# Patient Record
Sex: FEMALE | Race: WHITE | NOT HISPANIC OR LATINO | Employment: PART TIME | URBAN - METROPOLITAN AREA
[De-identification: names, ages, dates, MRNs, and addresses within clinical notes are randomized per-mention and may not be internally consistent; named-entity substitution may affect disease eponyms.]

---

## 2017-02-20 ENCOUNTER — PRE VISIT (OUTPATIENT)
Dept: CARDIOLOGY | Facility: CLINIC | Age: 67
End: 2017-02-20

## 2017-03-03 ENCOUNTER — OFFICE VISIT (OUTPATIENT)
Dept: CARDIOLOGY | Facility: CLINIC | Age: 67
End: 2017-03-03
Payer: COMMERCIAL

## 2017-03-03 VITALS
HEIGHT: 65 IN | BODY MASS INDEX: 23.66 KG/M2 | WEIGHT: 142 LBS | DIASTOLIC BLOOD PRESSURE: 54 MMHG | SYSTOLIC BLOOD PRESSURE: 102 MMHG | HEART RATE: 69 BPM

## 2017-03-03 DIAGNOSIS — I25.10 CORONARY ARTERY DISEASE INVOLVING NATIVE CORONARY ARTERY OF NATIVE HEART WITHOUT ANGINA PECTORIS: Primary | ICD-10-CM

## 2017-03-03 DIAGNOSIS — E78.00 HYPERCHOLESTEREMIA: ICD-10-CM

## 2017-03-03 PROCEDURE — 99214 OFFICE O/P EST MOD 30 MIN: CPT | Performed by: INTERNAL MEDICINE

## 2017-03-03 NOTE — LETTER
3/3/2017    Allan Cano MD  [facility]   [address]  [facility] [address]    RE: Svetlana Adair       Dear Colleague,    I had the pleasure of seeing Svetlana Adair in the [facility].    Ms. Adair is a very nice 66-year-old woman with a past medical history significant for a very high calcium score at 790, putting her in the top 1% for age.  She is a former smoker, having quit 4 years ago.  She has hypercholesterolemia.  She was under a great deal of stress as her   of Alzheimer disease and she was a caregiver for many years.  She herself has also had breast cancer and bilateral mastectomies.  Lymph nodes were all negative.      Svetlana returns to clinic stating that she is doing quite well from a cardiac standpoint.  She has no chest, arm, neck, jaw, shoulder discomfort, no dyspnea on exertion, orthopnea or PND.  No palpitations, lightheadedness, dizziness, syncope or near-syncope.        She states she is not doing quite as well as she has in the past.  She has a new boyfriend and unfortunately her diet has slid and she has gained about 5 pounds of weight as she states he is thin and does not watch as strict of a diet that she did.  She notes no side effects or problems with her current medical regimen.      She has a variety of questions including vitamin D, calcium supplements, Coenzyme Q10, her stress test and her calcium scoring test and what she needs to do to reverse these things, what is her long-term risks.  She states she is just haunted by the fact that she is in the top 1% for risk.     Outpatient Encounter Prescriptions as of 3/3/2017   Medication Sig Dispense Refill     Multiple Vitamins-Minerals (MULTIVITAMIN ADULT PO) Take by mouth daily       [DISCONTINUED] valACYclovir (VALTREX) 1000 mg tablet TAKE 2 TABLETS BY MOUTH TWICE DAILY 8 tablet 0     atorvastatin (LIPITOR) 40 MG tablet TAKE 1 TABLET(40 MG) BY  MOUTH DAILY 90 tablet 3     HYDROcodone-acetaminophen (NORCO) 5-325 MG per tablet Take 1 tablet by mouth every 6 hours as needed for pain 45 tablet 0     triamterene-hydrochlorothiazide (MAXZIDE-25) 37.5-25 MG per tablet Take 1 tablet by mouth as needed 90 tablet 0     cyclobenzaprine (FLEXERIL) 5 MG tablet Take 1-2 tablets (5-10 mg) by mouth 2 times daily as needed for muscle spasms 42 tablet 0     FIBER PO 2 tablets twice daily       nicotine polacrilex (NICORETTE) 2 MG gum Place 2 mg inside cheek as needed for smoking cessation       aspirin 81 MG tablet Take 1 tablet (81 mg) by mouth daily 30 tablet      omeprazole 20 MG tablet Take 1 tablet (20 mg) by mouth as needed 90 tablet 1     Omega-3 Fatty Acids (OMEGA 3 PO) Take 1 tablet by mouth 2 times daily        LYSINE 1-3 daily as needed       [DISCONTINUED] nicotine polacrilex (NICORETTE) 2 MG gum Take 1 each (2 mg) by mouth as needed for smoking cessation 170 each 3     Cholecalciferol (VITAMIN D3) 2000 UNITS TABS Take 5,000 Units by mouth Reported on 3/3/2017       Coenzyme Q10 (COQ10) 100 MG CAPS Take by mouth daily Reported on 3/3/2017       [DISCONTINUED] vitamin  B complex with vitamin C (VITAMIN  B COMPLEX) TABS Take 1 tablet by mouth daily       No facility-administered encounter medications on file as of 3/3/2017.       ASSESSMENT AND PLAN:  Svetlana appears to be doing quite well from a cardiac standpoint without clinical evidence of ischemia or heart failure or significant arrhythmia.  During her stress test of 12/2015, she was able to exercise to a very high workload and I have explained to her this means she has no hemodynamically significant coronary artery disease but her calcium score tells us that she does have quite a bit of coronary artery disease brewing and that we need to be aggressive with all risk factors.      I reassured her she is doing quite well.  Blood pressure is 102/54 with a pulse of 69.      Weight is 142 pounds which still keeps  her in the normal range with a body mass index of 23.7.  This is up about 5 pounds from last fall.      Fasting lipid profile is outstanding.  Total cholesterol is 140, HDL 75, LDL 52, triglycerides were 65.  I point out to dramatic improvement with LDL dropping from 142 all the way to 52 at the same time her HDL going up by 4 points.      I have told her the most important thing is the fact that she is off cigarettes.      Electrolytes were also within normal limits with a creatinine of 0.75, BUN of 12 and a potassium of 4.3.      We talked about the importance of regular exercise regimen and keeping a healthy diet.  We also talked about the fact that she probably should be on a vitamin D supplement.  Coenzyme Q10 may decrease the risk for side effects with the atorvastatin.  A multivitamin is probably not necessary but again I have asked her to discuss these with her primary care doctor.  I will have her follow up with my PATIENCE in 1 year.  I will see her back in 2 years.  If she were to have any problems I would be glad to see her sooner.     Again, thank you for allowing me to participate in the care of your patient.      Sincerely,    Jose Alberto Perez MD     [facility]

## 2017-03-03 NOTE — PROGRESS NOTES
HPI and Plan:   See dictation    Orders Placed This Encounter   Procedures     Lipid Profile     Lipid Profile     Follow-Up with Cardiac Advanced Practice Provider     Follow-Up with Cardiologist       Orders Placed This Encounter   Medications     Multiple Vitamins-Minerals (MULTIVITAMIN ADULT PO)     Sig: Take by mouth daily       Medications Discontinued During This Encounter   Medication Reason     nicotine polacrilex (NICORETTE) 2 MG gum Medication Reconciliation Clean Up     vitamin  B complex with vitamin C (VITAMIN  B COMPLEX) TABS Stopped by Patient         Encounter Diagnoses   Name Primary?     Coronary artery disease involving native coronary artery of native heart without angina pectoris Yes     Hypercholesteremia        CURRENT MEDICATIONS:  Current Outpatient Prescriptions   Medication Sig Dispense Refill     Multiple Vitamins-Minerals (MULTIVITAMIN ADULT PO) Take by mouth daily       valACYclovir (VALTREX) 1000 mg tablet TAKE 2 TABLETS BY MOUTH TWICE DAILY 8 tablet 0     atorvastatin (LIPITOR) 40 MG tablet TAKE 1 TABLET(40 MG) BY MOUTH DAILY 90 tablet 3     HYDROcodone-acetaminophen (NORCO) 5-325 MG per tablet Take 1 tablet by mouth every 6 hours as needed for pain 45 tablet 0     triamterene-hydrochlorothiazide (MAXZIDE-25) 37.5-25 MG per tablet Take 1 tablet by mouth as needed 90 tablet 0     cyclobenzaprine (FLEXERIL) 5 MG tablet Take 1-2 tablets (5-10 mg) by mouth 2 times daily as needed for muscle spasms 42 tablet 0     FIBER PO 2 tablets twice daily       nicotine polacrilex (NICORETTE) 2 MG gum Place 2 mg inside cheek as needed for smoking cessation       aspirin 81 MG tablet Take 1 tablet (81 mg) by mouth daily 30 tablet      omeprazole 20 MG tablet Take 1 tablet (20 mg) by mouth as needed 90 tablet 1     Omega-3 Fatty Acids (OMEGA 3 PO) Take 1 tablet by mouth 2 times daily        LYSINE 1-3 daily as needed       Cholecalciferol (VITAMIN D3) 2000 UNITS TABS Take 5,000 Units by mouth Reported  on 3/3/2017       Coenzyme Q10 (COQ10) 100 MG CAPS Take by mouth daily Reported on 3/3/2017         ALLERGIES     Allergies   Allergen Reactions     Cats      Codeine Sulfate GI Disturbance       PAST MEDICAL HISTORY:  Past Medical History   Diagnosis Date     Alcoholism (H)      Allergies      Fall     Basal cell cancer      back, chest, face     Breast cancer (H)      Coronary artery disease      CT calcium tetgt=877 Nov 2015     Depression      Hypertension      borderline     Squamous cell carcinoma (H)      left upper arm, chin       PAST SURGICAL HISTORY:  Past Surgical History   Procedure Laterality Date     Gyn surgery  2005     hysterectomy after hx of ovarian cyst, ended up being benign     Mastectomy modified radical  2011     bilateral     Colonoscopy       Orthopedic surgery       rt. knee arthroscopy     Realign patella  1973     right      Toe surgery       right grt OA      Colonoscopy  11/17/2011     Procedure:COLONOSCOPY; Colonoscopy; Surgeon:MEKA RUSS; Location:[facility]     Hysterectomy, pap no longer indicated       Mastectomy, bilateral         FAMILY HISTORY:  Family History   Problem Relation Age of Onset     CANCER Mother 60     leukemia     Arthritis Mother      osteo     Eye Disorder Mother      glaucoma     GASTROINTESTINAL DISEASE Mother      gallbladder     Alcohol/Drug Father      alcohol     HEART DISEASE Father      ? CHF     Respiratory Father      emphysema     Asthma Sister      Cancer - colorectal Brother 50     Prostate Cancer Brother      Allergies Brother      bee      Arthritis Sister      osteo     Arthritis Sister      osteo     Arthritis Brother      osteo     Depression Sister      Psychotic Disorder Sister      bipolar     Respiratory Sister        SOCIAL HISTORY:  Social History     Social History     Marital status:      Spouse name: N/A     Number of children: N/A     Years of education: N/A     Social History Main Topics     Smoking status: Former Smoker  "    Packs/day: 1.00     Years: 22.00     Types: Cigarettes     Quit date: 4/28/2010     Smokeless tobacco: Never Used     Alcohol use No      Comment: intermittent sobriety 8/24/11     Drug use: No     Sexual activity: Yes     Partners: Male     Birth control/ protection: Surgical      Comment: hyst     Other Topics Concern      Service No     Blood Transfusions No     Caffeine Concern Yes     4-5 cups caffeine per day     Occupational Exposure No     Hobby Hazards No     Sleep Concern Yes     Stress Concern Yes     Weight Concern No     Special Diet Yes     organic foods     Back Care No     Exercise No     Bike Helmet No     Seat Belt Yes     Self-Exams Yes     Parent/Sibling W/ Cabg, Mi Or Angioplasty Before 65f 55m? No     Social History Narrative       Review of Systems:  Skin:  Negative       Eyes:  Positive for glasses    ENT:  Negative      Respiratory:  Negative       Cardiovascular:  Negative      Gastroenterology: Negative      Genitourinary:  not assessed      Musculoskeletal:  Positive for arthritis;joint stiffness    Neurologic:  Positive for numbness or tingling of feet toes  Psychiatric:  Negative      Heme/Lymph/Imm:  Positive for allergies cats  Endocrine:  Negative        Physical Exam:  Vitals: /54  Pulse 69  Ht 1.651 m (5' 5\")  Wt 64.4 kg (142 lb)  BMI 23.63 kg/m2    Constitutional:  cooperative, alert and oriented, well developed, well nourished, in no acute distress        Skin:  warm and dry to the touch, no apparent skin lesions or masses noted        Head:  normocephalic, no masses or lesions        Eyes:  pupils equal and round;conjunctivae and lids unremarkable;sclera white;no xanthalasma;no nystagmus        ENT:  no pallor or cyanosis, dentition good        Neck:  carotid pulses are full and equal bilaterally;no carotid bruit        Chest:  normal breath sounds, clear to auscultation, normal A-P diameter, normal symmetry, normal respiratory excursion, no use of accessory " muscles          Cardiac: regular rhythm;normal S1 and S2;no S3 or S4;no murmurs, gallops or rubs detected                  Abdomen:           Vascular: pulses full and equal                                        Extremities and Back:  no edema;no spinal abnormalities noted;normal muscle strength and tone              Neurological:  affect appropriate, oriented to time, person and place;no gross motor deficits              CC  Jose Alberto Perez MD  [facility]  [address]  [place] [address]

## 2017-03-07 NOTE — PROGRESS NOTES
HISTORY OF PRESENT ILLNESS:  Ms. Adair is a very nice 66-year-old woman with a past medical history significant for a very high calcium score at 790, putting her in the top 1% for age.  She is a former smoker, having quit 4 years ago.  She has hypercholesterolemia.  She was under a great deal of stress as her   of Alzheimer disease and she was a caregiver for many years.  She herself has also had breast cancer and bilateral mastectomies.  Lymph nodes were all negative.      Svetlana returns to clinic stating that she is doing quite well from a cardiac standpoint.  She has no chest, arm, neck, jaw, shoulder discomfort, no dyspnea on exertion, orthopnea or PND.  No palpitations, lightheadedness, dizziness, syncope or near-syncope.        She states she is not doing quite as well as she has in the past.  She has a new boyfriend and unfortunately her diet has slid and she has gained about 5 pounds of weight as she states he is thin and does not watch as strict of a diet that she did.  She notes no side effects or problems with her current medical regimen.      She has a variety of questions including vitamin D, calcium supplements, Coenzyme Q10, her stress test and her calcium scoring test and what she needs to do to reverse these things, what is her long-term risks.  She states she is just haunted by the fact that she is in the top 1% for risk.      ASSESSMENT AND PLAN:  Svetlana appears to be doing quite well from a cardiac standpoint without clinical evidence of ischemia or heart failure or significant arrhythmia.  During her stress test of 2015, she was able to exercise to a very high workload and I have explained to her this means she has no hemodynamically significant coronary artery disease but her calcium score tells us that she does have quite a bit of coronary artery disease brewing and that we need to be aggressive with all risk factors.      I reassured her she is doing quite well.  Blood  pressure is 102/54 with a pulse of 69.      Weight is 142 pounds which still keeps her in the normal range with a body mass index of 23.7.  This is up about 5 pounds from last fall.      Fasting lipid profile is outstanding.  Total cholesterol is 140, HDL 75, LDL 52, triglycerides were 65.  I point out to dramatic improvement with LDL dropping from 142 all the way to 52 at the same time her HDL going up by 4 points.      I have told her the most important thing is the fact that she is off cigarettes.      Electrolytes were also within normal limits with a creatinine of 0.75, BUN of 12 and a potassium of 4.3.      We talked about the importance of regular exercise regimen and keeping a healthy diet.  We also talked about the fact that she probably should be on a vitamin D supplement.  Coenzyme Q10 may decrease the risk for side effects with the atorvastatin.  A multivitamin is probably not necessary but again I have asked her to discuss these with her primary care doctor.  I will have her follow up with my PATIENCE in 1 year.  I will see her back in 2 years.  If she were to have any problems I would be glad to see her sooner.         NITA GARCIA MD, [facility]             D: 2017 15:33   T: 2017 08:12   MT: DESTIN      Name:     ERIKA VALENCIA   MRN:      [id number]        Account:      [id number]   :      1950           Service Date: 2017      Document: [id number]

## 2017-04-16 ENCOUNTER — MYC MEDICAL ADVICE (OUTPATIENT)
Dept: FAMILY MEDICINE | Facility: CLINIC | Age: 67
End: 2017-04-16

## 2017-04-16 DIAGNOSIS — B00.9 HSV (HERPES SIMPLEX VIRUS) INFECTION: ICD-10-CM

## 2017-04-17 RX ORDER — VALACYCLOVIR HYDROCHLORIDE 1 G/1
TABLET, FILM COATED ORAL
Qty: 8 TABLET | Refills: 1 | Status: ON HOLD | OUTPATIENT
Start: 2017-04-17 | End: 2017-11-01

## 2017-04-17 NOTE — TELEPHONE ENCOUNTER
valACYclovir (VALTREX) 1000 mg tablet 8 tablet 0 11/16/2016          Last Written Prescription Date: 11/16/2016  Last Fill Quantity: 8, # refills: 0  Last Office Visit with [facility], [facility] or [facility] prescribing provider: 11/18/2016        Creatinine   Date Value Ref Range Status   11/14/2016 0.75 0.52 - 1.04 mg/dL Final

## 2017-04-17 NOTE — TELEPHONE ENCOUNTER
Prescription approved per [facility] Refill Protocol.    Ramona Sandy RN

## 2017-04-27 DIAGNOSIS — M54.50 LOW BACK PAIN WITHOUT SCIATICA, UNSPECIFIED BACK PAIN LATERALITY, UNSPECIFIED CHRONICITY: ICD-10-CM

## 2017-04-27 DIAGNOSIS — F17.218 CIGARETTE NICOTINE DEPENDENCE WITH OTHER NICOTINE-INDUCED DISORDER: Primary | ICD-10-CM

## 2017-04-27 RX ORDER — HYDROCODONE BITARTRATE AND ACETAMINOPHEN 5; 325 MG/1; MG/1
1 TABLET ORAL EVERY 6 HOURS PRN
Qty: 20 TABLET | Refills: 0 | Status: SHIPPED | OUTPATIENT
Start: 2017-04-27 | End: 2017-05-05

## 2017-04-27 NOTE — TELEPHONE ENCOUNTER
PCP:    Starter pack was completed on 11/18/16. Pt requesting to restart Chantix.     Ramona Sandy RN

## 2017-04-27 NOTE — TELEPHONE ENCOUNTER
Pended the Norco- Unsure if should pend starter chantix or continuing please review message below.    Controlled Substance Refill Request for Pending Prescriptions:                       Disp   Refills    HYDROcodone-acetaminophen (NORCO) 5-325 M*45 tab*0            Sig: Take 1 tablet by mouth every 6 hours as needed           for pain    Norco  Problem List Complete:  Yes   checked in past 6 months?  No, route to RN  Last OV:11/18/2016  Last Refill:10/27/2016  Qty:45  Refills:0  Controlled substance agreement: YES

## 2017-04-27 NOTE — TELEPHONE ENCOUNTER
Called patient regarding [place] refill.  Patient states that she uses the narcotic analgesic periodically for multiple sites of arthritic pain.  Her last refill was in October.  Agreed to give her a partial refill and she will follow up with Dr. Cano for ongoing therapy.

## 2017-04-27 NOTE — TELEPHONE ENCOUNTER
Reason for Call:  Medication or medication refill:    Do you use a [facility]?  Name of the pharmacy and phone number for the current request:     [id number] [address] [facility] [address]    Name of the medication requested: HYDROcodone-acetaminophen (NORCO) 5-325 MG per tablet - 2 pills left    Other request: also wants to go back on Chantix; please send rx to the pharm    Can we leave a detailed message on this number? YES    Phone number patient can be reached at: Home number on file [phone] (home)    Best Time:     Call taken on 4/27/2017 at 1:14 PM by Carlene Mejia

## 2017-05-01 DIAGNOSIS — M54.50 LOW BACK PAIN WITHOUT SCIATICA, UNSPECIFIED BACK PAIN LATERALITY, UNSPECIFIED CHRONICITY: ICD-10-CM

## 2017-05-01 RX ORDER — HYDROCODONE BITARTRATE AND ACETAMINOPHEN 5; 325 MG/1; MG/1
1 TABLET ORAL EVERY 6 HOURS PRN
Qty: 20 TABLET | Refills: 0 | Status: CANCELLED | OUTPATIENT
Start: 2017-05-01

## 2017-05-01 NOTE — TELEPHONE ENCOUNTER
Reason for Call:  Medication or medication refill:    Do you use a [facility]?  Name of the pharmacy and phone number for the current request:       WRITTEN PRESCRIPTION REQUESTED    Name of the medication requested: HYDROcodone-acetaminophen (NORCO) 5-325 MG per tablet      Other request: please call when Rx is ready     Can we leave a detailed message on this number? YES    Phone number patient can be reached at: Home number on file [phone] (home)    Best Time: anytime    Call taken on 5/1/2017 at 12:27 PM by Sharlene Morin

## 2017-05-04 NOTE — TELEPHONE ENCOUNTER
She did not take the Rx from the  she wants the whole script  So the half of Rx is still in the front drawer and the patient would like to come and  a new Rx for   A whole script on Monday 5/8/2017  Please call patient when there is a new RX ready for her to

## 2017-05-04 NOTE — TELEPHONE ENCOUNTER
Patient came in to  her medication and to say Goodbye to Dr. Cano who she is going to miss him greatly

## 2017-05-05 RX ORDER — HYDROCODONE BITARTRATE AND ACETAMINOPHEN 5; 325 MG/1; MG/1
1 TABLET ORAL EVERY 6 HOURS PRN
Qty: 20 TABLET | Refills: 0 | Status: SHIPPED | OUTPATIENT
Start: 2017-05-05 | End: 2017-10-27

## 2017-05-08 NOTE — TELEPHONE ENCOUNTER
Called and informed pt that two rx's are available at the .   1 with 20 from Dr. [place] and 1 with an additional 20 from Dr. Cano

## 2017-05-22 ENCOUNTER — MYC MEDICAL ADVICE (OUTPATIENT)
Dept: FAMILY MEDICINE | Facility: CLINIC | Age: 67
End: 2017-05-22

## 2017-05-22 DIAGNOSIS — F17.200 TOBACCO USE DISORDER: Primary | ICD-10-CM

## 2017-05-22 NOTE — TELEPHONE ENCOUNTER
Please see My Chart message below and advise as appropriate. Nicorette gum pended.  Listed as historical medication    Caroline Coughlin RN  [facility] [employer]

## 2017-05-28 ENCOUNTER — MYC MEDICAL ADVICE (OUTPATIENT)
Dept: FAMILY MEDICINE | Facility: CLINIC | Age: 67
End: 2017-05-28

## 2017-05-28 DIAGNOSIS — R60.0 PERIPHERAL EDEMA: ICD-10-CM

## 2017-05-31 RX ORDER — TRIAMTERENE/HYDROCHLOROTHIAZID 37.5-25 MG
1 TABLET ORAL PRN
Qty: 90 TABLET | Refills: 0 | Status: SHIPPED | OUTPATIENT
Start: 2017-05-31 | End: 2017-08-16

## 2017-05-31 NOTE — TELEPHONE ENCOUNTER
Pending Prescriptions:                       Disp   Refills    triamterene-hydrochlorothiazide (MAXZIDE-*90 tab*0            Sig: Take 1 tablet by mouth as needed          Last Written Prescription Date: 09/19/16  Last Fill Quantity: 90, # refills: 0  Last Office Visit with [facility], [facility] or [facility] prescribing provider: 11/18/16       Potassium   Date Value Ref Range Status   11/14/2016 4.3 3.4 - 5.3 mmol/L Final     Creatinine   Date Value Ref Range Status   11/14/2016 0.75 0.52 - 1.04 mg/dL Final     BP Readings from Last 3 Encounters:   03/03/17 102/54   11/18/16 107/69   01/15/16 122/70

## 2017-05-31 NOTE — TELEPHONE ENCOUNTER
Routing refill request to provider for review/approval because:  Drug not on the [facility] refill protocol for dx of peripheral edema  Peripheral edema not discussed in visits within past year  Audrey ABREU RN

## 2017-08-02 ENCOUNTER — TELEPHONE (OUTPATIENT)
Dept: CARDIOLOGY | Facility: CLINIC | Age: 67
End: 2017-08-02

## 2017-08-02 DIAGNOSIS — R00.2 PALPITATIONS: Primary | ICD-10-CM

## 2017-08-02 DIAGNOSIS — E78.5 HYPERLIPIDEMIA LDL GOAL <70: ICD-10-CM

## 2017-08-02 NOTE — TELEPHONE ENCOUNTER
"Pt calling- left voice message that she is concerned her statin is causing muscle \"tone\" issues and memory issues.  Also she is fatigued.    Called pt - reviewed that with her elevated calcium score she needs a statin rx.  Discussed usual side effects being muscle aching - not usually tone issues.  Pt will hold atorvastatin for 2 weeks and see if it helps.  Also, needs follow up with PCP to review these issues.    Discussed calcium score isn't treated with calcium management but is related to plaque and fat build up in arteries.  Avoid fats - red meat only 2-3x/week, eggs not daily.  Offered dietician consult.  Also recommended book by Dr. Garcia Harmon on reversing CAD.  Gave her PCP recommendations- Dr. Cummins and Dr. Mcginnis.  Will review if lab work needed with Dr. Perez.-   "

## 2017-08-02 NOTE — TELEPHONE ENCOUNTER
"Called to review that at times she is having \"fluttering\" in her chest  Denies other associated sxs ie lightheadness or shortness or breath    Will keep diary of when this occurs  Call if increasing or symptomatic    Noted on nuc gxt 2015 had 28K irregular beats.    "

## 2017-08-08 ENCOUNTER — HOSPITAL ENCOUNTER (OUTPATIENT)
Dept: CARDIOLOGY | Facility: CLINIC | Age: 67
Discharge: HOME | End: 2017-08-08
Attending: INTERNAL MEDICINE | Admitting: INTERNAL MEDICINE
Payer: MEDICARE

## 2017-08-08 DIAGNOSIS — R00.2 PALPITATIONS: ICD-10-CM

## 2017-08-08 DIAGNOSIS — E78.5 HYPERLIPIDEMIA LDL GOAL <70: ICD-10-CM

## 2017-08-08 LAB
ANION GAP SERPL CALCULATED.3IONS-SCNC: 6 MMOL/L (ref 6–17)
BUN SERPL-MCNC: 13 MG/DL (ref 7–30)
CALCIUM SERPL-MCNC: 9.6 MG/DL (ref 8.5–10.5)
CHLORIDE SERPL-SCNC: 105 MMOL/L (ref 98–107)
CK SERPL-CCNC: 147 U/L (ref 30–225)
CO2 SERPL-SCNC: 29 MMOL/L (ref 23–29)
CREAT SERPL-MCNC: 0.9 MG/DL (ref 0.7–1.3)
GFR SERPL CREATININE-BSD FRML MDRD: 63 ML/MIN/1.7M2
GLUCOSE SERPL-MCNC: 90 MG/DL (ref 70–105)
POTASSIUM SERPL-SCNC: 4 MMOL/L (ref 3.5–5.1)
SODIUM SERPL-SCNC: 136 MMOL/L (ref 136–145)

## 2017-08-08 PROCEDURE — 80048 BASIC METABOLIC PNL TOTAL CA: CPT | Performed by: INTERNAL MEDICINE

## 2017-08-08 PROCEDURE — 36415 COLL VENOUS BLD VENIPUNCTURE: CPT | Performed by: INTERNAL MEDICINE

## 2017-08-08 PROCEDURE — 93225 XTRNL ECG REC<48 HRS REC: CPT | Performed by: INTERNAL MEDICINE

## 2017-08-08 PROCEDURE — 82550 ASSAY OF CK (CPK): CPT | Performed by: INTERNAL MEDICINE

## 2017-08-08 PROCEDURE — 93227 XTRNL ECG REC<48 HR R&I: CPT | Performed by: INTERNAL MEDICINE

## 2017-08-11 ENCOUNTER — DOCUMENTATION ONLY (OUTPATIENT)
Dept: CARDIOLOGY | Facility: CLINIC | Age: 67
End: 2017-08-11

## 2017-08-11 DIAGNOSIS — I25.10 CORONARY ARTERY DISEASE INVOLVING NATIVE CORONARY ARTERY OF NATIVE HEART WITHOUT ANGINA PECTORIS: ICD-10-CM

## 2017-08-11 NOTE — PROGRESS NOTES
Phone call 8-2-2017 fluttering in chest Dr. Perez ordered BMP and Holter    Component      Latest Ref Rng & Units 8/8/2017   Sodium      136 - 145 mmol/L 136   Potassium      3.5 - 5.1 mmol/L 4.0   Chloride      98 - 107 mmol/L 105   Carbon Dioxide      23 - 29 mmol/L 29   Anion Gap      6 - 17 mmol/L 6   Glucose      70 - 105 mg/dL 90   Urea Nitrogen      7 - 30 mg/dL 13   Creatinine      0.70 - 1.30 mg/dL 0.90   GFR Estimate      >60 mL/min/1.7m2 63   GFR Estimate If Black      >60 mL/min/1.7m2 76   Calcium      8.5 - 10.5 mg/dL 9.6       Holter 24 hours Showed 464 isolated SVPB's, 5 pairs and 2 Runs longest run was 4 beats which was alos the fastes rate 130bpm at 3:13pm  Has D/U with PMD on 9-  F/U with Cardiology in March 2018      See if she would be willing to try atorvastatin 10 mg a day. Per Dr. Perez

## 2017-08-13 NOTE — TELEPHONE ENCOUNTER
Her electrolytes are fine. Holter is benign. She should have her TSH rechecked. Did her heart flutter during Holter?

## 2017-08-14 NOTE — PROGRESS NOTES
"  Spoke with patient and BMP results reviewed. Patient said she had a brief  fluttering feeling once while wearing the Holter and pressed the button.   Per Holter results - event of palpitation correlated with an isolated PAC. Patient states that she still experiences about 1-2 X/Week a brief episode, about 1 minute,  of fluttering.  Patient also is currently on a statin Holter for \" feeling that leg muscles are atrophying (shrinking) , fatigue and slight memory forgetfulness for the past year.\"  Patient states maybe her memory is better for the past week or so since holding the atorvastatin.    See if she would be willing to try atorvastatin 10 mg a day. Per Dr. Perez                 Patient states she is willing to try Atorvastatin 10mg and will repeat FLP in 6 weeks.  Reviewed holter results and recommend she cut back on caffeine to see if palpitations or fluttering has decreased.  Sent RX to [facility] and orders placed in Epic.  "

## 2017-08-15 RX ORDER — ATORVASTATIN CALCIUM 40 MG/1
TABLET, FILM COATED ORAL
Qty: 90 TABLET | Refills: 3 | Status: CANCELLED | COMMUNITY
Start: 2017-08-15

## 2017-08-15 RX ORDER — ATORVASTATIN CALCIUM 10 MG/1
10 TABLET, FILM COATED ORAL DAILY
Qty: 90 TABLET | Refills: 0 | Status: SHIPPED | OUTPATIENT
Start: 2017-08-15 | End: 2018-01-03 | Stop reason: ALTCHOICE

## 2017-08-23 ENCOUNTER — OFFICE VISIT (OUTPATIENT)
Dept: FAMILY MEDICINE | Facility: CLINIC | Age: 67
End: 2017-08-23
Payer: COMMERCIAL

## 2017-08-23 VITALS
BODY MASS INDEX: 23.73 KG/M2 | OXYGEN SATURATION: 96 % | RESPIRATION RATE: 18 BRPM | TEMPERATURE: 96.7 F | HEART RATE: 65 BPM | HEIGHT: 64 IN | DIASTOLIC BLOOD PRESSURE: 61 MMHG | SYSTOLIC BLOOD PRESSURE: 98 MMHG | WEIGHT: 139 LBS

## 2017-08-23 DIAGNOSIS — H92.03 ACUTE PAIN OF BOTH EARS: ICD-10-CM

## 2017-08-23 DIAGNOSIS — R53.83 FATIGUE, UNSPECIFIED TYPE: Primary | ICD-10-CM

## 2017-08-23 DIAGNOSIS — R00.2 HEART PALPITATIONS: ICD-10-CM

## 2017-08-23 PROCEDURE — 99215 OFFICE O/P EST HI 40 MIN: CPT | Performed by: NURSE PRACTITIONER

## 2017-08-23 ASSESSMENT — ENCOUNTER SYMPTOMS: PALPITATIONS: 1

## 2017-08-23 NOTE — NURSING NOTE
"Chief Complaint   Patient presents with     Palpitations     Ear Problem       Initial BP 98/61 (BP Location: Left arm, Patient Position: Chair, Cuff Size: Adult Regular)  Pulse 65  Temp 96.7  F (35.9  C) (Oral)  Resp 18  Ht 5' 4\" (1.626 m)  Wt 139 lb (63 kg)  SpO2 96%  BMI 23.86 kg/m2 Estimated body mass index is 23.86 kg/(m^2) as calculated from the following:    Height as of this encounter: 5' 4\" (1.626 m).    Weight as of this encounter: 139 lb (63 kg).  Medication Reconciliation: complete   Naya Stoddard CMA (AAMA)      "

## 2017-08-23 NOTE — PROGRESS NOTES
"HPI    SUBJECTIVE:   Svetlana Adair is a 67 year old female who presents to clinic today for the following health issues:    Palpitations      Duration: Ongoing    Description (location/character/radiation): Heart Palpitations-Had holter monitor this month with Cardiology    Intensity:  mild    Accompanying signs and symptoms: No chest pain; Very mild SOB that is more like stress SOB, even with just talking     History (similar episodes/previous evaluation): None    Precipitating or alleviating factors: None    Therapies tried and outcome: None       Complains of palpitations that are \"happening a lot today\"  Ear ache right 3 weeks ago, down neck on right side only, now last couple of days both ears hurt, jaw pain  Complains of fatigue for the last couple of months,   Complains of stress, stated she does have a good support system, she is not currently in counseling.  Atorvastatin down to 10 mg from 40 mg a couple weeks ago due to increasing fatigue  Memory changes, complains of brain fog and forgetfulness  Palpations in the last 4-6 months  Muscular atrophy, no weight loss but clothes are fitting differently, she is not currently working out or exercising due to fatigue.  Complains of indegestion-omeprazole as needed  Complains of vertigo for the last 2 months, lightheaded when standing      Past Medical History:   Diagnosis Date     Alcoholism (H)      Allergies     Fall     Basal cell cancer     back, chest, face     Breast cancer (H)      Coronary artery disease     CT calcium nyqtl=172 Nov 2015     Depression      Hypertension     borderline     Squamous cell carcinoma     left upper arm, chin     Past Surgical History:   Procedure Laterality Date     COLONOSCOPY       COLONOSCOPY  11/17/2011    Procedure:COLONOSCOPY; Colonoscopy; Surgeon:MEKA RUSS; Location: GI     GYN SURGERY  2005    hysterectomy after hx of ovarian cyst, ended up being benign     HYSTERECTOMY, PAP NO LONGER INDICATED       " MASTECTOMY MODIFIED RADICAL  2011    bilateral     MASTECTOMY, BILATERAL       ORTHOPEDIC SURGERY      rt. knee arthroscopy     REALIGN PATELLA  1973    right      TOE SURGERY      right grt OA      Social History   Substance Use Topics     Smoking status: Former Smoker     Packs/day: 1.00     Years: 22.00     Types: Cigarettes     Quit date: 4/28/2010     Smokeless tobacco: Never Used     Alcohol use No      Comment: intermittent sobriety 8/24/11     Current Outpatient Prescriptions   Medication Sig Dispense Refill     omeprazole 20 MG tablet Take 1 tablet (20 mg) by mouth as needed 30 tablet 0     triamterene-hydrochlorothiazide (MAXZIDE-25) 37.5-25 MG per tablet Take 1 tablet by mouth as needed 90 tablet 0     atorvastatin (LIPITOR) 10 MG tablet Take 1 tablet (10 mg) by mouth daily 90 tablet 0     nicotine polacrilex (NICORETTE) 2 MG gum Place 1 each (2 mg) inside cheek as needed for smoking cessation 30 tablet 11     HYDROcodone-acetaminophen (NORCO) 5-325 MG per tablet Take 1 tablet by mouth every 6 hours as needed for pain 20 tablet 0     valACYclovir (VALTREX) 1000 mg tablet TAKE 2 TABLETS BY MOUTH TWICE DAILY 8 tablet 1     Multiple Vitamins-Minerals (MULTIVITAMIN ADULT PO) Take by mouth daily       atorvastatin (LIPITOR) 40 MG tablet TAKE 1 TABLET(40 MG) BY MOUTH DAILY 90 tablet 3     cyclobenzaprine (FLEXERIL) 5 MG tablet Take 1-2 tablets (5-10 mg) by mouth 2 times daily as needed for muscle spasms 42 tablet 0     Cholecalciferol (VITAMIN D3) 2000 UNITS TABS Take 5,000 Units by mouth Reported on 3/3/2017       FIBER PO 2 tablets twice daily       Coenzyme Q10 (COQ10) 100 MG CAPS Take by mouth daily Reported on 3/3/2017       aspirin 81 MG tablet Take 1 tablet (81 mg) by mouth daily 30 tablet      Omega-3 Fatty Acids (OMEGA 3 PO) Take 1 tablet by mouth 2 times daily        LYSINE 1-3 daily as needed       varenicline (CHANTIX STARTING MONTH PAK) 0.5 MG X 11 & 1 MG X 42 tablet Take 0.5 mg tab daily for 3  "days, then 0.5 mg tab twice daily for 4 days, then 1 mg twice daily. (Patient not taking: Reported on 8/23/2017) 53 tablet 0     Allergies   Allergen Reactions     Cats      Codeine Sulfate GI Disturbance       Reviewed and updated as needed this visit by clinical staff and provider      Review of Systems   Constitutional: Positive for malaise/fatigue.   HENT: Positive for ear pain.         Bilateral jaw pain   Cardiovascular: Positive for palpitations.   Gastrointestinal: Positive for heartburn (indigestion).   Neurological: Positive for dizziness.   All other systems reviewed and are negative.        BP 98/61 (BP Location: Left arm, Patient Position: Chair, Cuff Size: Adult Regular)  Pulse 65  Temp 96.7  F (35.9  C) (Oral)  Resp 18  Ht 5' 4\" (1.626 m)  Wt 139 lb (63 kg)  SpO2 96%  BMI 23.86 kg/m2      Physical Exam   Constitutional: She is well-developed, well-nourished, and in no distress. Vital signs are normal.   HENT:   Head: Normocephalic.   Right Ear: Tympanic membrane, external ear and ear canal normal.   Left Ear: Tympanic membrane, external ear and ear canal normal.   Jaw was asymmetric with popping on the right side with closing of the jaw   Eyes: Conjunctivae are normal.   Cardiovascular: Normal rate and normal heart sounds.    Occasional ectopic beats   Pulmonary/Chest: Effort normal and breath sounds normal.   Neurological: She is alert.   Skin: Skin is warm and dry.   Psychiatric: Mood and affect normal.   Nursing note and vitals reviewed.      Assessment and Plan:       ICD-10-CM    1. Fatigue, unspecified type R53.83 TSH with free T4 reflex   2. Heart palpitations R00.2    3. Acute pain of both ears H92.03        Multiple symptoms of unknown etiology   Patient to have TSH done at next physical exam (9/25) with Dr Cruz  No acute findings on exam, ear pain is likely due to TMJ   Discussed the use of nicorette gum with her jaw pain and to try lozenges instead  Discussed cutting down on " caffeine and stopping all caffeine before noon.  Talked about diet and exercise to help with fatigue  Discussed coping strategies, counseling. Patient stated she has a good support system and is not afraid to talk .  Return to clinic if symptoms persist or worsen      The total visit time was 40 minutes more  than 50% was spent in counseling and coordination of care as discussed above.        Adriana Roblero, RN NP student      Muriel Mcintyre APRN, CNP  [facility]

## 2017-08-23 NOTE — MR AVS SNAPSHOT
After Visit Summary   8/23/2017    Svetlana Adair    MRN: [id number]           Patient Information     Date Of Birth          1950        Visit Information        Provider Department      8/23/2017 11:00 AM Muriel Mcintyre APRN CNP [facility]        Today's Diagnoses     Fatigue, unspecified type    -  1    Heart palpitations        Acute pain of both ears           Follow-ups after your visit        Your next 10 appointments already scheduled     Sep 25, 2017  9:30 AM CDT   Office Visit with Vladislav Cruz MD   [facility]    [address] [facility] [id number] [phone]           Bring a current list of meds and any records pertaining to this visit. For Physicals, please bring immunization records and any forms needing to be filled out. Please arrive 10 minutes early to complete paperwork.              Who to contact     If you have questions or need follow up information about today's clinic visit or your schedule please contact [facility] directly at [phone].  Normal or non-critical lab and imaging results will be communicated to you by Delphixhart, letter or phone within 4 business days after the clinic has received the results. If you do not hear from us within 7 days, please contact the clinic through Delphixhart or phone. If you have a critical or abnormal lab result, we will notify you by phone as soon as possible.  Submit refill requests through Doximity or call your pharmacy and they will forward the refill request to us. Please allow 3 business days for your refill to be completed.          Additional Information About Your Visit        MyChart Information     Doximity gives you secure access to your electronic health record. If you see a primary care provider, you can also send messages to your care team and make appointments. If you have questions, please call your primary care clinic.  If you do not have a  "primary care provider, please call [phone] and they will assist you.        Care EveryWhere ID     This is your Care EveryWhere ID. This could be used by other organizations to access your [place] medical records  [id number]        Your Vitals Were     Pulse Temperature Respirations Height Pulse Oximetry BMI (Body Mass Index)    65 96.7  F (35.9  C) (Oral) 18 5' 4\" (1.626 m) 96% 23.86 kg/m2       Blood Pressure from Last 3 Encounters:   08/23/17 98/61   03/03/17 102/54   11/18/16 107/69    Weight from Last 3 Encounters:   08/23/17 139 lb (63 kg)   03/03/17 142 lb (64.4 kg)   11/18/16 138 lb (62.6 kg)               Primary Care Provider Office Phone #    Mariano Byrnes [facility] [phone]       No address on file        Equal Access to Services     SMITA AMADOR : Hadii jerson veraso Sojabari, waaxda luqadaha, qaybta kaalmada adeegyada, chelsi james . So [facility] [phone].    ATENCIÓN: Si habla español, tiene a bruner disposición servicios gratuitos de asistencia lingüística. Chato al [phone].    We comply with applicable federal civil rights laws and [place] laws. We do not discriminate on the basis of race, color, national origin, age, disability sex, sexual orientation or gender identity.            Thank you!     Thank you for choosing [facility]  for your care. Our goal is always to provide you with excellent care. Hearing back from our patients is one way we can continue to improve our services. Please take a few minutes to complete the written survey that you may receive in the mail after your visit with us. Thank you!             Your Updated Medication List - Protect others around you: Learn how to safely use, store and throw away your medicines at www.disposemymeds.org.          This list is accurate as of: 8/23/17 11:59 PM.  Always use your most recent med list.                   Brand Name Dispense Instructions for use Diagnosis    aspirin 81 MG " tablet     30 tablet    Take 1 tablet (81 mg) by mouth daily    Coronary artery disease involving native coronary artery of native heart without angina pectoris       * atorvastatin 40 MG tablet    LIPITOR    90 tablet    TAKE 1 TABLET(40 MG) BY MOUTH DAILY    Coronary artery disease involving native coronary artery of native heart without angina pectoris       * atorvastatin 10 MG tablet    LIPITOR    90 tablet    Take 1 tablet (10 mg) by mouth daily    Coronary artery disease involving native coronary artery of native heart without angina pectoris       CoQ10 100 MG Caps      Take by mouth daily Reported on 3/3/2017        cyclobenzaprine 5 MG tablet    FLEXERIL    42 tablet    Take 1-2 tablets (5-10 mg) by mouth 2 times daily as needed for muscle spasms    Torticollis, spasmodic       FIBER PO      2 tablets twice daily        HYDROcodone-acetaminophen 5-325 MG per tablet    NORCO    20 tablet    Take 1 tablet by mouth every 6 hours as needed for pain    Low back pain without sciatica, unspecified back pain laterality, unspecified chronicity       LYSINE      1-3 daily as needed        MULTIVITAMIN ADULT PO      Take by mouth daily        nicotine polacrilex 2 MG gum    NICORETTE    30 tablet    Place 1 each (2 mg) inside cheek as needed for smoking cessation    Tobacco use disorder       OMEGA 3 PO      Take 1 tablet by mouth 2 times daily        omeprazole 20 MG tablet     30 tablet    Take 1 tablet (20 mg) by mouth as needed    GERD (gastroesophageal reflux disease)       triamterene-hydrochlorothiazide 37.5-25 MG per tablet    MAXZIDE-25    90 tablet    Take 1 tablet by mouth as needed    Peripheral edema       valACYclovir 1000 mg tablet    VALTREX    8 tablet    TAKE 2 TABLETS BY MOUTH TWICE DAILY    HSV (herpes simplex virus) infection       varenicline 0.5 MG X 11 & 1 MG X 42 tablet    CHANTIX STARTING MONTH LUMA    53 tablet    Take 0.5 mg tab daily for 3 days, then 0.5 mg tab twice daily for 4 days, then  1 mg twice daily.    Cigarette nicotine dependence with other nicotine-induced disorder       Vitamin D3 2000 UNITS Tabs      Take 5,000 Units by mouth Reported on 3/3/2017        * Notice:  This list has 2 medication(s) that are the same as other medications prescribed for you. Read the directions carefully, and ask your doctor or other care provider to review them with you.

## 2017-08-25 ASSESSMENT — ENCOUNTER SYMPTOMS
HEARTBURN: 1
DIZZINESS: 1

## 2017-09-25 ENCOUNTER — OFFICE VISIT (OUTPATIENT)
Dept: FAMILY MEDICINE | Facility: CLINIC | Age: 67
End: 2017-09-25
Payer: COMMERCIAL

## 2017-09-25 VITALS
HEART RATE: 64 BPM | SYSTOLIC BLOOD PRESSURE: 108 MMHG | OXYGEN SATURATION: 100 % | TEMPERATURE: 97.5 F | BODY MASS INDEX: 22.88 KG/M2 | DIASTOLIC BLOOD PRESSURE: 65 MMHG | HEIGHT: 64 IN | WEIGHT: 134 LBS

## 2017-09-25 DIAGNOSIS — Z23 NEED FOR PROPHYLACTIC VACCINATION AGAINST STREPTOCOCCUS PNEUMONIAE (PNEUMOCOCCUS): ICD-10-CM

## 2017-09-25 DIAGNOSIS — Z23 NEED FOR PROPHYLACTIC VACCINATION AND INOCULATION AGAINST INFLUENZA: ICD-10-CM

## 2017-09-25 DIAGNOSIS — E78.5 HYPERLIPIDEMIA LDL GOAL <130: ICD-10-CM

## 2017-09-25 DIAGNOSIS — Z91.81 AT RISK FOR FALLING: ICD-10-CM

## 2017-09-25 DIAGNOSIS — Z00.00 ROUTINE GENERAL MEDICAL EXAMINATION AT A HEALTH CARE FACILITY: Primary | ICD-10-CM

## 2017-09-25 DIAGNOSIS — C50.911 MALIGNANT NEOPLASM OF RIGHT FEMALE BREAST, UNSPECIFIED ESTROGEN RECEPTOR STATUS, UNSPECIFIED SITE OF BREAST (H): ICD-10-CM

## 2017-09-25 LAB
ALBUMIN SERPL-MCNC: 4.1 G/DL (ref 3.4–5)
ALP SERPL-CCNC: 60 U/L (ref 40–150)
ALT SERPL W P-5'-P-CCNC: 29 U/L (ref 0–50)
ANION GAP SERPL CALCULATED.3IONS-SCNC: 6 MMOL/L (ref 3–14)
AST SERPL W P-5'-P-CCNC: 17 U/L (ref 0–45)
BILIRUB SERPL-MCNC: 0.4 MG/DL (ref 0.2–1.3)
BUN SERPL-MCNC: 13 MG/DL (ref 7–30)
CALCIUM SERPL-MCNC: 9.5 MG/DL (ref 8.5–10.1)
CHLORIDE SERPL-SCNC: 107 MMOL/L (ref 94–109)
CHOLEST SERPL-MCNC: 158 MG/DL
CO2 SERPL-SCNC: 28 MMOL/L (ref 20–32)
CREAT SERPL-MCNC: 0.74 MG/DL (ref 0.52–1.04)
ERYTHROCYTE [DISTWIDTH] IN BLOOD BY AUTOMATED COUNT: 12.4 % (ref 10–15)
GFR SERPL CREATININE-BSD FRML MDRD: 79 ML/MIN/1.7M2
GLUCOSE SERPL-MCNC: 100 MG/DL (ref 70–99)
HCT VFR BLD AUTO: 43.3 % (ref 35–47)
HDLC SERPL-MCNC: 80 MG/DL
HGB BLD-MCNC: 14.4 G/DL (ref 11.7–15.7)
LDLC SERPL CALC-MCNC: 63 MG/DL
MCH RBC QN AUTO: 32.1 PG (ref 26.5–33)
MCHC RBC AUTO-ENTMCNC: 33.3 G/DL (ref 31.5–36.5)
MCV RBC AUTO: 96 FL (ref 78–100)
NONHDLC SERPL-MCNC: 78 MG/DL
PLATELET # BLD AUTO: 263 10E9/L (ref 150–450)
POTASSIUM SERPL-SCNC: 4.4 MMOL/L (ref 3.4–5.3)
PROT SERPL-MCNC: 7.7 G/DL (ref 6.8–8.8)
RBC # BLD AUTO: 4.49 10E12/L (ref 3.8–5.2)
SODIUM SERPL-SCNC: 141 MMOL/L (ref 133–144)
TRIGL SERPL-MCNC: 73 MG/DL
TSH SERPL DL<=0.005 MIU/L-ACNC: 1.1 MU/L (ref 0.4–4)
WBC # BLD AUTO: 5.2 10E9/L (ref 4–11)

## 2017-09-25 PROCEDURE — 84443 ASSAY THYROID STIM HORMONE: CPT | Performed by: INTERNAL MEDICINE

## 2017-09-25 PROCEDURE — G0009 ADMIN PNEUMOCOCCAL VACCINE: HCPCS | Performed by: INTERNAL MEDICINE

## 2017-09-25 PROCEDURE — 36415 COLL VENOUS BLD VENIPUNCTURE: CPT | Performed by: INTERNAL MEDICINE

## 2017-09-25 PROCEDURE — 80053 COMPREHEN METABOLIC PANEL: CPT | Performed by: INTERNAL MEDICINE

## 2017-09-25 PROCEDURE — G0008 ADMIN INFLUENZA VIRUS VAC: HCPCS | Performed by: INTERNAL MEDICINE

## 2017-09-25 PROCEDURE — G0438 PPPS, INITIAL VISIT: HCPCS | Performed by: INTERNAL MEDICINE

## 2017-09-25 PROCEDURE — 90670 PCV13 VACCINE IM: CPT | Performed by: INTERNAL MEDICINE

## 2017-09-25 PROCEDURE — 80061 LIPID PANEL: CPT | Performed by: INTERNAL MEDICINE

## 2017-09-25 PROCEDURE — 90662 IIV NO PRSV INCREASED AG IM: CPT | Performed by: INTERNAL MEDICINE

## 2017-09-25 PROCEDURE — 85027 COMPLETE CBC AUTOMATED: CPT | Performed by: INTERNAL MEDICINE

## 2017-09-25 NOTE — MR AVS SNAPSHOT
After Visit Summary   9/25/2017    Svetlana Adair    MRN: [id number]           Patient Information     Date Of Birth          1950        Visit Information        Provider Department      9/25/2017 9:30 AM Vladislav Cruz MD [facility]        Today's Diagnoses     Routine general medical examination at a health care facility    -  1    Malignant neoplasm of right female breast, unspecified estrogen receptor status, unspecified site of breast (H)        Hyperlipidemia LDL goal <130        Need for prophylactic vaccination and inoculation against influenza        Need for prophylactic vaccination against Streptococcus pneumoniae (pneumococcus)        At risk for falling          Care Instructions      Preventive Health Recommendations    Female Ages 65 +    Yearly exam:     See your health care provider every year in order to  o Review health changes.   o Discuss preventive care.    o Review your medicines if your doctor has prescribed any.      You no longer need a yearly Pap test unless you've had an abnormal Pap test in the past 10 years. If you have vaginal symptoms, such as bleeding or discharge, be sure to talk with your provider about a Pap test.      Every 1 to 2 years, have a mammogram.  If you are over 69, talk with your health care provider about whether or not you want to continue having screening mammograms.      Every 10 years, have a colonoscopy. Or, have a yearly FIT test (stool test). These exams will check for colon cancer.       Have a cholesterol test every 5 years, or more often if your doctor advises it.       Have a diabetes test (fasting glucose) every three years. If you are at risk for diabetes, you should have this test more often.       At age 65, have a bone density scan (DEXA) to check for osteoporosis (brittle bone disease).    Shots:    Get a flu shot each year.    Get a tetanus shot every 10 years.    Talk to your doctor about your pneumonia vaccines.  There are now two you should receive - Pneumovax (PPSV 23) and Prevnar (PCV 13).    Talk to your doctor about the shingles vaccine.    Talk to your doctor about the hepatitis B vaccine.    Nutrition:     Eat at least 5 servings of fruits and vegetables each day.      Eat whole-grain bread, whole-wheat pasta and brown rice instead of white grains and rice.      Talk to your provider about Calcium and Vitamin D.     Lifestyle    Exercise at least 150 minutes a week (30 minutes a day, 5 days a week). This will help you control your weight and prevent disease.      Limit alcohol to one drink per day.      No smoking.       Wear sunscreen to prevent skin cancer.       See your dentist twice a year for an exam and cleaning.      See your eye doctor every 1 to 2 years to screen for conditions such as glaucoma, macular degeneration and cataracts.          Follow-ups after your visit        Who to contact     If you have questions or need follow up information about today's clinic visit or your schedule please contact [facility] directly at [phone].  Normal or non-critical lab and imaging results will be communicated to you by YASSSUhart, letter or phone within 4 business days after the clinic has received the results. If you do not hear from us within 7 days, please contact the clinic through Hiddenbedt or phone. If you have a critical or abnormal lab result, we will notify you by phone as soon as possible.  Submit refill requests through Step Labs or call your pharmacy and they will forward the refill request to us. Please allow 3 business days for your refill to be completed.          Additional Information About Your Visit        Step Labs Information     Step Labs gives you secure access to your electronic health record. If you see a primary care provider, you can also send messages to your care team and make appointments. If you have questions, please call your primary care clinic.  If you do not have a primary  "care provider, please call [phone] and they will assist you.        Care EveryWhere ID     This is your Care EveryWhere ID. This could be used by other organizations to access your [place] medical records  [id number]        Your Vitals Were     Pulse Temperature Height Pulse Oximetry Breastfeeding? BMI (Body Mass Index)    64 97.5  F (36.4  C) (Tympanic) 5' 4\" (1.626 m) 100% No 23 kg/m2       Blood Pressure from Last 3 Encounters:   09/25/17 108/65   08/23/17 98/61   03/03/17 102/54    Weight from Last 3 Encounters:   09/25/17 134 lb (60.8 kg)   08/23/17 139 lb (63 kg)   03/03/17 142 lb (64.4 kg)              We Performed the Following     ADMIN INFLUENZA  (For MEDICARE Patients ONLY) []     CBC with platelets     Comprehensive metabolic panel     FLU VACCINE, INCREASED ANTIGEN, PRESV FREE, AGE 65+ [66856]     Lipid panel reflex to direct LDL     Pneumococcal vaccine 13 valent PCV13 IM (Prevnar) [39721]     TSH          Today's Medication Changes          These changes are accurate as of: 9/25/17 11:46 AM.  If you have any questions, ask your nurse or doctor.               These medicines have changed or have updated prescriptions.        Dose/Directions    atorvastatin 10 MG tablet   Commonly known as:  LIPITOR   This may have changed:  Another medication with the same name was removed. Continue taking this medication, and follow the directions you see here.   Used for:  Coronary artery disease involving native coronary artery of native heart without angina pectoris   Changed by:  Chelsea Roberson RN        Dose:  10 mg   Take 1 tablet (10 mg) by mouth daily   Quantity:  90 tablet   Refills:  0         Stop taking these medicines if you haven't already. Please contact your care team if you have questions.     varenicline 0.5 MG X 11 & 1 MG X 42 tablet   Commonly known as:  CHANTIX STARTING MONTH PAK   Stopped by:  Vladislav Cruz MD                    Primary Care Provider Office Phone #    [place] " [facility] [phone]       No address on file        Equal Access to Services     SMITA MARJORIE : Hadii aad ku hadsanticandace [place], wareginoda julieth, qacynthiacaleb duganjunigeovanny domingo, chelsi spearskeshawnfabio bowie. So [facility] [phone].    ATENCIÓN: Si habla español, tiene a bruner disposición servicios gratuitos de asistencia lingüística. Llame al [phone].    We comply with applicable federal civil rights laws and [place] laws. We do not discriminate on the basis of race, color, national origin, age, disability sex, sexual orientation or gender identity.            Thank you!     Thank you for choosing [facility]  for your care. Our goal is always to provide you with excellent care. Hearing back from our patients is one way we can continue to improve our services. Please take a few minutes to complete the written survey that you may receive in the mail after your visit with us. Thank you!             Your Updated Medication List - Protect others around you: Learn how to safely use, store and throw away your medicines at www.disposemymeds.org.          This list is accurate as of: 9/25/17 11:46 AM.  Always use your most recent med list.                   Brand Name Dispense Instructions for use Diagnosis    aspirin 81 MG tablet     30 tablet    Take 1 tablet (81 mg) by mouth daily    Coronary artery disease involving native coronary artery of native heart without angina pectoris       atorvastatin 10 MG tablet    LIPITOR    90 tablet    Take 1 tablet (10 mg) by mouth daily    Coronary artery disease involving native coronary artery of native heart without angina pectoris       CoQ10 100 MG Caps      Take by mouth daily Reported on 3/3/2017        cyclobenzaprine 5 MG tablet    FLEXERIL    42 tablet    Take 1-2 tablets (5-10 mg) by mouth 2 times daily as needed for muscle spasms    Torticollis, spasmodic       FIBER PO      2 tablets twice daily        HYDROcodone-acetaminophen 5-325 MG per  tablet    NORCO    20 tablet    Take 1 tablet by mouth every 6 hours as needed for pain    Low back pain without sciatica, unspecified back pain laterality, unspecified chronicity       LYSINE      1-3 daily as needed        MULTIVITAMIN ADULT PO      Take by mouth daily        nicotine polacrilex 2 MG gum    NICORETTE    30 tablet    Place 1 each (2 mg) inside cheek as needed for smoking cessation    Tobacco use disorder       OMEGA 3 PO      Take 1 tablet by mouth 2 times daily        omeprazole 20 MG tablet     30 tablet    Take 1 tablet (20 mg) by mouth as needed    GERD (gastroesophageal reflux disease)       triamterene-hydrochlorothiazide 37.5-25 MG per tablet    MAXZIDE-25    90 tablet    Take 1 tablet by mouth as needed    Peripheral edema       valACYclovir 1000 mg tablet    VALTREX    8 tablet    TAKE 2 TABLETS BY MOUTH TWICE DAILY    HSV (herpes simplex virus) infection       Vitamin D3 2000 UNITS Tabs      Take 5,000 Units by mouth Reported on 3/3/2017

## 2017-09-25 NOTE — PROGRESS NOTES
SUBJECTIVE:   Svetlana Adair is a 67 year old female who presents for Preventive Visit.  Are you in the first 12 months of your Medicare Part B coverage?  Yes,  Visual Acuity:  Right Eye: 20/20   Left Eye: 20/25  Both Eyes: 20/20    Healthy Habits:    Do you get at least three servings of calcium containing foods daily (dairy, green leafy vegetables, etc.)? yes    Amount of exercise or daily activities, outside of work: 5-6 day(s) per week    Problems taking medications regularly No    Medication side effects: No    Have you had an eye exam in the past two years? yes    Do you see a dentist twice per year? No - once per year    Do you have sleep apnea, excessive snoring or daytime drowsiness?yes - daily fatigue    COGNITIVE SCREEN  1) Repeat 3 items (Banana, Sunrise, Chair)    2) Clock draw: NORMAL  3) 3 item recall: Recalls 3 objects  Results: 3 items recalled: COGNITIVE IMPAIRMENT LESS LIKELY    Mini-CogTM Copyright PATTI Martinez. Licensed by the author for use in [facility]; reprinted with permission (luis antonio@[facility]). All rights reserved.          Reviewed and updated as needed this visit by clinical staff  Tobacco  Allergies  Meds  Med Hx  Surg Hx  Fam Hx  Soc Hx        Reviewed and updated as needed this visit by Provider  Tobacco  Med Hx  Surg Hx  Fam Hx  Soc Hx       Social History   Substance Use Topics     Smoking status: Former Smoker     Packs/day: 1.00     Years: 22.00     Types: Cigarettes     Quit date: 4/28/2010     Smokeless tobacco: Never Used     Alcohol use No      Comment: intermittent sobriety 8/24/11       The patient does not drink >3 drinks per day nor >7 drinks per week.    Today's PHQ-2 Score:   PHQ-2 ( 1999 Pf[employer]) 11/16/2016 11/5/2015   Q1: Little interest or pleasure in doing things - 0   Q2: Feeling down, depressed or hopeless - 0   PHQ-2 Score - 0   Q1: Little interest or pleasure in doing things Not at all -   Q2: Feeling down, depressed or hopeless Not at all -    PHQ-2 Score 0 -         Do you feel safe in your environment - Yes    Do you have a Health Care Directive?: Yes: Patient states has Advance Directive and will bring in a copy to clinic.    Current providers sharing in care for this patient include: Patient Care Team:  Mariano Wilson as PCP - General  Pavan Fuentes MD as MD (Oncology)      Hearing impairment: No    Ability to successfully perform activities of daily living: Yes, no assistance needed     Fall risk:         Home safety:  none identified      The following health maintenance items are reviewed in Epic and correct as of today:  Health Maintenance   Topic Date Due     ADVANCE DIRECTIVE PLANNING Q5 YRS  09/06/2016     PHQ-9 Q6 MONTHS  05/18/2017     FALL RISK ASSESSMENT  08/22/2017     PNEUMOCOCCAL (1 of 2 - PCV13) 08/22/2017     INFLUENZA VACCINE (SYSTEM ASSIGNED)  09/01/2017     DEPRESSION ACTION PLAN Q1 YR  11/18/2017     DEXA Q3 YR  11/06/2018     PNEUMO 5YR BOOST DUE AFTER AGE 65 (NO IB MSG) (2) 12/10/2020     LIPID SCREEN Q5 YR FEMALE (SYSTEM ASSIGNED)  11/14/2021     TETANUS IMMUNIZATION (SYSTEM ASSIGNED)  11/19/2022     COLON CANCER SCREEN (SYSTEM ASSIGNED)  12/14/2025     HEPATITIS C SCREENING  Completed     Patient Active Problem List   Diagnosis     Breast cancer est/prog +, HER2 ERNIE -     Osteoarthritis     Mild major depression (H)     CARDIOVASCULAR SCREENING; LDL GOAL LESS THAN 160     Advanced directives, counseling/discussion     Knee pain     Past use of tobacco     IFG (impaired fasting glucose)     Low back pain     Malignant neoplasm of right breast (H)     Coronary artery disease involving native coronary artery of native heart without angina pectoris     Hypercholesteremia     Past Surgical History:   Procedure Laterality Date     COLONOSCOPY       COLONOSCOPY  11/17/2011    Procedure:COLONOSCOPY; Colonoscopy; Surgeon:MEKA RUSS; Location: GI     GYN SURGERY  2005    hysterectomy after hx of ovarian cyst, ended  up being benign     HYSTERECTOMY, PAP NO LONGER INDICATED       MASTECTOMY MODIFIED RADICAL  2011    bilateral     MASTECTOMY, BILATERAL       ORTHOPEDIC SURGERY      rt. knee arthroscopy     REALIGN PATELLA  1973    right      TOE SURGERY      right grt OA        Social History   Substance Use Topics     Smoking status: Former Smoker     Packs/day: 1.00     Years: 22.00     Types: Cigarettes     Quit date: 4/28/2010     Smokeless tobacco: Never Used     Alcohol use No      Comment: intermittent sobriety 8/24/11     Family History   Problem Relation Age of Onset     CANCER Mother 60     leukemia     Arthritis Mother      osteo     Eye Disorder Mother      glaucoma     GASTROINTESTINAL DISEASE Mother      gallbladder     Alcohol/Drug Father      alcohol     HEART DISEASE Father      ? CHF     Respiratory Father      emphysema     Asthma Sister      Cancer - colorectal Brother 50     Prostate Cancer Brother      Allergies Brother      bee      Arthritis Sister      osteo     Arthritis Sister      osteo     Arthritis Brother      osteo     Depression Sister      Psychotic Disorder Sister      bipolar     Respiratory Sister          Current Outpatient Prescriptions   Medication Sig Dispense Refill     omeprazole 20 MG tablet Take 1 tablet (20 mg) by mouth as needed 30 tablet 0     triamterene-hydrochlorothiazide (MAXZIDE-25) 37.5-25 MG per tablet Take 1 tablet by mouth as needed 90 tablet 0     atorvastatin (LIPITOR) 10 MG tablet Take 1 tablet (10 mg) by mouth daily 90 tablet 0     nicotine polacrilex (NICORETTE) 2 MG gum Place 1 each (2 mg) inside cheek as needed for smoking cessation 30 tablet 11     HYDROcodone-acetaminophen (NORCO) 5-325 MG per tablet Take 1 tablet by mouth every 6 hours as needed for pain 20 tablet 0     valACYclovir (VALTREX) 1000 mg tablet TAKE 2 TABLETS BY MOUTH TWICE DAILY 8 tablet 1     Multiple Vitamins-Minerals (MULTIVITAMIN ADULT PO) Take by mouth daily       cyclobenzaprine (FLEXERIL) 5 MG  "tablet Take 1-2 tablets (5-10 mg) by mouth 2 times daily as needed for muscle spasms 42 tablet 0     Cholecalciferol (VITAMIN D3) 2000 UNITS TABS Take 5,000 Units by mouth Reported on 3/3/2017       FIBER PO 2 tablets twice daily       Coenzyme Q10 (COQ10) 100 MG CAPS Take by mouth daily Reported on 3/3/2017       aspirin 81 MG tablet Take 1 tablet (81 mg) by mouth daily 30 tablet      Omega-3 Fatty Acids (OMEGA 3 PO) Take 1 tablet by mouth 2 times daily        LYSINE 1-3 daily as needed       varenicline (CHANTIX STARTING MONTH LUMA) 0.5 MG X 11 & 1 MG X 42 tablet Take 0.5 mg tab daily for 3 days, then 0.5 mg tab twice daily for 4 days, then 1 mg twice daily. (Patient not taking: Reported on 9/25/2017) 53 tablet 0     [DISCONTINUED] atorvastatin (LIPITOR) 40 MG tablet TAKE 1 TABLET(40 MG) BY MOUTH DAILY (Patient not taking: Reported on 9/25/2017) 90 tablet 3     Allergies   Allergen Reactions     Cats      Codeine Sulfate GI Disturbance       ROS:  Constitutional, HEENT, cardiovascular, pulmonary, GI, , musculoskeletal, neuro, skin, endocrine and psych systems are negative, except as otherwise noted.      OBJECTIVE:   /65 (BP Location: Left arm, Cuff Size: Adult Regular)  Pulse 64  Temp 97.5  F (36.4  C) (Tympanic)  Ht 5' 4\" (1.626 m)  Wt 134 lb (60.8 kg)  SpO2 100%  Breastfeeding? No  BMI 23 kg/m2 Estimated body mass index is 23 kg/(m^2) as calculated from the following:    Height as of this encounter: 5' 4\" (1.626 m).    Weight as of this encounter: 134 lb (60.8 kg).  EXAM:   GENERAL APPEARANCE: healthy, alert and no distress  EYES: Eyes grossly normal to inspection, PERRL and conjunctivae and sclerae normal  HENT: ear canals and TM's normal, nose and mouth without ulcers or lesions, oropharynx clear and oral mucous membranes moist  NECK: no adenopathy, no asymmetry, masses, or scars and thyroid normal to palpation  RESP: lungs clear to auscultation - no rales, rhonchi or wheezes  BREAST: exam " "declined, as she will see Dr. Fuentes next month and will have an exam  CV: regular rate and rhythm, normal S1 S2, no S3 or S4, no murmur, click or rub, no peripheral edema and peripheral pulses strong  ABDOMEN: soft, nontender, no hepatosplenomegaly, no masses and bowel sounds normal  MS: no musculoskeletal defects are noted and gait is age appropriate without ataxia  SKIN: no suspicious lesions or rashes  NEURO: Normal strength and tone, sensory exam grossly normal, mentation intact and speech normal  PSYCH: mentation appears normal and anxious affect normal/bright    ASSESSMENT / PLAN:   1. Routine general medical examination at a health care facility      2. Malignant neoplasm of right female breast, unspecified estrogen receptor status, unspecified site of breast (H)      3. Hyperlipidemia LDL goal <130    - Comprehensive metabolic panel  - CBC with platelets  - Lipid panel reflex to direct LDL  - TSH    4. Need for prophylactic vaccination and inoculation against influenza    - FLU VACCINE, INCREASED ANTIGEN, PRESV FREE, AGE 65+ [83396]  - ADMIN INFLUENZA  (For MEDICARE Patients ONLY) []    5. Need for prophylactic vaccination against Streptococcus pneumoniae (pneumococcus)    - Pneumococcal vaccine 13 valent PCV13 IM (Prevnar) [39080]    6. At risk for falling        End of Life Planning:  Patient currently has an advanced directive: Yes.  Practitioner is supportive of decision.    COUNSELING:  Reviewed preventive health counseling, as reflected in patient instructions  Special attention given to:       Regular exercise       Healthy diet/nutrition       Immunizations    Vaccinated for: Influenza and Pneumococcal           Colon cancer screening       Hepatitis C screening          Estimated body mass index is 23 kg/(m^2) as calculated from the following:    Height as of this encounter: 5' 4\" (1.626 m).    Weight as of this encounter: 134 lb (60.8 kg).     reports that she quit smoking about 7 years ago. " Her smoking use included Cigarettes. She has a 22.00 pack-year smoking history. She has never used smokeless tobacco.        Appropriate preventive services were discussed with this patient, including applicable screening as appropriate for cardiovascular disease, diabetes, osteopenia/osteoporosis, and glaucoma.  As appropriate for age/gender, discussed screening for colorectal cancer, prostate cancer, breast cancer, and cervical cancer. Checklist reviewing preventive services available has been given to the patient.    Reviewed patients plan of care and provided an AVS. The Basic Care Plan (routine screening as documented in Health Maintenance) for Svetlana meets the Care Plan requirement. This Care Plan has been established and reviewed with the Patient.    Counseling Resources:  ATP IV Guidelines  Pooled Cohorts Equation Calculator  Breast Cancer Risk Calculator  FRAX Risk Assessment  ICSI Preventive Guidelines  Dietary Guidelines for Americans, 2010  USDA's MyPlate  ASA Prophylaxis  Lung CA Screening    Vladislav Cruz MD  [facility]

## 2017-09-25 NOTE — NURSING NOTE
"Chief Complaint   Patient presents with     Medicare Visit     Welcome to Medicare       Initial /65 (BP Location: Left arm, Cuff Size: Adult Regular)  Pulse 64  Temp 97.5  F (36.4  C) (Tympanic)  Ht 5' 4\" (1.626 m)  Wt 134 lb (60.8 kg)  SpO2 100%  Breastfeeding? No  BMI 23 kg/m2 Estimated body mass index is 23 kg/(m^2) as calculated from the following:    Height as of this encounter: 5' 4\" (1.626 m).    Weight as of this encounter: 134 lb (60.8 kg).  Medication Reconciliation: complete Velma Hidalgo MA      "

## 2017-09-25 NOTE — LETTER
[facility]  [address] [facility]  [address]  [place]  [address]  Tel: [phone]    September 26, 2017    Svetlana Adair  [address]    [facility] [address]        Dear Ms. Adair,    The following letter pertains to your most recent diagnostic tests:    -Liver and gallbladder tests are normal for you. (ALT,AST, Alk phos, bilirubin), kidney function is normal for you (Creatinine, GFR), Sodium is normal, Potassium is normal for you, Calcium is normal for you, Glucose (blood sugar) is essentially normal for you.      -Your cholesterol panel looks healthy.     -TSH (thyroid stimulating hormone) level is normal which indicates normal circulating thyroid hormone levels.                -Your complete blood counts including your hemoglobin returned normal for you.       Bottom line:  Your lab results look healthy and at goal.       Follow up:  Schedule an appointment for a physical examination with fasting blood tests in one year's time, or return sooner if new questions, symptoms or problems arise.       Sincerely,    Vladislav Cruz MD/MARICEL        Enclosure: Lab Results  Results for orders placed or performed in visit on 09/25/17   Comprehensive metabolic panel   Result Value Ref Range    Sodium 141 133 - 144 mmol/L    Potassium 4.4 3.4 - 5.3 mmol/L    Chloride 107 94 - 109 mmol/L    Carbon Dioxide 28 20 - 32 mmol/L    Anion Gap 6 3 - 14 mmol/L    Glucose 100 (H) 70 - 99 mg/dL    Urea Nitrogen 13 7 - 30 mg/dL    Creatinine 0.74 0.52 - 1.04 mg/dL    GFR Estimate 79 >60 mL/min/1.7m2    GFR Estimate If Black >90 >60 mL/min/1.7m2    Calcium 9.5 8.5 - 10.1 mg/dL    Bilirubin Total 0.4 0.2 - 1.3 mg/dL    Albumin 4.1 3.4 - 5.0 g/dL    Protein Total 7.7 6.8 - 8.8 g/dL    Alkaline Phosphatase 60 40 - 150 U/L    ALT 29 0 - 50 U/L    AST 17 0 - 45 U/L   CBC with platelets   Result Value Ref Range    WBC 5.2 4.0 - 11.0 10e9/L    RBC Count 4.49 3.8 - 5.2 10e12/L    Hemoglobin 14.4 11.7 - 15.7 g/dL     Hematocrit 43.3 35.0 - 47.0 %    MCV 96 78 - 100 fl    MCH 32.1 26.5 - 33.0 pg    MCHC 33.3 31.5 - 36.5 g/dL    RDW 12.4 10.0 - 15.0 %    Platelet Count 263 150 - 450 10e9/L   Lipid panel reflex to direct LDL   Result Value Ref Range    Cholesterol 158 <200 mg/dL    Triglycerides 73 <150 mg/dL    HDL Cholesterol 80 >49 mg/dL    LDL Cholesterol Calculated 63 <100 mg/dL    Non HDL Cholesterol 78 <130 mg/dL   TSH   Result Value Ref Range    TSH 1.10 0.40 - 4.00 mU/L

## 2017-09-26 ENCOUNTER — MYC MEDICAL ADVICE (OUTPATIENT)
Dept: CARDIOLOGY | Facility: CLINIC | Age: 67
End: 2017-09-26

## 2017-09-26 NOTE — PROGRESS NOTES
The following letter pertains to your most recent diagnostic tests:    -Liver and gallbladder tests are normal for you. (ALT,AST, Alk phos, bilirubin), kidney function is normal for you (Creatinine, GFR), Sodium is normal, Potassium is normal for you, Calcium is normal for you, Glucose (blood sugar) is essentially normal for you.      -Your cholesterol panel looks healthy.     -TSH (thyroid stimulating hormone) level is normal which indicates normal circulating thyroid hormone levels.                -Your complete blood counts including your hemoglobin returned normal for you.       Bottom line:  Your lab results look healthy and at goal.       Follow up:  Schedule an appointment for a physical examination with fasting blood tests in one year's time, or return sooner if new questions, symptoms or problems arise.       Sincerely,    Dr. Cruz

## 2017-09-27 ENCOUNTER — TELEPHONE (OUTPATIENT)
Dept: CARDIOLOGY | Facility: CLINIC | Age: 67
End: 2017-09-27

## 2017-09-27 NOTE — TELEPHONE ENCOUNTER
My Chart Message -   I just had my physical with Dr Cruz. He did the lipid tests for you to review as requested.   Dr Tyson lowered my atrovastin to 10 MG and wanted to see how that compared to the 40 Mg dose I was taking.    Please see my test results from Dr Cruz and let me know if there is any further directions for me....do I keep on the 10 MG dose?  I am assuming yes looking at the results.  Thank you,  Mahnaz Adair  Labs remain at goal. On Atorvastatin 10 mg daily,    Chol HDL LDL cameron Chol/HDL TG   09/25/17 1054 158 80 63 -- 73   11/14/16 1020 140 75 52 -- 65   Was on Atorvastatin 40 mg for lab draw 11-14-16.

## 2017-10-10 ENCOUNTER — TRANSFERRED RECORDS (OUTPATIENT)
Dept: HEALTH INFORMATION MANAGEMENT | Facility: CLINIC | Age: 67
End: 2017-10-10

## 2017-10-11 ENCOUNTER — TRANSFERRED RECORDS (OUTPATIENT)
Dept: HEALTH INFORMATION MANAGEMENT | Facility: CLINIC | Age: 67
End: 2017-10-11

## 2017-10-16 ENCOUNTER — TRANSFERRED RECORDS (OUTPATIENT)
Dept: HEALTH INFORMATION MANAGEMENT | Facility: CLINIC | Age: 67
End: 2017-10-16

## 2017-10-18 ENCOUNTER — OFFICE VISIT (OUTPATIENT)
Dept: SURGERY | Facility: CLINIC | Age: 67
End: 2017-10-18
Payer: COMMERCIAL

## 2017-10-18 VITALS
DIASTOLIC BLOOD PRESSURE: 78 MMHG | SYSTOLIC BLOOD PRESSURE: 117 MMHG | WEIGHT: 133 LBS | HEART RATE: 60 BPM | BODY MASS INDEX: 22.16 KG/M2 | HEIGHT: 65 IN

## 2017-10-18 DIAGNOSIS — C50.611 MALIGNANT NEOPLASM OF AXILLARY TAIL OF RIGHT BREAST IN FEMALE, ESTROGEN RECEPTOR POSITIVE (H): Primary | ICD-10-CM

## 2017-10-18 DIAGNOSIS — Z17.0 MALIGNANT NEOPLASM OF AXILLARY TAIL OF RIGHT BREAST IN FEMALE, ESTROGEN RECEPTOR POSITIVE (H): Primary | ICD-10-CM

## 2017-10-18 PROCEDURE — 99205 OFFICE O/P NEW HI 60 MIN: CPT | Performed by: SURGERY

## 2017-10-18 NOTE — PROGRESS NOTES
Surgery Consultation, [facility] ERNESTINA White MD    Svetlana Adair MRN# [id number]   YOB: 1950 Age: 67 year old     PCP:  Vladislav Cruz [phone]    Chief Complaint:  Recurrent right breast cancer    Pt was seen in consultation from Vladislav Cruz.    History of Present Illness:  Svetlana Adair is a 67 year old female who presented with a palpable node in her right axilla.  She has a history of previous right-sided breast cancer.  This was an invasive ductal carcinoma ER/IL positive.  She underwent bilateral mastectomies with reconstruction in 2011.  Had a sentinel node at that time which was negative.  No radiation, took tamoxifen for four years.  Oncotype DX was performed at that time and came back with a low recurrence score, thus chemotherapy was deferred.  She was recently seen and a right axillary lymph node was palpated on physical exam.  Ultrasound was obtained which revealed a 2 cm suspicious right axillary lymph node.  This was biopsied and found to be recurrent breast cancer with a similar hormone pattern.  She's here for further discussion.    PMH:  Svetlana Adair  has a past medical history of Alcoholism (H); Allergies; Basal cell cancer; Breast cancer (H); Coronary artery disease; Depression; Hypertension; and Squamous cell carcinoma.  PSH:  Svetlana Adair  has a past surgical history that includes GYN surgery (2005); Mastectomy modified radical (2011); colonoscopy; orthopedic surgery; Realign patella (1973); Toe Surgery; Colonoscopy (11/17/2011); hysterectomy, pap no longer indicated; and mastectomy, bilateral.    Home medications and allergies reviewed.    Social History:  Svetlana Adair  reports that she quit smoking about 7 years ago. Her smoking use included Cigarettes. She has a 22.00 pack-year smoking history. She has never used smokeless tobacco. She reports that she does not drink alcohol or use illicit drugs.  Family History:   "Svetlana Adair family history includes Alcohol/Drug in her father; Allergies in her brother; Arthritis in her brother, mother, sister, and sister; Asthma in her sister; Breast Cancer in her maternal grandmother; CANCER (age of onset: 60) in her mother; Cancer - colorectal (age of onset: 50) in her brother; Colon Cancer in her brother; Coronary Artery Disease in her father; Depression in her sister; Eye Disorder in her mother; GASTROINTESTINAL DISEASE in her mother; Gallbladder Disease in her mother; HEART DISEASE in her father; Other Cancer in her mother; Prostate Cancer in her brother; Psychotic Disorder in her sister; Respiratory in her father and sister; Substance Abuse in her father and sister.    ROS:  The 10 point Review of Systems is negative other than noted in the HPI.  No headaches or double vision.  No chest pain or shortness of breath.  No fevers or chills.    Physical Exam:  Blood pressure 117/78, pulse 60, height 5' 5\" (1.651 m), weight 133 lb (60.3 kg), not currently breastfeeding.  133 lbs 0 oz  Pleasant healthy female in no distress.  Patient has a pleasant affect and communicates well.   Pupils equal round and reactive to light.   No cervical lymphadenopathy or thyromegaly.   Lung fields clear, breathing comfortably.   Heart normal sinus rhythm.  No murmurs rubs or gallops.  Bilateral breast exam performed.  Subpectoral implants in place, well-healed reconstruction.  2.5 cm fairly soft palpable node in the right axilla, fairly mobile.  Skin warm, dry.  No obvious rashes or lesions.    All new lab and imaging data was reviewed including pathology reports and ultrasound images.       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  Although no other suspicious nodes were identified on ultrasound, she is getting a PET scan tomorrow.  I think she would benefit from right axillary dissection with postsurgical radiation therapy.  Plan is then " for antiestrogen therapy, although decision for or against cytotoxic chemotherapy may be deferred until after surgical pathology returns.  We talked about the possible risks of surgery which include bleeding infection and lymphedema.  She understands that she will have a drain after surgery.  We will get her scheduled as soon as possible.  Surgical co-morbities include previous surgery.    Yann White M.D.  [facility]  [phone]    Please route or send letter to:  Primary Care Provider (PCP) and Referring Provider

## 2017-10-18 NOTE — MR AVS SNAPSHOT
After Visit Summary   10/18/2017    Svetlana Adair    MRN: [id number]           Patient Information     Date Of Birth          1950        Visit Information        Provider Department      10/18/2017 11:00 AM Cortez White MD [facility] Surgery      Care Instructions    Your surgery is scheduled for 11/2/17 7:30 am at [facility]          Follow-ups after your visit        Your next 10 appointments already scheduled     Nov 02, 2017   Procedure with Cortez White MD   [facility] PeriOP Services (--)    [address]  [facility] [address] [phone]              Who to contact     If you have questions or need follow up information about today's clinic visit or your schedule please contact [facility] directly at [phone].  Normal or non-critical lab and imaging results will be communicated to you by MyChart, letter or phone within 4 business days after the clinic has received the results. If you do not hear from us within 7 days, please contact the clinic through CredSimplehart or phone. If you have a critical or abnormal lab result, we will notify you by phone as soon as possible.  Submit refill requests through Cerebrex or call your pharmacy and they will forward the refill request to us. Please allow 3 business days for your refill to be completed.          Additional Information About Your Visit        MyChart Information     C[employer] gives you secure access to your electronic health record. If you see a primary care provider, you can also send messages to your care team and make appointments. If you have questions, please call your primary care clinic.  If you do not have a primary care provider, please call [phone] and they will assist you.        Care EveryWhere ID     This is your Care EveryWhere ID. This could be used by other  "organizations to access your [place] medical records  [id number]        Your Vitals Were     Pulse Height BMI (Body Mass Index)             60 5' 5\" (1.651 m) 22.13 kg/m2          Blood Pressure from Last 3 Encounters:   10/18/17 117/78   09/25/17 108/65   08/23/17 98/61    Weight from Last 3 Encounters:   10/18/17 133 lb (60.3 kg)   09/25/17 134 lb (60.8 kg)   08/23/17 139 lb (63 kg)              Today, you had the following     No orders found for display       Primary Care Provider Office Phone # Fax #    Vladislav Cruz [phone]       [facility] [address] [facility] [address]  [facility] [address]        Equal Access to Services     [facility] : Hadii aad ku hadasho Soomaali, waaxda luqadaha, qaybta kaalmada adeegyada, waxay liseth haychely james . So [facility] [phone].    ATENCIÓN: Si habla español, tiene a bruner disposición servicios gratuitos de asistencia lingüística. Chato al [phone].    We comply with applicable federal civil rights laws and [place] laws. We do not discriminate on the basis of race, color, national origin, age, disability, sex, sexual orientation, or gender identity.            Thank you!     Thank you for choosing [facility]  for your care. Our goal is always to provide you with excellent care. Hearing back from our patients is one way we can continue to improve our services. Please take a few minutes to complete the written survey that you may receive in the mail after your visit with us. Thank you!             Your Updated Medication List - Protect others around you: Learn how to safely use, store and throw away your medicines at www.disposemymeds.org.          This list is accurate as of: 10/18/17 12:01 PM.  Always use your most recent med list.                   Brand Name Dispense Instructions for use Diagnosis    aspirin 81 MG tablet     30 tablet    Take 1 tablet (81 mg) by mouth daily    Coronary artery " disease involving native coronary artery of native heart without angina pectoris       atorvastatin 10 MG tablet    LIPITOR    90 tablet    Take 1 tablet (10 mg) by mouth daily    Coronary artery disease involving native coronary artery of native heart without angina pectoris       CoQ10 100 MG Caps      Take by mouth daily Reported on 3/3/2017        cyclobenzaprine 5 MG tablet    FLEXERIL    42 tablet    Take 1-2 tablets (5-10 mg) by mouth 2 times daily as needed for muscle spasms    Torticollis, spasmodic       FIBER PO      2 tablets twice daily        HYDROcodone-acetaminophen 5-325 MG per tablet    NORCO    20 tablet    Take 1 tablet by mouth every 6 hours as needed for pain    Low back pain without sciatica, unspecified back pain laterality, unspecified chronicity       LYSINE      1-3 daily as needed        MULTIVITAMIN ADULT PO      Take by mouth daily        nicotine polacrilex 2 MG gum    NICORETTE    30 tablet    Place 1 each (2 mg) inside cheek as needed for smoking cessation    Tobacco use disorder       OMEGA 3 PO      Take 1 tablet by mouth 2 times daily        omeprazole 20 MG tablet     30 tablet    Take 1 tablet (20 mg) by mouth as needed    GERD (gastroesophageal reflux disease)       triamterene-hydrochlorothiazide 37.5-25 MG per tablet    MAXZIDE-25    90 tablet    Take 1 tablet by mouth as needed    Peripheral edema       valACYclovir 1000 mg tablet    VALTREX    8 tablet    TAKE 2 TABLETS BY MOUTH TWICE DAILY    HSV (herpes simplex virus) infection       Vitamin D3 2000 UNITS Tabs      Take 5,000 Units by mouth Reported on 3/3/2017

## 2017-10-18 NOTE — NURSING NOTE
Breast Patients    BREAST PATIENTS (ALL)    1-Do you have any of the following symptoms? Other: None  2-In which breast are you having the symptoms? right  3-Do you use hormones?  No  4-Have you had a Mammogram? No  5-Have you ever had a breast cyst drained? No  6-Have you ever had a breast biopsy? Yes  Side: left  Date: 2011  7-Have you ever had a Breast Cancer? Yes  Side: Right  Date: 2-2-2011   8-Is there a history of Breast Cancer in your family? Yes   Relationship to you:    Great Grandmother  9-Have you ever had Ovarian Cancer? No  10-Is there a history of Ovarian Cancer in your family? No  11-Summarize your caffeine intake (i.e. coffee, tea, chocolate, soda etc.): 3-4 cups a day    BREAST PATIENTS (FEMALE)    12-What age did your periods begin? 15  13-Date your last menstrual period began? Hysterectomy in 50s  14-Number of full-term pregnancies: 0  15-Your age when your first child was born? N/A  16-Did you nurse your children? N/A  17-Are you pregnant now? No  18-Have you begun menopause? No  19-Have you had either ovary removed?Yes  Date of Surgery:  In 50s  20-Do you have breast implants? Yes

## 2017-10-18 NOTE — LETTER
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  Although no other suspicious nodes were identified on ultrasound, she is getting a PET scan tomorrow.  I think she would benefit from right axillary dissection with postsurgical radiation therapy. Plan is then for antiestrogen therapy, although decision for or against cytotoxic chemotherapy may be deferred until after surgical pathology returns.  We talked about the possible risks of surgery which include bleeding infection and lymphedema.  She understands that she will have a drain after surgery. We will get her scheduled as soon as possible.  Surgical co-morbities include previous surgery.     Yann White M.D.

## 2017-10-19 ENCOUNTER — TRANSFERRED RECORDS (OUTPATIENT)
Dept: HEALTH INFORMATION MANAGEMENT | Facility: CLINIC | Age: 67
End: 2017-10-19

## 2017-10-26 ENCOUNTER — OFFICE VISIT (OUTPATIENT)
Dept: FAMILY MEDICINE | Facility: CLINIC | Age: 67
End: 2017-10-26
Payer: COMMERCIAL

## 2017-10-26 VITALS
HEART RATE: 58 BPM | RESPIRATION RATE: 16 BRPM | BODY MASS INDEX: 23.16 KG/M2 | WEIGHT: 139 LBS | OXYGEN SATURATION: 98 % | DIASTOLIC BLOOD PRESSURE: 69 MMHG | HEIGHT: 65 IN | TEMPERATURE: 97 F | SYSTOLIC BLOOD PRESSURE: 111 MMHG

## 2017-10-26 DIAGNOSIS — E55.9 VITAMIN D DEFICIENCY: ICD-10-CM

## 2017-10-26 DIAGNOSIS — Z01.818 PREOP GENERAL PHYSICAL EXAM: Primary | ICD-10-CM

## 2017-10-26 DIAGNOSIS — C50.911 MALIGNANT NEOPLASM OF RIGHT FEMALE BREAST, UNSPECIFIED ESTROGEN RECEPTOR STATUS, UNSPECIFIED SITE OF BREAST (H): ICD-10-CM

## 2017-10-26 DIAGNOSIS — E78.5 HYPERLIPIDEMIA LDL GOAL <130: ICD-10-CM

## 2017-10-26 PROBLEM — I10 BENIGN ESSENTIAL HYPERTENSION: Status: ACTIVE | Noted: 2017-10-26

## 2017-10-26 PROBLEM — I10 BENIGN ESSENTIAL HYPERTENSION: Status: RESOLVED | Noted: 2017-10-26 | Resolved: 2017-10-26

## 2017-10-26 PROCEDURE — 36415 COLL VENOUS BLD VENIPUNCTURE: CPT | Performed by: INTERNAL MEDICINE

## 2017-10-26 PROCEDURE — 99214 OFFICE O/P EST MOD 30 MIN: CPT | Performed by: INTERNAL MEDICINE

## 2017-10-26 PROCEDURE — 93000 ELECTROCARDIOGRAM COMPLETE: CPT | Performed by: INTERNAL MEDICINE

## 2017-10-26 PROCEDURE — 83018 HEAVY METAL QUAN EACH NES: CPT | Mod: 90 | Performed by: INTERNAL MEDICINE

## 2017-10-26 PROCEDURE — 82306 VITAMIN D 25 HYDROXY: CPT | Performed by: INTERNAL MEDICINE

## 2017-10-26 PROCEDURE — 99000 SPECIMEN HANDLING OFFICE-LAB: CPT | Performed by: INTERNAL MEDICINE

## 2017-10-26 ASSESSMENT — PATIENT HEALTH QUESTIONNAIRE - PHQ9: SUM OF ALL RESPONSES TO PHQ QUESTIONS 1-9: 3

## 2017-10-26 NOTE — MR AVS SNAPSHOT
After Visit Summary   10/26/2017    Svetlana Adair    MRN: [id number]           Patient Information     Date Of Birth          1950        Visit Information        Provider Department      10/26/2017 2:00 PM Vladislav Cruz MD [facility]        Today's Diagnoses     Preop general physical exam    -  1    Malignant neoplasm of right female breast, unspecified estrogen receptor status, unspecified site of breast (H)        Hyperlipidemia LDL goal <130        Vitamin D deficiency          Care Instructions      Before Your Surgery      Call your surgeon if there is any change in your health. This includes signs of a cold or flu (such as a sore throat, runny nose, cough, rash or fever).    Do not smoke, drink alcohol or take over the counter medicine (unless your surgeon or primary care doctor tells you to) for the 24 hours before and after surgery.    If you take prescribed drugs: Follow your doctor s orders about which medicines to take and which to stop until after surgery.    Eating and drinking prior to surgery: follow the instructions from your surgeon    Take a shower or bath the night before surgery. Use the soap your surgeon gave you to gently clean your skin. If you do not have soap from your surgeon, use your regular soap. Do not shave or scrub the surgery site.  Wear clean pajamas and have clean sheets on your bed.           Follow-ups after your visit        Your next 10 appointments already scheduled     Nov 02, 2017   Procedure with Cortez White MD   [facility] PeriOP Services (--)    [address]  [facility] [address]   [phone]            Nov 02, 2017  7:30 AM CDT   [facility] Same Day Surgery with Cortez White MD   [facility])    [facility])   [phone]              Who to contact     If you have  "questions or need follow up information about today's clinic visit or your schedule please contact [facility] directly at [phone].  Normal or non-critical lab and imaging results will be communicated to you by MyChart, letter or phone within 4 business days after the clinic has received the results. If you do not hear from us within 7 days, please contact the clinic through 0xdatahart or phone. If you have a critical or abnormal lab result, we will notify you by phone as soon as possible.  Submit refill requests through [email or web address] or call your pharmacy and they will forward the refill request to us. Please allow 3 business days for your refill to be completed.          Additional Information About Your Visit        0xdataharBannerman Information     [email or web address] gives you secure access to your electronic health record. If you see a primary care provider, you can also send messages to your care team and make appointments. If you have questions, please call your primary care clinic.  If you do not have a primary care provider, please call [phone] and they will assist you.        Care EveryWhere ID     This is your Care EveryWhere ID. This could be used by other organizations to access your [place] medical records  [id number]        Your Vitals Were     Pulse Temperature Respirations Height Pulse Oximetry BMI (Body Mass Index)    58 97  F (36.1  C) (Oral) 16 5' 5\" (1.651 m) 98% 23.13 kg/m2       Blood Pressure from Last 3 Encounters:   10/26/17 111/69   10/18/17 117/78   09/25/17 108/65    Weight from Last 3 Encounters:   10/26/17 139 lb (63 kg)   10/18/17 133 lb (60.3 kg)   09/25/17 134 lb (60.8 kg)              We Performed the Following     EKG 12-lead complete w/read - Clinics     Iodine Serum     Vitamin D Deficiency        Primary Care Provider Office Phone # Fax #    Vladislav Cruz [phone]       [facility] [address]  [facility] [address]        Equal Access to " Services     [facility]: Hadii aad ku hadsanticandace Altagarciajabari, waaxda luqadaha, qaybta kaalmada chayo, chelsi james . So [facility] [place] [phone].    ATENCIÓN: Si habla yomi, tiene a bruner disposición servicios gratuitos de asistencia lingüística. Llame al [phone].    We comply with applicable federal civil rights laws and [place] laws. We do not discriminate on the basis of race, color, national origin, age, disability, sex, sexual orientation, or gender identity.            Thank you!     Thank you for choosing [facility]  for your care. Our goal is always to provide you with excellent care. Hearing back from our patients is one way we can continue to improve our services. Please take a few minutes to complete the written survey that you may receive in the mail after your visit with us. Thank you!             Your Updated Medication List - Protect others around you: Learn how to safely use, store and throw away your medicines at www.disposemymeds.org.          This list is accurate as of: 10/26/17  5:29 PM.  Always use your most recent med list.                   Brand Name Dispense Instructions for use Diagnosis    aspirin 81 MG tablet     30 tablet    Take 1 tablet (81 mg) by mouth daily    Coronary artery disease involving native coronary artery of native heart without angina pectoris       atorvastatin 10 MG tablet    LIPITOR    90 tablet    Take 1 tablet (10 mg) by mouth daily    Coronary artery disease involving native coronary artery of native heart without angina pectoris       CoQ10 100 MG Caps      Take by mouth daily Reported on 3/3/2017        cyclobenzaprine 5 MG tablet    FLEXERIL    42 tablet    Take 1-2 tablets (5-10 mg) by mouth 2 times daily as needed for muscle spasms    Torticollis, spasmodic       FIBER PO      2 tablets twice daily        HYDROcodone-acetaminophen 5-325 MG per tablet    NORCO    20 tablet    Take 1 tablet by mouth every 6 hours as  needed for pain    Low back pain without sciatica, unspecified back pain laterality, unspecified chronicity       LYSINE      1-3 daily as needed        MULTIVITAMIN ADULT PO      Take by mouth daily        nicotine polacrilex 2 MG gum    NICORETTE    30 tablet    Place 1 each (2 mg) inside cheek as needed for smoking cessation    Tobacco use disorder       OMEGA 3 PO      Take 1 tablet by mouth 2 times daily        omeprazole 20 MG tablet     30 tablet    Take 1 tablet (20 mg) by mouth as needed    GERD (gastroesophageal reflux disease)       triamterene-hydrochlorothiazide 37.5-25 MG per tablet    MAXZIDE-25    90 tablet    Take 1 tablet by mouth as needed    Peripheral edema       valACYclovir 1000 mg tablet    VALTREX    8 tablet    TAKE 2 TABLETS BY MOUTH TWICE DAILY    HSV (herpes simplex virus) infection       Vitamin D3 2000 UNITS Tabs      Take 5,000 Units by mouth Reported on 3/3/2017

## 2017-10-26 NOTE — LETTER
[facility]  [address] [facility]  [address]  [place]  [address]  Tel: [phone]    October 31, 2017    Svetlana Adair  [address]   [facility] [address]        Dear Ms. Renatolamont,    The following letter pertains to your most recent diagnostic tests:    Good news!     Your iodine levels and vitamin D levels are adequate    Sincerely,    Vladislav Cruz MD/MARICEL          Enclosure: Lab Results  Results for orders placed or performed in visit on 10/26/17   Iodine Serum   Result Value Ref Range    Iodine Serum 63 40 - 92 ng/mL   Vitamin D Deficiency   Result Value Ref Range    Vitamin D Deficiency screening 37 20 - 75 ug/L

## 2017-10-26 NOTE — NURSING NOTE
"Chief Complaint   Patient presents with     Pre-Op Exam       Initial /69 (BP Location: Left arm, Patient Position: Chair, Cuff Size: Adult Regular)  Pulse 58  Temp 97  F (36.1  C) (Oral)  Resp 16  Ht 5' 5\" (1.651 m)  Wt 139 lb (63 kg)  SpO2 98%  BMI 23.13 kg/m2 Estimated body mass index is 23.13 kg/(m^2) as calculated from the following:    Height as of this encounter: 5' 5\" (1.651 m).    Weight as of this encounter: 139 lb (63 kg).  Medication Reconciliation: complete   Naya Stoddard CMA (AAMA)      "

## 2017-10-27 ENCOUNTER — TELEPHONE (OUTPATIENT)
Dept: FAMILY MEDICINE | Facility: CLINIC | Age: 67
End: 2017-10-27

## 2017-10-27 DIAGNOSIS — G24.3 TORTICOLLIS, SPASMODIC: ICD-10-CM

## 2017-10-27 DIAGNOSIS — M54.50 LOW BACK PAIN WITHOUT SCIATICA, UNSPECIFIED BACK PAIN LATERALITY, UNSPECIFIED CHRONICITY: ICD-10-CM

## 2017-10-27 LAB — DEPRECATED CALCIDIOL+CALCIFEROL SERPL-MC: 37 UG/L (ref 20–75)

## 2017-10-27 NOTE — TELEPHONE ENCOUNTER
Reason for Call:  Other Reports    Detailed comments: patient  Had requested copies of reports Dr. Cruz had from testing done regarding cancer at the cancer clinic.  He printed them out to give to her at her PRE OP visit yesterday.  She forgot to take them with her.  She would like to have the copies mailed to her home address.    Phone Number Patient can be reached at: Home number on file [phone] (home)    Best Time: any time    Can we leave a detailed message on this number? YES    Call taken on 10/27/2017 at 3:31 PM by Padma Singh    .

## 2017-10-27 NOTE — TELEPHONE ENCOUNTER
Controlled Substance Refill Request for HYDROcodone-acetaminophen (NORCO) 5-325 MG per tablet  Problem List Complete:  No     PROVIDER TO CONSIDER COMPLETION OF PROBLEM LIST AND OVERVIEW/CONTROLLED SUBSTANCE AGREEMENT    Last Written Prescription Date:  5/5/2017  Last Fill Quantity: 20,   # refills: 0    Last Office Visit with [facility] primary care provider: 10/26/2017    Future Office visit:     Controlled substance agreement on file: No.     Processing:  Patient will  in clinic     checked in past 6 months?  No, route to RN      cyclobenzaprine (FLEXERIL) 5 MG tablet      Last Written Prescription Date: 3/2/2016  Last Fill Quantity: 42,  # refills: 0   Last Office Visit with [facility], [facility] or [facility] prescribing provider: 10/26/2017

## 2017-10-27 NOTE — TELEPHONE ENCOUNTER
Reason for Call:  Medication or medication refill:    Do you use a [facility]?  Name of the pharmacy and phone number for the current request:       [facility] [address] [place], [place] - [address] AT [facility] [address]    WRITTEN PRESCRIPTION REQUESTED.    Name of the medication requested: cyclobenzaprine (FLEXERIL) 5 MG tablet to be sent to [facility], and    HYDROcodone-acetaminophen (NORCO) 5-325 MG per tablet (Discontinued) 45 tablet  Will  at desk       Other request: patient saw Dr. Cruz yesterday for a pre op and he was going to do a refill for both of these medications.  She is requesting  The hydrocodone be for the amount of the the original amount of 45.  Please call patient when prescription is ready for .    Can we leave a detailed message on this number? YES    Phone number patient can be reached at: Home number on file [phone] (home)    Best Time: any time    Call taken on 10/27/2017 at 3:27 PM by Padma Singh    .

## 2017-10-30 LAB — IODINE SERPL-MCNC: 63 NG/ML (ref 40–92)

## 2017-10-31 NOTE — H&P (VIEW-ONLY)
[facility]  [address] [facility] [address]  [phone]  Dept: [phone]    PRE-OP EVALUATION:  Today's date: 10/26/2017    Svetlana Adair (: 1950) presents for pre-operative evaluation assessment as requested by Dr. White.  She requires evaluation and anesthesia risk assessment prior to undergoing surgery/procedure for treatment of Dissect Lymph Node Axilla .  Proposed procedure: Dissect Lymph Node Axilla    Date of Surgery/ Procedure: 2017  Time of Surgery/ Procedure: 7:30  Hospital/Surgical Facility: [facility]  Primary Physician: Vladislav Cruz  Type of Anesthesia Anticipated: General    Patient has a Health Care Directive or Living Will:  YES     Preop Questions 10/26/2017   1.  Do you have a history of heart attack, stroke, stent, bypass or surgery on an artery in the head, neck, heart or legs? No   2.  Do you ever have any pain or discomfort in your chest? No   3.  Do you have a history of  Heart Failure? No   4.   Are you troubled by shortness of breath when:  walking on a level surface, or up a slight hill, or at night? No   5.  Do you currently have a cold, bronchitis or other respiratory infection? No   6.  Do you have a cough, shortness of breath, or wheezing? No   7.  Do you sometimes get pains in the calves of your legs when you walk? No   8. Do you or anyone in your family have previous history of blood clots? No   9.  Do you or does anyone in your family have a serious bleeding problem such as prolonged bleeding following surgeries or cuts? No   10. Have you ever had problems with anemia or been told to take iron pills? No   11. Have you had any abnormal blood loss such as black, tarry or bloody stools, or abnormal vaginal bleeding? No   12. Have you ever had a blood transfusion? No   13. Have you or any of your relatives ever had problems with anesthesia? YES - Personal history of nausea    14. Do you have sleep apnea, excessive snoring or daytime  drowsiness? No   15. Do you have any prosthetic heart valves? No   16. Do you have prosthetic joints? No   17. Is there any chance that you may be pregnant? No         HPI:                                                      Brief HPI related to upcoming procedure: Lymph node noted on recent breast exam.  Found to represent recurrent breast cancer.  She has a history of coronary calcification on coronary calcium scan.  She can 4 METS of physical activity without chest pain or dyspnea.       MEDICAL HISTORY:                                                    Patient Active Problem List    Diagnosis Date Noted     Hypercholesteremia 12/02/2015     Priority: Medium     Coronary artery disease involving native coronary artery of native heart without angina pectoris 11/25/2015     Priority: Medium     CT calcium qloub=564 Nov 2015       Malignant neoplasm of right breast (H) 10/14/2015     Priority: Medium     Low back pain 04/03/2013     Priority: Medium     Diagnosis updated by automated process. Provider to review and confirm.       IFG (impaired fasting glucose) 04/01/2013     Priority: Medium     Knee pain 11/19/2012     Priority: Medium     Past use of tobacco 11/19/2012     Priority: Medium     Advanced directives, counseling/discussion 09/06/2011     Priority: Medium     Advance Directive Problem List Overview:   Name Relationship Phone    Primary Health Care Agent            Alternative Health Care Agent          Patient states has Advance Directive and will bring in a copy to clinic. 9/6/2011          Mild major depression (H) 06/20/2011     Priority: Medium     CARDIOVASCULAR SCREENING; LDL GOAL LESS THAN 160 06/20/2011     Priority: Medium     Breast cancer est/prog +, HER2 ERNIE - 03/03/2011     Priority: Medium     Osteoarthritis 03/03/2011     Priority: Medium      Past Medical History:   Diagnosis Date     Alcoholism (H)      Allergies     Fall     Basal cell cancer     back, chest, face     Breast cancer  (H)      Coronary artery disease     CT calcium ljcmz=499 Nov 2015     Depression      Hypertension     borderline     Squamous cell carcinoma     left upper arm, chin     Past Surgical History:   Procedure Laterality Date     COLONOSCOPY       COLONOSCOPY  11/17/2011    Procedure:COLONOSCOPY; Colonoscopy; Surgeon:MEKA RUSS; Location: GI     GYN SURGERY  2005    hysterectomy after hx of ovarian cyst, ended up being benign     HYSTERECTOMY, PAP NO LONGER INDICATED       MASTECTOMY MODIFIED RADICAL  2011    bilateral     MASTECTOMY, BILATERAL       ORTHOPEDIC SURGERY      rt. knee arthroscopy     REALIGN PATELLA  1973    right      TOE SURGERY      right grt OA      Current Outpatient Prescriptions   Medication Sig Dispense Refill     omeprazole 20 MG tablet Take 1 tablet (20 mg) by mouth as needed 30 tablet 0     triamterene-hydrochlorothiazide (MAXZIDE-25) 37.5-25 MG per tablet Take 1 tablet by mouth as needed 90 tablet 0     atorvastatin (LIPITOR) 10 MG tablet Take 1 tablet (10 mg) by mouth daily 90 tablet 0     nicotine polacrilex (NICORETTE) 2 MG gum Place 1 each (2 mg) inside cheek as needed for smoking cessation 30 tablet 11     HYDROcodone-acetaminophen (NORCO) 5-325 MG per tablet Take 1 tablet by mouth every 6 hours as needed for pain 20 tablet 0     valACYclovir (VALTREX) 1000 mg tablet TAKE 2 TABLETS BY MOUTH TWICE DAILY 8 tablet 1     Multiple Vitamins-Minerals (MULTIVITAMIN ADULT PO) Take by mouth daily       cyclobenzaprine (FLEXERIL) 5 MG tablet Take 1-2 tablets (5-10 mg) by mouth 2 times daily as needed for muscle spasms 42 tablet 0     Cholecalciferol (VITAMIN D3) 2000 UNITS TABS Take 5,000 Units by mouth Reported on 3/3/2017       FIBER PO 2 tablets twice daily       Coenzyme Q10 (COQ10) 100 MG CAPS Take by mouth daily Reported on 3/3/2017       aspirin 81 MG tablet Take 1 tablet (81 mg) by mouth daily 30 tablet      Omega-3 Fatty Acids (OMEGA 3 PO) Take 1 tablet by mouth 2 times daily     "    LYSINE 1-3 daily as needed         Allergies   Allergen Reactions     Cats      Codeine Sulfate GI Disturbance      Latex Allergy: NO    Social History   Substance Use Topics     Smoking status: Former Smoker     Packs/day: 1.00     Years: 22.00     Types: Cigarettes     Quit date: 4/28/2010     Smokeless tobacco: Never Used     Alcohol use No      Comment: intermittent sobriety 8/24/11     History   Drug Use No       REVIEW OF SYSTEMS:                                                    Constitutional, neuro, ENT, endocrine, pulmonary, cardiac, gastrointestinal, genitourinary, musculoskeletal, integument and psychiatric systems are negative, except as otherwise noted.      EXAM:                                                    /69 (BP Location: Left arm, Patient Position: Chair, Cuff Size: Adult Regular)  Pulse 58  Temp 97  F (36.1  C) (Oral)  Resp 16  Ht 5' 5\" (1.651 m)  Wt 139 lb (63 kg)  SpO2 98%  BMI 23.13 kg/m2    GENERAL APPEARANCE: healthy, alert and no distress     EYES: EOMI, PERRL     HENT: ear canals and TM's normal and nose and mouth without ulcers or lesions     NECK: no adenopathy, no asymmetry, masses, or scars and thyroid normal to palpation     RESP: lungs clear to auscultation - no rales, rhonchi or wheezes     CV: regular rates and rhythm, normal S1 S2, no S3 or S4 and no murmur, click or rub     ABDOMEN:  soft, nontender, no HSM or masses and bowel sounds normal     MS: extremities normal- no gross deformities noted, no evidence of inflammation in joints, FROM in all extremities.     SKIN: no suspicious lesions or rashes     NEURO: Normal strength and tone, sensory exam grossly normal, mentation intact and speech normal     PSYCH: mentation appears normal. and affect normal/bright     LYMPHATICS: Freely mobile SC mass in right axilla that is not tender, no other cervical lymphadenopathy     DIAGNOSTICS:                                                    EKG: Sinus bradycardia " not ST changes, motion artifact     Recent Labs   Lab Test  09/25/17   1054  08/08/17   1505  11/18/16   1256  11/14/16   1020  12/10/15   1621   HGB  14.4   --    --   13.5   --    PLT  263   --    --   258   --    NA  141  136   --   143   --    POTASSIUM  4.4  4.0   --   4.3   --    CR  0.74  0.90   --   0.75   --    A1C   --    --   5.6   --   5.7        IMPRESSION:                                                    Reason for surgery/procedure: Recurrent breast cancer   Diagnosis/reason for consult: Preoperative evaluation     The proposed surgical procedure is considered LOW risk.    REVISED CARDIAC RISK INDEX  The patient has the following serious cardiovascular risks for perioperative complications such as (MI, PE, VFib and 3  AV Block):  No serious cardiac risks  INTERPRETATION: 0 risks: Class I (very low risk - 0.4% complication rate)    The patient has the following additional risks for perioperative complications:  No identified additional risks      ICD-10-CM    1. Preop general physical exam Z01.818 EKG 12-lead complete w/read - Clinics   2. Malignant neoplasm of right female breast, unspecified estrogen receptor status, unspecified site of breast (H) C50.911 Iodine Serum   3. Vitamin D deficiency E55.9 Vitamin D Deficiency     She has done reading about vitamin D and iodine deficiency and recurrent cancer and she requests to have these levels checked.    RECOMMENDATIONS:                                                        --Patient is to take all scheduled medications on the day of surgery     APPROVAL GIVEN to proceed with proposed procedure, without further diagnostic evaluation       Signed Electronically by: Vladislav Cruz MD    Copy of this evaluation report is provided to requesting physician.    [facility] Preop Guidelines

## 2017-10-31 NOTE — PROGRESS NOTES
The following letter pertains to your most recent diagnostic tests:    Good news!     Your iodine levels and vitamin D levels are adequate.            Sincerely,    Dr. Cruz

## 2017-11-01 ENCOUNTER — ANESTHESIA EVENT (OUTPATIENT)
Dept: SURGERY | Facility: CLINIC | Age: 67
End: 2017-11-01
Payer: MEDICARE

## 2017-11-01 DIAGNOSIS — B00.9 HSV (HERPES SIMPLEX VIRUS) INFECTION: ICD-10-CM

## 2017-11-01 RX ORDER — HYDROCODONE BITARTRATE AND ACETAMINOPHEN 5; 325 MG/1; MG/1
1 TABLET ORAL EVERY 6 HOURS PRN
Qty: 20 TABLET | Refills: 0 | Status: ON HOLD | OUTPATIENT
Start: 2017-11-01 | End: 2017-11-02

## 2017-11-01 RX ORDER — CYCLOBENZAPRINE HCL 5 MG
5-10 TABLET ORAL 2 TIMES DAILY PRN
Qty: 42 TABLET | Refills: 0 | Status: SHIPPED | OUTPATIENT
Start: 2017-11-01 | End: 2018-10-19

## 2017-11-02 ENCOUNTER — TELEPHONE (OUTPATIENT)
Dept: SURGERY | Facility: CLINIC | Age: 67
End: 2017-11-02

## 2017-11-02 ENCOUNTER — HOSPITAL ENCOUNTER (OUTPATIENT)
Facility: CLINIC | Age: 67
Discharge: HOME | End: 2017-11-02
Attending: SURGERY | Admitting: SURGERY
Payer: MEDICARE

## 2017-11-02 ENCOUNTER — OFFICE VISIT (OUTPATIENT)
Dept: SURGERY | Facility: PHYSICIAN GROUP | Age: 67
End: 2017-11-02
Payer: COMMERCIAL

## 2017-11-02 ENCOUNTER — ANESTHESIA (OUTPATIENT)
Dept: SURGERY | Facility: CLINIC | Age: 67
End: 2017-11-02
Payer: MEDICARE

## 2017-11-02 VITALS
BODY MASS INDEX: 22.73 KG/M2 | HEIGHT: 65 IN | RESPIRATION RATE: 16 BRPM | OXYGEN SATURATION: 98 % | WEIGHT: 136.4 LBS | DIASTOLIC BLOOD PRESSURE: 82 MMHG | TEMPERATURE: 96.8 F | SYSTOLIC BLOOD PRESSURE: 122 MMHG

## 2017-11-02 DIAGNOSIS — M54.50 LOW BACK PAIN WITHOUT SCIATICA, UNSPECIFIED BACK PAIN LATERALITY, UNSPECIFIED CHRONICITY: ICD-10-CM

## 2017-11-02 DIAGNOSIS — G24.3 TORTICOLLIS, SPASMODIC: ICD-10-CM

## 2017-11-02 PROCEDURE — 36000054 ZZH SURGERY LEVEL 2 W FLUORO 1ST 30 MIN: Performed by: SURGERY

## 2017-11-02 PROCEDURE — A9270 NON-COVERED ITEM OR SERVICE: HCPCS | Mod: GY | Performed by: PHYSICIAN ASSISTANT

## 2017-11-02 PROCEDURE — 36000052 ZZH SURGERY LEVEL 2 EA 15 ADDTL MIN: Performed by: SURGERY

## 2017-11-02 PROCEDURE — 71000012 ZZH RECOVERY PHASE 1 LEVEL 1 FIRST HR: Performed by: SURGERY

## 2017-11-02 PROCEDURE — 25000128 H RX IP 250 OP 636: Performed by: NURSE ANESTHETIST, CERTIFIED REGISTERED

## 2017-11-02 PROCEDURE — 25000132 ZZH RX MED GY IP 250 OP 250 PS 637: Mod: GY | Performed by: PHYSICIAN ASSISTANT

## 2017-11-02 PROCEDURE — 40000170 ZZH STATISTIC PRE-PROCEDURE ASSESSMENT II: Performed by: SURGERY

## 2017-11-02 PROCEDURE — 71000013 ZZH RECOVERY PHASE 1 LEVEL 1 EA ADDTL HR: Performed by: SURGERY

## 2017-11-02 PROCEDURE — 25000128 H RX IP 250 OP 636: Performed by: ANESTHESIOLOGY

## 2017-11-02 PROCEDURE — 71000027 ZZH RECOVERY PHASE 2 EACH 15 MINS: Performed by: SURGERY

## 2017-11-02 PROCEDURE — 88307 TISSUE EXAM BY PATHOLOGIST: CPT | Mod: 26 | Performed by: SURGERY

## 2017-11-02 PROCEDURE — 37000009 ZZH ANESTHESIA TECHNICAL FEE, EACH ADDTL 15 MIN: Performed by: SURGERY

## 2017-11-02 PROCEDURE — 00000159 ZZHCL STATISTIC H-SEND OUTS PREP: Performed by: SURGERY

## 2017-11-02 PROCEDURE — 25000128 H RX IP 250 OP 636: Performed by: SURGERY

## 2017-11-02 PROCEDURE — 27210794 ZZH OR GENERAL SUPPLY STERILE: Performed by: SURGERY

## 2017-11-02 PROCEDURE — 37000008 ZZH ANESTHESIA TECHNICAL FEE, 1ST 30 MIN: Performed by: SURGERY

## 2017-11-02 PROCEDURE — 88307 TISSUE EXAM BY PATHOLOGIST: CPT | Performed by: SURGERY

## 2017-11-02 PROCEDURE — 27210995 ZZH RX 272: Performed by: SURGERY

## 2017-11-02 PROCEDURE — 25000125 ZZHC RX 250: Performed by: NURSE ANESTHETIST, CERTIFIED REGISTERED

## 2017-11-02 PROCEDURE — 25000125 ZZHC RX 250: Performed by: SURGERY

## 2017-11-02 PROCEDURE — 38525 BIOPSY/REMOVAL LYMPH NODES: CPT | Performed by: SURGERY

## 2017-11-02 RX ORDER — LIDOCAINE HYDROCHLORIDE 20 MG/ML
INJECTION, SOLUTION INFILTRATION; PERINEURAL PRN
Status: DISCONTINUED | OUTPATIENT
Start: 2017-11-02 | End: 2017-11-02

## 2017-11-02 RX ORDER — SODIUM CHLORIDE, SODIUM LACTATE, POTASSIUM CHLORIDE, CALCIUM CHLORIDE 600; 310; 30; 20 MG/100ML; MG/100ML; MG/100ML; MG/100ML
INJECTION, SOLUTION INTRAVENOUS CONTINUOUS PRN
Status: DISCONTINUED | OUTPATIENT
Start: 2017-11-02 | End: 2017-11-02

## 2017-11-02 RX ORDER — SODIUM CHLORIDE, SODIUM LACTATE, POTASSIUM CHLORIDE, CALCIUM CHLORIDE 600; 310; 30; 20 MG/100ML; MG/100ML; MG/100ML; MG/100ML
INJECTION, SOLUTION INTRAVENOUS CONTINUOUS
Status: DISCONTINUED | OUTPATIENT
Start: 2017-11-02 | End: 2017-11-02 | Stop reason: HOSPADM

## 2017-11-02 RX ORDER — PROPOFOL 10 MG/ML
INJECTION, EMULSION INTRAVENOUS CONTINUOUS PRN
Status: DISCONTINUED | OUTPATIENT
Start: 2017-11-02 | End: 2017-11-02

## 2017-11-02 RX ORDER — ACETAMINOPHEN 10 MG/ML
1000 INJECTION, SOLUTION INTRAVENOUS ONCE
Status: COMPLETED | OUTPATIENT
Start: 2017-11-02 | End: 2017-11-02

## 2017-11-02 RX ORDER — VALACYCLOVIR HYDROCHLORIDE 1 G/1
TABLET, FILM COATED ORAL
Qty: 8 TABLET | Refills: 1 | Status: SHIPPED | OUTPATIENT
Start: 2017-11-02 | End: 2018-04-13

## 2017-11-02 RX ORDER — MEPERIDINE HYDROCHLORIDE 25 MG/ML
12.5 INJECTION INTRAMUSCULAR; INTRAVENOUS; SUBCUTANEOUS
Status: DISCONTINUED | OUTPATIENT
Start: 2017-11-02 | End: 2017-11-02 | Stop reason: HOSPADM

## 2017-11-02 RX ORDER — FENTANYL CITRATE 50 UG/ML
25-50 INJECTION, SOLUTION INTRAMUSCULAR; INTRAVENOUS EVERY 5 MIN PRN
Status: DISCONTINUED | OUTPATIENT
Start: 2017-11-02 | End: 2017-11-02 | Stop reason: HOSPADM

## 2017-11-02 RX ORDER — CEFAZOLIN SODIUM 1 G/3ML
1 INJECTION, POWDER, FOR SOLUTION INTRAMUSCULAR; INTRAVENOUS SEE ADMIN INSTRUCTIONS
Status: DISCONTINUED | OUTPATIENT
Start: 2017-11-02 | End: 2017-11-02 | Stop reason: HOSPADM

## 2017-11-02 RX ORDER — PROPOFOL 10 MG/ML
INJECTION, EMULSION INTRAVENOUS PRN
Status: DISCONTINUED | OUTPATIENT
Start: 2017-11-02 | End: 2017-11-02

## 2017-11-02 RX ORDER — ONDANSETRON 4 MG/1
4 TABLET, ORALLY DISINTEGRATING ORAL EVERY 30 MIN PRN
Status: DISCONTINUED | OUTPATIENT
Start: 2017-11-02 | End: 2017-11-02 | Stop reason: HOSPADM

## 2017-11-02 RX ORDER — NALOXONE HYDROCHLORIDE 0.4 MG/ML
.1-.4 INJECTION, SOLUTION INTRAMUSCULAR; INTRAVENOUS; SUBCUTANEOUS
Status: DISCONTINUED | OUTPATIENT
Start: 2017-11-02 | End: 2017-11-02 | Stop reason: HOSPADM

## 2017-11-02 RX ORDER — BUPIVACAINE HYDROCHLORIDE AND EPINEPHRINE 5; 5 MG/ML; UG/ML
INJECTION, SOLUTION PERINEURAL PRN
Status: DISCONTINUED | OUTPATIENT
Start: 2017-11-02 | End: 2017-11-02 | Stop reason: HOSPADM

## 2017-11-02 RX ORDER — KETOROLAC TROMETHAMINE 30 MG/ML
INJECTION, SOLUTION INTRAMUSCULAR; INTRAVENOUS PRN
Status: DISCONTINUED | OUTPATIENT
Start: 2017-11-02 | End: 2017-11-02

## 2017-11-02 RX ORDER — ONDANSETRON 2 MG/ML
INJECTION INTRAMUSCULAR; INTRAVENOUS PRN
Status: DISCONTINUED | OUTPATIENT
Start: 2017-11-02 | End: 2017-11-02

## 2017-11-02 RX ORDER — HYDROCODONE BITARTRATE AND ACETAMINOPHEN 5; 325 MG/1; MG/1
1-2 TABLET ORAL EVERY 6 HOURS PRN
Qty: 25 TABLET | Refills: 0 | Status: SHIPPED | OUTPATIENT
Start: 2017-11-02 | End: 2018-02-01

## 2017-11-02 RX ORDER — ONDANSETRON 2 MG/ML
4 INJECTION INTRAMUSCULAR; INTRAVENOUS EVERY 30 MIN PRN
Status: DISCONTINUED | OUTPATIENT
Start: 2017-11-02 | End: 2017-11-02 | Stop reason: HOSPADM

## 2017-11-02 RX ORDER — FENTANYL CITRATE 50 UG/ML
25-50 INJECTION, SOLUTION INTRAMUSCULAR; INTRAVENOUS
Status: DISCONTINUED | OUTPATIENT
Start: 2017-11-02 | End: 2017-11-02 | Stop reason: HOSPADM

## 2017-11-02 RX ORDER — BUPIVACAINE HYDROCHLORIDE AND EPINEPHRINE 5; 5 MG/ML; UG/ML
INJECTION, SOLUTION EPIDURAL; INTRACAUDAL; PERINEURAL
Status: DISCONTINUED
Start: 2017-11-02 | End: 2017-11-02 | Stop reason: HOSPADM

## 2017-11-02 RX ORDER — CEFAZOLIN SODIUM 2 G/100ML
2 INJECTION, SOLUTION INTRAVENOUS
Status: COMPLETED | OUTPATIENT
Start: 2017-11-02 | End: 2017-11-02

## 2017-11-02 RX ORDER — DEXAMETHASONE SODIUM PHOSPHATE 4 MG/ML
INJECTION, SOLUTION INTRA-ARTICULAR; INTRALESIONAL; INTRAMUSCULAR; INTRAVENOUS; SOFT TISSUE PRN
Status: DISCONTINUED | OUTPATIENT
Start: 2017-11-02 | End: 2017-11-02

## 2017-11-02 RX ORDER — HYDROCODONE BITARTRATE AND ACETAMINOPHEN 5; 325 MG/1; MG/1
1-2 TABLET ORAL
Status: COMPLETED | OUTPATIENT
Start: 2017-11-02 | End: 2017-11-02

## 2017-11-02 RX ORDER — FENTANYL CITRATE 50 UG/ML
INJECTION, SOLUTION INTRAMUSCULAR; INTRAVENOUS PRN
Status: DISCONTINUED | OUTPATIENT
Start: 2017-11-02 | End: 2017-11-02

## 2017-11-02 RX ORDER — MAGNESIUM HYDROXIDE 1200 MG/15ML
LIQUID ORAL PRN
Status: DISCONTINUED | OUTPATIENT
Start: 2017-11-02 | End: 2017-11-02 | Stop reason: HOSPADM

## 2017-11-02 RX ORDER — HYDROMORPHONE HYDROCHLORIDE 1 MG/ML
.3-.5 INJECTION, SOLUTION INTRAMUSCULAR; INTRAVENOUS; SUBCUTANEOUS EVERY 10 MIN PRN
Status: DISCONTINUED | OUTPATIENT
Start: 2017-11-02 | End: 2017-11-02 | Stop reason: HOSPADM

## 2017-11-02 RX ADMIN — DEXAMETHASONE SODIUM PHOSPHATE 4 MG: 4 INJECTION, SOLUTION INTRA-ARTICULAR; INTRALESIONAL; INTRAMUSCULAR; INTRAVENOUS; SOFT TISSUE at 07:53

## 2017-11-02 RX ADMIN — SODIUM CHLORIDE, POTASSIUM CHLORIDE, SODIUM LACTATE AND CALCIUM CHLORIDE: 600; 310; 30; 20 INJECTION, SOLUTION INTRAVENOUS at 07:31

## 2017-11-02 RX ADMIN — PROPOFOL 170 MCG/KG/MIN: 10 INJECTION, EMULSION INTRAVENOUS at 07:33

## 2017-11-02 RX ADMIN — HYDROMORPHONE HYDROCHLORIDE 0.5 MG: 1 INJECTION, SOLUTION INTRAMUSCULAR; INTRAVENOUS; SUBCUTANEOUS at 10:22

## 2017-11-02 RX ADMIN — ONDANSETRON 4 MG: 2 INJECTION INTRAMUSCULAR; INTRAVENOUS at 09:02

## 2017-11-02 RX ADMIN — MIDAZOLAM HYDROCHLORIDE 2 MG: 1 INJECTION, SOLUTION INTRAMUSCULAR; INTRAVENOUS at 07:33

## 2017-11-02 RX ADMIN — LIDOCAINE HYDROCHLORIDE 60 MG: 20 INJECTION, SOLUTION INFILTRATION; PERINEURAL at 07:33

## 2017-11-02 RX ADMIN — HYDROCODONE BITARTRATE AND ACETAMINOPHEN 1 TABLET: 5; 325 TABLET ORAL at 10:19

## 2017-11-02 RX ADMIN — FENTANYL CITRATE 50 MCG: 50 INJECTION, SOLUTION INTRAMUSCULAR; INTRAVENOUS at 09:50

## 2017-11-02 RX ADMIN — HYDROMORPHONE HYDROCHLORIDE 0.5 MG: 1 INJECTION, SOLUTION INTRAMUSCULAR; INTRAVENOUS; SUBCUTANEOUS at 10:45

## 2017-11-02 RX ADMIN — ACETAMINOPHEN 1000 MG: 10 INJECTION, SOLUTION INTRAVENOUS at 10:40

## 2017-11-02 RX ADMIN — KETOROLAC TROMETHAMINE 30 MG: 30 INJECTION, SOLUTION INTRAMUSCULAR at 09:05

## 2017-11-02 RX ADMIN — PROPOFOL 200 MG: 10 INJECTION, EMULSION INTRAVENOUS at 07:33

## 2017-11-02 RX ADMIN — CEFAZOLIN SODIUM 2 G: 2 INJECTION, SOLUTION INTRAVENOUS at 07:42

## 2017-11-02 RX ADMIN — FENTANYL CITRATE 50 MCG: 50 INJECTION, SOLUTION INTRAMUSCULAR; INTRAVENOUS at 09:42

## 2017-11-02 RX ADMIN — FENTANYL CITRATE 50 MCG: 50 INJECTION, SOLUTION INTRAMUSCULAR; INTRAVENOUS at 08:17

## 2017-11-02 RX ADMIN — FENTANYL CITRATE 50 MCG: 50 INJECTION, SOLUTION INTRAMUSCULAR; INTRAVENOUS at 08:33

## 2017-11-02 RX ADMIN — FENTANYL CITRATE 50 MCG: 50 INJECTION, SOLUTION INTRAMUSCULAR; INTRAVENOUS at 07:33

## 2017-11-02 ASSESSMENT — LIFESTYLE VARIABLES: TOBACCO_USE: 1

## 2017-11-02 NOTE — ANESTHESIA PREPROCEDURE EVALUATION
Anesthesia Evaluation     . Pt has had prior anesthetic. Type: General    History of anesthetic complications   - PONV        ROS/MED HX    ENT/Pulmonary:     (+)tobacco use, Past use 22 pack years packs/day  , . .    Neurologic:       Cardiovascular:     (+) Dyslipidemia, hypertension--CAD, --. : . . . :. .       METS/Exercise Tolerance:     Hematologic:         Musculoskeletal:         GI/Hepatic:     (+) GERD Asymptomatic on medication,       Renal/Genitourinary:         Endo:         Psychiatric: Comment: H/o ETOH    (+) psychiatric history depression      Infectious Disease:         Malignancy:   (+) Malignancy History of Breast          Other:    (+) H/O Chronic Pain,H/O chronic opiod use ,                    Physical Exam  Normal systems: cardiovascular, pulmonary and dental    Airway   Mallampati: I  TM distance: >3 FB  Neck ROM: full    Dental     Cardiovascular   Rhythm and rate: regular and normal      Pulmonary    breath sounds clear to auscultation                    Anesthesia Plan      History & Physical Review  History and physical reviewed and following examination; no interval change.    ASA Status:  2 .    NPO Status:  > 8 hours    Plan for General and LMA with Propofol induction. Maintenance will be TIVA.    PONV prophylaxis:  Ondansetron (or other 5HT-3) and Dexamethasone or Solumedrol       Postoperative Care  Postoperative pain management:  IV analgesics and Oral pain medications.      Consents                          .

## 2017-11-02 NOTE — TELEPHONE ENCOUNTER
Name of caller: Patient    Reason for Call:  Patient had surgery this morning and misplaced discharge instructions.  She has a couple of post-op questions    Surgeon:  Dr. White     Recent Surgery:  Yes.    If yes, when & what type:  11/2/17; Right axillary dissection      Best phone number to reach pt at is: [phone]  Ok to leave a message with medical info? Yes.    Pharmacy preferred (if calling for a refill): [facility]

## 2017-11-02 NOTE — PROGRESS NOTES
Patient continue to complain of a 8/10 pain despite receiving 150 mcg of Fentanyl and 0.5 of Dilaudid.  Patient sleeping most of the time and no facial grimace. VS stable.  MDA updated. Tylenol IV ordered and given.

## 2017-11-02 NOTE — TELEPHONE ENCOUNTER
Patient called to state they got home without their discharge packet information.  Reviewed post op care: may shower after 48 hours, remove dressing prior to showering, replace dressing as needed.  Place dressing over drain site.  Steri strips may be removed after 1 week.  Patient expressed understanding and had no further questions.     Esperanza Holly PA-C

## 2017-11-02 NOTE — IP AVS SNAPSHOT
[facility]    [address]    [place] [address]    Phone:  [phone]    Fax:  [phone]                                       After Visit Summary   11/2/2017    Svetlana Adair    MRN: [id number]           After Visit Summary Signature Page     I have received my discharge instructions, and my questions have been answered. I have discussed any challenges I see with this plan with the nurse or doctor.    ..........................................................................................................................................  Patient/Patient Representative Signature      ..........................................................................................................................................  Patient Representative Print Name and Relationship to Patient    ..................................................               ................................................  Date                                            Time    ..........................................................................................................................................  Reviewed by Signature/Title    ...................................................              ..............................................  Date                                                            Time

## 2017-11-02 NOTE — OP NOTE
General Surgery Operative Note    PREOPERATIVE DIAGNOSIS: Recurrent breast cancer in the right axilla    POSTOPERATIVE DIAGNOSIS:  Same    PROCEDURE:  Right axillary node dissection    ANESTHESIA:  General.    PREOPERATIVE MEDICATIONS:  Ancef    SURGEON:  Cortez White MD    ASSISTANT:  Ace Sosa PA-C.  Assistant was required owing to challenging exposure and need for retraction.    INDICATIONS:  Patient is status post mastectomy for right-sided breast cancer.  She was recently noted to have a palpable lymph node in the right axilla which on biopsy showed recurrent breast cancer.    PROCEDURE:  The patient was taken to the operating suite and uneventfully endotracheally intubated.  The right breast and axilla were prepped and draped in a sterile fashion.  Surgeon initiated timeout was acknowledged.  We made a transverse incision in the right axilla below the axillary hairline and took this down through skin and subcutaneous tissue.  There was a moderate amount of scar due to the patient's previous mastectomy.  We took the incision down to the chest wall and began dissecting the tissue off of the underlying muscle.  This was taken out laterally to the latissimus muscle.  We proceeded superiorly until the axillary vein was encountered.  Two small branches of the vein were tied off.  We encountered the thoracodorsal neurovascular complex and removed tissue superficial to this.  As we proceeded medially, we noted a cluster of small palpable lymph nodes which were densely adherent to the chest wall.  They were able to be mobilized and removed by taking with it some of the serratus muscle.  This was very close to the origin of the thoracodorsal nerve as well as the long thoracic nerve, although both were spared.  We continued mobilizing and tying off all vessels until the entire axillary content was removed and sent for pathology.  Area was irrigated and inspected for hemostasis.  2 mL of Evicel were placed  within the axilla.  A 15 round drain was placed and secured to the skin with silk suture.  Deep tissues were reapproximated with 3-0 Vicryl.  The skin edges were reapproximated with 4-0 Vicryl and Steri-Strips.  The patient was uneventfully extubated, awakened and taken to the PACU in stable condition.  At the conclusion of the case, all lap and needle counts were correct.      ESTIMATED BLOOD LOSS:  15 mL    INTRAOPERATIVE FINDINGS:  Multiple small but grossly positive lymph nodes, densely adherent to the chest wall.    Cortez White MD

## 2017-11-02 NOTE — PROGRESS NOTES
[facility] drain instructions reviewed with caregiver.  All questions answered. Pt and caregiver state understanding.

## 2017-11-02 NOTE — PROGRESS NOTES
Patient waking up.  LMA discontinued and was uneventful.  Oxygen delivery via simple face mask continued.

## 2017-11-02 NOTE — BRIEF OP NOTE
[facility] Brief Operative Note    Pre-operative diagnosis: RIGHT AXILLARY BREAST CANCER    Post-operative diagnosis Right Axillary Mass     Procedure: Procedure(s):  RIGHT AXILLARY LYMPH NODE DISSECTION - Wound Class: I-Clean   Surgeon(s): Surgeon(s) and Role:     * Cortez White MD - Primary     * Ace Sosa PA-C - Assisting   Estimated blood loss: 15 mL    Specimens:   ID Type Source Tests Collected by Time Destination   A : Right axillary disection Tissue Axilla, Right SURGICAL PATHOLOGY EXAM Cortez White MD 11/2/2017  9:00 AM       Findings: See Operative Report for full details.  No complications noted.

## 2017-11-02 NOTE — IP AVS SNAPSHOT
MRN:[id number]                      After Visit Summary   11/2/2017    Svetlana Adair    MRN: [id number]           Thank you!     Thank you for choosing [facility] for your care. Our goal is always to provide you with excellent care. Hearing back from our patients is one way we can continue to improve our services. Please take a few minutes to complete the written survey that you may receive in the mail after you visit with us. Thank you!        Patient Information     Date Of Birth          1950        About your hospital stay     You were admitted on:  November 2, 2017 You last received care in the:  [facility]    You were discharged on:  November 2, 2017       Who to Call     For medical emergencies, please call 911.  For non-urgent questions about your medical care, please call your primary care provider or clinic, [phone]  For questions related to your surgery, please call your surgery clinic        Attending Provider     Provider Specialty    Cortez White MD Surgery       Primary Care Provider Office Phone # Fax #    Vladislav Cruz [phone]      After Care Instructions     Discharge Instructions       Please teach pt how to empty and strip her drain.  Give some extra supplies for dressing around the drain if possible.                  Further instructions from your care team         [facility]  Discharge Instructions: Post-Operative Breast Surgery    ACTIVITY    Increase your activity gradually.  Avoid strenuous physical activity or heavy lifting greater than 15-20 lbs. for 1-2 weeks with arm on the surgery side.  You may climb stairs.    Gentle rotation and stretching of your arms and shoulders will prevent joint stiffness.    You may drive without restrictions when you are not using any prescription pain medication and comfortable in a car.    You may return to work/school when you are  comfortable without any prescription pain medication.    WOUND CARE    You may remove your bandage and shower 48 hours after the surgery.  Pat your incisions dry and leave open to air.  Re-apply dressing (Band-aid or gauze/tape) as needed for drainage.    You may have steri-strips (looks like tape) or Dermabond (looks like glue) on your incision.  Leave it alone, it will peel up and fall off on its own.     Do not soak your incisions in a tub or pool for 2 weeks.     Wear a supportive bra for 1-2 weeks, day and night.    DIET    Return to the diet you were on before surgery.    Drink plenty of liquids to stay hydrated.    PAIN    Expect some tenderness and discomfort at the incision site(s).  Use the prescribed pain medication at your discretion.  Expect gradual resolution of your pain over several days.    You may take ibuprofen with food (unless you have been told not to) instead of or in addition to your prescribed pain medication. You may have to take 2 tabs of Norco for the pain initially and then taper back to 1 tab at a time.  Do not take any additional acetaminophen/APAP/Tylenol.    Do not drink alcohol or drive while you are taking pain medications.    You may apply ice to your incisions in 20 minute intervals as needed for the next 48 hours.  After that time, consider switching to heat if you prefer.    RETURN APPOINTMENT    You are going home with a drain in your axilla.  Log the output of this drain on a daily basis (you may have to empty bulb more frequently however).      When your drain output is less than 25 mL per day for 2 consecutive days, you need to call our office to schedule having your drain pulled.      Follow up with your surgeon in 2 weeks (hopefully drain removal appt will be done prior to this).  Please call the office at [phone] to schedule your appointments.    CALL OUR OFFICE IF YOU HAVE:     Chills or fever above 100.5 F.    Increased redness or drainage at your  incisions.    Significant bleeding.    Pain not relieved by your pain medication or rest.    Increasing pain after the first 48 hours.    Any other concerns or questions.    HELPFUL HINTS    Pain medications can cause constipation.  Limit use when possible.  Take over the counter stool softener/stimulant, such as Colace or Senna, with plenty of water.  You may take a mild over the counter laxative, such as Miralax or a suppository, as needed.        Same Day Surgery Discharge Instructions for  Sedation and General Anesthesia       It's not unusual to feel dizzy, light-headed or faint for up to 24 hours after surgery or while taking pain medication.  If you have these symptoms: sit for a few minutes before standing and have someone assist you when you get up to walk or use the bathroom.      You should rest and relax for the next 24 hours. We recommend you make arrangements to have an adult stay with you for at least 24 hours after your discharge.  Avoid hazardous and strenuous activity.      DO NOT DRIVE any vehicle or operate mechanical equipment for 24 hours following the end of your surgery.  Even though you may feel normal, your reactions may be affected by the medication you have received.      Do not drink alcoholic beverages for 24 hours following surgery.       Slowly progress to your regular diet as you feel able. It's not unusual to feel nauseated and/or vomit after receiving anesthesia.  If you develop these symptoms, drink clear liquids (apple juice, ginger ale, broth, 7-up, etc. ) until you feel better.  If your nausea and vomiting persists for 24 hours, please notify your surgeon.        All narcotic pain medications, along with inactivity and anesthesia, can cause constipation. Drinking plenty of liquids and increasing fiber intake will help.      For any questions of a medical nature, call your surgeon.      Do not make important decisions for 24 hours.      If you had general anesthesia, you may  have a sore throat for a couple of days related to the breathing tube used during surgery.  You may use Cepacol lozenges to help with this discomfort.  If it worsens or if you develop a fever, contact your surgeon.       If you feel your pain is not well managed with the pain medications prescribed by your surgeon, please contact your surgeon's office to let them know so they can address your concerns.     Justino Wilkinson Drain  Home Care Instructions    What is a Justino Wilkinson (GARLAND) Drain?  This is a small tube that connects to a bulb.  Its gentle suction removes extra fluid from a surgical wound.  Your doctor will remove the tube when the amount of fluid decreases.  The color and amount of fluid varies.  Right after surgery the fluid is bright red.  Over time, it changes to light pink and may become clear or the color of straw.    How should I care for my tube site?    Keep the skin around the tube dry.  Check with your doctor about how to shower.  You may need to cover the site with plastic when you shower.  Or, it may be okay to let the site get wet and put on a clean bandage after you shower.      If the bandage gets wet, you will need to change it.  How should I care for the bulb?    Keep the bulb compressed at all times except while you empty it.     Attach the bulb to your clothing with tape and a safety pin.    Try to empty the bulb at the same time every day.  Empty the bulb at least once a day, or when the bulb becomes half full.  If it becomes too full, there will not be enough suction.    To empty the bulb:    Wash your hands.    Open the bulb cap.    Drain the fluid from the bulb into the measuring cup.  If you have two drains, use two cups.      Clean the mouth of the bulb with an alcohol wipe if your nurse told you to.    Squeeze the bulb (fold it in half before you close the bulb cap) If it does not stay compressed, call your nurse or clinic.    Write the amount of drainage on the drainage record (see  "back page).  If you have two drains, write the amount for each bulb.    Flush the drainage down the toilet.  Rinse the measuring cup.    Wash your hands.    When should I call my doctor?   Call your doctor if:    You have a fever over 101 F (38.3 C), taken under the tongue.     The drainage increases or smells bad.    The skin around your tube has increased redness, swelling, warmth or pain.    You have pus or fluid leaking at the tube site.    Your stitches break.    You think the tube is not draining.    The tube falls out.    You have any problems or concerns.    Your drainage record:    Empty your bulb at least once per day or when 1/2 to 1/3 full.  Write down the date, time and amount of drainage in each bulb.   Bring this record to each clinic visit.    Date Time Bulb 1: amount of  Drainage in (ml or cc) Bulb 2: amount of drainage (in ml or cc) Notes                                                          While you were at the hospital today you received Toradol, an antiinflammatory medication similar to Ibuprofen.  You should not take other antiinflammatory medication, such as Ibuprofen, Motrin, Advil, Aleve, Naprosyn, etc, until 3:00 p.m.      **If you have concerns or questions about your procedure,    please contact Dr White at  [phone]**          Pending Results     Date and Time Order Name Status Description    11/2/2017 0900 Surgical pathology exam In process             Admission Information     Date & Time Department Dept. Phone    11/2/2017 [facility] [phone]      Your Vitals Were     Blood Pressure Temperature Respirations Height Weight Pulse Oximetry    122/82 96.8  F (36  C) 16 1.651 m (5' 5\") 61.9 kg (136 lb 6.4 oz) 98%    BMI (Body Mass Index)                   22.7 kg/m2           [employer] Information     [employer] gives you secure access to your electronic health record. If you see a primary care provider, you can also send messages to your care team and make " appointments. If you have questions, please call your primary care clinic.  If you do not have a primary care provider, please call [phone] and they will assist you.        Care EveryWhere ID     This is your Care EveryWhere ID. This could be used by other organizations to access your [facility] medical records  [id number]        Equal Access to Services     SMITA AMADOR : Hadii jerson pagan hadsantio Sonicolali, waaxda luqadaha, qaybta kaalmada carlosbrennangeovanny, chelsi spearskeshawnfabio james . So [facility] [phone].    ATENCIÓN: Si habla español, tiene a bruner disposición servicios gratuitos de asistencia lingüística. Chato al [phone].    We comply with applicable federal civil rights laws and [place] laws. We do not discriminate on the basis of race, color, national origin, age, disability, sex, sexual orientation, or gender identity.               Review of your medicines      UNREVIEWED medicines. Ask your doctor about these medicines        Dose / Directions    valACYclovir 1000 mg tablet   Commonly known as:  VALTREX   This may have changed:  See the new instructions.   Used for:  HSV (herpes simplex virus) infection   Ask about: Which instructions should I use?        TAKE 2 TABLETS BY MOUTH TWICE DAILY   Quantity:  8 tablet   Refills:  1         CONTINUE these medicines which may have CHANGED, or have new prescriptions. If we are uncertain of the size of tablets/capsules you have at home, strength may be listed as something that might have changed.        Dose / Directions    HYDROcodone-acetaminophen 5-325 MG per tablet   Commonly known as:  NORCO   This may have changed:  how much to take   Used for:  Low back pain without sciatica, unspecified back pain laterality, unspecified chronicity, Torticollis, spasmodic   Notes to Patient:  One pill given at 11:19        Dose:  1-2 tablet   Take 1-2 tablets by mouth every 6 hours as needed for pain   Quantity:  25 tablet   Refills:  0         CONTINUE these  medicines which have NOT CHANGED        Dose / Directions    aspirin 81 MG tablet   Used for:  Coronary artery disease involving native coronary artery of native heart without angina pectoris        Dose:  81 mg   Take 1 tablet (81 mg) by mouth daily   Quantity:  30 tablet   Refills:  0       atorvastatin 10 MG tablet   Commonly known as:  LIPITOR   Used for:  Coronary artery disease involving native coronary artery of native heart without angina pectoris        Dose:  10 mg   Take 1 tablet (10 mg) by mouth daily   Quantity:  90 tablet   Refills:  0       CoQ10 100 MG Caps        Take by mouth daily Reported on 3/3/2017   Refills:  0       cyclobenzaprine 5 MG tablet   Commonly known as:  FLEXERIL   Used for:  Torticollis, spasmodic, Low back pain without sciatica, unspecified back pain laterality, unspecified chronicity        Dose:  5-10 mg   Take 1-2 tablets (5-10 mg) by mouth 2 times daily as needed for muscle spasms   Quantity:  42 tablet   Refills:  0       FIBER PO        2 tablets twice daily   Refills:  0       IBUPROFEN PO        Dose:  200-400 mg   Take 200-400 mg by mouth every 6 hours as needed for moderate pain   Refills:  0       LYSINE        1-3 daily as needed   Refills:  0       MULTIVITAMIN ADULT PO        Take by mouth daily   Refills:  0       nicotine polacrilex 2 MG gum   Commonly known as:  NICORETTE   Used for:  Tobacco use disorder        Dose:  2 mg   Place 1 each (2 mg) inside cheek as needed for smoking cessation   Quantity:  30 tablet   Refills:  11       OMEGA 3 PO        Dose:  1 tablet   Take 1 tablet by mouth 2 times daily   Refills:  0       omeprazole 20 MG tablet   Used for:  GERD (gastroesophageal reflux disease)        Dose:  20 mg   Take 1 tablet (20 mg) by mouth as needed   Quantity:  30 tablet   Refills:  0       triamterene-hydrochlorothiazide 37.5-25 MG per tablet   Commonly known as:  MAXZIDE-25   Used for:  Peripheral edema        Dose:  1 tablet   Take 1 tablet by  mouth as needed   Quantity:  90 tablet   Refills:  0       Vitamin D3 2000 UNITS Tabs        Dose:  5000 Units   Take 5,000 Units by mouth Reported on 3/3/2017   Refills:  0            Where to get your medicines      These medications were sent to [id number] - [place], [place] - [address] AT [facility] [address]  [address] [place] [address] [address]     Phone:  [phone]     valACYclovir 1000 mg tablet         Some of these will need a paper prescription and others can be bought over the counter. Ask your nurse if you have questions.     Bring a paper prescription for each of these medications     HYDROcodone-acetaminophen 5-325 MG per tablet                Protect others around you: Learn how to safely use, store and throw away your medicines at www.disposemymeds.org.             Medication List: This is a list of all your medications and when to take them. Check marks below indicate your daily home schedule. Keep this list as a reference.      Medications           Morning Afternoon Evening Bedtime As Needed    aspirin 81 MG tablet   Take 1 tablet (81 mg) by mouth daily                                atorvastatin 10 MG tablet   Commonly known as:  LIPITOR   Take 1 tablet (10 mg) by mouth daily                                CoQ10 100 MG Caps   Take by mouth daily Reported on 3/3/2017                                cyclobenzaprine 5 MG tablet   Commonly known as:  FLEXERIL   Take 1-2 tablets (5-10 mg) by mouth 2 times daily as needed for muscle spasms                                FIBER PO   2 tablets twice daily                                HYDROcodone-acetaminophen 5-325 MG per tablet   Commonly known as:  NORCO   Take 1-2 tablets by mouth every 6 hours as needed for pain   Last time this was given:  1 tablet on 11/2/2017 10:19 AM   Notes to Patient:  One pill given at 11:19                                IBUPROFEN PO   Take 200-400 mg by mouth every 6 hours as needed for moderate  pain                                LYSINE   1-3 daily as needed                                MULTIVITAMIN ADULT PO   Take by mouth daily                                nicotine polacrilex 2 MG gum   Commonly known as:  NICORETTE   Place 1 each (2 mg) inside cheek as needed for smoking cessation                                OMEGA 3 PO   Take 1 tablet by mouth 2 times daily                                omeprazole 20 MG tablet   Take 1 tablet (20 mg) by mouth as needed                                triamterene-hydrochlorothiazide 37.5-25 MG per tablet   Commonly known as:  MAXZIDE-25   Take 1 tablet by mouth as needed                                Vitamin D3 2000 UNITS Tabs   Take 5,000 Units by mouth Reported on 3/3/2017                                  ASK your doctor about these medications           Morning Afternoon Evening Bedtime As Needed    valACYclovir 1000 mg tablet   Commonly known as:  VALTREX   TAKE 2 TABLETS BY MOUTH TWICE DAILY   Ask about: Which instructions should I use?

## 2017-11-02 NOTE — ANESTHESIA POSTPROCEDURE EVALUATION
Patient: Svetlana Adair    Procedure(s):  RIGHT AXILLARY LYMPH NODE DISSECTION - Wound Class: I-Clean    Diagnosis:RIGHT AXILLARY BREAST CANCER   Diagnosis Additional Information: No value filed.    Anesthesia Type:  General, LMA    Note:  Anesthesia Post Evaluation    Patient location during evaluation: PACU  Patient participation: Able to fully participate in evaluation  Level of consciousness: awake  Pain management: adequate  Airway patency: patent  Cardiovascular status: acceptable  Respiratory status: acceptable  Hydration status: acceptable  PONV: controlled     Anesthetic complications: None          Last vitals:  Vitals:    11/02/17 0613 11/02/17 0935   BP: 109/67 103/73   Resp: 16 15   Temp: 36.3  C (97.4  F) 35.8  C (96.4  F)   SpO2: 97% 100%         Electronically Signed By: Jose Alberto Eagle MD  November 2, 2017  9:37 AM

## 2017-11-02 NOTE — DISCHARGE INSTRUCTIONS
[facility]  Discharge Instructions: Post-Operative Breast Surgery    ACTIVITY    Increase your activity gradually.  Avoid strenuous physical activity or heavy lifting greater than 15-20 lbs. for 1-2 weeks with arm on the surgery side.  You may climb stairs.    Gentle rotation and stretching of your arms and shoulders will prevent joint stiffness.    You may drive without restrictions when you are not using any prescription pain medication and comfortable in a car.    You may return to work/school when you are comfortable without any prescription pain medication.    WOUND CARE    You may remove your bandage and shower 48 hours after the surgery.  Pat your incisions dry and leave open to air.  Re-apply dressing (Band-aid or gauze/tape) as needed for drainage.    You may have steri-strips (looks like tape) or Dermabond (looks like glue) on your incision.  Leave it alone, it will peel up and fall off on its own.     Do not soak your incisions in a tub or pool for 2 weeks.     Wear a supportive bra for 1-2 weeks, day and night.    DIET    Return to the diet you were on before surgery.    Drink plenty of liquids to stay hydrated.    PAIN    Expect some tenderness and discomfort at the incision site(s).  Use the prescribed pain medication at your discretion.  Expect gradual resolution of your pain over several days.    You may take ibuprofen with food (unless you have been told not to) instead of or in addition to your prescribed pain medication. You may have to take 2 tabs of Norco for the pain initially and then taper back to 1 tab at a time.  Do not take any additional acetaminophen/APAP/Tylenol.    Do not drink alcohol or drive while you are taking pain medications.    You may apply ice to your incisions in 20 minute intervals as needed for the next 48 hours.  After that time, consider switching to heat if you prefer.    RETURN APPOINTMENT    You are going home with a drain in  your axilla.  Log the output of this drain on a daily basis (you may have to empty bulb more frequently however).      When your drain output is less than 25 mL per day for 2 consecutive days, you need to call our office to schedule having your drain pulled.      Follow up with your surgeon in 2 weeks (hopefully drain removal appt will be done prior to this).  Please call the office at [phone] to schedule your appointments.    CALL OUR OFFICE IF YOU HAVE:     Chills or fever above 100.5 F.    Increased redness or drainage at your incisions.    Significant bleeding.    Pain not relieved by your pain medication or rest.    Increasing pain after the first 48 hours.    Any other concerns or questions.    HELPFUL HINTS    Pain medications can cause constipation.  Limit use when possible.  Take over the counter stool softener/stimulant, such as Colace or Senna, with plenty of water.  You may take a mild over the counter laxative, such as Miralax or a suppository, as needed.        Same Day Surgery Discharge Instructions for  Sedation and General Anesthesia       It's not unusual to feel dizzy, light-headed or faint for up to 24 hours after surgery or while taking pain medication.  If you have these symptoms: sit for a few minutes before standing and have someone assist you when you get up to walk or use the bathroom.      You should rest and relax for the next 24 hours. We recommend you make arrangements to have an adult stay with you for at least 24 hours after your discharge.  Avoid hazardous and strenuous activity.      DO NOT DRIVE any vehicle or operate mechanical equipment for 24 hours following the end of your surgery.  Even though you may feel normal, your reactions may be affected by the medication you have received.      Do not drink alcoholic beverages for 24 hours following surgery.       Slowly progress to your regular diet as you feel able. It's not unusual to feel nauseated and/or vomit after receiving  anesthesia.  If you develop these symptoms, drink clear liquids (apple juice, ginger ale, broth, 7-up, etc. ) until you feel better.  If your nausea and vomiting persists for 24 hours, please notify your surgeon.        All narcotic pain medications, along with inactivity and anesthesia, can cause constipation. Drinking plenty of liquids and increasing fiber intake will help.      For any questions of a medical nature, call your surgeon.      Do not make important decisions for 24 hours.      If you had general anesthesia, you may have a sore throat for a couple of days related to the breathing tube used during surgery.  You may use Cepacol lozenges to help with this discomfort.  If it worsens or if you develop a fever, contact your surgeon.       If you feel your pain is not well managed with the pain medications prescribed by your surgeon, please contact your surgeon's office to let them know so they can address your concerns.     Justino Wilkinson Drain  Home Care Instructions    What is a Justino Wilkinson (GARLAND) Drain?  This is a small tube that connects to a bulb.  Its gentle suction removes extra fluid from a surgical wound.  Your doctor will remove the tube when the amount of fluid decreases.  The color and amount of fluid varies.  Right after surgery the fluid is bright red.  Over time, it changes to light pink and may become clear or the color of straw.    How should I care for my tube site?    Keep the skin around the tube dry.  Check with your doctor about how to shower.  You may need to cover the site with plastic when you shower.  Or, it may be okay to let the site get wet and put on a clean bandage after you shower.      If the bandage gets wet, you will need to change it.  How should I care for the bulb?    Keep the bulb compressed at all times except while you empty it.     Attach the bulb to your clothing with tape and a safety pin.    Try to empty the bulb at the same time every day.  Empty the bulb at  least once a day, or when the bulb becomes half full.  If it becomes too full, there will not be enough suction.    To empty the bulb:    Wash your hands.    Open the bulb cap.    Drain the fluid from the bulb into the measuring cup.  If you have two drains, use two cups.      Clean the mouth of the bulb with an alcohol wipe if your nurse told you to.    Squeeze the bulb (fold it in half before you close the bulb cap) If it does not stay compressed, call your nurse or clinic.    Write the amount of drainage on the drainage record (see back page).  If you have two drains, write the amount for each bulb.    Flush the drainage down the toilet.  Rinse the measuring cup.    Wash your hands.    When should I call my doctor?   Call your doctor if:    You have a fever over 101 F (38.3 C), taken under the tongue.     The drainage increases or smells bad.    The skin around your tube has increased redness, swelling, warmth or pain.    You have pus or fluid leaking at the tube site.    Your stitches break.    You think the tube is not draining.    The tube falls out.    You have any problems or concerns.    Your drainage record:    Empty your bulb at least once per day or when 1/2 to 1/3 full.  Write down the date, time and amount of drainage in each bulb.   Bring this record to each clinic visit.    Date Time Bulb 1: amount of  Drainage in (ml or cc) Bulb 2: amount of drainage (in ml or cc) Notes                                                          While you were at the hospital today you received Toradol, an antiinflammatory medication similar to Ibuprofen.  You should not take other antiinflammatory medication, such as Ibuprofen, Motrin, Advil, Aleve, Naprosyn, etc, until 3:00 p.m.      **If you have concerns or questions about your procedure,    please contact Dr White at  [phone]**

## 2017-11-02 NOTE — ANESTHESIA CARE TRANSFER NOTE
Patient: Svetlana Adair    Procedure(s):  RIGHT AXILLARY LYMPH NODE DISSECTION - Wound Class: I-Clean    Diagnosis: RIGHT AXILLARY BREAST CANCER   Diagnosis Additional Information: No value filed.    Anesthesia Type:   General, LMA     Note:  Airway :LMA and Face Mask  Patient transferred to:PACU  Handoff Report: Identifed the Patient, Identified the Reponsible Provider, Reviewed the pertinent medical history, Discussed the surgical course, Reviewed Intra-OP anesthesia mangement and issues during anesthesia, Set expectations for post-procedure period and Allowed opportunity for questions and acknowledgement of understanding     good air exchange, sleepoing, LMA remains, report to RN  Vitals: (Last set prior to Anesthesia Care Transfer)    CRNA VITALS  11/2/2017 0857 - 11/2/2017 0934      11/2/2017             Pulse: 59    SpO2: 99 %    Resp Rate (observed): 14    Resp Rate (set): 10         BP: 103/73        Electronically Signed By: ANNAMARIA Carmichael CRNA  November 2, 2017  9:34 AM

## 2017-11-03 LAB — COPATH REPORT: NORMAL

## 2017-11-06 ENCOUNTER — OFFICE VISIT (OUTPATIENT)
Dept: SURGERY | Facility: CLINIC | Age: 67
End: 2017-11-06
Payer: COMMERCIAL

## 2017-11-06 ENCOUNTER — TELEPHONE (OUTPATIENT)
Dept: SURGERY | Facility: CLINIC | Age: 67
End: 2017-11-06

## 2017-11-06 VITALS — DIASTOLIC BLOOD PRESSURE: 69 MMHG | SYSTOLIC BLOOD PRESSURE: 123 MMHG | HEART RATE: 63 BPM

## 2017-11-06 DIAGNOSIS — Z98.890 POSTOPERATIVE STATE: Primary | ICD-10-CM

## 2017-11-06 PROCEDURE — 99024 POSTOP FOLLOW-UP VISIT: CPT | Performed by: PHYSICIAN ASSISTANT

## 2017-11-06 NOTE — TELEPHONE ENCOUNTER
Called patient back, she is having minimal drainage from drains and drain site is leaking she was recommended to come in today and have this assessed and see if drains can be removed. Scheduled patient for today at 1:00pm with PA. Patient verbalized understanding of this and agreed. Encouraged them to call back if they had further questions or concerns.    Erinn Gardiner RN BSN

## 2017-11-06 NOTE — TELEPHONE ENCOUNTER
Name of caller: Patient    Reason for Call:  questions    Surgeon:  Dr. White     Recent Surgery:  Yes.    If yes, when & what type:  Right axillary dissection.11 hours/02/17      Best phone number to reach pt at is: [phone]  Ok to leave a message with medical info? Yes.    Pharmacy preferred (if calling for a refill): na

## 2017-11-06 NOTE — MR AVS SNAPSHOT
After Visit Summary   11/6/2017    Svetlana Adair    MRN: [id number]           Patient Information     Date Of Birth          1950        Visit Information        Provider Department      11/6/2017 2:00 PM Ace Sosa PA-C [facility] General Surgery      Today's Diagnoses     Postoperative state    -  1       Follow-ups after your visit        Your next 10 appointments already scheduled     Nov 15, 2017 11:45 AM CST   Post Op with Cortez White MD   [facility]    [address]  [facility] [id number] [phone]              Who to contact     If you have questions or need follow up information about today's clinic visit or your schedule please contact [facility] directly at [phone].  Normal or non-critical lab and imaging results will be communicated to you by MyChart, letter or phone within 4 business days after the clinic has received the results. If you do not hear from us within 7 days, please contact the clinic through [employer] or phone. If you have a critical or abnormal lab result, we will notify you by phone as soon as possible.  Submit refill requests through [employer] or call your pharmacy and they will forward the refill request to us. Please allow 3 business days for your refill to be completed.          Additional Information About Your Visit        MyChart Information     [employer] gives you secure access to your electronic health record. If you see a primary care provider, you can also send messages to your care team and make appointments. If you have questions, please call your primary care clinic.  If you do not have a primary care provider, please call [phone] and they will assist you.        Care EveryWhere ID     This is your Care EveryWhere ID. This could be used by other organizations to access your [facility]  records  [id number]        Your Vitals Were     Pulse                   63            Blood Pressure from Last 3 Encounters:   11/06/17 123/69   11/02/17 122/82   10/26/17 111/69    Weight from Last 3 Encounters:   11/02/17 136 lb 6.4 oz (61.9 kg)   10/26/17 139 lb (63 kg)   10/18/17 133 lb (60.3 kg)              Today, you had the following     No orders found for display       Primary Care Provider Office Phone # Fax #    Vladislav Cruz [phone]       [facility] [address]  [facility] [address]        Equal Access to Services     ALEC AMADOR : Hadii aad latasha hadasho Sojabari, waaxda luqadaha, qaybta kaalmada adeishanyada, chelsi james . So [facility] [place] [phone].    ATENCIÓN: Si habla español, tiene a bruner disposición servicios gratuitos de asistencia lingüística. Llame al [phone].    We comply with applicable federal civil rights laws and [place] laws. We do not discriminate on the basis of race, color, national origin, age, disability, sex, sexual orientation, or gender identity.            Thank you!     Thank you for choosing [facility]  for your care. Our goal is always to provide you with excellent care. Hearing back from our patients is one way we can continue to improve our services. Please take a few minutes to complete the written survey that you may receive in the mail after your visit with us. Thank you!             Your Updated Medication List - Protect others around you: Learn how to safely use, store and throw away your medicines at www.disposemymeds.org.          This list is accurate as of: 11/6/17  3:08 PM.  Always use your most recent med list.                   Brand Name Dispense Instructions for use Diagnosis    aspirin 81 MG tablet     30 tablet    Take 1 tablet (81 mg) by mouth daily    Coronary artery disease involving native coronary artery of native heart without angina pectoris       atorvastatin 10 MG  tablet    LIPITOR    90 tablet    Take 1 tablet (10 mg) by mouth daily    Coronary artery disease involving native coronary artery of native heart without angina pectoris       CoQ10 100 MG Caps      Take by mouth daily Reported on 3/3/2017        cyclobenzaprine 5 MG tablet    FLEXERIL    42 tablet    Take 1-2 tablets (5-10 mg) by mouth 2 times daily as needed for muscle spasms    Torticollis, spasmodic, Low back pain without sciatica, unspecified back pain laterality, unspecified chronicity       FIBER PO      2 tablets twice daily        HYDROcodone-acetaminophen 5-325 MG per tablet    NORCO    25 tablet    Take 1-2 tablets by mouth every 6 hours as needed for pain    Low back pain without sciatica, unspecified back pain laterality, unspecified chronicity, Torticollis, spasmodic       IBUPROFEN PO      Take 200-400 mg by mouth every 6 hours as needed for moderate pain        LYSINE      1-3 daily as needed        MULTIVITAMIN ADULT PO      Take by mouth daily        nicotine polacrilex 2 MG gum    NICORETTE    30 tablet    Place 1 each (2 mg) inside cheek as needed for smoking cessation    Tobacco use disorder       OMEGA 3 PO      Take 1 tablet by mouth 2 times daily        omeprazole 20 MG tablet     30 tablet    Take 1 tablet (20 mg) by mouth as needed    GERD (gastroesophageal reflux disease)       triamterene-hydrochlorothiazide 37.5-25 MG per tablet    MAXZIDE-25    90 tablet    Take 1 tablet by mouth as needed    Peripheral edema       valACYclovir 1000 mg tablet    VALTREX    8 tablet    TAKE 2 TABLETS BY MOUTH TWICE DAILY    HSV (herpes simplex virus) infection       Vitamin D3 2000 UNITS Tabs      Take 5,000 Units by mouth Reported on 3/3/2017

## 2017-11-06 NOTE — PROGRESS NOTES
[facility]     CC: Post-op check     Subjective:  Svetlana Adair is here for her first postoperative visit. She underwent a Right Axillary Dissection by Dr. White  on 11/2/17. Today she  tells me she is doing quite well. She currently does not need any pain medications, and her GARLAND drain output has steadily declined over the last few days.  She would like this removed today if possible.    Objective:  Axilla - No seroma or hematoma, is a small ridge of swelling  Inc - Healing well, well approximated and without signs of infection    Pathology:  6/10 lymph nodes positive for metastatic carcinoma.  Dr. White called pt and reported findings.     Assessment:  S/p Right Axillary Dissection. The pathology confirms metastatic disease.     Plan:  Drain removed today.  Dressing applied.  Answered what questions I could about the surgery and pathology.  Path and Op Reports printed and given to pt.  Discussed dressing changes and pt will return to office next week to see Dr. White for further plan.  Patient was instructed to call if fever, increasing pain, redness or drainage at the incision sites occurs.  Otherwise she will follow up with Vladislav Cruz for her regular medical needs.    Ace Sosa PA-C  [phone]    Please route or send letter to:  Primary Care Provider (PCP)

## 2017-11-10 ENCOUNTER — TELEPHONE (OUTPATIENT)
Dept: SURGERY | Facility: CLINIC | Age: 67
End: 2017-11-10

## 2017-11-10 ENCOUNTER — OFFICE VISIT (OUTPATIENT)
Dept: SURGERY | Facility: CLINIC | Age: 67
End: 2017-11-10
Payer: COMMERCIAL

## 2017-11-10 DIAGNOSIS — Z09 FOLLOW-UP EXAMINATION FOLLOWING SURGERY: Primary | ICD-10-CM

## 2017-11-10 PROCEDURE — 99024 POSTOP FOLLOW-UP VISIT: CPT | Performed by: SURGERY

## 2017-11-10 NOTE — TELEPHONE ENCOUNTER
Name of caller: Patient    Reason for Call:  QUESTIONS LUMP UNDER ARMPIT NEEDS TO BE ASPIRATED    Surgeon:  Dr. White     Recent Surgery:  Yes.    If yes, when & what type:  RIGHT AXILLARY DI SECTION 11/02/17       Best phone number to reach pt at is: [phone]  Ok to leave a message with medical info? Yes.    Pharmacy preferred (if calling for a refill): NA

## 2017-11-10 NOTE — PROGRESS NOTES
Surgery Postop Note    Svetlana Adair presents today for surgical followup.  she is doing well following right axillary dissection.  Incisions look fine with no signs of wound infection.  She did have a moderately sized right axillary seroma, which I aspirated in clinic.  100 cc of straw colored clear fluid was removed.  We talked a little about her prognosis and possible additional therapy.  I expressed that it seems she is likely to have a recommendation for chemotherapy in addition to radiation.  She is scheduled to meet with her oncologist Tuesday.  I will see her later next week.  Thank you for the opportunity to help in her care.    Yann White M.D.  [facility]  [phone]    Please route or send letter to:  Primary Care Provider (PCP) and Referring Provider

## 2017-11-10 NOTE — LETTER
November 10, 2017    Re: Svetlana Adair - 1950    Svetlana Adair presents today for surgical followup.  she is doing well following right axillary dissection.  Incisions look fine with no signs of wound infection.  She did have a moderately sized right axillary seroma, which I aspirated in clinic.  100 cc of straw colored clear fluid was removed.  We talked a little about her prognosis and possible additional therapy.  I expressed that it seems she is likely to have a recommendation for chemotherapy in addition to radiation.  She is scheduled to meet with her oncologist Tuesday.  I will see her later next week.  Thank you for the opportunity to help in her care.     Yann White M.D.  [facility]  [phone]

## 2017-11-10 NOTE — TELEPHONE ENCOUNTER
Patient spoke with Dr. James last night and discussed coming in for hematoma/seroma aspiration today. Will discuss with Dr. White and try to get her scheduled for today. Patient verbalized understanding of this and agreed. She will wait for return call after I speak with Dr. White. Encouraged them to call back if they had further questions or concerns.    Erinn Gardiner RN BSN

## 2017-11-10 NOTE — MR AVS SNAPSHOT
After Visit Summary   11/10/2017    Svetlana Adair    MRN: [id number]           Patient Information     Date Of Birth          1950        Visit Information        Provider Department      11/10/2017 11:30 AM Cortez White MD [facility] [place] [facility] General Surgery       Follow-ups after your visit        Your next 10 appointments already scheduled     Nov 15, 2017 11:45 AM CST   Post Op with Cortez White MD   [facility] [place] [facility] [place]    [address]  [place] [address] [phone]              Who to contact     If you have questions or need follow up information about today's clinic visit or your schedule please contact [facility] directly at [phone].  Normal or non-critical lab and imaging results will be communicated to you by Care-n-Sharehart, letter or phone within 4 business days after the clinic has received the results. If you do not hear from us within 7 days, please contact the clinic through Care-n-Sharehart or phone. If you have a critical or abnormal lab result, we will notify you by phone as soon as possible.  Submit refill requests through VideoCare or call your pharmacy and they will forward the refill request to us. Please allow 3 business days for your refill to be completed.          Additional Information About Your Visit        MyChart Information     VideoCare gives you secure access to your electronic health record. If you see a primary care provider, you can also send messages to your care team and make appointments. If you have questions, please call your primary care clinic.  If you do not have a primary care provider, please call [phone] and they will assist you.        Care EveryWhere ID     This is your Care EveryWhere ID. This could be used by other organizations to access your [facility] medical records  [id number]         Blood Pressure from Last 3  Encounters:   11/06/17 123/69   11/02/17 122/82   10/26/17 111/69    Weight from Last 3 Encounters:   11/02/17 136 lb 6.4 oz (61.9 kg)   10/26/17 139 lb (63 kg)   10/18/17 133 lb (60.3 kg)              Today, you had the following     No orders found for display       Primary Care Provider Office Phone # Fax #    Vladislav JOSE Cruz [phone]       [facility] [address] [facility] [address]  [facility] [address]        Equal Access to Services     [facility]: Hadii aad ku hadasho Soomaali, waaxda luqadaha, qaybta kaalmada adeegyada, chelsi childers haychely james . So [facility] [phone].    ATENCIÓN: Si habla español, tiene a bruner disposición servicios gratuitos de asistencia lingüística. [facility] [phone].    We comply with applicable federal civil rights laws and [place] laws. We do not discriminate on the basis of race, color, national origin, age, disability, sex, sexual orientation, or gender identity.            Thank you!     Thank you for choosing [facility]  for your care. Our goal is always to provide you with excellent care. Hearing back from our patients is one way we can continue to improve our services. Please take a few minutes to complete the written survey that you may receive in the mail after your visit with us. Thank you!             Your Updated Medication List - Protect others around you: Learn how to safely use, store and throw away your medicines at www.disposemymeds.org.          This list is accurate as of: 11/10/17  1:24 PM.  Always use your most recent med list.                   Brand Name Dispense Instructions for use Diagnosis    aspirin 81 MG tablet     30 tablet    Take 1 tablet (81 mg) by mouth daily    Coronary artery disease involving native coronary artery of native heart without angina pectoris       atorvastatin 10 MG tablet    LIPITOR    90 tablet    Take 1 tablet (10 mg) by mouth daily    Coronary artery disease involving native  coronary artery of native heart without angina pectoris       CoQ10 100 MG Caps      Take by mouth daily Reported on 3/3/2017        cyclobenzaprine 5 MG tablet    FLEXERIL    42 tablet    Take 1-2 tablets (5-10 mg) by mouth 2 times daily as needed for muscle spasms    Torticollis, spasmodic, Low back pain without sciatica, unspecified back pain laterality, unspecified chronicity       FIBER PO      2 tablets twice daily        HYDROcodone-acetaminophen 5-325 MG per tablet    NORCO    25 tablet    Take 1-2 tablets by mouth every 6 hours as needed for pain    Low back pain without sciatica, unspecified back pain laterality, unspecified chronicity, Torticollis, spasmodic       IBUPROFEN PO      Take 200-400 mg by mouth every 6 hours as needed for moderate pain        LYSINE      1-3 daily as needed        MULTIVITAMIN ADULT PO      Take by mouth daily        nicotine polacrilex 2 MG gum    NICORETTE    30 tablet    Place 1 each (2 mg) inside cheek as needed for smoking cessation    Tobacco use disorder       OMEGA 3 PO      Take 1 tablet by mouth 2 times daily        omeprazole 20 MG tablet     30 tablet    Take 1 tablet (20 mg) by mouth as needed    GERD (gastroesophageal reflux disease)       triamterene-hydrochlorothiazide 37.5-25 MG per tablet    MAXZIDE-25    90 tablet    Take 1 tablet by mouth as needed    Peripheral edema       valACYclovir 1000 mg tablet    VALTREX    8 tablet    TAKE 2 TABLETS BY MOUTH TWICE DAILY    HSV (herpes simplex virus) infection       Vitamin D3 2000 UNITS Tabs      Take 5,000 Units by mouth Reported on 3/3/2017

## 2017-11-12 ENCOUNTER — TELEPHONE (OUTPATIENT)
Dept: SURGERY | Facility: CLINIC | Age: 67
End: 2017-11-12

## 2017-11-12 ENCOUNTER — THERAPY VISIT (OUTPATIENT)
Dept: OTHER | Facility: CLINIC | Age: 67
End: 2017-11-12
Payer: COMMERCIAL

## 2017-11-12 DIAGNOSIS — N64.89 SEROMA OF BREAST: Primary | ICD-10-CM

## 2017-11-12 PROCEDURE — 10160 PNXR ASPIR ABSC HMTMA BULLA: CPT | Mod: 79 | Performed by: SURGERY

## 2017-11-12 NOTE — TELEPHONE ENCOUNTER
Mahnaz called as she has had reaccumulation of her axillary seroma and it is now at least double the size it had been on Friday when Dr. White drained it in the office. We discussed options, she can come to the hospital today and I can drain the fluid or she can wait and have it drained in clinic tomorrow. She would like to come it today. I will see her upon her arrival to drain the seroma.     Anitra Fagan MD  [facility]  [phone]

## 2017-11-12 NOTE — MR AVS SNAPSHOT
After Visit Summary   11/12/2017    Svetlana Adair    MRN: [id number]           Patient Information     Date Of Birth          1950        Visit Information        Provider Department      11/12/2017 1:24 PM Anitra Fagan MD  [facility]        Today's Diagnoses     Seroma of breast    -  1       Follow-ups after your visit        Your next 10 appointments already scheduled     Nov 15, 2017 11:45 AM CST   Post Op with Cortez White MD   [facility] [place]    [address]  [facility] [address] [phone]              Who to contact     If you have questions or need follow up information about today's clinic visit or your schedule please contact  [facility] directly at [phone].  Normal or non-critical lab and imaging results will be communicated to you by The Clearinghart, letter or phone within 4 business days after the clinic has received the results. If you do not hear from us within 7 days, please contact the clinic through The Clearinghart or phone. If you have a critical or abnormal lab result, we will notify you by phone as soon as possible.  Submit refill requests through General Sentiment or call your pharmacy and they will forward the refill request to us. Please allow 3 business days for your refill to be completed.          Additional Information About Your Visit        MyChart Information     General Sentiment gives you secure access to your electronic health record. If you see a primary care provider, you can also send messages to your care team and make appointments. If you have questions, please call your primary care clinic.  If you do not have a primary care provider, please call [phone] and they will assist you.        Care EveryWhere ID     This is your Care EveryWhere ID. This could be used by other organizations to access your [facility] medical records  [id number]         Blood Pressure from Last 3 Encounters:   11/06/17  123/69   11/02/17 122/82   10/26/17 111/69    Weight from Last 3 Encounters:   11/02/17 61.9 kg (136 lb 6.4 oz)   10/26/17 63 kg (139 lb)   10/18/17 60.3 kg (133 lb)              Today, you had the following     No orders found for display       Primary Care Provider Office Phone # Fax #    Vladislav GARCIA MD Anthony [phone]       [facility]  [address] [facility] [address]  [facility] [address]        Equal Access to Services     [facility]: Hadii aad ku hadasho Soomaali, waaxda luqadaha, qaybta kaalmada adeegyada, waxari childers haychely james . So [facility] [phone].    ATENCIÓN: Si habla español, tiene a bruner disposición servicios gratuitos de asistencia lingüística. [facility] [phone].    We comply with applicable federal civil rights laws and [place] laws. We do not discriminate on the basis of race, color, national origin, age, disability, sex, sexual orientation, or gender identity.            Thank you!     Thank you for choosing [facility]  for your care. Our goal is always to provide you with excellent care. Hearing back from our patients is one way we can continue to improve our services. Please take a few minutes to complete the written survey that you may receive in the mail after your visit with us. Thank you!             Your Updated Medication List - Protect others around you: Learn how to safely use, store and throw away your medicines at www.disposemymeds.org.          This list is accurate as of: 11/12/17  1:28 PM.  Always use your most recent med list.                   Brand Name Dispense Instructions for use Diagnosis    aspirin 81 MG tablet     30 tablet    Take 1 tablet (81 mg) by mouth daily    Coronary artery disease involving native coronary artery of native heart without angina pectoris       atorvastatin 10 MG tablet    LIPITOR    90 tablet    Take 1 tablet (10 mg) by mouth daily    Coronary artery disease involving native coronary artery of native heart  without angina pectoris       CoQ10 100 MG Caps      Take by mouth daily Reported on 3/3/2017        cyclobenzaprine 5 MG tablet    FLEXERIL    42 tablet    Take 1-2 tablets (5-10 mg) by mouth 2 times daily as needed for muscle spasms    Torticollis, spasmodic, Low back pain without sciatica, unspecified back pain laterality, unspecified chronicity       FIBER PO      2 tablets twice daily        HYDROcodone-acetaminophen 5-325 MG per tablet    NORCO    25 tablet    Take 1-2 tablets by mouth every 6 hours as needed for pain    Low back pain without sciatica, unspecified back pain laterality, unspecified chronicity, Torticollis, spasmodic       IBUPROFEN PO      Take 200-400 mg by mouth every 6 hours as needed for moderate pain        LYSINE      1-3 daily as needed        MULTIVITAMIN ADULT PO      Take by mouth daily        nicotine polacrilex 2 MG gum    NICORETTE    30 tablet    Place 1 each (2 mg) inside cheek as needed for smoking cessation    Tobacco use disorder       OMEGA 3 PO      Take 1 tablet by mouth 2 times daily        omeprazole 20 MG tablet     30 tablet    Take 1 tablet (20 mg) by mouth as needed    GERD (gastroesophageal reflux disease)       triamterene-hydrochlorothiazide 37.5-25 MG per tablet    MAXZIDE-25    90 tablet    Take 1 tablet by mouth as needed    Peripheral edema       valACYclovir 1000 mg tablet    VALTREX    8 tablet    TAKE 2 TABLETS BY MOUTH TWICE DAILY    HSV (herpes simplex virus) infection       Vitamin D3 2000 UNITS Tabs      Take 5,000 Units by mouth Reported on 3/3/2017

## 2017-11-12 NOTE — PROGRESS NOTES
Surgery Procedure Note    Indication: Svetlana is a very pleasant 67yof who had an axillary lymph node dissection and unfortunately has developed a seroma which was drained in clinic on 11/10 and has re-accumulated and is symptomatic. She presents for drainage today.     Procedure: the axilla was prepped with chloraprep. 1%lidocaine was injected over the area of most fluctuance. An 18 gauge needle was inserted into the large fluid collection and clear fluid aspirated. A total of 200ml was aspirated. She tolerated this well. I placed a compression type dressing in her axilla.     She will follow up with Dr. White on Wednesday as scheduled.     Anitra Fagan MD  [facility]  [phone]

## 2017-11-14 ENCOUNTER — TRANSFERRED RECORDS (OUTPATIENT)
Dept: HEALTH INFORMATION MANAGEMENT | Facility: CLINIC | Age: 67
End: 2017-11-14

## 2017-11-15 ENCOUNTER — OFFICE VISIT (OUTPATIENT)
Dept: SURGERY | Facility: CLINIC | Age: 67
End: 2017-11-15
Payer: COMMERCIAL

## 2017-11-15 ENCOUNTER — TELEPHONE (OUTPATIENT)
Dept: SURGERY | Facility: CLINIC | Age: 67
End: 2017-11-15

## 2017-11-15 DIAGNOSIS — Z09 SURGERY FOLLOW-UP: Primary | ICD-10-CM

## 2017-11-15 PROCEDURE — 99024 POSTOP FOLLOW-UP VISIT: CPT | Performed by: SURGERY

## 2017-11-15 NOTE — TELEPHONE ENCOUNTER
Patient has follow up with Dr. White this morning.  Calling to report that fluid building up has seemed to move to a different area.  Informed patient that Dr. White could evaluate this at the appointment.  Patient also requesting to remove compression dressing that was placed at the last visit as it is causing discomfort.  Informed her that it is ok to remove the dressing at this time, but if there is any drainage that a new dressing should be placed over the area.  Patient agreed and will discuss further concerns with Dr. White during her appointment today.    Katelynn Jones RN

## 2017-11-15 NOTE — LETTER
November 15, 2017    Re: Svetlana Adair - 1950    Svetlana Adair presents today for surgical followup.  she is doing well following right axillary dissection for recurrent breast cancer.  She does have a recurrent seroma and I have elected to aspirate it at this point.  I removed approximately 150 mL of clear fluid.  I would like to see her back on Friday for possible repeat aspiration.  Thank you for the opportunity to help in her care.     Yann White M.D.  [facility]  [phone]

## 2017-11-15 NOTE — MR AVS SNAPSHOT
After Visit Summary   11/15/2017    Svetlana Adair    MRN: [id number]           Patient Information     Date Of Birth          1950        Visit Information        Provider Department      11/15/2017 11:45 AM Cortez White MD [facility] General Surgery      Today's Diagnoses     Surgery follow-up    -  1       Follow-ups after your visit        Who to contact     If you have questions or need follow up information about today's clinic visit or your schedule please contact [facility] directly at [phone].  Normal or non-critical lab and imaging results will be communicated to you by Futuris.tkhart, letter or phone within 4 business days after the clinic has received the results. If you do not hear from us within 7 days, please contact the clinic through InTouch Technologiest or phone. If you have a critical or abnormal lab result, we will notify you by phone as soon as possible.  Submit refill requests through Socrative or call your pharmacy and they will forward the refill request to us. Please allow 3 business days for your refill to be completed.          Additional Information About Your Visit        MyChart Information     Socrative gives you secure access to your electronic health record. If you see a primary care provider, you can also send messages to your care team and make appointments. If you have questions, please call your primary care clinic.  If you do not have a primary care provider, please call [phone] and they will assist you.        Care EveryWhere ID     This is your Care EveryWhere ID. This could be used by other organizations to access your [facility] medical records  [id number]         Blood Pressure from Last 3 Encounters:   11/06/17 123/69   11/02/17 122/82   10/26/17 111/69    Weight from Last 3 Encounters:   11/02/17 136 lb 6.4 oz (61.9 kg)   10/26/17 139 lb (63 kg)   10/18/17 133 lb (60.3 kg)              Today,  you had the following     No orders found for display       Primary Care Provider Office Phone # Fax #    Vladislav Cruz [phone]       [facility] [address] [facility] [address]  [facility] [address]        Equal Access to Services     [facility] : Hadii aad ku hadasho Soomaali, waaxda luqadaha, qaybta kaalmada adeegyada, waxay idiin hayaan adeeg khkylee laAntonioindian . So [facility] [phone].    ATENCIÓN: Si habla español, tiene a bruner disposición servicios gratuitos de asistencia lingüística. Llame al [phone].    We comply with applicable federal civil rights laws and [place] laws. We do not discriminate on the basis of race, color, national origin, age, disability, sex, sexual orientation, or gender identity.            Thank you!     Thank you for choosing [facility]  for your care. Our goal is always to provide you with excellent care. Hearing back from our patients is one way we can continue to improve our services. Please take a few minutes to complete the written survey that you may receive in the mail after your visit with us. Thank you!             Your Updated Medication List - Protect others around you: Learn how to safely use, store and throw away your medicines at www.disposemymeds.org.          This list is accurate as of: 11/15/17 12:04 PM.  Always use your most recent med list.                   Brand Name Dispense Instructions for use Diagnosis    aspirin 81 MG tablet     30 tablet    Take 1 tablet (81 mg) by mouth daily    Coronary artery disease involving native coronary artery of native heart without angina pectoris       atorvastatin 10 MG tablet    LIPITOR    90 tablet    Take 1 tablet (10 mg) by mouth daily    Coronary artery disease involving native coronary artery of native heart without angina pectoris       CoQ10 100 MG Caps      Take by mouth daily Reported on 3/3/2017        cyclobenzaprine 5 MG tablet    FLEXERIL    42 tablet    Take 1-2 tablets  (5-10 mg) by mouth 2 times daily as needed for muscle spasms    Torticollis, spasmodic, Low back pain without sciatica, unspecified back pain laterality, unspecified chronicity       FIBER PO      2 tablets twice daily        HYDROcodone-acetaminophen 5-325 MG per tablet    NORCO    25 tablet    Take 1-2 tablets by mouth every 6 hours as needed for pain    Low back pain without sciatica, unspecified back pain laterality, unspecified chronicity, Torticollis, spasmodic       IBUPROFEN PO      Take 200-400 mg by mouth every 6 hours as needed for moderate pain        LYSINE      1-3 daily as needed        MULTIVITAMIN ADULT PO      Take by mouth daily        nicotine polacrilex 2 MG gum    NICORETTE    30 tablet    Place 1 each (2 mg) inside cheek as needed for smoking cessation    Tobacco use disorder       OMEGA 3 PO      Take 1 tablet by mouth 2 times daily        omeprazole 20 MG tablet     30 tablet    Take 1 tablet (20 mg) by mouth as needed    GERD (gastroesophageal reflux disease)       triamterene-hydrochlorothiazide 37.5-25 MG per tablet    MAXZIDE-25    90 tablet    Take 1 tablet by mouth as needed    Peripheral edema       valACYclovir 1000 mg tablet    VALTREX    8 tablet    TAKE 2 TABLETS BY MOUTH TWICE DAILY    HSV (herpes simplex virus) infection       Vitamin D3 2000 UNITS Tabs      Take 5,000 Units by mouth Reported on 3/3/2017

## 2017-11-17 ENCOUNTER — OFFICE VISIT (OUTPATIENT)
Dept: SURGERY | Facility: CLINIC | Age: 67
End: 2017-11-17
Payer: COMMERCIAL

## 2017-11-17 VITALS
SYSTOLIC BLOOD PRESSURE: 122 MMHG | DIASTOLIC BLOOD PRESSURE: 76 MMHG | BODY MASS INDEX: 22.66 KG/M2 | WEIGHT: 136 LBS | HEIGHT: 65 IN | OXYGEN SATURATION: 99 % | HEART RATE: 67 BPM

## 2017-11-17 DIAGNOSIS — Z09 SURGICAL FOLLOWUP VISIT: Primary | ICD-10-CM

## 2017-11-17 PROCEDURE — 99024 POSTOP FOLLOW-UP VISIT: CPT | Performed by: PHYSICIAN ASSISTANT

## 2017-11-17 PROCEDURE — 10160 PNXR ASPIR ABSC HMTMA BULLA: CPT | Mod: 79 | Performed by: PHYSICIAN ASSISTANT

## 2017-11-17 NOTE — MR AVS SNAPSHOT
After Visit Summary   11/17/2017    Svetlana Adair    MRN: [id number]           Patient Information     Date Of Birth          1950        Visit Information        Provider Department      11/17/2017 1:00 PM Dorothea Boogie PA-C [facility] General Surgery      Today's Diagnoses     Surgical followup visit    -  1       Follow-ups after your visit        Your next 10 appointments already scheduled     Nov 20, 2017  2:15 PM CST   Post Op with Ace Sosa PA-C   [facility] ([facility])    [address]  [place] [id number] [phone]              Who to contact     If you have questions or need follow up information about today's clinic visit or your schedule please contact [facility] directly at [phone].  Normal or non-critical lab and imaging results will be communicated to you by IT'SUGARhart, letter or phone within 4 business days after the clinic has received the results. If you do not hear from us within 7 days, please contact the clinic through IT'SUGARhart or phone. If you have a critical or abnormal lab result, we will notify you by phone as soon as possible.  Submit refill requests through MindBites or call your pharmacy and they will forward the refill request to us. Please allow 3 business days for your refill to be completed.          Additional Information About Your Visit        MyChart Information     MindBites gives you secure access to your electronic health record. If you see a primary care provider, you can also send messages to your care team and make appointments. If you have questions, please call your primary care clinic.  If you do not have a primary care provider, please call [phone] and they will assist you.        Care EveryWhere ID     This is your Care EveryWhere ID. This could be used by other organizations to access your [facility]  "records  [id number]        Your Vitals Were     Pulse Height Pulse Oximetry BMI (Body Mass Index)          67 5' 5\" (1.651 m) 99% 22.63 kg/m2         Blood Pressure from Last 3 Encounters:   11/17/17 122/76   11/06/17 123/69   11/02/17 122/82    Weight from Last 3 Encounters:   11/17/17 136 lb (61.7 kg)   11/02/17 136 lb 6.4 oz (61.9 kg)   10/26/17 139 lb (63 kg)              We Performed the Following     SCPA Office Procedure        Primary Care Provider Office Phone # Fax #    Vladislav Cruz [phone]       [facility] [address] [address]        Equal Access to Services     [facility] : Hadii aad ku hadasho Sojabari, waaxda luqadaha, qaybta kaalmada adeegyada, waxari childers haychely james . So [facility] [phone].    ATENCIÓN: Si habla español, tiene a bruner disposición servicios gratuitos de asistencia lingüística. Chato al [phone].    We comply with applicable federal civil rights laws and [place] laws. We do not discriminate on the basis of race, color, national origin, age, disability, sex, sexual orientation, or gender identity.            Thank you!     Thank you for choosing [facility]  for your care. Our goal is always to provide you with excellent care. Hearing back from our patients is one way we can continue to improve our services. Please take a few minutes to complete the written survey that you may receive in the mail after your visit with us. Thank you!             Your Updated Medication List - Protect others around you: Learn how to safely use, store and throw away your medicines at www.disposemymeds.org.          This list is accurate as of: 11/17/17  1:52 PM.  Always use your most recent med list.                   Brand Name Dispense Instructions for use Diagnosis    aspirin 81 MG tablet     30 tablet    Take 1 tablet (81 mg) by mouth daily    Coronary artery disease involving native coronary artery of " native heart without angina pectoris       atorvastatin 10 MG tablet    LIPITOR    90 tablet    Take 1 tablet (10 mg) by mouth daily    Coronary artery disease involving native coronary artery of native heart without angina pectoris       CoQ10 100 MG Caps      Take by mouth daily Reported on 3/3/2017        cyclobenzaprine 5 MG tablet    FLEXERIL    42 tablet    Take 1-2 tablets (5-10 mg) by mouth 2 times daily as needed for muscle spasms    Torticollis, spasmodic, Low back pain without sciatica, unspecified back pain laterality, unspecified chronicity       FIBER PO      2 tablets twice daily        HYDROcodone-acetaminophen 5-325 MG per tablet    NORCO    25 tablet    Take 1-2 tablets by mouth every 6 hours as needed for pain    Low back pain without sciatica, unspecified back pain laterality, unspecified chronicity, Torticollis, spasmodic       IBUPROFEN PO      Take 200-400 mg by mouth every 6 hours as needed for moderate pain        LYSINE      1-3 daily as needed        MULTIVITAMIN ADULT PO      Take by mouth daily        nicotine polacrilex 2 MG gum    NICORETTE    30 tablet    Place 1 each (2 mg) inside cheek as needed for smoking cessation    Tobacco use disorder       OMEGA 3 PO      Take 1 tablet by mouth 2 times daily        omeprazole 20 MG tablet     30 tablet    Take 1 tablet (20 mg) by mouth as needed    GERD (gastroesophageal reflux disease)       triamterene-hydrochlorothiazide 37.5-25 MG per tablet    MAXZIDE-25    90 tablet    Take 1 tablet by mouth as needed    Peripheral edema       valACYclovir 1000 mg tablet    VALTREX    8 tablet    TAKE 2 TABLETS BY MOUTH TWICE DAILY    HSV (herpes simplex virus) infection       Vitamin D3 2000 UNITS Tabs      Take 5,000 Units by mouth Reported on 3/3/2017

## 2017-11-17 NOTE — LETTER
2017    Re: Svetlana Adair - 1950    Subjective: Svetlana Adair is here for possible aspiration of the right axilla.  She underwent a right axillary node dissection by Dr. White on 17.  She underwent drainage of a seroma on 11/10 (100cc),  (200cc), and 11/15 (150cc).  She returns today for further aspiration.  Today she continues to have tenderness to the right axilla. The patient denies fever/chills, redness or drainage.      Objective:     Wound(s): clean, dry and intact and without any purulent drainage or erythema. R axilla is tender and there is a palpable seroma.      Assessment and Plan:  S/P right axillary node dissection, now with a seroma  - The right axilla was prepped with betadine. An 18G needle was used to aspirate the seroma.  Approximately 100cc of serosanguinous fluid was drained.  The wound was cleaned and dressed.   - She was instructed to continue to keep the wound clean and dry. Ok to shower but do not soak or apply creams.  Wear a compressive bra.   - Continue stretching exercises of the RUE.   - Follow up in Monday for possible drainage, call sooner if any questions or concerns.     Dorothea Boogie PA-C

## 2017-11-17 NOTE — PROGRESS NOTES
[facility] Visit Note    Subjective: Svetlana Adair is here for possible aspiration of the right axilla.  She underwent a right axillary node dissection by Dr. White on 11/2/17.  She underwent drainage of a seroma on 11/10 (100cc), 11/12 (200cc), and 11/15 (150cc).  She returns today for further aspiration.  Today she continues to have tenderness to the right axilla. The patient denies fever/chills, redness or drainage.     Objective:    Wound(s): clean, dry and intact and without any purulent drainage or erythema. R axilla is tender and there is a palpable seroma.     Assessment and Plan:  S/P right axillary node dissection, now with a seroma  - The right axilla was prepped with betadine. An 18G needle was used to aspirate the seroma.  Approximately 100cc of serosanguinous fluid was drained.  The wound was cleaned and dressed.   - She was instructed to continue to keep the wound clean and dry. Ok to shower but do not soak or apply creams.  Wear a compressive bra.   - Continue stretching exercises of the RUE.   - Follow up in Monday for possible drainage, call sooner if any questions or concerns.    Dorothea Boogie PA-C  Please route or send letter to:  Primary Care Provider (PCP)

## 2017-11-20 ENCOUNTER — OFFICE VISIT (OUTPATIENT)
Dept: SURGERY | Facility: CLINIC | Age: 67
End: 2017-11-20
Payer: COMMERCIAL

## 2017-11-20 DIAGNOSIS — Z09 SURGERY FOLLOW-UP EXAMINATION: Primary | ICD-10-CM

## 2017-11-20 PROCEDURE — 99024 POSTOP FOLLOW-UP VISIT: CPT | Performed by: PHYSICIAN ASSISTANT

## 2017-11-20 NOTE — MR AVS SNAPSHOT
After Visit Summary   11/20/2017    Svetlana Adair    MRN: [id number]           Patient Information     Date Of Birth          1950        Visit Information        Provider Department      11/20/2017 2:15 PM Ace Sosa PA-C [facility] General Surgery      Today's Diagnoses     Surgery follow-up examination    -  1       Follow-ups after your visit        Your next 10 appointments already scheduled     Nov 22, 2017 11:45 AM CST   Post Op with Esperanza Holly PA-C   [facility] [place] ([facility]    [address]  [place] [address] [phone]            Nov 24, 2017  8:30 AM CST   Post Op with Esperanza Holly PA-C   [facility] ([facility] [place])    [address]  [place] [id number] [phone]              Who to contact     If you have questions or need follow up information about today's clinic visit or your schedule please contact [facility] directly at [phone].  Normal or non-critical lab and imaging results will be communicated to you by Justworkshart, letter or phone within 4 business days after the clinic has received the results. If you do not hear from us within 7 days, please contact the clinic through Justworkshart or phone. If you have a critical or abnormal lab result, we will notify you by phone as soon as possible.  Submit refill requests through TasteBook or call your pharmacy and they will forward the refill request to us. Please allow 3 business days for your refill to be completed.          Additional Information About Your Visit        MyChart Information     TasteBook gives you secure access to your electronic health record. If you see a primary care provider, you can also send messages to your care team and make appointments. If you have questions, please call your primary care clinic.  If you do not  have a primary care provider, please call [phone] and they will assist you.        Care EveryWhere ID     This is your Care EveryWhere ID. This could be used by other organizations to access your [facility] medical records  [id number]         Blood Pressure from Last 3 Encounters:   11/17/17 122/76   11/06/17 123/69   11/02/17 122/82    Weight from Last 3 Encounters:   11/17/17 136 lb (61.7 kg)   11/02/17 136 lb 6.4 oz (61.9 kg)   10/26/17 139 lb (63 kg)              Today, you had the following     No orders found for display       Primary Care Provider Office Phone # Fax #    Vladislav Cruz [phone]       [facility] [address] [address]        Equal Access to Services     [facility]: Hadii aad ku hadasho Soomaali, waaxda luqadaha, qaybta kaalmada adeegyada, chelsi mitchellin hayaaefrain james . So [facility] [phone].    ATENCIÓN: Si habla español, tiene a bruner disposición servicios gratuitos de asistencia lingüística. Llame al [phone].    We comply with applicable federal civil rights laws and [place] laws. We do not discriminate on the basis of race, color, national origin, age, disability, sex, sexual orientation, or gender identity.            Thank you!     Thank you for choosing [facility]  for your care. Our goal is always to provide you with excellent care. Hearing back from our patients is one way we can continue to improve our services. Please take a few minutes to complete the written survey that you may receive in the mail after your visit with us. Thank you!             Your Updated Medication List - Protect others around you: Learn how to safely use, store and throw away your medicines at www.disposemymeds.org.          This list is accurate as of: 11/20/17  3:17 PM.  Always use your most recent med list.                   Brand Name Dispense Instructions for use Diagnosis    aspirin 81 MG tablet     30 tablet     Take 1 tablet (81 mg) by mouth daily    Coronary artery disease involving native coronary artery of native heart without angina pectoris       atorvastatin 10 MG tablet    LIPITOR    90 tablet    Take 1 tablet (10 mg) by mouth daily    Coronary artery disease involving native coronary artery of native heart without angina pectoris       CoQ10 100 MG Caps      Take by mouth daily Reported on 3/3/2017        cyclobenzaprine 5 MG tablet    FLEXERIL    42 tablet    Take 1-2 tablets (5-10 mg) by mouth 2 times daily as needed for muscle spasms    Torticollis, spasmodic, Low back pain without sciatica, unspecified back pain laterality, unspecified chronicity       FIBER PO      2 tablets twice daily        HYDROcodone-acetaminophen 5-325 MG per tablet    NORCO    25 tablet    Take 1-2 tablets by mouth every 6 hours as needed for pain    Low back pain without sciatica, unspecified back pain laterality, unspecified chronicity, Torticollis, spasmodic       IBUPROFEN PO      Take 200-400 mg by mouth every 6 hours as needed for moderate pain        LYSINE      1-3 daily as needed        MULTIVITAMIN ADULT PO      Take by mouth daily        nicotine polacrilex 2 MG gum    NICORETTE    30 tablet    Place 1 each (2 mg) inside cheek as needed for smoking cessation    Tobacco use disorder       OMEGA 3 PO      Take 1 tablet by mouth 2 times daily        omeprazole 20 MG tablet     30 tablet    Take 1 tablet (20 mg) by mouth as needed    GERD (gastroesophageal reflux disease)       triamterene-hydrochlorothiazide 37.5-25 MG per tablet    MAXZIDE-25    90 tablet    Take 1 tablet by mouth as needed    Peripheral edema       valACYclovir 1000 mg tablet    VALTREX    8 tablet    TAKE 2 TABLETS BY MOUTH TWICE DAILY    HSV (herpes simplex virus) infection       Vitamin D3 2000 UNITS Tabs      Take 5,000 Units by mouth Reported on 3/3/2017

## 2017-11-20 NOTE — LETTER
2017    Re: Svetlana ARMSTRONG [facility] - 1950    History same.  Some discomfort in axilla with the swelling.  Feels better with it drained.  Has had drained 3 or 4 times.  Amounts decreasing.  Fluid remains SS.     Today prepped with chloraprep, then wiped down with alcohol b/c pt did not want orange to skin and did not want to wait 3 min with arm in air.  While sitting, first used 25 gauge, but transitioned to 22 gauge to aspirate better.  Pt tolerated the procedure without local anesthetic.  Was able to aspirate 85mL of serosanguinous fluid.     There was just some mild erythema/pinking of the incision area, but pt reports this is also where her sports bra contacts skin.  She has been using this for last 3 days.  She was instructed to watch this.     Discussed with patient, I'm happy amount of aspirate continues to lower and no signs of infection.  May need drainage either Wed or Friday of this week.  Appts made for both and pt will call to cancel if swelling is tolerable.  At this point, I feel like she is close to being able to stop aspirations and deal with the smaller seroma conservatively.  She would feel more comfortable having aspirated at least one more time assuming swelling returns.     All questions answered.  She was concerned about a tendon pulling in the axilla when she raises her arm.  The tendon is visibly taut in the axilla with arm raised.  We discussed stretching this over time with wall crawls.  Some sensory deficits to triceps areas yet as well.     Return to clinic Wed or Friday PRN swelling.  Continue supportive bra.      Ace Sosa PA-C  [phone]

## 2017-11-20 NOTE — PROGRESS NOTES
Post Op Visit for drainage of Right Axillary Seroma    History same.  Some discomfort in axilla with the swelling.  Feels better with it drained.  Has had drained 3 or 4 times.  Amounts decreasing.  Fluid remains SS.    Today prepped with chloraprep, then wiped down with alcohol b/c pt did not want orange to skin and did not want to wait 3 min with arm in air.  While sitting, first used 25 gauge, but transitioned to 22 gauge to aspirate better.  Pt tolerated the procedure without local anesthetic.  Was able to aspirate 85mL of serosanguinous fluid.    There was just some mild erythema/pinking of the incision area, but pt reports this is also where her sports bra contacts skin.  She has been using this for last 3 days.  She was instructed to watch this.    Discussed with patient, I'm happy amount of aspirate continues to lower and no signs of infection.  May need drainage either Wed or Friday of this week.  Appts made for both and pt will call to cancel if swelling is tolerable.  At this point, I feel like she is close to being able to stop aspirations and deal with the smaller seroma conservatively.  She would feel more comfortable having aspirated at least one more time assuming swelling returns.    All questions answered.  She was concerned about a tendon pulling in the axilla when she raises her arm.  The tendon is visibly taut in the axilla with arm raised.  We discussed stretching this over time with wall crawls.  Some sensory deficits to triceps areas yet as well.    Return to clinic Wed or Friday PRN swelling.  Continue supportive bra.

## 2017-11-21 ENCOUNTER — TELEPHONE (OUTPATIENT)
Dept: CARDIOLOGY | Facility: CLINIC | Age: 67
End: 2017-11-21

## 2017-11-21 NOTE — TELEPHONE ENCOUNTER
Per  Dr. Perez's reply patient called and will hold the statin for 1 fill month. Patient agrees with plan and will call with update after the Holidays.

## 2017-11-21 NOTE — TELEPHONE ENCOUNTER
Gastrointestinal statins were causing problems with muscle weakness and memory the effects should reverse with stopping the statin. If there is any doubts given a one-month holiday. If she appears to be better there are other statins we could try.

## 2017-11-21 NOTE — TELEPHONE ENCOUNTER
"Patient called to report that after decreasing the atorvastatin 40 mg to 10 mg daily she has not noticed any effect in her \" weak, atrophied muscles and her memory is no better. \" Patient is also taking CoQ 10. Patient staters that her nephew had these same symptoms and stopped the atorvastatin.   Patient advised to take a 2 week statin Holiday.  Patient wondering if there is another statin she could try that would cause these possible side effects and if stopping the statin will her muscles and memory improve?   Will message Dr. Perez  "

## 2017-11-28 ENCOUNTER — TELEPHONE (OUTPATIENT)
Dept: SURGERY | Facility: CLINIC | Age: 67
End: 2017-11-28

## 2017-11-28 NOTE — TELEPHONE ENCOUNTER
S/p R axillary node dissection 11/2/17  Surgeon:      Patient was last seen in clinic on 11/20/17 - R axillary seroma aspirated.  She cancelled 2 follow-up appointments last week as she said that swelling had gone down considerably.  Today she reports that she now notes a hard lump just above and below R axillary incision.  No drainage or redness at site.    She has an appt with another provider on Monday 12/4 and would like to be seen in clinic after that appointment.     Transferred to appointments to schedule post-op appointment with PA.

## 2017-11-28 NOTE — TELEPHONE ENCOUNTER
Attempted to return patient's call.  No answer - left voicemail message.  Will attempt to reach pt again at 1:00.

## 2017-11-28 NOTE — TELEPHONE ENCOUNTER
Pt denies pain, fever and chills.  She will call clinic if any new or worsening symptoms develop prior to post-op visit.  Pt agrees with plan.

## 2017-11-28 NOTE — TELEPHONE ENCOUNTER
Name of caller: Patient    Reason for Call:  questions    Surgeon:  Dr. White     Recent Surgery:  Yes.     If yes, when & what type:   right axillary dissection; 11/02/17      Best phone number to reach pt at is: [phone]  Ok to leave a message with medical info? Yes.    Pharmacy preferred (if calling for a refill): na

## 2017-12-04 ENCOUNTER — TRANSFERRED RECORDS (OUTPATIENT)
Dept: HEALTH INFORMATION MANAGEMENT | Facility: CLINIC | Age: 67
End: 2017-12-04

## 2017-12-04 ENCOUNTER — OFFICE VISIT (OUTPATIENT)
Dept: SURGERY | Facility: CLINIC | Age: 67
End: 2017-12-04
Payer: COMMERCIAL

## 2017-12-04 DIAGNOSIS — Z09 SURGERY FOLLOW-UP EXAMINATION: Primary | ICD-10-CM

## 2017-12-04 PROCEDURE — 99024 POSTOP FOLLOW-UP VISIT: CPT | Performed by: PHYSICIAN ASSISTANT

## 2017-12-04 NOTE — MR AVS SNAPSHOT
After Visit Summary   12/4/2017    Svetlana Adair    MRN: [id number]           Patient Information     Date Of Birth          1950        Visit Information        Provider Department      12/4/2017 1:30 PM Ace Sosa PA-C [facility] General Surgery      Today's Diagnoses     Surgery follow-up examination    -  1       Follow-ups after your visit        Who to contact     If you have questions or need follow up information about today's clinic visit or your schedule please contact [facility] directly at [phone].  Normal or non-critical lab and imaging results will be communicated to you by TalkT[employer], letter or phone within 4 business days after the clinic has received the results. If you do not hear from us within 7 days, please contact the clinic through "[employer]t or phone. If you have a critical or abnormal lab result, we will notify you by phone as soon as possible.  Submit refill requests through [employer] or call your pharmacy and they will forward the refill request to us. Please allow 3 business days for your refill to be completed.          Additional Information About Your Visit        MyChart Information     [employer] gives you secure access to your electronic health record. If you see a primary care provider, you can also send messages to your care team and make appointments. If you have questions, please call your primary care clinic.  If you do not have a primary care provider, please call [phone] and they will assist you.        Care EveryWhere ID     This is your Care EveryWhere ID. This could be used by other organizations to access your [place] medical records  [id number]         Blood Pressure from Last 3 Encounters:   11/17/17 122/76   11/06/17 123/69   11/02/17 122/82    Weight from Last 3 Encounters:   11/17/17 61.7 kg (136 lb)   11/02/17 61.9 kg (136 lb 6.4 oz)   10/26/17 63 kg (139 lb)               Today, you had the following     No orders found for display       Primary Care Provider Office Phone # Fax #    Vladislav Cruz [phone]       [facility] [address] [facility] [address]  [facility] [address]        Equal Access to Services     MARIONALEC MARJORIE : Hadii aad ku hadasho Soomaali, waaxda luqadaha, qaybta kaalmada adeegyada, waxay idiin hayaan adeishan elisefabio ladamon . So [facility] [phone].    ATENCIÓN: Si habla español, tiene a bruner disposición servicios gratuitos de asistencia lingüística. Llame al [phone].    We comply with applicable federal civil rights laws and [place] laws. We do not discriminate on the basis of race, color, national origin, age, disability, sex, sexual orientation, or gender identity.            Thank you!     Thank you for choosing [facility]  for your care. Our goal is always to provide you with excellent care. Hearing back from our patients is one way we can continue to improve our services. Please take a few minutes to complete the written survey that you may receive in the mail after your visit with us. Thank you!             Your Updated Medication List - Protect others around you: Learn how to safely use, store and throw away your medicines at www.disposemymeds.org.          This list is accurate as of: 12/4/17 11:59 PM.  Always use your most recent med list.                   Brand Name Dispense Instructions for use Diagnosis    aspirin 81 MG tablet     30 tablet    Take 1 tablet (81 mg) by mouth daily    Coronary artery disease involving native coronary artery of native heart without angina pectoris       atorvastatin 10 MG tablet    LIPITOR    90 tablet    Take 1 tablet (10 mg) by mouth daily    Coronary artery disease involving native coronary artery of native heart without angina pectoris       CoQ10 100 MG Caps      Take by mouth daily Reported on 3/3/2017        cyclobenzaprine 5 MG tablet    FLEXERIL    42 tablet    Take 1-2  tablets (5-10 mg) by mouth 2 times daily as needed for muscle spasms    Torticollis, spasmodic, Low back pain without sciatica, unspecified back pain laterality, unspecified chronicity       FIBER PO      2 tablets twice daily        HYDROcodone-acetaminophen 5-325 MG per tablet    NORCO    25 tablet    Take 1-2 tablets by mouth every 6 hours as needed for pain    Low back pain without sciatica, unspecified back pain laterality, unspecified chronicity, Torticollis, spasmodic       IBUPROFEN PO      Take 200-400 mg by mouth every 6 hours as needed for moderate pain        LYSINE      1-3 daily as needed        MULTIVITAMIN ADULT PO      Take by mouth daily        nicotine polacrilex 2 MG gum    NICORETTE    30 tablet    Place 1 each (2 mg) inside cheek as needed for smoking cessation    Tobacco use disorder       OMEGA 3 PO      Take 1 tablet by mouth 2 times daily        omeprazole 20 MG tablet     30 tablet    Take 1 tablet (20 mg) by mouth as needed    GERD (gastroesophageal reflux disease)       triamterene-hydrochlorothiazide 37.5-25 MG per tablet    MAXZIDE-25    90 tablet    Take 1 tablet by mouth as needed    Peripheral edema       valACYclovir 1000 mg tablet    VALTREX    8 tablet    TAKE 2 TABLETS BY MOUTH TWICE DAILY    HSV (herpes simplex virus) infection       Vitamin D3 2000 UNITS Tabs      Take 5,000 Units by mouth Reported on 3/3/2017

## 2017-12-05 NOTE — PROGRESS NOTES
"General Surgery Clinic Note    Svetlana Adair returns to the clinic for Right axilla check.  Her hematoma greatly improved over the Thanksgiving weekend.  She cancelled two office visits that were set up for aspiration. However, she has since noticed a lump under her incision.  She states this still causes her some discomfort in her axilla.  Additionally, when she uses her arm to push her out of a chair (triceps firing), she has some weakness.  Also some decreased sensation in this same triceps area.    NAD, pleasant, A&O  Right axilla: seroma gone.  Semi solid colum that runs right through her axilla with arm raised.  This feels muscular to me, but not equal bilaterally.    Incision: healing well with no concerns    A/P:  Stable S/P Right axillary dissection with multiple post op aspirations  -Discussed with her that I don't feel this is fluid.  No aspiration needed.  We looked at anatomy of what muscle this could be inserting here.  Her surgery included a more firm lesion that was taken off the chest wall and muscle in the area, so may be some deformity from this.  -Low likelihood of scar tissue, but could try some gentle cross friction massage.  -At this time, would monitor and give some time to see if heals on own.  She will do this for 2 weeks and then call for an appt to see Dr. White if still concerns.  Will call our office sooner if \"mass\" getting any larger.  Potentially consider US if worsens.  -Pt is comfortable with this plan and will call with any further concerns.  "

## 2017-12-07 ENCOUNTER — TRANSFERRED RECORDS (OUTPATIENT)
Dept: HEALTH INFORMATION MANAGEMENT | Facility: CLINIC | Age: 67
End: 2017-12-07

## 2017-12-11 ENCOUNTER — TELEPHONE (OUTPATIENT)
Dept: SURGERY | Facility: CLINIC | Age: 67
End: 2017-12-11

## 2017-12-11 NOTE — TELEPHONE ENCOUNTER
"Patient reports that the hard lump under her arm is still present and last time she was in seeing a PA, she was told to call back if still present in a couple of weeks.  No s/s of infection.  She also reports a \"yellow\" area.  According to the patient, Dr. Richard thinks that she should be seen in our clinic prior to beginning radiation this week.    Scheduled patient to come see Dr. White prior to radiation on the 13th.    Katelynn Jones RN  "

## 2017-12-14 ENCOUNTER — TRANSFERRED RECORDS (OUTPATIENT)
Dept: HEALTH INFORMATION MANAGEMENT | Facility: CLINIC | Age: 67
End: 2017-12-14

## 2018-01-02 ENCOUNTER — TELEPHONE (OUTPATIENT)
Dept: CARDIOLOGY | Facility: CLINIC | Age: 68
End: 2018-01-02

## 2018-01-02 DIAGNOSIS — E78.2 MIXED HYPERLIPIDEMIA: Primary | ICD-10-CM

## 2018-01-02 NOTE — TELEPHONE ENCOUNTER
Patient called with update. Atorvastatin 10 mg daily has been held for 2-3 weeks and patient believes she has more energy, less weakness and fatigue, and little change in some  short term memory loss.   Patient wondering if she should have another FLP check or alternative medication. Patient wonders if she even needs to take a statin?  Will message Dr. Perez.

## 2018-01-03 RX ORDER — ROSUVASTATIN CALCIUM 5 MG/1
5 TABLET, COATED ORAL DAILY
Qty: 90 TABLET | Refills: 1 | Status: SHIPPED | OUTPATIENT
Start: 2018-01-03 | End: 2018-06-22

## 2018-01-03 NOTE — TELEPHONE ENCOUNTER
Spoke with patient and Dr. Perez's reply reviewed. Patient agrees with trying Crestor 5 mg daily. Atorvastatin was already stopped.  Prescription e scribed.   Patient will call with any symptoms, concerns, or questions.   Orders entered for repeat FLP/ALT.

## 2018-01-03 NOTE — TELEPHONE ENCOUNTER
Her calcium score is the top 1% ie higher risk than 99% of other women her same age. Her cholesterol profile is terrible. We can recheck but it will probably be terrible again. I doubt her symptoms are related to her statin. She can try crestor 5 mgs/day

## 2018-01-05 ENCOUNTER — TELEPHONE (OUTPATIENT)
Dept: CARDIOLOGY | Facility: CLINIC | Age: 68
End: 2018-01-05

## 2018-01-05 NOTE — TELEPHONE ENCOUNTER
Message from patient that she would like a call to discuss using statin medication. Patient was recently changed to crestor 5mg daily.  Attempted to contact patient, left message for patient to call back.    1030 spoke with patient, she states she took her medications to discuss with a pharmacist, she had been working with her oncology providers to see if the hormone-blockers used for her breast cancer had caused an episode of severe muscle cramping in her arm & hand and knee swelling at the joint. Patient states the pharmacist told her the statin meds were the issue for her and she should request to try a fenofibrate instead.  Reviewed with patient that different stains work along separate pathways and do not all have the same side effects. Reviewed that the fenofibrate medications are not as strong as the stains in decreasing LDL. Patient states she has only taken 2 days of crestor and would like to try using this a little longer. She will call back if she decides she is not tolerating the crestor.

## 2018-01-19 ENCOUNTER — TELEPHONE (OUTPATIENT)
Dept: CARDIOLOGY | Facility: CLINIC | Age: 68
End: 2018-01-19

## 2018-01-19 DIAGNOSIS — I25.10 CORONARY ARTERY DISEASE INVOLVING NATIVE CORONARY ARTERY OF NATIVE HEART WITHOUT ANGINA PECTORIS: Primary | ICD-10-CM

## 2018-01-19 DIAGNOSIS — E78.2 MIXED HYPERLIPIDEMIA: ICD-10-CM

## 2018-01-19 NOTE — TELEPHONE ENCOUNTER
Attempted to contact patient, Unable to find the previous life screening test in EPIC  showing carotid plaque. Patient's current diagnosis CAD and mixed hyperlipidemia do no qualify for doing a carotid US without a ABN (waiver) . Message left for patient to call back.

## 2018-01-19 NOTE — TELEPHONE ENCOUNTER
Spoke with patient who states she will look for the carotid screening test done a few years ago that states she had plaque and if she find it will either call us, bring/fax a copy to us or bring it with her to the PATIENCE OV. Patient states she prefers not to sign a wavier.

## 2018-01-19 NOTE — TELEPHONE ENCOUNTER
Patient called requesting a repeat Carotid US to be done prior to upcoming PATIENCE OV. Patient states she had a life screening test about 2 years ago which showed plaque in her carotid and would like it checked.  Reviewed call request with Dr. Perez. Ok to order carotid US.

## 2018-01-25 ENCOUNTER — TELEPHONE (OUTPATIENT)
Dept: CARDIOLOGY | Facility: CLINIC | Age: 68
End: 2018-01-25

## 2018-01-25 ENCOUNTER — MEDICAL CORRESPONDENCE (OUTPATIENT)
Dept: HEALTH INFORMATION MANAGEMENT | Facility: CLINIC | Age: 68
End: 2018-01-25

## 2018-01-25 DIAGNOSIS — I65.29 CAROTID ARTERY PLAQUE: Primary | ICD-10-CM

## 2018-01-25 NOTE — TELEPHONE ENCOUNTER
Carotid US ordered to be done prior to PATIENCE OV.  Carotid artery plaque entered as diagnosis.   Scheduling messaged.

## 2018-01-25 NOTE — TELEPHONE ENCOUNTER
Patient called wondering if carotid screening US done a couple of years ago had been received. Patient states she had faxed a copy on 1-19-18. No fax received. Message left requesting another copy be sent. Fax number given.

## 2018-01-25 NOTE — TELEPHONE ENCOUNTER
Fax received from patient : [facility] screening done 3-22-13   Carotid artery results: abnormal mild plaque buildup and was preformed fore than 12 months ago.   Patient requesting a carotid US be done prior to PATIENCE OV. Carotid artery plaque disease.  Copy sent to scan.  Dr. Perez message.

## 2018-01-31 ENCOUNTER — TELEPHONE (OUTPATIENT)
Dept: FAMILY MEDICINE | Facility: CLINIC | Age: 68
End: 2018-01-31

## 2018-01-31 DIAGNOSIS — M54.50 LOW BACK PAIN WITHOUT SCIATICA, UNSPECIFIED BACK PAIN LATERALITY, UNSPECIFIED CHRONICITY: ICD-10-CM

## 2018-01-31 DIAGNOSIS — G24.3 TORTICOLLIS, SPASMODIC: ICD-10-CM

## 2018-01-31 NOTE — TELEPHONE ENCOUNTER
Controlled Substance Refill Request for norco  Problem List Complete:  No     PROVIDER TO CONSIDER COMPLETION OF PROBLEM LIST AND OVERVIEW/CONTROLLED SUBSTANCE AGREEMENT    Last Written Prescription Date:  11/02/17  Last Fill Quantity: 25,   # refills: 0    Last Office Visit with G primary care provider: 10/26/17    Future Office visit:   Next 5 appointments (look out 90 days)     Feb 21, 2018 10:30 AM CST   Return Visit with Taiwo Anthony PA-C   [facility]    [address] [place] [id number] [phone]                  Controlled substance agreement on file: No.     Processing:  Patient will  in clinic   checked in past 6 months?  No, route to RN

## 2018-01-31 NOTE — TELEPHONE ENCOUNTER
Reason for Call:  Medication or medication refill:    Do you use a [facility]?  Name of the pharmacy and phone number for the current request:       [facility], [place] - [address] AT [facility] [address]  WRITTEN PRESCRIPTION REQUESTED      Name of the medication requested: Norco    Other request: call when ready for     Can we leave a detailed message on this number? YES    Phone number patient can be reached at: Home number on file [phone] (home)    Best Time: anytime    Call taken on 1/31/2018 at 12:17 PM by Yan Alexander

## 2018-02-01 ENCOUNTER — PRE VISIT (OUTPATIENT)
Dept: CARDIOLOGY | Facility: CLINIC | Age: 68
End: 2018-02-01

## 2018-02-01 RX ORDER — HYDROCODONE BITARTRATE AND ACETAMINOPHEN 5; 325 MG/1; MG/1
1-2 TABLET ORAL EVERY 6 HOURS PRN
Qty: 20 TABLET | Refills: 0 | Status: SHIPPED | OUTPATIENT
Start: 2018-02-01 | End: 2018-03-27
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
## 2018-02-01 NOTE — TELEPHONE ENCOUNTER
Left VM for patient that script is ready for  at   Placed script at  for .    Paula Candelario RN

## 2018-02-02 ENCOUNTER — TRANSFERRED RECORDS (OUTPATIENT)
Dept: HEALTH INFORMATION MANAGEMENT | Facility: CLINIC | Age: 68
End: 2018-02-02

## 2018-02-05 ENCOUNTER — TRANSFERRED RECORDS (OUTPATIENT)
Dept: HEALTH INFORMATION MANAGEMENT | Facility: CLINIC | Age: 68
End: 2018-02-05

## 2018-02-06 ENCOUNTER — TRANSFERRED RECORDS (OUTPATIENT)
Dept: HEALTH INFORMATION MANAGEMENT | Facility: CLINIC | Age: 68
End: 2018-02-06

## 2018-02-13 ENCOUNTER — MYC MEDICAL ADVICE (OUTPATIENT)
Dept: FAMILY MEDICINE | Facility: CLINIC | Age: 68
End: 2018-02-13

## 2018-02-13 ENCOUNTER — HOSPITAL ENCOUNTER (OUTPATIENT)
Dept: ULTRASOUND IMAGING | Facility: CLINIC | Age: 68
Discharge: HOME | End: 2018-02-13
Attending: INTERNAL MEDICINE | Admitting: INTERNAL MEDICINE
Payer: MEDICARE

## 2018-02-13 DIAGNOSIS — I65.29 CAROTID ARTERY PLAQUE: ICD-10-CM

## 2018-02-13 DIAGNOSIS — N95.1 FEMALE CLIMACTERIC STATE: ICD-10-CM

## 2018-02-13 DIAGNOSIS — G24.3 TORTICOLLIS, SPASMODIC: ICD-10-CM

## 2018-02-13 PROCEDURE — 93880 EXTRACRANIAL BILAT STUDY: CPT | Mod: 26 | Performed by: INTERNAL MEDICINE

## 2018-02-13 PROCEDURE — 93880 EXTRACRANIAL BILAT STUDY: CPT

## 2018-02-14 RX ORDER — GABAPENTIN 100 MG/1
200 CAPSULE ORAL 3 TIMES DAILY
Qty: 540 CAPSULE | Refills: 0 | Status: SHIPPED | OUTPATIENT
Start: 2018-02-14 | End: 2018-10-19

## 2018-02-14 NOTE — TELEPHONE ENCOUNTER
To PCP:  Please see MyChart Message below.  Gabapentin has not been refilled since 2015.  Thank you.  Jill Frost RN

## 2018-02-16 ENCOUNTER — TELEPHONE (OUTPATIENT)
Dept: CARDIOLOGY | Facility: CLINIC | Age: 68
End: 2018-02-16

## 2018-02-16 NOTE — TELEPHONE ENCOUNTER
Call from patient wondering about US carotid results. Reviewed with patient that scan showed good flow bilaterally, mild plaque on left side. Reminded patient of labs and OV 2/21/18 with PATIENCE Taiwo Anthony.

## 2018-02-21 ENCOUNTER — OFFICE VISIT (OUTPATIENT)
Dept: CARDIOLOGY | Facility: CLINIC | Age: 68
End: 2018-02-21
Attending: INTERNAL MEDICINE
Payer: COMMERCIAL

## 2018-02-21 VITALS
HEIGHT: 65 IN | HEART RATE: 59 BPM | WEIGHT: 137.1 LBS | DIASTOLIC BLOOD PRESSURE: 68 MMHG | BODY MASS INDEX: 22.84 KG/M2 | SYSTOLIC BLOOD PRESSURE: 100 MMHG

## 2018-02-21 DIAGNOSIS — E78.2 MIXED HYPERLIPIDEMIA: ICD-10-CM

## 2018-02-21 DIAGNOSIS — E78.5 HYPERLIPIDEMIA LDL GOAL <70: ICD-10-CM

## 2018-02-21 DIAGNOSIS — E78.2 MIXED HYPERLIPIDEMIA: Primary | ICD-10-CM

## 2018-02-21 DIAGNOSIS — I65.22 ATHEROSCLEROSIS OF LEFT CAROTID ARTERY: ICD-10-CM

## 2018-02-21 DIAGNOSIS — I25.10 CORONARY ARTERY DISEASE INVOLVING NATIVE CORONARY ARTERY OF NATIVE HEART WITHOUT ANGINA PECTORIS: ICD-10-CM

## 2018-02-21 LAB
ALT SERPL W P-5'-P-CCNC: 17 U/L (ref 5–30)
CHOLEST SERPL-MCNC: 187 MG/DL
HDLC SERPL-MCNC: 75 MG/DL
LDLC SERPL CALC-MCNC: 96 MG/DL
NONHDLC SERPL-MCNC: 112 MG/DL
TRIGL SERPL-MCNC: 79 MG/DL

## 2018-02-21 PROCEDURE — 99213 OFFICE O/P EST LOW 20 MIN: CPT | Performed by: PHYSICIAN ASSISTANT

## 2018-02-21 PROCEDURE — 36415 COLL VENOUS BLD VENIPUNCTURE: CPT | Performed by: INTERNAL MEDICINE

## 2018-02-21 PROCEDURE — 84460 ALANINE AMINO (ALT) (SGPT): CPT | Performed by: INTERNAL MEDICINE

## 2018-02-21 PROCEDURE — 80061 LIPID PANEL: CPT | Performed by: INTERNAL MEDICINE

## 2018-02-21 NOTE — MR AVS SNAPSHOT
After Visit Summary   2/21/2018    Svetlana Adair    MRN: [id number]           Patient Information     Date Of Birth          1950        Visit Information        Provider Department      2/21/2018 10:30 AM Taiwo Anthony PA-C [facility]        Today's Diagnoses     Coronary artery disease involving native coronary artery of native heart without angina pectoris          Care Instructions    Today's Plan:   1) Come back in 6 months with repeat fasting labs.     Your Cholesterol Lab Results:   Component      Latest Ref Rng & Units 9/25/2017 2/21/2018   Cholesterol      <200 mg/dL 158 187   Triglycerides      <150 mg/dL 73 79   HDL Cholesterol      >49 mg/dL 80 75   LDL Cholesterol Calculated      <100 mg/dL 63 96   Non HDL Cholesterol      <130 mg/dL 78 112       If you have questions or concerns please call my nurse team at [phone].     Scheduling phone number: [phone]  Reminder: Please bring in all current medications, over the counter supplements and vitamin bottles to your next appointment.    It was a pleasure seeing you today!     Taiwo Anthony  2/21/2018              Follow-ups after your visit        Who to contact     If you have questions or need follow up information about today's clinic visit or your schedule please contact [facility] directly at [phone].  Normal or non-critical lab and imaging results will be communicated to you by MyChart, letter or phone within 4 business days after the clinic has received the results. If you do not hear from us within 7 days, please contact the clinic through MyChart or phone. If you have a critical or abnormal lab result, we will notify you by phone as soon as possible.  Submit refill requests through [employer] or call your pharmacy and they will forward the refill request to us. Please allow 3 business days for your refill to be completed.  "         Additional Information About Your Visit        MyChart Information     [employer] gives you secure access to your electronic health record. If you see a primary care provider, you can also send messages to your care team and make appointments. If you have questions, please call your primary care clinic.  If you do not have a primary care provider, please call [phone] and they will assist you.        Care EveryWhere ID     This is your Care EveryWhere ID. This could be used by other organizations to access your [place] medical records  [id number]        Your Vitals Were     Pulse Height BMI (Body Mass Index)             59 1.651 m (5' 5\") 22.81 kg/m2          Blood Pressure from Last 3 Encounters:   02/21/18 100/68   11/17/17 122/76   11/06/17 123/69    Weight from Last 3 Encounters:   02/21/18 62.2 kg (137 lb 1.6 oz)   11/17/17 61.7 kg (136 lb)   11/02/17 61.9 kg (136 lb 6.4 oz)              We Performed the Following     Follow-Up with Cardiac Advanced Practice Provider          Today's Medication Changes          These changes are accurate as of 2/21/18 11:04 AM.  If you have any questions, ask your nurse or doctor.               These medicines have changed or have updated prescriptions.        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 [address]        Equal Access to Services     SMITA AMADOR : Hadii aad ku hadsantio Sonicolali, waaxda luqadaha, qaybta kajunida dionyramangeovanny, chelsi childers lizaefrain spearskeshawnfabio james . So [facility] [phone].    ATENCIÓN: Si habla español, tiene a bruner disposición servicios gratuitos de asistencia lingüística. Llame al [phone].    We comply with applicable federal civil rights laws and [place] laws. We do not discriminate on the basis of race, color, national origin, age, disability, sex, sexual orientation, or gender identity.            Thank you!     Thank you for choosing [facility]  for your care. Our goal is always to provide you with excellent care. Hearing back from our patients is one way we can continue to improve our services. Please take a few minutes to complete the written survey that you may receive in the mail after your visit with us. Thank you!             Your Updated Medication List - Protect others around you: Learn how to safely use, store and throw away your medicines at www.disposemymeds.org.          This list is accurate as of 2/21/18 11:04 AM.  Always use your most recent med list.                   Brand Name Dispense Instructions for use Diagnosis    aspirin 81 MG tablet     30 tablet    Take 1 tablet (81 mg) by mouth daily    Coronary artery disease involving native coronary artery of native heart without angina pectoris       CoQ10 100 MG Caps      Take by mouth daily Reported on 3/3/2017        cyclobenzaprine 5 MG tablet    FLEXERIL    42 tablet    Take 1-2 tablets (5-10 mg) by mouth 2 times daily as needed for muscle spasms    Torticollis, spasmodic, Low back pain without sciatica, unspecified back pain laterality, unspecified chronicity       FIBER PO      2 tablets twice daily        gabapentin 100 MG capsule    NEURONTIN    540 capsule    Take 2 capsules (200 mg) by mouth 3 times daily    Female  climacteric state, Torticollis, spasmodic       HYDROcodone-acetaminophen 5-325 MG per tablet    NORCO    20 tablet    Take 1-2 tablets by mouth every 6 hours as needed for pain    Low back pain without sciatica, unspecified back pain laterality, unspecified chronicity, Torticollis, spasmodic       IBUPROFEN PO      Take 200-400 mg by mouth every 6 hours as needed for moderate pain        LYSINE      1-3 daily as needed        MULTIVITAMIN ADULT PO      Take by mouth daily        nicotine polacrilex 2 MG gum    NICORETTE    30 tablet    Place 1 each (2 mg) inside cheek as needed for smoking cessation    Tobacco use disorder       OMEGA 3 PO      Take 1 tablet by mouth 2 times daily        omeprazole 20 MG tablet     30 tablet    Take 1 tablet (20 mg) by mouth as needed    GERD (gastroesophageal reflux disease)       rosuvastatin 5 MG tablet    CRESTOR    90 tablet    Take 1 tablet (5 mg) by mouth daily    Mixed hyperlipidemia       triamterene-hydrochlorothiazide 37.5-25 MG per tablet    MAXZIDE-25    90 tablet    Take 1 tablet by mouth as needed    Peripheral edema       valACYclovir 1000 mg tablet    VALTREX    8 tablet    TAKE 2 TABLETS BY MOUTH TWICE DAILY    HSV (herpes simplex virus) infection       Vitamin D3 2000 UNITS Tabs      Take 5,000 Units by mouth Reported on 3/3/2017

## 2018-02-21 NOTE — PATIENT INSTRUCTIONS
Today's Plan:   1) Come back in 6 months with repeat fasting labs.     Your Cholesterol Lab Results:   Component      Latest Ref Rng & Units 9/25/2017 2/21/2018   Cholesterol      <200 mg/dL 158 187   Triglycerides      <150 mg/dL 73 79   HDL Cholesterol      >49 mg/dL 80 75   LDL Cholesterol Calculated      <100 mg/dL 63 96   Non HDL Cholesterol      <130 mg/dL 78 112       If you have questions or concerns please call my nurse team at [phone].     Scheduling phone number: [phone]  Reminder: Please bring in all current medications, over the counter supplements and vitamin bottles to your next appointment.    It was a pleasure seeing you today!     Taiwo Anthony  2/21/2018

## 2018-02-21 NOTE — PROGRESS NOTES
History of Present Illness:   This is a pleasant 67-year-old female who presents to cardiology clinic for annual follow-up.  Her past medical history is notable for high calcium score with negative stress study, history of tobacco abuse, and hypercholesterolemia.  She was started on atorvastatin which was recently switched to rosuvastatin due to memory issues.    She returns to clinic stating that she is doing well.  She denies any new symptoms.  She states that her memory issues or maybe a little bit better now with rosuvastatin.  She denies any muscle tenderness.    Physical examination:  Gen- NAD  Neck- Normal JVP  Resp- CTA, lyn  Card- RRR w/o m,r,g  Abd- Soft, nontender  Ext- No edema    Assessment and Plan:   This is a pleasant 67-year-old female who presents to cardiology clinic for annual follow-up.  Her past medical history is notable for high calcium score with negative stress study, history of tobacco abuse, and hypercholesterolemia.  She was started on atorvastatin which was recently switched to rosuvastatin due to memory issues.    Her lipid panel today shows slight packs lighten her numbers with LDL 96 and total cholesterol 187.  She does admit to not exercising regularly.  We decided to pursue further lifestyle changes to recheck her numbers in 6 months.    Carotid ultrasound shows some plaque in the Left internal carotid but no increased velocities. Overall stable findings compared to her last ultrasound.     Thank you for allowing me to participate in the care of this patient today.      This note was completed in part using Dragon voice recognition software. Although reviewed after completion, some word and grammatical errors may occur.    Orders this Visit:  No orders of the defined types were placed in this encounter.    No orders of the defined types were placed in this encounter.    There are no discontinued medications.      Encounter Diagnosis   Name Primary?     Coronary artery disease  involving native coronary artery of native heart without angina pectoris        CURRENT MEDICATIONS:  Current Outpatient Prescriptions   Medication Sig Dispense Refill     gabapentin (NEURONTIN) 100 MG capsule Take 2 capsules (200 mg) by mouth 3 times daily (Patient taking differently: Take 200 mg by mouth as needed p) 540 capsule 0     HYDROcodone-acetaminophen (NORCO) 5-325 MG per tablet Take 1-2 tablets by mouth every 6 hours as needed for pain 20 tablet 0     rosuvastatin (CRESTOR) 5 MG tablet Take 1 tablet (5 mg) by mouth daily 90 tablet 1     valACYclovir (VALTREX) 1000 mg tablet TAKE 2 TABLETS BY MOUTH TWICE DAILY (Patient taking differently: TAKE 2 TABLETS BY MOUTH TWICE DAILY as needed) 8 tablet 1     IBUPROFEN PO Take 200-400 mg by mouth every 6 hours as needed for moderate pain       cyclobenzaprine (FLEXERIL) 5 MG tablet Take 1-2 tablets (5-10 mg) by mouth 2 times daily as needed for muscle spasms 42 tablet 0     omeprazole 20 MG tablet Take 1 tablet (20 mg) by mouth as needed 30 tablet 0     triamterene-hydrochlorothiazide (MAXZIDE-25) 37.5-25 MG per tablet Take 1 tablet by mouth as needed 90 tablet 0     nicotine polacrilex (NICORETTE) 2 MG gum Place 1 each (2 mg) inside cheek as needed for smoking cessation 30 tablet 11     Multiple Vitamins-Minerals (MULTIVITAMIN ADULT PO) Take by mouth daily       Cholecalciferol (VITAMIN D3) 2000 UNITS TABS Take 5,000 Units by mouth Reported on 3/3/2017       FIBER PO 2 tablets twice daily       Coenzyme Q10 (COQ10) 100 MG CAPS Take by mouth daily Reported on 3/3/2017       aspirin 81 MG tablet Take 1 tablet (81 mg) by mouth daily 30 tablet      Omega-3 Fatty Acids (OMEGA 3 PO) Take 1 tablet by mouth 2 times daily        LYSINE 1-3 daily as needed         ALLERGIES     Allergies   Allergen Reactions     Cats      Codeine Sulfate GI Disturbance       PAST MEDICAL HISTORY:  Past Medical History:   Diagnosis Date     Alcoholism (H)      Allergies     Fall     Basal  cell cancer     back, chest, face     Breast cancer (H)      Coronary artery disease     CT calcium rfgfc=423 Nov 2015     Depression      Hyperlipidemia LDL goal <130 12/2/2015     Hypertension     borderline     PONV (postoperative nausea and vomiting)      Squamous cell carcinoma     left upper arm, chin       PAST SURGICAL HISTORY:  Past Surgical History:   Procedure Laterality Date     COLONOSCOPY       COLONOSCOPY  11/17/2011    Procedure:COLONOSCOPY; Colonoscopy; Surgeon:MEKA RUSS; Location: [facility]     DISSECT LYMPH NODE AXILLA Right 11/2/2017    Procedure: DISSECT LYMPH NODE AXILLA;  RIGHT AXILLARY LYMPH NODE DISSECTION;  Surgeon: Cortez White MD;  Location: [facility]     GYN SURGERY  2005    hysterectomy after hx of ovarian cyst, ended up being benign     HYSTERECTOMY, PAP NO LONGER INDICATED       MASTECTOMY MODIFIED RADICAL  2011    bilateral     MASTECTOMY, BILATERAL       ORTHOPEDIC SURGERY      rt. knee arthroscopy     ORTHOPEDIC SURGERY Right     toe surgery     REALIGN PATELLA  1973    right      TOE SURGERY      right grt OA        FAMILY HISTORY:  Family History   Problem Relation Age of Onset     CANCER Mother 60     leukemia     Arthritis Mother      osteo     Eye Disorder Mother      glaucoma     GASTROINTESTINAL DISEASE Mother      gallbladder     Other Cancer Mother      Gallbladder Disease Mother      Alcohol/Drug Father      alcohol     HEART DISEASE Father      ? CHF     Respiratory Father      emphysema     Coronary Artery Disease Father      Substance Abuse Father      Substance Abuse Sister      Colon Cancer Brother      Breast Cancer Maternal Grandmother      Asthma Sister      Cancer - colorectal Brother 50     Prostate Cancer Brother      Allergies Brother      bee      Arthritis Sister      osteo     Arthritis Sister      osteo     Arthritis Brother      osteo     Depression Sister      Psychotic Disorder Sister      bipolar     Respiratory Sister        SOCIAL  "HISTORY:  Social History     Social History     Marital status:      Spouse name: N/A     Number of children: N/A     Years of education: N/A     Social History Main Topics     Smoking status: Former Smoker     Packs/day: 1.00     Years: 22.00     Types: Cigarettes     Quit date: 4/28/2010     Smokeless tobacco: Never Used     Alcohol use No      Comment: intermittent sobriety 8/24/11     Drug use: Yes     Special: Marijuana      Comment: for sleeping/occ     Sexual activity: Yes     Partners: Male     Birth control/ protection: Surgical      Comment: hyst     Other Topics Concern      Service No     Blood Transfusions No     Caffeine Concern Yes     4-5 cups caffeine per day     Occupational Exposure No     Hobby Hazards No     Sleep Concern Yes     Stress Concern Yes     Weight Concern No     Special Diet Yes     organic foods     Back Care No     Exercise No     Bike Helmet No     Seat Belt Yes     Self-Exams Yes     Parent/Sibling W/ Cabg, Mi Or Angioplasty Before 65f 55m? No     Social History Narrative       Review of Systems:  Skin:  Negative     Eyes:  Positive for glasses  ENT:  Negative    Respiratory:  Negative    Cardiovascular:  Negative    Gastroenterology: Negative    Genitourinary:  Negative    Musculoskeletal:  Positive for arthritis;joint stiffness  Neurologic:  Negative    Psychiatric:  Negative    Heme/Lymph/Imm:  Positive for    Endocrine:  Negative      Physical Exam:  Vitals: /68  Pulse 59  Ht 1.651 m (5' 5\")  Wt 62.2 kg (137 lb 1.6 oz)  BMI 22.81 kg/m2   See dictation above.      Recent Lab Results:  LIPID RESULTS:  Lab Results   Component Value Date    CHOL 187 02/21/2018    HDL 75 02/21/2018    LDL 96 02/21/2018    TRIG 79 02/21/2018    CHOLHDLRATIO 3.3 10/29/2015       LIVER ENZYME RESULTS:  Lab Results   Component Value Date    AST 17 09/25/2017    ALT 17 02/21/2018       CBC RESULTS:  Lab Results   Component Value Date    WBC 5.2 09/25/2017    RBC 4.49 09/25/2017 "    HGB 14.4 09/25/2017    HCT 43.3 09/25/2017    MCV 96 09/25/2017    MCH 32.1 09/25/2017    MCHC 33.3 09/25/2017    RDW 12.4 09/25/2017     09/25/2017       BMP RESULTS:  Lab Results   Component Value Date     09/25/2017    POTASSIUM 4.4 09/25/2017    CHLORIDE 107 09/25/2017    CO2 28 09/25/2017    ANIONGAP 6 09/25/2017     (H) 09/25/2017    BUN 13 09/25/2017    CR 0.74 09/25/2017    GFRESTIMATED 79 09/25/2017    GFRESTBLACK >90 09/25/2017    YASMIN 9.5 09/25/2017        A1C RESULTS:  Lab Results   Component Value Date    A1C 5.6 11/18/2016       INR RESULTS:  No results found for: INR        Taiwo Anthony PA-C   February 21, 2018

## 2018-02-21 NOTE — LETTER
2/21/2018    Vladislav Cruz MD  [facility] [address]  [facility] [address]    RE: Svetlana Adair       Dear Colleague,    I had the pleasure of seeing Svetlana PATTI Adair in the [facility].    History of Present Illness:   This is a pleasant 67-year-old female who presents to cardiology clinic for annual follow-up.  Her past medical history is notable for high calcium score with negative stress study, history of tobacco abuse, and hypercholesterolemia.  She was started on atorvastatin which was recently switched to rosuvastatin due to memory issues.    She returns to clinic stating that she is doing well.  She denies any new symptoms.  She states that her memory issues or maybe a little bit better now with rosuvastatin.  She denies any muscle tenderness.    Physical examination:  Gen- NAD  Neck- Normal JVP  Resp- CTA, lyn  Card- RRR w/o m,r,g  Abd- Soft, nontender  Ext- No edema    Assessment and Plan:   This is a pleasant 67-year-old female who presents to cardiology clinic for annual follow-up.  Her past medical history is notable for high calcium score with negative stress study, history of tobacco abuse, and hypercholesterolemia.  She was started on atorvastatin which was recently switched to rosuvastatin due to memory issues.    Her lipid panel today shows slight packs lighten her numbers with LDL 96 and total cholesterol 187.  She does admit to not exercising regularly.  We decided to pursue further lifestyle changes to recheck her numbers in 6 months.    Carotid ultrasound shows some plaque in the Left internal carotid but no increased velocities. Overall stable findings compared to her last ultrasound.     Thank you for allowing me to participate in the care of this patient today.      This note was completed in part using Dragon voice recognition software. Although reviewed after completion, some word and grammatical errors may occur.    Orders this  Visit:  No orders of the defined types were placed in this encounter.    No orders of the defined types were placed in this encounter.    There are no discontinued medications.      Encounter Diagnosis   Name Primary?     Coronary artery disease involving native coronary artery of native heart without angina pectoris        CURRENT MEDICATIONS:  Current Outpatient Prescriptions   Medication Sig Dispense Refill     gabapentin (NEURONTIN) 100 MG capsule Take 2 capsules (200 mg) by mouth 3 times daily (Patient taking differently: Take 200 mg by mouth as needed p) 540 capsule 0     HYDROcodone-acetaminophen (NORCO) 5-325 MG per tablet Take 1-2 tablets by mouth every 6 hours as needed for pain 20 tablet 0     rosuvastatin (CRESTOR) 5 MG tablet Take 1 tablet (5 mg) by mouth daily 90 tablet 1     valACYclovir (VALTREX) 1000 mg tablet TAKE 2 TABLETS BY MOUTH TWICE DAILY (Patient taking differently: TAKE 2 TABLETS BY MOUTH TWICE DAILY as needed) 8 tablet 1     IBUPROFEN PO Take 200-400 mg by mouth every 6 hours as needed for moderate pain       cyclobenzaprine (FLEXERIL) 5 MG tablet Take 1-2 tablets (5-10 mg) by mouth 2 times daily as needed for muscle spasms 42 tablet 0     omeprazole 20 MG tablet Take 1 tablet (20 mg) by mouth as needed 30 tablet 0     triamterene-hydrochlorothiazide (MAXZIDE-25) 37.5-25 MG per tablet Take 1 tablet by mouth as needed 90 tablet 0     nicotine polacrilex (NICORETTE) 2 MG gum Place 1 each (2 mg) inside cheek as needed for smoking cessation 30 tablet 11     Multiple Vitamins-Minerals (MULTIVITAMIN ADULT PO) Take by mouth daily       Cholecalciferol (VITAMIN D3) 2000 UNITS TABS Take 5,000 Units by mouth Reported on 3/3/2017       FIBER PO 2 tablets twice daily       Coenzyme Q10 (COQ10) 100 MG CAPS Take by mouth daily Reported on 3/3/2017       aspirin 81 MG tablet Take 1 tablet (81 mg) by mouth daily 30 tablet      Omega-3 Fatty Acids (OMEGA 3 PO) Take 1 tablet by mouth 2 times daily         LYSINE 1-3 daily as needed         ALLERGIES     Allergies   Allergen Reactions     Cats      Codeine Sulfate GI Disturbance       PAST MEDICAL HISTORY:  Past Medical History:   Diagnosis Date     Alcoholism (H)      Allergies     Fall     Basal cell cancer     back, chest, face     Breast cancer (H)      Coronary artery disease     CT calcium vahfs=675 Nov 2015     Depression      Hyperlipidemia LDL goal <130 12/2/2015     Hypertension     borderline     PONV (postoperative nausea and vomiting)      Squamous cell carcinoma     left upper arm, chin       PAST SURGICAL HISTORY:  Past Surgical History:   Procedure Laterality Date     COLONOSCOPY       COLONOSCOPY  11/17/2011    Procedure:COLONOSCOPY; Colonoscopy; Surgeon:MEKA RUSS; Location: GI     DISSECT LYMPH NODE AXILLA Right 11/2/2017    Procedure: DISSECT LYMPH NODE AXILLA;  RIGHT AXILLARY LYMPH NODE DISSECTION;  Surgeon: Cortez White MD;  Location:  [facility]     GYN SURGERY  2005    hysterectomy after hx of ovarian cyst, ended up being benign     HYSTERECTOMY, PAP NO LONGER INDICATED       MASTECTOMY MODIFIED RADICAL  2011    bilateral     MASTECTOMY, BILATERAL       ORTHOPEDIC SURGERY      rt. knee arthroscopy     ORTHOPEDIC SURGERY Right     toe surgery     REALIGN PATELLA  1973    right      TOE SURGERY      right grt OA        FAMILY HISTORY:  Family History   Problem Relation Age of Onset     CANCER Mother 60     leukemia     Arthritis Mother      osteo     Eye Disorder Mother      glaucoma     GASTROINTESTINAL DISEASE Mother      gallbladder     Other Cancer Mother      Gallbladder Disease Mother      Alcohol/Drug Father      alcohol     HEART DISEASE Father      ? CHF     Respiratory Father      emphysema     Coronary Artery Disease Father      Substance Abuse Father      Substance Abuse Sister      Colon Cancer Brother      Breast Cancer Maternal Grandmother      Asthma Sister      Cancer - colorectal Brother 50     Prostate Cancer  "Brother      Allergies Brother      bee      Arthritis Sister      osteo     Arthritis Sister      osteo     Arthritis Brother      osteo     Depression Sister      Psychotic Disorder Sister      bipolar     Respiratory Sister        SOCIAL HISTORY:  Social History     Social History     Marital status:      Spouse name: N/A     Number of children: N/A     Years of education: N/A     Social History Main Topics     Smoking status: Former Smoker     Packs/day: 1.00     Years: 22.00     Types: Cigarettes     Quit date: 4/28/2010     Smokeless tobacco: Never Used     Alcohol use No      Comment: intermittent sobriety 8/24/11     Drug use: Yes     Special: Marijuana      Comment: for sleeping/occ     Sexual activity: Yes     Partners: Male     Birth control/ protection: Surgical      Comment: hyst     Other Topics Concern      Service No     Blood Transfusions No     Caffeine Concern Yes     4-5 cups caffeine per day     Occupational Exposure No     Hobby Hazards No     Sleep Concern Yes     Stress Concern Yes     Weight Concern No     Special Diet Yes     organic foods     Back Care No     Exercise No     Bike Helmet No     Seat Belt Yes     Self-Exams Yes     Parent/Sibling W/ Cabg, Mi Or Angioplasty Before 65f 55m? No     Social History Narrative       Review of Systems:  Skin:  Negative     Eyes:  Positive for glasses  ENT:  Negative    Respiratory:  Negative    Cardiovascular:  Negative    Gastroenterology: Negative    Genitourinary:  Negative    Musculoskeletal:  Positive for arthritis;joint stiffness  Neurologic:  Negative    Psychiatric:  Negative    Heme/Lymph/Imm:  Positive for    Endocrine:  Negative      Physical Exam:  Vitals: /68  Pulse 59  Ht 1.651 m (5' 5\")  Wt 62.2 kg (137 lb 1.6 oz)  BMI 22.81 kg/m2   See dictation above.      Recent Lab Results:  LIPID RESULTS:  Lab Results   Component Value Date    CHOL 187 02/21/2018    HDL 75 02/21/2018    LDL 96 02/21/2018    TRIG 79 " 02/21/2018    CHOLHDLRATIO 3.3 10/29/2015       LIVER ENZYME RESULTS:  Lab Results   Component Value Date    AST 17 09/25/2017    ALT 17 02/21/2018       CBC RESULTS:  Lab Results   Component Value Date    WBC 5.2 09/25/2017    RBC 4.49 09/25/2017    HGB 14.4 09/25/2017    HCT 43.3 09/25/2017    MCV 96 09/25/2017    MCH 32.1 09/25/2017    MCHC 33.3 09/25/2017    RDW 12.4 09/25/2017     09/25/2017       BMP RESULTS:  Lab Results   Component Value Date     09/25/2017    POTASSIUM 4.4 09/25/2017    CHLORIDE 107 09/25/2017    CO2 28 09/25/2017    ANIONGAP 6 09/25/2017     (H) 09/25/2017    BUN 13 09/25/2017    CR 0.74 09/25/2017    GFRESTIMATED 79 09/25/2017    GFRESTBLACK >90 09/25/2017    YASMIN 9.5 09/25/2017        A1C RESULTS:  Lab Results   Component Value Date    A1C 5.6 11/18/2016       INR RESULTS:  No results found for: INR      Thank you for allowing me to participate in the care of your patient.    Sincerely,     Taiwo Anthony PA-C     [facility]

## 2018-03-27 DIAGNOSIS — M54.50 LOW BACK PAIN WITHOUT SCIATICA, UNSPECIFIED BACK PAIN LATERALITY, UNSPECIFIED CHRONICITY: ICD-10-CM

## 2018-03-27 DIAGNOSIS — G24.3 TORTICOLLIS, SPASMODIC: ICD-10-CM
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
## 2018-03-27 NOTE — TELEPHONE ENCOUNTER
Controlled Substance Refill Request for HYDROcodone-acetaminophen (NORCO) 5-325 MG per tablet  Problem List Complete:  No     PROVIDER TO CONSIDER COMPLETION OF PROBLEM LIST AND OVERVIEW/CONTROLLED SUBSTANCE AGREEMENT    Last Written Prescription Date:  2/1/2018  Last Fill Quantity: 20,   # refills: 0    Last Office Visit with [facility] primary care provider: 10/26/2017    Future Office visit:     Controlled substance agreement on file: No.     Processing:  Patient will  in clinic   checked in past 6 months?  No, route to RN

## 2018-03-28 RX ORDER — HYDROCODONE BITARTRATE AND ACETAMINOPHEN 5; 325 MG/1; MG/1
1-2 TABLET ORAL EVERY 6 HOURS PRN
Qty: 20 TABLET | Refills: 0 | Status: SHIPPED | OUTPATIENT
Start: 2018-03-28 | End: 2018-04-26

## 2018-03-28 NOTE — TELEPHONE ENCOUNTER
LVM for Pt to call and schedule. Rx is ready for Pt to  at the .   Controlled Substance Agreement printed. Please have Pt fill sign form  in front of   staff and witness her signature.

## 2018-03-28 NOTE — TELEPHONE ENCOUNTER
I noted that she requested another fill of norco  Her use seems to be escalating  I would recommend an office visit to discuss strategies for controlling pain that do not include opioids   I refilled the medication, but I think she should be seen at her convenience to talk about it  Also she does not have a controlled substance agreement on file

## 2018-03-29 NOTE — TELEPHONE ENCOUNTER
Controlled substance agreement invalid as it was not done by Dr. Cruz. Patient was told she needs an appointment to discuss future medications and do a [facility].     Signed agreement destroyed as it is invalid.

## 2018-04-13 DIAGNOSIS — B00.9 HSV (HERPES SIMPLEX VIRUS) INFECTION: ICD-10-CM

## 2018-04-13 NOTE — TELEPHONE ENCOUNTER
"valACYclovir (VALTREX) 1000 mg tablet 8 tablet 1 11/2/2017     Last Written Prescription Date:  11/2/17  Last Fill Quantity: 8,  # refills: 1   Last office visit: 10/26/2017 with prescribing provider:  Anthony   Future Office Visit:   Next 5 appointments (look out 90 days)     Apr 26, 2018 11:30 AM CDT   [facility] Vladislav [facility]   [facility]    [address] [id number] [phone]                 Requested Prescriptions   Pending Prescriptions Disp Refills     valACYclovir (VALTREX) 500 MG tablet [Pharmacy Med Name: VALACYCLOVIR 500MG TABLETS] 16 tablet 0     Sig: TAKE 4 TABLETS BY MOUTH TWICE DAILY    Antivirals for Herpes Protocol Passed    4/13/2018 10:19 AM       Passed - Patient is age 12 or older       Passed - Recent (12 mo) or future (30 days) visit within the authorizing provider's specialty    Patient had office visit in the last 12 months or has a visit in the next 30 days with authorizing provider or within the authorizing provider's specialty.  See \"Patient Info\" tab in inbasket, or \"Choose Columns\" in Meds & Orders section of the refill encounter.           Passed - Normal serum creatinine on file in past 12 months    Recent Labs   Lab Test  09/25/17   1054   CR  0.74             PHQ-9 SCORE 7/29/2015 11/18/2016 10/26/2017   Total Score 5 - -   Total Score - 4 3     "

## 2018-04-16 RX ORDER — VALACYCLOVIR HYDROCHLORIDE 1 G/1
2000 TABLET, FILM COATED ORAL 2 TIMES DAILY PRN
Qty: 8 TABLET | Refills: 1 | Status: SHIPPED | OUTPATIENT
Start: 2018-04-16 | End: 2019-04-17

## 2018-04-16 NOTE — TELEPHONE ENCOUNTER
Prescription approved per [facility] Refill Protocol.  Jasmyn RAZA RN

## 2018-04-19 ENCOUNTER — HOSPITAL ENCOUNTER (OUTPATIENT)
Dept: MAMMOGRAPHY | Facility: CLINIC | Age: 68
Discharge: HOME | End: 2018-04-19
Attending: INTERNAL MEDICINE | Admitting: INTERNAL MEDICINE
Payer: MEDICARE

## 2018-04-19 DIAGNOSIS — R22.31 MASS OF RIGHT AXILLA: ICD-10-CM

## 2018-04-19 PROCEDURE — 76882 US LMTD JT/FCL EVL NVASC XTR: CPT | Mod: RT

## 2018-04-26 ENCOUNTER — OFFICE VISIT (OUTPATIENT)
Dept: FAMILY MEDICINE | Facility: CLINIC | Age: 68
End: 2018-04-26
Payer: COMMERCIAL

## 2018-04-26 VITALS
HEIGHT: 65 IN | WEIGHT: 137 LBS | BODY MASS INDEX: 22.82 KG/M2 | SYSTOLIC BLOOD PRESSURE: 106 MMHG | TEMPERATURE: 98 F | DIASTOLIC BLOOD PRESSURE: 54 MMHG | OXYGEN SATURATION: 100 % | HEART RATE: 56 BPM

## 2018-04-26 DIAGNOSIS — M25.569 CHRONIC KNEE PAIN, UNSPECIFIED LATERALITY: ICD-10-CM

## 2018-04-26 DIAGNOSIS — G89.29 CHRONIC KNEE PAIN, UNSPECIFIED LATERALITY: ICD-10-CM

## 2018-04-26 DIAGNOSIS — M54.50 LOW BACK PAIN WITHOUT SCIATICA, UNSPECIFIED BACK PAIN LATERALITY, UNSPECIFIED CHRONICITY: ICD-10-CM

## 2018-04-26 DIAGNOSIS — G89.4 CHRONIC PAIN SYNDROME: ICD-10-CM

## 2018-04-26 DIAGNOSIS — M54.2 NECK PAIN: ICD-10-CM

## 2018-04-26 DIAGNOSIS — F43.23 ADJUSTMENT DISORDER WITH MIXED ANXIETY AND DEPRESSED MOOD: Primary | ICD-10-CM

## 2018-04-26 PROCEDURE — 99214 OFFICE O/P EST MOD 30 MIN: CPT | Performed by: INTERNAL MEDICINE

## 2018-04-26 RX ORDER — HYDROCODONE BITARTRATE AND ACETAMINOPHEN 5; 325 MG/1; MG/1
1-2 TABLET ORAL EVERY 6 HOURS PRN
Qty: 20 TABLET | Refills: 0 | Status: SHIPPED | OUTPATIENT
Start: 2018-04-26 | End: 2018-06-19

## 2018-04-26 NOTE — LETTER
[facility]    04/26/18    Patient: Svetlana Adair  YOB: 1950  Medical Record Number: [id number]                                                                  Controlled Substance Agreement  I understand that my care provider has prescribed controlled substances (narcotics, tranquilizers, and/or stimulants) to help manage my condition(s).  I am taking this medicine to help me function or work.  I know that this is strong medicine.  It could have serious side effects and even cause a dependency on the drug.  If I stop these medicines suddenly, I could have severe withdrawal symptoms.    The risks, benefits, and side effects of these medication(s) were explained to me.  I agree that:  1. I will take part in other treatments as advised by my provider.  This may be psychiatry or counseling, physical therapy, behavioral therapy, group treatment, or a referral to a pain clinic.  I will reduce or stop my medicine when my provider tells me to do so.   2. I will take my medicines as prescribed.  I will not change the dose or schedule unless my provider tells me to.  There will be no refills if I  run out early.   I may be contacted at any time without warning and asked to complete a drug test or pill count.   3. I will keep all my appointments at the clinic.  If I miss appointments or fail to follow instructions, my provider may stop my medicine.  4. I will not ask other providers to prescribe controlled substances. And I will not accept controlled substances from other people. If I need another prescribed controlled substance for a new reason, I will notify my provider within one business day.  5. If I enroll in the [place] Medical Marijuana program, I will tell my provider.  I will also sign an agreement to share my medical records with my provider.  6. I will use one pharmacy to fill all of my controlled substance prescriptions.  If my prescription is mailed to my pharmacy, it may take 5  to 7 days for my medicine to be ready.  7. I understand that my provider, clinic care team, and pharmacy can track controlled substance prescriptions from other providers through a central database (prescription monitoring program).  8. I will bring in my list of medications (or my medicine bottles) each time I come to the clinic.  REV- 04/2016                                                                                                                                            Page 1 of 2      [facility]    04/26/18    Patient: Svetlana Adair  YOB: 1950  Medical Record Number: [id number]    9. Refills of controlled substances will be made only during office hours.  It is up to me to make sure that I do not run out of my medicines on weekends or holidays.    10. I am responsible for my prescriptions.  If the medicine is lost or stolen, it will not be replaced.   I also agree not to share these medicines with anyone.  11. I agree to not use ANY illegal or recreational drugs.  This includes marijuana, cocaine, bath salts or other drugs.  I agree not to use alcohol unless my provider says I may.  I agree to give urine samples whenever asked.  If I fail to give a urine sample, the provider may stop my medicine.     12. I will tell my nurse or provider right away if I become pregnant or have a new medical problem treated outside of [facility].  13. I understand that this medicine can affect my thinking and judgment.  It may be unsafe for me to drive, use machinery and do dangerous tasks.  I will not do any of these things until I know how the medicine affects me.  If my dose changes, I will wait to see how it affects me.  I will contact my provider if I have concerns about medicine side effects.  I understand that if I do not follow any of the conditions above, my prescriptions or treatment may be stopped.    I agree that my provider, clinic care team, and pharmacy may work with any  city, state or federal law enforcement agency that investigates the misuse, sale, or other diversion of my controlled medicine. I will allow my provider to discuss my care with or share a copy of this agreement with any other treating provider, pharmacy or emergency room where I receive care.  I agree to give up (waive) any right of privacy or confidentiality with respect to these authorizations.   I have read this agreement and have asked questions about anything I did not understand.   ___________________________________    ___________________________  Patient Signature                                                           Date and Time  ___________________________________     ____________________________  Witness                                                                            Date and Time  ___________________________________  Vladislav Cruz MD  REV-  04/2016                                                                                                                                                                 Page 2 of 2

## 2018-04-26 NOTE — MR AVS SNAPSHOT
"              After Visit Summary   4/26/2018    Svetlana Adair    MRN: [id number]           Patient Information     Date Of Birth          1950        Visit Information        Provider Department      4/26/2018 11:30 AM Vladislav Cruz MD [facility]        Today's Diagnoses     Adjustment disorder with mixed anxiety and depressed mood    -  1    Low back pain without sciatica, unspecified back pain laterality, unspecified chronicity        Torticollis, spasmodic        Chronic pain syndrome          Care Instructions    You should get the new shingles vaccine \"SHINGRIX\" (not Zostavax) at your pharmacy.             Follow-ups after your visit        Additional Services     MENTAL HEALTH REFERRAL  - Adult; Outpatient Treatment; Individual/Couples/Family/Group Therapy/Health Psychology; [facility] [place]: [facility] [phone]; We will contact you to schedule the appointment or please call with any questions       All scheduling is subject to the client's specific insurance plan & benefits, provider/location availability, and provider clinical specialities.  Please arrive 15 minutes early for your first appointment and bring your completed paperwork.    Please be aware that coverage of these services is subject to the terms and limitations of your health insurance plan.  Call member services at your health plan with any benefit or coverage questions.                            Who to contact     If you have questions or need follow up information about today's clinic visit or your schedule please contact [facility] directly at [phone].  Normal or non-critical lab and imaging results will be communicated to you by MyChart, letter or phone within 4 business days after the clinic has received the results. If you do not hear from us within 7 days, please contact the clinic through MyChart or phone. If you have a critical or abnormal lab result, we will notify you by phone as " "soon as possible.  Submit refill requests through [employer] or call your pharmacy and they will forward the refill request to us. Please allow 3 business days for your refill to be completed.          Additional Information About Your Visit        [employer] Information     [employer] gives you secure access to your electronic health record. If you see a primary care provider, you can also send messages to your care team and make appointments. If you have questions, please call your primary care clinic.  If you do not have a primary care provider, please call [phone] and they will assist you.        Care EveryWhere ID     This is your Care EveryWhere ID. This could be used by other organizations to access your [place] medical records  [id number]        Your Vitals Were     Pulse Temperature Height Pulse Oximetry Breastfeeding? BMI (Body Mass Index)    56 98  F (36.7  C) (Oral) 5' 5\" (1.651 m) 100% No 22.8 kg/m2       Blood Pressure from Last 3 Encounters:   04/26/18 106/54   02/21/18 100/68   11/17/17 122/76    Weight from Last 3 Encounters:   04/26/18 137 lb (62.1 kg)   02/21/18 137 lb 1.6 oz (62.2 kg)   11/17/17 136 lb (61.7 kg)              We Performed the Following     MENTAL HEALTH REFERRAL  - Adult; Outpatient Treatment; Individual/Couples/Family/Group Therapy/Health Psychology; [facility]: [facility] [phone]; We will contact you to schedule the appointment or please call with any questions          Today's Medication Changes          These changes are accurate as of 4/26/18 12:16 PM.  If you have any questions, ask your nurse or doctor.               These medicines have changed or have updated prescriptions.        Dose/Directions    gabapentin 100 MG capsule   Commonly known as:  NEURONTIN   This may have changed:    - when to take this  - reasons to take this  - additional instructions   Used for:  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, Torticollis, spasmodic        Dose:  200 mg   Take 2 capsules " (200 mg) by mouth 3 times daily   Quantity:  540 capsule   Refills:  0            Where to get your medicines      Some of these will need a paper prescription and others can be bought over the counter.  Ask your nurse if you have questions.     Bring a paper prescription for each of these medications     HYDROcodone-acetaminophen 5-325 MG per tablet               Information about OPIOIDS     PRESCRIPTION OPIOIDS: WHAT YOU NEED TO KNOW   You have a prescription for an opioid (narcotic) pain medicine. Opioids can cause addiction. If you have a history of chemical dependency of any type, you are at a higher risk of becoming addicted to opioids. Only take this medicine after all other options have been tried. Take it for as short a time and as few doses as possible.     Do not:    Drive. If you drive while taking these medicines, you could be arrested for driving under the influence (DUI).    Operate heavy machinery    Do any other dangerous activities while taking these medicines.     Drink any alcohol while taking these medicines.      Take with any other medicines that contain acetaminophen. Read all labels carefully. Look for the word  acetaminophen  or  Tylenol.  Ask your pharmacist if you have questions or are unsure.    Store your pills in a secure place, locked if possible. We will not replace any lost or stolen medicine. If you don t finish your medicine, please throw away (dispose) as directed by your pharmacist. The [place] Pollution Control Agency has more information about safe disposal: https://www.pca.[facility].[place]/living-green/managing-unwanted-medications    All opioids tend to cause constipation. Drink plenty of water and eat foods that have a lot of fiber, such as fruits, vegetables, prune juice, apple juice and high-fiber cereal. Take a laxative (Miralax, milk of magnesia, Colace, Senna) if you don t move your bowels at least every other day.          Primary Care Provider Office Phone # Fax #     Vladislav Cruz [phone]       [facility] [address]  [facility] [address]        Equal Access to Services     SMITA AMADOR : Hadii aad ku hadsantio Sonicolali, waaxda luqadaha, qaybta kaalmada adeegyada, chelsi zaman laAntoniochely . So [facility] [phone].    ATENCIÓN: Si habla español, tiene a bruner disposición servicios gratuitos de asistencia lingüística. Llame al [phone].    We comply with applicable federal civil rights laws and [place] laws. We do not discriminate on the basis of race, color, national origin, age, disability, sex, sexual orientation, or gender identity.            Thank you!     Thank you for choosing [facility]  for your care. Our goal is always to provide you with excellent care. Hearing back from our patients is one way we can continue to improve our services. Please take a few minutes to complete the written survey that you may receive in the mail after your visit with us. Thank you!             Your Updated Medication List - Protect others around you: Learn how to safely use, store and throw away your medicines at www.disposemymeds.org.          This list is accurate as of 4/26/18 12:16 PM.  Always use your most recent med list.                   Brand Name Dispense Instructions for use Diagnosis    aspirin 81 MG tablet     30 tablet    Take 1 tablet (81 mg) by mouth daily    Coronary artery disease involving native coronary artery of native heart without angina pectoris       CoQ10 100 MG Caps      Take by mouth daily Reported on 3/3/2017        cyclobenzaprine 5 MG tablet    FLEXERIL    42 tablet    Take 1-2 tablets (5-10 mg) by mouth 2 times daily as needed for muscle spasms    Torticollis, spasmodic, Low back pain without sciatica, unspecified back pain laterality, unspecified chronicity       FIBER PO      2 tablets twice daily        gabapentin 100 MG capsule    NEURONTIN    540 capsule    Take 2 capsules (200 mg) by  mouth 3 times daily    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, Torticollis, spasmodic       HYDROcodone-acetaminophen 5-325 MG per tablet    NORCO    20 tablet    Take 1-2 tablets by mouth every 6 hours as needed for pain    Low back pain without sciatica, unspecified back pain laterality, unspecified chronicity, Torticollis, spasmodic       IBUPROFEN PO      Take 200-400 mg by mouth every 6 hours as needed for moderate pain        LYSINE      1-3 daily as needed        MULTIVITAMIN ADULT PO      Take by mouth daily        nicotine polacrilex 2 MG gum    NICORETTE    30 tablet    Place 1 each (2 mg) inside cheek as needed for smoking cessation    Tobacco use disorder       OMEGA 3 PO      Take 1 tablet by mouth 2 times daily        omeprazole 20 MG tablet     30 tablet    Take 1 tablet (20 mg) by mouth as needed    GERD (gastroesophageal reflux disease)       rosuvastatin 5 MG tablet    CRESTOR    90 tablet    Take 1 tablet (5 mg) by mouth daily    Mixed hyperlipidemia       triamterene-hydrochlorothiazide 37.5-25 MG per tablet    MAXZIDE-25    90 tablet    Take 1 tablet by mouth as needed    Peripheral edema       valACYclovir 1000 mg tablet    VALTREX    8 tablet    Take 2 tablets (2,000 mg) by mouth 2 times daily as needed    HSV (herpes simplex virus) infection       Vitamin D3 2000 units Tabs      Take 5,000 Units by mouth Reported on 3/3/2017
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
## 2018-05-02 ENCOUNTER — TELEPHONE (OUTPATIENT)
Dept: FAMILY MEDICINE | Facility: CLINIC | Age: 68
End: 2018-05-02

## 2018-05-02 NOTE — TELEPHONE ENCOUNTER
Reason for Call: Request for an order or referral:    Order or referral being requested: needs a DX code for Physically condition  That causes her to want to have counseling     Date needed: as soon as possible    Has the patient been seen by the PCP for this problem? YES    Additional comments: call ASAP    Phone number Patient can be reached at:  Home number on file [phone] (home)    Best Time:  anytime    Can we leave a detailed message on this number?  YES    Call taken on 5/2/2018 at 4:22 PM by Yan Alexander

## 2018-05-02 NOTE — TELEPHONE ENCOUNTER
Left message with ICD 10 diagnosis codes from mental health referral placed by Dr. Cruz on 4/26/2018.  Codes are   Diagnosis   309.28 (ICD-9-CM) - F43.23 (ICD-10-CM) - Adjustment disorder with mixed anxiety and depressed mood     Advised Mahnaz to call back with any further questions or needs.

## 2018-05-03 NOTE — TELEPHONE ENCOUNTER
Pt called back informing that these codes do not work.  They are looking for procedure code.  Please call her back.  It also has to be due to a physical diagnosis, not a mental one.  She wonders if her cancer diagnosis could work.

## 2018-05-03 NOTE — TELEPHONE ENCOUNTER
"Spoke to Svetlana who states that Medicare is requiring a code for an actual physical condition - possibly her cancer diagnosis? She was told that the code would not have any \"dots\" in it - (xxx.xx). I gave her the code for her cancer diagnosis C50.911, and asked her to call patient financial services, as they are familiar with the billing code requirements and may be better suited to help her with this matter. She will call them at [phone], and call us back if anything further is needed on our end.   "

## 2018-05-08 ENCOUNTER — OFFICE VISIT (OUTPATIENT)
Dept: PSYCHOLOGY | Facility: CLINIC | Age: 68
End: 2018-05-08
Attending: INTERNAL MEDICINE
Payer: COMMERCIAL

## 2018-05-08 DIAGNOSIS — F43.23 ADJUSTMENT DISORDER WITH MIXED ANXIETY AND DEPRESSED MOOD: Primary | ICD-10-CM

## 2018-05-08 PROCEDURE — 90791 PSYCH DIAGNOSTIC EVALUATION: CPT | Performed by: SOCIAL WORKER

## 2018-05-08 ASSESSMENT — ANXIETY QUESTIONNAIRES
6. BECOMING EASILY ANNOYED OR IRRITABLE: SEVERAL DAYS
2. NOT BEING ABLE TO STOP OR CONTROL WORRYING: SEVERAL DAYS
5. BEING SO RESTLESS THAT IT IS HARD TO SIT STILL: SEVERAL DAYS
7. FEELING AFRAID AS IF SOMETHING AWFUL MIGHT HAPPEN: NOT AT ALL
IF YOU CHECKED OFF ANY PROBLEMS ON THIS QUESTIONNAIRE, HOW DIFFICULT HAVE THESE PROBLEMS MADE IT FOR YOU TO DO YOUR WORK, TAKE CARE OF THINGS AT HOME, OR GET ALONG WITH OTHER PEOPLE: SOMEWHAT DIFFICULT
3. WORRYING TOO MUCH ABOUT DIFFERENT THINGS: MORE THAN HALF THE DAYS
1. FEELING NERVOUS, ANXIOUS, OR ON EDGE: MORE THAN HALF THE DAYS
GAD7 TOTAL SCORE: 8

## 2018-05-08 ASSESSMENT — PATIENT HEALTH QUESTIONNAIRE - PHQ9: 5. POOR APPETITE OR OVEREATING: SEVERAL DAYS

## 2018-05-08 NOTE — MR AVS SNAPSHOT
MRN:[id number]                      After Visit Summary   5/8/2018    Svetlana Adair    MRN: [id number]           Visit Information        Provider Department      5/8/2018 2:00 PM Monserrat Meng, [facility] [place] [facility] Generic      Your next 10 appointments already scheduled     May 23, 2018  5:00 PM CDT   Return Visit with Monserrat Meng [facility] [place] ([facility] [place])    [address]  [place] [address] [phone]            Jun 05, 2018 11:00 AM CDT   Return Visit with Monserrat Meng [facility] [place] ([facility] [place])    [address]  [place] [address] [phone]              MyChart Information     [employer] gives you secure access to your electronic health record. If you see a primary care provider, you can also send messages to your care team and make appointments. If you have questions, please call your primary care clinic.  If you do not have a primary care provider, please call [phone] and they will assist you.        Care EveryWhere ID     This is your Care EveryWhere ID. This could be used by other organizations to access your [facility] medical records  [id number]        Equal Access to Services     SMITA AMADOR AH: Mary veraso Sojabari, waaxda luqadaha, qaybta kaalmada adeegyada, chelsi bowie. So [facility] [phone].    ATENCIÓN: Si habla español, tiene a bruner disposición servicios gratuitos de asistencia lingüística. Llame al [phone].    We comply with applicable federal civil rights laws and [place] laws. We do not discriminate on the basis of race, color, national origin, age, disability, sex, sexual orientation, or gender identity.

## 2018-05-08 NOTE — PROGRESS NOTES
Progress Note - Initial Session    Client Name:  Svetlana Adair Date: 5/8/2018           Service Type: Individual      Session Start Time: 2pm  Session End Time: 250      Session Length: 38 - 52      Session #: 1     Attendees: Client attended alone         Diagnostic Assessment in progress.  Unable to complete documentation at the conclusion of the first session due to time limitations.      Mental Status Assessment:  Appearance:   Appropriate   Eye Contact:   Good   Psychomotor Behavior: Normal   Attitude:   Cooperative   Orientation:   All  Speech   Rate / Production: Normal    Volume:  Normal   Mood:    Anxious  Depressed  Sad   Affect:    Appropriate   Thought Content:  Clear  Perservative  Rumination   Thought Form:  Coherent  Logical   Insight:    Fair       Safety Issues and Plan for Safety and Risk Management:  Client denies current fears or concerns for personal safety.  Client denies current or recent suicidal ideation or behaviors.  Client denies current or recent homicidal ideation or behaviors.  Client denies current or recent self injurious behavior or ideation.  Client denies other safety concerns.  A safety and risk management plan has not been developed at this time, however client was given the after-hours number / 911 should there be a change in any of these risk factors.  Client reports there are no firearms in the house.      Diagnostic Criteria:  A. The development of emotional or behavioral symptoms in response to an identifiable stressor(s) occurring within 3 months of the onset of the stressor(s)  B. These symptoms or behaviors are clinically significant, as evidenced by one or both of the following:  C. The stress-related disturbance does not meet criteria for another disorder & is not not an exacerbation of another mental disorder  D. The symptoms do not represent normal bereavement  E. Once the stressor or its consequences have terminated, the symptoms do not persist  for more than an additional 6 months       * Adjustment Disorder with Mixed Anxiety and Depressed Mood: The predominant manifestation is a combination of depression and anxiety        DSM5 Diagnoses: (Sustained by DSM5 Criteria Listed Above)  Diagnoses: Adjustment Disorders  309.28 (F43.23) With mixed anxiety and depressed mood  Psychosocial & Contextual Factors: re occurrence of BrCa; bf dx with terminal cancer;financial issues.  WHODAS 2.0 (12 item)            This questionnaire asks about difficulties due to health conditions. Health conditions  include  disease or illnesses, other health problems that may be short or long lasting,  injuries, mental health or emotional problems, and problems with alcohol or drugs.                     Think back over the past 30 days and answer these questions, thinking about how much  difficulty you had doing the following activities. For each question, please [place] only  one response.    S1 Standing for long periods such as 30 minutes? None =         1   S2 Taking care of household responsibilities? Mild =           2   S3 Learning a new task, for example, learning how to get to a new place? None =         1   S4 How much of a problem do you have joining community activities (for example, festivals, Muslim or other activities) in the same way as anyone else can? None =         1   S5 How much have you been emotionally affected by your health problems? Mild =           2     In the past 30 days, how much difficulty did you have in:   S6 Concentrating on doing something for ten minutes? None =         1   S7 Walking a long distance such as a kilometer (or equivalent)? None =         1   S8 Washing your whole body? None =         1   S9 Getting dressed? None =         1   S10 Dealing with people you do not know? None =         1   S11 Maintaining a friendship? None =         1   S12 Your day to day work? Mild =           2     H1 Overall, in the past 30 days, how many days were  these difficulties present? Record number of days 5   H2 In the past 30 days, for how many days were you totally unable to carry out your usual activities or work because of any health condition? Record number of days  0   H3 In the past 30 days, not counting the days that you were totally unable, for how many days did you cut back or reduce your usual activities or work because of any health condition? Record number of days 5       Collateral Reports Completed:  Routed note to PCP      PLAN: (Homework, other):  Client stated that she may follow up for ongoing services with [facility].  Agreeing to schedule future appnts. Returns to clinic in 2 weeks to begin tx goal planning. Full DA to follow.      Monserrat Meng, DIMAS 5/8/2018

## 2018-05-09 ASSESSMENT — ANXIETY QUESTIONNAIRES: GAD7 TOTAL SCORE: 8

## 2018-05-09 ASSESSMENT — PATIENT HEALTH QUESTIONNAIRE - PHQ9: SUM OF ALL RESPONSES TO PHQ QUESTIONS 1-9: 6

## 2018-05-21 ENCOUNTER — TRANSFERRED RECORDS (OUTPATIENT)
Dept: HEALTH INFORMATION MANAGEMENT | Facility: CLINIC | Age: 68
End: 2018-05-21

## 2018-05-23 ENCOUNTER — OFFICE VISIT (OUTPATIENT)
Dept: PSYCHOLOGY | Facility: CLINIC | Age: 68
End: 2018-05-23
Attending: INTERNAL MEDICINE
Payer: COMMERCIAL

## 2018-05-23 DIAGNOSIS — F43.23 ADJUSTMENT DISORDER WITH MIXED ANXIETY AND DEPRESSED MOOD: Primary | ICD-10-CM

## 2018-05-23 PROCEDURE — 90834 PSYTX W PT 45 MINUTES: CPT | Performed by: SOCIAL WORKER

## 2018-05-23 ASSESSMENT — ANXIETY QUESTIONNAIRES
3. WORRYING TOO MUCH ABOUT DIFFERENT THINGS: SEVERAL DAYS
1. FEELING NERVOUS, ANXIOUS, OR ON EDGE: MORE THAN HALF THE DAYS
5. BEING SO RESTLESS THAT IT IS HARD TO SIT STILL: SEVERAL DAYS
2. NOT BEING ABLE TO STOP OR CONTROL WORRYING: SEVERAL DAYS
7. FEELING AFRAID AS IF SOMETHING AWFUL MIGHT HAPPEN: SEVERAL DAYS
IF YOU CHECKED OFF ANY PROBLEMS ON THIS QUESTIONNAIRE, HOW DIFFICULT HAVE THESE PROBLEMS MADE IT FOR YOU TO DO YOUR WORK, TAKE CARE OF THINGS AT HOME, OR GET ALONG WITH OTHER PEOPLE: SOMEWHAT DIFFICULT
6. BECOMING EASILY ANNOYED OR IRRITABLE: NEARLY EVERY DAY
GAD7 TOTAL SCORE: 10

## 2018-05-23 ASSESSMENT — PATIENT HEALTH QUESTIONNAIRE - PHQ9: 5. POOR APPETITE OR OVEREATING: SEVERAL DAYS

## 2018-05-23 NOTE — MR AVS SNAPSHOT
MRN:[id number]                      After Visit Summary   5/23/2018    Svetlana Adair    MRN: [id number]           Visit Information        Provider Department      5/23/2018 5:00 PM Monserrat Meng, [facility] [place] [facility] Generic      Your next 10 appointments already scheduled     May 29, 2018 11:40 AM CDT   (Arrive by 11:25 AM)   ROSAMARIA Extremity with Kortney Bustos PT   [facility] [facility] ([facility] [place]  )    [address]  [place] [address]   [phone]            Jun 05, 2018 11:00 AM CDT   Return Visit with Monserrat Meng [facility] [place] ([facility] [place])    [address]  [place] [address] [phone]            Jun 19, 2018 12:00 PM CDT   Return Visit with Monserrat Meng [facility] [place] ([facility] [place])    [address]  [facility] [address] [phone]              MyChart Information     [employer] gives you secure access to your electronic health record. If you see a primary care provider, you can also send messages to your care team and make appointments. If you have questions, please call your primary care clinic.  If you do not have a primary care provider, please call [phone] and they will assist you.        Care EveryWhere ID     This is your Care EveryWhere ID. This could be used by other organizations to access your [facility] medical records  [id number]        Equal Access to Services     SMITA AMADOR : Hadii jerson ku hadasho Sonicolali, waaxda luqadaha, qaybta kaalmada adeegyada, chelsi idilarry bowie. So [facility] [phone].    ATENCIÓN: Si habla español, tiene a bruner disposición servicios gratuitos de asistencia lingüística. Llame al [phone].    We comply with applicable federal civil rights laws and [place] laws. We do not discriminate on the basis of race, color, national origin, age, disability, sex,  sexual orientation, or gender identity.

## 2018-05-23 NOTE — PROGRESS NOTES
Progress Note    Client Name: Svetlana Adair  Date: 5/23/2018           Service Type: Individual      Session Start Time: 5pm  Session End Time: 550      Session Length: 50     Session #: 2     Attendees: Client attended alone    Treatment Plan Last Reviewed: in prgress  PHQ-9 / ANGY-7 : 5;10     DATA      Progress Since Last Session (Related to Symptoms / Goals / Homework):   Symptoms: Stable    Homework: n/a      Episode of Care Goals: Minimal progress - PREPARATION (Decided to change - considering how); Intervened by negotiating a change plan and determining options / strategies for behavior change, identifying triggers, exploring social supports, and working towards setting a date to begin behavior change     Current / Ongoing Stressors and Concerns:   In remission from 2nd cancer dx; bf in stage 4 cancer, financial stress and relational issues. IN recovery but had recent relapse.     Treatment Objective(s) Addressed in This Session:   Discussing areas for tx goal planning and obtaining more history and current information. Wouls like to work on boundaries in relationships and better self care. Already using meditation and yoga.       Intervention:   Insight, Use of AA supports, CBT.        ASSESSMENT: Current Emotional / Mental Status (status of significant symptoms):   Risk status (Self / Other harm or suicidal ideation)   Client denies current fears or concerns for personal safety.   Client denies current or recent suicidal ideation or behaviors.   Client denies current or recent homicidal ideation or behaviors.   Client denies current or recent self injurious behavior or ideation.   Client denies other safety concerns.   A safety and risk management plan has not been developed at this time, however client was given the after-hours number / 911 should there be a change in any of these risk factors.     Appearance:   Appropriate    Eye Contact:   Good     Psychomotor Behavior: Normal    Attitude:   Cooperative    Orientation:   All   Speech    Rate / Production: Hyperverbal     Volume:  Soft    Mood:    Anxious  Irritable    Affect:    Appropriate    Thought Content:  Clear  Rumination    Thought Form:  Coherent  Logical    Insight:    Good      Medication Review:   No current psychiatric medications prescribed     Medication Compliance:   NA     Changes in Health Issues:   None reported     Chemical Use Review:   Substance Use: Chemical use reviewed, no active concerns identified .Reportes 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 Working with sponsor and AA group.     Tobacco Use: No current tobacco use.       Collateral Reports Completed:   Not Applicable    PLAN: (Client Tasks / Therapist Tasks / Other)  Returns in 2 weeks; complete tx plan then.        Monserrat Meng, [facility] 5/23/2018                                                           ________________________________________________________________________

## 2018-05-24 ASSESSMENT — PATIENT HEALTH QUESTIONNAIRE - PHQ9: SUM OF ALL RESPONSES TO PHQ QUESTIONS 1-9: 5

## 2018-05-24 ASSESSMENT — ANXIETY QUESTIONNAIRES: GAD7 TOTAL SCORE: 10

## 2018-05-29 ENCOUNTER — THERAPY VISIT (OUTPATIENT)
Dept: PHYSICAL THERAPY | Facility: CLINIC | Age: 68
End: 2018-05-29
Payer: MEDICARE

## 2018-05-29 DIAGNOSIS — M54.2 NECK PAIN ON RIGHT SIDE: Primary | ICD-10-CM

## 2018-05-29 PROCEDURE — 97161 PT EVAL LOW COMPLEX 20 MIN: CPT | Mod: GP | Performed by: PHYSICAL THERAPIST

## 2018-05-29 PROCEDURE — G8979 MOBILITY GOAL STATUS: HCPCS | Mod: GP | Performed by: PHYSICAL THERAPIST

## 2018-05-29 PROCEDURE — G8978 MOBILITY CURRENT STATUS: HCPCS | Mod: GP | Performed by: PHYSICAL THERAPIST

## 2018-05-29 PROCEDURE — 97110 THERAPEUTIC EXERCISES: CPT | Mod: GP | Performed by: PHYSICAL THERAPIST

## 2018-05-29 NOTE — MR AVS SNAPSHOT
After Visit Summary   5/29/2018    Svetlana Adair    MRN: [id number]           Patient Information     Date Of Birth          1950        Visit Information        Provider Department      5/29/2018 11:40 AM Kortney Bustos PT [facility] [place] [facility]        Today's Diagnoses     Neck pain on right side    -  1       Follow-ups after your visit        Your next 10 appointments already scheduled     Jun 04, 2018 10:00 AM CDT   ROSAMARIA Extremity with Kortney Bustos PT   [facility] [place] [facility] ([facility] [place]  )    [address] [place] [address] [address]  [place] [address]   [phone]            Jun 05, 2018 11:00 AM CDT   Return Visit with Monserrat Meng, [facility] [place] ([facility] [place])    [address]  [place] [address] [phone]            Alexi 15, 2018 12:40 PM CDT   ROSAMARIA Extremity with Kortney Bustos PT   [facility]  [place] [facility] ([facility] [place]  )    [address] [place] [address] [address]  [place] [address] [phone]            Jun 19, 2018 12:00 PM CDT   Return Visit with Monserrat Meng, [facility] [place] ([facility] [place])    [address]  [place] [address] [phone]            Jun 22, 2018 12:40 PM CDT   ROSAMARIA Extremity with Kortney Bustos PT   [facility] [place] [facility] ([facility] [place]  )    [address] [place] [address] [address]  [place] [address]   [phone]              Who to contact     If you have questions or need follow up information about today's clinic visit or your schedule please contact [facility] directly at [phone].  Normal or non-critical lab and imaging results will be communicated to you by MyChart, letter or phone within 4 business days after the clinic has received the results. If you do not hear from us within 7 days, please contact the  clinic through [employer] or phone. If you have a critical or abnormal lab result, we will notify you by phone as soon as possible.  Submit refill requests through [employer] or call your pharmacy and they will forward the refill request to us. Please allow 3 business days for your refill to be completed.          Additional Information About Your Visit        [employer] Information     [employer] gives you secure access to your electronic health record. If you see a primary care provider, you can also send messages to your care team and make appointments. If you have questions, please call your primary care clinic.  If you do not have a primary care provider, please call [phone] and they will assist you.        Care EveryWhere ID     This is your Care EveryWhere ID. This could be used by other organizations to access your [facility] medical records  [id number]         Blood Pressure from Last 3 Encounters:   04/26/18 106/54   02/21/18 100/68   11/17/17 122/76    Weight from Last 3 Encounters:   04/26/18 62.1 kg (137 lb)   02/21/18 62.2 kg (137 lb 1.6 oz)   11/17/17 61.7 kg (136 lb)              We Performed the Following     HC PT EVAL, LOW COMPLEXITY     ROSAM[facility] CERT REPORT     [facility] INITIAL EVAL REPORT     THERAPEUTIC EXERCISES          Today's Medication Changes          These changes are accurate as of 5/29/18  4:04 PM.  If you have any questions, ask your nurse or doctor.               These medicines have changed or have updated prescriptions.        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 Hospital[facility] : Hadii jerson pagan edacandace Joy, wareginoda luqadaha, qaybta kaileejunigeovanny domingo, chelsi spearskeshawnfabio bowie. So [facility] [phone].    ATENCIÓN: Si habla español, tiene a bruner disposición servicios gratuitos de asistencia lingüística. Rajame al [phone].    We comply with applicable federal civil rights laws and [place] laws. We do not discriminate on the basis of race, color, national origin, age, disability, sex, sexual orientation, or gender identity.            Thank you!     Thank you for choosing [facility]  for your care. Our goal is always to provide you with excellent care. Hearing back from our patients is one way we can continue to improve our services. Please take a few minutes to complete the written survey that you may receive in the mail after your visit with us. Thank you!             Your Updated Medication List - Protect others around you: Learn how to safely use, store and throw away your medicines at www.disposemymeds.org.          This list is accurate as of 5/29/18  4:04 PM.  Always use your most recent med list.                   Brand Name Dispense Instructions for use Diagnosis    aspirin 81 MG tablet     30 tablet    Take 1 tablet (81 mg) by mouth daily    Coronary artery disease involving native coronary artery of native heart without angina pectoris       CoQ10 100 MG Caps      Take by mouth daily Reported on 3/3/2017        cyclobenzaprine 5 MG tablet    FLEXERIL    42 tablet    Take 1-2 tablets (5-10 mg) by mouth 2 times daily as needed for muscle spasms    Torticollis, spasmodic, Low back pain without sciatica, unspecified back pain laterality, unspecified chronicity       FIBER PO      2 tablets twice daily        gabapentin 100 MG capsule    NEURONTIN    540 capsule    Take 2 capsules (200 mg) by mouth 3 times daily    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,  Torticollis, spasmodic       HYDROcodone-acetaminophen 5-325 MG per tablet    NORCO    20 tablet    Take 1-2 tablets by mouth every 6 hours as needed for pain    Low back pain without sciatica, unspecified back pain laterality, unspecified chronicity       IBUPROFEN PO      Take 200-400 mg by mouth every 6 hours as needed for moderate pain        LYSINE      1-3 daily as needed        MULTIVITAMIN ADULT PO      Take by mouth daily        nicotine polacrilex 2 MG gum    NICORETTE    30 tablet    Place 1 each (2 mg) inside cheek as needed for smoking cessation    Tobacco use disorder       OMEGA 3 PO      Take 1 tablet by mouth 2 times daily        omeprazole 20 MG tablet     30 tablet    Take 1 tablet (20 mg) by mouth as needed    GERD (gastroesophageal reflux disease)       rosuvastatin 5 MG tablet    CRESTOR    90 tablet    Take 1 tablet (5 mg) by mouth daily    Mixed hyperlipidemia       triamterene-hydrochlorothiazide 37.5-25 MG per tablet    MAXZIDE-25    90 tablet    Take 1 tablet by mouth as needed    Peripheral edema       valACYclovir 1000 mg tablet    VALTREX    8 tablet    Take 2 tablets (2,000 mg) by mouth 2 times daily as needed    HSV (herpes simplex virus) infection       Vitamin D3 2000 units Tabs      Take 5,000 Units by mouth Reported on 3/3/2017
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
## 2018-05-30 NOTE — PROGRESS NOTES
[place] [facility] Initial Evaluation  Subjective:  Patient is a 67 year old female presenting with rehab left ankle/foot hpi.                                      Pertinent medical history includes:  Cancer and chemical dependency.  Medical allergies: yes.  Other surgeries include:  Cancer surgery and orthopedic surgery.  Current medications:  None as reported by patient.  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  Primary job tasks include:  Driving.    Barriers include:  None as reported by patient.    Red flags:  Pain at night/rest.                        Objective:  System    Physical Exam    General     ROS    Assessment/Plan:
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
## 2018-06-04 ENCOUNTER — THERAPY VISIT (OUTPATIENT)
Dept: PHYSICAL THERAPY | Facility: CLINIC | Age: 68
End: 2018-06-04
Payer: MEDICARE

## 2018-06-04 DIAGNOSIS — M54.2 NECK PAIN ON RIGHT SIDE: ICD-10-CM

## 2018-06-04 PROCEDURE — 97110 THERAPEUTIC EXERCISES: CPT | Mod: GP | Performed by: PHYSICAL THERAPIST

## 2018-06-04 PROCEDURE — 97140 MANUAL THERAPY 1/> REGIONS: CPT | Mod: GP | Performed by: PHYSICAL THERAPIST

## 2018-06-04 PROCEDURE — 97112 NEUROMUSCULAR REEDUCATION: CPT | Mod: GP | Performed by: PHYSICAL THERAPIST

## 2018-06-05 ENCOUNTER — OFFICE VISIT (OUTPATIENT)
Dept: PSYCHOLOGY | Facility: CLINIC | Age: 68
End: 2018-06-05
Payer: COMMERCIAL

## 2018-06-05 DIAGNOSIS — F43.23 ADJUSTMENT DISORDER WITH MIXED ANXIETY AND DEPRESSED MOOD: Primary | ICD-10-CM

## 2018-06-05 PROCEDURE — 90834 PSYTX W PT 45 MINUTES: CPT | Performed by: SOCIAL WORKER

## 2018-06-05 ASSESSMENT — ANXIETY QUESTIONNAIRES
1. FEELING NERVOUS, ANXIOUS, OR ON EDGE: SEVERAL DAYS
2. NOT BEING ABLE TO STOP OR CONTROL WORRYING: SEVERAL DAYS
5. BEING SO RESTLESS THAT IT IS HARD TO SIT STILL: SEVERAL DAYS
3. WORRYING TOO MUCH ABOUT DIFFERENT THINGS: NOT AT ALL
7. FEELING AFRAID AS IF SOMETHING AWFUL MIGHT HAPPEN: NOT AT ALL
IF YOU CHECKED OFF ANY PROBLEMS ON THIS QUESTIONNAIRE, HOW DIFFICULT HAVE THESE PROBLEMS MADE IT FOR YOU TO DO YOUR WORK, TAKE CARE OF THINGS AT HOME, OR GET ALONG WITH OTHER PEOPLE: SOMEWHAT DIFFICULT
6. BECOMING EASILY ANNOYED OR IRRITABLE: MORE THAN HALF THE DAYS
GAD7 TOTAL SCORE: 6

## 2018-06-05 ASSESSMENT — PATIENT HEALTH QUESTIONNAIRE - PHQ9: 5. POOR APPETITE OR OVEREATING: SEVERAL DAYS

## 2018-06-05 NOTE — MR AVS SNAPSHOT
MRN:[id number]                      After Visit Summary   6/5/2018    Svetlana Adair    MRN: [id number]           Visit Information        Provider Department      6/5/2018 11:00 AM Uniqueiza Monserrat, [facility] [place] [facility] Generic      Your next 10 appointments already scheduled     Alexi 15, 2018 12:40 PM CDT   ROSAMARIA Extremity with Kortney Bustos, PT   [facility] [facility] ([facility] [place]  )    [address]  [place] [address] [phone]            Jun 19, 2018 12:00 PM CDT   Return Visit with Monserrat Meng [facility] [place] ([facility] [place])    [address] [place] [address] [address]  [place] [id number] [phone]            Jun 22, 2018 12:40 PM CDT   ROSAMARIA Extremity with Kortney Bustos, PT   [facility] ([facility] [place]  )    [address] [place] [address] [address]  [place] [address] [phone]            Jul 02, 2018 12:00 PM CDT   Return Visit with Monserrat Meng [facility] [place] ([facility] [place])    [address]  [place] [address] [phone]              MyChart Information     [facility] [employer] gives you secure access to your electronic health record. If you see a primary care provider, you can also send messages to your care team and make appointments. If you have questions, please call your primary care clinic.  If you do not have a primary care provider, please call [phone] and they will assist you.        Care EveryWhere ID     This is your Care EveryWhere ID. This could be used by other organizations to access your [place] medical records  [id number]        Equal Access to Services     SMITA AMADOR : Mary Hamilton, mattie clancy, qaleah kaalchelsi hoffmann. Altagracia [facility] [phone].    ATENCIÓN: Si habla español, tiene a bruner disposición servicios gratuitos de asistencia  lingüística. Chato al [phone].    We comply with applicable federal civil rights laws and [place] laws. We do not discriminate on the basis of race, color, national origin, age, disability, sex, sexual orientation, or gender identity.

## 2018-06-05 NOTE — PROGRESS NOTES
Progress Note    Client Name: Svetlana Adair  Date: 6/5/2018           Service Type: Individual      Session Start Time: 11am  Session End Time: 1145      Session Length: 45     Session #: 3     Attendees: Client attended alone    Treatment Plan Last Reviewed: see below.  PHQ-9 / ANGY-7 : 3;6     DATA      Progress Since Last Session (Related to Symptoms / Goals / Homework):   Symptoms: Stable    Homework: Completed in session      Episode of Care Goals: Minimal progress - PREPARATION (Decided to change - considering how); Intervened by negotiating a change plan and determining options / strategies for behavior change, identifying triggers, exploring social supports, and working towards setting a date to begin behavior change     Current / Ongoing Stressors and Concerns:   Needing help with establishing and maintaining boundaries and with healthier management of emotions. 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     Treatment Objective(s) Addressed in This Session:   Improve coping skills; setting limits; self care.       Intervention:   Established treatment goals, keeping in mind need to rely on sober supports/programming.        ASSESSMENT: Current Emotional / Mental Status (status of significant symptoms):   Risk status (Self / Other harm or suicidal ideation)   Client denies current fears or concerns for personal safety.   Client denies current or recent suicidal ideation or behaviors.   Client denies current or recent homicidal ideation or behaviors.   Client denies current or recent self injurious behavior or ideation.   Client denies other safety concerns.   A safety and risk management plan has not been developed at this time, however client was given the after-hours number / 911 should there be a change in any of these risk factors.     Appearance:   Appropriate    Eye Contact:   Good    Psychomotor Behavior: Normal  "   Attitude:   Cooperative    Orientation:   All   Speech    Rate / Production: Normal     Volume:  Normal    Mood:    Anxious  Irritable    Affect:    Appropriate    Thought Content:  Clear  Rumination    Thought Form:  Coherent  Logical    Insight:    Fair      Medication Review:   No current psychiatric medications prescribed     Medication Compliance:   NA     Changes in Health Issues:   None reported     Chemical Use Review:   Substance Use: increase in alcohol  and cannabis .  Client reports frequency of use of MJ as at night :2 hits\" 5 out of 7 days since 3/18;Etoh: Drank 5x since 3/18,2\"airplanesized bottles.  Patient assessed present costs and future losses as a result of substance use  Reviewed concerns related to health related substance abuse risk  Provided encouragement towards sobriety  Provided support and affirmation for steps taken towards sobriety. States her AA group is aware of her current use level and are encouraging her to stop.   Tobacco Use: No current tobacco use.       Collateral Reports Completed:   Not Applicable as yet.    PLAN: (Client Tasks / Therapist Tasks / Other)  Returns in 2 weeks. We will cont to monitor use and I will recommend she discuss medication with her GP to assist in healthier coping.        Monserrat Meng, [facility]6/5/2018                                                           ________________________________________________________________________    Treatment Plan    Client's Name: Svetlana Adair  YOB: 1950    Date: 6/5/2018      DSM-V Diagnoses: Adjustment Disorders  309.28 (F43.23) With mixed anxiety and depressed mood  Psychosocial / Contextual Factors: Hx of losses; breast cancer survivor; current Bf has cancer.  WHODAS: se intake    Referral / Collaboration:  Referral to GP for medication consultation will be recommended..    Anticipated number of session or this episode of care: eval every 90 days      MeasurableTreatment Goal(s) related to " "diagnosis / functional impairment(s)  Goal 1: Client will improve management of boundaries.    I will know I've met my goal when I feel less \"hooked\".      Objective #A (Client Action)    Client will compile a list of boundaries that they would like to set with others. Ct will practice setting these weekly..  Status: new     Intervention(s)  Therapist will teach coping skills and communication skils to assist ct in her efforts..    Objective #B  Client will identify 2-3 strategies to more effectively address stressors.  Status: new     Intervention(s)  Therapist will encourage and support efforts and provide resources as needed..    Objective #C  Client will explore and adopt coping that is not reliant on MJ nor etoh..  Status: new     Intervention(s)  Therapist will provide referrals/information and support efforts..Including seeking a med consultation from her GP and or psychiatry.          Client has reviewed and agreed to the above plan.      Monserrat Meng, [facility]  June 5, 2018  "

## 2018-06-06 ASSESSMENT — ANXIETY QUESTIONNAIRES: GAD7 TOTAL SCORE: 6

## 2018-06-06 ASSESSMENT — PATIENT HEALTH QUESTIONNAIRE - PHQ9: SUM OF ALL RESPONSES TO PHQ QUESTIONS 1-9: 3

## 2018-06-15 ENCOUNTER — THERAPY VISIT (OUTPATIENT)
Dept: PHYSICAL THERAPY | Facility: CLINIC | Age: 68
End: 2018-06-15
Payer: MEDICARE

## 2018-06-15 DIAGNOSIS — M54.2 NECK PAIN ON RIGHT SIDE: ICD-10-CM

## 2018-06-15 PROCEDURE — 97140 MANUAL THERAPY 1/> REGIONS: CPT | Mod: GP | Performed by: PHYSICAL THERAPIST

## 2018-06-15 PROCEDURE — 97110 THERAPEUTIC EXERCISES: CPT | Mod: GP | Performed by: PHYSICAL THERAPIST

## 2018-06-15 PROCEDURE — 97112 NEUROMUSCULAR REEDUCATION: CPT | Mod: GP | Performed by: PHYSICAL THERAPIST

## 2018-06-18 ENCOUNTER — MYC MEDICAL ADVICE (OUTPATIENT)
Dept: FAMILY MEDICINE | Facility: CLINIC | Age: 68
End: 2018-06-18

## 2018-06-18 DIAGNOSIS — M54.50 LOW BACK PAIN WITHOUT SCIATICA, UNSPECIFIED BACK PAIN LATERALITY, UNSPECIFIED CHRONICITY: ICD-10-CM

## 2018-06-19 RX ORDER — HYDROCODONE BITARTRATE AND ACETAMINOPHEN 5; 325 MG/1; MG/1
1-2 TABLET ORAL EVERY 6 HOURS PRN
Qty: 20 TABLET | Refills: 0 | Status: SHIPPED | OUTPATIENT
Start: 2018-06-19 | End: 2018-07-23

## 2018-06-19 RX ORDER — HYDROCODONE BITARTRATE AND ACETAMINOPHEN 5; 325 MG/1; MG/1
1-2 TABLET ORAL EVERY 6 HOURS PRN
Qty: 20 TABLET | Refills: 0 | Status: SHIPPED | OUTPATIENT
Start: 2018-06-19 | End: 2018-06-19

## 2018-06-19 NOTE — TELEPHONE ENCOUNTER
Pending Prescriptions:                       Disp   Refills    HYDROcodone-acetaminophen (NORCO) 5-325 M*20 tab*0            Sig: Take 1-2 tablets by mouth every 6 hours as needed           for pain    Last filled 4/26/18, last seen 4/26/18    Dana Powell RN

## 2018-06-22 ENCOUNTER — THERAPY VISIT (OUTPATIENT)
Dept: PHYSICAL THERAPY | Facility: CLINIC | Age: 68
End: 2018-06-22
Payer: MEDICARE

## 2018-06-22 DIAGNOSIS — M54.2 NECK PAIN ON RIGHT SIDE: ICD-10-CM

## 2018-06-22 DIAGNOSIS — E78.2 MIXED HYPERLIPIDEMIA: ICD-10-CM

## 2018-06-22 PROCEDURE — 97140 MANUAL THERAPY 1/> REGIONS: CPT | Mod: GP | Performed by: PHYSICAL THERAPIST

## 2018-06-22 PROCEDURE — 97012 MECHANICAL TRACTION THERAPY: CPT | Mod: GP | Performed by: PHYSICAL THERAPIST

## 2018-06-22 PROCEDURE — 97110 THERAPEUTIC EXERCISES: CPT | Mod: GP | Performed by: PHYSICAL THERAPIST

## 2018-06-22 RX ORDER — ROSUVASTATIN CALCIUM 5 MG/1
5 TABLET, COATED ORAL DAILY
Qty: 90 TABLET | Refills: 1 | Status: SHIPPED | OUTPATIENT
Start: 2018-06-22 | End: 2018-10-16 | Stop reason: DRUGHIGH

## 2018-06-22 NOTE — TELEPHONE ENCOUNTER
Medication Refilled: Rosuvastatin  Last office visit: 18  Last Labs/EK18  Next office visit: 18  Pharmacy sent to: Rafael Antonio RN

## 2018-06-26 ENCOUNTER — THERAPY VISIT (OUTPATIENT)
Dept: PHYSICAL THERAPY | Facility: CLINIC | Age: 68
End: 2018-06-26
Payer: MEDICARE

## 2018-06-26 DIAGNOSIS — M54.2 NECK PAIN ON RIGHT SIDE: ICD-10-CM

## 2018-06-26 PROCEDURE — 97140 MANUAL THERAPY 1/> REGIONS: CPT | Mod: GP | Performed by: PHYSICAL THERAPIST

## 2018-06-26 PROCEDURE — 97110 THERAPEUTIC EXERCISES: CPT | Mod: GP | Performed by: PHYSICAL THERAPIST

## 2018-06-26 PROCEDURE — 97112 NEUROMUSCULAR REEDUCATION: CPT | Mod: GP | Performed by: PHYSICAL THERAPIST

## 2018-07-02 ENCOUNTER — OFFICE VISIT (OUTPATIENT)
Dept: PSYCHOLOGY | Facility: CLINIC | Age: 68
End: 2018-07-02
Payer: COMMERCIAL

## 2018-07-02 DIAGNOSIS — F43.23 ADJUSTMENT DISORDER WITH MIXED ANXIETY AND DEPRESSED MOOD: Primary | ICD-10-CM

## 2018-07-02 PROCEDURE — 90834 PSYTX W PT 45 MINUTES: CPT | Performed by: SOCIAL WORKER

## 2018-07-02 ASSESSMENT — ANXIETY QUESTIONNAIRES
GAD7 TOTAL SCORE: 4
2. NOT BEING ABLE TO STOP OR CONTROL WORRYING: SEVERAL DAYS
6. BECOMING EASILY ANNOYED OR IRRITABLE: SEVERAL DAYS
5. BEING SO RESTLESS THAT IT IS HARD TO SIT STILL: NOT AT ALL
3. WORRYING TOO MUCH ABOUT DIFFERENT THINGS: NOT AT ALL
1. FEELING NERVOUS, ANXIOUS, OR ON EDGE: SEVERAL DAYS
7. FEELING AFRAID AS IF SOMETHING AWFUL MIGHT HAPPEN: NOT AT ALL
IF YOU CHECKED OFF ANY PROBLEMS ON THIS QUESTIONNAIRE, HOW DIFFICULT HAVE THESE PROBLEMS MADE IT FOR YOU TO DO YOUR WORK, TAKE CARE OF THINGS AT HOME, OR GET ALONG WITH OTHER PEOPLE: NOT DIFFICULT AT ALL

## 2018-07-02 ASSESSMENT — PATIENT HEALTH QUESTIONNAIRE - PHQ9: 5. POOR APPETITE OR OVEREATING: SEVERAL DAYS

## 2018-07-02 NOTE — MR AVS SNAPSHOT
MRN:[id number]                      After Visit Summary   7/2/2018    Svetlana Adair    MRN: [id number]           Visit Information        Provider Department      7/2/2018 12:00 PM Monserrat Meng, [facility]      Your next 10 appointments already scheduled     Jul 09, 2018  8:40 AM CDT   ROSAMARIA Extremity with Kortney Bustos, PT   [facility] (ROSAMARIA [place]  )    [address]  [place] [address]   [phone]            Jul 16, 2018 12:30 PM CDT   ROSAMARIA Extremity with Kortney Bustos PT   [facility] (ROSAMARIA [place]  )    [address]  [place] [address]   [phone]            Aug 06, 2018 12:00 PM CDT   Return Visit with Monserrat Meng [facility] ([facility])    [address] [place] [address] [address]  [facility] [address] [phone]            Sep 06, 2018  9:30 AM CDT   LAB with GRECO LAB   [facility] [place] ([facility])    [address]  [facility] [address] [phone]           Please do not eat 10-12 hours before your appointment if you are coming in fasting for labs on lipids, cholesterol, or glucose (sugar). This does not apply to pregnant women. Water, hot tea and black coffee (with nothing added) are okay. Do not drink other fluids, diet soda or chew gum.            Sep 06, 2018 10:30 AM CDT   Return Visit with Taiwo Anthony PA-C   [facility]   [place] ([facility])    [address]  [facility] [address] [phone] OPT 2              MyChart Information     MyChart gives you secure access to your electronic health record. If you see a primary care provider, you can also send messages to your care team and make appointments. If you have questions, please call your primary care  clinic.  If you do not have a primary care provider, please call [phone] and they will assist you.        Care EveryWhere ID     This is your Care EveryWhere ID. This could be used by other organizations to access your [place] medical records  [id number]        Equal Access to Services     SMITA AMADOR : Mary Hamilton, mattie clancy, rolan domingo, chelsi bowie. So [facility] [phone].    ATENCIÓN: Si habla español, tiene a bruner disposición servicios gratuitos de asistencia lingüística. Llame al [phone].    We comply with applicable federal civil rights laws and [place] laws. We do not discriminate on the basis of race, color, national origin, age, disability, sex, sexual orientation, or gender identity.

## 2018-07-02 NOTE — PROGRESS NOTES
Progress Note    Client Name: Svetlana Adair  Date:7/2/2018           Service Type: Individual      Session Start Time: 11am  Session End Time: 1145      Session Length: 45     Session #: 4     Attendees: Client attended alone    Treatment Plan Last Reviewed: see below.  PHQ-9 / ANGY-7 : 2;4     DATA      Progress Since Last Session (Related to Symptoms / Goals / Homework):   Symptoms: Stable    Homework: Completed in session      Episode of Care Goals: Minimal progress - PREPARATION (Decided to change - considering how); Intervened by negotiating a change plan and determining options / strategies for behavior change, identifying triggers, exploring social supports, and working towards setting a date to begin behavior change. In light of recent events over past month, ct reports she has turned a corner related to mood and outlook. Bf received a good cancer report and she realized she had been feeling angry and defiant and not focusing on self care. Deciding to stop drinking and will gradually cut down on MJ use at bedtime. Agrees to schedule appnt with GP to discuss medications for mood and sleep. May also try accupunture.     Current / Ongoing Stressors and Concerns:   Needing help with establishing and maintaining boundaries and with healthier management of emotions. 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     Treatment Objective(s) Addressed in This Session:   Improve coping skills; setting limits; self care. Began addressing this more emphatically today.       Intervention:   Established treatment goals, keeping in mind need to rely on sober supports/programming. Encouraging sobriety and social connection.        ASSESSMENT: Current Emotional / Mental Status (status of significant symptoms):   Risk status (Self / Other harm or suicidal ideation)   Client denies current fears or concerns for personal safety.   Client denies current or recent  "suicidal ideation or behaviors.   Client denies current or recent homicidal ideation or behaviors.   Client denies current or recent self injurious behavior or ideation.   Client denies other safety concerns.   A safety and risk management plan has not been developed at this time, however client was given the after-hours number / 911 should there be a change in any of these risk factors.     Appearance:   Appropriate    Eye Contact:   Good    Psychomotor Behavior: Normal    Attitude:   Cooperative    Orientation:   All   Speech    Rate / Production: Normal     Volume:  Normal    Mood:    Anxious  Irritable    Affect:    Appropriate    Thought Content:  Clear  Rumination    Thought Form:  Coherent  Logical    Insight:    Fair      Medication Review:   No current psychiatric medications prescribed     Medication Compliance:   NA     Changes in Health Issues:   None reported     Chemical Use Review:   Substance Use:  Decrease now in alcohol  and cannabis .  Client reports frequency of use of MJ as at night :2 hits\" 5 out of 7 days since 3/18;Etoh: Drank 2x since last appnt, 2\"airplanesized\" bottles.  Patient assessed present costs and future losses as a result of substance use  Reviewed concerns related to health related substance abuse risk  Provided encouragement towards sobriety  Provided support and affirmation for steps taken towards sobriety. States her AA group is aware of her current use level and are encouraging her to stop.   Tobacco Use: No current tobacco use.       Collateral Reports Completed:   Not Applicable as yet.    PLAN: (Client Tasks / Therapist Tasks / Other)  Returns in 2-4 weeks. She will be added to providers wait list. We will cont to monitor use and I will recommend she discuss medication with her GP to assist in healthier coping.        Monserrat Meng, MAYELASW 7/2/2018                                                         " "    ________________________________________________________________________    Treatment Plan    Client's Name: Svetlana Adair  YOB: 1950    Date: 6/5/2018      DSM-V Diagnoses: Adjustment Disorders  309.28 (F43.23) With mixed anxiety and depressed mood  Psychosocial / Contextual Factors: Hx of losses; breast cancer survivor; current Bf has cancer.  WHODAS: se intake    Referral / Collaboration:  Referral to GP for medication consultation will be recommended..    Anticipated number of session or this episode of care: eval every 90 days      MeasurableTreatment Goal(s) related to diagnosis / functional impairment(s)  Goal 1: Client will improve management of boundaries.    I will know I've met my goal when I feel less \"hooked\".      Objective #A (Client Action)    Client will compile a list of boundaries that they would like to set with others. Ct will practice setting these weekly..  Status: new     Intervention(s)  Therapist will teach coping skills and communication skils to assist ct in her efforts..    Objective #B  Client will identify 2-3 strategies to more effectively address stressors.  Status: new     Intervention(s)  Therapist will encourage and support efforts and provide resources as needed..    Objective #C  Client will explore and adopt coping that is not reliant on MJ nor etoh..  Status: new     Intervention(s)  Therapist will provide referrals/information and support efforts..Including seeking a med consultation from her GP and or psychiatry.          Client has reviewed and agreed to the above plan.      Monserrat Meng, [facility]  June 5, 2018  "

## 2018-07-03 ASSESSMENT — ANXIETY QUESTIONNAIRES: GAD7 TOTAL SCORE: 4

## 2018-07-03 ASSESSMENT — PATIENT HEALTH QUESTIONNAIRE - PHQ9: SUM OF ALL RESPONSES TO PHQ QUESTIONS 1-9: 2

## 2018-07-09 ENCOUNTER — THERAPY VISIT (OUTPATIENT)
Dept: PHYSICAL THERAPY | Facility: CLINIC | Age: 68
End: 2018-07-09
Payer: MEDICARE

## 2018-07-09 DIAGNOSIS — M54.2 NECK PAIN ON RIGHT SIDE: ICD-10-CM

## 2018-07-09 PROCEDURE — 97112 NEUROMUSCULAR REEDUCATION: CPT | Mod: GP | Performed by: PHYSICAL THERAPIST

## 2018-07-09 PROCEDURE — 97110 THERAPEUTIC EXERCISES: CPT | Mod: GP | Performed by: PHYSICAL THERAPIST

## 2018-07-09 PROCEDURE — 97140 MANUAL THERAPY 1/> REGIONS: CPT | Mod: GP | Performed by: PHYSICAL THERAPIST

## 2018-07-16 ENCOUNTER — THERAPY VISIT (OUTPATIENT)
Dept: PHYSICAL THERAPY | Facility: CLINIC | Age: 68
End: 2018-07-16
Payer: MEDICARE

## 2018-07-16 DIAGNOSIS — M54.2 NECK PAIN ON RIGHT SIDE: ICD-10-CM

## 2018-07-16 PROCEDURE — 97140 MANUAL THERAPY 1/> REGIONS: CPT | Mod: GP | Performed by: PHYSICAL THERAPIST

## 2018-07-16 PROCEDURE — 97012 MECHANICAL TRACTION THERAPY: CPT | Mod: GP | Performed by: PHYSICAL THERAPIST

## 2018-07-16 PROCEDURE — 97110 THERAPEUTIC EXERCISES: CPT | Mod: GP | Performed by: PHYSICAL THERAPIST

## 2018-07-22 ENCOUNTER — MYC MEDICAL ADVICE (OUTPATIENT)
Dept: FAMILY MEDICINE | Facility: CLINIC | Age: 68
End: 2018-07-22

## 2018-07-22 DIAGNOSIS — M54.50 LOW BACK PAIN WITHOUT SCIATICA, UNSPECIFIED BACK PAIN LATERALITY, UNSPECIFIED CHRONICITY: ICD-10-CM

## 2018-07-23 ENCOUNTER — THERAPY VISIT (OUTPATIENT)
Dept: PHYSICAL THERAPY | Facility: CLINIC | Age: 68
End: 2018-07-23
Payer: COMMERCIAL

## 2018-07-23 DIAGNOSIS — M54.2 NECK PAIN ON RIGHT SIDE: ICD-10-CM

## 2018-07-23 PROCEDURE — 97140 MANUAL THERAPY 1/> REGIONS: CPT | Mod: GP | Performed by: PHYSICAL THERAPIST

## 2018-07-23 PROCEDURE — 97110 THERAPEUTIC EXERCISES: CPT | Mod: GP | Performed by: PHYSICAL THERAPIST

## 2018-07-23 PROCEDURE — 97012 MECHANICAL TRACTION THERAPY: CPT | Mod: GP | Performed by: PHYSICAL THERAPIST

## 2018-07-23 RX ORDER — HYDROCODONE BITARTRATE AND ACETAMINOPHEN 5; 325 MG/1; MG/1
1-2 TABLET ORAL EVERY 6 HOURS PRN
Qty: 20 TABLET | Refills: 0 | Status: SHIPPED | OUTPATIENT
Start: 2018-07-23 | End: 2018-08-02

## 2018-07-23 NOTE — TELEPHONE ENCOUNTER
Controlled Substance Refill Request for Norco  Problem List Complete:  No     PROVIDER TO CONSIDER COMPLETION OF PROBLEM LIST AND OVERVIEW/CONTROLLED SUBSTANCE AGREEMENT    Last Written Prescription Date:  06/19/18  Last Fill Quantity: 20,   # refills: 0    Last Office Visit with [facility] primary care provider: 04/26/18    Future Office visit:   Next 5 appointments (look out 90 days)     Aug 02, 2018 11:30 AM CDT   Office Visit with Vladislav Cruz MD   [facility]    [address] [address]   [phone]            Aug 09, 2018  2:00 PM CDT   [facility]    [address]  [facility] [address]   [phone]            Aug 21, 2018 12:00 PM CDT   Return Visit with Monserrat Meng [facility] ([facility])    [address]  [address]  [facility] [address]   [phone]            Sep 06, 2018 10:30 AM CDT   Return Visit with Taiwo Anthony PA-C   [facility]    [address]  [facility] [address] [phone] OPT 2                  Controlled substance agreement on file: Yes:  Date 04/26/18.     Processing:  Patient will  in clinic   checked in past 3 months?  No, route to RAKEL Sher MA

## 2018-07-23 NOTE — TELEPHONE ENCOUNTER
checked today. Only issue is that Surgeon dispensed medication in November 1 time so no issues.  Dana Powell RN

## 2018-08-02 ENCOUNTER — OFFICE VISIT (OUTPATIENT)
Dept: FAMILY MEDICINE | Facility: CLINIC | Age: 68
End: 2018-08-02
Payer: COMMERCIAL

## 2018-08-02 VITALS
SYSTOLIC BLOOD PRESSURE: 102 MMHG | DIASTOLIC BLOOD PRESSURE: 66 MMHG | HEIGHT: 65 IN | BODY MASS INDEX: 22.49 KG/M2 | TEMPERATURE: 97.3 F | HEART RATE: 57 BPM | OXYGEN SATURATION: 99 % | WEIGHT: 135 LBS

## 2018-08-02 DIAGNOSIS — F32.0 MILD MAJOR DEPRESSION (H): ICD-10-CM

## 2018-08-02 DIAGNOSIS — E21.3 HYPERPARATHYROIDISM (H): ICD-10-CM

## 2018-08-02 DIAGNOSIS — F10.21 ALCOHOL DEPENDENCE IN REMISSION (H): ICD-10-CM

## 2018-08-02 DIAGNOSIS — C50.911 MALIGNANT NEOPLASM OF RIGHT FEMALE BREAST, UNSPECIFIED ESTROGEN RECEPTOR STATUS, UNSPECIFIED SITE OF BREAST (H): ICD-10-CM

## 2018-08-02 DIAGNOSIS — M54.50 LOW BACK PAIN WITHOUT SCIATICA, UNSPECIFIED BACK PAIN LATERALITY, UNSPECIFIED CHRONICITY: ICD-10-CM

## 2018-08-02 PROCEDURE — 99214 OFFICE O/P EST MOD 30 MIN: CPT | Performed by: INTERNAL MEDICINE

## 2018-08-02 RX ORDER — LETROZOLE 2.5 MG/1
1 TABLET, FILM COATED ORAL DAILY
Refills: 5 | COMMUNITY
Start: 2018-05-08

## 2018-08-02 RX ORDER — HYDROCODONE BITARTRATE AND ACETAMINOPHEN 5; 325 MG/1; MG/1
1-2 TABLET ORAL EVERY 6 HOURS PRN
Qty: 20 TABLET | Refills: 0 | Status: SHIPPED | OUTPATIENT
Start: 2018-08-23 | End: 2018-10-25

## 2018-08-02 RX ORDER — ESCITALOPRAM OXALATE 10 MG/1
10 TABLET ORAL DAILY
Qty: 90 TABLET | Refills: 3 | Status: SHIPPED | OUTPATIENT
Start: 2018-08-02 | End: 2018-12-06

## 2018-08-02 ASSESSMENT — ANXIETY QUESTIONNAIRES
GAD7 TOTAL SCORE: 7
3. WORRYING TOO MUCH ABOUT DIFFERENT THINGS: SEVERAL DAYS
2. NOT BEING ABLE TO STOP OR CONTROL WORRYING: SEVERAL DAYS
1. FEELING NERVOUS, ANXIOUS, OR ON EDGE: SEVERAL DAYS
7. FEELING AFRAID AS IF SOMETHING AWFUL MIGHT HAPPEN: SEVERAL DAYS
5. BEING SO RESTLESS THAT IT IS HARD TO SIT STILL: SEVERAL DAYS
IF YOU CHECKED OFF ANY PROBLEMS ON THIS QUESTIONNAIRE, HOW DIFFICULT HAVE THESE PROBLEMS MADE IT FOR YOU TO DO YOUR WORK, TAKE CARE OF THINGS AT HOME, OR GET ALONG WITH OTHER PEOPLE: SOMEWHAT DIFFICULT
6. BECOMING EASILY ANNOYED OR IRRITABLE: SEVERAL DAYS

## 2018-08-02 ASSESSMENT — PATIENT HEALTH QUESTIONNAIRE - PHQ9: 5. POOR APPETITE OR OVEREATING: SEVERAL DAYS

## 2018-08-02 NOTE — MR AVS SNAPSHOT
After Visit Summary   8/2/2018    Svetlana Adair    MRN: [id number]           Patient Information     Date Of Birth          1950        Visit Information        Provider Department      8/2/2018 11:30 AM Vladislav Cruz MD [facility]        Today's Diagnoses     Mild major depression (H)        Malignant neoplasm of right female breast, unspecified estrogen receptor status, unspecified site of breast (H)        Hyperparathyroidism (H)        Alcohol dependence in remission (H)           Follow-ups after your visit        Follow-up notes from your care team     Return in about 4 weeks (around 8/30/2018) for Routine Visit.      Your next 10 appointments already scheduled     Aug 07, 2018 12:00 PM CDT   ROSAMARIA Extremity with Delmis Nelson, PT   [facility] ([facility] [place]  )    [address]  [place] [address]   [phone]            Aug 09, 2018  2:00 PM CDT   Return Visit with Monserrat Meng, [facility] ([facility] [place])    [address] [place] [address] [address]  [facility] [address]   [phone]            Aug 14, 2018 12:00 PM CDT   ROSAMARIA Extremity with Delmis Nelson, PT   [facility] [place] [facility] ([facility]IA [place]  )    [address]  [place] [address]   [phone]            Aug 21, 2018 12:00 PM CDT   Return Visit with Monserrat Meng, [facility] [place] ([facility] [place])    [address] [place] [address] [address]  [facility] [address]   [phone]            Sep 06, 2018  9:30 AM CDT   LAB with [facility]CO LAB   [facility] [place] ([facility])    [address]  [facility] [address] [phone]           Please do not eat 10-12 hours before your appointment if you are coming in fasting for labs on lipids, cholesterol, or glucose (sugar). This does not apply to pregnant women. Water, hot tea and  "black coffee (with nothing added) are okay. Do not drink other fluids, diet soda or chew gum.            Sep 06, 2018 10:30 AM CDT   Return Visit with Taiwo Anthony PA-C   [facility] ([facility])    [address]  [place] [address] [phone] OPT 2            Sep 06, 2018  1:00 PM CDT   Return Visit with DIMAS Sorto   [facility] ([facility])    [address]  [place] [address] [phone]              Who to contact     If you have questions or need follow up information about today's clinic visit or your schedule please contact [facility] directly at [phone].  Normal or non-critical lab and imaging results will be communicated to you by MyChart, letter or phone within 4 business days after the clinic has received the results. If you do not hear from us within 7 days, please contact the clinic through [employer] or phone. If you have a critical or abnormal lab result, we will notify you by phone as soon as possible.  Submit refill requests through [employer] or call your pharmacy and they will forward the refill request to us. Please allow 3 business days for your refill to be completed.          Additional Information About Your Visit        MyChart Information     [employer] gives you secure access to your electronic health record. If you see a primary care provider, you can also send messages to your care team and make appointments. If you have questions, please call your primary care clinic.  If you do not have a primary care provider, please call [phone] and they will assist you.        Care EveryWhere ID     This is your Care EveryWhere ID. This could be used by other organizations to access your [place] medical records  [id number]        Your Vitals Were     Pulse Temperature Height Pulse Oximetry BMI (Body Mass Index)       57 97.3  F (36.3  C) (Oral) 5' 5\" (1.651 " m) 99% 22.47 kg/m2        Blood Pressure from Last 3 Encounters:   08/02/18 102/66   04/26/18 106/54   02/21/18 100/68    Weight from Last 3 Encounters:   08/02/18 135 lb (61.2 kg)   04/26/18 137 lb (62.1 kg)   02/21/18 137 lb 1.6 oz (62.2 kg)              Today, you had the following     No orders found for display         Today's Medication Changes          These changes are accurate as of 8/2/18 12:18 PM.  If you have any questions, ask your nurse or doctor.               Start taking these medicines.        Dose/Directions    escitalopram 10 MG tablet   Commonly known as:  LEXAPRO   Used for:  Mild major depression (H)   Started by:  Vladislav Cruz MD        Dose:  10 mg   Take 1 tablet (10 mg) by mouth daily One-half tablet once daily for one week, then increase to one tablet daily until follow up appointment   Quantity:  90 tablet   Refills:  3         These medicines have changed or have updated prescriptions.        Dose/Directions    gabapentin 100 MG capsule   Commonly known as:  NEURONTIN   This may have changed:    - when to take this  - reasons to take this  - additional instructions   Used for:  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, Torticollis, spasmodic        Dose:  200 mg   Take 2 capsules (200 mg) by mouth 3 times daily   Quantity:  540 capsule   Refills:  0            Where to get your medicines      These medications were sent to [id number]  [place] - [address] AT [facility] [place] [address]     Phone:  [phone]     escitalopram 10 MG tablet                Primary Care Provider Office Phone # Fax #    Vladislav Cruz [phone] [address]  [place] [address]        Equal Access to Services     SMITA AMADOR AH: Mary Hamilton, waemili clancy, qacynthiata kaalmageovanny domingo, chelsi bowie. So [facility] [phone].    ATENCIÓN: Si peytonla español, tiene a bruner disposición  servicios gratuitos de asistencia lingüística. Chato flores [phone].    We comply with applicable federal civil rights laws and [place] laws. We do not discriminate on the basis of race, color, national origin, age, disability, sex, sexual orientation, or gender identity.            Thank you!     Thank you for choosing [facility]  for your care. Our goal is always to provide you with excellent care. Hearing back from our patients is one way we can continue to improve our services. Please take a few minutes to complete the written survey that you may receive in the mail after your visit with us. Thank you!             Your Updated Medication List - Protect others around you: Learn how to safely use, store and throw away your medicines at www.disposemymeds.org.          This list is accurate as of 8/2/18 12:18 PM.  Always use your most recent med list.                   Brand Name Dispense Instructions for use Diagnosis    aspirin 81 MG tablet     30 tablet    Take 1 tablet (81 mg) by mouth daily    Coronary artery disease involving native coronary artery of native heart without angina pectoris       CoQ10 100 MG Caps      Take by mouth daily Reported on 3/3/2017        cyclobenzaprine 5 MG tablet    FLEXERIL    42 tablet    Take 1-2 tablets (5-10 mg) by mouth 2 times daily as needed for muscle spasms    Torticollis, spasmodic, Low back pain without sciatica, unspecified back pain laterality, unspecified chronicity       escitalopram 10 MG tablet    LEXAPRO    90 tablet    Take 1 tablet (10 mg) by mouth daily One-half tablet once daily for one week, then increase to one tablet daily until follow up appointment    Mild major depression (H)       FIBER PO      2 tablets twice daily        gabapentin 100 MG capsule    NEURONTIN    540 capsule    Take 2 capsules (200 mg) by mouth 3 times daily    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, Torticollis, spasmodic       HYDROcodone-acetaminophen 5-325 MG per tablet    NORCO     20 tablet    Take 1-2 tablets by mouth every 6 hours as needed for pain    Low back pain without sciatica, unspecified back pain laterality, unspecified chronicity       IBUPROFEN PO      Take 200-400 mg by mouth every 6 hours as needed for moderate pain        letrozole 2.5 MG tablet    FEMARA     Take 1 tablet by mouth daily        LYSINE      1-3 daily as needed        MULTIVITAMIN ADULT PO      Take by mouth daily        nicotine polacrilex 2 MG gum    NICORETTE    30 tablet    Place 1 each (2 mg) inside cheek as needed for smoking cessation    Tobacco use disorder       OMEGA 3 PO      Take 1 tablet by mouth 2 times daily        omeprazole 20 MG tablet     30 tablet    Take 1 tablet (20 mg) by mouth as needed    GERD (gastroesophageal reflux disease)       rosuvastatin 5 MG tablet    CRESTOR    90 tablet    Take 1 tablet (5 mg) by mouth daily    Mixed hyperlipidemia       triamterene-hydrochlorothiazide 37.5-25 MG per tablet    MAXZIDE-25    90 tablet    Take 1 tablet by mouth as needed    Peripheral edema       valACYclovir 1000 mg tablet    VALTREX    8 tablet    Take 2 tablets (2,000 mg) by mouth 2 times daily as needed    HSV (herpes simplex virus) infection       Vitamin D3 2000 units Tabs      Take 5,000 Units by mouth Reported on 3/3/2017        ZOLEDRONIC ACID IV      Inject into the vein every 6 months

## 2018-08-02 NOTE — TELEPHONE ENCOUNTER
escitalopram (LEXAPRO) 10 MG tablet 90 tablet 3 8/2/2018     Last Written Prescription Date:  8/2/18  Last Fill Quantity: 90,  # refills: 3   Last office visit: 8/2/2018 with prescribing provider:  Anthony   Future Office Visit:   Next 5 appointments (look out 90 days)     Aug 09, 2018  2:00 PM CDT   Return Visit with Monserrat Meng, [facility] ([facility])    [address]  [place] [address]   [phone]            Aug 21, 2018 12:00 PM CDT   Return Visit with Monserrat Meng, [facility] [place] ([facility])    [address]  [place] [address] [phone]            Sep 06, 2018 10:30 AM CDT   Return Visit with Taiwo Anthony PA-C   [facility] ([facility])    [address]  [place] [address] [phone] OPT 2            Sep 06, 2018  1:00 PM CDT   Return Visit with Monserrat Meng, [facility] [place] ([facility])    [address]  [place] [address] [phone]            Sep 19, 2018 10:30 AM CDT   PHYSICAL with Vladislav Cruz MD   [facility]    [address]  [place] [address] [phone]                 Requested Prescriptions   Pending Prescriptions Disp Refills     escitalopram (LEXAPRO) 10 MG tablet [Pharmacy Med Name: ESCITALOPRAM 10MG TABLETS] 86 tablet 3     Sig: TAKE ONE-HALF TABLET BY MOUTH ONCE DAILY FOR 1 WEEK, THEN INCREASE TO 1 TABLET DAILY UNTIL FOLLOW UP APPT    SSRIs Protocol Passed    8/2/2018 12:47 PM       Passed - PHQ-9 score less than 5 in past 6 months    Please review last PHQ-9 score.          Passed - Patient is age 18 or older       Passed - No active pregnancy on record       Passed - No positive pregnancy test in last 12 months       Passed - Recent (6 mo) or future (30 days) visit within the authorizing provider's specialty    Patient  "had office visit in the last 6 months or has a visit in the next 30 days with authorizing provider or within the authorizing provider's specialty.  See \"Patient Info\" tab in inbasket, or \"Choose Columns\" in Meds & Orders section of the refill encounter.            PHQ-9 SCORE 6/5/2018 7/2/2018 8/2/2018   Total Score - - -   Total Score 3 2 6     "

## 2018-08-02 NOTE — PROGRESS NOTES
"Chief complaint:  Mood problems     S:    67 year old female with alcoholism, breast cancer, chronic pain, hyperlipidemia  Has been seeing therapist to help with stress from her breast cancer and a significant other who is battling lung cancer and worsening  She feels anxious and depressed for several months and it is severe and her therapist suggested taking medications in addition to doing psychotherapy to address her mood  She is here to discuss this     PHQ-9 SCORE 6/5/2018 7/2/2018 8/2/2018   Total Score - - -   Total Score 3 2 6     ANGY-7 SCORE 6/5/2018 7/2/2018 8/2/2018   Total Score 6 4 7       She is not currently drinking alcohol   She is not smoking, but using nicotine replacement products     O:  /66 (BP Location: Right arm, Patient Position: Sitting, Cuff Size: Adult Regular)  Pulse 57  Temp 97.3  F (36.3  C) (Oral)  Ht 5' 5\" (1.651 m)  Wt 135 lb (61.2 kg)  SpO2 99%  BMI 22.47 kg/m2  GEN: Well appearing, comfortable appearing  MENTAL STATUS:  Depressed and anxious mood, anxious affect, well groomed, speech sometimes pressured and tangential, no hallucinations, normal insight and judgement, no suicidal ideation       A/P:    1. Mild major depression (H)  Try lexapro  Side effects and risks discussed  Follow up in 4 weeks to assess therapy and adjust dose   - escitalopram (LEXAPRO) 10 MG tablet; Take 1 tablet (10 mg) by mouth daily One-half tablet once daily for one week, then increase to one tablet daily until follow up appointment  Dispense: 90 tablet; Refill: 3    2. Malignant neoplasm of right female breast, unspecified estrogen receptor status, unspecified site of breast (H)  Continue follow up with Dr. Ace     3. Hyperparathyroidism (H)  Check labs when she returns in 4 weeks (one year since last check)    4. Alcohol dependence in remission (H)      Total face to face contact time was greater than 28 minutes of which more than 50% of this time was spent counseling and coordinating care " regarding the above topics.

## 2018-08-03 RX ORDER — ESCITALOPRAM OXALATE 10 MG/1
TABLET ORAL
Start: 2018-08-03

## 2018-08-03 ASSESSMENT — PATIENT HEALTH QUESTIONNAIRE - PHQ9: SUM OF ALL RESPONSES TO PHQ QUESTIONS 1-9: 6

## 2018-08-03 ASSESSMENT — ANXIETY QUESTIONNAIRES: GAD7 TOTAL SCORE: 7

## 2018-08-07 ENCOUNTER — THERAPY VISIT (OUTPATIENT)
Dept: PHYSICAL THERAPY | Facility: CLINIC | Age: 68
End: 2018-08-07
Payer: MEDICARE

## 2018-08-07 DIAGNOSIS — M54.2 NECK PAIN ON RIGHT SIDE: ICD-10-CM

## 2018-08-07 PROCEDURE — 97112 NEUROMUSCULAR REEDUCATION: CPT | Mod: GP | Performed by: PHYSICAL THERAPIST

## 2018-08-07 PROCEDURE — 97110 THERAPEUTIC EXERCISES: CPT | Mod: GP | Performed by: PHYSICAL THERAPIST

## 2018-08-09 ENCOUNTER — OFFICE VISIT (OUTPATIENT)
Dept: PSYCHOLOGY | Facility: CLINIC | Age: 68
End: 2018-08-09
Payer: COMMERCIAL

## 2018-08-09 DIAGNOSIS — F43.23 ADJUSTMENT DISORDER WITH MIXED ANXIETY AND DEPRESSED MOOD: Primary | ICD-10-CM

## 2018-08-09 PROCEDURE — 90834 PSYTX W PT 45 MINUTES: CPT | Performed by: SOCIAL WORKER

## 2018-08-09 ASSESSMENT — ANXIETY QUESTIONNAIRES
IF YOU CHECKED OFF ANY PROBLEMS ON THIS QUESTIONNAIRE, HOW DIFFICULT HAVE THESE PROBLEMS MADE IT FOR YOU TO DO YOUR WORK, TAKE CARE OF THINGS AT HOME, OR GET ALONG WITH OTHER PEOPLE: SOMEWHAT DIFFICULT
GAD7 TOTAL SCORE: 7
1. FEELING NERVOUS, ANXIOUS, OR ON EDGE: SEVERAL DAYS
2. NOT BEING ABLE TO STOP OR CONTROL WORRYING: SEVERAL DAYS
3. WORRYING TOO MUCH ABOUT DIFFERENT THINGS: SEVERAL DAYS
6. BECOMING EASILY ANNOYED OR IRRITABLE: SEVERAL DAYS
7. FEELING AFRAID AS IF SOMETHING AWFUL MIGHT HAPPEN: SEVERAL DAYS
5. BEING SO RESTLESS THAT IT IS HARD TO SIT STILL: SEVERAL DAYS

## 2018-08-09 ASSESSMENT — PATIENT HEALTH QUESTIONNAIRE - PHQ9: 5. POOR APPETITE OR OVEREATING: SEVERAL DAYS

## 2018-08-09 NOTE — MR AVS SNAPSHOT
MRN:[id number]                      After Visit Summary   8/9/2018    Svetlana Adair    MRN: [id number]           Visit Information        Provider Department      8/9/2018 2:00 PM Monserrat Meng, [facility] Generic      Your next 10 appointments already scheduled     Aug 14, 2018 12:00 PM CDT   ROSAMARIA Extremity with Delmis Nelson, PT   [facility] (ROSAMARIA [place]  )    [address] [place] [address] [address]  [place] [address]   [phone]            Aug 21, 2018 10:40 AM CDT   ROSAMARIA Extremity with Delmis Nelson, PT   [place] [facility] (ROSAMARIA [place]  )    [address] [place] [address] [address]  [place] [address] [phone]            Aug 21, 2018 12:00 PM CDT   Return Visit with Monserrat Meng, [facility] ([facility])    [address]  [facility] [address] [phone]            Aug 28, 2018 11:20 AM CDT   ROSAMARIA Extremity with Delmis Nelson, PT   [facility] (ROSAMARIA [place]  )    [address]  [place] [address] [phone]            Sep 04, 2018 10:40 AM CDT   ROSAMARIA Extremity with Delmis Nelson, PT   [facility] (ROSAMARIA [place]  )    [address] [place] [address] [address]  [place] [address] [phone]            Sep 06, 2018  9:30 AM CDT   LAB with [facility]   [facility] ([facility])    [address]  [facility] [address] [phone]           Please do not eat 10-12 hours before your appointment if you are coming in fasting for labs on lipids, cholesterol, or glucose (sugar). This does not apply to pregnant women. Water, hot tea and black coffee (with nothing added) are okay. Do not drink other fluids, diet soda or chew gum.            Sep 06, 2018 10:30 AM CDT   Return  Visit with Taiwo Anthony PA-C   [facility] ([facility])    [address]  [place] [address] [phone] OPT 2            Sep 06, 2018  1:00 PM CDT   Return Visit with Monserrat Meng, [facility] [place] ([facility] [place])    [address]  [place] [address] [phone]            Sep 11, 2018 10:40 AM CDT   ROSAMARIA Extremity with Delmis Nelson PT   [facility] [place] [facility] (ROSAMARIA [place]  )    [address]  [place] [address] [phone]            Sep 19, 2018 10:30 AM CDT   PHYSICAL with Vladislav Cruz MD   [facility]    [address] [facility] [address] [phone]              MyChart Information     [employer] gives you secure access to your electronic health record. If you see a primary care provider, you can also send messages to your care team and make appointments. If you have questions, please call your primary care clinic.  If you do not have a primary care provider, please call [phone] and they will assist you.        Care EveryWhere ID     This is your Care EveryWhere ID. This could be used by other organizations to access your [place] medical records  [id number]        Equal Access to Services     SMITA AMADOR AH: Hadii jerson [place], waaxda luqadaha, qaybta kaalmada chayo, chelsi bowie. So [facility] [phone].    ATENCIÓN: Si habla español, tiene a bruner disposición servicios gratuitos de asistencia lingüística. Llame al [phone].    We comply with applicable federal civil rights laws and [place] laws. We do not discriminate on the basis of race, color, national origin, age, disability, sex, sexual orientation, or gender identity.

## 2018-08-09 NOTE — PROGRESS NOTES
Progress Note    Client Name: Svetlana Adair  Date:8/9/2018             Service Type: Individual      Session Start Time: 2pm  Session End Time: 245      Session Length: 45     Session #: 5     Attendees: Client attended alone    Treatment Plan Last Reviewed: see below.  PHQ-9 / ANGY-7 : 3;7     DATA      Progress Since Last Session (Related to Symptoms / Goals / Homework):   Symptoms: Stable but with a bit more anxiety    Homework: Completed in session      Episode of Care Goals: Minimal progress - ACTION (Actively working towards change); Intervened by reinforcing change plan / affirming steps takenMet with Dr Cruz and was prescribed Lexapro. Taking this the last 8 days. Had to stop exercising per recommendation by her PT for 2 weeks. Using meditation to cope. Talking with 2 women friends for support. Bf under went radiation on brain and lung and has been bedridden for a while. This experience has increased her mood sx's.   Current / Ongoing Stressors and Concerns:   Needing help with establishing and maintaining boundaries and with healthier management of emotions. 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     Treatment Objective(s) Addressed in This Session:   Improve coping skills; setting limits; self care.  Continue to focus on self care. Financial issues chronic and one regular ct dies recently. Reports stopping MJ and etoh use altogether.      Intervention:   Keeping in mind need to rely on sober supports/programming. Encouraging sobriety and social connection.         ASSESSMENT: Current Emotional / Mental Status (status of significant symptoms):   Risk status (Self / Other harm or suicidal ideation)   Client denies current fears or concerns for personal safety.   Client denies current or recent suicidal ideation or behaviors.   Client denies current or recent homicidal ideation or behaviors.   Client denies current or recent self  injurious behavior or ideation.   Client denies other safety concerns.   A safety and risk management plan has not been developed at this time, however client was given the after-hours number / 911 should there be a change in any of these risk factors.     Appearance:   Appropriate    Eye Contact:   Good    Psychomotor Behavior: Normal    Attitude:   Cooperative    Orientation:   All   Speech    Rate / Production: Normal     Volume:  Normal    Mood:    Anxious  Sad    Affect:    Appropriate    Thought Content:  Clear  Rumination    Thought Form:  Coherent  Logical    Insight:    Fair      Medication Review:   No current psychiatric medications prescribed     Medication Compliance:   NA     Changes in Health Issues:   None reported     Chemical Use Review:   Substance Use: Provided encouragement towards continued sobriety  Provided support and affirmation for steps taken towards sobriety. States her AA group is aware of her current use level and are encouraging her to remain sober. Tobacco Use: No current tobacco use.       Collateral Reports Completed:   Not Applicable as yet.    PLAN: (Client Tasks / Therapist Tasks / Other)  Returns in 2-4 weeks. She will be added to providers wait list. We will cont to monitor use and engagement in self care and boundary work.      Monserrat Meng, [facility] 8/9/2018                                                             ________________________________________________________________________    Treatment Plan    Client's Name: Svetlana Adair  YOB: 1950    Date: 6/5/2018      DSM-V Diagnoses: Adjustment Disorders  309.28 (F43.23) With mixed anxiety and depressed mood  Psychosocial / Contextual Factors: Hx of losses; breast cancer survivor; current Bf has cancer.  WHODAS: se intake    Referral / Collaboration:  Referral to GP for medication consultation will be recommended..    Anticipated number of session or this episode of care: eval every 90  "days      MeasurableTreatment Goal(s) related to diagnosis / functional impairment(s)  Goal 1: Client will improve management of boundaries.    I will know I've met my goal when I feel less \"hooked\".      Objective #A (Client Action)    Client will compile a list of boundaries that they would like to set with others. Ct will practice setting these weekly..  Status: new     Intervention(s)  Therapist will teach coping skills and communication skils to assist ct in her efforts..    Objective #B  Client will identify 2-3 strategies to more effectively address stressors.  Status: new     Intervention(s)  Therapist will encourage and support efforts and provide resources as needed..    Objective #C  Client will explore and adopt coping that is not reliant on MJ nor etoh..  Status: new     Intervention(s)  Therapist will provide referrals/information and support efforts..Including seeking a med consultation from her GP and or psychiatry.          Client has reviewed and agreed to the above plan.      Monserrat Meng, [facility]  June 5, 2018  "

## 2018-08-10 ASSESSMENT — ANXIETY QUESTIONNAIRES: GAD7 TOTAL SCORE: 7

## 2018-08-10 ASSESSMENT — PATIENT HEALTH QUESTIONNAIRE - PHQ9: SUM OF ALL RESPONSES TO PHQ QUESTIONS 1-9: 3

## 2018-08-14 ENCOUNTER — THERAPY VISIT (OUTPATIENT)
Dept: PHYSICAL THERAPY | Facility: CLINIC | Age: 68
End: 2018-08-14
Payer: MEDICARE

## 2018-08-14 DIAGNOSIS — M54.2 NECK PAIN ON RIGHT SIDE: ICD-10-CM

## 2018-08-14 PROCEDURE — 97110 THERAPEUTIC EXERCISES: CPT | Mod: GP | Performed by: PHYSICAL THERAPIST

## 2018-08-14 PROCEDURE — 97112 NEUROMUSCULAR REEDUCATION: CPT | Mod: GP | Performed by: PHYSICAL THERAPIST

## 2018-08-21 ENCOUNTER — OFFICE VISIT (OUTPATIENT)
Dept: PSYCHOLOGY | Facility: CLINIC | Age: 68
End: 2018-08-21
Payer: COMMERCIAL

## 2018-08-21 ENCOUNTER — THERAPY VISIT (OUTPATIENT)
Dept: PHYSICAL THERAPY | Facility: CLINIC | Age: 68
End: 2018-08-21
Payer: MEDICARE

## 2018-08-21 DIAGNOSIS — M54.2 NECK PAIN ON RIGHT SIDE: ICD-10-CM

## 2018-08-21 DIAGNOSIS — F43.23 ADJUSTMENT DISORDER WITH MIXED ANXIETY AND DEPRESSED MOOD: Primary | ICD-10-CM

## 2018-08-21 PROCEDURE — 90834 PSYTX W PT 45 MINUTES: CPT | Performed by: SOCIAL WORKER

## 2018-08-21 PROCEDURE — 97110 THERAPEUTIC EXERCISES: CPT | Mod: GP | Performed by: PHYSICAL THERAPIST

## 2018-08-21 PROCEDURE — 97112 NEUROMUSCULAR REEDUCATION: CPT | Mod: GP | Performed by: PHYSICAL THERAPIST

## 2018-08-21 ASSESSMENT — ANXIETY QUESTIONNAIRES
2. NOT BEING ABLE TO STOP OR CONTROL WORRYING: NOT AT ALL
5. BEING SO RESTLESS THAT IT IS HARD TO SIT STILL: NOT AT ALL
1. FEELING NERVOUS, ANXIOUS, OR ON EDGE: SEVERAL DAYS
6. BECOMING EASILY ANNOYED OR IRRITABLE: SEVERAL DAYS
3. WORRYING TOO MUCH ABOUT DIFFERENT THINGS: NOT AT ALL
7. FEELING AFRAID AS IF SOMETHING AWFUL MIGHT HAPPEN: NOT AT ALL
GAD7 TOTAL SCORE: 2
IF YOU CHECKED OFF ANY PROBLEMS ON THIS QUESTIONNAIRE, HOW DIFFICULT HAVE THESE PROBLEMS MADE IT FOR YOU TO DO YOUR WORK, TAKE CARE OF THINGS AT HOME, OR GET ALONG WITH OTHER PEOPLE: NOT DIFFICULT AT ALL

## 2018-08-21 ASSESSMENT — PATIENT HEALTH QUESTIONNAIRE - PHQ9: 5. POOR APPETITE OR OVEREATING: NOT AT ALL

## 2018-08-21 NOTE — PROGRESS NOTES
Progress Note    Client Name: Svetlana Adair  Date:8/21/2018             Service Type: Individual      Session Start Time: 12pm  Session End Time: 1245      Session Length: 45     Session #: 6     Attendees: Client attended alone    Treatment Plan Last Reviewed: see below.  PHQ-9 / ANGY-7 : 2;2     DATA      Progress Since Last Session (Related to Symptoms / Goals / Homework):   Symptoms: Improving with a reduction in anxiety with the help of counseling and medication.   Homework: Completed in session      Episode of Care Goals: Satisfactory progress - ACTION (Actively working towards change); Intervened by reinforcing change plan / affirming steps takenMet with Dr Cruz and has been taking Lexapro.  Sleep has improved. Better boundaries with bf around his mood and health concerns.   Current / Ongoing Stressors and Concerns:   Needing help with establishing and maintaining boundaries and with healthier management of emotions. 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 Reports ongoing sobriety now. In robust self care activities: PT, tennis, social outlets, biking.     Treatment Objective(s) Addressed in This Session:   Improve coping skills; setting limits; self care.  Continue to focus on self care. Financial issues chronic. Attempting to convince bf that he needs therapy and/or a cancer support grp. Trying to set limits around his focusing on her as his main support.    Intervention:   Keeping in mind need to rely on sober supports/programming. Encouraging sobriety and social connection. Ct able to establish boundaries with bf while working on maintaining her connection to him and his family.        ASSESSMENT: Current Emotional / Mental Status (status of significant symptoms):   Risk status (Self / Other harm or suicidal ideation)   Client denies current fears or concerns for personal safety.   Client denies current or recent suicidal ideation  or behaviors.   Client denies current or recent homicidal ideation or behaviors.   Client denies current or recent self injurious behavior or ideation.   Client denies other safety concerns.   A safety and risk management plan has not been developed at this time, however client was given the after-hours number / 911 should there be a change in any of these risk factors.     Appearance:   Appropriate    Eye Contact:   Good    Psychomotor Behavior: Normal    Attitude:   Cooperative    Orientation:   All   Speech    Rate / Production: Normal     Volume:  Normal    Mood:    Anxious  with improvement.    Affect:    Appropriate    Thought Content:  Clear  Rumination    Thought Form:  Coherent  Logical    Insight:    Fair      Medication Review:   No current psychiatric medications prescribed     Medication Compliance:   NA     Changes in Health Issues:   None reported     Chemical Use Review:   Substance Use: Provided encouragement towards continued sobriety  Provided support and affirmation for steps taken towards sobriety. States her AA group is aware of her current use level and are encouraging her to remain sober. Tobacco Use: No current tobacco use.       Collateral Reports Completed:   Not Applicable as yet.    PLAN: (Client Tasks / Therapist Tasks / Other)  Returns in 2 weeks.  We will cont to monitor use and engagement in self care and boundary work.      Monserrat Meng, [facility] 8/21/2018                                                             ________________________________________________________________________    Treatment Plan    Client's Name: Svetlana Adair  YOB: 1950    Date: 6/5/2018      DSM-V Diagnoses: Adjustment Disorders  309.28 (F43.23) With mixed anxiety and depressed mood  Psychosocial / Contextual Factors: Hx of losses; breast cancer survivor; current Bf has cancer.  WHODAS: se intake    Referral / Collaboration:  Referral to GP for medication consultation will be  "recommended..    Anticipated number of session or this episode of care: eval every 90 days      MeasurableTreatment Goal(s) related to diagnosis / functional impairment(s)  Goal 1: Client will improve management of boundaries.    I will know I've met my goal when I feel less \"hooked\".      Objective #A (Client Action)    Client will compile a list of boundaries that they would like to set with others. Ct will practice setting these weekly..  Status: new     Intervention(s)  Therapist will teach coping skills and communication skils to assist ct in her efforts..    Objective #B  Client will identify 2-3 strategies to more effectively address stressors.  Status: new     Intervention(s)  Therapist will encourage and support efforts and provide resources as needed..    Objective #C  Client will explore and adopt coping that is not reliant on MJ nor etoh..  Status: new     Intervention(s)  Therapist will provide referrals/information and support efforts..Including seeking a med consultation from her GP and or psychiatry.          Client has reviewed and agreed to the above plan.      Monserrat Meng, [facility]  June 5, 2018  "

## 2018-08-21 NOTE — MR AVS SNAPSHOT
MRN:[id number]                      After Visit Summary   8/21/2018    Svetlana Adair    MRN: [id number]           Visit Information        Provider Department      8/21/2018 12:00 PM Monserrat Meng, [facility] Generic      Your next 10 appointments already scheduled     Aug 28, 2018 11:20 AM CDT   ROSAMARIA Extremity with Delmis Nelson, PT   [facility] [place] [facility] (ROSAMARIA [place]  )    [address] [place] [address] [address]  [place] [address]   [phone]            Sep 04, 2018 10:40 AM CDT   ROSAMARIA Extremity with Delmis Nelson, PT   [facility] (ROSAMARIA [place]  )    [address] [place] [address] [address]  [place] [address] [phone]            Sep 06, 2018  9:30 AM CDT   LAB with [facility]   [facility] ([facility])    [address]  [facility] [address] [phone]           Please do not eat 10-12 hours before your appointment if you are coming in fasting for labs on lipids, cholesterol, or glucose (sugar). This does not apply to pregnant women. Water, hot tea and black coffee (with nothing added) are okay. Do not drink other fluids, diet soda or chew gum.            Sep 06, 2018 10:30 AM CDT   Return Visit with Taiwo Anthony PA-C   [facility] ([facility])    [address]  [facility] [address] [phone] OPT 2            Sep 06, 2018  1:00 PM CDT   Return Visit with Monserrat Meng, [facility] ([facility] [place])    [address]  [place] [address] [phone]            Sep 11, 2018 10:40 AM CDT   ROSAMARIA Extremity with Delmis Nelson, PT   [facility] (ROSAMARIA [place]  )    [address]  [place] [address] [phone]            Sep 19, 2018 10:30 AM  CDT   PHYSICAL with Vladislav Cruz MD   [facility] ([facility])    [address] [facility] [address] [phone]            Sep 27, 2018 12:00 PM CDT   Return Visit with DIMAS Sorto   [facility] [place] ([facility])    [address]  [place] [address] [phone]              MyChart Information     [employer] gives you secure access to your electronic health record. If you see a primary care provider, you can also send messages to your care team and make appointments. If you have questions, please call your primary care clinic.  If you do not have a primary care provider, please call [phone] and they will assist you.        Care EveryWhere ID     This is your Care EveryWhere ID. This could be used by other organizations to access your [place] medical records  [id number]        Equal Access to Services     SMITA AMADOR : Hadii jerson [place], wareginoda julieth, qaybta kaalmageovanny dom[place], chelsi bowie. So [facility] [place] [phone].    ATENCIÓN: Si habla español, tiene a bruner disposición servicios gratuitos de asistencia lingüística. Llame al [phone].    We comply with applicable federal civil rights laws and [place] laws. We do not discriminate on the basis of race, color, national origin, age, disability, sex, sexual orientation, or gender identity.

## 2018-08-22 ASSESSMENT — PATIENT HEALTH QUESTIONNAIRE - PHQ9: SUM OF ALL RESPONSES TO PHQ QUESTIONS 1-9: 2

## 2018-08-22 ASSESSMENT — ANXIETY QUESTIONNAIRES: GAD7 TOTAL SCORE: 2

## 2018-08-28 ENCOUNTER — THERAPY VISIT (OUTPATIENT)
Dept: PHYSICAL THERAPY | Facility: CLINIC | Age: 68
End: 2018-08-28
Payer: MEDICARE

## 2018-08-28 DIAGNOSIS — M54.2 NECK PAIN ON RIGHT SIDE: ICD-10-CM

## 2018-08-28 PROCEDURE — G8981 BODY POS CURRENT STATUS: HCPCS | Mod: GP | Performed by: PHYSICAL THERAPIST

## 2018-08-28 PROCEDURE — 97112 NEUROMUSCULAR REEDUCATION: CPT | Mod: GP | Performed by: PHYSICAL THERAPIST

## 2018-08-28 PROCEDURE — G8979 MOBILITY GOAL STATUS: HCPCS | Mod: GP | Performed by: PHYSICAL THERAPIST

## 2018-08-28 PROCEDURE — G8980 MOBILITY D/C STATUS: HCPCS | Mod: GP | Performed by: PHYSICAL THERAPIST

## 2018-08-28 PROCEDURE — 97110 THERAPEUTIC EXERCISES: CPT | Mod: GP | Performed by: PHYSICAL THERAPIST

## 2018-08-28 PROCEDURE — G8982 BODY POS GOAL STATUS: HCPCS | Mod: GP | Performed by: PHYSICAL THERAPIST

## 2018-08-28 PROCEDURE — G8978 MOBILITY CURRENT STATUS: HCPCS | Mod: GP | Performed by: PHYSICAL THERAPIST

## 2018-08-30 NOTE — PROGRESS NOTES
Subjective:  HPI                    Objective:  System    Physical Exam    General     ROS    Assessment/Plan:    PROGRESS  REPORT    Progress reporting period is from May 29, 2018 to August 28, 2018.       SUBJECTIVE   Subjective: Patient reports she has had a busy week and hasn't had as much time to perform HEP. Still feels like neck ROM is improving with less stiffness felt overall.     Current Pain level: 4/10.      Initial Pain level: 7/10.   Changes in function: Patient reports she is able to play tennis now without neck pain. Continues to have difficulty with stiffness in neck in am, and after Yoga and Pilates,  Adverse reaction to treatment or activity: none    OBJECTIVE  Changes noted in objective findings:  The objective findings below are from DOS 08/28/18.  Objective: AROM cervical spine Flex 50 degrees,, ext 55 degrees, SBB 30 degrees, Rot B 45 degrees. Myotome weakness C5 left fair and C8 fair on the left.      ASSESSMENT/PLAN  Updated problem list and treatment plan: Diagnosis 1:  Neck pain  Pain -  hot/cold therapy, manual therapy, self management, education and home program  Decreased ROM/flexibility - manual therapy, therapeutic exercise, therapeutic activity and home program  Impaired posture - neuro re-education, therapeutic activities and home program  STG/LTGs have been met or progress has been made towards goals:  Yes, patient continues to make progress with decreased pain, increased ROM, and improved functional abilities  Assessment of Progress: The patient's condition is improving.  Self Management Plans:  Patient has been instructed in a home treatment program.  Patient  has been instructed in self management of symptoms.  I have re-evaluated this patient and find that the nature, scope, duration and intensity of the therapy is appropriate for the medical condition of the patient.  Svetlana continues to require the following intervention to meet STG and LTG's:  PT    Recommendations:  This  patient would benefit from continued therapy.     Frequency:  1 X week, once daily  Duration:  for 6 weeks        Please refer to the daily flowsheet for treatment today, total treatment time and time spent performing 1:1 timed codes.

## 2018-09-04 ENCOUNTER — THERAPY VISIT (OUTPATIENT)
Dept: PHYSICAL THERAPY | Facility: CLINIC | Age: 68
End: 2018-09-04
Payer: MEDICARE

## 2018-09-04 DIAGNOSIS — M54.2 NECK PAIN ON RIGHT SIDE: ICD-10-CM

## 2018-09-04 PROCEDURE — 97110 THERAPEUTIC EXERCISES: CPT | Mod: GP | Performed by: PHYSICAL THERAPIST

## 2018-09-04 PROCEDURE — 97112 NEUROMUSCULAR REEDUCATION: CPT | Mod: GP | Performed by: PHYSICAL THERAPIST

## 2018-09-06 ENCOUNTER — OFFICE VISIT (OUTPATIENT)
Dept: PSYCHOLOGY | Facility: CLINIC | Age: 68
End: 2018-09-06
Payer: COMMERCIAL

## 2018-09-06 ENCOUNTER — OFFICE VISIT (OUTPATIENT)
Dept: CARDIOLOGY | Facility: CLINIC | Age: 68
End: 2018-09-06
Attending: PHYSICIAN ASSISTANT
Payer: COMMERCIAL

## 2018-09-06 VITALS
HEIGHT: 65 IN | BODY MASS INDEX: 22.66 KG/M2 | SYSTOLIC BLOOD PRESSURE: 116 MMHG | WEIGHT: 136 LBS | DIASTOLIC BLOOD PRESSURE: 70 MMHG | HEART RATE: 56 BPM

## 2018-09-06 DIAGNOSIS — E78.2 MIXED HYPERLIPIDEMIA: ICD-10-CM

## 2018-09-06 DIAGNOSIS — I65.22 ATHEROSCLEROSIS OF LEFT CAROTID ARTERY: ICD-10-CM

## 2018-09-06 DIAGNOSIS — I25.10 CORONARY ARTERY DISEASE INVOLVING NATIVE CORONARY ARTERY OF NATIVE HEART WITHOUT ANGINA PECTORIS: ICD-10-CM

## 2018-09-06 DIAGNOSIS — E78.5 HYPERLIPIDEMIA LDL GOAL <70: ICD-10-CM

## 2018-09-06 DIAGNOSIS — E78.5 HYPERLIPIDEMIA LDL GOAL <70: Primary | ICD-10-CM

## 2018-09-06 DIAGNOSIS — F43.23 ADJUSTMENT DISORDER WITH MIXED ANXIETY AND DEPRESSED MOOD: Primary | ICD-10-CM

## 2018-09-06 LAB
ALT SERPL W P-5'-P-CCNC: 44 U/L (ref 5–30)
CHOLEST SERPL-MCNC: 198 MG/DL
HDLC SERPL-MCNC: 81 MG/DL
LDLC SERPL CALC-MCNC: 94 MG/DL
NONHDLC SERPL-MCNC: 117 MG/DL
TRIGL SERPL-MCNC: 115 MG/DL

## 2018-09-06 PROCEDURE — 80061 LIPID PANEL: CPT | Performed by: PHYSICIAN ASSISTANT

## 2018-09-06 PROCEDURE — 90834 PSYTX W PT 45 MINUTES: CPT | Performed by: SOCIAL WORKER

## 2018-09-06 PROCEDURE — 36415 COLL VENOUS BLD VENIPUNCTURE: CPT | Performed by: PHYSICIAN ASSISTANT

## 2018-09-06 PROCEDURE — 99214 OFFICE O/P EST MOD 30 MIN: CPT | Performed by: PHYSICIAN ASSISTANT

## 2018-09-06 PROCEDURE — 84460 ALANINE AMINO (ALT) (SGPT): CPT | Performed by: PHYSICIAN ASSISTANT

## 2018-09-06 ASSESSMENT — PATIENT HEALTH QUESTIONNAIRE - PHQ9: 5. POOR APPETITE OR OVEREATING: SEVERAL DAYS

## 2018-09-06 ASSESSMENT — ANXIETY QUESTIONNAIRES
5. BEING SO RESTLESS THAT IT IS HARD TO SIT STILL: SEVERAL DAYS
3. WORRYING TOO MUCH ABOUT DIFFERENT THINGS: SEVERAL DAYS
7. FEELING AFRAID AS IF SOMETHING AWFUL MIGHT HAPPEN: SEVERAL DAYS
6. BECOMING EASILY ANNOYED OR IRRITABLE: SEVERAL DAYS
IF YOU CHECKED OFF ANY PROBLEMS ON THIS QUESTIONNAIRE, HOW DIFFICULT HAVE THESE PROBLEMS MADE IT FOR YOU TO DO YOUR WORK, TAKE CARE OF THINGS AT HOME, OR GET ALONG WITH OTHER PEOPLE: SOMEWHAT DIFFICULT
GAD7 TOTAL SCORE: 6
1. FEELING NERVOUS, ANXIOUS, OR ON EDGE: SEVERAL DAYS
2. NOT BEING ABLE TO STOP OR CONTROL WORRYING: NOT AT ALL

## 2018-09-06 NOTE — MR AVS SNAPSHOT
After Visit Summary   9/6/2018    Svetlana Adair    MRN: [id number]           Patient Information     Date Of Birth          1950        Visit Information        Provider Department      9/6/2018 10:30 AM Taiwo Anthony PA-C [facility]        Today's Diagnoses     Coronary artery disease involving native coronary artery of native heart without angina pectoris        Mixed hyperlipidemia        Hyperlipidemia LDL goal <70        Atherosclerosis of left carotid artery          Care Instructions    Today's Plan:   1) Continue with same medications.   2) Follow mediterranean diet.    If you have questions or concerns please call my nurse team at [phone].     Scheduling phone number: [phone]  Reminder: Please bring in all current medications, over the counter supplements and vitamin bottles to your next appointment.    It was a pleasure seeing you today!     Taiwo Anthony  9/6/2018              Follow-ups after your visit        Your next 10 appointments already scheduled     Sep 06, 2018  1:00 PM CDT   Return Visit with Monserrat Megn [facility] ([facility])    [address] [place] [address] [address]  [facility] [address] [phone]            Sep 11, 2018 10:40 AM CDT   ROSAMARIA Extremity with Delmis Nelson    [facility] ([facility] [place]  )    [address] [place] [address] [address]  [place] [address]   [phone]            Sep 19, 2018 10:30 AM CDT   [facility]    [address] [place] [address]   [phone]            Sep 27, 2018 12:00 PM CDT   Return Visit with Monserrat Meng [facility] ([facility])    [address] [place] [address] [address]  [facility] [id number] [phone]              Who to contact     If you have questions or need follow up information about  "today's clinic visit or your schedule please contact [facility]   [place] directly at [phone].  Normal or non-critical lab and imaging results will be communicated to you by [employer], letter or phone within 4 business days after the clinic has received the results. If you do not hear from us within 7 days, please contact the clinic through [employer] or phone. If you have a critical or abnormal lab result, we will notify you by phone as soon as possible.  Submit refill requests through [facility] or call your pharmacy and they will forward the refill request to us. Please allow 3 business days for your refill to be completed.          Additional Information About Your Visit        [facility] Information     [facility] gives you secure access to your electronic health record. If you see a primary care provider, you can also send messages to your care team and make appointments. If you have questions, please call your primary care clinic.  If you do not have a primary care provider, please call [phone] and they will assist you.        Care EveryWhere ID     This is your Care EveryWhere ID. This could be used by other organizations to access your [facility] medical records  [id number]        Your Vitals Were     Pulse Height BMI (Body Mass Index)             56 1.651 m (5' 5\") 22.63 kg/m2          Blood Pressure from Last 3 Encounters:   09/06/18 116/70   08/02/18 102/66   04/26/18 106/54    Weight from Last 3 Encounters:   09/06/18 61.7 kg (136 lb)   08/02/18 61.2 kg (135 lb)   04/26/18 62.1 kg (137 lb)              We Performed the Following     Follow-Up with Cardiac Advanced Practice Provider          Today's Medication Changes          These changes are accurate as of 9/6/18 11:04 AM.  If you have any questions, ask your nurse or doctor.               These medicines have changed or have updated prescriptions.        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 So [facility] [phone].    ATENCIÓN: Si habla español, tiene a bruner disposición servicios gratuitos de asistencia lingüística. [facility] [phone].    We comply with applicable federal civil rights laws and [place] laws. We do not discriminate on the basis of race, color, national origin, age, disability, sex, sexual orientation, or gender identity.            Thank you!     Thank you for choosing [facility]  for your care. Our goal is always to provide you with excellent care. Hearing back from our patients is one way we can continue to improve our services. Please take a few minutes to complete the written survey that you may receive in the mail after your visit with us. Thank you!             Your Updated Medication List - Protect others around you: Learn how to safely use, store and throw away your medicines at www.disposemymeds.org.          This list is accurate as of 9/6/18 11:04 AM.  Always use your most recent med list.                   Brand Name Dispense Instructions for use Diagnosis    aspirin 81 MG tablet     30 tablet    Take 1 tablet (81 mg) by mouth daily    Coronary artery disease involving native coronary artery of native heart without angina pectoris       CoQ10 100 MG Caps      Take by mouth daily Reported on 3/3/2017        cyclobenzaprine 5 MG tablet    FLEXERIL     42 tablet    Take 1-2 tablets (5-10 mg) by mouth 2 times daily as needed for muscle spasms    Torticollis, spasmodic, Low back pain without sciatica, unspecified back pain laterality, unspecified chronicity       escitalopram 10 MG tablet    LEXAPRO    90 tablet    Take 1 tablet (10 mg) by mouth daily One-half tablet once daily for one week, then increase to one tablet daily until follow up appointment    Mild major depression (H)       FIBER PO      2 tablets twice daily        gabapentin 100 MG capsule    NEURONTIN    540 capsule    Take 2 capsules (200 mg) by mouth 3 times daily    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, Torticollis, spasmodic       HYDROcodone-acetaminophen 5-325 MG per tablet    NORCO    20 tablet    Take 1-2 tablets by mouth every 6 hours as needed for pain    Low back pain without sciatica, unspecified back pain laterality, unspecified chronicity       IBUPROFEN PO      Take 200-400 mg by mouth every 6 hours as needed for moderate pain        letrozole 2.5 MG tablet    FEMARA     Take 1 tablet by mouth daily        LYSINE      1-3 daily as needed        MULTIVITAMIN ADULT PO      Take by mouth daily        nicotine polacrilex 2 MG gum    NICORETTE    30 tablet    Place 1 each (2 mg) inside cheek as needed for smoking cessation    Tobacco use disorder       OMEGA 3 PO      Take 1 tablet by mouth 2 times daily        omeprazole 20 MG tablet     30 tablet    Take 1 tablet (20 mg) by mouth as needed    GERD (gastroesophageal reflux disease)       rosuvastatin 5 MG tablet    CRESTOR    90 tablet    Take 1 tablet (5 mg) by mouth daily    Mixed hyperlipidemia       triamterene-hydrochlorothiazide 37.5-25 MG per tablet    MAXZIDE-25    90 tablet    Take 1 tablet by mouth as needed    Peripheral edema       valACYclovir 1000 mg tablet    VALTREX    8 tablet    Take 2 tablets (2,000 mg) by mouth 2 times daily as needed    HSV (herpes simplex virus) infection       Vitamin D3 2000 units Tabs      Take 5,000 Units  by mouth Reported on 3/3/2017        ZOLEDRONIC ACID IV      Inject into the vein every 6 months

## 2018-09-06 NOTE — PROGRESS NOTES
Primary Cardiologist: Dr. Perez    History of Present Illness:   This is a very pleasant 68-year-old female who presents to cardiology clinic for routine follow-up.  Her past medical history is notable for high calcium score measuring 790, former tobacco user, and hyperlipidemia. Of note, she had myalgias with atorvastatin and was switched to rosuvastatin lat year.     She returns to clinic stating that she is doing well.  She denies any symptoms of chest discomfort, shortness of breath, palpitations, or any other symptoms.  She does admit to not paying attention to her diet and over the last few weeks she has been eating out a lot.    Assessment and plan:  This is a very pleasant 68-year-old female who presents to cardiology clinic for routine follow-up.  Her past medical history is notable for high calcium score measuring 790, former tobacco user, and hyperlipidemia.     Her lipid panel unfortunately does not show improvement in her numbers. Given her calcium score, we would like her LDL closer to 70. Her ALT is higher as well but not by significant amount. At this time she would like to continue with lifestyle changes and very reluctant to increase rosuvastatin. We will recheck her numbers in 3 months and if she continues to have high numbers, we will increase rosuvastatin to 10 mg daily. We will need to keep close watch on her ALT as well.     Thank you for allowing me to participate in the care of this patient today.       This note was completed in part using Dragon voice recognition software. Although reviewed after completion, some word and grammatical errors may occur.    Orders this Visit:  No orders of the defined types were placed in this encounter.    No orders of the defined types were placed in this encounter.    There are no discontinued medications.      Encounter Diagnoses   Name Primary?     Coronary artery disease involving native coronary artery of native heart without angina pectoris       Mixed hyperlipidemia      Hyperlipidemia LDL goal <70      Atherosclerosis of left carotid artery        CURRENT MEDICATIONS:  Current Outpatient Prescriptions   Medication Sig Dispense Refill     aspirin 81 MG tablet Take 1 tablet (81 mg) by mouth daily 30 tablet      Cholecalciferol (VITAMIN D3) 2000 UNITS TABS Take 5,000 Units by mouth Reported on 3/3/2017       Coenzyme Q10 (COQ10) 100 MG CAPS Take by mouth daily Reported on 3/3/2017       cyclobenzaprine (FLEXERIL) 5 MG tablet Take 1-2 tablets (5-10 mg) by mouth 2 times daily as needed for muscle spasms 42 tablet 0     escitalopram (LEXAPRO) 10 MG tablet Take 1 tablet (10 mg) by mouth daily One-half tablet once daily for one week, then increase to one tablet daily until follow up appointment 90 tablet 3     FIBER PO 2 tablets twice daily       gabapentin (NEURONTIN) 100 MG capsule Take 2 capsules (200 mg) by mouth 3 times daily (Patient taking differently: Take 200 mg by mouth as needed p) 540 capsule 0     HYDROcodone-acetaminophen (NORCO) 5-325 MG per tablet Take 1-2 tablets by mouth every 6 hours as needed for pain 20 tablet 0     IBUPROFEN PO Take 200-400 mg by mouth every 6 hours as needed for moderate pain       letrozole (FEMARA) 2.5 MG tablet Take 1 tablet by mouth daily  5     LYSINE 1-3 daily as needed       Multiple Vitamins-Minerals (MULTIVITAMIN ADULT PO) Take by mouth daily       nicotine polacrilex (NICORETTE) 2 MG gum Place 1 each (2 mg) inside cheek as needed for smoking cessation 30 tablet 11     Omega-3 Fatty Acids (OMEGA 3 PO) Take 1 tablet by mouth 2 times daily        omeprazole 20 MG tablet Take 1 tablet (20 mg) by mouth as needed 30 tablet 0     rosuvastatin (CRESTOR) 5 MG tablet Take 1 tablet (5 mg) by mouth daily 90 tablet 1     triamterene-hydrochlorothiazide (MAXZIDE-25) 37.5-25 MG per tablet Take 1 tablet by mouth as needed 90 tablet 0     valACYclovir (VALTREX) 1000 mg tablet Take 2 tablets (2,000 mg) by mouth 2 times daily as  needed 8 tablet 1     ZOLEDRONIC ACID IV Inject into the vein every 6 months         ALLERGIES     Allergies   Allergen Reactions     Cats      Codeine Sulfate GI Disturbance       PAST MEDICAL HISTORY:  Past Medical History:   Diagnosis Date     Alcoholism (H)      Allergies     Fall     Basal cell cancer     back, chest, face     Breast cancer (H)      Coronary artery disease     CT calcium zhlpm=202 Nov 2015     Depression      Hyperlipidemia LDL goal <130 12/2/2015     Hypertension     borderline     PONV (postoperative nausea and vomiting)      Squamous cell carcinoma     left upper arm, chin       PAST SURGICAL HISTORY:  Past Surgical History:   Procedure Laterality Date     COLONOSCOPY       COLONOSCOPY  11/17/2011    Procedure:COLONOSCOPY; Colonoscopy; Surgeon:MEKA RUSS; Location: GI     DISSECT LYMPH NODE AXILLA Right 11/2/2017    Procedure: DISSECT LYMPH NODE AXILLA;  RIGHT AXILLARY LYMPH NODE DISSECTION;  Surgeon: Cortez White MD;  Location:  [facility]     GYN SURGERY  2005    hysterectomy after hx of ovarian cyst, ended up being benign     HYSTERECTOMY, PAP NO LONGER INDICATED       MASTECTOMY MODIFIED RADICAL  2011    bilateral     MASTECTOMY, BILATERAL       ORTHOPEDIC SURGERY      rt. knee arthroscopy     ORTHOPEDIC SURGERY Right     toe surgery     REALIGN PATELLA  1973    right      TOE SURGERY      right grt OA        FAMILY HISTORY:  Family History   Problem Relation Age of Onset     Cancer Mother 60     leukemia     Arthritis Mother      osteo     Eye Disorder Mother      glaucoma     GASTROINTESTINAL DISEASE Mother      gallbladder     Other Cancer Mother      Gallbladder Disease Mother      Alcohol/Drug Father      alcohol     HEART DISEASE Father      ? CHF     Respiratory Father      emphysema     Coronary Artery Disease Father      Substance Abuse Father      Substance Abuse Sister      Colon Cancer Brother      Breast Cancer Maternal Grandmother      Asthma Sister      Cancer  "- colorectal Brother 50     Prostate Cancer Brother      Allergies Brother      bee      Arthritis Sister      osteo     Arthritis Sister      osteo     Arthritis Brother      osteo     Depression Sister      Psychotic Disorder Sister      bipolar     Respiratory Sister        SOCIAL HISTORY:  Social History     Social History     Marital status:      Spouse name: N/A     Number of children: N/A     Years of education: N/A     Social History Main Topics     Smoking status: Former Smoker     Packs/day: 1.00     Years: 22.00     Types: Cigarettes     Quit date: 4/28/2010     Smokeless tobacco: Never Used     Alcohol use No      Comment: intermittent sobriety 8/24/11     Drug use: Yes     Special: Marijuana      Comment: for sleeping/occ     Sexual activity: Yes     Partners: Male     Birth control/ protection: Surgical      Comment: hyst     Other Topics Concern      Service No     Blood Transfusions No     Caffeine Concern Yes     4-5 cups caffeine per day     Occupational Exposure No     Hobby Hazards No     Sleep Concern Yes     Stress Concern Yes     Weight Concern No     Special Diet Yes     organic foods     Back Care No     Exercise No     Bike Helmet No     Seat Belt Yes     Self-Exams Yes     Parent/Sibling W/ Cabg, Mi Or Angioplasty Before 65f 55m? No     Social History Narrative       Review of Systems:  Skin:  Negative     Eyes:  Positive for glasses  ENT:  Negative    Respiratory:  Negative    Cardiovascular:  Negative    Gastroenterology: Negative    Genitourinary:  Negative    Musculoskeletal:  Positive for arthritis;joint stiffness  Neurologic:  Negative    Psychiatric:  Negative    Heme/Lymph/Imm:  Positive for    Endocrine:  Negative      Physical Exam:  Vitals: /70  Pulse 56  Ht 1.651 m (5' 5\")  Wt 61.7 kg (136 lb)  BMI 22.63 kg/m2     GEN:  NAD  NECK: No JVD  C/V:  Regular rate and rhythm, no murmur, rub or gallop.  RESP: Clear to auscultation bilaterally without wheezing, " rales, or rhonchi.  GI: Abdomen soft, nontender, nondistended.   EXTREM: No LE edema.   NEURO: Alert and oriented, cooperative. No obvious focal deficits.   PSYCH: Normal affect.  SKIN: Warm and dry.       Recent Lab Results:  LIPID RESULTS:  Lab Results   Component Value Date    CHOL 198 09/06/2018    HDL 81 09/06/2018    LDL 94 09/06/2018    TRIG 115 09/06/2018    CHOLHDLRATIO 3.3 10/29/2015       LIVER ENZYME RESULTS:  Lab Results   Component Value Date    AST 17 09/25/2017    ALT 44 (H) 09/06/2018       CBC RESULTS:  Lab Results   Component Value Date    WBC 5.2 09/25/2017    RBC 4.49 09/25/2017    HGB 14.4 09/25/2017    HCT 43.3 09/25/2017    MCV 96 09/25/2017    MCH 32.1 09/25/2017    MCHC 33.3 09/25/2017    RDW 12.4 09/25/2017     09/25/2017       BMP RESULTS:  Lab Results   Component Value Date     09/25/2017    POTASSIUM 4.4 09/25/2017    CHLORIDE 107 09/25/2017    CO2 28 09/25/2017    ANIONGAP 6 09/25/2017     (H) 09/25/2017    BUN 13 09/25/2017    CR 0.74 09/25/2017    GFRESTIMATED 79 09/25/2017    GFRESTBLACK >90 09/25/2017    YASMIN 9.5 09/25/2017        A1C RESULTS:  Lab Results   Component Value Date    A1C 5.6 11/18/2016       INR RESULTS:  No results found for: INR        Taiwo Anthony PA-C   September 6, 2018

## 2018-09-06 NOTE — LETTER
9/6/2018    Vladislav Cruz MD  [address]  [facility] [address]    RE: Svetlana Adair       Dear Colleague,    I had the pleasure of seeing Svetlana PATTI Adair in the [facility].    Primary Cardiologist: Dr. Perez    History of Present Illness:   This is a very pleasant 68-year-old female who presents to cardiology clinic for routine follow-up.  Her past medical history is notable for high calcium score measuring 790, former tobacco user, and hyperlipidemia. Of note, she had myalgias with atorvastatin and was switched to rosuvastatin lat year.     She returns to clinic stating that she is doing well.  She denies any symptoms of chest discomfort, shortness of breath, palpitations, or any other symptoms.  She does admit to not paying attention to her diet and over the last few weeks she has been eating out a lot.    Assessment and plan:  This is a very pleasant 68-year-old female who presents to cardiology clinic for routine follow-up.  Her past medical history is notable for high calcium score measuring 790, former tobacco user, and hyperlipidemia.     Her lipid panel unfortunately does not show improvement in her numbers. Given her calcium score, we would like her LDL closer to 70. Her ALT is higher as well but not by significant amount. At this time she would like to continue with lifestyle changes and very reluctant to increase rosuvastatin. We will recheck her numbers in 3 months and if she continues to have high numbers, we will increase rosuvastatin to 10 mg daily. We will need to keep close watch on her ALT as well.     Thank you for allowing me to participate in the care of this patient today.       This note was completed in part using Dragon voice recognition software. Although reviewed after completion, some word and grammatical errors may occur.    Orders this Visit:  No orders of the defined types were placed in this encounter.    No orders of the defined  types were placed in this encounter.    There are no discontinued medications.      Encounter Diagnoses   Name Primary?     Coronary artery disease involving native coronary artery of native heart without angina pectoris      Mixed hyperlipidemia      Hyperlipidemia LDL goal <70      Atherosclerosis of left carotid artery        CURRENT MEDICATIONS:  Current Outpatient Prescriptions   Medication Sig Dispense Refill     aspirin 81 MG tablet Take 1 tablet (81 mg) by mouth daily 30 tablet      Cholecalciferol (VITAMIN D3) 2000 UNITS TABS Take 5,000 Units by mouth Reported on 3/3/2017       Coenzyme Q10 (COQ10) 100 MG CAPS Take by mouth daily Reported on 3/3/2017       cyclobenzaprine (FLEXERIL) 5 MG tablet Take 1-2 tablets (5-10 mg) by mouth 2 times daily as needed for muscle spasms 42 tablet 0     escitalopram (LEXAPRO) 10 MG tablet Take 1 tablet (10 mg) by mouth daily One-half tablet once daily for one week, then increase to one tablet daily until follow up appointment 90 tablet 3     FIBER PO 2 tablets twice daily       gabapentin (NEURONTIN) 100 MG capsule Take 2 capsules (200 mg) by mouth 3 times daily (Patient taking differently: Take 200 mg by mouth as needed p) 540 capsule 0     HYDROcodone-acetaminophen (NORCO) 5-325 MG per tablet Take 1-2 tablets by mouth every 6 hours as needed for pain 20 tablet 0     IBUPROFEN PO Take 200-400 mg by mouth every 6 hours as needed for moderate pain       letrozole (FEMARA) 2.5 MG tablet Take 1 tablet by mouth daily  5     LYSINE 1-3 daily as needed       Multiple Vitamins-Minerals (MULTIVITAMIN ADULT PO) Take by mouth daily       nicotine polacrilex (NICORETTE) 2 MG gum Place 1 each (2 mg) inside cheek as needed for smoking cessation 30 tablet 11     Omega-3 Fatty Acids (OMEGA 3 PO) Take 1 tablet by mouth 2 times daily        omeprazole 20 MG tablet Take 1 tablet (20 mg) by mouth as needed 30 tablet 0     rosuvastatin (CRESTOR) 5 MG tablet Take 1 tablet (5 mg) by mouth daily  90 tablet 1     triamterene-hydrochlorothiazide (MAXZIDE-25) 37.5-25 MG per tablet Take 1 tablet by mouth as needed 90 tablet 0     valACYclovir (VALTREX) 1000 mg tablet Take 2 tablets (2,000 mg) by mouth 2 times daily as needed 8 tablet 1     ZOLEDRONIC ACID IV Inject into the vein every 6 months         ALLERGIES     Allergies   Allergen Reactions     Cats      Codeine Sulfate GI Disturbance       PAST MEDICAL HISTORY:  Past Medical History:   Diagnosis Date     Alcoholism (H)      Allergies     Fall     Basal cell cancer     back, chest, face     Breast cancer (H)      Coronary artery disease     CT calcium unnmz=879 Nov 2015     Depression      Hyperlipidemia LDL goal <130 12/2/2015     Hypertension     borderline     PONV (postoperative nausea and vomiting)      Squamous cell carcinoma     left upper arm, chin       PAST SURGICAL HISTORY:  Past Surgical History:   Procedure Laterality Date     COLONOSCOPY       COLONOSCOPY  11/17/2011    Procedure:COLONOSCOPY; Colonoscopy; Surgeon:MEKA RUSS; Location: [facility]     DISSECT LYMPH NODE AXILLA Right 11/2/2017    Procedure: DISSECT LYMPH NODE AXILLA;  RIGHT AXILLARY LYMPH NODE DISSECTION;  Surgeon: Cortez White MD;  Location:  [facility]     GYN SURGERY  2005    hysterectomy after hx of ovarian cyst, ended up being benign     HYSTERECTOMY, PAP NO LONGER INDICATED       MASTECTOMY MODIFIED RADICAL  2011    bilateral     MASTECTOMY, BILATERAL       ORTHOPEDIC SURGERY      rt. knee arthroscopy     ORTHOPEDIC SURGERY Right     toe surgery     REALIGN PATELLA  1973    right      TOE SURGERY      right grt OA        FAMILY HISTORY:  Family History   Problem Relation Age of Onset     Cancer Mother 60     leukemia     Arthritis Mother      osteo     Eye Disorder Mother      glaucoma     GASTROINTESTINAL DISEASE Mother      gallbladder     Other Cancer Mother      Gallbladder Disease Mother      Alcohol/Drug Father      alcohol     HEART DISEASE Father      ? CHF      Respiratory Father      emphysema     Coronary Artery Disease Father      Substance Abuse Father      Substance Abuse Sister      Colon Cancer Brother      Breast Cancer Maternal Grandmother      Asthma Sister      Cancer - colorectal Brother 50     Prostate Cancer Brother      Allergies Brother      bee      Arthritis Sister      osteo     Arthritis Sister      osteo     Arthritis Brother      osteo     Depression Sister      Psychotic Disorder Sister      bipolar     Respiratory Sister        SOCIAL HISTORY:  Social History     Social History     Marital status:      Spouse name: N/A     Number of children: N/A     Years of education: N/A     Social History Main Topics     Smoking status: Former Smoker     Packs/day: 1.00     Years: 22.00     Types: Cigarettes     Quit date: 4/28/2010     Smokeless tobacco: Never Used     Alcohol use No      Comment: intermittent sobriety 8/24/11     Drug use: Yes     Special: Marijuana      Comment: for sleeping/occ     Sexual activity: Yes     Partners: Male     Birth control/ protection: Surgical      Comment: hyst     Other Topics Concern      Service No     Blood Transfusions No     Caffeine Concern Yes     4-5 cups caffeine per day     Occupational Exposure No     Hobby Hazards No     Sleep Concern Yes     Stress Concern Yes     Weight Concern No     Special Diet Yes     organic foods     Back Care No     Exercise No     Bike Helmet No     Seat Belt Yes     Self-Exams Yes     Parent/Sibling W/ Cabg, Mi Or Angioplasty Before 65f 55m? No     Social History Narrative       Review of Systems:  Skin:  Negative     Eyes:  Positive for glasses  ENT:  Negative    Respiratory:  Negative    Cardiovascular:  Negative    Gastroenterology: Negative    Genitourinary:  Negative    Musculoskeletal:  Positive for arthritis;joint stiffness  Neurologic:  Negative    Psychiatric:  Negative    Heme/Lymph/Imm:  Positive for    Endocrine:  Negative      Physical Exam:  Vitals: BP  "116/70  Pulse 56  Ht 1.651 m (5' 5\")  Wt 61.7 kg (136 lb)  BMI 22.63 kg/m2     GEN:  NAD  NECK: No JVD  C/V:  Regular rate and rhythm, no murmur, rub or gallop.  RESP: Clear to auscultation bilaterally without wheezing, rales, or rhonchi.  GI: Abdomen soft, nontender, nondistended.   EXTREM: No LE edema.   NEURO: Alert and oriented, cooperative. No obvious focal deficits.   PSYCH: Normal affect.  SKIN: Warm and dry.       Recent Lab Results:  LIPID RESULTS:  Lab Results   Component Value Date    CHOL 198 09/06/2018    HDL 81 09/06/2018    LDL 94 09/06/2018    TRIG 115 09/06/2018    CHOLHDLRATIO 3.3 10/29/2015       LIVER ENZYME RESULTS:  Lab Results   Component Value Date    AST 17 09/25/2017    ALT 44 (H) 09/06/2018       CBC RESULTS:  Lab Results   Component Value Date    WBC 5.2 09/25/2017    RBC 4.49 09/25/2017    HGB 14.4 09/25/2017    HCT 43.3 09/25/2017    MCV 96 09/25/2017    MCH 32.1 09/25/2017    MCHC 33.3 09/25/2017    RDW 12.4 09/25/2017     09/25/2017       BMP RESULTS:  Lab Results   Component Value Date     09/25/2017    POTASSIUM 4.4 09/25/2017    CHLORIDE 107 09/25/2017    CO2 28 09/25/2017    ANIONGAP 6 09/25/2017     (H) 09/25/2017    BUN 13 09/25/2017    CR 0.74 09/25/2017    GFRESTIMATED 79 09/25/2017    GFRESTBLACK >90 09/25/2017    YASMIN 9.5 09/25/2017        A1C RESULTS:  Lab Results   Component Value Date    A1C 5.6 11/18/2016       INR RESULTS:  No results found for: INR        Thank you for allowing me to participate in the care of your patient.    Sincerely,     Taiwo Anthony PA-C     [facility]    "

## 2018-09-06 NOTE — LETTER
9/6/2018    Vladislav Cruz MD  [address]  [facility] [address]    RE: Svetlana Adair       Dear Colleague,    I had the pleasure of seeing Svetlana PATTI Adair in the [facility].    Primary Cardiologist: Dr. Perez    History of Present Illness:   This is a very pleasant 68-year-old female who presents to cardiology clinic for routine follow-up.  Her past medical history is notable for high calcium score measuring 790, former tobacco user, and hyperlipidemia. Of note, she had myalgias with atorvastatin and was switched to rosuvastatin lat year.     She returns to clinic stating that she is doing well.  She denies any symptoms of chest discomfort, shortness of breath, palpitations, or any other symptoms.  She does admit to not paying attention to her diet and over the last few weeks she has been eating out a lot.    Assessment and plan:  This is a very pleasant 68-year-old female who presents to cardiology clinic for routine follow-up.  Her past medical history is notable for high calcium score measuring 790, former tobacco user, and hyperlipidemia.     Her lipid panel unfortunately does not show improvement in her numbers. Given her calcium score, we would like her LDL closer to 70. Her ALT is higher as well but not by significant amount. At this time she would like to continue with lifestyle changes and very reluctant to increase rosuvastatin. We will recheck her numbers in 3 months and if she continues to have high numbers, we will increase rosuvastatin to 10 mg daily. We will need to keep close watch on her ALT as well.     Thank you for allowing me to participate in the care of this patient today.       This note was completed in part using Dragon voice recognition software. Although reviewed after completion, some word and grammatical errors may occur.    Orders this Visit:  No orders of the defined types were placed in this encounter.    No orders of the defined  types were placed in this encounter.    There are no discontinued medications.      Encounter Diagnoses   Name Primary?     Coronary artery disease involving native coronary artery of native heart without angina pectoris      Mixed hyperlipidemia      Hyperlipidemia LDL goal <70      Atherosclerosis of left carotid artery        CURRENT MEDICATIONS:  Current Outpatient Prescriptions   Medication Sig Dispense Refill     aspirin 81 MG tablet Take 1 tablet (81 mg) by mouth daily 30 tablet      Cholecalciferol (VITAMIN D3) 2000 UNITS TABS Take 5,000 Units by mouth Reported on 3/3/2017       Coenzyme Q10 (COQ10) 100 MG CAPS Take by mouth daily Reported on 3/3/2017       cyclobenzaprine (FLEXERIL) 5 MG tablet Take 1-2 tablets (5-10 mg) by mouth 2 times daily as needed for muscle spasms 42 tablet 0     escitalopram (LEXAPRO) 10 MG tablet Take 1 tablet (10 mg) by mouth daily One-half tablet once daily for one week, then increase to one tablet daily until follow up appointment 90 tablet 3     FIBER PO 2 tablets twice daily       gabapentin (NEURONTIN) 100 MG capsule Take 2 capsules (200 mg) by mouth 3 times daily (Patient taking differently: Take 200 mg by mouth as needed p) 540 capsule 0     HYDROcodone-acetaminophen (NORCO) 5-325 MG per tablet Take 1-2 tablets by mouth every 6 hours as needed for pain 20 tablet 0     IBUPROFEN PO Take 200-400 mg by mouth every 6 hours as needed for moderate pain       letrozole (FEMARA) 2.5 MG tablet Take 1 tablet by mouth daily  5     LYSINE 1-3 daily as needed       Multiple Vitamins-Minerals (MULTIVITAMIN ADULT PO) Take by mouth daily       nicotine polacrilex (NICORETTE) 2 MG gum Place 1 each (2 mg) inside cheek as needed for smoking cessation 30 tablet 11     Omega-3 Fatty Acids (OMEGA 3 PO) Take 1 tablet by mouth 2 times daily        omeprazole 20 MG tablet Take 1 tablet (20 mg) by mouth as needed 30 tablet 0     rosuvastatin (CRESTOR) 5 MG tablet Take 1 tablet (5 mg) by mouth daily  90 tablet 1     triamterene-hydrochlorothiazide (MAXZIDE-25) 37.5-25 MG per tablet Take 1 tablet by mouth as needed 90 tablet 0     valACYclovir (VALTREX) 1000 mg tablet Take 2 tablets (2,000 mg) by mouth 2 times daily as needed 8 tablet 1     ZOLEDRONIC ACID IV Inject into the vein every 6 months         ALLERGIES     Allergies   Allergen Reactions     Cats      Codeine Sulfate GI Disturbance       PAST MEDICAL HISTORY:  Past Medical History:   Diagnosis Date     Alcoholism (H)      Allergies     Fall     Basal cell cancer     back, chest, face     Breast cancer (H)      Coronary artery disease     CT calcium zndyz=555 Nov 2015     Depression      Hyperlipidemia LDL goal <130 12/2/2015     Hypertension     borderline     PONV (postoperative nausea and vomiting)      Squamous cell carcinoma     left upper arm, chin       PAST SURGICAL HISTORY:  Past Surgical History:   Procedure Laterality Date     COLONOSCOPY       COLONOSCOPY  11/17/2011    Procedure:COLONOSCOPY; Colonoscopy; Surgeon:MEKA RUSS; Location: GI     DISSECT LYMPH NODE AXILLA Right 11/2/2017    Procedure: DISSECT LYMPH NODE AXILLA;  RIGHT AXILLARY LYMPH NODE DISSECTION;  Surgeon: Cortez White MD;  Location:  [facility]     GYN SURGERY  2005    hysterectomy after hx of ovarian cyst, ended up being benign     HYSTERECTOMY, PAP NO LONGER INDICATED       MASTECTOMY MODIFIED RADICAL  2011    bilateral     MASTECTOMY, BILATERAL       ORTHOPEDIC SURGERY      rt. knee arthroscopy     ORTHOPEDIC SURGERY Right     toe surgery     REALIGN PATELLA  1973    right      TOE SURGERY      right grt OA        FAMILY HISTORY:  Family History   Problem Relation Age of Onset     Cancer Mother 60     leukemia     Arthritis Mother      osteo     Eye Disorder Mother      glaucoma     GASTROINTESTINAL DISEASE Mother      gallbladder     Other Cancer Mother      Gallbladder Disease Mother      Alcohol/Drug Father      alcohol     HEART DISEASE Father      ? CHF      Respiratory Father      emphysema     Coronary Artery Disease Father      Substance Abuse Father      Substance Abuse Sister      Colon Cancer Brother      Breast Cancer Maternal Grandmother      Asthma Sister      Cancer - colorectal Brother 50     Prostate Cancer Brother      Allergies Brother      bee      Arthritis Sister      osteo     Arthritis Sister      osteo     Arthritis Brother      osteo     Depression Sister      Psychotic Disorder Sister      bipolar     Respiratory Sister        SOCIAL HISTORY:  Social History     Social History     Marital status:      Spouse name: N/A     Number of children: N/A     Years of education: N/A     Social History Main Topics     Smoking status: Former Smoker     Packs/day: 1.00     Years: 22.00     Types: Cigarettes     Quit date: 4/28/2010     Smokeless tobacco: Never Used     Alcohol use No      Comment: intermittent sobriety 8/24/11     Drug use: Yes     Special: Marijuana      Comment: for sleeping/occ     Sexual activity: Yes     Partners: Male     Birth control/ protection: Surgical      Comment: hyst     Other Topics Concern      Service No     Blood Transfusions No     Caffeine Concern Yes     4-5 cups caffeine per day     Occupational Exposure No     Hobby Hazards No     Sleep Concern Yes     Stress Concern Yes     Weight Concern No     Special Diet Yes     organic foods     Back Care No     Exercise No     Bike Helmet No     Seat Belt Yes     Self-Exams Yes     Parent/Sibling W/ Cabg, Mi Or Angioplasty Before 65f 55m? No     Social History Narrative       Review of Systems:  Skin:  Negative     Eyes:  Positive for glasses  ENT:  Negative    Respiratory:  Negative    Cardiovascular:  Negative    Gastroenterology: Negative    Genitourinary:  Negative    Musculoskeletal:  Positive for arthritis;joint stiffness  Neurologic:  Negative    Psychiatric:  Negative    Heme/Lymph/Imm:  Positive for    Endocrine:  Negative      Physical Exam:  Vitals: BP  "116/70  Pulse 56  Ht 1.651 m (5' 5\")  Wt 61.7 kg (136 lb)  BMI 22.63 kg/m2     GEN:  NAD  NECK: No JVD  C/V:  Regular rate and rhythm, no murmur, rub or gallop.  RESP: Clear to auscultation bilaterally without wheezing, rales, or rhonchi.  GI: Abdomen soft, nontender, nondistended.   EXTREM: No LE edema.   NEURO: Alert and oriented, cooperative. No obvious focal deficits.   PSYCH: Normal affect.  SKIN: Warm and dry.       Recent Lab Results:  LIPID RESULTS:  Lab Results   Component Value Date    CHOL 198 09/06/2018    HDL 81 09/06/2018    LDL 94 09/06/2018    TRIG 115 09/06/2018    CHOLHDLRATIO 3.3 10/29/2015       LIVER ENZYME RESULTS:  Lab Results   Component Value Date    AST 17 09/25/2017    ALT 44 (H) 09/06/2018       CBC RESULTS:  Lab Results   Component Value Date    WBC 5.2 09/25/2017    RBC 4.49 09/25/2017    HGB 14.4 09/25/2017    HCT 43.3 09/25/2017    MCV 96 09/25/2017    MCH 32.1 09/25/2017    MCHC 33.3 09/25/2017    RDW 12.4 09/25/2017     09/25/2017       BMP RESULTS:  Lab Results   Component Value Date     09/25/2017    POTASSIUM 4.4 09/25/2017    CHLORIDE 107 09/25/2017    CO2 28 09/25/2017    ANIONGAP 6 09/25/2017     (H) 09/25/2017    BUN 13 09/25/2017    CR 0.74 09/25/2017    GFRESTIMATED 79 09/25/2017    GFRESTBLACK >90 09/25/2017    YASMIN 9.5 09/25/2017        A1C RESULTS:  Lab Results   Component Value Date    A1C 5.6 11/18/2016       INR RESULTS:  No results found for: INR        Taiwo Anthony PA-C   September 6, 2018       Thank you for allowing me to participate in the care of your patient.      Sincerely,     Taiwo Anthony PA-C     [facility]    cc:   Taiwo Anthony PA-C  [address] [place] [address]        "

## 2018-09-06 NOTE — PROGRESS NOTES
Progress Note    Client Name: Svetlana Adair  Date:9/6/2018               Service Type: Individual      Session Start Time: 1pm  Session End Time: 145      Session Length: 45     Session #: 7     Attendees: Client attended alone    Treatment Plan Last Reviewed: see below.  PHQ-9 / ANGY-7 : 2;6     DATA      Progress Since Last Session (Related to Symptoms / Goals / Homework):   Symptoms: Improving with a reduction in anxiety with the help of counseling and medication.   Homework: Completed in session      Episode of Care Goals: Satisfactory progress - ACTION (Actively working towards change); Intervened by reinforcing change plan / affirming steps taken Met with Dr Cruz and has been taking Lexapro.  Sleep has improved. Better boundaries with bf around his mood and health concerns. Still can get affected by bf's depression and fear. Working hard to take care of self. 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   Current / Ongoing Stressors and Concerns:   Needing help with establishing and maintaining boundaries and with healthier management of emotions. 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 Reports ongoing sobriety now. In robust self care activities: PT, tennis, social outlets, biking.     Treatment Objective(s) Addressed in This Session:   Improve coping skills; setting limits; self care.  Continue to focus on self care. Financial issues chronic but seeing light at end of the tunnel. Bf signing up for palliative care and she hope this takes some of the burden off of her.   Intervention:   Keeping in mind need to rely on sober supports/programming. Encouraging sobriety and social connection. Ct able to establish boundaries with bf while working on maintaining her connection to him and his family.        ASSESSMENT: Current Emotional / Mental Status (status of significant symptoms):   Risk status (Self / Other harm or suicidal  ideation)   Client denies current fears or concerns for personal safety.   Client denies current or recent suicidal ideation or behaviors.   Client denies current or recent homicidal ideation or behaviors.   Client denies current or recent self injurious behavior or ideation.   Client denies other safety concerns.   A safety and risk management plan has not been developed at this time, however client was given the after-hours number / 911 should there be a change in any of these risk factors.     Appearance:   Appropriate    Eye Contact:   Good    Psychomotor Behavior: Normal    Attitude:   Cooperative    Orientation:   All   Speech    Rate / Production: Normal     Volume:  Normal    Mood:    Anxious  with improvement.    Affect:    Appropriate    Thought Content:  Clear  Rumination    Thought Form:  Coherent  Logical    Insight:    Fair      Medication Review:   No current psychiatric medications prescribed     Medication Compliance:   NA     Changes in Health Issues:   None reported     Chemical Use Review:   Substance Use: Provided encouragement towards continued sobriety  Provided support and affirmation for steps taken towards sobriety. States her AA group is aware of her current use level and are encouraging her to remain sober. Tobacco Use: No current tobacco use.       Collateral Reports Completed:   Not Applicable as yet.    PLAN: (Client Tasks / Therapist Tasks / Other)  Returns in 2 weeks.  We will cont to monitor use and engagement in self care and boundary work.      Monserrat Meng, [facility] 9/6/2018                                                             ________________________________________________________________________    Treatment Plan    Client's Name: Svetlana Adair  YOB: 1950    Date: 6/5/2018      DSM-V Diagnoses: Adjustment Disorders  309.28 (F43.23) With mixed anxiety and depressed mood  Psychosocial / Contextual Factors: Hx of losses; breast cancer survivor; current  "Bf has cancer.  WHODAS: se intake    Referral / Collaboration:  Referral to GP for medication consultation will be recommended..    Anticipated number of session or this episode of care: eval every 90 days      MeasurableTreatment Goal(s) related to diagnosis / functional impairment(s)  Goal 1: Client will improve management of boundaries.    I will know I've met my goal when I feel less \"hooked\".      Objective #A (Client Action)    Client will compile a list of boundaries that they would like to set with others. Ct will practice setting these weekly..  Status: new     Intervention(s)  Therapist will teach coping skills and communication skils to assist ct in her efforts..    Objective #B  Client will identify 2-3 strategies to more effectively address stressors.  Status: new     Intervention(s)  Therapist will encourage and support efforts and provide resources as needed..    Objective #C  Client will explore and adopt coping that is not reliant on MJ nor etoh..  Status: new     Intervention(s)  Therapist will provide referrals/information and support efforts..Including seeking a med consultation from her GP and or psychiatry.          Client has reviewed and agreed to the above plan.      Monserrat Meng, [facility]  June 5, 2018  "

## 2018-09-06 NOTE — PATIENT INSTRUCTIONS
Today's Plan:   1) Continue with same medications.   2) Follow mediterranean diet.    If you have questions or concerns please call my nurse team at [phone].     Scheduling phone number: [phone]  Reminder: Please bring in all current medications, over the counter supplements and vitamin bottles to your next appointment.    It was a pleasure seeing you today!     Taiwo Anthony  9/6/2018

## 2018-09-06 NOTE — MR AVS SNAPSHOT
MRN:[id number]                      After Visit Summary   9/6/2018    Svetlana Adair    MRN: [id number]           Visit Information        Provider Department      9/6/2018 1:00 PM Monserrat Meng, [facility]      Your next 10 appointments already scheduled     Sep 11, 2018 10:40 AM CDT   ROSAMARIA Extremity with Delmis Nelson PT   [facility] [place] [facility] ([facility] [place]  )    [address]  [place] [address]   [phone]            Sep 19, 2018 10:30 AM CDT   [facility]ICAL with Vladislav Cruz MD   [facility]    [address]  [place] [address] [phone]            Sep 27, 2018 12:00 PM CDT   Return Visit with Monserrat Meng [facility] ([facility] [place])    [address]  [place] [address] [phone]            Oct 23, 2018 11:00 AM CDT   Return Visit with Monserrat Meng [facility] ([facility] [place])    [address]  [place] [address] [phone]              MyChart Information     [employer] gives you secure access to your electronic health record. If you see a primary care provider, you can also send messages to your care team and make appointments. If you have questions, please call your primary care clinic.  If you do not have a primary care provider, please call [phone] and they will assist you.        Care EveryWhere ID     This is your Care EveryWhere ID. This could be used by other organizations to access your [facility] medical records  [id number]        Equal Access to Services     [facility] AH: Mary Hamilton, waemili clancy, qaleah kaalmada chayo, chelsi garner So [facility] [phone].    ATENCIÓN: Si habla español, tiene a bruner disposición servicios gratuitos de asistencia lingüística. Llame al  [phone].    We comply with applicable federal civil rights laws and [place] laws. We do not discriminate on the basis of race, color, national origin, age, disability, sex, sexual orientation, or gender identity.

## 2018-09-07 ASSESSMENT — ANXIETY QUESTIONNAIRES: GAD7 TOTAL SCORE: 6

## 2018-09-07 ASSESSMENT — PATIENT HEALTH QUESTIONNAIRE - PHQ9: SUM OF ALL RESPONSES TO PHQ QUESTIONS 1-9: 2

## 2018-09-11 ENCOUNTER — THERAPY VISIT (OUTPATIENT)
Dept: PHYSICAL THERAPY | Facility: CLINIC | Age: 68
End: 2018-09-11
Payer: MEDICARE

## 2018-09-11 DIAGNOSIS — M54.2 NECK PAIN ON RIGHT SIDE: ICD-10-CM

## 2018-09-11 PROCEDURE — 97112 NEUROMUSCULAR REEDUCATION: CPT | Mod: GP | Performed by: PHYSICAL THERAPIST

## 2018-09-11 PROCEDURE — 97140 MANUAL THERAPY 1/> REGIONS: CPT | Mod: GP | Performed by: PHYSICAL THERAPIST

## 2018-09-11 PROCEDURE — 97110 THERAPEUTIC EXERCISES: CPT | Mod: GP | Performed by: PHYSICAL THERAPIST

## 2018-09-12 ENCOUNTER — TRANSFERRED RECORDS (OUTPATIENT)
Dept: HEALTH INFORMATION MANAGEMENT | Facility: CLINIC | Age: 68
End: 2018-09-12

## 2018-09-24 ENCOUNTER — THERAPY VISIT (OUTPATIENT)
Dept: PHYSICAL THERAPY | Facility: CLINIC | Age: 68
End: 2018-09-24
Payer: MEDICARE

## 2018-09-24 DIAGNOSIS — M54.2 NECK PAIN ON RIGHT SIDE: ICD-10-CM

## 2018-09-24 PROCEDURE — 97110 THERAPEUTIC EXERCISES: CPT | Mod: GP | Performed by: PHYSICAL THERAPIST

## 2018-09-24 PROCEDURE — 97112 NEUROMUSCULAR REEDUCATION: CPT | Mod: GP | Performed by: PHYSICAL THERAPIST

## 2018-09-24 PROCEDURE — 97140 MANUAL THERAPY 1/> REGIONS: CPT | Mod: GP | Performed by: PHYSICAL THERAPIST

## 2018-10-16 ENCOUNTER — DOCUMENTATION ONLY (OUTPATIENT)
Dept: LAB | Facility: CLINIC | Age: 68
End: 2018-10-16

## 2018-10-16 ENCOUNTER — DOCUMENTATION ONLY (OUTPATIENT)
Dept: CARDIOLOGY | Facility: CLINIC | Age: 68
End: 2018-10-16

## 2018-10-16 DIAGNOSIS — I65.22 ATHEROSCLEROSIS OF LEFT CAROTID ARTERY: ICD-10-CM

## 2018-10-16 DIAGNOSIS — E78.5 HYPERLIPIDEMIA LDL GOAL <70: ICD-10-CM

## 2018-10-16 DIAGNOSIS — I25.10 CORONARY ARTERY DISEASE INVOLVING NATIVE CORONARY ARTERY OF NATIVE HEART WITHOUT ANGINA PECTORIS: ICD-10-CM

## 2018-10-16 DIAGNOSIS — E78.5 HYPERLIPIDEMIA LDL GOAL <130: Primary | ICD-10-CM

## 2018-10-16 DIAGNOSIS — E78.2 MIXED HYPERLIPIDEMIA: Primary | ICD-10-CM

## 2018-10-16 DIAGNOSIS — E78.2 MIXED HYPERLIPIDEMIA: ICD-10-CM

## 2018-10-16 LAB
ALT SERPL W P-5'-P-CCNC: 36 U/L (ref 0–50)
CHOLEST SERPL-MCNC: 188 MG/DL
HDLC SERPL-MCNC: 74 MG/DL
LDLC SERPL CALC-MCNC: 91 MG/DL
NONHDLC SERPL-MCNC: 114 MG/DL
TRIGL SERPL-MCNC: 113 MG/DL

## 2018-10-16 PROCEDURE — 36415 COLL VENOUS BLD VENIPUNCTURE: CPT | Performed by: PHYSICIAN ASSISTANT

## 2018-10-16 PROCEDURE — 80061 LIPID PANEL: CPT | Performed by: PHYSICIAN ASSISTANT

## 2018-10-16 PROCEDURE — 84460 ALANINE AMINO (ALT) (SGPT): CPT | Performed by: PHYSICIAN ASSISTANT

## 2018-10-16 RX ORDER — ROSUVASTATIN CALCIUM 10 MG/1
10 TABLET, COATED ORAL DAILY
Qty: 30 TABLET | Refills: 3 | Status: SHIPPED | OUTPATIENT
Start: 2018-10-16 | End: 2018-10-17

## 2018-10-16 NOTE — PROGRESS NOTES
Patient had lab appointment this morning and was drawn for what was in chart.    Patient requested for a Pre-physical lab draw, after she was done being drawn.     Lab only was able to draw what was in chart, patient couldnt wait to be drawn again today do to another appointment today, but will coming back in the morning for pre-physical labs.    Please place orders for lab If needed or if nothing else is needed, to please contact patient.     Thank you Lab,

## 2018-10-16 NOTE — PROGRESS NOTES
Message from PATIENCE Taiwo Anthony:  Lipid panel essentially unchanged. Let's increase rosuvastatin to 10 mg.     Taiwo Anthony PA-C   10/16/2018     Spoke with patient to review PATIENCE Taiwo Anthony's recommendation to increase rosuvastatin to 10mg. Patient was disappointed with her LDL score as she has been working on her diet. She requested a new script be sent as she was due to order refills. She asked for 30 days to see how she tolerates the new dose. Rx escripted.

## 2018-10-17 DIAGNOSIS — E78.2 MIXED HYPERLIPIDEMIA: ICD-10-CM

## 2018-10-17 RX ORDER — ROSUVASTATIN CALCIUM 10 MG/1
10 TABLET, COATED ORAL DAILY
Qty: 90 TABLET | Refills: 0 | Status: SHIPPED | OUTPATIENT
Start: 2018-10-17 | End: 2019-05-08

## 2018-10-19 ENCOUNTER — OFFICE VISIT (OUTPATIENT)
Dept: FAMILY MEDICINE | Facility: CLINIC | Age: 68
End: 2018-10-19
Payer: COMMERCIAL

## 2018-10-19 VITALS
HEIGHT: 65 IN | BODY MASS INDEX: 22.84 KG/M2 | DIASTOLIC BLOOD PRESSURE: 70 MMHG | WEIGHT: 137.1 LBS | HEART RATE: 58 BPM | OXYGEN SATURATION: 99 % | SYSTOLIC BLOOD PRESSURE: 108 MMHG | TEMPERATURE: 98.8 F

## 2018-10-19 DIAGNOSIS — E78.5 HYPERLIPIDEMIA LDL GOAL <130: ICD-10-CM

## 2018-10-19 DIAGNOSIS — E78.5 HYPERLIPIDEMIA LDL GOAL <70: ICD-10-CM

## 2018-10-19 DIAGNOSIS — Z00.00 ROUTINE GENERAL MEDICAL EXAMINATION AT A HEALTH CARE FACILITY: Primary | ICD-10-CM

## 2018-10-19 DIAGNOSIS — Z23 NEED FOR PROPHYLACTIC VACCINATION AND INOCULATION AGAINST INFLUENZA: ICD-10-CM

## 2018-10-19 DIAGNOSIS — Z71.6 ENCOUNTER FOR TOBACCO USE CESSATION COUNSELING: ICD-10-CM

## 2018-10-19 DIAGNOSIS — G89.4 CHRONIC PAIN SYNDROME: ICD-10-CM

## 2018-10-19 DIAGNOSIS — F32.0 MILD MAJOR DEPRESSION (H): ICD-10-CM

## 2018-10-19 DIAGNOSIS — I25.10 CORONARY ARTERY DISEASE INVOLVING NATIVE CORONARY ARTERY OF NATIVE HEART WITHOUT ANGINA PECTORIS: ICD-10-CM

## 2018-10-19 DIAGNOSIS — C50.911 MALIGNANT NEOPLASM OF RIGHT FEMALE BREAST, UNSPECIFIED ESTROGEN RECEPTOR STATUS, UNSPECIFIED SITE OF BREAST (H): ICD-10-CM

## 2018-10-19 DIAGNOSIS — E21.3 HYPERPARATHYROIDISM (H): ICD-10-CM

## 2018-10-19 DIAGNOSIS — R19.7 DIARRHEA, UNSPECIFIED TYPE: ICD-10-CM

## 2018-10-19 LAB
ALBUMIN SERPL-MCNC: 4 G/DL (ref 3.4–5)
ALP SERPL-CCNC: 43 U/L (ref 40–150)
ALT SERPL W P-5'-P-CCNC: 38 U/L (ref 0–50)
ANION GAP SERPL CALCULATED.3IONS-SCNC: 9 MMOL/L (ref 3–14)
AST SERPL W P-5'-P-CCNC: 23 U/L (ref 0–45)
BILIRUB SERPL-MCNC: 0.4 MG/DL (ref 0.2–1.3)
BUN SERPL-MCNC: 13 MG/DL (ref 7–30)
CALCIUM SERPL-MCNC: 9.2 MG/DL (ref 8.5–10.1)
CHLORIDE SERPL-SCNC: 102 MMOL/L (ref 94–109)
CO2 SERPL-SCNC: 26 MMOL/L (ref 20–32)
CREAT SERPL-MCNC: 0.71 MG/DL (ref 0.52–1.04)
ERYTHROCYTE [DISTWIDTH] IN BLOOD BY AUTOMATED COUNT: 12.3 % (ref 10–15)
GFR SERPL CREATININE-BSD FRML MDRD: 83 ML/MIN/1.7M2
GLUCOSE SERPL-MCNC: 92 MG/DL (ref 70–99)
HCT VFR BLD AUTO: 40.4 % (ref 35–47)
HGB BLD-MCNC: 13.1 G/DL (ref 11.7–15.7)
MCH RBC QN AUTO: 31.6 PG (ref 26.5–33)
MCHC RBC AUTO-ENTMCNC: 32.4 G/DL (ref 31.5–36.5)
MCV RBC AUTO: 98 FL (ref 78–100)
PLATELET # BLD AUTO: 233 10E9/L (ref 150–450)
POTASSIUM SERPL-SCNC: 4.1 MMOL/L (ref 3.4–5.3)
PROT SERPL-MCNC: 7.6 G/DL (ref 6.8–8.8)
RBC # BLD AUTO: 4.14 10E12/L (ref 3.8–5.2)
SODIUM SERPL-SCNC: 137 MMOL/L (ref 133–144)
TSH SERPL DL<=0.005 MIU/L-ACNC: 2.46 MU/L (ref 0.4–4)
WBC # BLD AUTO: 3.9 10E9/L (ref 4–11)

## 2018-10-19 PROCEDURE — 85027 COMPLETE CBC AUTOMATED: CPT | Performed by: INTERNAL MEDICINE

## 2018-10-19 PROCEDURE — 80053 COMPREHEN METABOLIC PANEL: CPT | Performed by: INTERNAL MEDICINE

## 2018-10-19 PROCEDURE — 83516 IMMUNOASSAY NONANTIBODY: CPT | Mod: 59 | Performed by: INTERNAL MEDICINE

## 2018-10-19 PROCEDURE — 99397 PER PM REEVAL EST PAT 65+ YR: CPT | Performed by: INTERNAL MEDICINE

## 2018-10-19 PROCEDURE — 99213 OFFICE O/P EST LOW 20 MIN: CPT | Mod: 25 | Performed by: INTERNAL MEDICINE

## 2018-10-19 PROCEDURE — 83516 IMMUNOASSAY NONANTIBODY: CPT | Performed by: INTERNAL MEDICINE

## 2018-10-19 PROCEDURE — 84443 ASSAY THYROID STIM HORMONE: CPT | Performed by: INTERNAL MEDICINE

## 2018-10-19 PROCEDURE — 36415 COLL VENOUS BLD VENIPUNCTURE: CPT | Performed by: INTERNAL MEDICINE

## 2018-10-19 NOTE — MR AVS SNAPSHOT
After Visit Summary   10/19/2018    Svetlana Adair    MRN: [id number]           Patient Information     Date Of Birth          1950        Visit Information        Provider Department      10/19/2018 8:30 AM Vladislav Cruz MD [facility]        Today's Diagnoses     Routine general medical examination at a health care facility    -  1    Malignant neoplasm of right female breast, unspecified estrogen receptor status, unspecified site of breast (H)        Hyperparathyroidism (H)        Mild major depression (H)        Coronary artery disease involving native coronary artery of native heart without angina pectoris        Hyperlipidemia LDL goal <70        Chronic pain syndrome        Need for prophylactic vaccination and inoculation against influenza        Diarrhea, unspecified type        Encounter for tobacco use cessation counseling          Care Instructions      Preventive Health Recommendations    Female Ages 65 +    Yearly exam:     See your health care provider every year in order to  o Review health changes.   o Discuss preventive care.    o Review your medicines if your doctor has prescribed any.      You no longer need a yearly Pap test unless you've had an abnormal Pap test in the past 10 years. If you have vaginal symptoms, such as bleeding or discharge, be sure to talk with your provider about a Pap test.      Every 1 to 2 years, have a mammogram.  If you are over 69, talk with your health care provider about whether or not you want to continue having screening mammograms.      Every 10 years, have a colonoscopy. Or, have a yearly FIT test (stool test). These exams will check for colon cancer.       Have a cholesterol test every 5 years, or more often if your doctor advises it.       Have a diabetes test (fasting glucose) every three years. If you are at risk for diabetes, you should have this test more often.       At age 65, have a bone density scan (DEXA) to check  for osteoporosis (brittle bone disease).    Shots:    Get a flu shot each year.    Get a tetanus shot every 10 years.    Talk to your doctor about your pneumonia vaccines. There are now two you should receive - Pneumovax (PPSV 23) and Prevnar (PCV 13).    Talk to your pharmacist about the shingles vaccine.    Talk to your doctor about the hepatitis B vaccine.    Nutrition:     Eat at least 5 servings of fruits and vegetables each day.      Eat whole-grain bread, whole-wheat pasta and brown rice instead of white grains and rice.      Get adequate Calcium and Vitamin D.     Lifestyle    Exercise at least 150 minutes a week (30 minutes a day, 5 days a week). This will help you control your weight and prevent disease.      Limit alcohol to one drink per day.      No smoking.       Wear sunscreen to prevent skin cancer.       See your dentist twice a year for an exam and cleaning.      See your eye doctor every 1 to 2 years to screen for conditions such as glaucoma, macular degeneration and cataracts.          Follow-ups after your visit        Additional Services     MED THERAPY MANAGE REFERRAL       Your provider has referred you to: **[facility] Scheduling (numerous locations) [phone]   [email or web address]    Reason for Referral: Tobacco Cessation    The [facility] department will contact you to schedule an appointment.  You may also schedule the appointment by calling [phone].  For [facility] patients, please call [phone] to confirm/schedule your appointment on the next business day.    This service is designed to help you get the most from your medications.  A specially trained Pharmacist will work closely with you and your providers to solve any questions, concerns, issues or problems related to your medications.    Please bring all of your prescription and non-prescription medications  (such as vitamins, over-the-counter medications, and herbals) or a detailed medication list to your appointment.    If you have a glucose meter or other home monitoring information, please also bring this to your appointment (i.e. blood glucose log, blood pressure log, pain log, etc.).                  Follow-up notes from your care team     Return in about 1 year (around 10/19/2019) for Preventive Visit.      Your next 10 appointments already scheduled     Oct 23, 2018 11:00 AM CDT   Return Visit with Monserrat Meng [facility] ([facility] [place])    [address]  [facility] [address] [phone]              Who to contact     If you have questions or need follow up information about today's clinic visit or your schedule please contact [facility] directly at [phone].  Normal or non-critical lab and imaging results will be communicated to you by MyChart, letter or phone within 4 business days after the clinic has received the results. If you do not hear from us within 7 days, please contact the clinic through [employer] or phone. If you have a critical or abnormal lab result, we will notify you by phone as soon as possible.  Submit refill requests through [employer] or call your pharmacy and they will forward the refill request to us. Please allow 3 business days for your refill to be completed.          Additional Information About Your Visit        MyChart Information     [employer] gives you secure access to your electronic health record. If you see a primary care provider, you can also send messages to your care team and make appointments. If you have questions, please call your primary care clinic.  If you do not have a primary care provider, please call [phone] and they will assist you.        Care EveryWhere ID     This is your Care EveryWhere ID. This could be used by other organizations to access your [facility] medical records  [id number]        Your  "Vitals Were     Pulse Temperature Height Pulse Oximetry BMI (Body Mass Index)       58 98.8  F (37.1  C) (Oral) 5' 5\" (1.651 m) 99% 22.81 kg/m2        Blood Pressure from Last 3 Encounters:   10/19/18 108/70   09/06/18 116/70   08/02/18 102/66    Weight from Last 3 Encounters:   10/19/18 137 lb 1.6 oz (62.2 kg)   09/06/18 136 lb (61.7 kg)   08/02/18 135 lb (61.2 kg)              We Performed the Following     CBC with platelets     Comprehensive metabolic panel     MED THERAPY MANAGE REFERRAL     OFFICE/OUTPT VISIT,EST,LEVL III     Tissue transglutaminase corine IgA and IgG     TSH          Today's Medication Changes          These changes are accurate as of 10/19/18  9:27 AM.  If you have any questions, ask your nurse or doctor.               Stop taking these medicines if you haven't already. Please contact your care team if you have questions.     cyclobenzaprine 5 MG tablet   Commonly known as:  FLEXERIL   Stopped by:  Vladislav Cruz MD           gabapentin 100 MG capsule   Commonly known as:  NEURONTIN   Stopped by:  Vladislav Cruz MD                    Primary Care Provider Office Phone # Fax #    Vladislav Cruz [phone] [address] [address]        Equal Access to Services     [facility] AH: Hadii aad ku hadasho Soomaali, waaxda luqadaha, qaybta kaalmada adeegyada, waxay stephenin haychely james . So [facility] [phone].    ATENCIÓN: Si habla español, tiene a bruner disposición servicios gratuitos de asistencia lingüística. Llame al [phone].    We comply with applicable federal civil rights laws and [place] laws. We do not discriminate on the basis of race, color, national origin, age, disability, sex, sexual orientation, or gender identity.            Thank you!     Thank you for choosing [facility]  for your care. Our goal is always to provide you with excellent care. Hearing back from our patients is one way we can continue to improve " our services. Please take a few minutes to complete the written survey that you may receive in the mail after your visit with us. Thank you!             Your Updated Medication List - Protect others around you: Learn how to safely use, store and throw away your medicines at www.disposemymeds.org.          This list is accurate as of 10/19/18  9:27 AM.  Always use your most recent med list.                   Brand Name Dispense Instructions for use Diagnosis    aspirin 81 MG tablet     30 tablet    Take 1 tablet (81 mg) by mouth daily    Coronary artery disease involving native coronary artery of native heart without angina pectoris       CoQ10 100 MG Caps      Take by mouth daily Reported on 3/3/2017        escitalopram 10 MG tablet    LEXAPRO    90 tablet    Take 1 tablet (10 mg) by mouth daily One-half tablet once daily for one week, then increase to one tablet daily until follow up appointment    Mild major depression (H)       FIBER PO      2 tablets twice daily        HYDROcodone-acetaminophen 5-325 MG per tablet    NORCO    20 tablet    Take 1-2 tablets by mouth every 6 hours as needed for pain    Low back pain without sciatica, unspecified back pain laterality, unspecified chronicity       IBUPROFEN PO      Take 200-400 mg by mouth every 6 hours as needed for moderate pain        letrozole 2.5 MG tablet    FEMARA     Take 1 tablet by mouth daily        LYSINE      1-3 daily as needed        MULTIVITAMIN ADULT PO      Take by mouth daily        nicotine polacrilex 2 MG gum    NICORETTE    30 tablet    Place 1 each (2 mg) inside cheek as needed for smoking cessation    Tobacco use disorder       OMEGA 3 PO      Take 1 tablet by mouth 2 times daily        omeprazole 20 MG tablet     30 tablet    Take 1 tablet (20 mg) by mouth as needed    GERD (gastroesophageal reflux disease)       rosuvastatin 10 MG tablet    CRESTOR    90 tablet    Take 1 tablet (10 mg) by mouth daily    Mixed hyperlipidemia        triamterene-hydrochlorothiazide 37.5-25 MG per tablet    MAXZIDE-25    90 tablet    Take 1 tablet by mouth as needed    Peripheral edema       valACYclovir 1000 mg tablet    VALTREX    8 tablet    Take 2 tablets (2,000 mg) by mouth 2 times daily as needed    HSV (herpes simplex virus) infection       Vitamin D3 2000 units Tabs      Take 5,000 Units by mouth Reported on 3/3/2017        ZOLEDRONIC ACID IV      Inject into the vein every 6 months

## 2018-10-19 NOTE — PROGRESS NOTES
"  SUBJECTIVE:   Svetlana Adair is a 68 year old female who presents for Preventive Visit.  Are you in the first 12 months of your Medicare Part B coverage?  No    Physical Health:    In general, how would you rate your overall physical health? good    Outside of work, how many days during the week do you exercise? 2-3 days/week    Outside of work, approximately how many minutes a day do you exercise?15-30 minutes    If you drink alcohol do you typically have >3 drinks per day or >7 drinks per week? No    Do you usually eat at least 4 servings of fruit and vegetables a day, include whole grains & fiber and avoid regularly eating high fat or \"junk\" foods? NO    Do you have any problems taking medications regularly?  No    Do you have any side effects from medications? none    Needs assistance for the following daily activities: none    Home safety:  throw rugs in the hallway     Hearing impairment: No    In the past 6 months, have you been bothered by leaking of urine? no    Mental Health:    In general, how would you rate your overall mental or emotional health? fair  PHQ-2 Score:      Additional concerns to address?      Diarrhea for 3 months   No blood in stools  Up to 2 stools per day  No weight loss or fevers   No recent travel or antibiotics   Symptom improved with anti diarrhea medication    Rare intermittent use of norco for chronic neck pain; she requests refill       lexapro is helping mood ; she is talking to a therapist too     PHQ-9 SCORE 8/21/2018 9/6/2018 10/19/2018   Total Score - - -   Total Score 2 2 4       Fall risk:      Fallen 2 or more times in the past year?: No  Any fall with injury in the past year?: No    COGNITIVE SCREEN  1) Repeat 3 items (Leader, Season, Table)    2) Clock draw: NORMAL  3) 3 item recall: Recalls 3 objects  Results: 3 items recalled: COGNITIVE IMPAIRMENT LESS LIKELY    Mini-CogTM Copyright S Juan. Licensed by the author for use in [facility]; reprinted " with permission (luis antonio@[email or web address]). All rights reserved.      Reviewed and updated as needed this visit by clinical staff  Tobacco  Allergies  Meds  Soc Hx      Reviewed and updated as needed this visit by Provider        Social History   Substance Use Topics     Smoking status: Former Smoker     Packs/day: 1.00     Years: 22.00     Types: Cigarettes     Quit date: 4/28/2010     Smokeless tobacco: Never Used     Alcohol use No      Comment: intermittent sobriety 8/24/11                             Do you feel safe in your environment - Yes    Do you have a Health Care Directive?: Yes: Patient states has Advance Directive and will bring in a copy to clinic.    Current providers sharing in care for this patient include:   Patient Care Team:  Vlaidslav Cruz MD as PCP - General (Internal Medicine)  Pavan Fuentes MD as MD (Oncology)    The following health maintenance items are reviewed in Epic and correct as of today:  Health Maintenance   Topic Date Due     ADVANCE DIRECTIVE PLANNING Q5 YRS  09/06/2016     DEPRESSION ACTION PLAN Q1 YR  11/18/2017     INFLUENZA VACCINE (1) 09/01/2018     FALL RISK ASSESSMENT  10/26/2018     DEXA Q3 YR  11/06/2018     PHQ-9 Q6 MONTHS  03/06/2019     URINE DRUG SCREEN Q1 YR  04/26/2019     ANGY QUESTIONNAIRE 1 YEAR  09/06/2019     PNEUMO 5YR BOOST DUE AFTER AGE 65 (NO IB MSG) (2) 12/10/2020     PNEUMOCOCCAL (2 of 2 - PPSV23) 12/10/2020     COLONOSCOPY Q5 YR  12/14/2020     TETANUS IMMUNIZATION (SYSTEM ASSIGNED)  07/11/2023     LIPID SCREEN Q5 YR FEMALE (SYSTEM ASSIGNED)  10/16/2023     HEPATITIS C SCREENING  Completed     Patient Active Problem List   Diagnosis     Breast cancer est/prog +, HER2 ERNIE -     Osteoarthritis     Mild major depression (H)     Advanced directives, counseling/discussion     Knee pain     Past use of tobacco     IFG (impaired fasting glucose)     Low back pain     Malignant neoplasm of right breast (H)     Coronary artery disease involving native coronary artery of  native heart without angina pectoris     Hyperlipidemia LDL goal <70     Follow-up examination following surgery     Carotid artery plaque     Chronic pain syndrome     Adjustment disorder with mixed anxiety and depressed mood     Neck pain on right side     Hyperparathyroidism (H)     Alcohol dependence in remission (H)     Past Surgical History:   Procedure Laterality Date     COLONOSCOPY       COLONOSCOPY  11/17/2011    Procedure:COLONOSCOPY; Colonoscopy; Surgeon:MEKA RUSS; Location: GI     DISSECT LYMPH NODE AXILLA Right 11/2/2017    Procedure: DISSECT LYMPH NODE AXILLA;  RIGHT AXILLARY LYMPH NODE DISSECTION;  Surgeon: Cortez White MD;  Location: [facility]     GYN SURGERY  2005    hysterectomy after hx of ovarian cyst, ended up being benign     HYSTERECTOMY, PAP NO LONGER INDICATED       MASTECTOMY MODIFIED RADICAL  2011    bilateral     MASTECTOMY, BILATERAL       ORTHOPEDIC SURGERY      rt. knee arthroscopy     ORTHOPEDIC SURGERY Right     toe surgery     REALIGN PATELLA  1973    right      TOE SURGERY      right grt OA        Social History   Substance Use Topics     Smoking status: Former Smoker     Packs/day: 1.00     Years: 22.00     Types: Cigarettes     Quit date: 4/28/2010     Smokeless tobacco: Never Used     Alcohol use No      Comment: intermittent sobriety 8/24/11     Family History   Problem Relation Age of Onset     Cancer Mother 60     leukemia     Arthritis Mother      osteo     Eye Disorder Mother      glaucoma     GASTROINTESTINAL DISEASE Mother      gallbladder     Other Cancer Mother      Gallbladder Disease Mother      Alcohol/Drug Father      alcohol     HEART DISEASE Father      ? CHF     Respiratory Father      emphysema     Coronary Artery Disease Father      Substance Abuse Father      Substance Abuse Sister      Colon Cancer Brother      Breast Cancer Maternal Grandmother      Asthma Sister      Cancer - colorectal Brother 50     Prostate Cancer Brother      Allergies  Brother      bee      Arthritis Sister      osteo     Arthritis Sister      osteo     Arthritis Brother      osteo     Depression Sister      Psychotic Disorder Sister      bipolar     Respiratory Sister          Current Outpatient Prescriptions   Medication Sig Dispense Refill     aspirin 81 MG tablet Take 1 tablet (81 mg) by mouth daily 30 tablet      Cholecalciferol (VITAMIN D3) 2000 UNITS TABS Take 5,000 Units by mouth Reported on 3/3/2017       Coenzyme Q10 (COQ10) 100 MG CAPS Take by mouth daily Reported on 3/3/2017       escitalopram (LEXAPRO) 10 MG tablet Take 1 tablet (10 mg) by mouth daily One-half tablet once daily for one week, then increase to one tablet daily until follow up appointment 90 tablet 3     FIBER PO 2 tablets twice daily       HYDROcodone-acetaminophen (NORCO) 5-325 MG per tablet Take 1-2 tablets by mouth every 6 hours as needed for pain 20 tablet 0     IBUPROFEN PO Take 200-400 mg by mouth every 6 hours as needed for moderate pain       letrozole (FEMARA) 2.5 MG tablet Take 1 tablet by mouth daily  5     LYSINE 1-3 daily as needed       Multiple Vitamins-Minerals (MULTIVITAMIN ADULT PO) Take by mouth daily       Omega-3 Fatty Acids (OMEGA 3 PO) Take 1 tablet by mouth 2 times daily        omeprazole 20 MG tablet Take 1 tablet (20 mg) by mouth as needed 30 tablet 0     rosuvastatin (CRESTOR) 10 MG tablet Take 1 tablet (10 mg) by mouth daily 90 tablet 0     triamterene-hydrochlorothiazide (MAXZIDE-25) 37.5-25 MG per tablet Take 1 tablet by mouth as needed 90 tablet 0     valACYclovir (VALTREX) 1000 mg tablet Take 2 tablets (2,000 mg) by mouth 2 times daily as needed 8 tablet 1     ZOLEDRONIC ACID IV Inject into the vein every 6 months       cyclobenzaprine (FLEXERIL) 5 MG tablet Take 1-2 tablets (5-10 mg) by mouth 2 times daily as needed for muscle spasms (Patient not taking: Reported on 10/19/2018) 42 tablet 0     gabapentin (NEURONTIN) 100 MG capsule Take 2 capsules (200 mg) by mouth 3  "times daily (Patient not taking: Reported on 10/19/2018) 540 capsule 0     nicotine polacrilex (NICORETTE) 2 MG gum Place 1 each (2 mg) inside cheek as needed for smoking cessation 30 tablet 11     Allergies   Allergen Reactions     Cats      Codeine Sulfate GI Disturbance           ROS:  Constitutional, HEENT, cardiovascular, pulmonary, gi and gu systems are negative, except as otherwise noted.    OBJECTIVE:   /70 (BP Location: Left arm, Cuff Size: Adult Regular)  Pulse 58  Temp 98.8  F (37.1  C) (Oral)  Ht 5' 5\" (1.651 m)  Wt 137 lb 1.6 oz (62.2 kg)  SpO2 99%  BMI 22.81 kg/m2 Estimated body mass index is 22.81 kg/(m^2) as calculated from the following:    Height as of this encounter: 5' 5\" (1.651 m).    Weight as of this encounter: 137 lb 1.6 oz (62.2 kg).  EXAM:   GENERAL: healthy, alert and no distress  EYES: Eyes grossly normal to inspection, PERRL and conjunctivae and sclerae normal  HENT: ear canals and TM's normal, nose and mouth without ulcers or lesions  NECK: no adenopathy, no asymmetry, masses, or scars and thyroid normal to palpation  RESP: lungs clear to auscultation - no rales, rhonchi or wheezes  BREAST: Deferred as patient sees Dr. Ace from oncology regularly in follow up   CV: regular rate and rhythm, normal S1 S2, no S3 or S4, no murmur, click or rub, no peripheral edema and peripheral pulses strong  ABDOMEN: soft, nontender, no hepatosplenomegaly, no masses and bowel sounds normal  MS: no gross musculoskeletal defects noted, no edema  SKIN: no suspicious lesions or rashes  NEURO: Normal strength and tone, mentation intact and speech normal  PSYCH: mentation appears normal, affect normal/bright    Diagnostic Test Results:  Labs pending     ASSESSMENT / PLAN:   1. Routine general medical examination at a health care facility      2. Malignant neoplasm of right female breast, unspecified estrogen receptor status, unspecified site of breast (H)  Continue follow up with oncology     3. " Hyperparathyroidism (H)  Check labs today     4. Mild major depression (H)  PHQ 9 demonstrates good mood control  Still has mood complaints, I encouraged her to continue follow up with psychotherapist  Many life stressors discussed     5. Coronary artery disease involving native coronary artery of native heart without angina pectoris  Stable; continue follow up with cardiology     6. Hyperlipidemia LDL goal <70  Statin dose recently adjusted   - Comprehensive metabolic panel  - CBC with platelets  - TSH    7. Chronic pain syndrome  Continue rare intermittent use of hydrocodone   - OFFICE/OUTPT VISIT,EST,LEVL III    8. Need for prophylactic vaccination and inoculation against influenza  Vaccinated     9. Diarrhea, unspecified type  Unclear etiology   Try eliminating lactose and and artificial sweeteners  Check for celiac  If that does not help enough consider stool tests and or early colonoscopy  Discussed with keri t  - Tissue transglutaminase corine IgA and IgG  - OFFICE/OUTPT VISIT,EST,LEVL III    10. Encounter for tobacco use cessation counseling  She would like to try chantix to help stop chewing nicotine gum, referred to Janice to help with coverage as there are financial barriers to going back on chantix   - MED THERAPY MANAGE REFERRAL    End of Life Planning:  Patient currently has an advanced directive: Yes.  Practitioner is supportive of decision.    COUNSELING:  Reviewed preventive health counseling, as reflected in patient instructions  Special attention given to:       Lung cancer screening:  Less than 30 pack year hx   Regular exercise       Healthy diet/nutrition       Immunizations    Flu shot recommended she got it [facility] this year, shingrix recommended she already had her 2 injections   Colon cancer screening repeat colonoscopy in 2020, or sooner pending diarrhea symptoms    Hepatitis C screening done    BP Readings from Last 1 Encounters:   10/19/18 108/70     Estimated body mass index is 22.81  "kg/(m^2) as calculated from the following:    Height as of this encounter: 5' 5\" (1.651 m).    Weight as of this encounter: 137 lb 1.6 oz (62.2 kg).           reports that she quit smoking about 8 years ago. Her smoking use included Cigarettes. She has a 22.00 pack-year smoking history. She has never used smokeless tobacco.  Tobacco Cessation Action Plan: Pharmacotherapies : Chantix    Appropriate preventive services were discussed with this patient, including applicable screening as appropriate for cardiovascular disease, diabetes, osteopenia/osteoporosis, and glaucoma.  As appropriate for age/gender, discussed screening for colorectal cancer, prostate cancer, breast cancer, and cervical cancer. Checklist reviewing preventive services available has been given to the patient.    Reviewed patients plan of care and provided an AVS. The Basic Care Plan (routine screening as documented in Health Maintenance) for Svetlana meets the Care Plan requirement. This Care Plan has been established and reviewed with the Patient.    Counseling Resources:  ATP IV Guidelines  Pooled Cohorts Equation Calculator  Breast Cancer Risk Calculator  FRAX Risk Assessment  ICSI Preventive Guidelines  Dietary Guidelines for Americans, 2010  USDA's MyPlate  ASA Prophylaxis  Lung CA Screening    Vladislav Cruz MD  [facility]  "

## 2018-10-19 NOTE — LETTER
[facility]  [address] [facility]  [address]  [place]  [address]  Tel: [phone]    October 22, 2018    Svetlana Adair  [address]   [facility] [address]        Dear Ms. Adair,    The following letter pertains to your most recent diagnostic tests:    -Your tissue transglutaminase antibodies are negative which means you do not have celiac disease (sprue) which is an allergy/ intolerance to wheat gluten.       -Liver and gallbladder tests are normal for you. (ALT,AST, Alk phos, bilirubin), kidney function is normal for you (Creatinine, GFR), Sodium is normal, Potassium is normal for you, Calcium is normal for you, Glucose (blood sugar) is normal for you.      -TSH (thyroid stimulating hormone) level is normal which indicates normal circulating thyroid hormone levels.      -Your complete blood counts including your hemoglobin returned normal for you.         Bottom line:  Lab results look OK.        Follow up:  Schedule an appointment for a physical examination with fasting blood tests in one year's time, or return sooner if new questions, symptoms or problems arise or if diarrhea fails to improve.     If you have any further questions or problems, please contact our office.      Sincerely,    Vladislav Cruz MD/ Shivani Vieira CMA  Results for orders placed or performed in visit on 10/19/18   Comprehensive metabolic panel   Result Value Ref Range    Sodium 137 133 - 144 mmol/L    Potassium 4.1 3.4 - 5.3 mmol/L    Chloride 102 94 - 109 mmol/L    Carbon Dioxide 26 20 - 32 mmol/L    Anion Gap 9 3 - 14 mmol/L    Glucose 92 70 - 99 mg/dL    Urea Nitrogen 13 7 - 30 mg/dL    Creatinine 0.71 0.52 - 1.04 mg/dL    GFR Estimate 83 >60 mL/min/1.7m2    GFR Estimate If Black >90 >60 mL/min/1.7m2    Calcium 9.2 8.5 - 10.1 mg/dL    Bilirubin Total 0.4 0.2 - 1.3 mg/dL    Albumin 4.0 3.4 - 5.0 g/dL    Protein Total 7.6 6.8 - 8.8 g/dL    Alkaline Phosphatase 43 40 - 150 U/L    ALT 38 0 - 50 U/L    AST 23 0  - 45 U/L   CBC with platelets   Result Value Ref Range    WBC 3.9 (L) 4.0 - 11.0 10e9/L    RBC Count 4.14 3.8 - 5.2 10e12/L    Hemoglobin 13.1 11.7 - 15.7 g/dL    Hematocrit 40.4 35.0 - 47.0 %    MCV 98 78 - 100 fl    MCH 31.6 26.5 - 33.0 pg    MCHC 32.4 31.5 - 36.5 g/dL    RDW 12.3 10.0 - 15.0 %    Platelet Count 233 150 - 450 10e9/L   Tissue transglutaminase corine IgA and IgG   Result Value Ref Range    Tissue Transglutaminase Antibody IgA 1 <7 U/mL    Tissue Transglutaminase Corine IgG 1 <7 U/mL   TSH   Result Value Ref Range    TSH 2.46 0.40 - 4.00 mU/L               Enclosure: Lab Results

## 2018-10-20 ASSESSMENT — PATIENT HEALTH QUESTIONNAIRE - PHQ9: SUM OF ALL RESPONSES TO PHQ QUESTIONS 1-9: 4

## 2018-10-21 LAB
TTG IGA SER-ACNC: 1 U/ML
TTG IGG SER-ACNC: 1 U/ML

## 2018-10-22 NOTE — PROGRESS NOTES
The following letter pertains to your most recent diagnostic tests:    -Your tissue transglutaminase antibodies are negative which means you do not have celiac disease (sprue) which is an allergy/ intolerance to wheat gluten.       -Liver and gallbladder tests are normal for you. (ALT,AST, Alk phos, bilirubin), kidney function is normal for you (Creatinine, GFR), Sodium is normal, Potassium is normal for you, Calcium is normal for you, Glucose (blood sugar) is normal for you.      -TSH (thyroid stimulating hormone) level is normal which indicates normal circulating thyroid hormone levels.      -Your complete blood counts including your hemoglobin returned normal for you.         Bottom line:  Lab results look OK.        Follow up:  Schedule an appointment for a physical examination with fasting blood tests in one year's time, or return sooner if new questions, symptoms or problems arise or if diarrhea fails to improve.       Sincerely,    Dr. Cruz

## 2018-10-23 ENCOUNTER — OFFICE VISIT (OUTPATIENT)
Dept: PSYCHOLOGY | Facility: CLINIC | Age: 68
End: 2018-10-23
Payer: COMMERCIAL

## 2018-10-23 DIAGNOSIS — F43.23 ADJUSTMENT DISORDER WITH MIXED ANXIETY AND DEPRESSED MOOD: Primary | ICD-10-CM

## 2018-10-23 PROCEDURE — 90834 PSYTX W PT 45 MINUTES: CPT | Performed by: SOCIAL WORKER

## 2018-10-23 NOTE — MR AVS SNAPSHOT
MRN:[id number]                      After Visit Summary   10/23/2018    Svetlana Adair    MRN: [id number]           Visit Information        Provider Department      10/23/2018 11:00 AM Monserrat Meng LICSW [facility] Generic      Your next 10 appointments already scheduled     Nov 01, 2018 10:30 AM CDT   Office Visit with Janice Tierney, [facility] ([facility])    [address]  [address]  [facility] [address] [phone]           Bring a current list of meds and any records pertaining to this visit. For Physicals, please bring immunization records and any forms needing to be filled out. Please arrive 10 minutes early to complete paperwork.            Nov 29, 2018 11:00 AM CST   Return Visit with DIMAS Sorto   [facility] ([facility])    [address] [facility] [address]  [place] [facility] [address] [phone]              MyChart Information     [employer] gives you secure access to your electronic health record. If you see a primary care provider, you can also send messages to your care team and make appointments. If you have questions, please call your primary care clinic.  If you do not have a primary care provider, please call [phone] and they will assist you.        Care EveryWhere ID     This is your Care EveryWhere ID. This could be used by other organizations to access your [facility] medical records  [id number]        Equal Access to Services     SMITA AMADOR AH: Hadii jerson veraso Sonicolali, waaxda luqadaha, qaybta kaalmada adeegyada, chelsi bowie. So [facility] [phone].    ATENCIÓN: Si habla español, tiene a bruner disposición servicios gratuitos de asistencia lingüística. Llame al [phone].    We comply with applicable federal civil rights laws and [place] laws. We do not discriminate on the basis of race, color, national origin, age, disability, sex,  sexual orientation, or gender identity.

## 2018-10-25 DIAGNOSIS — M54.50 LOW BACK PAIN WITHOUT SCIATICA, UNSPECIFIED BACK PAIN LATERALITY, UNSPECIFIED CHRONICITY: ICD-10-CM

## 2018-10-25 ASSESSMENT — PATIENT HEALTH QUESTIONNAIRE - PHQ9
SUM OF ALL RESPONSES TO PHQ QUESTIONS 1-9: 7
5. POOR APPETITE OR OVEREATING: SEVERAL DAYS

## 2018-10-25 ASSESSMENT — ANXIETY QUESTIONNAIRES
3. WORRYING TOO MUCH ABOUT DIFFERENT THINGS: SEVERAL DAYS
IF YOU CHECKED OFF ANY PROBLEMS ON THIS QUESTIONNAIRE, HOW DIFFICULT HAVE THESE PROBLEMS MADE IT FOR YOU TO DO YOUR WORK, TAKE CARE OF THINGS AT HOME, OR GET ALONG WITH OTHER PEOPLE: SOMEWHAT DIFFICULT
6. BECOMING EASILY ANNOYED OR IRRITABLE: MORE THAN HALF THE DAYS
5. BEING SO RESTLESS THAT IT IS HARD TO SIT STILL: SEVERAL DAYS
GAD7 TOTAL SCORE: 9
2. NOT BEING ABLE TO STOP OR CONTROL WORRYING: SEVERAL DAYS
7. FEELING AFRAID AS IF SOMETHING AWFUL MIGHT HAPPEN: SEVERAL DAYS
1. FEELING NERVOUS, ANXIOUS, OR ON EDGE: MORE THAN HALF THE DAYS

## 2018-10-25 NOTE — PROGRESS NOTES
Progress Note    Client Name: Svetlana Adair  Date:10/23/2018                 Service Type: Individual      Session Start Time: 11am  Session End Time: 1145      Session Length: 45     Session #: 8     Attendees: Client attended alone    Treatment Plan Last Reviewed: see below.  PHQ-9 / ANGY-7 : 7;9     DATA      Progress Since Last Session (Related to Symptoms / Goals / Homework):   Symptoms: increasing due to financial and relational stressors  Homework: Completed in session      Episode of Care Goals: Satisfactory progress - ACTION (Actively working towards change); Intervened by reinforcing change plan / affirming steps taken Met with Dr Cruz and has been taking Lexapro.  Sleep has improved. Better boundaries with bf around his mood and health concerns. Still can get affected by bf's depression and fear. Working hard to take care of self. 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 Recent financial issues and BF's distancing resulted in her relapsing by drinking 4 airplane sized bottles of etoh. Plan is to go to her AA mtg tonight and report this. Realizing her coping skills need work.   Current / Ongoing Stressors and Concerns:   Needing help with establishing and maintaining boundaries and with healthier management of emotions. 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 Reports sobriety now after relapsing last night. Feels she gives more that she gets in relationship. BF now in remission from cancer.   Treatment Objective(s) Addressed in This Session:   Improve coping skills; setting limits; self care.  Struggling currently and plan also to seek out a . Working on paying back taxes. Encouraging her to reach out to AA and friends.     Intervention:   Keeping in mind need to rely on sober supports/programming. Encouraging sobriety and social connection. Encouraging her to focus on this self care vs  over focusing on taking care of bf.      ASSESSMENT: Current Emotional / Mental Status (status of significant symptoms):   Risk status (Self / Other harm or suicidal ideation)   Client denies current fears or concerns for personal safety.   Client denies current or recent suicidal ideation or behaviors.   Client denies current or recent homicidal ideation or behaviors.   Client denies current or recent self injurious behavior or ideation.   Client denies other safety concerns.   A safety and risk management plan has not been developed at this time, however client was given the after-hours number / 911 should there be a change in any of these risk factors.     Appearance:   Appropriate    Eye Contact:   Good    Psychomotor Behavior: Normal    Attitude:   Cooperative    Orientation:   All   Speech    Rate / Production: Normal     Volume:  Normal    Mood:    Anxious  Irritable    Affect:    Appropriate    Thought Content:  Clear  Rumination    Thought Form:  Coherent  Logical    Insight:    Fair      Medication Review:   No changes to current psychiatric medication(s)     Medication Compliance:   Yes     Changes in Health Issues:   None reported     Chemical Use Review:   Substance Use: Provided encouragement towards continued sobriety  Provided support and affirmation for steps taken towards sobriety. States her AA group is aware of her current use level and are encouraging her to remain sober. Tobacco Use: No current tobacco use.       Collateral Reports Completed:   Not Applicable as yet.    PLAN: (Client Tasks / Therapist Tasks / Other)  Returns in 2-3 weeks.  We will cont to monitor use and engagement in self care and boundary work.      Monserrat Meng, LICSW 10/23/2018                                                             ________________________________________________________________________    Treatment Plan    Client's Name: Svetlana Adair  YOB: 1950    Date: 6/5/2018      DSM-V  "Diagnoses: Adjustment Disorders  309.28 (F43.23) With mixed anxiety and depressed mood  Psychosocial / Contextual Factors: Hx of losses; breast cancer survivor; current Bf has cancer.  WHODAS: se intake    Referral / Collaboration:  Referral to GP for medication consultation will be recommended..    Anticipated number of session or this episode of care: eval every 90 days      MeasurableTreatment Goal(s) related to diagnosis / functional impairment(s)  Goal 1: Client will improve management of boundaries.    I will know I've met my goal when I feel less \"hooked\".      Objective #A (Client Action)    Client will compile a list of boundaries that they would like to set with others. Ct will practice setting these weekly..  Status: new     Intervention(s)  Therapist will teach coping skills and communication skils to assist ct in her efforts..    Objective #B  Client will identify 2-3 strategies to more effectively address stressors.  Status: new     Intervention(s)  Therapist will encourage and support efforts and provide resources as needed..    Objective #C  Client will explore and adopt coping that is not reliant on MJ nor etoh..  Status: new     Intervention(s)  Therapist will provide referrals/information and support efforts..Including seeking a med consultation from her GP and or psychiatry.          Client has reviewed and agreed to the above plan.      Monserrat Meng, DIMAS  June 5, 2018  "

## 2018-10-25 NOTE — TELEPHONE ENCOUNTER
HYDROcodone-acetaminophen (NORCO) 5-325 MG per tablet 20 tablet 0 8/23/2018  No   Sig - Route: Take 1-2 tablets by mouth every 6 hours as needed for pain    Associated Diagnoses   Low back pain without sciatica, unspecified back pain laterality, unspecified chronicity [M54.5]             Last Written Prescription Date:  08/23/2018  Last Fill Quantity: 20,  # refills: 0   Last office visit: 10/19/2018 with prescribing provider:     Future Office Visit:   Next 5 appointments (look out 90 days)     Nov 01, 2018 10:30 AM CDT   Office Visit with Janice Tierney [facility] ([facility])    [address]  [place] [id number] [phone]            Nov 29, 2018 11:00 AM CST   Return Visit with Monserrat Meng, [facility] [place] ([facility] [place])    [address]  [address]  [place] [address] [phone]                Problem List Complete:  Yes  Chronic pain syndrome         Problem Detail      Noted:  4/26/2018      Priority:  Medium      Overview Signed 4/26/2018 12:01 PM by Vladislav Cruz MD     Patient is followed by Vladislav Cruz MD for ongoing prescription of pain medication.  All refills should only be approved by this provider, or covering partner.     Medication(s): norco 5/325.   Maximum quantity per month: #20  Clinic visit frequency required: Q 6  months      Controlled substance agreement:      Encounter-Level CSA:      There are no encounter-level csa.       Pain Clinic evaluation in the past: No     DIRE Total Score(s):  No flowsheet data found.     Last [facility] website verification:  none   [email or web address]           checked in past 3 months?  No, route to RN

## 2018-10-25 NOTE — TELEPHONE ENCOUNTER
Reason for Call:  Medication or medication refill:    Do you use a [facility]?  Name of the pharmacy and phone number for the current request:   IN CLINIC    Name of the medication requested: HYDROcodone-acetaminophen (NORCO) 5-325 MG  Other request:     Can we leave a detailed message on this number? YES    Phone number patient can be reached at: Home number on file [phone] (home)    Best Time: any    Call taken on 10/25/2018 at 3:17 PM by Kalpana Yates

## 2018-10-26 RX ORDER — HYDROCODONE BITARTRATE AND ACETAMINOPHEN 5; 325 MG/1; MG/1
1-2 TABLET ORAL EVERY 6 HOURS PRN
Qty: 20 TABLET | Refills: 0 | Status: SHIPPED | OUTPATIENT
Start: 2018-10-26 | End: 2018-12-12

## 2018-10-26 ASSESSMENT — ANXIETY QUESTIONNAIRES: GAD7 TOTAL SCORE: 9

## 2018-11-01 ENCOUNTER — OFFICE VISIT (OUTPATIENT)
Dept: PHARMACY | Facility: CLINIC | Age: 68
End: 2018-11-01
Payer: COMMERCIAL

## 2018-11-01 DIAGNOSIS — E63.9 NUTRITIONAL DISORDER: ICD-10-CM

## 2018-11-01 DIAGNOSIS — C50.911 MALIGNANT NEOPLASM OF RIGHT FEMALE BREAST, UNSPECIFIED ESTROGEN RECEPTOR STATUS, UNSPECIFIED SITE OF BREAST (H): ICD-10-CM

## 2018-11-01 DIAGNOSIS — M54.5 LOW BACK PAIN, UNSPECIFIED BACK PAIN LATERALITY, UNSPECIFIED CHRONICITY, WITH SCIATICA PRESENCE UNSPECIFIED: ICD-10-CM

## 2018-11-01 DIAGNOSIS — F32.0 MILD MAJOR DEPRESSION (H): ICD-10-CM

## 2018-11-01 DIAGNOSIS — B00.1 COLD SORE: ICD-10-CM

## 2018-11-01 DIAGNOSIS — R19.7 DIARRHEA, UNSPECIFIED TYPE: Primary | ICD-10-CM

## 2018-11-01 DIAGNOSIS — I65.22 ATHEROSCLEROSIS OF LEFT CAROTID ARTERY: ICD-10-CM

## 2018-11-01 DIAGNOSIS — Z87.891 PAST USE OF TOBACCO: ICD-10-CM

## 2018-11-01 PROCEDURE — 99605 MTMS BY PHARM NP 15 MIN: CPT | Performed by: PHARMACIST

## 2018-11-01 PROCEDURE — 99607 MTMS BY PHARM ADDL 15 MIN: CPT | Performed by: PHARMACIST

## 2018-11-01 RX ORDER — PHENOL 1.4 %
10 AEROSOL, SPRAY (ML) MUCOUS MEMBRANE
COMMUNITY
End: 2022-06-28

## 2018-11-01 RX ORDER — MV-MN/OM3/DHA/EPA/FISH/LUT/ZEA 250-5-1 MG
1 CAPSULE ORAL DAILY
COMMUNITY
End: 2020-04-29

## 2018-11-01 RX ORDER — LOPERAMIDE HYDROCHLORIDE 2 MG/1
1 TABLET ORAL DAILY PRN
COMMUNITY
End: 2018-11-29

## 2018-11-01 RX ORDER — MAGNESIUM OXIDE/MAG AA CHELATE 300 MG
300 CAPSULE ORAL PRN
COMMUNITY
End: 2020-04-29

## 2018-11-01 NOTE — PROGRESS NOTES
"SUBJECTIVE/OBJECTIVE:                           Svetlana Adair is a 68 year old female coming in for an initial visit for Medication Therapy Management.  She was referred to me from Dr. Cruz.    Chief Complaint: Diarrhea, wonders if she could try Chantix.    Allergies/ADRs: Reviewed in Epic  Tobacco: History of tobacco dependence - quit about 6 years ago, but continues using nicotine gum  Alcohol: not currently using  Caffeine: 3-4 cups/day of coffee; no tea or soda  Activity: Usually plays tennis, does yoga once a week; all on hold due to neck pain.  She continues walking at least 4x/week    PMH: Reviewed in Epic    Medication Adherence/Access:  no issues reported    Diarrhea:  She reports \"a switch flipped\" in July, she went from having normal bowel movements to diarrhea over the course of a day, and diarrhea has continued since then.  She denies any changes around that same time.  Stress levels have been high, but she reports they've been high for years.  She's currently taking loperamide 1mg daily PRN (every 3-4 days) and this is effective, but she wishes to find the cause of this.  Her feeling is that it's related to use of NRT.    Smoking Cessation:  Svetlana quit smoking about 6 years ago, but has continued using NRT (gum) since that time.  She admits she's using high doses of NRT and doesn't always \"chew and park\" - often just \"chews mindlessly.\"  She does feel she's getting some nicotine absorption because she has tried switching to regular gum instead and hasn't found it as effective.  She took Chantix when she was smoking, she found this effective.  Wonders if we could use Chantix to help her get off NRT as she feels NRT is contributing to her diarrhea.     Breast Cancer: She just finished treatment in February.  Currently taking letrozole 2.5mg daily, and says she'll be on this for 10 years.  She would prefer to not take but understands risks of doing so, so plans to continue.  She denies side " effects.  She also receives zoledronic acid every 6 months and reports this is going well.    CAD: Current therapy includes ASA 81mg daily and rosuvastatin 10mg once daily.  Pt reports the following possible side effects: memory loss.  She's been working with cardiology, rosuvastatin dose was just increased.  The 10-year ASCVD risk score (Eddie PINEDA Jr, et al., 2013) is: 3.9%    Values used to calculate the score:      Age: 68 years      Sex: Female      Is Non- : No      Diabetic: No      Tobacco smoker: No      Systolic Blood Pressure: 108 mmHg      Is BP treated: No      HDL Cholesterol: 74 mg/dL      Total Cholesterol: 188 mg/dL     Depression:  Current medications include: escitalopram 10mg daily. Pt reports that depression symptoms are improved.  She denies side effects of therapy. Escitalopram was started in August, following development of diarrhea.  She hasn't seen any changes in diarrhea since starting this.  PHQ-9 SCORE 10/19/2018 10/19/2018 10/25/2018   Total Score - - -   Total Score 4 4 7      Pain:  She's been working with PT for neck/shoulder pain.  She's currently taking hydrocodone/APAP 1/2 tablet most days of the week and ibuprofen, generally 200mg daily.  She reports this has been effective for her and she denies side effects.    Cold Sores:  She has Valtrex and l-lysine available for use if needed, which she finds effective.  She denies side effects.  Hasn't needed to use either lately.    Supplements:  Svetlana has an extensive supplement list - see med list for more details.  She reports she was on these prior to the development of diarrhea.  She finds these effective and prefers to continue taking.  She did try holding a few of these (namely magnesium) to see if diarrhea would improve, which it did not.      ASSESSMENT:                             Current medications were reviewed today.     Medication Adherence: good, no issues identified    Diarrhea: Unimproved.  May be  "multifactorial - related to NRT use (particularly since she's not \"chewing and parking\" gum).  May also be secondary to supplement use.  Will want to be cautious and only make one change at a time so we know what improves/worsens symptoms.    Smoking Cessation: Ideally would like to get her off NRT since she's been on this for so long.  Mechanistically, Chantix does make sense.      Breast Cancer: Stable.    CAD: Stable, although she feels statin is contributing to memory loss.  As noted above, we want to be cautious to only make one change at a time.  This will be something to visit again in the future.    Depression: Improved.    Pain:  Stable, although would prefer she's not taking NSAIDs and/or opioids daily.    Cold Sores:  Stable.    Supplements:  I will need to do some research to investigate her supplements further.  I'll be out of the office all next week, and likely won't have a chance to review this before then.  Will plan to review and discuss in more detail at our next visit.    PLAN:                            1.  Rx sent to her pharmacy for Chantix starting pack.  Recommended discontinuing NRT.  2.  I will investigate safety/efficacy of her supplements more, and we will plan to discuss in more detail at our next visit.    I spent 60 minutes with this patient today. All changes were made via collaborative practice agreement with Dr. Cruz. A copy of the visit note was provided to the patient's primary care provider.    Will follow up in 4 weeks, appt scheduled.    The patient was given a summary of these recommendations as an after visit summary.     Janice Tierney, PharmD, [facility]  Medication Therapy Management Provider  Pager: [phone]   "

## 2018-11-01 NOTE — PROGRESS NOTES
One of our [employer] pharmacy students on rotation investigated Svetlana's supplements and found the following:      Black Psyllium (in Super Colon Cleanse) can slow or reduce the absorption of oral medications so it is recommended to take 30-60 minutes after PO medications.    Nigella sativa + Amla Khoury + Eleutherococcus (in Neurall) +  ASA + Ibuprofen = increased risk of bleeding (Amla khoury can decrease platelet aggregation by 24-36%)    Astaxanthin (in OmegaKrill 5x) + Letrozole + Norco= reduced effectiveness of letrozole and norco (CY inducer in vitro research)    Moringa + Bacopa (in Neurall) + letrozole = increased levels of letrozole (CY inhibitor)    Nigella sativa +  Somnapure +  Eleutherococcus (in Neurall) + Norco= increased risk for CNS depression and sedation    Amla Khoury + Norco + Rosuvastatin= increased risk for hepatotoxicity    Huperzia dedrick (in Neurall) + Imodium= decreased effectiveness of imodium  * Unable to find Majestha powder and Vitacell (was only able to check a few ingredients) so unsure about drug interactions with these    Long-term safety of most of these is unknown.    Diarrhea: Unsure how significant the CY effects with letrozole are (especially since OmegaKrill may lower concentrations but moringa and Neurall may increase concentrations) but it is possible that increased concentrations of letrozole could be contributing to diarrhea. Also the Neurall might be decreasing the effectiveness of Imodium leading to continued diarrhea.     Will discuss further with Svetlana at scheduled f/up appt.    Janice Tierney, PharmD, [facility]  Medication Therapy Management Provider  Pager: [phone]

## 2018-11-01 NOTE — PATIENT INSTRUCTIONS
Recommendations from today's MTM visit:                                                    MTM (medication therapy management) is a service provided by a clinical pharmacist designed to help you get the most of out of your medicines.   Today we reviewed what your medicines are for, how to know if they are working, that your medicines are safe and how to make your medicine regimen as easy as possible.     1.  Start Chantix and try to cut back on the use of the nicotine gum.    2.  I'll look into the supplement use more.    Next MTM visit:  Thursday, November 29th at 10am    To schedule another MTM appointment, please call the clinic directly or you may call the MTM scheduling line at [phone] or toll-free at [phone].     My Clinical Pharmacist's contact information:                                                      It was a pleasure talking with you today!  Please feel free to contact me with any questions or concerns you have.      Velma Hayes PharmD  Medication Therapy Management (MTM) Resident  Pager: [phone]    Janice Tierney PharmD, [facility]  Medication Therapy Management Provider  Pager: [phone]      You may receive a survey about the MTM services you received.  I would appreciate your feedback to help me serve you better in the future. Please fill it out and return it when you can. Your comments will be anonymous.

## 2018-11-01 NOTE — MR AVS SNAPSHOT
After Visit Summary   11/1/2018    Svetlana Adair    MRN: [id number]           Patient Information     Date Of Birth          1950        Visit Information        Provider Department      11/1/2018 10:30 AM Janice Tierney, [facility]        Today's Diagnoses     Past use of tobacco    -  1      Care Instructions    Recommendations from today's MTM visit:                                                    MTM (medication therapy management) is a service provided by a clinical pharmacist designed to help you get the most of out of your medicines.   Today we reviewed what your medicines are for, how to know if they are working, that your medicines are safe and how to make your medicine regimen as easy as possible.     1.  Start Chantix and try to cut back on the use of the nicotine gum.    2.  I'll look into the supplement use more.    Next MTM visit:  Thursday, November 29th at 10am    To schedule another MTM appointment, please call the clinic directly or you may call the MTM scheduling line at [phone] or toll-free at [phone].     My Clinical Pharmacist's contact information:                                                      It was a pleasure talking with you today!  Please feel free to contact me with any questions or concerns you have.      Velma Hayes PharmD  Medication Therapy Management (MTM) Resident  Pager: [phone]    Janice Tierney PharmD, [facility]  Medication Therapy Management Provider  Pager: [phone]      You may receive a survey about the MTM services you received.  I would appreciate your feedback to help me serve you better in the future. Please fill it out and return it when you can. Your comments will be anonymous.                     Follow-ups after your visit        Your next 10 appointments already scheduled     Nov 29, 2018 10:00 AM CST   Office Visit with Janice Tierney RPH   [facility] MTM ([facility]  [place])    [address]  [place] [address] [phone]           Bring a current list of meds and any records pertaining to this visit. For Physicals, please bring immunization records and any forms needing to be filled out. Please arrive 10 minutes early to complete paperwork.            Nov 29, 2018 11:00 AM CST   Return Visit with Monserrat Meng [facility] [place] ([facility] [place])    [address]  [address]  [place] [address] [phone]              Who to contact     If you have questions or need follow up information about today's clinic visit or your schedule please contact [facility] directly at [phone].  Normal or non-critical lab and imaging results will be communicated to you by MyChart, letter or phone within 4 business days after the clinic has received the results. If you do not hear from us within 7 days, please contact the clinic through MyChart or phone. If you have a critical or abnormal lab result, we will notify you by phone as soon as possible.  Submit refill requests through "[employer] or call your pharmacy and they will forward the refill request to us. Please allow 3 business days for your refill to be completed.          Additional Information About Your Visit        MyChart Information     "[employer] gives you secure access to your electronic health record. If you see a primary care provider, you can also send messages to your care team and make appointments. If you have questions, please call your primary care clinic.  If you do not have a primary care provider, please call [phone] and they will assist you.        Care EveryWhere ID     This is your Care EveryWhere ID. This could be used by other organizations to access your [facility] medical records  [id number]         Blood Pressure from Last 3 Encounters:   10/19/18 108/70   09/06/18 116/70   08/02/18 102/66    Weight from Last 3 Encounters:   10/19/18 137 lb  1.6 oz (62.2 kg)   09/06/18 136 lb (61.7 kg)   08/02/18 135 lb (61.2 kg)              Today, you had the following     No orders found for display         Today's Medication Changes          These changes are accurate as of 11/1/18 11:14 AM.  If you have any questions, ask your nurse or doctor.               Start taking these medicines.        Dose/Directions    varenicline 0.5 MG X 11 & 1 MG X 42 tablet   Commonly known as:  CHANTIX STARTING MONTH LUMA   Used for:  Past use of tobacco        Take as directed per package instructions.   Quantity:  53 tablet   Refills:  0            Where to get your medicines      These medications were sent to [id number] - [place] - [address] AT [facility] & [place] [address]     Phone:  [phone]     varenicline 0.5 MG X 11 & 1 MG X 42 tablet                Primary Care Provider Office Phone # Fax #    Vladislav Cruz [phone] [address]  [place] [address]        Equal Access to Services     [facility]: Hadii aad ku hadasho Soomaali, waaxda luqadaha, qaybta kaalmada adeegyada, chelsi james . So [facility] [phone].    ATENCIÓN: Si habla español, tiene a bruner disposición servicios gratuitos de asistencia lingüística. Llame al [phone].    We comply with applicable federal civil rights laws and [place] laws. We do not discriminate on the basis of race, color, national origin, age, disability, sex, sexual orientation, or gender identity.            Thank you!     Thank you for choosing [facility]  for your care. Our goal is always to provide you with excellent care. Hearing back from our patients is one way we can continue to improve our services. Please take a few minutes to complete the written survey that you may receive in the mail after your visit with us. Thank you!             Your Updated Medication List - Protect others  around you: Learn how to safely use, store and throw away your medicines at www.disposemymeds.org.          This list is accurate as of 11/1/18 11:14 AM.  Always use your most recent med list.                   Brand Name Dispense Instructions for use Diagnosis    aspirin 81 MG tablet     30 tablet    Take 1 tablet (81 mg) by mouth daily    Coronary artery disease involving native coronary artery of native heart without angina pectoris       Coenzyme Q10 300 MG Caps      Take 300 mg by mouth daily        escitalopram 10 MG tablet    LEXAPRO    90 tablet    Take 1 tablet (10 mg) by mouth daily One-half tablet once daily for one week, then increase to one tablet daily until follow up appointment    Mild major depression (H)       HYDROcodone-acetaminophen 5-325 MG per tablet    NORCO    20 tablet    Take 1-2 tablets by mouth every 6 hours as needed for pain    Low back pain without sciatica, unspecified back pain laterality, unspecified chronicity       IBUPROFEN PO      Take 200 mg by mouth daily        IMODIUM A-D 2 MG tablet   Generic drug:  loperamide      Take 1 mg by mouth daily as needed for diarrhea        letrozole 2.5 MG tablet    FEMARA     Take 1 tablet by mouth daily        LYSINE      1-3 daily as needed        Magnesium 300 MG Caps      Take 300 mg by mouth daily        Melatonin 10 MG Tabs tablet      Take 10 mg by mouth nightly as needed for sleep        nicotine polacrilex 2 MG gum    NICORETTE    30 tablet    Place 1 each (2 mg) inside cheek as needed for smoking cessation    Tobacco use disorder       OCUVITE ADULT 50+ Caps      Take 1 capsule by mouth daily        rosuvastatin 10 MG tablet    CRESTOR    90 tablet    Take 1 tablet (10 mg) by mouth daily    Mixed hyperlipidemia       UNABLE TO FIND      MEDICATION NAME: Somnapure - 2 tablets = 500mg valerian root, 300mg lemon balm extract, 200mg l-theanine, 120mg hops extract, 50mg chamomile flower extract, 50mg passion flower extract, 3mg  melatonin.  Takes 1-2 tablets at bedtime        UNABLE TO FIND      MEDICATION NAME: Vitacell 2 capsules = proprietary blend of 1155mg CherryPure montmorency tart cherry skin powder, longvida curcuma longa rhizome extract, boswella lisa resin extract, green tea leaf extract, quercetin, resveratrol, and cocoa seed extract.  Takes 2 capsules daily        UNABLE TO FIND      MEDICATION NAME: Peak Hernnádez Oil - 2 capsules = 2000mg nigella sativa oil.  Takes 2 capsules daily        UNABLE TO FIND      MEDICATION NAME: Neurall 2 capsules = 400mg eleutherococcus senticosus root extract, 320mg bacopa monnieri standardized plant extract, sabroxy oroxylum indicum bark extract, huperzia dedrick whole plant extract.  Takes 2 capsules daily.        UNABLE TO FIND      MEDICATION NAME: Moringa 1 serving of powder daily        UNABLE TO FIND      MEDICATION NAME: Premium Amla Berry 2 capsules = 4mg Vitamin C, 966mg organic amla, organic amla extract.  Takes 2 capsules daily        UNABLE TO FIND      MEDICATION NAME: OmegaKrill 5x 3 capsules = 480mg of total omega-3, 64mg EPA, 392mg DHA, 300mcg astaxanthin.  Takes 3 capsules daily        UNABLE TO FIND      MEDICATION NAME: Super Colon Cleanse 2 capsules = 665mg senna leaf powder, 321mg psyllium husk powder, 21mg fennel seed powder, 21mg papaya leaf powder, 21mg yolanda hips fruit powder, 11mg l-acidophilus.  Taking 4 capsules daily        UNABLE TO FIND      MEDICATION NAME: Majestha powder daily        UNABLE TO FIND      MEDICATION NAME: Neem powder daily        valACYclovir 1000 mg tablet    VALTREX    8 tablet    Take 2 tablets (2,000 mg) by mouth 2 times daily as needed    HSV (herpes simplex virus) infection       varenicline 0.5 MG X 11 & 1 MG X 42 tablet    CHANTIX STARTING MONTH LUMA    53 tablet    Take as directed per package instructions.    Past use of tobacco       Vitamin D-3 5000 units Tabs      Take 2 tablets by mouth daily        ZOLEDRONIC ACID IV      Inject into  the vein every 6 months

## 2018-11-14 ENCOUNTER — TELEPHONE (OUTPATIENT)
Dept: FAMILY MEDICINE | Facility: CLINIC | Age: 68
End: 2018-11-14

## 2018-11-14 NOTE — TELEPHONE ENCOUNTER
Central Prior Authorization Team   Phone: [phone]    PA Initiation    Medication: chantix starting   Insurance Company: [employer] - Phone [phone] Fax [phone]  Pharmacy Filling the Rx: [facility] [address] [place] [place] [address] - [facility] [address] AT [facility] [place] [address]  Filling Pharmacy Phone: [phone]  Filling Pharmacy Fax:    Start Date: 11/14/2018

## 2018-11-14 NOTE — TELEPHONE ENCOUNTER
Prior Authorization Approval    Authorization Effective Date: 8/16/2018  Authorization Expiration Date: 5/13/2019  Medication: chantix starting   Approved Dose/Quantity:    Reference #:     Insurance Company: Anat Mir - Phone [phone] Fax [phone]  Expected CoPay:       CoPay Card Available:      Foundation Assistance Needed:    Which Pharmacy is filling the prescription (Not needed for infusion/clinic administered): [facility] [address] [place] [address] [address] AT [facility] OF [address]  Pharmacy Notified: Yes  Patient Notified: Yes

## 2018-11-14 NOTE — TELEPHONE ENCOUNTER
Prior Authorization Retail Medication Request    Medication/Dose: chantix starting month pak 53's  ICD code (if different than what is on RX):  Z87.891  Previously Tried and Failed:    Rationale:  Stop smoking    Insurance Name:  Medicare  Insurance ID:  [id number]      Pharmacy Information (if different than what is on RX)  Name:  [facility]  Phone:  [phone]

## 2018-11-29 ENCOUNTER — ALLIED HEALTH/NURSE VISIT (OUTPATIENT)
Dept: PHARMACY | Facility: CLINIC | Age: 68
End: 2018-11-29
Payer: COMMERCIAL

## 2018-11-29 ENCOUNTER — OFFICE VISIT (OUTPATIENT)
Dept: PSYCHOLOGY | Facility: CLINIC | Age: 68
End: 2018-11-29
Payer: COMMERCIAL

## 2018-11-29 DIAGNOSIS — F43.23 ADJUSTMENT DISORDER WITH MIXED ANXIETY AND DEPRESSED MOOD: Primary | ICD-10-CM

## 2018-11-29 DIAGNOSIS — E63.9 NUTRITIONAL DISORDER: ICD-10-CM

## 2018-11-29 DIAGNOSIS — Z87.891 PAST USE OF TOBACCO: ICD-10-CM

## 2018-11-29 DIAGNOSIS — R19.7 DIARRHEA, UNSPECIFIED TYPE: Primary | ICD-10-CM

## 2018-11-29 PROCEDURE — 90834 PSYTX W PT 45 MINUTES: CPT | Performed by: SOCIAL WORKER

## 2018-11-29 PROCEDURE — 99606 MTMS BY PHARM EST 15 MIN: CPT | Performed by: PHARMACIST

## 2018-11-29 PROCEDURE — 99607 MTMS BY PHARM ADDL 15 MIN: CPT | Performed by: PHARMACIST

## 2018-11-29 ASSESSMENT — ANXIETY QUESTIONNAIRES
IF YOU CHECKED OFF ANY PROBLEMS ON THIS QUESTIONNAIRE, HOW DIFFICULT HAVE THESE PROBLEMS MADE IT FOR YOU TO DO YOUR WORK, TAKE CARE OF THINGS AT HOME, OR GET ALONG WITH OTHER PEOPLE: SOMEWHAT DIFFICULT
2. NOT BEING ABLE TO STOP OR CONTROL WORRYING: MORE THAN HALF THE DAYS
GAD7 TOTAL SCORE: 8
6. BECOMING EASILY ANNOYED OR IRRITABLE: SEVERAL DAYS
3. WORRYING TOO MUCH ABOUT DIFFERENT THINGS: MORE THAN HALF THE DAYS
1. FEELING NERVOUS, ANXIOUS, OR ON EDGE: SEVERAL DAYS
5. BEING SO RESTLESS THAT IT IS HARD TO SIT STILL: SEVERAL DAYS
7. FEELING AFRAID AS IF SOMETHING AWFUL MIGHT HAPPEN: NOT AT ALL

## 2018-11-29 ASSESSMENT — PATIENT HEALTH QUESTIONNAIRE - PHQ9
SUM OF ALL RESPONSES TO PHQ QUESTIONS 1-9: 6
5. POOR APPETITE OR OVEREATING: SEVERAL DAYS

## 2018-11-29 NOTE — PROGRESS NOTES
"SUBJECTIVE/OBJECTIVE:                Svetlana Adair is a 68 year old female called for a follow-up visit for Medication Therapy Management.  She was referred to me from Dr. Cruz.     Chief Complaint: Follow up from our visit on 11/1/18.  Called to f/up on diarrhea and review of supplement use    Tobacco: History of tobacco dependence - quit about 6 years ago but continues using NRT  Alcohol: not currently using    Medication Adherence/Access: no issues reported    Diarrhea:  She reports that her diarrhea resolved \"the day after the election results.\"  She's no longer using Imodium.  She says \"I knew I was uptight about the election, but didn't realize how much.\"    Smoking Cessation:  At our last visit we suspected NRT might be contributing to nausea/diarrhea because she was not \"chewing and parking\" but rather just chewing nicotine gum.  We changed to Chantix, but she didn't pick this up due to cost.  She continues using NRT, has been more conscious of \"parking\" and feels this is going ok.  Is working on gradually trying to get off NRT.    Supplements:  Is interested in finding out what information I found about her supplements.    Other medications/conditions were not discussed today - no changes since our last visit.     Today's Vitals: There were no vitals taken for this visit. - telephone visit    ASSESSMENT:              Current medications were reviewed today as discussed above.      Medication Adherence: good, no issues identified    Diarrhea: Resolved.    Smoking Cessation: Improved.  Will benefit from continuing to taper off NRT.    Supplements: Needed to review supplement findings.    PLAN:                  1.  Reviewed findings regarding her supplements - also sent these to her via [employer].  2.  Encouraged Svetlana to try to taper off NRT.    I spent 20 minutes with this patient today. A copy of the visit note was provided to the patient's primary care provider.     Will follow up in 3 months, sooner " if needed.    The patient was sent via [employer] a summary of these recommendations as an after visit summary.    Janice Tierney PharmD, [facility]  Medication Therapy Management Provider  Pager: [phone]

## 2018-11-29 NOTE — MR AVS SNAPSHOT
After Visit Summary   11/29/2018    Svetlana Adair    MRN: [id number]           Patient Information     Date Of Birth          1950        Visit Information        Provider Department      11/29/2018 10:00 AM Janice Tierney, [facility]        Today's Diagnoses     Diarrhea, unspecified type    -  1    Past use of tobacco        Nutritional disorder           Follow-ups after your visit        Your next 10 appointments already scheduled     Nov 29, 2018 11:00 AM CST   Return Visit with Monserrat Meng [facility] [place] ([facility])    [address]  [place] [address] [phone]              Who to contact     If you have questions or need follow up information about today's clinic visit or your schedule please contact [facility] directly at [phone].  Normal or non-critical lab and imaging results will be communicated to you by MyChart, letter or phone within 4 business days after the clinic has received the results. If you do not hear from us within 7 days, please contact the clinic through MyChart or phone. If you have a critical or abnormal lab result, we will notify you by phone as soon as possible.  Submit refill requests through [employer] or call your pharmacy and they will forward the refill request to us. Please allow 3 business days for your refill to be completed.          Additional Information About Your Visit        MyChart Information     [employer] gives you secure access to your electronic health record. If you see a primary care provider, you can also send messages to your care team and make appointments. If you have questions, please call your primary care clinic.  If you do not have a primary care provider, please call [phone] and they will assist you.        Care EveryWhere ID     This is your Care EveryWhere ID. This could be used by other organizations to access your [facility]  records  [id number]         Blood Pressure from Last 3 Encounters:   10/19/18 108/70   09/06/18 116/70   08/02/18 102/66    Weight from Last 3 Encounters:   10/19/18 137 lb 1.6 oz (62.2 kg)   09/06/18 136 lb (61.7 kg)   08/02/18 135 lb (61.2 kg)              Today, you had the following     No orders found for display       Primary Care Provider Office Phone # Fax #    Vladislav JOSE Cruz [phone] [address]  [place] [address]        Equal Access to Services     [facility]: Hadii aad latasha hadsantio Sojabari, waaxda luqadaha, qaybta kaalmada adeishanyada, chelsi james . So [facility] [phone].    ATENCIÓN: Si habla español, tiene a bruner disposición servicios gratuitos de asistencia lingüística. [facility] [phone].    We comply with applicable federal civil rights laws and [place] laws. We do not discriminate on the basis of race, color, national origin, age, disability, sex, sexual orientation, or gender identity.            Thank you!     Thank you for choosing [facility]  for your care. Our goal is always to provide you with excellent care. Hearing back from our patients is one way we can continue to improve our services. Please take a few minutes to complete the written survey that you may receive in the mail after your visit with us. Thank you!             Your Updated Medication List - Protect others around you: Learn how to safely use, store and throw away your medicines at www.disposemymeds.org.          This list is accurate as of 11/29/18 10:25 AM.  Always use your most recent med list.                   Brand Name Dispense Instructions for use Diagnosis    aspirin 81 MG tablet    ASA    30 tablet    Take 1 tablet (81 mg) by mouth daily    Coronary artery disease involving native coronary artery of native heart without angina pectoris       Coenzyme Q10 300 MG Caps      Take 300 mg by mouth daily        escitalopram 10 MG tablet     LEXAPRO    90 tablet    Take 1 tablet (10 mg) by mouth daily One-half tablet once daily for one week, then increase to one tablet daily until follow up appointment    Mild major depression (H)       HYDROcodone-acetaminophen 5-325 MG tablet    NORCO    20 tablet    Take 1-2 tablets by mouth every 6 hours as needed for pain    Low back pain without sciatica, unspecified back pain laterality, unspecified chronicity       IBUPROFEN PO      Take 200 mg by mouth daily        letrozole 2.5 MG tablet    FEMARA     Take 1 tablet by mouth daily        LYSINE      1-3 daily as needed        Magnesium 300 MG Caps      Take 300 mg by mouth daily        Melatonin 10 MG Tabs tablet      Take 10 mg by mouth nightly as needed for sleep        nicotine 2 MG gum    NICORETTE    30 tablet    Place 1 each (2 mg) inside cheek as needed for smoking cessation    Tobacco use disorder       OCUVITE ADULT 50+ Caps      Take 1 capsule by mouth daily        rosuvastatin 10 MG tablet    CRESTOR    90 tablet    Take 1 tablet (10 mg) by mouth daily    Mixed hyperlipidemia       UNABLE TO FIND      MEDICATION NAME: Somnapure - 2 tablets = 500mg valerian root, 300mg lemon balm extract, 200mg l-theanine, 120mg hops extract, 50mg chamomile flower extract, 50mg passion flower extract, 3mg melatonin.  Takes 1-2 tablets at bedtime        UNABLE TO FIND      MEDICATION NAME: Vitacell 2 capsules = proprietary blend of 1155mg CherryPure montmorency tart cherry skin powder, longvida curcuma longa rhizome extract, boswella lisa resin extract, green tea leaf extract, quercetin, resveratrol, and cocoa seed extract.  Takes 2 capsules daily        UNABLE TO FIND      MEDICATION NAME: Peak Hernández Oil - 2 capsules = 2000mg nigella sativa oil.  Takes 2 capsules daily        UNABLE TO FIND      MEDICATION NAME: Neurall 2 capsules = 400mg eleutherococcus senticosus root extract, 320mg bacopa monnieri standardized plant extract, sabroxy oroxylum indicum bark  extract, malcolmzia dedrick whole plant extract.  Takes 2 capsules daily.        UNABLE TO FIND      MEDICATION NAME: Moringa 1 serving of powder daily        UNABLE TO FIND      MEDICATION NAME: Premium Amla Berry 2 capsules = 4mg Vitamin C, 966mg organic amla, organic amla extract.  Takes 2 capsules daily        UNABLE TO FIND      MEDICATION NAME: OmegaKrill 5x 3 capsules = 480mg of total omega-3, 64mg EPA, 392mg DHA, 300mcg astaxanthin.  Takes 3 capsules daily        UNABLE TO FIND      MEDICATION NAME: Super Colon Cleanse 2 capsules = 665mg senna leaf powder, 321mg psyllium husk powder, 21mg fennel seed powder, 21mg papaya leaf powder, 21mg yolanda hips fruit powder, 11mg l-acidophilus.  Taking 4 capsules daily        UNABLE TO FIND      MEDICATION NAME: Majestha powder daily        UNABLE TO FIND      MEDICATION NAME: Neem powder daily        valACYclovir 1000 mg tablet    VALTREX    8 tablet    Take 2 tablets (2,000 mg) by mouth 2 times daily as needed    HSV (herpes simplex virus) infection       Vitamin D-3 5000 units Tabs      Take 2 tablets by mouth daily        ZOLEDRONIC ACID IV      Inject into the vein every 6 months

## 2018-11-29 NOTE — PROGRESS NOTES
Progress Note    Client Name: Svetlana Adair  Date:11/29/2018                 Service Type: Individual      Session Start Time: 11am  Session End Time: 1145      Session Length: 45     Session #: 9     Attendees: Client attended alone    Treatment Plan Last Reviewed: see below.  PHQ-9 / ANGY-7 : 6;8     DATA      Progress Since Last Session (Related to Symptoms / Goals / Homework):   Symptoms: increasing due to financial and relational stressors  Homework: Completed in session      Episode of Care Goals: Satisfactory progress - ACTION (Actively working towards change); Intervened by reinforcing change plan / affirming steps taken. More depressed and stressed by financial constraints and lack of clients. Drank again twice since last visit one month ago. Worked today on challenging narratives that give her permission to use. Suggested she contact her GP re increasing her lexapro while also sharing with him her current struggles with alcohol. May want to discuss taking naltrexone with him as well.       Current / Ongoing Stressors and Concerns:   Needing help with establishing and maintaining boundaries and with healthier management of emotions. 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 Feels she gives more that she gets in relationship. BF now in remission from cancer. Relationship with him improving.   Treatment Objective(s) Addressed in This Session:   Improve coping skills; setting limits; self care.  Struggling currently and plan also to seek out a . Received settlement which has allowed her to pay all back taxes which has been a big relief. Encouraging use of sponsors and AA.      Intervention:   Keeping in mind need to rely on sober supports/programming. Encouraging sobriety and social connection. Encouraging her to focus on this self care vs over focusing on taking care of others.  Created plan of action  "today in session around building \"speed bumps\" into routine especially when triggered.      ASSESSMENT: Current Emotional / Mental Status (status of significant symptoms):   Risk status (Self / Other harm or suicidal ideation)   Client denies current fears or concerns for personal safety.   Client denies current or recent suicidal ideation or behaviors.   Client denies current or recent homicidal ideation or behaviors.   Client denies current or recent self injurious behavior or ideation.   Client denies other safety concerns.   A safety and risk management plan has not been developed at this time, however client was given the after-hours number / 911 should there be a change in any of these risk factors.     Appearance:   Appropriate    Eye Contact:   Good    Psychomotor Behavior: Normal    Attitude:   Cooperative    Orientation:   All   Speech    Rate / Production: Normal     Volume:  Normal    Mood:    Anxious  Depressed    Affect:    Appropriate    Thought Content:  Clear  Rumination    Thought Form:  Coherent  Logical    Insight:    Fair      Medication Review:   No changes to current psychiatric medication(s)     Medication Compliance:   Yes     Changes in Health Issues:   None reported     Chemical Use Review:   Substance Use: Provided encouragement towards continued sobriety  Provided support and affirmation for steps taken towards sobriety. States her AA group is aware of her current use level and are encouraging her to remain sober. Tobacco Use: No current tobacco use.       Collateral Reports Completed:   Not Applicable as yet.    PLAN: (Client Tasks / Therapist Tasks / Other)  Returns in 4 weeks.  We will cont to monitor use and engagement in self care and boundary work.      Monserrat Meng, LICSW 11/29/2018                                                               ________________________________________________________________________    Treatment Plan    Client's Name: Svetlana ARMSTRONG Giovany  Date Of " "Birth: 1950    Date: 6/5/2018      DSM-V Diagnoses: Adjustment Disorders  309.28 (F43.23) With mixed anxiety and depressed mood  Psychosocial / Contextual Factors: Hx of losses; breast cancer survivor; current Bf has cancer.  WHODAS: se intake    Referral / Collaboration:  Referral to GP for medication consultation will be recommended..    Anticipated number of session or this episode of care: eval every 90 days      MeasurableTreatment Goal(s) related to diagnosis / functional impairment(s)  Goal 1: Client will improve management of boundaries.    I will know I've met my goal when I feel less \"hooked\".      Objective #A (Client Action)    Client will compile a list of boundaries that they would like to set with others. Ct will practice setting these weekly..  Status: new     Intervention(s)  Therapist will teach coping skills and communication skils to assist ct in her efforts..    Objective #B  Client will identify 2-3 strategies to more effectively address stressors.  Status: new     Intervention(s)  Therapist will encourage and support efforts and provide resources as needed..    Objective #C  Client will explore and adopt coping that is not reliant on MJ nor etoh..  Status: new     Intervention(s)  Therapist will provide referrals/information and support efforts..Including seeking a med consultation from her GP and or psychiatry.          Client has reviewed and agreed to the above plan.      Monserrat Meng [facility]  June 5, 2018  "

## 2018-11-29 NOTE — MR AVS SNAPSHOT
MRN:[id number]                      After Visit Summary   11/29/2018    Svetlana Adair    MRN: [id number]           Visit Information        Provider Department      11/29/2018 11:00 AM Monserrat Meng LICSW [facility]      Your next 10 appointments already scheduled     Jan 22, 2019 10:00 AM CST   Return Visit with Monserrat MengDIMAS   [facility] [place] ([facility])    [address]  [place] [id number] [phone]              MyChart Information     [facility] gives you secure access to your electronic health record. If you see a primary care provider, you can also send messages to your care team and make appointments. If you have questions, please call your primary care clinic.  If you do not have a primary care provider, please call [phone] and they will assist you.        Care EveryWhere ID     This is your Care EveryWhere ID. This could be used by other organizations to access your [facility] medical records  [id number]        Equal Access to Services     SMITA AMADOR : Hadii aad ku hadasho Sonicolali, waaxda luqadaha, qaybta kaalmada adesylvia, chelsi bowie. So [facility] [phone].    ATENCIÓN: Si habla español, tiene a bruner disposición servicios gratuitos de asistencia lingüística. Llame al [phone].    We comply with applicable federal civil rights laws and [place] laws. We do not discriminate on the basis of race, color, national origin, age, disability, sex, sexual orientation, or gender identity.

## 2018-11-30 ASSESSMENT — ANXIETY QUESTIONNAIRES: GAD7 TOTAL SCORE: 8

## 2018-12-10 DIAGNOSIS — M54.50 LOW BACK PAIN WITHOUT SCIATICA, UNSPECIFIED BACK PAIN LATERALITY, UNSPECIFIED CHRONICITY: ICD-10-CM

## 2018-12-10 NOTE — TELEPHONE ENCOUNTER
Reason for Call:  Medication or medication refill:    Do you use a [facility]?  Name of the pharmacy and phone number for the current request:    PT WILL  IN CLINIC-PLEASE CALL PT WHEN RX IS READY FOR     Name of the medication requested: NORCO    Other request: NA    Can we leave a detailed message on this number? YES    Phone number patient can be reached at: Home number on file [phone] (home)    Best Time: ANYTIME    Call taken on 12/10/2018 at 10:11 AM by Valorie Fierro

## 2018-12-11 NOTE — TELEPHONE ENCOUNTER
HYDROcodone-acetaminophen (NORCO) 5-325 MG per tablet  Last Written Prescription Date:  10/26/18  Last Fill Quantity: 20,  # refills: 0   Last office visit: 10/19/2018 with prescribing provider:  major   Future Office Visit:   Next 5 appointments (look out 90 days)    Dec 31, 2018 10:30 AM CST  [facility] Vladislav Cruz [facility]  [facility] [address] [place] [address]  [phone]   Jan 03, 2019  1:30 PM CST  Pre-Op physical with Vladislav Cruz MD  [facility] [address] [place] [address]  [phone]   Jan 22, 2019 10:00 AM CST  Return Visit with Monserrat Meng [facility] [address]  [facility] [address]  [phone]                   Requested Prescriptions   Pending Prescriptions Disp Refills     HYDROcodone-acetaminophen (NORCO) 5-325 MG tablet 20 tablet 0     Sig: Take 1-2 tablets by mouth every 6 hours as needed for pain    There is no refill protocol information for this order

## 2018-12-12 RX ORDER — HYDROCODONE BITARTRATE AND ACETAMINOPHEN 5; 325 MG/1; MG/1
1-2 TABLET ORAL EVERY 6 HOURS PRN
Qty: 20 TABLET | Refills: 0 | Status: SHIPPED | OUTPATIENT
Start: 2018-12-12 | End: 2019-03-20

## 2018-12-12 NOTE — TELEPHONE ENCOUNTER
Routing refill request to provider for review/approval because:  Drug not on the [facility] refill protocol     Unable to review [place]  as this provider not listed.   Edith AMADOR RN,BSN

## 2019-01-02 ENCOUNTER — OFFICE VISIT (OUTPATIENT)
Dept: FAMILY MEDICINE | Facility: CLINIC | Age: 69
End: 2019-01-02
Payer: MEDICARE

## 2019-01-02 VITALS
BODY MASS INDEX: 23.49 KG/M2 | WEIGHT: 141 LBS | HEART RATE: 70 BPM | SYSTOLIC BLOOD PRESSURE: 110 MMHG | OXYGEN SATURATION: 99 % | DIASTOLIC BLOOD PRESSURE: 58 MMHG | TEMPERATURE: 97.6 F | HEIGHT: 65 IN

## 2019-01-02 DIAGNOSIS — F33.1 MODERATE EPISODE OF RECURRENT MAJOR DEPRESSIVE DISORDER (H): Primary | ICD-10-CM

## 2019-01-02 PROCEDURE — 99214 OFFICE O/P EST MOD 30 MIN: CPT | Performed by: INTERNAL MEDICINE

## 2019-01-02 RX ORDER — DULOXETIN HYDROCHLORIDE 30 MG/1
CAPSULE, DELAYED RELEASE ORAL
Qty: 180 CAPSULE | Refills: 3 | Status: SHIPPED | OUTPATIENT
Start: 2019-01-02 | End: 2019-02-07

## 2019-01-02 ASSESSMENT — PATIENT HEALTH QUESTIONNAIRE - PHQ9: SUM OF ALL RESPONSES TO PHQ QUESTIONS 1-9: 8

## 2019-01-02 ASSESSMENT — MIFFLIN-ST. JEOR: SCORE: 1180.45
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
## 2019-01-02 NOTE — PATIENT INSTRUCTIONS
For week one:  Cut Lexapro (escitalolpram) in half and take 10 mg once daily while starting duloxetine (Cymbalta) 30 mg once daily for one week.    For week two:  Stop Lexapro and increase duloxetine (Cymbalta) from 30 mg once daily to 60 mg (two capsules) once daily until follow up appointment with me.    It will take 4 weeks or so to understand the full effect of Cymbalta on your mood, so I recommend a follow up appointment at that interval or sooner if needed.

## 2019-01-03 ENCOUNTER — OFFICE VISIT (OUTPATIENT)
Dept: FAMILY MEDICINE | Facility: CLINIC | Age: 69
End: 2019-01-03
Payer: MEDICARE

## 2019-01-03 DIAGNOSIS — I25.10 CORONARY ARTERY DISEASE INVOLVING NATIVE CORONARY ARTERY OF NATIVE HEART WITHOUT ANGINA PECTORIS: ICD-10-CM

## 2019-01-03 DIAGNOSIS — C50.911 MALIGNANT NEOPLASM OF RIGHT FEMALE BREAST, UNSPECIFIED ESTROGEN RECEPTOR STATUS, UNSPECIFIED SITE OF BREAST (H): ICD-10-CM

## 2019-01-03 DIAGNOSIS — E21.3 HYPERPARATHYROIDISM (H): ICD-10-CM

## 2019-01-03 DIAGNOSIS — Z01.818 PREOP GENERAL PHYSICAL EXAM: Primary | ICD-10-CM

## 2019-01-03 DIAGNOSIS — G89.4 CHRONIC PAIN SYNDROME: ICD-10-CM

## 2019-01-03 DIAGNOSIS — F10.21 ALCOHOL DEPENDENCE IN REMISSION (H): ICD-10-CM

## 2019-01-03 DIAGNOSIS — F43.23 ADJUSTMENT DISORDER WITH MIXED ANXIETY AND DEPRESSED MOOD: ICD-10-CM

## 2019-01-03 DIAGNOSIS — I65.22 ATHEROSCLEROSIS OF LEFT CAROTID ARTERY: ICD-10-CM

## 2019-01-03 DIAGNOSIS — E78.5 HYPERLIPIDEMIA LDL GOAL <70: ICD-10-CM

## 2019-01-03 PROCEDURE — 99215 OFFICE O/P EST HI 40 MIN: CPT | Performed by: INTERNAL MEDICINE

## 2019-01-03 PROCEDURE — 93000 ELECTROCARDIOGRAM COMPLETE: CPT | Performed by: INTERNAL MEDICINE

## 2019-01-03 ASSESSMENT — MIFFLIN-ST. JEOR: SCORE: 1169.79

## 2019-01-03 NOTE — PROGRESS NOTES
[address] [address]  [phone]  Dept: [phone]    PRE-OP EVALUATION:  Today's date: 1/3/2019    Svetlana Adair (: 1950) presents for pre-operative evaluation assessment as requested by Dr. Cristhian Birmingham.  She requires evaluation and anesthesia risk assessment prior to undergoing surgery/procedure for treatment of scare tissue in right breast from radiation therapy  .    Proposed Surgery/ Procedure:Remove scar tissue Right breast  Date of Surgery/ Procedure: 2019  Time of Surgery/ Procedure: 9 ;30am  Hospital/Surgical Facility: [facility]   Fax number for surgical facility: [phone]  Primary Physician: Vladislav Cruz  Type of Anesthesia Anticipated: General    Patient has a Health Care Directive or Living Will:  NO    1. NO - Do you have a history of heart attack, stroke, stent, bypass or surgery on an artery in the head, neck, heart or legs?  2. NO - Do you ever have any pain or discomfort in your chest?  3. NO - Do you have a history of  Heart Failure?  4. NO - Are you troubled by shortness of breath when: walking on the level, up a slight hill or at night?  5. NO - Do you currently have a cold, bronchitis or other respiratory infection?  6. NO - Do you have a cough, shortness of breath or wheezing?  7. NO - Do you sometimes get pains in the calves of your legs when you walk?  8. NO - Do you or anyone in your family have previous history of blood clots?  9. NO - Do you or does anyone in your family have a serious bleeding problem such as prolonged bleeding following surgeries or cuts?  10. NO - Have you ever had problems with anemia or been told to take iron pills?  11. NO - Have you had any abnormal blood loss such as black, tarry or bloody stools, or abnormal vaginal bleeding?  12. NO - Have you ever had a blood transfusion?  13. NO - Have you or any of your relatives ever had problems with anesthesia?  14. NO - Do  you have sleep apnea, excessive snoring or daytime drowsiness?  15. NO - Do you have any prosthetic heart valves?  16. NO - Do you have prosthetic joints?  17. NO - Is there any chance that you may be pregnant?      HPI:     HPI related to upcoming procedure: 68 year old female with breast cancer who had radiation therapy and has breast pain thought to be related to radiation therapy and will require surgical debridement of this scar tissue by her report.  The pain is mild to moderate and has been present for months.  She can perform 4 METS of physical activity without chest pain or dyspnea.    MEDICAL HISTORY:     Patient Active Problem List    Diagnosis Date Noted     Hyperparathyroidism (H) 08/02/2018     Priority: Medium     Alcohol dependence in remission (H) 08/02/2018     Priority: Medium     Neck pain on right side 05/29/2018     Priority: Medium     Adjustment disorder with mixed anxiety and depressed mood 05/08/2018     Priority: Medium     Chronic pain syndrome 04/26/2018     Priority: Medium     Patient is followed by Vladislav Cruz MD for ongoing prescription of pain medication.  All refills should only be approved by this provider, or covering partner.    Medication(s): norco 5/325.   Maximum quantity per month: #20  Clinic visit frequency required: Q 6  months     Controlled substance agreement:  Encounter-Level CSA:     There are no encounter-level csa.        Pain Clinic evaluation in the past: No    DIRE Total Score(s):  No flowsheet data found.    Last [facility] website verification:  none   [email or web address]         Carotid artery plaque 01/25/2018     Priority: Medium     Follow-up examination following surgery 11/10/2017     Priority: Medium     Hyperlipidemia LDL goal <70 12/02/2015     Priority: Medium     Coronary artery disease involving native coronary artery of native heart without angina pectoris 11/25/2015     Priority: Medium     CT calcium btdet=589 Nov 2015       Malignant  neoplasm of right breast (H) 10/14/2015     Priority: Medium     Low back pain 04/03/2013     Priority: Medium     Diagnosis updated by automated process. Provider to review and confirm.       IFG (impaired fasting glucose) 04/01/2013     Priority: Medium     Knee pain 11/19/2012     Priority: Medium     Past use of tobacco 11/19/2012     Priority: Medium     Advanced directives, counseling/discussion 09/06/2011     Priority: Medium     Advance Directive Problem List Overview:   Name Relationship Phone    Primary Health Care Agent            Alternative Health Care Agent          Patient states has Advance Directive and will bring in a copy to clinic. 9/6/2011          Moderate episode of recurrent major depressive disorder (H) 06/20/2011     Priority: Medium     Breast cancer est/prog +, HER2 ERNIE - 03/03/2011     Priority: Medium     Osteoarthritis 03/03/2011     Priority: Medium      Past Medical History:   Diagnosis Date     Alcoholism (H)      Allergies     Fall     Basal cell cancer     back, chest, face     Breast cancer (H)      Coronary artery disease     CT calcium dypke=375 Nov 2015     Depression      Hyperlipidemia LDL goal <130 12/2/2015     Hypertension     borderline     PONV (postoperative nausea and vomiting)      Squamous cell carcinoma     left upper arm, chin     Past Surgical History:   Procedure Laterality Date     COLONOSCOPY       COLONOSCOPY  11/17/2011    Procedure:COLONOSCOPY; Colonoscopy; Surgeon:MEKA RUSS; Location: GI     DISSECT LYMPH NODE AXILLA Right 11/2/2017    Procedure: DISSECT LYMPH NODE AXILLA;  RIGHT AXILLARY LYMPH NODE DISSECTION;  Surgeon: Cortez White MD;  Location: [facility]     GYN SURGERY  2005    hysterectomy after hx of ovarian cyst, ended up being benign     HYSTERECTOMY, PAP NO LONGER INDICATED       MASTECTOMY MODIFIED RADICAL  2011    bilateral     MASTECTOMY, BILATERAL       ORTHOPEDIC SURGERY      rt. knee arthroscopy     ORTHOPEDIC SURGERY Right      toe surgery     REALIGN PATELLA  1973    right      TOE SURGERY      right grt OA      Current Outpatient Medications   Medication Sig Dispense Refill     aspirin 81 MG tablet Take 1 tablet (81 mg) by mouth daily 30 tablet      Cholecalciferol (VITAMIN D-3) 5000 units TABS Take 2 tablets by mouth daily       Coenzyme Q10 300 MG CAPS Take 300 mg by mouth daily       DULoxetine (CYMBALTA) 30 MG capsule One capsule once daily for one week and then increase to two capsules once daily 180 capsule 3     HYDROcodone-acetaminophen (NORCO) 5-325 MG tablet Take 1-2 tablets by mouth every 6 hours as needed for pain 20 tablet 0     IBUPROFEN PO Take 200 mg by mouth daily        letrozole (FEMARA) 2.5 MG tablet Take 1 tablet by mouth daily  5     LYSINE 1-3 daily as needed       Magnesium 300 MG CAPS Take 300 mg by mouth daily       Melatonin 10 MG TABS tablet Take 10 mg by mouth nightly as needed for sleep       Multiple Vitamins-Minerals (OCUVITE ADULT 50+) CAPS Take 1 capsule by mouth daily       nicotine polacrilex (NICORETTE) 2 MG gum Place 1 each (2 mg) inside cheek as needed for smoking cessation 30 tablet 11     rosuvastatin (CRESTOR) 10 MG tablet Take 1 tablet (10 mg) by mouth daily 90 tablet 0     UNABLE TO FIND MEDICATION NAME: Somnapure - 2 tablets = 500mg valerian root, 300mg lemon balm extract, 200mg l-theanine, 120mg hops extract, 50mg chamomile flower extract, 50mg passion flower extract, 3mg melatonin.  Takes 1-2 tablets at bedtime       UNABLE TO FIND MEDICATION NAME: Vitacell 2 capsules = proprietary blend of 1155mg CherryPure montmorency tart cherry skin powder, longvida curcuma longa rhizome extract, boswella lisa resin extract, green tea leaf extract, quercetin, resveratrol, and cocoa seed extract.  Takes 2 capsules daily       UNABLE TO FIND MEDICATION NAME: Peak Hernández Oil - 2 capsules = 2000mg nigella sativa oil.  Takes 2 capsules daily       UNABLE TO FIND MEDICATION NAME: Neurall 2 capsules =  400mg eleutherococcus senticosus root extract, 320mg bacopa monnieri standardized plant extract, sabroxy oroxylum indicum bark extract, huperzia dedrick whole plant extract.  Takes 2 capsules daily.       UNABLE TO FIND MEDICATION NAME: Moringa 1 serving of powder daily       UNABLE TO FIND MEDICATION NAME: Premium Amla Berry 2 capsules = 4mg Vitamin C, 966mg organic amla, organic amla extract.  Takes 2 capsules daily       UNABLE TO FIND MEDICATION NAME: OmegaKrill 5x 3 capsules = 480mg of total omega-3, 64mg EPA, 392mg DHA, 300mcg astaxanthin.  Takes 3 capsules daily       UNABLE TO FIND MEDICATION NAME: Super Colon Cleanse 2 capsules = 665mg senna leaf powder, 321mg psyllium husk powder, 21mg fennel seed powder, 21mg papaya leaf powder, 21mg yolanda hips fruit powder, 11mg l-acidophilus.  Taking 4 capsules daily       UNABLE TO FIND MEDICATION NAME: Majestha powder daily       UNABLE TO FIND MEDICATION NAME: Neem powder daily       valACYclovir (VALTREX) 1000 mg tablet Take 2 tablets (2,000 mg) by mouth 2 times daily as needed 8 tablet 1     ZOLEDRONIC ACID IV Inject into the vein every 6 months       OTC products: None, except as noted above    Allergies   Allergen Reactions     Cats      Codeine Sulfate GI Disturbance      Latex Allergy: NO    Social History     Tobacco Use     Smoking status: Former Smoker     Packs/day: 1.00     Years: 22.00     Pack years: 22.00     Types: Cigarettes     Last attempt to quit: 2010     Years since quittin.6     Smokeless tobacco: Never Used   Substance Use Topics     Alcohol use: No     Alcohol/week: 0.0 oz     Comment: intermittent sobriety 11     History   Drug Use     Types: Marijuana     Comment: for sleeping/occ       REVIEW OF SYSTEMS:   Constitutional, neuro, ENT, endocrine, pulmonary, cardiac, gastrointestinal, genitourinary, musculoskeletal, integument and psychiatric systems are negative, except as otherwise noted.    EXAM:   BP (P) 120/66 (BP Location:  "Left arm, Cuff Size: Adult Regular)   Pulse (P) 66   Temp (P) 98.4  F (36.9  C) (Oral)   Ht (P) 1.638 m (5' 4.5\")   Wt (P) 63.7 kg (140 lb 6.4 oz)   SpO2 (P) 99%   BMI (P) 23.73 kg/m      GENERAL APPEARANCE: healthy, alert and no distress     EYES: EOMI, PERRL     HENT: ear canals and TM's normal and nose and mouth without ulcers or lesions     NECK: no adenopathy, no asymmetry, masses, or scars and thyroid normal to palpation     RESP: lungs clear to auscultation - no rales, rhonchi or wheezes     CV: regular rates and rhythm, normal S1 S2, no S3 or S4 and no murmur, click or rub     ABDOMEN:  soft, nontender, no HSM or masses and bowel sounds normal     MS: extremities normal- no gross deformities noted, no evidence of inflammation in joints, FROM in all extremities.     SKIN: no suspicious lesions or rashes     NEURO: Normal strength and tone, sensory exam grossly normal, mentation intact and speech normal     PSYCH: mentation appears normal. and affect normal/bright     LYMPHATICS: No cervical adenopathy    DIAGNOSTICS:   EKG: Sinus rhythm not ST changes     Recent Labs   Lab Test 10/19/18  0931 09/25/17  1054  11/18/16  1256  12/10/15  1621   HGB 13.1 14.4  --   --    < >  --     263  --   --    < >  --     141   < >  --    < >  --    POTASSIUM 4.1 4.4   < >  --    < >  --    CR 0.71 0.74   < >  --    < >  --    A1C  --   --   --  5.6  --  5.7    < > = values in this interval not displayed.        IMPRESSION:   Reason for surgery/procedure: Breast pain  Diagnosis/reason for consult:  Preoperative evaluation     The proposed surgical procedure is considered LOW risk.    REVISED CARDIAC RISK INDEX  The patient has the following serious cardiovascular risks for perioperative complications such as (MI, PE, VFib and 3  AV Block):  Coronary Artery Disease (MI, positive stress test, angina, Qs on EKG)  INTERPRETATION: 1 risks: Class II (low risk - 0.9% complication rate)    The patient has the " following additional risks for perioperative complications:  Alcohol abuse with risk of withdrawal  High tolerance to opioid analgesics due to intermittent use of hydrocodone for chronic neck pain       ICD-10-CM    1. Preop general physical exam Z01.818    2. Malignant neoplasm of right female breast, unspecified estrogen receptor status, unspecified site of breast (H) C50.911    3. Alcohol dependence in remission (H) F10.21    4. Hyperparathyroidism (H) E21.3    5. Coronary artery disease involving native coronary artery of native heart without angina pectoris I25.10    6. Atherosclerosis of left carotid artery I65.22    7. Adjustment disorder with mixed anxiety and depressed mood F43.23    8. Hyperlipidemia LDL goal <70 E78.5    9. Chronic pain syndrome G89.4      OK to have breast surgery  See yesterdays discussion regarding her alcohol consumption  Her calcium was normal on 10/19  She has diagnosis of CAD based on coronary calcifications, on statin therapy without ischemic symptoms  Non obstructive carotid plaque in 2/2018 without symptoms on aspirin and statin   See yesterdays discussion regarding serotonin specific reuptake inhibitor to SNRI switch; follow up in 3-4 weeks to assess therapy      RECOMMENDATIONS:       Cardiovascular Risk  Performs 4 METs exercise without symptoms (Climb a flight of stairs) .     --Patient is to take all scheduled medications on the day of surgery EXCEPT for modifications listed below.    Anticoagulant or Antiplatelet Medication Use  ASPIRIN: Discontinue ASA 7-10 days prior to procedure to reduce bleeding risk.  It should be resumed post-operatively.        APPROVAL GIVEN to proceed with proposed procedure, without further diagnostic evaluation       Signed Electronically by: Vladislav Cruz MD    Copy of this evaluation report is provided to requesting physician.    [facility] Preop Guidelines    Revised Cardiac Risk Index

## 2019-01-04 NOTE — RESULT ENCOUNTER NOTE
The following letter pertains to your most recent diagnostic tests:      Good news! -Your EKG looks okay for surgery.      Sincerely,    Dr. Cruz

## 2019-01-16 ENCOUNTER — TELEPHONE (OUTPATIENT)
Dept: FAMILY MEDICINE | Facility: CLINIC | Age: 69
End: 2019-01-16

## 2019-01-16 NOTE — TELEPHONE ENCOUNTER
Reason for Call:  Pre-Op info Needed    Detailed comments: Please Fax over All Pre-Op info to [facility]  Fax # [phone]          Call taken on 1/16/2019 at 10:52 AM by Yan Alexander

## 2019-01-16 NOTE — TELEPHONE ENCOUNTER
Faxed pre-op note and EKG over to [facility] and placed in accordion folder on Ayanna pod.    Omid Ricardo CMA on 1/16/2019 at 11:20 AM

## 2019-01-21 ENCOUNTER — TRANSFERRED RECORDS (OUTPATIENT)
Dept: HEALTH INFORMATION MANAGEMENT | Facility: CLINIC | Age: 69
End: 2019-01-21

## 2019-02-07 ENCOUNTER — OFFICE VISIT (OUTPATIENT)
Dept: FAMILY MEDICINE | Facility: CLINIC | Age: 69
End: 2019-02-07
Payer: MEDICARE

## 2019-02-07 VITALS
WEIGHT: 137.2 LBS | TEMPERATURE: 99.3 F | BODY MASS INDEX: 22.86 KG/M2 | HEART RATE: 65 BPM | SYSTOLIC BLOOD PRESSURE: 111 MMHG | DIASTOLIC BLOOD PRESSURE: 74 MMHG | OXYGEN SATURATION: 95 % | HEIGHT: 65 IN

## 2019-02-07 DIAGNOSIS — J06.9 UPPER RESPIRATORY TRACT INFECTION, UNSPECIFIED TYPE: ICD-10-CM

## 2019-02-07 DIAGNOSIS — F33.1 MODERATE EPISODE OF RECURRENT MAJOR DEPRESSIVE DISORDER (H): Primary | ICD-10-CM

## 2019-02-07 PROCEDURE — 99213 OFFICE O/P EST LOW 20 MIN: CPT | Performed by: INTERNAL MEDICINE

## 2019-02-07 RX ORDER — DULOXETIN HYDROCHLORIDE 60 MG/1
60 CAPSULE, DELAYED RELEASE ORAL DAILY
Qty: 90 CAPSULE | Refills: 3 | Status: SHIPPED | OUTPATIENT
Start: 2019-02-07 | End: 2020-01-06

## 2019-02-07 ASSESSMENT — ANXIETY QUESTIONNAIRES
7. FEELING AFRAID AS IF SOMETHING AWFUL MIGHT HAPPEN: NOT AT ALL
1. FEELING NERVOUS, ANXIOUS, OR ON EDGE: SEVERAL DAYS
IF YOU CHECKED OFF ANY PROBLEMS ON THIS QUESTIONNAIRE, HOW DIFFICULT HAVE THESE PROBLEMS MADE IT FOR YOU TO DO YOUR WORK, TAKE CARE OF THINGS AT HOME, OR GET ALONG WITH OTHER PEOPLE: SOMEWHAT DIFFICULT
GAD7 TOTAL SCORE: 5
2. NOT BEING ABLE TO STOP OR CONTROL WORRYING: SEVERAL DAYS
5. BEING SO RESTLESS THAT IT IS HARD TO SIT STILL: SEVERAL DAYS
6. BECOMING EASILY ANNOYED OR IRRITABLE: NOT AT ALL
7. FEELING AFRAID AS IF SOMETHING AWFUL MIGHT HAPPEN: NOT AT ALL
6. BECOMING EASILY ANNOYED OR IRRITABLE: NOT AT ALL
2. NOT BEING ABLE TO STOP OR CONTROL WORRYING: SEVERAL DAYS
1. FEELING NERVOUS, ANXIOUS, OR ON EDGE: SEVERAL DAYS
IF YOU CHECKED OFF ANY PROBLEMS ON THIS QUESTIONNAIRE, HOW DIFFICULT HAVE THESE PROBLEMS MADE IT FOR YOU TO DO YOUR WORK, TAKE CARE OF THINGS AT HOME, OR GET ALONG WITH OTHER PEOPLE: SOMEWHAT DIFFICULT
GAD7 TOTAL SCORE: 5
5. BEING SO RESTLESS THAT IT IS HARD TO SIT STILL: SEVERAL DAYS
3. WORRYING TOO MUCH ABOUT DIFFERENT THINGS: SEVERAL DAYS
3. WORRYING TOO MUCH ABOUT DIFFERENT THINGS: SEVERAL DAYS

## 2019-02-07 ASSESSMENT — MIFFLIN-ST. JEOR: SCORE: 1155.28

## 2019-02-07 ASSESSMENT — PATIENT HEALTH QUESTIONNAIRE - PHQ9
SUM OF ALL RESPONSES TO PHQ QUESTIONS 1-9: 3
5. POOR APPETITE OR OVEREATING: SEVERAL DAYS
5. POOR APPETITE OR OVEREATING: SEVERAL DAYS

## 2019-02-07 NOTE — PROGRESS NOTES
"  SUBJECTIVE:   Svetlana Adair is a 68 year old female who presents to clinic today for the following health issues:      Medication Followup of Cymbalta    Taking Medication as prescribed: yes    Side Effects:  None    Medication Helping Symptoms:  Still feels like she has \"cotton head\"     PHQ-9 SCORE 11/29/2018 1/2/2019 2/7/2019   PHQ-9 Total Score - - -   PHQ-9 Total Score 6 8 3     ANGY-7 SCORE 10/25/2018 11/29/2018 2/7/2019   Total Score 9 8 5         Patient reports improved mood on Cymbalta.  She is not having any specific side effects from the Cymbalta.  She developed URI symptoms about 7 days ago.  Symptoms are improving but at the onset were severe.  Symptoms included nasal congestion, rhinorrhea, cough, fevers and fatigue.  She has not had a fever for several days.  She denies shortness of breath.    Problem list and histories reviewed & adjusted, as indicated.  Additional history: as documented    Patient Active Problem List   Diagnosis     Breast cancer est/prog +, HER2 ERNIE -     Osteoarthritis     Moderate episode of recurrent major depressive disorder (H)     Advanced directives, counseling/discussion     Knee pain     Past use of tobacco     IFG (impaired fasting glucose)     Low back pain     Malignant neoplasm of right breast (H)     Coronary artery disease involving native coronary artery of native heart without angina pectoris     Hyperlipidemia LDL goal <70     Follow-up examination following surgery     Carotid artery plaque     Chronic pain syndrome     Adjustment disorder with mixed anxiety and depressed mood     Neck pain on right side     Hyperparathyroidism (H)     Alcohol dependence in remission (H)     Past Surgical History:   Procedure Laterality Date     COLONOSCOPY       COLONOSCOPY  11/17/2011    Procedure:COLONOSCOPY; Colonoscopy; Surgeon:MEKA RUSS; Location: GI     DISSECT LYMPH NODE AXILLA Right 11/2/2017    Procedure: DISSECT LYMPH NODE AXILLA;  RIGHT AXILLARY LYMPH " NODE DISSECTION;  Surgeon: Cortez White MD;  Location: [facility]     GYN SURGERY  2005    hysterectomy after hx of ovarian cyst, ended up being benign     HYSTERECTOMY, PAP NO LONGER INDICATED       MASTECTOMY MODIFIED RADICAL  2011    bilateral     MASTECTOMY, BILATERAL       ORTHOPEDIC SURGERY      rt. knee arthroscopy     ORTHOPEDIC SURGERY Right     toe surgery     REALIGN PATELLA  1973    right      TOE SURGERY      right grt OA        Social History     Tobacco Use     Smoking status: Former Smoker     Packs/day: 1.00     Years: 22.00     Pack years: 22.00     Types: Cigarettes     Last attempt to quit: 2010     Years since quittin.7     Smokeless tobacco: Never Used   Substance Use Topics     Alcohol use: No     Alcohol/week: 0.0 oz     Comment: intermittent sobriety 11     Family History   Problem Relation Age of Onset     Cancer Mother 60        leukemia     Arthritis Mother         osteo     Eye Disorder Mother         glaucoma     Gastrointestinal Disease Mother         gallbladder     Other Cancer Mother      Gallbladder Disease Mother      Alcohol/Drug Father         alcohol     Heart Disease Father         ? CHF     Respiratory Father         emphysema     Coronary Artery Disease Father      Substance Abuse Father      Substance Abuse Sister      Colon Cancer Brother      Breast Cancer Maternal Grandmother      Asthma Sister      Cancer - colorectal Brother 50     Prostate Cancer Brother      Allergies Brother         bee      Arthritis Sister         osteo     Arthritis Sister         osteo     Arthritis Brother         osteo     Depression Sister      Psychotic Disorder Sister         bipolar     Respiratory Sister          Current Outpatient Medications   Medication Sig Dispense Refill     aspirin 81 MG tablet Take 1 tablet (81 mg) by mouth daily 30 tablet      Cholecalciferol (VITAMIN D-3) 5000 units TABS Take 2 tablets by mouth daily       Coenzyme Q10 300 MG CAPS Take 300 mg  by mouth daily       DULoxetine (CYMBALTA) 60 MG capsule Take 1 capsule (60 mg) by mouth daily 90 capsule 3     HYDROcodone-acetaminophen (NORCO) 5-325 MG tablet Take 1-2 tablets by mouth every 6 hours as needed for pain 20 tablet 0     IBUPROFEN PO Take 200 mg by mouth every 4 hours as needed for moderate pain        letrozole (FEMARA) 2.5 MG tablet Take 1 tablet by mouth daily  5     LYSINE 1-3 daily as needed       Magnesium 300 MG CAPS Take 300 mg by mouth daily       Melatonin 10 MG TABS tablet Take 10 mg by mouth nightly as needed for sleep       Multiple Vitamins-Minerals (OCUVITE ADULT 50+) CAPS Take 1 capsule by mouth daily       nicotine polacrilex (NICORETTE) 2 MG gum Place 1 each (2 mg) inside cheek as needed for smoking cessation 30 tablet 11     rosuvastatin (CRESTOR) 10 MG tablet Take 1 tablet (10 mg) by mouth daily 90 tablet 0     UNABLE TO FIND MEDICATION NAME: Somnapure - 2 tablets = 500mg valerian root, 300mg lemon balm extract, 200mg l-theanine, 120mg hops extract, 50mg chamomile flower extract, 50mg passion flower extract, 3mg melatonin.  Takes 1-2 tablets at bedtime       UNABLE TO FIND MEDICATION NAME: Vitacell 2 capsules = proprietary blend of 1155mg CherryPure montmorency tart cherry skin powder, longvida curcuma longa rhizome extract, boswella lisa resin extract, green tea leaf extract, quercetin, resveratrol, and cocoa seed extract.  Takes 2 capsules daily       UNABLE TO FIND MEDICATION NAME: Peak Hernández Oil - 2 capsules = 2000mg nigella sativa oil.  Takes 2 capsules daily       UNABLE TO FIND MEDICATION NAME: Neurall 2 capsules = 400mg eleutherococcus senticosus root extract, 320mg bacopa monnieri standardized plant extract, sabroxy oroxylum indicum bark extract, huperzia dedrick whole plant extract.  Takes 2 capsules daily.       UNABLE TO FIND MEDICATION NAME: Moringa 1 serving of powder daily       UNABLE TO FIND MEDICATION NAME: Premium Amla Berry 2 capsules = 4mg Vitamin C, 966mg  "organic amla, organic amla extract.  Takes 2 capsules daily       UNABLE TO FIND MEDICATION NAME: OmegaKrill 5x 3 capsules = 480mg of total omega-3, 64mg EPA, 392mg DHA, 300mcg astaxanthin.  Takes 3 capsules daily       UNABLE TO FIND MEDICATION NAME: Super Colon Cleanse 2 capsules = 665mg senna leaf powder, 321mg psyllium husk powder, 21mg fennel seed powder, 21mg papaya leaf powder, 21mg yolanda hips fruit powder, 11mg l-acidophilus.  Taking 4 capsules daily       UNABLE TO FIND MEDICATION NAME: Majestha powder daily       UNABLE TO FIND MEDICATION NAME: Neem powder daily       valACYclovir (VALTREX) 1000 mg tablet Take 2 tablets (2,000 mg) by mouth 2 times daily as needed 8 tablet 1     ZOLEDRONIC ACID IV Inject into the vein every 6 months       Allergies   Allergen Reactions     Cats      Codeine Sulfate GI Disturbance       Reviewed and updated as needed this visit by clinical staff  Tobacco  Allergies  Meds  Soc Hx      Reviewed and updated as needed this visit by Provider         ROS:  Constitutional, HEENT, cardiovascular, pulmonary, gi and gu systems are negative, except as otherwise noted.    OBJECTIVE:     /74 (BP Location: Left arm, Cuff Size: Adult Large)   Pulse 65   Temp 99.3  F (37.4  C) (Oral)   Ht 1.638 m (5' 4.5\")   Wt 62.2 kg (137 lb 3.2 oz)   SpO2 95%   BMI 23.19 kg/m    Body mass index is 23.19 kg/m .  GENERAL: healthy, alert and no distress  EYES: Eyes grossly normal to inspection, PERRL and conjunctivae and sclerae normal  HENT: ear canals and TM's normal, nose and mouth without ulcers or lesions; no sinus tenderness  NECK: no adenopathy, no asymmetry, masses, or scars and thyroid normal to palpation  RESP: lungs clear to auscultation - no rales, rhonchi or wheezes  CV: Heart with regular rate and rhythm.   NEURO: Normal strength and tone, mentation intact and speech normal  PSYCH: mentation appears normal, affect normal/bright        ASSESSMENT/PLAN:         1. Moderate episode " of recurrent major depressive disorder (H)  Mood improved on 60 mg of Cymbalta.  Continue current dose.  She would like to recheck in 3 months to see if she can taper off the medication, because she has some business interests that might improve her financial situation and she is confident that her mood might improve should those business interest go her way.  Until then, she will stay on her current dose of Cymbalta.  - DULoxetine (CYMBALTA) 60 MG capsule; Take 1 capsule (60 mg) by mouth daily  Dispense: 90 capsule; Refill: 3    2. Upper respiratory tract infection, unspecified type  I believe that she is experiencing are improving, resolving viral upper respiratory tract infection, I do not think that any specific intervention is needed although we did discuss some symptom interventions and contagion considerations.  She will inform me if symptoms worsen or new symptoms develop.          Vladislav Cruz MD  [facility]

## 2019-02-08 ASSESSMENT — ANXIETY QUESTIONNAIRES: GAD7 TOTAL SCORE: 5

## 2019-02-26 ENCOUNTER — ANCILLARY PROCEDURE (OUTPATIENT)
Dept: GENERAL RADIOLOGY | Facility: CLINIC | Age: 69
End: 2019-02-26
Attending: INTERNAL MEDICINE
Payer: MEDICARE

## 2019-02-26 ENCOUNTER — TELEPHONE (OUTPATIENT)
Dept: FAMILY MEDICINE | Facility: CLINIC | Age: 69
End: 2019-02-26

## 2019-02-26 DIAGNOSIS — R05.9 COUGH: ICD-10-CM

## 2019-02-26 DIAGNOSIS — R05.9 COUGH: Primary | ICD-10-CM

## 2019-02-26 PROCEDURE — 71046 X-RAY EXAM CHEST 2 VIEWS: CPT

## 2019-02-26 NOTE — TELEPHONE ENCOUNTER
PCP,    Pt last OV 2/7/19. States her cough is worse since then and wondering if she can get something for this. Leaves for [place] on Thursday.  Denies fever or swollen lymph nodes. Would you request another OV? Fit her in or team appt?  Please advise.     Thank you,  Pablito LIEBERMAN RN

## 2019-02-26 NOTE — LETTER
[facility]  [address] [facility]  [address]  [place]  [address]  Tel: [phone]    February 27, 2019    Svetlana S Giovany  [address]   [facility] [address]        Dear Ms. Adair,    The following letter pertains to your most recent diagnostic tests:    Good news! Your chest x-ray is normal.  If your cough is really severe, you may want to schedule an appointment with a provider in our office or go to our urgent care prior to your departure to [place].  If you have any further questions or problems, please contact our office.      Sincerely,    Vladislav Cruz MD/Tiffany

## 2019-02-26 NOTE — TELEPHONE ENCOUNTER
Reason for call:  Symptom   Symptom or request: Productive cough and congestion for 1 week. She presented with cough only at 2/07/2019 OV but has gotten worse. Leaving Thursday am for [place] and traveling with immuno compromised .  No fever, no swollen lymph nodes.    Duration (how long have symptoms been present): 1- 2 weeks   Have you been treated for this before? Yes    Additional comments:     Phone number to reach patient:  Cell number on file:    Telephone Information:   Mobile [phone]       Best Time:  Anytime    Can we leave a detailed message on this number?  YES

## 2019-02-26 NOTE — TELEPHONE ENCOUNTER
I attempted to call patient to schedule xray only appointment - no answer on phone number.  Did NOT leave message.  Patient can be scheduled for xray only anytime until 8:30pm.  Verna Beatty, RT (R)

## 2019-02-27 ENCOUNTER — TELEPHONE (OUTPATIENT)
Dept: FAMILY MEDICINE | Facility: CLINIC | Age: 69
End: 2019-02-27

## 2019-02-27 NOTE — TELEPHONE ENCOUNTER
The x-ray is normal, no pneumonia, if symptoms are intolerable she should schedule office visit appointment with team provider or UC tonight

## 2019-02-27 NOTE — TELEPHONE ENCOUNTER
Reason for Call:  Request for results:    Name of test or procedure: Chest Xray    Date of test of procedure: 2/26    Location of the test or procedure:   Clinic    OK to leave the result message on voice mail or with a family member? YES    Phone number Patient can be reached at:  Home number on file [phone] (home)    Additional comments: She wants to get the results ASAP so she know what to do next    Call taken on 2/27/2019 at 9:27 AM by Sharlene Morin

## 2019-02-27 NOTE — TELEPHONE ENCOUNTER
Patient called and informed of x-ray results. Informed patient that results are normal and no signs of pneumonia. Patient states taking sudafed and afrin recently and has helped symptoms dramatically. Patient will follow up if she has any concerns.    Omid Ricardo CMA on 2/27/2019 at 11:39 AM

## 2019-03-18 DIAGNOSIS — G89.4 CHRONIC PAIN SYNDROME: ICD-10-CM

## 2019-03-18 DIAGNOSIS — M54.50 LOW BACK PAIN WITHOUT SCIATICA, UNSPECIFIED BACK PAIN LATERALITY, UNSPECIFIED CHRONICITY: ICD-10-CM

## 2019-03-18 NOTE — TELEPHONE ENCOUNTER
Reason for Call:  Medication or medication refill:    Do you use a [facility]?  Name of the pharmacy and phone number for the current request:    [facility] [address] [facility], [place] - [address] AT [facility] [address]  WRITTEN PRESCRIPTION REQUESTED      Name of the medication requested: HYDROcodone-acetaminophen (NORCO) 5-325 MG tablet       Other request: any    Can we leave a detailed message on this number? YES    Phone number patient can be reached at: Home number on file [phone] (home)    Best Time: any    Call taken on 3/18/2019 at 4:27 PM by Melinda Love

## 2019-03-19 NOTE — TELEPHONE ENCOUNTER
Controlled Substance Refill Request for     HYDROcodone-acetaminophen (NORCO) 5-325 MG tablet 20 tablet 0 12/12/2018  No   Sig - Route: Take 1-2 tablets by mouth every 6 hours as needed for pain      Associated Diagnoses   Low back pain without sciatica, unspecified back pain laterality, unspecified chronicity [M54.5]         Last Written Prescription Date:  12/12/2018  Last Fill Quantity: 20,  # refills: 0   Last office visit: 2/7/2019 with prescribing provider:     Future Office Visit:   Next 5 appointments (look out 90 days)    Mar 28, 2019  1:00 PM CDT  Office Visit with Janice Tierney [facility] ([facility]) [address]  [facility] [address]  [phone]   May 01, 2019  8:15 AM CDT  Return Visit with Jose Alberto Perez MD  [facility] ([facility]) [address]  [facility] [address] [phone] OPT 2   May 09, 2019  1:00 PM CDT  Office Visit with Vladislav Cruz MD  [facility] ([facility]) [address] [facility] [address]  [phone]         Problem List Complete:  Yes  Chronic pain syndrome   Problem Detail     Noted:  4/26/2018    Priority:  Medium    Overview Signed 4/26/2018 12:01 PM by Vladislav Cruz MD   Patient is followed by Vladislav Cruz MD for ongoing prescription of pain medication.  All refills should only be approved by this provider, or covering partner.     Medication(s): norco 5/325.   Maximum quantity per month: #20  Clinic visit frequency required: Q 6  months      Controlled substance agreement:      Encounter-Level CSA:      There are no encounter-level csa.          Pain Clinic evaluation in the past: No     DIRE Total Score(s):  No flowsheet data found.     Last [facility] website verification:  none   [email or web address]        checked in past 3 months?  No, route to RN

## 2019-03-20 PROBLEM — G89.4 CHRONIC PAIN SYNDROME: Status: ACTIVE | Noted: 2018-04-26

## 2019-03-20 RX ORDER — HYDROCODONE BITARTRATE AND ACETAMINOPHEN 5; 325 MG/1; MG/1
1-2 TABLET ORAL EVERY 6 HOURS PRN
Qty: 20 TABLET | Refills: 0 | Status: SHIPPED | OUTPATIENT
Start: 2019-03-20 | End: 2019-04-17

## 2019-03-20 NOTE — TELEPHONE ENCOUNTER
Checked .  All fills by Dr. Cruz except most recent fill 1/18/19 by Cristhian Birmingham MD [address], who pt saw for an operation.  Dana Powell RN

## 2019-03-28 ENCOUNTER — OFFICE VISIT (OUTPATIENT)
Dept: PHARMACY | Facility: CLINIC | Age: 69
End: 2019-03-28

## 2019-03-28 VITALS
SYSTOLIC BLOOD PRESSURE: 120 MMHG | BODY MASS INDEX: 23.32 KG/M2 | WEIGHT: 138 LBS | HEART RATE: 64 BPM | OXYGEN SATURATION: 98 % | DIASTOLIC BLOOD PRESSURE: 75 MMHG

## 2019-03-28 DIAGNOSIS — Z87.891 PAST USE OF TOBACCO: ICD-10-CM

## 2019-03-28 DIAGNOSIS — Z78.9 TAKES DIETARY SUPPLEMENTS: ICD-10-CM

## 2019-03-28 DIAGNOSIS — M54.5 LOW BACK PAIN, UNSPECIFIED BACK PAIN LATERALITY, UNSPECIFIED CHRONICITY, WITH SCIATICA PRESENCE UNSPECIFIED: ICD-10-CM

## 2019-03-28 DIAGNOSIS — B00.1 COLD SORE: ICD-10-CM

## 2019-03-28 DIAGNOSIS — C50.911 MALIGNANT NEOPLASM OF RIGHT FEMALE BREAST, UNSPECIFIED ESTROGEN RECEPTOR STATUS, UNSPECIFIED SITE OF BREAST (H): ICD-10-CM

## 2019-03-28 DIAGNOSIS — R19.7 DIARRHEA, UNSPECIFIED TYPE: Primary | ICD-10-CM

## 2019-03-28 DIAGNOSIS — F32.0 MILD MAJOR DEPRESSION (H): ICD-10-CM

## 2019-03-28 DIAGNOSIS — I65.22 ATHEROSCLEROSIS OF LEFT CAROTID ARTERY: ICD-10-CM

## 2019-03-28 PROCEDURE — 99607 MTMS BY PHARM ADDL 15 MIN: CPT | Performed by: PHARMACIST

## 2019-03-28 PROCEDURE — 99605 MTMS BY PHARM NP 15 MIN: CPT | Performed by: PHARMACIST

## 2019-03-28 NOTE — PATIENT INSTRUCTIONS
Recommendations from today's MTM visit:                                                    MTM (medication therapy management) is a service provided by a clinical pharmacist designed to help you get the most of out of your medicines.   Today we reviewed what your medicines are for, how to know if they are working, that your medicines are safe and how to make your medicine regimen as easy as possible.     1.  If your Neem powder doesn't have biotin - consider starting a biotin supplement to see if this helps with hair/nails.    2.  Try eating a yogurt daily instead of starting up another probiotic.    3.  Continue working on tapering off the nicotine gum.    Next MTM visit: 1 year, sooner if needed    To schedule another MTM appointment, please call the clinic directly or you may call the MTM scheduling line at [phone] or toll-free at [phone].     My Clinical Pharmacist's contact information:                                                      It was a pleasure talking with you today!  Please feel free to contact me with any questions or concerns you have.      Velma Hayes PharmD  Medication Therapy Management (MTM) Resident  Pager: [phone]    Janice Tierney PharmD, [facility]  Medication Therapy Management Provider  Pager: [phone]      You may receive a survey about the MTM services you received by email and/or US Mail.  I would appreciate your feedback to help me serve you better in the future. Your comments will be anonymous.

## 2019-03-28 NOTE — PROGRESS NOTES
SUBJECTIVE/OBJECTIVE:                Svetlana Adair is a 68 year old female coming in for a follow-up visit for Medication Therapy Management.  She was referred to me from Dr. Cruz.     Chief Complaint: Follow up from our visit on 11/29/18.  This visit serves as an initial visit for 2019.  She has no specific questions or concerns today.    Tobacco: History of tobacco dependence - quit about 6 years ago but continues using NRT  Alcohol: not currently using    Medication Adherence/Access: no issues reported    Diarrhea:  She is no longer having diarrhea, but does continue having loose stools.  She continues to feel NRT is playing a role as she's continued with this (nicotine 2mg gum as needed).  Overall she's feeling well and she's no longer needing to use loperamide.  She wonders if she should start a probiotic.  She does eat yogurt a few times a week.  Expresses concern about the cost of probiotics.    Smoking Cessation:  Svetlana quit smoking about 6 years ago, but has continued using NRT (gum) since that time.  She feels she's doing ok and plans to taper off NRT as she's able.     Breast Cancer: She just finished treatment in February.  Currently taking letrozole 2.5mg daily, and says she'll be on this for 10 years.  She would prefer to not take but understands risks of doing so, so plans to continue.  She has noticed that her nails are weak and her hair has thinned, which she attributes to letrozole therapy.  She also receives zoledronic acid every 6 months and reports this is going well.     CAD: Current therapy includes ASA 81mg daily and rosuvastatin 10mg once daily.  She denies side effects.  Rosuvastatin dose was last increased in the fall.  The 10-year ASCVD risk score (Randolph DC Jr., et al., 2013) is: 4.8%    Values used to calculate the score:      Age: 68 years      Sex: Female      Is Non- : No      Diabetic: No      Tobacco smoker: No      Systolic Blood Pressure: 120 mmHg       Is BP treated: No      HDL Cholesterol: 74 mg/dL      Total Cholesterol: 188 mg/dL     LDL Cholesterol Calculated   Date Value Ref Range Status   10/16/2018 91 <100 mg/dL Final     Comment:     Desirable:       <100 mg/dl      Depression:  Current medications include: duloxetine 60mg daily, changed from escitalopram since our last visit. Pt reports that depression symptoms are improved with this change.  She denies side effects of therapy.    PHQ-9 SCORE 1/2/2019 2/7/2019 2/7/2019   PHQ-9 Total Score - - -   PHQ-9 Total Score 8 3 3      Pain:  She's been working with PT for neck/shoulder pain.  She's currently taking hydrocodone/APAP 1/2 tablet along with 200mg of ibuprofen occasionally (sometimes a few times a week, other times none for several weeks).  She reports this has been effective for her and she denies side effects.    Cold Sores:  She has Valtrex and l-lysine available for use if needed, which she finds effective.  She denies side effects.  Hasn't needed to use either lately.    Supplements:  Svetlana has an extensive supplement list - see med list for more details.  .  She finds these effective and prefers to continue taking.      Today's Vitals: /75   Pulse 64   Wt 138 lb (62.6 kg)   SpO2 98%   BMI 23.32 kg/m      ASSESSMENT:              Current medications were reviewed today as discussed above.      Medication Adherence: good, no issues identified    Diarrhea: Improved.  There's limited data for probiotics helping with diarrhea (outside of antibiotic use), may benefit from eating yogurt daily instead.    Smoking Cessation: Would benefit from tapering off NRT, as we've discussed in the past.    Breast Cancer: May benefit from trial of biotin to see if this helps with nail/hair growth.    CAD: Plan in place.    Depression: Stable.     Pain:  Stable.    Cold Sores:  Stable.    Supplements:  Stable, as we've discussed before it's questionable how much benefit she's getting, and she's aware of  potential risks/side effects.      PLAN:                  1.  Suggested trial of biotin to see if this helps with nail/hair growth.  If no improvement in 1-2 months, then discontinue.  2.  Recommended eating yogurt daily vs starting a probiotic.  3.  Again encouraged her to work on tapering off NRT.    I spent 45 minutes with this patient today. A copy of the visit note was provided to the patient's primary care provider.     Will follow up in 1 year, sooner if needed.    The patient was given a summary of these recommendations as an after visit summary.    Janice Tierney, PharmD, [facility]  Medication Therapy Management Provider  Pager: [phone]

## 2019-04-02 ENCOUNTER — TELEPHONE (OUTPATIENT)
Dept: FAMILY MEDICINE | Facility: CLINIC | Age: 69
End: 2019-04-02

## 2019-04-02 NOTE — TELEPHONE ENCOUNTER
Reason for Call:  Medication or medication refill:    Do you use a [facility]?  Name of the pharmacy and phone number for the current request:         [facility] [address] [facility], [place] - [address] AT [facility] [address]    Name of the medication requested: DULoxetine (CYMBALTA) 60 MG capsule    Other request: want to make sure they receive the prescription for the 60 MG capsules    Can we leave a detailed message on this number? Not Applicable      Call taken on 4/2/2019 at 12:23 PM by Padma Singh  .

## 2019-04-11 ENCOUNTER — PRE VISIT (OUTPATIENT)
Dept: CARDIOLOGY | Facility: CLINIC | Age: 69
End: 2019-04-11

## 2019-04-11 DIAGNOSIS — I25.10 CORONARY ARTERY DISEASE INVOLVING NATIVE CORONARY ARTERY OF NATIVE HEART WITHOUT ANGINA PECTORIS: Primary | ICD-10-CM

## 2019-04-11 NOTE — TELEPHONE ENCOUNTER
Patient changed dose of crestor to 10mg daily. Follow up lipid panel is due. Order placed for flp/alt. Called patient and she will set up a lab visit at her PCP office prior to her OV on May 1. She is aware to be fasting.

## 2019-04-15 DIAGNOSIS — B00.9 HSV (HERPES SIMPLEX VIRUS) INFECTION: ICD-10-CM

## 2019-04-15 DIAGNOSIS — M54.50 LOW BACK PAIN WITHOUT SCIATICA, UNSPECIFIED BACK PAIN LATERALITY, UNSPECIFIED CHRONICITY: ICD-10-CM

## 2019-04-15 NOTE — TELEPHONE ENCOUNTER
Reason for Call:  Medication or medication refill:    Do you use a [facility]?  Name of the pharmacy and phone number for the current request:     [facility] [address] [facility] [address] AT [facility] [address]  WRITTEN PRESCRIPTION REQUESTED      Name of the medication requested: HYDROcodone-acetaminophen (NORCO) 5-325 MG tablet    valACYclovir (VALTREX) 1000 mg tablet      Other request: Pt needs a refill on both of the medications above. Please call pt when ready to  the HYDROcodone-acetaminophen (NORCO) 5-325 MG tablet      Can we leave a detailed message on this number? YES    Phone number patient can be reached at: Work number on file:  [phone]    Best Time: anytime    Call taken on 4/15/2019 at 1:37 PM by Esperanza Cantu

## 2019-04-17 RX ORDER — VALACYCLOVIR HYDROCHLORIDE 1 G/1
2000 TABLET, FILM COATED ORAL 2 TIMES DAILY PRN
Qty: 8 TABLET | Refills: 1 | Status: SHIPPED | OUTPATIENT
Start: 2019-04-17 | End: 2019-10-31

## 2019-04-17 RX ORDER — HYDROCODONE BITARTRATE AND ACETAMINOPHEN 5; 325 MG/1; MG/1
1-2 TABLET ORAL EVERY 6 HOURS PRN
Qty: 20 TABLET | Refills: 0 | Status: SHIPPED | OUTPATIENT
Start: 2019-04-17 | End: 2019-05-29

## 2019-04-17 NOTE — TELEPHONE ENCOUNTER
Patient is followed by JAZLYN FLOWERS for ongoing prescription of pain medication.  All refills should only be approved by this provider, or covering partner.    Medication(s): Norco.   Maximum quantity per month: 20  Clinic visit frequency required: Q 3 months      Controlled substance agreement:  Encounter-Level CSA - 04/26/2018:    Controlled Substance Agreement - Scan on 5/2/2018 10:48 AM: CONTROLLED SUBSTANCE AGREEMENT (below)       Patient-Level CSA:    There are no patient-level csa.       Pain Clinic evaluation in the past: No    Last [facility] website verification:  done on 4/17/19 no concerns   [email or web address]    Pablito LIEBERMAN RN

## 2019-04-30 DIAGNOSIS — I25.10 CORONARY ARTERY DISEASE INVOLVING NATIVE CORONARY ARTERY OF NATIVE HEART WITHOUT ANGINA PECTORIS: ICD-10-CM

## 2019-04-30 LAB
ALT SERPL W P-5'-P-CCNC: 33 U/L (ref 0–50)
CHOLEST SERPL-MCNC: 212 MG/DL
HDLC SERPL-MCNC: 98 MG/DL
LDLC SERPL CALC-MCNC: 91 MG/DL
NONHDLC SERPL-MCNC: 114 MG/DL
TRIGL SERPL-MCNC: 115 MG/DL

## 2019-04-30 PROCEDURE — 84460 ALANINE AMINO (ALT) (SGPT): CPT | Performed by: INTERNAL MEDICINE

## 2019-04-30 PROCEDURE — 36415 COLL VENOUS BLD VENIPUNCTURE: CPT | Performed by: INTERNAL MEDICINE

## 2019-04-30 PROCEDURE — 80061 LIPID PANEL: CPT | Performed by: INTERNAL MEDICINE

## 2019-05-01 ENCOUNTER — OFFICE VISIT (OUTPATIENT)
Dept: CARDIOLOGY | Facility: CLINIC | Age: 69
End: 2019-05-01
Payer: MEDICARE

## 2019-05-01 VITALS
DIASTOLIC BLOOD PRESSURE: 82 MMHG | SYSTOLIC BLOOD PRESSURE: 121 MMHG | BODY MASS INDEX: 23.43 KG/M2 | WEIGHT: 140.6 LBS | HEART RATE: 74 BPM | HEIGHT: 65 IN

## 2019-05-01 DIAGNOSIS — E78.5 HYPERLIPIDEMIA LDL GOAL <70: Primary | ICD-10-CM

## 2019-05-01 DIAGNOSIS — I65.22 ATHEROSCLEROSIS OF LEFT CAROTID ARTERY: ICD-10-CM

## 2019-05-01 DIAGNOSIS — R93.1 HIGH CORONARY ARTERY CALCIUM SCORE: Chronic | ICD-10-CM

## 2019-05-01 PROCEDURE — 99214 OFFICE O/P EST MOD 30 MIN: CPT | Performed by: INTERNAL MEDICINE

## 2019-05-01 ASSESSMENT — MIFFLIN-ST. JEOR: SCORE: 1178.64

## 2019-05-01 NOTE — LETTER
2019      Vladislav Cruz MD  [address]  [facility] [address]      RE: Svetlana Mccloudchristian       Dear Colleague,    I had the pleasure of seeing Svetlana Adair in the [facility].    Service Date: 2019      HISTORY OF PRESENT ILLNESS:  Ms. Adair is a very nice 68-year-old woman with past medical history significant for very high calcium score at 790, putting in the top 1% for age.  She is a former smoker, having quit in .  She has hypercholesterolemia.  She is under a great deal of stress.  Her   a couple years ago of Alzheimer's and she was the primary caregiver.        When I saw her last 2 years ago, she had reconnected with an old boyfriend and things were going very well, although he now been diagnosed with stage IV lung cancer and was given for 14 months to live and now is in his 15-month, so he is doing well, but clearly deteriorating.  She herself unfortunately has also had a recrudescence of her breast cancer and bilateral mastectomies, but lymph node was positive and she is now undergoing therapy for that as well.        Because of all this, Svetlana thinks she is quite depressed.  She has not been able to exercise regularly.  She has not been paying attention to diet.  She states she does not care and cannot get motivated, although she is hoping with improving weather that she will improve.  She states her walking partner for years has moved away.      She has no chest, arm, neck, jaw or shoulder discomfort.  No dyspnea on exertion, orthopnea or PND.  No palpitations, lightheadedness, dizziness, syncope or near-syncope.        She thinks statin as a cause memory loss and we have not been able to push beyond 10 mg of rosuvastatin.        ASSESSMENT AND PLAN:  Svetlana appears to be doing well from a cardiac standpoint without clinical evidence of ischemia and this is supported by a stress nuclear scan in .  She does have mild carotid  disease based on ultrasound from 2018.  We again talked about the fact that the positive calcium score in the face of a negative stress test she has coronary disease brewing.  We talked about the fact that coronary artery disease starts in the outside area and moves in.  We reviewed all the things regarding diet, exercise.  I congratulated her or not smoking and encouraged her to continue to do so.  She states she does occasionally smoke some pot with her boyfriend.  She has no symptoms to suggest heart failure or significant arrhythmia.  On physical exam, she does have an occasional premature beat.      Blood pressure is very well controlled at 121/82 with a pulse of 74.      Weight is in the ideal range of 140 pounds, giving a body mass index of 23.4.      Her fasting lipid profile has slid and undoubtedly this is due to her slip in her diet as well as her lack of exercise.  Cholesterol is back up to 212.  HDL fortunately is 98, LDL 91, triglycerides are 115.  We talked about the fact that LDL is reaching the first goal of below 100, but given the fact that she is in the top 1% for risk, I try to drive it below 70.  I suggested either increasing her rosuvastatin to 20 mg or adding Zetia.  She states she wants to try improving of her lifestyle, getting back to exercising, increasing the fiber in diet.  I suggest that she can try taking Benecol to take control or there are over-the-counter plant stanols and sterols that will lower cholesterol as well.  She wants to try for 6 months.  I will have her follow up with my PATIENCE  at that time.  We will see what her cholesterol profile is, but I would really like to drive her LDL below 70.  I do not think she would be able to do it with just lifestyle changes, but we will check at that time and then we can consider again bumping her statin up or adding Zetia.      She clearly is fighting depression which is being handled by Dr. Cruz.  Again, I have encouraged her to get  back to a regular exercise, get out and get some sunshine as this will all help or garcia her depression.      Thank you for allowing me to participate in her care.         NITA GARCIA MD, [facility]             D: 2019   T: 2019   MT: QASIM      Name:     ERIKA VALENCIA   MRN:      [id number]        Account:      [id number]   :      1950           Service Date: 2019      Document: [id number]         Outpatient Encounter Medications as of 2019   Medication Sig Dispense Refill     aspirin 81 MG tablet Take 1 tablet (81 mg) by mouth daily 30 tablet      Cholecalciferol (VITAMIN D-3) 5000 units TABS Take 2 tablets by mouth daily       Coenzyme Q10 300 MG CAPS Take 300 mg by mouth daily       DULoxetine (CYMBALTA) 60 MG capsule Take 1 capsule (60 mg) by mouth daily 90 capsule 3     HYDROcodone-acetaminophen (NORCO) 5-325 MG tablet Take 1-2 tablets by mouth every 6 hours as needed for pain 20 tablet 0     IBUPROFEN PO Take 200 mg by mouth every 4 hours as needed for moderate pain        letrozole (FEMARA) 2.5 MG tablet Take 1 tablet by mouth daily  5     LYSINE 1-3 daily as needed       Magnesium 300 MG CAPS Take 300 mg by mouth daily       Melatonin 10 MG TABS tablet Take 10 mg by mouth nightly as needed for sleep       Multiple Vitamins-Minerals (OCUVITE ADULT 50+) CAPS Take 1 capsule by mouth daily       nicotine polacrilex (NICORETTE) 2 MG gum Place 1 each (2 mg) inside cheek as needed for smoking cessation 30 tablet 11     rosuvastatin (CRESTOR) 10 MG tablet Take 1 tablet (10 mg) by mouth daily 90 tablet 0     UNABLE TO FIND MEDICATION NAME: Somnapure - 2 tablets = 500mg valerian root, 300mg lemon balm extract, 200mg l-theanine, 120mg hops extract, 50mg chamomile flower extract, 50mg passion flower extract, 3mg melatonin.  Takes 1-2 tablets at bedtime       UNABLE TO FIND MEDICATION NAME: Vitacell 2 capsules = proprietary blend of 1155mg CherryPure montmorency tart cherry skin  powder, longvida curcuma longa rhizome extract, boswella lisa resin extract, green tea leaf extract, quercetin, resveratrol, and cocoa seed extract.  Takes 2 capsules daily       UNABLE TO FIND MEDICATION NAME: Peak Hernández Oil - 2 capsules = 2000mg nigella sativa oil.  Takes 2 capsules daily       UNABLE TO FIND MEDICATION NAME: Neurall 2 capsules = 400mg eleutherococcus senticosus root extract, 320mg bacopa monnieri standardized plant extract, sabroxy oroxylum indicum bark extract, huperzia dedrick whole plant extract.  Takes 2 capsules daily.       UNABLE TO FIND MEDICATION NAME: Moringa 1 serving of powder daily       UNABLE TO FIND MEDICATION NAME: Premium Amla Berry 2 capsules = 4mg Vitamin C, 966mg organic amla, organic amla extract.  Takes 2 capsules daily       UNABLE TO FIND MEDICATION NAME: OmegaKrill 5x 3 capsules = 480mg of total omega-3, 64mg EPA, 392mg DHA, 300mcg astaxanthin.  Takes 3 capsules daily       UNABLE TO FIND MEDICATION NAME: Super Colon Cleanse 2 capsules = 665mg senna leaf powder, 321mg psyllium husk powder, 21mg fennel seed powder, 21mg papaya leaf powder, 21mg yolanda hips fruit powder, 11mg l-acidophilus.  Taking 4 capsules daily       UNABLE TO FIND MEDICATION NAME: Majestha powder daily       UNABLE TO FIND MEDICATION NAME: Neem powder daily       valACYclovir (VALTREX) 1000 mg tablet Take 2 tablets (2,000 mg) by mouth 2 times daily as needed 8 tablet 1     ZOLEDRONIC ACID IV Inject into the vein every 6 months       No facility-administered encounter medications on file as of 5/1/2019.        Again, thank you for allowing me to participate in the care of your patient.      Sincerely,    Jose Alberto Perez MD     [facility]

## 2019-05-01 NOTE — PROGRESS NOTES
HPI and Plan:   See dictation    Orders Placed This Encounter   Procedures     Lipid Profile     Lipid Profile     Follow-Up with Cardiac Advanced Practice Provider     Follow-Up with Cardiac Advanced Practice Provider     Follow-Up with Cardiologist       No orders of the defined types were placed in this encounter.      There are no discontinued medications.      Encounter Diagnoses   Name Primary?     High coronary artery calcium score      Hyperlipidemia LDL goal <70 Yes     Atherosclerosis of left carotid artery        CURRENT MEDICATIONS:  Current Outpatient Medications   Medication Sig Dispense Refill     aspirin 81 MG tablet Take 1 tablet (81 mg) by mouth daily 30 tablet      Cholecalciferol (VITAMIN D-3) 5000 units TABS Take 2 tablets by mouth daily       Coenzyme Q10 300 MG CAPS Take 300 mg by mouth daily       DULoxetine (CYMBALTA) 60 MG capsule Take 1 capsule (60 mg) by mouth daily 90 capsule 3     HYDROcodone-acetaminophen (NORCO) 5-325 MG tablet Take 1-2 tablets by mouth every 6 hours as needed for pain 20 tablet 0     IBUPROFEN PO Take 200 mg by mouth every 4 hours as needed for moderate pain        letrozole (FEMARA) 2.5 MG tablet Take 1 tablet by mouth daily  5     LYSINE 1-3 daily as needed       Magnesium 300 MG CAPS Take 300 mg by mouth daily       Melatonin 10 MG TABS tablet Take 10 mg by mouth nightly as needed for sleep       Multiple Vitamins-Minerals (OCUVITE ADULT 50+) CAPS Take 1 capsule by mouth daily       nicotine polacrilex (NICORETTE) 2 MG gum Place 1 each (2 mg) inside cheek as needed for smoking cessation 30 tablet 11     rosuvastatin (CRESTOR) 10 MG tablet Take 1 tablet (10 mg) by mouth daily 90 tablet 0     UNABLE TO FIND MEDICATION NAME: Somnapure - 2 tablets = 500mg valerian root, 300mg lemon balm extract, 200mg l-theanine, 120mg hops extract, 50mg chamomile flower extract, 50mg passion flower extract, 3mg melatonin.  Takes 1-2 tablets at bedtime       UNABLE TO FIND MEDICATION  NAME: Vitacell 2 capsules = proprietary blend of 1155mg CherryPure montmorency tart cherry skin powder, longvida curcuma longa rhizome extract, boswella lisa resin extract, green tea leaf extract, quercetin, resveratrol, and cocoa seed extract.  Takes 2 capsules daily       UNABLE TO FIND MEDICATION NAME: Peak Hernández Oil - 2 capsules = 2000mg nigella sativa oil.  Takes 2 capsules daily       UNABLE TO FIND MEDICATION NAME: Neurall 2 capsules = 400mg eleutherococcus senticosus root extract, 320mg bacopa monnieri standardized plant extract, sabroxy oroxylum indicum bark extract, huperzia dedrick whole plant extract.  Takes 2 capsules daily.       UNABLE TO FIND MEDICATION NAME: Moringa 1 serving of powder daily       UNABLE TO FIND MEDICATION NAME: Premium Amla Berry 2 capsules = 4mg Vitamin C, 966mg organic amla, organic amla extract.  Takes 2 capsules daily       UNABLE TO FIND MEDICATION NAME: OmegaKrill 5x 3 capsules = 480mg of total omega-3, 64mg EPA, 392mg DHA, 300mcg astaxanthin.  Takes 3 capsules daily       UNABLE TO FIND MEDICATION NAME: Super Colon Cleanse 2 capsules = 665mg senna leaf powder, 321mg psyllium husk powder, 21mg fennel seed powder, 21mg papaya leaf powder, 21mg yolanda hips fruit powder, 11mg l-acidophilus.  Taking 4 capsules daily       UNABLE TO FIND MEDICATION NAME: Majestha powder daily       UNABLE TO FIND MEDICATION NAME: Neem powder daily       valACYclovir (VALTREX) 1000 mg tablet Take 2 tablets (2,000 mg) by mouth 2 times daily as needed 8 tablet 1     ZOLEDRONIC ACID IV Inject into the vein every 6 months         ALLERGIES     Allergies   Allergen Reactions     Cats      Codeine Sulfate GI Disturbance       PAST MEDICAL HISTORY:  Past Medical History:   Diagnosis Date     Alcoholism (H)      Allergies     Fall     Basal cell cancer     back, chest, face     Breast cancer (H)      Coronary artery disease     CT calcium ufkho=296 Nov 2015     Depression      Hyperlipidemia LDL goal <130  12/2/2015     Hypertension     borderline     PONV (postoperative nausea and vomiting)      Squamous cell carcinoma     left upper arm, chin       PAST SURGICAL HISTORY:  Past Surgical History:   Procedure Laterality Date     COLONOSCOPY       COLONOSCOPY  11/17/2011    Procedure:COLONOSCOPY; Colonoscopy; Surgeon:MEKA RUSS; Location: [facility]     DISSECT LYMPH NODE AXILLA Right 11/2/2017    Procedure: DISSECT LYMPH NODE AXILLA;  RIGHT AXILLARY LYMPH NODE DISSECTION;  Surgeon: Cortez White MD;  Location:  [facility]     GYN SURGERY  2005    hysterectomy after hx of ovarian cyst, ended up being benign     HYSTERECTOMY, PAP NO LONGER INDICATED       MASTECTOMY MODIFIED RADICAL  2011    bilateral     MASTECTOMY, BILATERAL       ORTHOPEDIC SURGERY      rt. knee arthroscopy     ORTHOPEDIC SURGERY Right     toe surgery     REALIGN PATELLA  1973    right      TOE SURGERY      right grt OA        FAMILY HISTORY:  Family History   Problem Relation Age of Onset     Cancer Mother 60        leukemia     Arthritis Mother         osteo     Eye Disorder Mother         glaucoma     Gastrointestinal Disease Mother         gallbladder     Other Cancer Mother      Gallbladder Disease Mother      Alcohol/Drug Father         alcohol     Heart Disease Father         ? CHF     Respiratory Father         emphysema     Coronary Artery Disease Father      Substance Abuse Father      Substance Abuse Sister      Colon Cancer Brother      Breast Cancer Maternal Grandmother      Asthma Sister      Cancer - colorectal Brother 50     Prostate Cancer Brother      Allergies Brother         bee      Arthritis Sister         osteo     Arthritis Sister         osteo     Arthritis Brother         osteo     Depression Sister      Psychotic Disorder Sister         bipolar     Respiratory Sister        SOCIAL HISTORY:  Social History     Socioeconomic History     Marital status:      Spouse name: None     Number of children: None     Years of  education: None     Highest education level: None   Occupational History     None   Social Needs     Financial resource strain: None     Food insecurity:     Worry: None     Inability: None     Transportation needs:     Medical: None     Non-medical: None   Tobacco Use     Smoking status: Former Smoker     Packs/day: 1.00     Years: 22.00     Pack years: 22.00     Types: Cigarettes     Last attempt to quit: 2010     Years since quittin.0     Smokeless tobacco: Never Used   Substance and Sexual Activity     Alcohol use: No     Alcohol/week: 0.0 oz     Comment: intermittent sobriety 11     Drug use: Yes     Types: Marijuana     Comment: for sleeping/occ     Sexual activity: Yes     Partners: Male     Birth control/protection: Surgical     Comment: hyst   Lifestyle     Physical activity:     Days per week: None     Minutes per session: None     Stress: None   Relationships     Social connections:     Talks on phone: None     Gets together: None     Attends Yarsani service: None     Active member of club or organization: None     Attends meetings of clubs or organizations: None     Relationship status: None     Intimate partner violence:     Fear of current or ex partner: None     Emotionally abused: None     Physically abused: None     Forced sexual activity: None   Other Topics Concern      Service No     Blood Transfusions No     Caffeine Concern Yes     Comment: 4-5 cups caffeine per day     Occupational Exposure No     Hobby Hazards No     Sleep Concern Yes     Stress Concern Yes     Weight Concern No     Special Diet Yes     Comment: organic foods     Back Care No     Exercise No     Bike Helmet No     Seat Belt Yes     Self-Exams Yes     Parent/sibling w/ CABG, MI or angioplasty before 65F 55M? No   Social History Narrative     None       Review of Systems:  Skin:  Negative       Eyes:  Positive for glasses    ENT:  Negative      Respiratory:  Negative       Cardiovascular:  Negative;chest  "pain;lightheadedness;dizziness;syncope or near-syncope;cyanosis;edema;exercise intolerance Positive for;palpitations energy level low, states related to depression   Gastroenterology: Negative      Genitourinary:  Positive for urinary frequency    Musculoskeletal:  Positive for joint pain;arthritis    Neurologic:  Negative      Psychiatric:  Positive for depression;sleep disturbances    Heme/Lymph/Imm:  Positive for allergies    Endocrine:  Negative        Physical Exam:  Vitals: /82   Pulse 74   Ht 1.651 m (5' 5\")   Wt 63.8 kg (140 lb 9.6 oz)   BMI 23.40 kg/m      Constitutional:  cooperative, alert and oriented, well developed, well nourished, in no acute distress        Skin:  warm and dry to the touch, no apparent skin lesions or masses noted          Head:  normocephalic, no masses or lesions        Eyes:           Lymph:      ENT:  no pallor or cyanosis, dentition good        Neck:  carotid pulses are full and equal bilaterally;no carotid bruit        Respiratory:  normal breath sounds, clear to auscultation, normal A-P diameter, normal symmetry, normal respiratory excursion, no use of accessory muscles         Cardiac: regular rhythm;normal S1 and S2;no S3 or S4;no murmurs, gallops or rubs detected occasional premature beats              pulses full and equal                                        GI:           Extremities and Muscular Skeletal:  no edema;no spinal abnormalities noted;normal muscle strength and tone              Neurological:  no gross motor deficits        Psych:  affect appropriate, oriented to time, person and place        CC  No referring provider defined for this encounter.              "

## 2019-05-01 NOTE — PROGRESS NOTES
Service Date: 2019      HISTORY OF PRESENT ILLNESS:  Ms. Adair is a very nice 68-year-old woman with past medical history significant for very high calcium score at 790, putting in the top 1% for age.  She is a former smoker, having quit in .  She has hypercholesterolemia.  She is under a great deal of stress.  Her   a couple years ago of Alzheimer's and she was the primary caregiver.        When I saw her last 2 years ago, she had reconnected with an old boyfriend and things were going very well, although he now been diagnosed with stage IV lung cancer and was given for 14 months to live and now is in his 15-month, so he is doing well, but clearly deteriorating.  She herself unfortunately has also had a recrudescence of her breast cancer and bilateral mastectomies, but lymph node was positive and she is now undergoing therapy for that as well.        Because of all this, Svetlana thinks she is quite depressed.  She has not been able to exercise regularly.  She has not been paying attention to diet.  She states she does not care and cannot get motivated, although she is hoping with improving weather that she will improve.  She states her walking partner for years has moved away.      She has no chest, arm, neck, jaw or shoulder discomfort.  No dyspnea on exertion, orthopnea or PND.  No palpitations, lightheadedness, dizziness, syncope or near-syncope.        She thinks statin as a cause memory loss and we have not been able to push beyond 10 mg of rosuvastatin.        ASSESSMENT AND PLAN:  Svetlana appears to be doing well from a cardiac standpoint without clinical evidence of ischemia and this is supported by a stress nuclear scan in 2015.  She does have mild carotid disease based on ultrasound from 2018.  We again talked about the fact that the positive calcium score in the face of a negative stress test she has coronary disease brewing.  We talked about the fact that coronary artery disease  starts in the outside area and moves in.  We reviewed all the things regarding diet, exercise.  I congratulated her or not smoking and encouraged her to continue to do so.  She states she does occasionally smoke some pot with her boyfriend.  She has no symptoms to suggest heart failure or significant arrhythmia.  On physical exam, she does have an occasional premature beat.      Blood pressure is very well controlled at 121/82 with a pulse of 74.      Weight is in the ideal range of 140 pounds, giving a body mass index of 23.4.      Her fasting lipid profile has slid and undoubtedly this is due to her slip in her diet as well as her lack of exercise.  Cholesterol is back up to 212.  HDL fortunately is 98, LDL 91, triglycerides are 115.  We talked about the fact that LDL is reaching the first goal of below 100, but given the fact that she is in the top 1% for risk, I try to drive it below 70.  I suggested either increasing her rosuvastatin to 20 mg or adding Zetia.  She states she wants to try improving of her lifestyle, getting back to exercising, increasing the fiber in diet.  I suggest that she can try taking Benecol to take control or there are over-the-counter plant stanols and sterols that will lower cholesterol as well.  She wants to try for 6 months.  I will have her follow up with my PATIENCE  at that time.  We will see what her cholesterol profile is, but I would really like to drive her LDL below 70.  I do not think she would be able to do it with just lifestyle changes, but we will check at that time and then we can consider again bumping her statin up or adding Zetia.      She clearly is fighting depression which is being handled by Dr. Cruz.  Again, I have encouraged her to get back to a regular exercise, get out and get some sunshine as this will all help or garcia her depression.      Thank you for allowing me to participate in her care.         NITA GARCIA MD, [facility]             D: 05/01/2019    T: 2019   MT: QASIM      Name:     ERIKA VALENCIA   MRN:      [id number]        Account:      [id number]   :      1950           Service Date: 2019      Document: [id number]

## 2019-05-01 NOTE — LETTER
5/1/2019    Vladislav Cruz MD  [address] [facility] [address]  [place] [address]    RE: Svetlana Adair       Dear Colleague,    I had the pleasure of seeing Svetlana Adair in the [facility].    HPI and Plan:   See dictation    Orders Placed This Encounter   Procedures     Lipid Profile     Lipid Profile     Follow-Up with Cardiac Advanced Practice Provider     Follow-Up with Cardiac Advanced Practice Provider     Follow-Up with Cardiologist       No orders of the defined types were placed in this encounter.      There are no discontinued medications.      Encounter Diagnoses   Name Primary?     High coronary artery calcium score      Hyperlipidemia LDL goal <70 Yes     Atherosclerosis of left carotid artery        CURRENT MEDICATIONS:  Current Outpatient Medications   Medication Sig Dispense Refill     aspirin 81 MG tablet Take 1 tablet (81 mg) by mouth daily 30 tablet      Cholecalciferol (VITAMIN D-3) 5000 units TABS Take 2 tablets by mouth daily       Coenzyme Q10 300 MG CAPS Take 300 mg by mouth daily       DULoxetine (CYMBALTA) 60 MG capsule Take 1 capsule (60 mg) by mouth daily 90 capsule 3     HYDROcodone-acetaminophen (NORCO) 5-325 MG tablet Take 1-2 tablets by mouth every 6 hours as needed for pain 20 tablet 0     IBUPROFEN PO Take 200 mg by mouth every 4 hours as needed for moderate pain        letrozole (FEMARA) 2.5 MG tablet Take 1 tablet by mouth daily  5     LYSINE 1-3 daily as needed       Magnesium 300 MG CAPS Take 300 mg by mouth daily       Melatonin 10 MG TABS tablet Take 10 mg by mouth nightly as needed for sleep       Multiple Vitamins-Minerals (OCUVITE ADULT 50+) CAPS Take 1 capsule by mouth daily       nicotine polacrilex (NICORETTE) 2 MG gum Place 1 each (2 mg) inside cheek as needed for smoking cessation 30 tablet 11     rosuvastatin (CRESTOR) 10 MG tablet Take 1 tablet (10 mg) by mouth daily 90 tablet 0     UNABLE TO FIND MEDICATION NAME: Somnapure - 2 tablets  = 500mg valerian root, 300mg lemon balm extract, 200mg l-theanine, 120mg hops extract, 50mg chamomile flower extract, 50mg passion flower extract, 3mg melatonin.  Takes 1-2 tablets at bedtime       UNABLE TO FIND MEDICATION NAME: Vitacell 2 capsules = proprietary blend of 1155mg CherryPure montmorency tart cherry skin powder, longvida curcuma longa rhizome extract, boswella lisa resin extract, green tea leaf extract, quercetin, resveratrol, and cocoa seed extract.  Takes 2 capsules daily       UNABLE TO FIND MEDICATION NAME: Peak Hernández Oil - 2 capsules = 2000mg nigella sativa oil.  Takes 2 capsules daily       UNABLE TO FIND MEDICATION NAME: Neurall 2 capsules = 400mg eleutherococcus senticosus root extract, 320mg bacopa monnieri standardized plant extract, sabroxy oroxylum indicum bark extract, huperzia dedrick whole plant extract.  Takes 2 capsules daily.       UNABLE TO FIND MEDICATION NAME: Moringa 1 serving of powder daily       UNABLE TO FIND MEDICATION NAME: Premium Amla Berry 2 capsules = 4mg Vitamin C, 966mg organic amla, organic amla extract.  Takes 2 capsules daily       UNABLE TO FIND MEDICATION NAME: OmegaKrill 5x 3 capsules = 480mg of total omega-3, 64mg EPA, 392mg DHA, 300mcg astaxanthin.  Takes 3 capsules daily       UNABLE TO FIND MEDICATION NAME: Super Colon Cleanse 2 capsules = 665mg senna leaf powder, 321mg psyllium husk powder, 21mg fennel seed powder, 21mg papaya leaf powder, 21mg yolanda hips fruit powder, 11mg l-acidophilus.  Taking 4 capsules daily       UNABLE TO FIND MEDICATION NAME: Majestha powder daily       UNABLE TO FIND MEDICATION NAME: Neem powder daily       valACYclovir (VALTREX) 1000 mg tablet Take 2 tablets (2,000 mg) by mouth 2 times daily as needed 8 tablet 1     ZOLEDRONIC ACID IV Inject into the vein every 6 months         ALLERGIES     Allergies   Allergen Reactions     Cats      Codeine Sulfate GI Disturbance       PAST MEDICAL HISTORY:  Past Medical History:   Diagnosis  Date     Alcoholism (H)      Allergies     Fall     Basal cell cancer     back, chest, face     Breast cancer (H)      Coronary artery disease     CT calcium uhmrm=948 Nov 2015     Depression      Hyperlipidemia LDL goal <130 12/2/2015     Hypertension     borderline     PONV (postoperative nausea and vomiting)      Squamous cell carcinoma     left upper arm, chin       PAST SURGICAL HISTORY:  Past Surgical History:   Procedure Laterality Date     COLONOSCOPY       COLONOSCOPY  11/17/2011    Procedure:COLONOSCOPY; Colonoscopy; Surgeon:MEKA RUSS; Location: [facility]     DISSECT LYMPH NODE AXILLA Right 11/2/2017    Procedure: DISSECT LYMPH NODE AXILLA;  RIGHT AXILLARY LYMPH NODE DISSECTION;  Surgeon: Cortez White MD;  Location: [facility]     GYN SURGERY  2005    hysterectomy after hx of ovarian cyst, ended up being benign     HYSTERECTOMY, PAP NO LONGER INDICATED       MASTECTOMY MODIFIED RADICAL  2011    bilateral     MASTECTOMY, BILATERAL       ORTHOPEDIC SURGERY      rt. knee arthroscopy     ORTHOPEDIC SURGERY Right     toe surgery     REALIGN PATELLA  1973    right      TOE SURGERY      right grt OA        FAMILY HISTORY:  Family History   Problem Relation Age of Onset     Cancer Mother 60        leukemia     Arthritis Mother         osteo     Eye Disorder Mother         glaucoma     Gastrointestinal Disease Mother         gallbladder     Other Cancer Mother      Gallbladder Disease Mother      Alcohol/Drug Father         alcohol     Heart Disease Father         ? CHF     Respiratory Father         emphysema     Coronary Artery Disease Father      Substance Abuse Father      Substance Abuse Sister      Colon Cancer Brother      Breast Cancer Maternal Grandmother      Asthma Sister      Cancer - colorectal Brother 50     Prostate Cancer Brother      Allergies Brother         bee      Arthritis Sister         osteo     Arthritis Sister         osteo     Arthritis Brother         osteo     Depression Sister       Psychotic Disorder Sister         bipolar     Respiratory Sister        SOCIAL HISTORY:  Social History     Socioeconomic History     Marital status:      Spouse name: None     Number of children: None     Years of education: None     Highest education level: None   Occupational History     None   Social Needs     Financial resource strain: None     Food insecurity:     Worry: None     Inability: None     Transportation needs:     Medical: None     Non-medical: None   Tobacco Use     Smoking status: Former Smoker     Packs/day: 1.00     Years: 22.00     Pack years: 22.00     Types: Cigarettes     Last attempt to quit: 2010     Years since quittin.0     Smokeless tobacco: Never Used   Substance and Sexual Activity     Alcohol use: No     Alcohol/week: 0.0 oz     Comment: intermittent sobriety 11     Drug use: Yes     Types: Marijuana     Comment: for sleeping/occ     Sexual activity: Yes     Partners: Male     Birth control/protection: Surgical     Comment: hyst   Lifestyle     Physical activity:     Days per week: None     Minutes per session: None     Stress: None   Relationships     Social connections:     Talks on phone: None     Gets together: None     Attends Hoahaoism service: None     Active member of club or organization: None     Attends meetings of clubs or organizations: None     Relationship status: None     Intimate partner violence:     Fear of current or ex partner: None     Emotionally abused: None     Physically abused: None     Forced sexual activity: None   Other Topics Concern      Service No     Blood Transfusions No     Caffeine Concern Yes     Comment: 4-5 cups caffeine per day     Occupational Exposure No     Hobby Hazards No     Sleep Concern Yes     Stress Concern Yes     Weight Concern No     Special Diet Yes     Comment: organic foods     Back Care No     Exercise No     Bike Helmet No     Seat Belt Yes     Self-Exams Yes     Parent/sibling w/ CABG, MI or  "angioplasty before 65F 55M? No   Social History Narrative     None       Review of Systems:  Skin:  Negative       Eyes:  Positive for glasses    ENT:  Negative      Respiratory:  Negative       Cardiovascular:  Negative;chest pain;lightheadedness;dizziness;syncope or near-syncope;cyanosis;edema;exercise intolerance Positive for;palpitations energy level low, states related to depression   Gastroenterology: Negative      Genitourinary:  Positive for urinary frequency    Musculoskeletal:  Positive for joint pain;arthritis    Neurologic:  Negative      Psychiatric:  Positive for depression;sleep disturbances    Heme/Lymph/Imm:  Positive for allergies    Endocrine:  Negative        Physical Exam:  Vitals: /82   Pulse 74   Ht 1.651 m (5' 5\")   Wt 63.8 kg (140 lb 9.6 oz)   BMI 23.40 kg/m       Constitutional:  cooperative, alert and oriented, well developed, well nourished, in no acute distress        Skin:  warm and dry to the touch, no apparent skin lesions or masses noted          Head:  normocephalic, no masses or lesions        Eyes:           Lymph:      ENT:  no pallor or cyanosis, dentition good        Neck:  carotid pulses are full and equal bilaterally;no carotid bruit        Respiratory:  normal breath sounds, clear to auscultation, normal A-P diameter, normal symmetry, normal respiratory excursion, no use of accessory muscles         Cardiac: regular rhythm;normal S1 and S2;no S3 or S4;no murmurs, gallops or rubs detected occasional premature beats              pulses full and equal                                        GI:           Extremities and Muscular Skeletal:  no edema;no spinal abnormalities noted;normal muscle strength and tone              Neurological:  no gross motor deficits        Psych:  affect appropriate, oriented to time, person and place        CC  No referring provider defined for this encounter.                Thank you for allowing me to participate in the care of your " patient.      Sincerely,     Jose Alberto Perez MD     [facility]    cc:   No referring provider defined for this encounter.

## 2019-05-08 DIAGNOSIS — E78.2 MIXED HYPERLIPIDEMIA: ICD-10-CM

## 2019-05-08 RX ORDER — ROSUVASTATIN CALCIUM 10 MG/1
10 TABLET, COATED ORAL DAILY
Qty: 90 TABLET | Refills: 3 | Status: SHIPPED | OUTPATIENT
Start: 2019-05-08 | End: 2020-05-04

## 2019-05-16 ENCOUNTER — FCC EXTENDED DOCUMENTATION (OUTPATIENT)
Dept: PSYCHOLOGY | Facility: CLINIC | Age: 69
End: 2019-05-16

## 2019-05-16 NOTE — PROGRESS NOTES
Discharge Summary  Multiple Sessions    Client Name: Svetlana Adair MRN#: [id number] YOB: 1950      Intake / Discharge Date: 5/8/18; 11/29/18      DSM5 Diagnoses: (Sustained by DSM5 Criteria Listed Above)  Diagnoses: Adjustment Disorders  309.28 (F43.23) With mixed anxiety and depressed mood  Psychosocial & Contextual Factors: Hx of chronic illness; relational stressors.  WHODAS 2.0 (12 item) Score: see intake          Presenting Concern:  Stress      Reason for Discharge:  Client did not return      Disposition at Time of Last Encounter:   Comments:   Stable at last encounter     Risk Management:   Client denies a history of suicidal ideation, suicide attempts, self-injurious behavior, homicidal ideation, homicidal behavior and and other safety concerns  A safety and risk management plan has not been developed at this time, however client was given the after-hours number / 911 should there be a change in any of these risk factors.      Referred To:  Back to referring GP.        Monserrat Meng, DIMAS   5/16/2019

## 2019-05-21 ENCOUNTER — TRANSFERRED RECORDS (OUTPATIENT)
Dept: HEALTH INFORMATION MANAGEMENT | Facility: CLINIC | Age: 69
End: 2019-05-21

## 2019-05-28 DIAGNOSIS — I25.10 CORONARY ARTERY DISEASE INVOLVING NATIVE CORONARY ARTERY OF NATIVE HEART WITHOUT ANGINA PECTORIS: ICD-10-CM

## 2019-05-28 DIAGNOSIS — M54.50 LOW BACK PAIN WITHOUT SCIATICA, UNSPECIFIED BACK PAIN LATERALITY, UNSPECIFIED CHRONICITY: ICD-10-CM

## 2019-05-28 NOTE — TELEPHONE ENCOUNTER
Controlled Substance Refill Request for Hydrocodone-acetaminophen 5-325    Problem List Complete:  Yes    Last Written Prescription Date:  04/17/19  Last Fill Quantity: 20 tablets,   # refills: 0    THE MOST RECENT OFFICE VISIT MUST BE WITHIN THE PAST 3 MONTHS. AT LEAST ONE FACE TO FACE VISIT MUST OCCUR EVERY 6 MONTHS. ADDITIONAL VISITS CAN BE VIRTUAL.  (THIS STATEMENT SHOULD BE DELETED.)    Last Office Visit with [facility] primary care provider: 02/07/19    Future Office visit:   Next 5 appointments (look out 90 days)    Jun 06, 2019  1:00 PM CDT  [facility] [address] [facility] [address]  [phone]          Controlled substance agreement:   Encounter-Level CSA - 04/26/2018:    Controlled Substance Agreement - Scan on 5/2/2018 10:48 AM: CONTROLLED SUBSTANCE AGREEMENT (below)       Patient-Level CSA:    There are no patient-level csa.         Last Urine Drug Screen: No results found for: CDAUT, No results found for: COMDAT, No results found for: THC13, PCP13, COC13, MAMP13, OPI13, AMP13, BZO13, TCA13, MTD13, BAR13, OXY13, PPX13, BUP13     Processing:  Patient will  in clinic    [email or web address]   checked in past 3 months?  Yes 03/20/19

## 2019-05-28 NOTE — TELEPHONE ENCOUNTER
Reason for Call:  Medication or medication refill:    Do you use a [facility]?  Name of the pharmacy and phone number for the current request:      WRITTEN PRESCRIPTION REQUESTED    Name of the medication requested:   HYDROcodone-acetaminophen (NORCO) 5-325 MG tablet    Can we leave a detailed message on this number? YES    Phone number patient can be reached at: Cell number on file:    Telephone Information:   Mobile [phone]       Best Time: Any    Call taken on 5/28/2019 at 4:29 PM by Vibha Mendoza

## 2019-05-29 RX ORDER — HYDROCODONE BITARTRATE AND ACETAMINOPHEN 5; 325 MG/1; MG/1
1-2 TABLET ORAL EVERY 6 HOURS PRN
Qty: 20 TABLET | Refills: 0 | Status: SHIPPED | OUTPATIENT
Start: 2019-05-29 | End: 2019-06-28

## 2019-06-04 NOTE — TELEPHONE ENCOUNTER
Contacted pt, Rx at  for pick-up    Lupe White, RT(R)     Finger Flexors        Keeping right fingertips straight, press putty toward base of palm.  Repeat __30_ times. Do __1__ sessions per day.  Activity: Squeeze flour sifter, plastic squeeze bottles, turkey baster, juice from fruit.* every other day           Squeeze putty using thumb and all fingers.  Repeat _30___ times. Do __1__ sessions per day. Every other day     Copyright © [employer] Â®. All rights reserved.   Lateral Pinch Strengthening (Resistive Putty)        Squeeze between thumb and side of index  finger in turn.  Repeat _30___ times. Do __1__ sessions per day. Every other day       2. Putty Pinch:    Roll up the putty to create a small tubular section. Next, pinch the putty and repeat down the section with just your index finger,  middle finger, and your thumb.  Repeat with your index and middle finger with your thumb. Try to avoid hyperextension of the thumb.   Repeat each direction  30 pinches. Every other day           Copyright © [employer]. All rights reserved.       All exercises are done every other day; do not exceed this. About 2-3 times  A week.

## 2019-06-06 ENCOUNTER — OFFICE VISIT (OUTPATIENT)
Dept: FAMILY MEDICINE | Facility: CLINIC | Age: 69
End: 2019-06-06
Payer: MEDICARE

## 2019-06-06 VITALS
DIASTOLIC BLOOD PRESSURE: 58 MMHG | OXYGEN SATURATION: 99 % | SYSTOLIC BLOOD PRESSURE: 96 MMHG | HEART RATE: 64 BPM | WEIGHT: 142 LBS | TEMPERATURE: 97.6 F | BODY MASS INDEX: 23.66 KG/M2 | HEIGHT: 65 IN

## 2019-06-06 DIAGNOSIS — M54.2 NECK PAIN ON RIGHT SIDE: Primary | ICD-10-CM

## 2019-06-06 DIAGNOSIS — F43.23 ADJUSTMENT DISORDER WITH MIXED ANXIETY AND DEPRESSED MOOD: ICD-10-CM

## 2019-06-06 DIAGNOSIS — G89.4 CHRONIC PAIN SYNDROME: ICD-10-CM

## 2019-06-06 PROCEDURE — 99214 OFFICE O/P EST MOD 30 MIN: CPT | Performed by: INTERNAL MEDICINE

## 2019-06-06 ASSESSMENT — MIFFLIN-ST. JEOR: SCORE: 1184.99

## 2019-06-06 ASSESSMENT — PATIENT HEALTH QUESTIONNAIRE - PHQ9: SUM OF ALL RESPONSES TO PHQ QUESTIONS 1-9: 5

## 2019-06-06 NOTE — PROGRESS NOTES
Subjective     Svetlana Adair is a 68 year old female who presents to clinic today for the following health issues:    HPI   Follow up Medication for Duloxetine    Depression and Anxiety Follow-Up    How are you doing with your depression since your last visit? Improved     How are you doing with your anxiety since your last visit?  Improved     Are you having other symptoms that might be associated with depression or anxiety? No    Have you had a significant life event? OTHER: Improving financial situation, improving health of her significant other     Do you have any concerns with your use of alcohol or other drugs? No    Social History     Tobacco Use     Smoking status: Former Smoker     Packs/day: 1.00     Years: 22.00     Pack years: 22.00     Types: Cigarettes     Last attempt to quit: 2010     Years since quittin.1     Smokeless tobacco: Never Used   Substance Use Topics     Alcohol use: No     Alcohol/week: 0.0 oz     Comment: intermittent sobriety 11     Drug use: Yes     Types: Marijuana     Comment: for sleeping/occ     PHQ 2019   PHQ-9 Total Score 3 5 5   Q9: Thoughts of better off dead/self-harm past 2 weeks Not at all Not at all Not at all     ANGY-7 SCORE 2018   Total Score 8 5 5           Suicide Assessment Five-step Evaluation and Treatment (SAFE-T)        Chronic Pain Follow-Up       Type / Location of Pain: Neck and shoulder pain  Analgesia/pain control:       Recent changes:  improved      Overall control: Tolerable with discomfort  Activity level/function:      Daily activities:  Can do most things most days, with some rest    Work:  Able to work part time without limitations  Adverse effects:  No  Adherance    Taking medication as directed?  Yes    Participating in other treatments: yes  Risk Factors:    Sleep:  Good    Mood/anxiety:  controlled    Recent family or social stressors:  none noted    Other aggravating factors:  none  PHQ-9 SCORE 2/7/2019 6/6/2019 6/6/2019   PHQ-9 Total Score - - -   PHQ-9 Total Score 3 5 5     ANGY-7 SCORE 11/29/2018 2/7/2019 2/7/2019   Total Score 8 5 5     Encounter-Level CSA - 04/26/2018:    Controlled Substance Agreement - Scan on 5/2/2018 10:48 AM: CONTROLLED SUBSTANCE AGREEMENT (below)       Patient-Level CSA:    There are no patient-level csa.         Amount of exercise or physical activity: 2-3 days/week for an average of 15-30 minutes    Problems taking medications regularly: No    Medication side effects: none    Diet: regular (no restrictions)         Patient Active Problem List   Diagnosis     Breast cancer est/prog +, HER2 ERNIE -     Osteoarthritis     Moderate episode of recurrent major depressive disorder (H)     Advanced directives, counseling/discussion     Knee pain     Past use of tobacco     IFG (impaired fasting glucose)     Low back pain     Malignant neoplasm of right breast (H)     Hyperlipidemia LDL goal <70     Follow-up examination following surgery     Atherosclerosis of left carotid artery     Chronic pain syndrome     Adjustment disorder with mixed anxiety and depressed mood     Neck pain on right side     Hyperparathyroidism (H)     Alcohol dependence in remission (H)     High coronary artery calcium score     Past Surgical History:   Procedure Laterality Date     COLONOSCOPY       COLONOSCOPY  11/17/2011    Procedure:COLONOSCOPY; Colonoscopy; Surgeon:MEKA RUSS; Location: GI     DISSECT LYMPH NODE AXILLA Right 11/2/2017    Procedure: DISSECT LYMPH NODE AXILLA;  RIGHT AXILLARY LYMPH NODE DISSECTION;  Surgeon: Cortez White MD;  Location: [facility]     GYN SURGERY  2005    hysterectomy after hx of ovarian cyst, ended up being benign     HYSTERECTOMY, PAP NO LONGER INDICATED       MASTECTOMY MODIFIED RADICAL  2011    bilateral     MASTECTOMY, BILATERAL       ORTHOPEDIC SURGERY      rt. knee arthroscopy     ORTHOPEDIC SURGERY Right     toe surgery     REALIGN PATELLA   1973    right      TOE SURGERY      right grt OA        Social History     Tobacco Use     Smoking status: Former Smoker     Packs/day: 1.00     Years: 22.00     Pack years: 22.00     Types: Cigarettes     Last attempt to quit: 2010     Years since quittin.1     Smokeless tobacco: Never Used   Substance Use Topics     Alcohol use: No     Alcohol/week: 0.0 oz     Comment: intermittent sobriety 11     Family History   Problem Relation Age of Onset     Cancer Mother 60        leukemia     Arthritis Mother         osteo     Eye Disorder Mother         glaucoma     Gastrointestinal Disease Mother         gallbladder     Other Cancer Mother      Gallbladder Disease Mother      Alcohol/Drug Father         alcohol     Heart Disease Father         ? CHF     Respiratory Father         emphysema     Coronary Artery Disease Father      Substance Abuse Father      Substance Abuse Sister      Colon Cancer Brother      Breast Cancer Maternal Grandmother      Asthma Sister      Cancer - colorectal Brother 50     Prostate Cancer Brother      Allergies Brother         bee      Arthritis Sister         osteo     Arthritis Sister         osteo     Arthritis Brother         osteo     Depression Sister      Psychotic Disorder Sister         bipolar     Respiratory Sister          Current Outpatient Medications   Medication Sig Dispense Refill     aspirin 81 MG tablet Take 1 tablet (81 mg) by mouth daily 30 tablet      Coenzyme Q10 300 MG CAPS Take 300 mg by mouth daily       DULoxetine (CYMBALTA) 60 MG capsule Take 1 capsule (60 mg) by mouth daily 90 capsule 3     HYDROcodone-acetaminophen (NORCO) 5-325 MG tablet Take 1-2 tablets by mouth every 6 hours as needed for pain 20 tablet 0     IBUPROFEN PO Take 200 mg by mouth every 4 hours as needed for moderate pain        letrozole (FEMARA) 2.5 MG tablet Take 1 tablet by mouth daily  5     LYSINE 1-3 daily as needed       Magnesium 300 MG CAPS Take 300 mg by mouth daily        Melatonin 10 MG TABS tablet Take 10 mg by mouth nightly as needed for sleep       nicotine polacrilex (NICORETTE) 2 MG gum Place 1 each (2 mg) inside cheek as needed for smoking cessation 30 tablet 11     rosuvastatin (CRESTOR) 10 MG tablet Take 1 tablet (10 mg) by mouth daily 90 tablet 3     UNABLE TO FIND MEDICATION NAME: Somnapure - 2 tablets = 500mg valerian root, 300mg lemon balm extract, 200mg l-theanine, 120mg hops extract, 50mg chamomile flower extract, 50mg passion flower extract, 3mg melatonin.  Takes 1-2 tablets at bedtime       UNABLE TO FIND MEDICATION NAME: Vitacell 2 capsules = proprietary blend of 1155mg CherryPure montmorency tart cherry skin powder, longvida curcuma longa rhizome extract, boswella lisa resin extract, green tea leaf extract, quercetin, resveratrol, and cocoa seed extract.  Takes 2 capsules daily       UNABLE TO FIND MEDICATION NAME: Peak Hernández Oil - 2 capsules = 2000mg nigella sativa oil.  Takes 2 capsules daily       UNABLE TO FIND MEDICATION NAME: Neurall 2 capsules = 400mg eleutherococcus senticosus root extract, 320mg bacopa monnieri standardized plant extract, sabroxy oroxylum indicum bark extract, huperzia dedrick whole plant extract.  Takes 2 capsules daily.       UNABLE TO FIND MEDICATION NAME: Moringa 1 serving of powder daily       UNABLE TO FIND MEDICATION NAME: Premium Amla Berry 2 capsules = 4mg Vitamin C, 966mg organic amla, organic amla extract.  Takes 2 capsules daily       UNABLE TO FIND MEDICATION NAME: OmegaKrill 5x 3 capsules = 480mg of total omega-3, 64mg EPA, 392mg DHA, 300mcg astaxanthin.  Takes 3 capsules daily       UNABLE TO FIND MEDICATION NAME: Super Colon Cleanse 2 capsules = 665mg senna leaf powder, 321mg psyllium husk powder, 21mg fennel seed powder, 21mg papaya leaf powder, 21mg yolanda hips fruit powder, 11mg l-acidophilus.  Taking 4 capsules daily       UNABLE TO FIND MEDICATION NAME: Majestha powder daily       valACYclovir (VALTREX) 1000 mg tablet  "Take 2 tablets (2,000 mg) by mouth 2 times daily as needed 8 tablet 1     ZOLEDRONIC ACID IV Inject into the vein every 6 months       Cholecalciferol (VITAMIN D-3) 5000 units TABS Take 2 tablets by mouth daily       Multiple Vitamins-Minerals (OCUVITE ADULT 50+) CAPS Take 1 capsule by mouth daily       UNABLE TO FIND MEDICATION NAME: Neem powder daily       Allergies   Allergen Reactions     Cats      Codeine Sulfate GI Disturbance       Reviewed and updated as needed this visit by Provider         Review of Systems   ROS COMP: Constitutional, HEENT, cardiovascular, pulmonary, gi and gu systems are negative, except as otherwise noted.      Objective    BP 96/58 (BP Location: Left arm, Patient Position: Sitting, Cuff Size: Adult Regular)   Pulse 64   Temp 97.6  F (36.4  C) (Oral)   Ht 1.651 m (5' 5\")   Wt 64.4 kg (142 lb)   SpO2 99%   Breastfeeding? No   BMI 23.63 kg/m    Body mass index is 23.63 kg/m .  Physical Exam   General: This is a well-appearing, comfortable appearing female in no acute distress.  Mental status: Improve mood and affect, normal speech, reasonable insight and judgment    Diagnostic Test Results:  Labs reviewed in Epic        Assessment & Plan       ICD-10-CM    1. Neck pain on right side M54.2    2. Chronic pain syndrome G89.4    3. Adjustment disorder with mixed anxiety and depressed mood F43.23         Spent about 26 minutes with this patient in face-to-face contact discussing whether to taper off of Cymbalta.  I think that she is deriving benefit from Cymbalta both for her chronic pain and for her mood.  I would not be in a rush to taper off of Cymbalta.  After long discussion, and explanations of how Cymbalta can help with both pain and mood and how it may help to minimize use of Norco, she is agreeable to continue with Cymbalta.  She continues to use the Norco appropriately and the benefits of Norco seem to outweigh the risks.  She is not having any side effects.  It does allow her " to be more active during the summer months and even play tennis.      No follow-ups on file.    Vladislav Cruz MD  [facility]

## 2019-06-06 NOTE — PROGRESS NOTES
"Subjective     Svetlana Adair is a 68 year old female who presents to clinic today for the following health issues:    HPI     Back pain ongoing for a couple of weeks. Saw Urgent Care on 05/02/19 and was prescribed some medication to help with the pain.    {HIST REVIEW/ LINKS 2 (Optional):507911}    {Additional problems for the provider to add (optional):244440}  Reviewed and updated as needed this visit by Provider         Review of Systems   {ROS COMP (Optional):784362}      Objective    There were no vitals taken for this visit.  There is no height or weight on file to calculate BMI.  Physical Exam   {Exam List (Optional):982296}    {Diagnostic Test Results (Optional):749205::\"Diagnostic Test Results:\",\"Labs reviewed in Epic\"}        {PROVIDER CHARTING PREFERENCE:423861}        "

## 2019-06-26 DIAGNOSIS — F11.90 CHRONIC, CONTINUOUS USE OF OPIOIDS: ICD-10-CM

## 2019-06-26 DIAGNOSIS — M54.50 LOW BACK PAIN WITHOUT SCIATICA, UNSPECIFIED BACK PAIN LATERALITY, UNSPECIFIED CHRONICITY: ICD-10-CM

## 2019-06-26 NOTE — TELEPHONE ENCOUNTER
Reason for Call:  Medication or medication refill:    Do you use a [facility]?  Name of the pharmacy and phone number for the current request:  Pt will  paper copy    Name of the medication requested:    HYDROcodone-acetaminophen (NORCO) 5-325 MG tablet         Other request:     Can we leave a detailed message on this number? YES    Phone number patient can be reached at: Home number on file [phone] (home)    Best Time: any    Call taken on 6/26/2019 at 12:50 PM by Jaye Hall

## 2019-06-27 NOTE — TELEPHONE ENCOUNTER
Controlled Substance Refill Request for     HYDROcodone-acetaminophen (NORCO) 5-325 MG tablet 20 tablet 0 5/29/2019  No   Sig - Route: Take 1-2 tablets by mouth every 6 hours as needed for pain      Last Written Prescription Date:  05/29/2019  Last Fill Quantity: 20,  # refills: 0   Last office visit: 6/6/2019 with prescribing provider:     Future Office Visit:      Problem List Complete:  Yes    Chronic pain syndrome   Problem Detail     Noted:  4/26/2018   Priority:  Medium   Overview Addendum 3/20/2019 12:08 PM by Dana Powell RN   Patient is followed by Vladislav Cruz MD for ongoing prescription of pain medication.  All refills should only be approved by this provider, or covering partner.     Medication(s): norco 5/325.   Maximum quantity per month: #20  Clinic visit frequency required: Q 6  months      Controlled substance agreement:      Encounter-Level CSA:      There are no encounter-level csa.          Pain Clinic evaluation in the past: No     DIRE Total Score(s):  No flowsheet data found.     Last [facility] website verification: 3/20/19 ok    [email or web address]           checked in past 3 months?  No, route to RN

## 2019-06-28 RX ORDER — HYDROCODONE BITARTRATE AND ACETAMINOPHEN 5; 325 MG/1; MG/1
1-2 TABLET ORAL EVERY 6 HOURS PRN
Qty: 20 TABLET | Refills: 0 | Status: SHIPPED | OUTPATIENT
Start: 2019-06-28 | End: 2019-08-05

## 2019-06-28 NOTE — TELEPHONE ENCOUNTER
Last [facility] website verification: 6/28/19 [place]    [email or web address]    Routing refill request to provider for review/approval because:  Drug not on the [facility] refill protocol     Jasmyn RAZA RN

## 2019-08-05 ENCOUNTER — TELEPHONE (OUTPATIENT)
Dept: FAMILY MEDICINE | Facility: CLINIC | Age: 69
End: 2019-08-05

## 2019-08-05 DIAGNOSIS — M54.50 LOW BACK PAIN WITHOUT SCIATICA, UNSPECIFIED BACK PAIN LATERALITY, UNSPECIFIED CHRONICITY: ICD-10-CM

## 2019-08-05 RX ORDER — HYDROCODONE BITARTRATE AND ACETAMINOPHEN 5; 325 MG/1; MG/1
1-2 TABLET ORAL EVERY 6 HOURS PRN
Qty: 20 TABLET | Refills: 0 | Status: SHIPPED | OUTPATIENT
Start: 2019-08-05 | End: 2019-09-04

## 2019-08-05 NOTE — TELEPHONE ENCOUNTER
Reason for Call:  Medication or medication refill:    Do you use a [facility]?  Name of the pharmacy and phone number for the current request:       [id number] - [place], [place] - [address] [facility] [address]  WRITTEN PRESCRIPTION REQUESTED      Name of the medication requested: Norco    Other request: Call once the Hard copy is ready for     Can we leave a detailed message on this number? YES    Phone number patient can be reached at: Home number on file [phone] (home)    Best Time: anytime    Call taken on 8/5/2019 at 11:16 AM by Yan Alexander

## 2019-08-05 NOTE — TELEPHONE ENCOUNTER
Controlled Substance Refill Request for Hydrocodone-acetaminophen 5-325 mg    Problem List Complete:  Yes    Last Written Prescription Date:  06/28/19  Last Fill Quantity: 20 tablets,   # refills: 0    THE MOST RECENT OFFICE VISIT MUST BE WITHIN THE PAST 3 MONTHS. AT LEAST ONE FACE TO FACE VISIT MUST OCCUR EVERY 6 MONTHS. ADDITIONAL VISITS CAN BE VIRTUAL.  (THIS STATEMENT SHOULD BE DELETED.)    Last Office Visit with [facility] primary care provider: 06/06/19    Future Office visit:   Next 5 appointments (look out 90 days)    Oct 21, 2019  1:00 PM CDT  PHYSICAL with Vladislav Cruz MD  [facility] [address] [address]  [phone]          Controlled substance agreement:   Encounter-Level CSA - 04/26/2018:    Controlled Substance Agreement - Scan on 5/2/2018 10:48 AM: CONTROLLED SUBSTANCE AGREEMENT (below)       Patient-Level CSA:    There are no patient-level csa.         Last Urine Drug Screen: No results found for: CDAUT, No results found for: COMDAT, No results found for: THC13, PCP13, COC13, MAMP13, OPI13, AMP13, BZO13, TCA13, MTD13, BAR13, OXY13, PPX13, BUP13     Processing:  Patient will  in clinic    [email or web address]   checked in past 3 months?  Yes 06/28/19

## 2019-09-04 DIAGNOSIS — M54.50 LOW BACK PAIN WITHOUT SCIATICA, UNSPECIFIED BACK PAIN LATERALITY, UNSPECIFIED CHRONICITY: ICD-10-CM

## 2019-09-04 RX ORDER — HYDROCODONE BITARTRATE AND ACETAMINOPHEN 5; 325 MG/1; MG/1
1-2 TABLET ORAL EVERY 6 HOURS PRN
Qty: 20 TABLET | Refills: 0 | Status: SHIPPED | OUTPATIENT
Start: 2019-09-04 | End: 2019-10-07

## 2019-09-04 NOTE — TELEPHONE ENCOUNTER
Controlled Substance Refill Request for Hydrocodone-acetaminophen 5-325    Problem List Complete:  Yes    Last Written Prescription Date:  08/05/19  Last Fill Quantity: 20 tablets,   # refills: 0    THE MOST RECENT OFFICE VISIT MUST BE WITHIN THE PAST 3 MONTHS. AT LEAST ONE FACE TO FACE VISIT MUST OCCUR EVERY 6 MONTHS. ADDITIONAL VISITS CAN BE VIRTUAL.  (THIS STATEMENT SHOULD BE DELETED.)    Last Office Visit with [facility] primary care provider: 06/06/19    Future Office visit:   Next 5 appointments (look out 90 days)    Oct 21, 2019  1:00 PM CDT  PHYSICAL with Vladislav [facility] MD  [facility] [address] [facility] [address] [address]  [phone]          Controlled substance agreement:   Encounter-Level CSA - 04/26/2018:    Controlled Substance Agreement - Scan on 5/2/2018 10:48 AM: CONTROLLED SUBSTANCE AGREEMENT (below)       Patient-Level CSA:    There are no patient-level csa.         Last Urine Drug Screen: No results found for: CDAUT, No results found for: COMDAT, No results found for: THC13, PCP13, COC13, MAMP13, OPI13, AMP13, BZO13, TCA13, MTD13, BAR13, OXY13, PPX13, BUP13     Processing:  Patient will  in clinic    [email or web address]   checked in past 3 months?  Yes 06/28/19

## 2019-09-04 NOTE — TELEPHONE ENCOUNTER
[employer] message sent notifying pt of e-scribe process and due for 3 month pain follow up this month     Routing refill request to provider for review/approval because:  Drug not on the [facility] refill protocol     Jasmyn RAZA RN

## 2019-09-04 NOTE — TELEPHONE ENCOUNTER
Reason for Call:  Medication or medication refill:    Do you use a [facility]?  Name of the pharmacy and phone number for the current request:     [id number] - [place], [place] - [address] AT [facility] [address]  WRITTEN PRESCRIPTION REQUESTED      Name of the medication requested: HYDROcodone-acetaminophen (NORCO) 5-325 MG tablet      Other request:     Can we leave a detailed message on this number? YES    Phone number patient can be reached at: Home number on file [phone] (home)    Best Time: any    Call taken on 9/4/2019 at 9:44 AM by Melinda Love

## 2019-09-12 ENCOUNTER — TRANSFERRED RECORDS (OUTPATIENT)
Dept: HEALTH INFORMATION MANAGEMENT | Facility: CLINIC | Age: 69
End: 2019-09-12

## 2019-10-07 ENCOUNTER — OFFICE VISIT (OUTPATIENT)
Dept: FAMILY MEDICINE | Facility: CLINIC | Age: 69
End: 2019-10-07
Payer: MEDICARE

## 2019-10-07 VITALS
HEART RATE: 67 BPM | HEIGHT: 65 IN | BODY MASS INDEX: 23.16 KG/M2 | SYSTOLIC BLOOD PRESSURE: 118 MMHG | TEMPERATURE: 99 F | DIASTOLIC BLOOD PRESSURE: 81 MMHG | OXYGEN SATURATION: 98 % | WEIGHT: 139 LBS

## 2019-10-07 DIAGNOSIS — M54.50 LOW BACK PAIN WITHOUT SCIATICA, UNSPECIFIED BACK PAIN LATERALITY, UNSPECIFIED CHRONICITY: ICD-10-CM

## 2019-10-07 PROCEDURE — 99213 OFFICE O/P EST LOW 20 MIN: CPT | Performed by: INTERNAL MEDICINE

## 2019-10-07 RX ORDER — HYDROCODONE BITARTRATE AND ACETAMINOPHEN 5; 325 MG/1; MG/1
1 TABLET ORAL DAILY PRN
Qty: 20 TABLET | Refills: 0 | Status: SHIPPED | OUTPATIENT
Start: 2019-10-07 | End: 2019-11-21

## 2019-10-07 ASSESSMENT — MIFFLIN-ST. JEOR: SCORE: 1166.38

## 2019-10-07 NOTE — PROGRESS NOTES
Subjective     Svetlana Adair is a 69 year old female who presents to clinic today for the following health issues:    HPI       3 month chronic pain check      How many servings of fruits and vegetables do you eat daily?  0-1    On average, how many sweetened beverages do you drink each day (soda, juice, sweet tea, etc)?   0    How many days per week do you miss taking your medication? 0    Pleasant 69-year-old female here for follow-up on her chronic back pain.  She takes a half of a Norco tablet up to once daily as needed for severe pain not controlled by over-the-counter anti-inflammatory drugs or Tylenol.  This allowed her to be have an active summer including playing tennis.  Recently, she injured her rib tripping and falling on a marble table.  This has caused her to take Norco more frequently.  Otherwise, she is feeling well.  Her mood is under good control.  She has remained tobacco free.  She continues to follow-up with oncology as directed related to her history of breast cancer.        Patient Active Problem List   Diagnosis     Breast cancer est/prog +, HER2 ERNIE -     Osteoarthritis     Moderate episode of recurrent major depressive disorder (H)     Advanced directives, counseling/discussion     Knee pain     Past use of tobacco     IFG (impaired fasting glucose)     Low back pain     Malignant neoplasm of right breast (H)     Hyperlipidemia LDL goal <70     Follow-up examination following surgery     Atherosclerosis of left carotid artery     Chronic, continuous use of opioids     Adjustment disorder with mixed anxiety and depressed mood     Neck pain on right side     Hyperparathyroidism (H)     Alcohol dependence in remission (H)     High coronary artery calcium score     Past Surgical History:   Procedure Laterality Date     COLONOSCOPY       COLONOSCOPY  11/17/2011    Procedure:COLONOSCOPY; Colonoscopy; Surgeon:MEKA RUSS; Location: GI     DISSECT LYMPH NODE AXILLA Right 11/2/2017     Procedure: DISSECT LYMPH NODE AXILLA;  RIGHT AXILLARY LYMPH NODE DISSECTION;  Surgeon: Cortez White MD;  Location: [facility]     GYN SURGERY      hysterectomy after hx of ovarian cyst, ended up being benign     HYSTERECTOMY, PAP NO LONGER INDICATED       MASTECTOMY MODIFIED RADICAL  2011    bilateral     MASTECTOMY, BILATERAL       ORTHOPEDIC SURGERY      rt. knee arthroscopy     ORTHOPEDIC SURGERY Right     toe surgery     REALIGN PATELLA      right      TOE SURGERY      right grt OA        Social History     Tobacco Use     Smoking status: Former Smoker     Packs/day: 1.00     Years: 22.00     Pack years: 22.00     Types: Cigarettes     Last attempt to quit: 2010     Years since quittin.4     Smokeless tobacco: Never Used   Substance Use Topics     Alcohol use: No     Alcohol/week: 0.0 standard drinks     Comment: intermittent sobriety 11     Family History   Problem Relation Age of Onset     Cancer Mother 60        leukemia     Arthritis Mother         osteo     Eye Disorder Mother         glaucoma     Gastrointestinal Disease Mother         gallbladder     Other Cancer Mother      Gallbladder Disease Mother      Alcohol/Drug Father         alcohol     Heart Disease Father         ? CHF     Respiratory Father         emphysema     Coronary Artery Disease Father      Substance Abuse Father      Substance Abuse Sister      Colon Cancer Brother      Breast Cancer Maternal Grandmother      Asthma Sister      Cancer - colorectal Brother 50     Prostate Cancer Brother      Allergies Brother         bee      Arthritis Sister         osteo     Arthritis Sister         osteo     Arthritis Brother         osteo     Depression Sister      Psychotic Disorder Sister         bipolar     Respiratory Sister          Current Outpatient Medications   Medication Sig Dispense Refill     aspirin 81 MG tablet Take 1 tablet (81 mg) by mouth daily 30 tablet      Cholecalciferol (VITAMIN D-3) 5000 units TABS  Take 2 tablets by mouth daily       Coenzyme Q10 300 MG CAPS Take 300 mg by mouth daily       DULoxetine (CYMBALTA) 60 MG capsule Take 1 capsule (60 mg) by mouth daily 90 capsule 3     HYDROcodone-acetaminophen (NORCO) 5-325 MG tablet Take 1 tablet by mouth daily as needed for pain 20 tablet 0     IBUPROFEN PO Take 200 mg by mouth every 4 hours as needed for moderate pain        letrozole (FEMARA) 2.5 MG tablet Take 1 tablet by mouth daily  5     LYSINE 1-3 daily as needed       Magnesium 300 MG CAPS Take 300 mg by mouth daily       Melatonin 10 MG TABS tablet Take 10 mg by mouth nightly as needed for sleep       Multiple Vitamins-Minerals (OCUVITE ADULT 50+) CAPS Take 1 capsule by mouth daily       nicotine polacrilex (NICORETTE) 2 MG gum Place 1 each (2 mg) inside cheek as needed for smoking cessation 30 tablet 11     rosuvastatin (CRESTOR) 10 MG tablet Take 1 tablet (10 mg) by mouth daily 90 tablet 3     UNABLE TO FIND MEDICATION NAME: Somnapure - 2 tablets = 500mg valerian root, 300mg lemon balm extract, 200mg l-theanine, 120mg hops extract, 50mg chamomile flower extract, 50mg passion flower extract, 3mg melatonin.  Takes 1-2 tablets at bedtime       UNABLE TO FIND MEDICATION NAME: Vitacell 2 capsules = proprietary blend of 1155mg CherryPure [place] tart cherry skin powder, longvida curcuma longa rhizome extract, boswella lisa resin extract, green tea leaf extract, quercetin, resveratrol, and cocoa seed extract.  Takes 2 capsules daily       UNABLE TO FIND MEDICATION NAME: Peak Hernández Oil - 2 capsules = 2000mg nigella sativa oil.  Takes 2 capsules daily       UNABLE TO FIND MEDICATION NAME: Neurall 2 capsules = 400mg eleutherococcus senticosus root extract, 320mg bacopa monnieri standardized plant extract, sabroxy oroxylum indicum bark extract, huperzia dedrick whole plant extract.  Takes 2 capsules daily.       UNABLE TO FIND MEDICATION NAME: Moringa 1 serving of powder daily       UNABLE TO FIND  "MEDICATION NAME: Premium Amla Berry 2 capsules = 4mg Vitamin C, 966mg organic amla, organic amla extract.  Takes 2 capsules daily       UNABLE TO FIND MEDICATION NAME: OmegaKrill 5x 3 capsules = 480mg of total omega-3, 64mg EPA, 392mg DHA, 300mcg astaxanthin.  Takes 3 capsules daily       UNABLE TO FIND MEDICATION NAME: Super Colon Cleanse 2 capsules = 665mg senna leaf powder, 321mg psyllium husk powder, 21mg fennel seed powder, 21mg papaya leaf powder, 21mg yolanda hips fruit powder, 11mg l-acidophilus.  Taking 4 capsules daily       UNABLE TO FIND MEDICATION NAME: Majestha powder daily       UNABLE TO FIND MEDICATION NAME: Neem powder daily       valACYclovir (VALTREX) 1000 mg tablet Take 2 tablets (2,000 mg) by mouth 2 times daily as needed 8 tablet 1     ZOLEDRONIC ACID IV Inject into the vein every 6 months       Allergies   Allergen Reactions     Cats      Codeine Sulfate GI Disturbance       Reviewed and updated as needed this visit by Provider         Review of Systems   ROS COMP: Constitutional, HEENT, cardiovascular, pulmonary, gi and gu systems are negative, except as otherwise noted.      Objective    /81 (BP Location: Left arm, Cuff Size: Adult Regular)   Pulse 67   Temp 99  F (37.2  C) (Tympanic)   Ht 1.651 m (5' 5\")   Wt 63 kg (139 lb)   SpO2 98%   Breastfeeding? No   BMI 23.13 kg/m    Body mass index is 23.13 kg/m .  Physical Exam   Well-appearing, comfortable appearing female no acute distress            Assessment & Plan     1. Low back pain without sciatica, unspecified back pain laterality, unspecified chronicity  Continue low-dose hydrocodone as needed as benefits outweigh risks and there is no toxicities or side effects.  Return in 3 months for close follow-up.  - HYDROcodone-acetaminophen (NORCO) 5-325 MG tablet; Take 1 tablet by mouth daily as needed for pain  Dispense: 20 tablet; Refill: 0           Return in about 3 months (around 1/7/2020) for Preventive Visit.    Vladislav GONZALEZ" MD Anthony  [facility]

## 2019-10-31 ENCOUNTER — OFFICE VISIT (OUTPATIENT)
Dept: URGENT CARE | Facility: URGENT CARE | Age: 69
End: 2019-10-31
Payer: MEDICARE

## 2019-10-31 VITALS
HEIGHT: 65 IN | DIASTOLIC BLOOD PRESSURE: 69 MMHG | SYSTOLIC BLOOD PRESSURE: 111 MMHG | HEART RATE: 69 BPM | TEMPERATURE: 98.5 F | WEIGHT: 139 LBS | BODY MASS INDEX: 23.16 KG/M2 | OXYGEN SATURATION: 98 % | RESPIRATION RATE: 17 BRPM

## 2019-10-31 DIAGNOSIS — R53.83 OTHER FATIGUE: ICD-10-CM

## 2019-10-31 DIAGNOSIS — J01.00 ACUTE NON-RECURRENT MAXILLARY SINUSITIS: Primary | ICD-10-CM

## 2019-10-31 DIAGNOSIS — B00.9 HSV (HERPES SIMPLEX VIRUS) INFECTION: ICD-10-CM

## 2019-10-31 LAB
ALBUMIN SERPL-MCNC: 3.9 G/DL (ref 3.4–5)
ALBUMIN UR-MCNC: NEGATIVE MG/DL
ALP SERPL-CCNC: 49 U/L (ref 40–150)
ALT SERPL W P-5'-P-CCNC: 35 U/L (ref 0–50)
ANION GAP SERPL CALCULATED.3IONS-SCNC: 3 MMOL/L (ref 3–14)
APPEARANCE UR: CLEAR
AST SERPL W P-5'-P-CCNC: 18 U/L (ref 0–45)
BASOPHILS # BLD AUTO: 0 10E9/L (ref 0–0.2)
BASOPHILS NFR BLD AUTO: 0.6 %
BILIRUB SERPL-MCNC: 0.2 MG/DL (ref 0.2–1.3)
BILIRUB UR QL STRIP: NEGATIVE
BUN SERPL-MCNC: 17 MG/DL (ref 7–30)
CALCIUM SERPL-MCNC: 9.2 MG/DL (ref 8.5–10.1)
CHLORIDE SERPL-SCNC: 105 MMOL/L (ref 94–109)
CO2 SERPL-SCNC: 31 MMOL/L (ref 20–32)
COLOR UR AUTO: YELLOW
CREAT SERPL-MCNC: 0.8 MG/DL (ref 0.52–1.04)
DIFFERENTIAL METHOD BLD: NORMAL
EOSINOPHIL # BLD AUTO: 0.3 10E9/L (ref 0–0.7)
EOSINOPHIL NFR BLD AUTO: 4.9 %
ERYTHROCYTE [DISTWIDTH] IN BLOOD BY AUTOMATED COUNT: 12.5 % (ref 10–15)
GFR SERPL CREATININE-BSD FRML MDRD: 76 ML/MIN/{1.73_M2}
GLUCOSE SERPL-MCNC: 94 MG/DL (ref 70–99)
GLUCOSE UR STRIP-MCNC: NEGATIVE MG/DL
HCT VFR BLD AUTO: 40.2 % (ref 35–47)
HGB BLD-MCNC: 13.4 G/DL (ref 11.7–15.7)
HGB UR QL STRIP: ABNORMAL
KETONES UR STRIP-MCNC: NEGATIVE MG/DL
LEUKOCYTE ESTERASE UR QL STRIP: NEGATIVE
LYMPHOCYTES # BLD AUTO: 1.8 10E9/L (ref 0.8–5.3)
LYMPHOCYTES NFR BLD AUTO: 29.3 %
MCH RBC QN AUTO: 32.8 PG (ref 26.5–33)
MCHC RBC AUTO-ENTMCNC: 33.3 G/DL (ref 31.5–36.5)
MCV RBC AUTO: 98 FL (ref 78–100)
MONOCYTES # BLD AUTO: 0.5 10E9/L (ref 0–1.3)
MONOCYTES NFR BLD AUTO: 8.3 %
NEUTROPHILS # BLD AUTO: 3.6 10E9/L (ref 1.6–8.3)
NEUTROPHILS NFR BLD AUTO: 56.9 %
NITRATE UR QL: NEGATIVE
PH UR STRIP: 7 PH (ref 5–7)
PLATELET # BLD AUTO: 278 10E9/L (ref 150–450)
POTASSIUM SERPL-SCNC: 4.4 MMOL/L (ref 3.4–5.3)
PROT SERPL-MCNC: 7.5 G/DL (ref 6.8–8.8)
RBC # BLD AUTO: 4.09 10E12/L (ref 3.8–5.2)
RBC #/AREA URNS AUTO: NORMAL /HPF
SODIUM SERPL-SCNC: 139 MMOL/L (ref 133–144)
SOURCE: ABNORMAL
SP GR UR STRIP: 1.02 (ref 1–1.03)
UROBILINOGEN UR STRIP-ACNC: 0.2 EU/DL (ref 0.2–1)
WBC # BLD AUTO: 6.3 10E9/L (ref 4–11)
WBC #/AREA URNS AUTO: NORMAL /HPF

## 2019-10-31 PROCEDURE — 80053 COMPREHEN METABOLIC PANEL: CPT | Performed by: FAMILY MEDICINE

## 2019-10-31 PROCEDURE — 36415 COLL VENOUS BLD VENIPUNCTURE: CPT | Performed by: FAMILY MEDICINE

## 2019-10-31 PROCEDURE — 85025 COMPLETE CBC W/AUTO DIFF WBC: CPT | Performed by: FAMILY MEDICINE

## 2019-10-31 PROCEDURE — 99203 OFFICE O/P NEW LOW 30 MIN: CPT | Performed by: FAMILY MEDICINE

## 2019-10-31 PROCEDURE — 81001 URINALYSIS AUTO W/SCOPE: CPT | Performed by: FAMILY MEDICINE

## 2019-10-31 RX ORDER — VALACYCLOVIR HYDROCHLORIDE 1 G/1
TABLET, FILM COATED ORAL
Qty: 8 TABLET | Refills: 0 | Status: SHIPPED | OUTPATIENT
Start: 2019-10-31 | End: 2021-04-30

## 2019-10-31 RX ORDER — AMOXICILLIN 875 MG
875 TABLET ORAL 2 TIMES DAILY
Qty: 20 TABLET | Refills: 0 | Status: SHIPPED | OUTPATIENT
Start: 2019-10-31 | End: 2019-11-21

## 2019-10-31 ASSESSMENT — MIFFLIN-ST. JEOR: SCORE: 1166.38

## 2019-10-31 NOTE — PROGRESS NOTES
70 yo female here with exhaustion and sinus congestion.   Sleeping too much.   Lost some weight. Does not show on scale at the clinic.   Having hot flashes but no night sweats.   Started 6 weeks ago.   Started with post nasal drainage, mucus, coughing.  Using OTC cold medication. ?loratidine.   Some rash around nose that is new. Some stress.   History of breast cancer.   Holding off on supplement.   No UTI symptoms.  Some loose stool and uses imodium every few days.     Social History     Occupational History     Not on file   Tobacco Use     Smoking status: Former Smoker     Packs/day: 1.00     Years: 22.00     Pack years: 22.00     Types: Cigarettes     Last attempt to quit: 2010     Years since quittin.5     Smokeless tobacco: Never Used   Substance and Sexual Activity     Alcohol use: No     Alcohol/week: 0.0 standard drinks     Comment: intermittent sobriety 11     Drug use: Yes     Types: Marijuana     Comment: for sleeping/occ     Sexual activity: Yes     Partners: Male     Birth control/protection: Surgical     Comment: hyst     Allergies   Allergen Reactions     Cats      Codeine Sulfate GI Disturbance     Patient Active Problem List   Diagnosis     Breast cancer est/prog +, HER2 ERNIE -     Osteoarthritis     Moderate episode of recurrent major depressive disorder (H)     Advanced directives, counseling/discussion     Knee pain     Past use of tobacco     IFG (impaired fasting glucose)     Low back pain     Malignant neoplasm of right breast (H)     Hyperlipidemia LDL goal <70     Follow-up examination following surgery     Atherosclerosis of left carotid artery     Chronic, continuous use of opioids     Adjustment disorder with mixed anxiety and depressed mood     Neck pain on right side     Hyperparathyroidism (H)     Alcohol dependence in remission (H)     High coronary artery calcium score     Reviewed medications, social history and  past medical and surgical history.    Review of system:  "for general, respiratory, CVS, GI and psychiatry negative except for noted above.     EXAM:  /69   Pulse 69   Temp 98.5  F (36.9  C) (Oral)   Resp 17   Ht 1.651 m (5' 5\")   Wt 63 kg (139 lb)   SpO2 98%   BMI 23.13 kg/m    Constitutional: healthy, alert and no distress   Psychiatric: mentation appears normal and affect normal/bright  Cardiovascular: RRR. No murmurs,  Respiratory: negative, Lungs clear. No crackles or wheezing. No tachypnea.   Neck: mild cervical adenopathy   ENT: Both TM exam normal, mild maxillary sinus tenderness, mild nasal turbinate hypertrophy, throat clear    ASSESSMENT / PLAN:   (J01.00) Acute non-recurrent maxillary sinusitis  (primary encounter diagnosis)  Comment: Patient has multiple risk factor including history of malignancy and immunosuppression.  At this point we decided that we should empirically treat her with an antibiotic.  Side effects discussed.  See already struggles with some loose stool and I recommended her to start probiotics preemptively.  She agreed.  Side effects discussed.  Plan: amoxicillin (AMOXIL) 875 MG tablet, Urine         Microscopic           (R53.83) Other fatigue  Comment: As mentioned above we should rule out other causes with her history of malignancy.  We will obtain some basic labs.  If she persistently is experience her fatigue symptoms it would be reasonable for her to follow-up with her primary physician.  Plan: CBC with platelets and differential,         Comprehensive metabolic panel, amoxicillin         (AMOXIL) 875 MG tablet, UA reflex to         Microscopic and Culture                 The above note was dictated using voice recognition. Although reviewed after completion, some word and grammatical error may remain .        "

## 2019-10-31 NOTE — TELEPHONE ENCOUNTER
Ok x 1 refill - has appointment pending  Henny HEADRN BSN  [facility]  [phone]

## 2019-11-20 NOTE — PROGRESS NOTES
Primary Cardiologist: Dr. Perez    Reason for Visit: 6 month follow up after lifestyle change and repeat lipid/ALT    History of Present Illness:   This is a very pleasant 69-year-old female with medical history is notable for high calcium score of 790, former tobacco user, and hyperlipidemia.     She returns to clinic, stating that she is doing ok. She is struggling with depression due to significant health events that have occurred over the last few years, including her boyfriend who was diagnosed with stage IV lung cancer. She is part of a support group and she thinks this is helping her a lot. Sounds like she has a good [place] of friends as well who get together with her regularly. She denies any specific symptoms of chest discomfort, palpitations, SOB, orthopnea, PND, presyncope, or syncope. Once in while she will have twinges of pain in her chest these are very atypical. She admits that she has not exercised much over the last 6 months.    Assessment and Plan:   This is a very pleasant 69-year-old female with medical history is notable for high calcium score of 790, former tobacco user, and hyperlipidemia.     Interestingly, her lipid panel is much better this time around. LDL is 69 which is at goal. I am not able to explain this change as she had no increase in her statin and she tells me she has not exercised much. At this time we will monitor her numbers without any changes. Her main concern has been stress and dealing with depression.     She will return our clinic in 6 months.    Thank you for allowing me to participate in the care of this patient today.    This note was completed in part using Dragon voice recognition software. Although reviewed after completion, some word and grammatical errors may occur.    Orders this Visit:  No orders of the defined types were placed in this encounter.    No orders of the defined types were placed in this encounter.    Medications Discontinued During This  Encounter   Medication Reason     amoxicillin (AMOXIL) 875 MG tablet Therapy completed     UNABLE TO FIND Stopped by Patient     UNABLE TO FIND Stopped by Patient     UNABLE TO FIND Stopped by Patient     UNABLE TO FIND Stopped by Patient         Encounter Diagnosis   Name Primary?     Hyperlipidemia LDL goal <70        CURRENT MEDICATIONS:  Current Outpatient Medications   Medication Sig Dispense Refill     aspirin 81 MG tablet Take 1 tablet (81 mg) by mouth daily 30 tablet      Cholecalciferol (VITAMIN D-3) 5000 units TABS Take 2 tablets by mouth daily       Coenzyme Q10 300 MG CAPS Take 300 mg by mouth daily       DULoxetine (CYMBALTA) 60 MG capsule Take 1 capsule (60 mg) by mouth daily 90 capsule 3     HYDROcodone-acetaminophen (NORCO) 5-325 MG tablet Take 1 tablet by mouth daily as needed for pain 20 tablet 0     IBUPROFEN PO Take 200 mg by mouth every 4 hours as needed for moderate pain        letrozole (FEMARA) 2.5 MG tablet Take 1 tablet by mouth daily  5     LYSINE 1-3 daily as needed       Magnesium 300 MG CAPS Take 300 mg by mouth as needed        Melatonin 10 MG TABS tablet Take 10 mg by mouth nightly as needed for sleep       Multiple Vitamins-Minerals (OCUVITE ADULT 50+) CAPS Take 1 capsule by mouth daily       nicotine polacrilex (NICORETTE) 2 MG gum Place 1 each (2 mg) inside cheek as needed for smoking cessation 30 tablet 11     rosuvastatin (CRESTOR) 10 MG tablet Take 1 tablet (10 mg) by mouth daily 90 tablet 3     UNABLE TO FIND MEDICATION NAME: Somnapure - 2 tablets = 500mg valerian root, 300mg lemon balm extract, 200mg l-theanine, 120mg hops extract, 50mg chamomile flower extract, 50mg passion flower extract, 3mg melatonin.  Takes 1-2 tablets at bedtime       UNABLE TO FIND MEDICATION NAME: Moringa 1 serving of powder daily       UNABLE TO FIND MEDICATION NAME: Premium Amla Berry 2 capsules = 4mg Vitamin C, 966mg organic amla, organic amla extract.  Takes 2 capsules daily       UNABLE TO FIND  MEDICATION NAME: OmegaKrill 5x 3 capsules = 480mg of total omega-3, 64mg EPA, 392mg DHA, 300mcg astaxanthin.  Takes 3 capsules daily       UNABLE TO FIND MEDICATION NAME: Super Colon Cleanse 2 capsules = 665mg senna leaf powder, 321mg psyllium husk powder, 21mg fennel seed powder, 21mg papaya leaf powder, 21mg yolanda hips fruit powder, 11mg l-acidophilus.  Taking 4 capsules daily       UNABLE TO FIND MEDICATION NAME: Majestha powder daily       valACYclovir (VALTREX) 1000 mg tablet TAKE 2 TABLETS(2000 MG) BY MOUTH TWICE DAILY AS NEEDED 8 tablet 0     ZOLEDRONIC ACID IV Inject into the vein every 6 months         ALLERGIES     Allergies   Allergen Reactions     Cats      Codeine Sulfate GI Disturbance       PAST MEDICAL HISTORY:  Past Medical History:   Diagnosis Date     Alcoholism (H)      Allergies     Fall     Basal cell cancer     back, chest, face     Breast cancer (H)      Coronary artery disease     CT calcium choap=472 Nov 2015     Depression      Hyperlipidemia LDL goal <130 12/2/2015     Hypertension     borderline     PONV (postoperative nausea and vomiting)      Squamous cell carcinoma     left upper arm, chin       PAST SURGICAL HISTORY:  Past Surgical History:   Procedure Laterality Date     COLONOSCOPY       COLONOSCOPY  11/17/2011    Procedure:COLONOSCOPY; Colonoscopy; Surgeon:MEKA RUSS; Location: GI     DISSECT LYMPH NODE AXILLA Right 11/2/2017    Procedure: DISSECT LYMPH NODE AXILLA;  RIGHT AXILLARY LYMPH NODE DISSECTION;  Surgeon: Cortez White MD;  Location:  [facility]     GYN SURGERY  2005    hysterectomy after hx of ovarian cyst, ended up being benign     HYSTERECTOMY, PAP NO LONGER INDICATED       MASTECTOMY MODIFIED RADICAL  2011    bilateral     MASTECTOMY, BILATERAL       ORTHOPEDIC SURGERY      rt. knee arthroscopy     ORTHOPEDIC SURGERY Right     toe surgery     REALIGN PATELLA  1973    right      TOE SURGERY      right grt OA        FAMILY HISTORY:  Family History   Problem  Relation Age of Onset     Cancer Mother 60        leukemia     Arthritis Mother         osteo     Eye Disorder Mother         glaucoma     Gastrointestinal Disease Mother         gallbladder     Other Cancer Mother      Gallbladder Disease Mother      Alcohol/Drug Father         alcohol     Heart Disease Father         ? CHF     Respiratory Father         emphysema     Coronary Artery Disease Father      Substance Abuse Father      Substance Abuse Sister      Colon Cancer Brother      Breast Cancer Maternal Grandmother      Asthma Sister      Cancer - colorectal Brother 50     Prostate Cancer Brother      Allergies Brother         bee      Arthritis Sister         osteo     Arthritis Sister         osteo     Arthritis Brother         osteo     Depression Sister      Psychotic Disorder Sister         bipolar     Respiratory Sister        SOCIAL HISTORY:  Social History     Socioeconomic History     Marital status:      Spouse name: None     Number of children: None     Years of education: None     Highest education level: None   Occupational History     None   Social Needs     Financial resource strain: None     Food insecurity:     Worry: None     Inability: None     Transportation needs:     Medical: None     Non-medical: None   Tobacco Use     Smoking status: Former Smoker     Packs/day: 1.00     Years: 22.00     Pack years: 22.00     Types: Cigarettes     Last attempt to quit: 2010     Years since quittin.5     Smokeless tobacco: Never Used   Substance and Sexual Activity     Alcohol use: No     Alcohol/week: 0.0 standard drinks     Comment: intermittent sobriety 11     Drug use: Yes     Types: Marijuana     Comment: for sleeping/occ     Sexual activity: Yes     Partners: Male     Birth control/protection: Surgical     Comment: hyst   Lifestyle     Physical activity:     Days per week: None     Minutes per session: None     Stress: None   Relationships     Social connections:     Talks on  "phone: None     Gets together: None     Attends Jewish service: None     Active member of club or organization: None     Attends meetings of clubs or organizations: None     Relationship status: None     Intimate partner violence:     Fear of current or ex partner: None     Emotionally abused: None     Physically abused: None     Forced sexual activity: None   Other Topics Concern      Service No     Blood Transfusions No     Caffeine Concern Yes     Comment: 4-5 cups caffeine per day     Occupational Exposure No     Hobby Hazards No     Sleep Concern Yes     Stress Concern Yes     Weight Concern No     Special Diet Yes     Comment: organic foods     Back Care No     Exercise No     Bike Helmet No     Seat Belt Yes     Self-Exams Yes     Parent/sibling w/ CABG, MI or angioplasty before 65F 55M? No   Social History Narrative     None       Review of Systems:  Skin:  Negative     Eyes:  Positive for glasses  ENT:  Positive for vertigo  Respiratory:  Positive for dyspnea on exertion;shortness of breath  Cardiovascular:  Negative    Gastroenterology: Negative    Genitourinary:  Positive for urinary frequency  Musculoskeletal:  Positive for joint pain;arthritis  Neurologic:  Negative    Psychiatric:  Positive for depression;sleep disturbances;anxiety;excessive stress  Heme/Lymph/Imm:  Positive for allergies  Endocrine:  Negative      Physical Exam:  Vitals: /71   Pulse 58   Ht 1.651 m (5' 5\")   Wt 62.6 kg (138 lb)   BMI 22.96 kg/m       GEN:  NAD  NECK: No JVD  C/V:  Regular rate and rhythm, no murmur, rub or gallop.  RESP: Clear to auscultation bilaterally.  GI: Abdomen soft, nontender, nondistended.  EXTREM: No LE edema.   NEURO: Alert and oriented, cooperative. No obvious focal deficits.   PSYCH: Normal affect.  SKIN: Warm and dry.       Recent Lab Results:  LIPID RESULTS:  Lab Results   Component Value Date    CHOL 171 11/21/2019    HDL 85 11/21/2019    LDL 69 11/21/2019    TRIG 87 11/21/2019    " CHOLHDLRATIO 3.3 10/29/2015       LIVER ENZYME RESULTS:  Lab Results   Component Value Date    AST 18 10/31/2019    ALT 35 10/31/2019       CBC RESULTS:  Lab Results   Component Value Date    WBC 6.3 10/31/2019    RBC 4.09 10/31/2019    HGB 13.4 10/31/2019    HCT 40.2 10/31/2019    MCV 98 10/31/2019    MCH 32.8 10/31/2019    MCHC 33.3 10/31/2019    RDW 12.5 10/31/2019     10/31/2019       BMP RESULTS:  Lab Results   Component Value Date     10/31/2019    POTASSIUM 4.4 10/31/2019    CHLORIDE 105 10/31/2019    CO2 31 10/31/2019    ANIONGAP 3 10/31/2019    GLC 94 10/31/2019    BUN 17 10/31/2019    CR 0.80 10/31/2019    GFRESTIMATED 76 10/31/2019    GFRESTBLACK 88 10/31/2019    YASMIN 9.2 10/31/2019        A1C RESULTS:  Lab Results   Component Value Date    A1C 5.6 11/18/2016       INR RESULTS:  No results found for: INR        Taiwo Anthony PA-C, [facility]   November 20, 2019

## 2019-11-21 ENCOUNTER — OFFICE VISIT (OUTPATIENT)
Dept: CARDIOLOGY | Facility: CLINIC | Age: 69
End: 2019-11-21
Attending: INTERNAL MEDICINE
Payer: MEDICARE

## 2019-11-21 VITALS
HEART RATE: 58 BPM | SYSTOLIC BLOOD PRESSURE: 121 MMHG | BODY MASS INDEX: 22.99 KG/M2 | DIASTOLIC BLOOD PRESSURE: 71 MMHG | WEIGHT: 138 LBS | HEIGHT: 65 IN

## 2019-11-21 DIAGNOSIS — M54.50 LOW BACK PAIN WITHOUT SCIATICA, UNSPECIFIED BACK PAIN LATERALITY, UNSPECIFIED CHRONICITY: ICD-10-CM

## 2019-11-21 DIAGNOSIS — E78.5 HYPERLIPIDEMIA LDL GOAL <70: Primary | ICD-10-CM

## 2019-11-21 DIAGNOSIS — I25.10 CORONARY ARTERY DISEASE INVOLVING NATIVE CORONARY ARTERY OF NATIVE HEART WITHOUT ANGINA PECTORIS: ICD-10-CM

## 2019-11-21 DIAGNOSIS — E78.2 MIXED HYPERLIPIDEMIA: ICD-10-CM

## 2019-11-21 DIAGNOSIS — E78.5 HYPERLIPIDEMIA LDL GOAL <70: ICD-10-CM

## 2019-11-21 LAB
CHOLEST SERPL-MCNC: 171 MG/DL
HDLC SERPL-MCNC: 85 MG/DL
LDLC SERPL CALC-MCNC: 69 MG/DL
NONHDLC SERPL-MCNC: 86 MG/DL
TRIGL SERPL-MCNC: 87 MG/DL

## 2019-11-21 PROCEDURE — 80061 LIPID PANEL: CPT | Performed by: INTERNAL MEDICINE

## 2019-11-21 PROCEDURE — 99214 OFFICE O/P EST MOD 30 MIN: CPT | Performed by: PHYSICIAN ASSISTANT

## 2019-11-21 PROCEDURE — 36415 COLL VENOUS BLD VENIPUNCTURE: CPT | Performed by: INTERNAL MEDICINE

## 2019-11-21 RX ORDER — HYDROCODONE BITARTRATE AND ACETAMINOPHEN 5; 325 MG/1; MG/1
1 TABLET ORAL DAILY PRN
Qty: 20 TABLET | Refills: 0 | Status: SHIPPED | OUTPATIENT
Start: 2019-11-21 | End: 2019-12-31

## 2019-11-21 ASSESSMENT — MIFFLIN-ST. JEOR: SCORE: 1161.84

## 2019-11-21 NOTE — LETTER
11/21/2019    Vladislav Cruz MD  [address] [facility] [address]  [facility] [address]    RE: Svetlana Adair       Dear Colleague,    I had the pleasure of seeing Svetlana Adair in the [facility].    Primary Cardiologist: Dr. Perez    Reason for Visit: 6 month follow up after lifestyle change and repeat lipid/ALT    History of Present Illness:   This is a very pleasant 69-year-old female with medical history is notable for high calcium score of 790, former tobacco user, and hyperlipidemia.     She returns to clinic, stating that she is doing ok. She is struggling with depression due to significant health events that have occurred over the last few years, including her boyfriend who was diagnosed with stage IV lung cancer. She is part of a support group and she thinks this is helping her a lot. Sounds like she has a good [place] of friends as well who get together with her regularly. She denies any specific symptoms of chest discomfort, palpitations, SOB, orthopnea, PND, presyncope, or syncope. Once in while she will have twinges of pain in her chest these are very atypical. She admits that she has not exercised much over the last 6 months.    Assessment and Plan:   This is a very pleasant 69-year-old female with medical history is notable for high calcium score of 790, former tobacco user, and hyperlipidemia.     Interestingly, her lipid panel is much better this time around. LDL is 69 which is at goal. I am not able to explain this change as she had no increase in her statin and she tells me she has not exercised much. At this time we will monitor her numbers without any changes. Her main concern has been stress and dealing with depression.     She will return our clinic in 6 months.    Thank you for allowing me to participate in the care of this patient today.    This note was completed in part using Dragon voice recognition software. Although reviewed after completion, some word and  grammatical errors may occur.    Orders this Visit:  No orders of the defined types were placed in this encounter.    No orders of the defined types were placed in this encounter.    Medications Discontinued During This Encounter   Medication Reason     amoxicillin (AMOXIL) 875 MG tablet Therapy completed     UNABLE TO FIND Stopped by Patient     UNABLE TO FIND Stopped by Patient     UNABLE TO FIND Stopped by Patient     UNABLE TO FIND Stopped by Patient         Encounter Diagnosis   Name Primary?     Hyperlipidemia LDL goal <70        CURRENT MEDICATIONS:  Current Outpatient Medications   Medication Sig Dispense Refill     aspirin 81 MG tablet Take 1 tablet (81 mg) by mouth daily 30 tablet      Cholecalciferol (VITAMIN D-3) 5000 units TABS Take 2 tablets by mouth daily       Coenzyme Q10 300 MG CAPS Take 300 mg by mouth daily       DULoxetine (CYMBALTA) 60 MG capsule Take 1 capsule (60 mg) by mouth daily 90 capsule 3     HYDROcodone-acetaminophen (NORCO) 5-325 MG tablet Take 1 tablet by mouth daily as needed for pain 20 tablet 0     IBUPROFEN PO Take 200 mg by mouth every 4 hours as needed for moderate pain        letrozole (FEMARA) 2.5 MG tablet Take 1 tablet by mouth daily  5     LYSINE 1-3 daily as needed       Magnesium 300 MG CAPS Take 300 mg by mouth as needed        Melatonin 10 MG TABS tablet Take 10 mg by mouth nightly as needed for sleep       Multiple Vitamins-Minerals (OCUVITE ADULT 50+) CAPS Take 1 capsule by mouth daily       nicotine polacrilex (NICORETTE) 2 MG gum Place 1 each (2 mg) inside cheek as needed for smoking cessation 30 tablet 11     rosuvastatin (CRESTOR) 10 MG tablet Take 1 tablet (10 mg) by mouth daily 90 tablet 3     UNABLE TO FIND MEDICATION NAME: Somnapure - 2 tablets = 500mg valerian root, 300mg lemon balm extract, 200mg l-theanine, 120mg hops extract, 50mg chamomile flower extract, 50mg passion flower extract, 3mg melatonin.  Takes 1-2 tablets at bedtime       UNABLE TO FIND  MEDICATION NAME: Moringa 1 serving of powder daily       UNABLE TO FIND MEDICATION NAME: Premium Amla Berry 2 capsules = 4mg Vitamin C, 966mg organic amla, organic amla extract.  Takes 2 capsules daily       UNABLE TO FIND MEDICATION NAME: OmegaKrill 5x 3 capsules = 480mg of total omega-3, 64mg EPA, 392mg DHA, 300mcg astaxanthin.  Takes 3 capsules daily       UNABLE TO FIND MEDICATION NAME: Super Colon Cleanse 2 capsules = 665mg senna leaf powder, 321mg psyllium husk powder, 21mg fennel seed powder, 21mg papaya leaf powder, 21mg yolanda hips fruit powder, 11mg l-acidophilus.  Taking 4 capsules daily       UNABLE TO FIND MEDICATION NAME: Majestha powder daily       valACYclovir (VALTREX) 1000 mg tablet TAKE 2 TABLETS(2000 MG) BY MOUTH TWICE DAILY AS NEEDED 8 tablet 0     ZOLEDRONIC ACID IV Inject into the vein every 6 months         ALLERGIES     Allergies   Allergen Reactions     Cats      Codeine Sulfate GI Disturbance       PAST MEDICAL HISTORY:  Past Medical History:   Diagnosis Date     Alcoholism (H)      Allergies     Fall     Basal cell cancer     back, chest, face     Breast cancer (H)      Coronary artery disease     CT calcium sonwg=998 Nov 2015     Depression      Hyperlipidemia LDL goal <130 12/2/2015     Hypertension     borderline     PONV (postoperative nausea and vomiting)      Squamous cell carcinoma     left upper arm, chin       PAST SURGICAL HISTORY:  Past Surgical History:   Procedure Laterality Date     COLONOSCOPY       COLONOSCOPY  11/17/2011    Procedure:COLONOSCOPY; Colonoscopy; Surgeon:MEKA RUSS; Location: GI     DISSECT LYMPH NODE AXILLA Right 11/2/2017    Procedure: DISSECT LYMPH NODE AXILLA;  RIGHT AXILLARY LYMPH NODE DISSECTION;  Surgeon: Cortez White MD;  [facility]     GYN SURGERY  2005    hysterectomy after hx of ovarian cyst, ended up being benign     HYSTERECTOMY, PAP NO LONGER INDICATED       MASTECTOMY MODIFIED RADICAL  2011    bilateral     MASTECTOMY,  BILATERAL       ORTHOPEDIC SURGERY      rt. knee arthroscopy     ORTHOPEDIC SURGERY Right     toe surgery     REALIGN PATELLA  1973    right      TOE SURGERY      right grt OA        FAMILY HISTORY:  Family History   Problem Relation Age of Onset     Cancer Mother 60        leukemia     Arthritis Mother         osteo     Eye Disorder Mother         glaucoma     Gastrointestinal Disease Mother         gallbladder     Other Cancer Mother      Gallbladder Disease Mother      Alcohol/Drug Father         alcohol     Heart Disease Father         ? CHF     Respiratory Father         emphysema     Coronary Artery Disease Father      Substance Abuse Father      Substance Abuse Sister      Colon Cancer Brother      Breast Cancer Maternal Grandmother      Asthma Sister      Cancer - colorectal Brother 50     Prostate Cancer Brother      Allergies Brother         bee      Arthritis Sister         osteo     Arthritis Sister         osteo     Arthritis Brother         osteo     Depression Sister      Psychotic Disorder Sister         bipolar     Respiratory Sister        SOCIAL HISTORY:  Social History     Socioeconomic History     Marital status:      Spouse name: None     Number of children: None     Years of education: None     Highest education level: None   Occupational History     None   Social Needs     Financial resource strain: None     Food insecurity:     Worry: None     Inability: None     Transportation needs:     Medical: None     Non-medical: None   Tobacco Use     Smoking status: Former Smoker     Packs/day: 1.00     Years: 22.00     Pack years: 22.00     Types: Cigarettes     Last attempt to quit: 2010     Years since quittin.5     Smokeless tobacco: Never Used   Substance and Sexual Activity     Alcohol use: No     Alcohol/week: 0.0 standard drinks     Comment: intermittent sobriety 11     Drug use: Yes     Types: Marijuana     Comment: for sleeping/occ     Sexual activity: Yes     Partners:  "Male     Birth control/protection: Surgical     Comment: hyst   Lifestyle     Physical activity:     Days per week: None     Minutes per session: None     Stress: None   Relationships     Social connections:     Talks on phone: None     Gets together: None     Attends Gnosticism service: None     Active member of club or organization: None     Attends meetings of clubs or organizations: None     Relationship status: None     Intimate partner violence:     Fear of current or ex partner: None     Emotionally abused: None     Physically abused: None     Forced sexual activity: None   Other Topics Concern      Service No     Blood Transfusions No     Caffeine Concern Yes     Comment: 4-5 cups caffeine per day     Occupational Exposure No     Hobby Hazards No     Sleep Concern Yes     Stress Concern Yes     Weight Concern No     Special Diet Yes     Comment: organic foods     Back Care No     Exercise No     Bike Helmet No     Seat Belt Yes     Self-Exams Yes     Parent/sibling w/ CABG, MI or angioplasty before 65F 55M? No   Social History Narrative     None       Review of Systems:  Skin:  Negative     Eyes:  Positive for glasses  ENT:  Positive for vertigo  Respiratory:  Positive for dyspnea on exertion;shortness of breath  Cardiovascular:  Negative    Gastroenterology: Negative    Genitourinary:  Positive for urinary frequency  Musculoskeletal:  Positive for joint pain;arthritis  Neurologic:  Negative    Psychiatric:  Positive for depression;sleep disturbances;anxiety;excessive stress  Heme/Lymph/Imm:  Positive for allergies  Endocrine:  Negative      Physical Exam:  Vitals: /71   Pulse 58   Ht 1.651 m (5' 5\")   Wt 62.6 kg (138 lb)   BMI 22.96 kg/m        GEN:  NAD  NECK: No JVD  C/V:  Regular rate and rhythm, no murmur, rub or gallop.  RESP: Clear to auscultation bilaterally.  GI: Abdomen soft, nontender, nondistended.  EXTREM: No LE edema.   NEURO: Alert and oriented, cooperative. No obvious focal " deficits.   PSYCH: Normal affect.  SKIN: Warm and dry.       Recent Lab Results:  LIPID RESULTS:  Lab Results   Component Value Date    CHOL 171 11/21/2019    HDL 85 11/21/2019    LDL 69 11/21/2019    TRIG 87 11/21/2019    CHOLHDLRATIO 3.3 10/29/2015       LIVER ENZYME RESULTS:  Lab Results   Component Value Date    AST 18 10/31/2019    ALT 35 10/31/2019       CBC RESULTS:  Lab Results   Component Value Date    WBC 6.3 10/31/2019    RBC 4.09 10/31/2019    HGB 13.4 10/31/2019    HCT 40.2 10/31/2019    MCV 98 10/31/2019    MCH 32.8 10/31/2019    MCHC 33.3 10/31/2019    RDW 12.5 10/31/2019     10/31/2019       BMP RESULTS:  Lab Results   Component Value Date     10/31/2019    POTASSIUM 4.4 10/31/2019    CHLORIDE 105 10/31/2019    CO2 31 10/31/2019    ANIONGAP 3 10/31/2019    GLC 94 10/31/2019    BUN 17 10/31/2019    CR 0.80 10/31/2019    GFRESTIMATED 76 10/31/2019    GFRESTBLACK 88 10/31/2019    YASMIN 9.2 10/31/2019        A1C RESULTS:  Lab Results   Component Value Date    A1C 5.6 11/18/2016       INR RESULTS:  No results found for: INR        Taiwo Anthony PA-C, BENI   November 20, 2019       Thank you for allowing me to participate in the care of your patient.      Sincerely,     Taiwo Anthony PA-C     [facility]    cc:   Vladislav Cruz MD  [address]  [place] [address]

## 2019-11-21 NOTE — TELEPHONE ENCOUNTER
Controlled Substance Refill Request for   HYDROcodone-acetaminophen (NORCO) 5-325 MG tablet 20 tablet 0 10/7/2019       Problem List Complete:  Yes    Last Written Prescription Date:  10/07/2019  Last Fill Quantity: 20,   # refills: 0    THE MOST RECENT OFFICE VISIT MUST BE WITHIN THE PAST 3 MONTHS. AT LEAST ONE FACE TO FACE VISIT MUST OCCUR EVERY 6 MONTHS. ADDITIONAL VISITS CAN BE VIRTUAL.  (THIS STATEMENT SHOULD BE DELETED.)    Last Office Visit with [facility] primary care provider: 10/07/2019    Future Office visit:   Next 5 appointments (look out 90 days)    Jan 06, 2020 10:00 AM CST  PHYSICAL with Vladislav Cruz MD  [facility] ([facility] [address] [facility] [address]  [phone]          Controlled substance agreement:   Encounter-Level CSA - 04/26/2018:    Controlled Substance Agreement - Scan on 5/2/2018 10:48 AM: CONTROLLED SUBSTANCE AGREEMENT     Patient-Level CSA:    There are no patient-level csa.         Last Urine Drug Screen: No results found for: CDAUT, No results found for: COMDAT, No results found for: THC13, PCP13, COC13, MAMP13, OPI13, AMP13, BZO13, TCA13, MTD13, BAR13, OXY13, PPX13, BUP13     Processing:  Rx to be electronically transmitted to pharmacy by provider     [email or web address]   checked in past 3 months?  Yes 6/28/19

## 2019-11-21 NOTE — PATIENT INSTRUCTIONS
Today's Plan:   1) Cholesterol numbers are better.     If you have questions or concerns please call my nurse team at [phone].     Scheduling phone number: [phone]  Reminder: Please bring in all current medications, over the counter supplements and vitamin bottles to your next appointment.    It was a pleasure seeing you today!     Taiwo Anthony PA-C  11/21/2019

## 2019-11-21 NOTE — TELEPHONE ENCOUNTER
Reason for Call:  Medication or medication refill:    Do you use a [facility]?  Name of the pharmacy and phone number for the current request:  [id number] - [place], [place] - [address] AT [facility] [address]      Name of the medication requested: HYDROcodone-acetaminophen (NORCO) 5-325 MG tablet       Other request:     Can we leave a detailed message on this number? YES    Phone number patient can be reached at: Home number on file [phone] (home)    Best Time: any    Call taken on 11/21/2019 at 1:01 PM by Melinda Love

## 2019-12-29 ENCOUNTER — NURSE TRIAGE (OUTPATIENT)
Dept: NURSING | Facility: CLINIC | Age: 69
End: 2019-12-29

## 2019-12-29 DIAGNOSIS — M54.50 LOW BACK PAIN WITHOUT SCIATICA, UNSPECIFIED BACK PAIN LATERALITY, UNSPECIFIED CHRONICITY: ICD-10-CM

## 2019-12-29 DIAGNOSIS — F11.90 CHRONIC, CONTINUOUS USE OF OPIOIDS: ICD-10-CM

## 2019-12-29 NOTE — TELEPHONE ENCOUNTER
See telephone encounter.    Reason for Disposition    Caller requesting a NON-URGENT new prescription or refill and triager unable to refill per unit policy    Protocols used: MEDICATION QUESTION CALL-A-AH

## 2019-12-29 NOTE — TELEPHONE ENCOUNTER
Svetlana requesting refill of Norco.   Last filled 11/21/19.      HYDROcodone-acetaminophen (NORCO) 5-325 MG tablet, Take 1 tablet by mouth daily as needed for pain .      Only using 0.5 tablets daily, has enough until January 1, [facility] in Epic correct ([address]).      Thanks  Connie Costa RN  [facility]

## 2019-12-30 NOTE — TELEPHONE ENCOUNTER
Controlled Substance Refill Request for Hydrocodone-acetaminophen 5-325 mg    Problem List Complete:  Yes    Last Written Prescription Date:  11/21/19  Last Fill Quantity: 20 tablets,   # refills: 0    THE MOST RECENT OFFICE VISIT MUST BE WITHIN THE PAST 3 MONTHS. AT LEAST ONE FACE TO FACE VISIT MUST OCCUR EVERY 6 MONTHS. ADDITIONAL VISITS CAN BE VIRTUAL.  (THIS STATEMENT SHOULD BE DELETED.)    [facility] Office Visit [facility] care provider: 10/07/19    Future Office visit:   Next 5 appointments (look out 90 days)    Jan 06, 2020 10:00 AM CST  PHYSICAL with Vladislav Cruz MD  [facility] ([facility]) [address] [facility] [address]  [phone]          Controlled substance agreement:   Encounter-Level CSA - 04/26/2018:    Controlled Substance Agreement - Scan on 5/2/2018 10:48 AM: CONTROLLED SUBSTANCE AGREEMENT     Patient-Level CSA:    There are no patient-level csa.         Last Urine Drug Screen: No results found for: CDAUT, No results found for: COMDAT, No results found for: THC13, PCP13, COC13, MAMP13, OPI13, AMP13, BZO13, TCA13, MTD13, BAR13, OXY13, PPX13, BUP13     Processing:  Rx to be electronically transmitted to pharmacy by provider     [email or web address]   checked in past 3 months?  No, route to RN

## 2019-12-31 RX ORDER — HYDROCODONE BITARTRATE AND ACETAMINOPHEN 5; 325 MG/1; MG/1
1 TABLET ORAL DAILY PRN
Qty: 20 TABLET | Refills: 0 | Status: SHIPPED | OUTPATIENT
Start: 2019-12-31 | End: 2020-02-07

## 2019-12-31 NOTE — TELEPHONE ENCOUNTER
Patient is followed by Vladislav Cruz MD for ongoing prescription of pain medication.  All refills should only be approved by this provider, or covering partner.    Medication(s): norco 5/325.   Maximum quantity per month: #20  Clinic visit frequency required: Q 6  months     Controlled substance agreement:  Encounter-Level CSA:     There are no encounter-level csa.        Pain Clinic evaluation in the past: No    DIRE Total Score(s):  No flowsheet data found.    Last [facility] website verification: 12/31/19

## 2020-01-06 ENCOUNTER — OFFICE VISIT (OUTPATIENT)
Dept: FAMILY MEDICINE | Facility: CLINIC | Age: 70
End: 2020-01-06
Payer: MEDICARE

## 2020-01-06 VITALS
WEIGHT: 140.3 LBS | HEART RATE: 63 BPM | SYSTOLIC BLOOD PRESSURE: 125 MMHG | OXYGEN SATURATION: 100 % | HEIGHT: 64 IN | BODY MASS INDEX: 23.95 KG/M2 | TEMPERATURE: 97.7 F | DIASTOLIC BLOOD PRESSURE: 75 MMHG

## 2020-01-06 DIAGNOSIS — I65.22 ATHEROSCLEROSIS OF LEFT CAROTID ARTERY: ICD-10-CM

## 2020-01-06 DIAGNOSIS — M54.2 NECK PAIN ON RIGHT SIDE: ICD-10-CM

## 2020-01-06 DIAGNOSIS — E21.3 HYPERPARATHYROIDISM (H): ICD-10-CM

## 2020-01-06 DIAGNOSIS — F10.21 ALCOHOL DEPENDENCE IN REMISSION (H): ICD-10-CM

## 2020-01-06 DIAGNOSIS — Z87.891 PERSONAL HISTORY OF TOBACCO USE: ICD-10-CM

## 2020-01-06 DIAGNOSIS — F11.90 CHRONIC, CONTINUOUS USE OF OPIOIDS: ICD-10-CM

## 2020-01-06 DIAGNOSIS — R19.7 DIARRHEA, UNSPECIFIED TYPE: ICD-10-CM

## 2020-01-06 DIAGNOSIS — Z00.00 ROUTINE GENERAL MEDICAL EXAMINATION AT A HEALTH CARE FACILITY: Primary | ICD-10-CM

## 2020-01-06 DIAGNOSIS — C50.911 MALIGNANT NEOPLASM OF RIGHT FEMALE BREAST, UNSPECIFIED ESTROGEN RECEPTOR STATUS, UNSPECIFIED SITE OF BREAST (H): ICD-10-CM

## 2020-01-06 DIAGNOSIS — F33.1 MODERATE EPISODE OF RECURRENT MAJOR DEPRESSIVE DISORDER (H): ICD-10-CM

## 2020-01-06 DIAGNOSIS — E78.5 HYPERLIPIDEMIA LDL GOAL <70: ICD-10-CM

## 2020-01-06 LAB
C DIFF TOX B STL QL: NEGATIVE
SPECIMEN SOURCE: NORMAL

## 2020-01-06 PROCEDURE — 99000 SPECIMEN HANDLING OFFICE-LAB: CPT | Performed by: INTERNAL MEDICINE

## 2020-01-06 PROCEDURE — 87506 IADNA-DNA/RNA PROBE TQ 6-11: CPT | Performed by: INTERNAL MEDICINE

## 2020-01-06 PROCEDURE — 87177 OVA AND PARASITES SMEARS: CPT | Performed by: INTERNAL MEDICINE

## 2020-01-06 PROCEDURE — 80307 DRUG TEST PRSMV CHEM ANLYZR: CPT | Mod: 90 | Performed by: INTERNAL MEDICINE

## 2020-01-06 PROCEDURE — G0439 PPPS, SUBSEQ VISIT: HCPCS | Performed by: INTERNAL MEDICINE

## 2020-01-06 PROCEDURE — 87209 SMEAR COMPLEX STAIN: CPT | Performed by: INTERNAL MEDICINE

## 2020-01-06 PROCEDURE — 96127 BRIEF EMOTIONAL/BEHAV ASSMT: CPT | Performed by: INTERNAL MEDICINE

## 2020-01-06 PROCEDURE — 99213 OFFICE O/P EST LOW 20 MIN: CPT | Mod: 25 | Performed by: INTERNAL MEDICINE

## 2020-01-06 PROCEDURE — 87493 C DIFF AMPLIFIED PROBE: CPT | Mod: XU | Performed by: INTERNAL MEDICINE

## 2020-01-06 RX ORDER — DULOXETIN HYDROCHLORIDE 30 MG/1
30 CAPSULE, DELAYED RELEASE ORAL DAILY
Qty: 90 CAPSULE | Refills: 3 | Status: SHIPPED | OUTPATIENT
Start: 2020-01-06 | End: 2020-02-06

## 2020-01-06 RX ORDER — DULOXETIN HYDROCHLORIDE 60 MG/1
60 CAPSULE, DELAYED RELEASE ORAL DAILY
Qty: 90 CAPSULE | Refills: 3 | Status: SHIPPED | OUTPATIENT
Start: 2020-01-06 | End: 2020-02-06

## 2020-01-06 ASSESSMENT — PATIENT HEALTH QUESTIONNAIRE - PHQ9: SUM OF ALL RESPONSES TO PHQ QUESTIONS 1-9: 7

## 2020-01-06 ASSESSMENT — ACTIVITIES OF DAILY LIVING (ADL): CURRENT_FUNCTION: NO ASSISTANCE NEEDED

## 2020-01-06 ASSESSMENT — MIFFLIN-ST. JEOR: SCORE: 1163.53
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
## 2020-01-06 NOTE — PATIENT INSTRUCTIONS
Please call [place] radiology at [phone] to schedule your lung cancer screening CT scan.           Lung Cancer Screening   Frequently Asked Questions  If you are at high-risk for lung cancer, getting screened with low-dose computed tomography (LDCT) every year can help save your life. This handout offers answers to some of the most common questions about lung cancer screening. If you have other questions, please call [phone] ([phone]).     What is it?  Lung cancer screening uses special X-ray technology to create an image of your lung tissue. The exam is quick and easy and takes less than 10 seconds. We don t give you any medicine or use any needles. You can eat before and after the exam. You don t need to change your clothes as long as the clothing on your chest doesn t contain metal. But, you do need to be able to hold your breath for at least 6 seconds during the exam.    What is the goal of lung cancer screening?  The goal of lung cancer screening is to save lives. Many times, lung cancer is not found until a person starts having physical symptoms. Lung cancer screening can help detect lung cancer in the earliest stages when it may be easier to treat.    Who should be screened for lung cancer?  We suggest lung cancer screening for anyone who is at high-risk for lung cancer. You are in the high-risk group if you:      are between the ages of 55 and 79, and    have smoked at least 1 pack of cigarettes a day for 30 or more years, and    still smoke or have quit within the past 15 years.    However, if you have a new cough or shortness of breath, you should talk to your doctor before being screened.    Some national lung health advocacy groups also recommend screening for people ages 50 to 79 who have smoked an average of 1 pack of cigarettes a day for 20 years. They must also have at least 1 other risk factor for lung cancer, not including exposure to secondhand smoke. Other risk factors are  having had cancer in the past, emphysema, pulmonary fibrosis, COPD, a family history of lung cancer, or exposure to certain materials such as arsenic, asbestos, beryllium, cadmium, chromium, diesel fumes, nickel, radon or silica. Your care team can help you know if you have one of these risk factors.     Why does it matter if I have symptoms?  Certain symptoms can be a sign that you have a condition in your lungs that should be checked and treated by your doctor. These symptoms include fever, chest pain, a new or changing cough, shortness of breath that you have never felt before, coughing up blood or unexplained weight loss. Having any of these symptoms can greatly affect the results of lung cancer screening.       Should all smokers get an LDCT lung cancer screening exam?  It depends. Lung cancer screening is for a very specific group of men and women who have a history of heavy smoking over a long period of time (see  Who should be screened for lung cancer  above).  I am in the high-risk group, but have been diagnosed with cancer in the past. Is LDCT lung cancer screening right for me?  In some cases, you should not have LDCT lung screening, such as when your doctor is already following your cancer with CT scan studies. Your doctor will help you decide if LDCT lung screening is right for you.  Do I need to have a screening exam every year?  Yes. If you are in the high-risk group described earlier, you should get an LDCT lung cancer screening exam every year until you are 79, or are no longer willing or able to undergo screening and possible procedures to diagnose and treat lung cancer.  How effective is LDCT at preventing death from lung cancer?  Studies have shown that LDCT lung cancer screening can lower the risk of death from lung cancer by 20 percent in people who are at high-risk.  What are the risks?  There are some risks and limitations of LDCT lung cancer screening. We want to make sure you understand the  risks and benefits, so please let us know if you have any questions. Your doctor may want to talk with you more about these risks.    Radiation exposure: As with any exam that uses radiation, there is a very small increased risk of cancer. The amount of radiation in LDCT is small--about the same amount a person would get from a mammogram. Your doctor orders the exam when he or she feels the potential benefits outweigh the risks.    False negatives: No test is perfect, including LDCT. It is possible that you may have a medical condition, including lung cancer, that is not found during your exam. This is called a false negative result.    False positives and more testing: LDCT very often finds something in the lung that could be cancer, but in fact is not. This is called a false positive result. False positive tests often cause anxiety. To make sure these findings are not cancer, you may need to have more tests. These tests will be done only if you give us permission. Sometimes patients need a treatment that can have side effects, such as a biopsy. For more information on false positives, see  What can I expect from the results?     Findings not related to lung cancer: Your LDCT exam also takes pictures of areas of your body next to your lungs. In a very small number of cases, the CT scan will show an abnormal finding in one of these areas, such as your kidneys, adrenal glands, liver or thyroid. This finding may not be serious, but you may need more tests. Your doctor can help you decide what other tests you may need, if any.  What can I expect from the results?  About 1 out of 4 LDCT exams will find something that may need more tests. Most of the time, these findings are lung nodules. Lung nodules are very small collections of tissue in the lung. These nodules are very common, and the vast majority--more than 97 percent--are not cancer (benign). Most are normal lymph nodes or small areas of scarring from past  infections.  But, if a small lung nodule is found to be cancer, the cancer can be cured more than 90 percent of the time. To know if the nodule is cancer, we may need to get more images before your next yearly screening exam. If the nodule has suspicious features (for example, it is large, has an odd shape or grows over time), we will refer you to a specialist for further testing.  Will my doctor also get the results?  Yes. Your doctor will get a copy of your results.  Is it okay to keep smoking now that there s a cancer screening exam?  No. Tobacco is one of the strongest cancer-causing agents. It causes not only lung cancer, but other cancers and cardiovascular (heart) diseases as well. The damage caused by smoking builds over time. This means that the longer you smoke, the higher your risk of disease. While it is never too late to quit, the sooner you quit, the better.  Where can I find help to quit smoking?  The best way to prevent lung cancer is to stop smoking. If you have already quit smoking, congratulations and keep it up! For help on quitting smoking, please call [employer] at 4-432-405-EECE ([phone]) or the American Cancer Society at 1-294.609.1290 to find local resources near you.  One-on-one health coaching:  If you d prefer to work individually with a health care provider on tobacco cessation, we offer:      Medication Therapy Management:  Our specially trained pharmacists work closely with you and your doctor to help you quit smoking.  Call [phone] or [phone] (toll free).     Can Do: Health coaching offered by [facility].  [email or web address]

## 2020-01-07 LAB
C COLI+JEJUNI+LARI FUSA STL QL NAA+PROBE: NOT DETECTED
EC STX1 GENE STL QL NAA+PROBE: NOT DETECTED
EC STX2 GENE STL QL NAA+PROBE: NOT DETECTED
ENTERIC PATHOGEN COMMENT: NORMAL
NOROV GI+II ORF1-ORF2 JNC STL QL NAA+PR: NOT DETECTED
O+P STL MICRO: NORMAL
O+P STL MICRO: NORMAL
RVA NSP5 STL QL NAA+PROBE: NOT DETECTED
SALMONELLA SP RPOD STL QL NAA+PROBE: NOT DETECTED
SHIGELLA SP+EIEC IPAH STL QL NAA+PROBE: NOT DETECTED
SPECIMEN SOURCE: NORMAL
V CHOL+PARA RFBL+TRKH+TNAA STL QL NAA+PR: NOT DETECTED
Y ENTERO RECN STL QL NAA+PROBE: NOT DETECTED

## 2020-01-08 NOTE — RESULT ENCOUNTER NOTE
The following letter pertains to your most recent diagnostic tests:    Good news! The stool tests do NOT show any infections to explain your persistent diarrhea.  A reasonable next step to evaluate the problem would be to proceed with a colonoscopy as we discussed.        Bottom line:  I sent a referral for a colonoscopy and the  should call you to schedule the procedure.      Sincerely,    Dr. Cruz

## 2020-01-16 ENCOUNTER — HOSPITAL ENCOUNTER (OUTPATIENT)
Dept: CT IMAGING | Facility: CLINIC | Age: 70
Discharge: HOME | End: 2020-01-16
Attending: INTERNAL MEDICINE | Admitting: INTERNAL MEDICINE
Payer: MEDICARE

## 2020-01-16 DIAGNOSIS — Z87.891 PERSONAL HISTORY OF TOBACCO USE: ICD-10-CM

## 2020-01-16 PROCEDURE — G0297 LDCT FOR LUNG CA SCREEN: HCPCS

## 2020-01-16 NOTE — RESULT ENCOUNTER NOTE
The following letter pertains to your most recent diagnostic tests:    Good news! Your lung cancer screening CT is normal.   Repeat in one year.        Sincerely,    Dr. Cruz

## 2020-01-16 NOTE — LETTER
[facility]  [address] [facility]  [address]  [place]  [address]  Tel: [phone]    January 16, 2020    Svetlana Adair  [address]   [facility] [address]        Dear Ms. Adair,    The following letter pertains to your most recent diagnostic tests:    Good news! Your lung cancer screening CT is normal.   Repeat in one year  If you have any further questions or problems, please contact our office.      Sincerely,    Vladislav Cruz MD/ Shivani Vieira CMA            Enclosure: Lab Results

## 2020-02-06 ENCOUNTER — OFFICE VISIT (OUTPATIENT)
Dept: FAMILY MEDICINE | Facility: CLINIC | Age: 70
End: 2020-02-06
Payer: MEDICARE

## 2020-02-06 VITALS
HEIGHT: 65 IN | DIASTOLIC BLOOD PRESSURE: 81 MMHG | BODY MASS INDEX: 23.66 KG/M2 | HEART RATE: 75 BPM | SYSTOLIC BLOOD PRESSURE: 120 MMHG | TEMPERATURE: 97.2 F | WEIGHT: 142 LBS | OXYGEN SATURATION: 100 %

## 2020-02-06 DIAGNOSIS — F33.1 MODERATE EPISODE OF RECURRENT MAJOR DEPRESSIVE DISORDER (H): Primary | ICD-10-CM

## 2020-02-06 DIAGNOSIS — M79.644 PAIN OF RIGHT THUMB: ICD-10-CM

## 2020-02-06 PROCEDURE — 99214 OFFICE O/P EST MOD 30 MIN: CPT | Performed by: INTERNAL MEDICINE

## 2020-02-06 RX ORDER — BUPROPION HYDROCHLORIDE 150 MG/1
150 TABLET ORAL EVERY MORNING
Qty: 90 TABLET | Refills: 3 | Status: SHIPPED | OUTPATIENT
Start: 2020-02-06 | End: 2020-03-23

## 2020-02-06 ASSESSMENT — ANXIETY QUESTIONNAIRES
IF YOU CHECKED OFF ANY PROBLEMS ON THIS QUESTIONNAIRE, HOW DIFFICULT HAVE THESE PROBLEMS MADE IT FOR YOU TO DO YOUR WORK, TAKE CARE OF THINGS AT HOME, OR GET ALONG WITH OTHER PEOPLE: NOT DIFFICULT AT ALL
6. BECOMING EASILY ANNOYED OR IRRITABLE: MORE THAN HALF THE DAYS
5. BEING SO RESTLESS THAT IT IS HARD TO SIT STILL: SEVERAL DAYS
1. FEELING NERVOUS, ANXIOUS, OR ON EDGE: MORE THAN HALF THE DAYS
GAD7 TOTAL SCORE: 10
7. FEELING AFRAID AS IF SOMETHING AWFUL MIGHT HAPPEN: NOT AT ALL
3. WORRYING TOO MUCH ABOUT DIFFERENT THINGS: SEVERAL DAYS
2. NOT BEING ABLE TO STOP OR CONTROL WORRYING: NEARLY EVERY DAY

## 2020-02-06 ASSESSMENT — MIFFLIN-ST. JEOR: SCORE: 1172.05

## 2020-02-06 ASSESSMENT — PATIENT HEALTH QUESTIONNAIRE - PHQ9
SUM OF ALL RESPONSES TO PHQ QUESTIONS 1-9: 15
5. POOR APPETITE OR OVEREATING: SEVERAL DAYS

## 2020-02-06 NOTE — PROGRESS NOTES
"Subjective     Svetlana Adair is a 69 year old female who presents to clinic today for the following health issues:    HPI   Medication Followup of Cymbalta    Taking Medication as prescribed: yes    Side Effects:  Impulse control issues    Medication Helping Symptoms:  NO     Here to follow up mood after cymbalta dose change  She thinks cymbalta is making her feel worse, not better  She wonders if it is causing diarrhea?  She thinks it makes it harder for her to sleep, but she takes it in the AM.  She has many life stressors.  She wonders if she even needs a depression pill?  She denies suicidal ideation.  She has had success with wellbutrin in the remote past.      She describes several years of progressive pain at the base of her thumb that is now severe       Reviewed and updated as needed this visit by Provider  Tobacco  Med Hx  Surg Hx  Fam Hx  Soc Hx        Review of Systems         Objective    /81 (BP Location: Left arm, Cuff Size: Adult Regular)   Pulse 75   Temp 97.2  F (36.2  C) (Tympanic)   Ht 1.638 m (5' 4.5\")   Wt 64.4 kg (142 lb)   SpO2 100%   Breastfeeding No   BMI 24.00 kg/m    Body mass index is 24 kg/m .  Physical Exam   GEN:  Well appearing, no distress  MSK:  Pain at right 1st FCI joint   PSYCH:   Depressed mood, normal affect, no suicidal ideation, normal speech, well groomed          Assessment & Plan     1. Moderate episode of recurrent major depressive disorder (H)  See patient instructions  Failing several therapies, I think psychiatry input would be helpful   She wants to see how she does off drug therapy, but we gave her the option to restart wellbutrin if needed pending mood upon tapering off cymbalta   - MENTAL HEALTH REFERRAL  - Adult; Psychiatry and Medication Management; Psychiatry; [facility] [phone].  Medication management & future refills will be returned to [facility] PCP upon completion of evaluation; We jose...  - buPROPion " (WELLBUTRIN XL) 150 MG 24 hr tablet; Take 1 tablet (150 mg) by mouth every morning  Dispense: 90 tablet; Refill: 3    2. Pain of right thumb  See Dr. Maguire for consideration of cortisone injection   - ORTHOPEDICS ADULT REFERRAL       Patient Instructions   Since the Cymbalta (duloxetine) is not helping enough and may be causing side effects, I recommend tapering off of Cymbalta carefully.   Reduce your dose to 60 mg once daily for one week, then decrease your dose to 30 mg once daily for one week and then stop Cymbalta.  To replace Cymbalta you have the option of restarting Wellbutrin (Buproprion).  If you were to restart Wellbutrin, you may start it on the week when your Cymbalta dose is decreased to 30 mg daily.  That will give a chance for the Wellbutrin to increase in concentration in your systom, while the Cymbalta is working its way out of your system.  I do recommend a consultation with a psychiatry specialist to get an expert opinion on which drug or if drug therapy is needed to help your mood.  The [place] psychiatry service will call you to arrange an appointment.      Return in about 8 weeks (around 4/2/2020) for Mood Check.    Vladislav Cruz MD  [facility]    Total face to face contact time was greater than 30 minutes of which more than 50% of this time was spent counseling and coordinating care regarding the above topics.

## 2020-02-06 NOTE — PATIENT INSTRUCTIONS
Since the Cymbalta (duloxetine) is not helping enough and may be causing side effects, I recommend tapering off of Cymbalta carefully.   Reduce your dose to 60 mg once daily for one week, then decrease your dose to 30 mg once daily for one week and then stop Cymbalta.  To replace Cymbalta you have the option of restarting Wellbutrin (Buproprion).  If you were to restart Wellbutrin, you may start it on the week when your Cymbalta dose is decreased to 30 mg daily.  That will give a chance for the Wellbutrin to increase in concentration in your systom, while the Cymbalta is working its way out of your system.  I do recommend a consultation with a psychiatry specialist to get an expert opinion on which drug or if drug therapy is needed to help your mood.  The [place] psychiatry service will call you to arrange an appointment.

## 2020-02-07 DIAGNOSIS — M54.50 LOW BACK PAIN WITHOUT SCIATICA, UNSPECIFIED BACK PAIN LATERALITY, UNSPECIFIED CHRONICITY: ICD-10-CM

## 2020-02-07 DIAGNOSIS — K21.9 GASTROESOPHAGEAL REFLUX DISEASE, ESOPHAGITIS PRESENCE NOT SPECIFIED: Primary | ICD-10-CM

## 2020-02-07 DIAGNOSIS — K21.9 GERD (GASTROESOPHAGEAL REFLUX DISEASE): ICD-10-CM

## 2020-02-07 RX ORDER — HYDROCODONE BITARTRATE AND ACETAMINOPHEN 5; 325 MG/1; MG/1
1 TABLET ORAL DAILY PRN
Qty: 20 TABLET | Refills: 0 | Status: SHIPPED | OUTPATIENT
Start: 2020-02-07 | End: 2020-03-11

## 2020-02-07 RX ORDER — NICOTINE POLACRILEX 4 MG/1
20 GUM, CHEWING ORAL PRN
Qty: 90 TABLET | Refills: 3 | Status: SHIPPED | OUTPATIENT
Start: 2020-02-07 | End: 2021-05-10

## 2020-02-07 ASSESSMENT — ANXIETY QUESTIONNAIRES: GAD7 TOTAL SCORE: 10

## 2020-02-07 NOTE — TELEPHONE ENCOUNTER
I called Svetlana regarding the request for Omeprazole. has not been on omeprazole for a couple years. Her reflux symptoms have returned and she has been trying tums to no relief.  She would like to go back on.      See also request for Kim Lai RN- Triage [employer]

## 2020-02-07 NOTE — TELEPHONE ENCOUNTER
Reason for Call:  Medication or medication refill:    Do you use a [facility]?  Name of the pharmacy and phone number for the current request:  [id number] - [place], [place] - [address] AT [facility] [address]      Name of the medication requested: HYDROcodone-acetaminophen (NORCO) 5-325 MG tablet   omeprazole    Other request:   Can we leave a detailed message on this number? YES    Phone number patient can be reached at: Home number on file [phone] (home)    Best Time: any    Call taken on 2/7/2020 at 10:08 AM by Melinda Love

## 2020-02-07 NOTE — TELEPHONE ENCOUNTER
Pending Prescriptions:                       Disp   Refills    HYDROcodone-acetaminophen (NORCO) 5-325 M*20 tab*0            Sig: Take 1 tablet by mouth daily as needed for pain    omeprazole 20 MG tablet                   30 tab*0            Sig: Take 1 tablet (20 mg) by mouth as needed        Controlled Substance Refill Request for Hydrocodone-Acteaminophen  Problem List Complete:  No     PROVIDER TO CONSIDER COMPLETION OF PROBLEM LIST AND OVERVIEW/CONTROLLED SUBSTANCE AGREEMENT    Last Written Prescription Date:  12/31//2019  Last Fill Quantity: 20,   # refills: 0    THE MOST RECENT OFFICE VISIT MUST BE WITHIN THE PAST 3 MONTHS. AT LEAST ONE FACE TO FACE VISIT MUST OCCUR EVERY 6 MONTHS. ADDITIONAL VISITS CAN BE VIRTUAL.  (THIS STATEMENT SHOULD BE DELETED.)    [place] [facility] [place] [facility] [place]: 02/06/2020    Future Office visit:   Next 5 appointments (look out 90 days)    Mar 23, 2020 12:30 PM CDT  [facility] [address] [facility] [address]  [phone]          Controlled substance agreement:   Encounter-Level CSA - 04/26/2018:    Controlled Substance Agreement - Scan on 5/2/2018 10:48 AM: CONTROLLED SUBSTANCE AGREEMENT     Patient-Level CSA:    There are no patient-level csa.         Last Urine Drug Screen: No results found for: CDAUT, No results found for: COMDAT, No results found for: THC13, PCP13, COC13, MAMP13, OPI13, AMP13, BZO13, TCA13, MTD13, BAR13, OXY13, PPX13, BUP13     Processing:  Rx to be electronically transmitted to pharmacy by provider      [email or web address]       checked in past 3 months?  No, route to RN    Omeprazole  Last Written Prescription Date:  07/30/2012  Last Fill Quantity: na,  # refills: na   Last office visit: 2/6/2020 with prescribing provider:     Future Office Visit:   Next 5 appointments (look out 90 days)    Mar 23, 2020 12:30 PM CDT  Office Visit  "with [facility]islav [facility] MD  [facility] [address] [facility] [address] [phone]         Requested Prescriptions   Pending Prescriptions Disp Refills     HYDROcodone-acetaminophen (NORCO) 5-325 MG tablet 20 tablet 0     Sig: Take 1 tablet by mouth daily as needed for pain       There is no refill protocol information for this order        omeprazole 20 MG tablet 30 tablet 0     Sig: Take 1 tablet (20 mg) by mouth as needed       PPI Protocol Failed - 2/7/2020 10:11 AM        Failed - Medication is active on med list        Passed - Not on Clopidogrel (unless Pantoprazole ordered)        Passed - No diagnosis of osteoporosis on record        Passed - Recent (12 mo) or future (30 days) visit within the authorizing provider's specialty     Patient has had an office visit with the authorizing provider or a provider within the authorizing providers department within the previous 12 mos or has a future within next 30 days. See \"Patient Info\" tab in inbasket, or \"Choose Columns\" in Meds & Orders section of the refill encounter.              Passed - Patient is age 18 or older        Passed - No active pregnacy on record        Passed - No positive pregnancy test in past 12 months          "

## 2020-02-10 ENCOUNTER — HOSPITAL ENCOUNTER (OUTPATIENT)
Facility: CLINIC | Age: 70
Discharge: HOME | End: 2020-02-10
Attending: INTERNAL MEDICINE | Admitting: INTERNAL MEDICINE
Payer: MEDICARE

## 2020-02-10 VITALS
OXYGEN SATURATION: 96 % | SYSTOLIC BLOOD PRESSURE: 135 MMHG | RESPIRATION RATE: 16 BRPM | DIASTOLIC BLOOD PRESSURE: 87 MMHG | WEIGHT: 135 LBS | HEART RATE: 68 BPM | BODY MASS INDEX: 22.81 KG/M2

## 2020-02-10 DIAGNOSIS — F43.23 ADJUSTMENT DISORDER WITH MIXED ANXIETY AND DEPRESSED MOOD: Primary | ICD-10-CM

## 2020-02-10 LAB — COLONOSCOPY: NORMAL

## 2020-02-10 PROCEDURE — 25000128 H RX IP 250 OP 636: Performed by: INTERNAL MEDICINE

## 2020-02-10 PROCEDURE — 88305 TISSUE EXAM BY PATHOLOGIST: CPT | Mod: 26,76 | Performed by: INTERNAL MEDICINE

## 2020-02-10 PROCEDURE — G0500 MOD SEDAT ENDO SERVICE >5YRS: HCPCS | Performed by: INTERNAL MEDICINE

## 2020-02-10 PROCEDURE — 99153 MOD SED SAME PHYS/QHP EA: CPT | Performed by: INTERNAL MEDICINE

## 2020-02-10 PROCEDURE — 45380 COLONOSCOPY AND BIOPSY: CPT | Performed by: INTERNAL MEDICINE

## 2020-02-10 PROCEDURE — 25800030 ZZH RX IP 258 OP 636: Performed by: INTERNAL MEDICINE

## 2020-02-10 PROCEDURE — 88305 TISSUE EXAM BY PATHOLOGIST: CPT | Performed by: INTERNAL MEDICINE

## 2020-02-10 RX ORDER — FLUMAZENIL 0.1 MG/ML
0.2 INJECTION, SOLUTION INTRAVENOUS
Status: CANCELLED | OUTPATIENT
Start: 2020-02-10 | End: 2020-02-11

## 2020-02-10 RX ORDER — SODIUM CHLORIDE, SODIUM LACTATE, POTASSIUM CHLORIDE, CALCIUM CHLORIDE 600; 310; 30; 20 MG/100ML; MG/100ML; MG/100ML; MG/100ML
INJECTION, SOLUTION INTRAVENOUS CONTINUOUS PRN
Status: DISCONTINUED | OUTPATIENT
Start: 2020-02-10 | End: 2020-02-10 | Stop reason: HOSPADM

## 2020-02-10 RX ORDER — FENTANYL CITRATE 50 UG/ML
INJECTION, SOLUTION INTRAMUSCULAR; INTRAVENOUS PRN
Status: DISCONTINUED | OUTPATIENT
Start: 2020-02-10 | End: 2020-02-10 | Stop reason: HOSPADM

## 2020-02-10 RX ORDER — ONDANSETRON 2 MG/ML
4 INJECTION INTRAMUSCULAR; INTRAVENOUS
Status: DISCONTINUED | OUTPATIENT
Start: 2020-02-10 | End: 2020-02-10 | Stop reason: HOSPADM

## 2020-02-10 RX ORDER — LIDOCAINE 40 MG/G
CREAM TOPICAL
Status: DISCONTINUED | OUTPATIENT
Start: 2020-02-10 | End: 2020-02-10 | Stop reason: HOSPADM

## 2020-02-10 RX ORDER — DULOXETIN HYDROCHLORIDE 20 MG/1
20 CAPSULE, DELAYED RELEASE ORAL 2 TIMES DAILY
COMMUNITY
End: 2020-03-23

## 2020-02-10 RX ORDER — NALOXONE HYDROCHLORIDE 0.4 MG/ML
.1-.4 INJECTION, SOLUTION INTRAMUSCULAR; INTRAVENOUS; SUBCUTANEOUS
Status: CANCELLED | OUTPATIENT
Start: 2020-02-10 | End: 2020-02-11

## 2020-02-10 RX ORDER — ONDANSETRON 2 MG/ML
4 INJECTION INTRAMUSCULAR; INTRAVENOUS EVERY 6 HOURS PRN
Status: CANCELLED | OUTPATIENT
Start: 2020-02-10

## 2020-02-10 RX ORDER — ONDANSETRON 4 MG/1
4 TABLET, ORALLY DISINTEGRATING ORAL EVERY 6 HOURS PRN
Status: CANCELLED | OUTPATIENT
Start: 2020-02-10

## 2020-02-11 LAB — COPATH REPORT: NORMAL

## 2020-02-11 RX ORDER — DULOXETIN HYDROCHLORIDE 60 MG/1
CAPSULE, DELAYED RELEASE ORAL
Qty: 90 CAPSULE | Refills: 0 | Status: SHIPPED | OUTPATIENT
Start: 2020-02-11 | End: 2020-03-23

## 2020-02-11 NOTE — TELEPHONE ENCOUNTER
"Dr. Cruz,    Duloxetine:  Listed as historical  Last office visit: 2/6/2020 with prescribing provider:  yes   Future Office Visit:   Next 5 appointments (look out 90 days)    Mar 23, 2020 12:30 PM CDT  Office Visit with Vladislav Cruz MD  [facility] ([facility]) [address] [facility] [address] [phone]         Thanks,  Iris Miranda RN    Requested Prescriptions   Pending Prescriptions Disp Refills     DULoxetine (CYMBALTA) 60 MG capsule [Pharmacy Med Name: DULOXETINE DR 60MG CAPSULES] 90 capsule 0     Sig: TAKE ONE CAPSULE BY MOUTH DAILY       Serotonin-Norepinephrine Reuptake Inhibitors  Failed - 2/10/2020  9:09 AM        Failed - Medication is active on med list        Passed - Blood pressure under 140/90 in past 12 months     BP Readings from Last 3 Encounters:   02/10/20 135/87   02/06/20 120/81   01/06/20 125/75                 Passed - Recent (12 mo) or future (30 days) visit within the authorizing provider's specialty     Patient has had an office visit with the authorizing provider or a provider within the authorizing providers department within the previous 12 mos or has a future within next 30 days. See \"Patient Info\" tab in inbasket, or \"Choose Columns\" in Meds & Orders section of the refill encounter.              Passed - Patient is age 18 or older        Passed - No active pregnancy on record        Passed - No positive pregnancy test in past 12 months        "

## 2020-02-14 ENCOUNTER — TELEPHONE (OUTPATIENT)
Dept: FAMILY MEDICINE | Facility: CLINIC | Age: 70
End: 2020-02-14

## 2020-02-14 DIAGNOSIS — Z79.899 OTHER LONG TERM (CURRENT) DRUG THERAPY: ICD-10-CM

## 2020-02-14 DIAGNOSIS — F11.90 CHRONIC, CONTINUOUS USE OF OPIOIDS: Primary | ICD-10-CM

## 2020-02-14 NOTE — TELEPHONE ENCOUNTER
Reason for Call:  Request for results:    Name of test or procedure: full metabolic panel     Date of test of procedure: 01/06/20      Location of the test or procedure[facility]     OK to leave the result message on voice mail or with a family member? YES    Phone number Patient can be reached at:  Cell number on file:    Telephone Information:   Mobile [phone]       Additional comments: any     Call taken on 2/14/2020 at 2:03 PM by Teena Alvarez

## 2020-02-14 NOTE — TELEPHONE ENCOUNTER
PCP,    Called pt  She was looking for Urine drugs screen results from 1/6. Appears order was cancelled and never ran. Would you like her to provide another sample.  She states she is happy to if you'd like her to.    Also- she is waiting for the results of her colonoscopy    Thank you,  Pablito LIEBERMAN RN

## 2020-02-14 NOTE — TELEPHONE ENCOUNTER
Yes to urine    5 polyps, the gi doctor should get back to her as to when to follow up, either 3 or 5 years, none cancerous    Jonathan Cummins M.D.

## 2020-02-14 NOTE — TELEPHONE ENCOUNTER
Spoke to patient.    1) , do you want her to come in for a  urine drug screen?  She seemed to think you didn't feel it necessary.      2)  I expect that she should be receiving a result letter soon from Gastroenterology with official polyp results and follow up recommendations very soon. (gave her the polyp results; 3-5 year follow up is up to gastro)      She will call them if no response within the next week or so.  Nafisa Garcia RN

## 2020-02-19 NOTE — TELEPHONE ENCOUNTER
I phoned pt and relayed 's comments.  Schedule lab only this Friday for Urine Drug Screen.  Order is in Epic

## 2020-02-19 NOTE — TELEPHONE ENCOUNTER
It is [facility] policy with regards to long -term use of opioids, so yes she should come in for UDS

## 2020-02-20 ENCOUNTER — TRANSFERRED RECORDS (OUTPATIENT)
Dept: HEALTH INFORMATION MANAGEMENT | Facility: CLINIC | Age: 70
End: 2020-02-20

## 2020-02-21 DIAGNOSIS — Z79.899 OTHER LONG TERM (CURRENT) DRUG THERAPY: ICD-10-CM

## 2020-02-21 DIAGNOSIS — F11.90 CHRONIC, CONTINUOUS USE OF OPIOIDS: ICD-10-CM

## 2020-02-21 LAB
AMPHETAMINES UR QL: NOT DETECTED NG/ML
BARBITURATES UR QL SCN: NOT DETECTED NG/ML
BENZODIAZ UR QL SCN: NOT DETECTED NG/ML
BUPRENORPHINE UR QL: NOT DETECTED NG/ML
CANNABINOIDS UR QL: NOT DETECTED NG/ML
COCAINE UR QL SCN: NOT DETECTED NG/ML
D-METHAMPHET UR QL: NOT DETECTED NG/ML
METHADONE UR QL SCN: NOT DETECTED NG/ML
OPIATES UR QL SCN: NOT DETECTED NG/ML
OXYCODONE UR QL SCN: NOT DETECTED NG/ML
PCP UR QL SCN: NOT DETECTED NG/ML
PROPOXYPH UR QL: NOT DETECTED NG/ML
TRICYCLICS UR QL SCN: NOT DETECTED NG/ML

## 2020-02-21 PROCEDURE — 80306 DRUG TEST PRSMV INSTRMNT: CPT | Performed by: INTERNAL MEDICINE

## 2020-02-23 NOTE — RESULT ENCOUNTER NOTE
The following letter pertains to your most recent diagnostic tests:    This is a copy of your urine drug screen.  No unexpected findings were detected.        Sincerely,    Dr. Cruz

## 2020-03-10 DIAGNOSIS — M54.50 LOW BACK PAIN WITHOUT SCIATICA, UNSPECIFIED BACK PAIN LATERALITY, UNSPECIFIED CHRONICITY: ICD-10-CM

## 2020-03-10 NOTE — TELEPHONE ENCOUNTER
Reason for Call:  Medication or medication refill:    Do you use a [facility]?  Name of the pharmacy and phone number for the current request:  [facility] #[id number] - [place], [place] - [address] AT [facility] [address]    Name of the medication requested:   HYDROcodone-acetaminophen (NORCO) 5-325 MG tablet  20 tablet          Other request:  Please refill rx    Can we leave a detailed message on this number? YES    Phone number patient can be reached at: Home number on file [phone] (home)    Best Time: anhy    Call taken on 3/10/2020 at 6:48 PM by Patrick Funes

## 2020-03-11 RX ORDER — HYDROCODONE BITARTRATE AND ACETAMINOPHEN 5; 325 MG/1; MG/1
1 TABLET ORAL DAILY PRN
Qty: 20 TABLET | Refills: 0 | Status: SHIPPED | OUTPATIENT
Start: 2020-03-11 | End: 2020-04-22

## 2020-03-11 NOTE — TELEPHONE ENCOUNTER
Controlled Substance Refill Request for   HYDROcodone-acetaminophen (NORCO) 5-325 MG tablet  20 tablet  0  2/7/2020      Problem List Complete:    Yes    Last Written Prescription Date:  2/7/2020  Last Fill Quantity: 20,   # refills: 0    THE MOST RECENT OFFICE VISIT MUST BE WITHIN THE PAST 3 MONTHS. AT LEAST ONE FACE TO FACE VISIT MUST OCCUR EVERY 6 MONTHS. ADDITIONAL VISITS CAN BE VIRTUAL.  (THIS STATEMENT SHOULD BE DELETED.)    Last Office Visit with [facility] primary care provider: 2/6/2020    Future Office visit:   Next 5 appointments (look out 90 days)    Mar 23, 2020 12:30 PM CDT  Office Visit with Vladislav [facility] MD  [facility] [address] [facility] [address]  [phone]          Controlled substance agreement:   Encounter-Level CSA - 04/26/2018:    Controlled Substance Agreement - Scan on 5/2/2018 10:48 AM: CONTROLLED SUBSTANCE AGREEMENT     Patient-Level CSA:    There are no patient-level csa.         Last Urine Drug Screen: No results found for: CDAUT, No results found for: COMDAT,   Cannabinoids (50-udy-1-carboxy-9-THC)   Date Value Ref Range Status   02/21/2020 Not Detected NDET^Not Detected ng/mL Final     Comment:     Cutoff for a negative cannabinoid is 50 ng/mL or less.     Phencyclidine (Phencyclidine)   Date Value Ref Range Status   02/21/2020 Not Detected NDET^Not Detected ng/mL Final     Comment:     Cutoff for a negative PCP is 25 ng/mL or less.     Cocaine (Benzoylecgonine)   Date Value Ref Range Status   02/21/2020 Not Detected NDET^Not Detected ng/mL Final     Comment:     Cutoff for a negative cocaine is 150 ng/ml or less.     Methamphetamine (d-Methamphetamine)   Date Value Ref Range Status   02/21/2020 Not Detected NDET^Not Detected ng/mL Final     Comment:     Cutoff for a negative methamphetamine is 500 ng/ml or less.     Opiates (Morphine)   Date Value Ref Range Status   02/21/2020 Not Detected NDET^Not Detected ng/mL Final     Comment:      Cutoff for a negative opiate is 100 ng/ml or less.     Amphetamine (d-Amphetamine)   Date Value Ref Range Status   02/21/2020 Not Detected NDET^Not Detected ng/mL Final     Comment:     Cutoff for a negative amphetamine is 500 ng/mL or less.     Benzodiazepines (Nordiazepam)   Date Value Ref Range Status   02/21/2020 Not Detected NDET^Not Detected ng/mL Final     Comment:     Cutoff for a negative benzodiazepine is 150 ng/ml or less.     Tricyclic Antidepressants (Desipramine)   Date Value Ref Range Status   02/21/2020 Not Detected NDET^Not Detected ng/mL Final     Comment:     Cutoff for a negative tricyclic antidepressant is 300 ng/ml or less.     Methadone (Methadone)   Date Value Ref Range Status   02/21/2020 Not Detected NDET^Not Detected ng/mL Final     Comment:     Cutoff for a negative methadone is 200 ng/ml or less.     Barbiturates (Butalbital)   Date Value Ref Range Status   02/21/2020 Not Detected NDET^Not Detected ng/mL Final     Comment:     Cutoff for a negative barbituate is 200 ng/ml or less.     Oxycodone (Oxycodone)   Date Value Ref Range Status   02/21/2020 Not Detected NDET^Not Detected ng/mL Final     Comment:     Cutoff for a negative Oxycodone is 100 ng/mL or less.     Propoxyphene (Norpropoxyphene)   Date Value Ref Range Status   02/21/2020 Not Detected NDET^Not Detected ng/mL Final     Comment:     Cutoff for a negative propoxyphene is 300 ng/ml or less     Buprenorphine (Buprenorphine)   Date Value Ref Range Status   02/21/2020 Not Detected NDET^Not Detected ng/mL Final     Comment:     Cutoff for a negative buprenorphine is 10 ng/ml or less        Processing:  Rx to be electronically transmitted to pharmacy by provider     [email or web address]   checked in past 3 months?  No, route to RN (12/31/2019)

## 2020-03-18 NOTE — PROGRESS NOTES
"Svetlana Adair is a 69 year old female who is being evaluated via a telephone visit.      The patient has been notified of following (by DANIEL Hidalgo CMA      \"We have found that certain health care needs can be provided without the need for a physical exam.  This service lets us provide the care you need with a short phone conversation.  If a prescription is necessary we can send it directly to your pharmacy.  If lab work is needed we can place an order for that and you can then stop by our lab to have the test done at a later time.    This telephone visit will be conducted via 3 way call with the you (the patient) , the physician/provider, and a me all on the line at the same time.  This allows your physician/provider to have the phone conversation with you while I will be taking notes for your medical record.  We will have full access to your [facility] medical record during this entire phone call.    Since this is like an office visit,  will bill your insurance company for this service.  Please check with your medical insurance if this type of telephone/virtual is covered . You may be responsible for the cost of this service if insurance coverage is denied.  The typical cost is $30 (10min), $59(11-20min) and $85 (21-30min)     If during the course of the call the physician/provider feels a telephone visit is not appropriate, you will not be charged for this service\"    Consent has been obtained for this service by care team member: yes.  See the scanned image in the medical record.    S: The history as provided by the patient to the provider during this 3 way call include management of depression, anxiety and chronic pain    Pertinent parts of the the patient's medical history reviewed and confirmed by the provider included : History of depression, chronic pain, breast cancer, nicotine dependence, hyperlipidemia, gastroesophageal reflux disease    Total time of call between patient and provider was 9:30 " minutes     Sincerely,    Vladislav Cruz MD   (MD signature)  ===================================================    I have reviewed the note as documented above.  This accurately captures the substance of my conversation with the patient    Assessment/Plan:         Adjustment disorder with mixed anxiety and depressed mood  Chronic, continuous use of opioids  Low back pain, unspecified back pain laterality, unspecified chronicity, unspecified whether sciatica present       At her previous visit she noted that Cymbalta was making her mood worse.  She was to stop it and start Wellbutrin.  She Cymbalta, but did not start Wellbutrin so that she could meet with a psychiatrist without any drugs on board.  Despite stopping Cymbalta, she claims that her mood is holding up even in this time of crisis with a viral pandemic.  She admits that she feels less motivated to walk for exercise.  However, she does not feel suicidal and all things considered states that her mood has been fairly good.  She has noted that her osteoarthritis pain in her back and knees has been worse since stopping Cymbalta.  She wonders if she could restart Cymbalta at a lower dose i.e. 30 mg once daily to help with her arthritis symptoms but not necessarily to help with her mood?  She does have an appointment with a psychiatrist at the end of April.  After discussion, we decided to restart Cymbalta at a dose of 30 mg daily mostly for pain benefit rather than mood benefit.  We agreed to not start the Wellbutrin.  We agreed to keep the appointment with a psychiatrist at the end of April.  She will call me if she has worsening symptoms or new symptoms that develop between then and now.

## 2020-03-23 ENCOUNTER — VIRTUAL VISIT (OUTPATIENT)
Dept: FAMILY MEDICINE | Facility: CLINIC | Age: 70
End: 2020-03-23
Payer: MEDICARE

## 2020-03-23 DIAGNOSIS — M54.50 LOW BACK PAIN, UNSPECIFIED BACK PAIN LATERALITY, UNSPECIFIED CHRONICITY, UNSPECIFIED WHETHER SCIATICA PRESENT: ICD-10-CM

## 2020-03-23 DIAGNOSIS — F43.23 ADJUSTMENT DISORDER WITH MIXED ANXIETY AND DEPRESSED MOOD: Primary | ICD-10-CM

## 2020-03-23 DIAGNOSIS — F11.90 CHRONIC, CONTINUOUS USE OF OPIOIDS: ICD-10-CM

## 2020-03-23 PROCEDURE — G2012 BRIEF CHECK IN BY MD/QHP: HCPCS | Performed by: INTERNAL MEDICINE

## 2020-03-23 RX ORDER — DULOXETIN HYDROCHLORIDE 30 MG/1
30 CAPSULE, DELAYED RELEASE ORAL DAILY
Qty: 90 CAPSULE | Refills: 3 | COMMUNITY
Start: 2020-03-23 | End: 2020-07-01

## 2020-04-20 DIAGNOSIS — M54.50 LOW BACK PAIN WITHOUT SCIATICA, UNSPECIFIED BACK PAIN LATERALITY, UNSPECIFIED CHRONICITY: ICD-10-CM

## 2020-04-20 NOTE — TELEPHONE ENCOUNTER
Reason for Call:  Medication or medication refill:    Do you use a [facility]?  Name of the pharmacy and phone number for the current request:  [facility] #[id number] - [place], [place] - [address] AT [facility] [address]    Name of the medication requested: HYDROcodone-acetaminophen (NORCO) 5-325 MG tablet    Other request:     Can we leave a detailed message on this number? YES    Phone number patient can be reached at: Cell number on file:    Telephone Information:   Mobile [phone]       Best Time:     Call taken on 4/20/2020 at 10:11 AM by Kalpana Yates

## 2020-04-21 NOTE — TELEPHONE ENCOUNTER
Controlled Substance Refill Request for HYDROcodone-acetaminophen (NORCO) 5-325 MG tablet   Last Written Prescription Date:  3/11/20  Last Fill Quantity: 20,  # refills: 0   Last office visit: 3/23/2020 with prescribing provider:  Anthony (Virtual)   Future Office Visit:   Next 5 appointments (look out 90 days)    May 12, 2020 11:00 AM CDT  Telephone Visit with Taiwo Anthony PA-C  [facility] [address] [place] [address] [phone] OPT 2           Problem List Complete:  Yes   checked in past 3 months?  No, route to RN      Chronic, continuous use of opioids   Problem Detail     Noted:  4/26/2018    Priority:  Medium    Overview Addendum 12/31/2019 11:56 AM by Emily Joseph RN    Patient is followed by Vladislav Cruz MD for ongoing prescription of pain medication.  All refills should only be approved by this provider, or covering partner.     Medication(s): norco 5/325.   Maximum quantity per month: #20  Clinic visit frequency required: Q 6  months      Controlled substance agreement:      Encounter-Level CSA:      There are no encounter-level csa.          Pain Clinic evaluation in the past: No     DIRE Total Score(s):  No flowsheet data found.     Last [facility] website verification: 12/31/19    [email or web address]

## 2020-04-21 NOTE — TELEPHONE ENCOUNTER
Routing refill request to provider for review/approval because:   checked, no concerns.   Dana Roberson RN

## 2020-04-22 ENCOUNTER — TELEPHONE (OUTPATIENT)
Dept: PHARMACY | Facility: CLINIC | Age: 70
End: 2020-04-22

## 2020-04-22 RX ORDER — HYDROCODONE BITARTRATE AND ACETAMINOPHEN 5; 325 MG/1; MG/1
1 TABLET ORAL DAILY PRN
Qty: 20 TABLET | Refills: 0 | Status: SHIPPED | OUTPATIENT
Start: 2020-04-22 | End: 2020-06-03

## 2020-04-22 NOTE — TELEPHONE ENCOUNTER
This patient is due for [place] follow-up. I called the patient to schedule an appointment and left a message with the clinic phone number for the patient to call to schedule.    Adriana Hernandez, PharmD  PGY1 Medication Therapy Management Resident   [phone]

## 2020-04-28 ENCOUNTER — TELEPHONE (OUTPATIENT)
Dept: CARDIOLOGY | Facility: CLINIC | Age: 70
End: 2020-04-28

## 2020-04-28 NOTE — TELEPHONE ENCOUNTER
Message sent to [facility] - KEEP FLP scheduled 5-7-2020 or Postpone?  5- F/U visit for history is notable for high calcium score of 790, former tobacco user, and hyperlipidemia

## 2020-04-29 ENCOUNTER — VIRTUAL VISIT (OUTPATIENT)
Dept: PHARMACY | Facility: CLINIC | Age: 70
End: 2020-04-29
Payer: COMMERCIAL

## 2020-04-29 DIAGNOSIS — R19.7 DIARRHEA, UNSPECIFIED TYPE: Primary | ICD-10-CM

## 2020-04-29 DIAGNOSIS — Z78.9 TAKES DIETARY SUPPLEMENTS: ICD-10-CM

## 2020-04-29 DIAGNOSIS — F32.0 MILD MAJOR DEPRESSION (H): ICD-10-CM

## 2020-04-29 DIAGNOSIS — C50.911 MALIGNANT NEOPLASM OF RIGHT FEMALE BREAST, UNSPECIFIED ESTROGEN RECEPTOR STATUS, UNSPECIFIED SITE OF BREAST (H): ICD-10-CM

## 2020-04-29 DIAGNOSIS — Z87.891 PAST USE OF TOBACCO: ICD-10-CM

## 2020-04-29 DIAGNOSIS — I65.22 ATHEROSCLEROSIS OF LEFT CAROTID ARTERY: ICD-10-CM

## 2020-04-29 DIAGNOSIS — M54.2 NECK PAIN ON RIGHT SIDE: ICD-10-CM

## 2020-04-29 DIAGNOSIS — B00.1 COLD SORE: ICD-10-CM

## 2020-04-29 PROCEDURE — 99607 MTMS BY PHARM ADDL 15 MIN: CPT | Mod: GT | Performed by: PHARMACIST

## 2020-04-29 PROCEDURE — 99605 MTMS BY PHARM NP 15 MIN: CPT | Mod: GT | Performed by: PHARMACIST

## 2020-04-29 RX ORDER — LACTOBACILLUS RHAMNOSUS GG 10B CELL
1 CAPSULE ORAL DAILY
COMMUNITY
End: 2023-07-31

## 2020-04-29 RX ORDER — VITS A,C,E/LUTEIN/MINERALS 300MCG-200
1 TABLET ORAL DAILY
COMMUNITY
End: 2022-06-28

## 2020-04-29 NOTE — PROGRESS NOTES
MTM ENCOUNTER  SUBJECTIVE/OBJECTIVE:                           Svetlana Adair is a 69 year old female contacted via secure video for a follow-up visit. She was referred to me from Dr. Cruz.  Today's visit is a follow-up MTM visit from 3/28/19, this will serve as an initial visit for 2020.     Patient consented to a telehealth visit: yes  Telemedicine Visit Details  Type of service:  Video visit  Start Time: 9:26 AM  End Time: 10:06 AM  Originating Location (pt. Location): Home  Distant Location (provider location):  [facility] [place]  Mode of Communication:  Video Conference via [employer]    Chief Complaint: Medication review, no specific questions or concerns today.    Tobacco:  reports that she quit smoking about 10 years ago. Her smoking use included cigarettes. She has a 22.00 pack-year smoking history. She has never used smokeless tobacco. Still using NRT - see below.  Alcohol: not currently using    Medication Adherence/Access: Pt forgot to take her aspirin - see below    Diarrhea:  Current medication includes a probiotic daily. She finds this very effective at preventing runny stools. She is no longer having diarrhea, but does continue having loose stools. No medication side effects noted.     Smoking Cessation:  Current medications include nicotine 2 mg gum. Svetlana quit smoking years ago, but has continued using NRT (gum) since that time.  She feels she's doing ok and plans to taper off NRT as she's able, although does not feel like now is a good time given extra stress due to COVID-19 and her job as a care provider for elderly people. She does continue to chew about 1 piece of gum every hour, but reports she doesn't chew this for very long. No medication side effects noted, aside from slight appetite suppression.      Breast Cancer: Currently taking letrozole 2.5mg daily, and says she'll be on this for a total of 10 years.  She would prefer to not take but understands risks of doing so,  so plans to continue.  She has noticed that her nails are weak and her hair has thinned, which she attributes to letrozole therapy. She did not opt to try biotin to help with hair/nail strength.  She also receives zoledronic acid every 6 months and reports this is going well - was supposed to get this in April but it has been postponed to June due to COVID-19.      CAD: Current therapy includes ASA 81mg daily and rosuvastatin 10mg once daily.  She reports that she notices some short term memory changes on rosuvastatin, but understands the risks of not taking this. She admits she had forgot about the baby aspirin, and hasn't been taking recently. She does have this available to her at home.     The 10-year ASCVD risk score (Eddie PINEDA Jr., et al., 2013) is: 6.2%    Values used to calculate the score:      Age: 69 years      Sex: Female      Is Non- : No      Diabetic: No      Tobacco smoker: No      Systolic Blood Pressure: 135 mmHg      Is BP treated: No      HDL Cholesterol: 85 mg/dL      Total Cholesterol: 171 mg/dL     LDL Cholesterol Calculated   Date Value Ref Range Status   11/21/2019 69 <100 mg/dL Final     Comment:     Desirable:       <100 mg/dl       Depression:  Current medications include: duloxetine 30mg daily. Pt was previously taking duloxetine 60 mg, which she felt was worsening her mood and she was tapered off of this with the plan to start bupropion. Bupropion was never started, as pt wanted to start seeing psychiatry without any medications on board. She then noticed a worsening in her osteoarthritis pain, and requested that she restart duloxetine at a lower dose. Pt reports that her pain has improved since restarting duloxetine at 30 mg daily, primarily her arthritis pain. Depression symptoms are also improved with this change. She denies side effects of therapy.  She had a scheduled visit with psychiatry tomorrow, pt cancelled because she feels she is doing really well on  her current regimen and no longer sees a need to follow with psychiatry.  PHQ 6/6/2019 1/6/2020 2/6/2020   PHQ-9 Total Score 5 7 15   Q9: Thoughts of better off dead/self-harm past 2 weeks Not at all Not at all Not at all       Pain:  She's currently taking hydrocodone/APAP 1/2 tablet along with 200mg of ibuprofen occasionally.  She reports this has been effective for her and she denies side effects. She has been trying to cut back on ibuprofen, generally takes 1 tablet 3-4 times/week depending on her pain level.     Cold Sores:  She has Valtrex and l-lysine available for use if needed, which she finds effective.  She denies side effects.  Hasn't needed to use either lately - last use was in February.     Supplements:  Svetlana has an extensive supplement list - see med list for more details. She requests that I add the following new supplements to her medication list: Bee Pollen, Ashwaghanda, Zymex, Yin Chiao. Also, through medication reconciliation MTM determined she is also taking Vitamin C/Magnesium/Copper/Zinc/manganese supplement, Vitamin D/Calcium supplement, and a probiotic as noted previously.  She finds these effective and prefers to continue taking.      Today's Vitals: There were no vitals taken for this visit. - video visit    ASSESSMENT:                            Medication Adherence: fair, needs improvement - see below     Diarrhea: Improved.  Pt has found daily probiotic helpful.      Smoking Cessation: Encouraged her to taper off nicotine gum, as she is able.     Breast Cancer: Stable.     CAD: Pt would benefit from restarting aspirin 81 mg daily.      Depression: Improved on duloxetine 30 mg daily.     Pain:  Stable. Encouraged pt to continue working to reduce ibuprofen use.      Cold Sores:  Stable.     Supplements:  Stable, as we've discussed before it's questionable how much benefit she's getting, and she's aware of potential risks/side effects. Updated her medication list with her new  supplements.     PLAN:                            1. Restart aspirin 81 mg daily.     2. Continue working to taper off of NRT.     I spent 40 minutes with this patient today. All changes were made via collaborative practice agreement with Dr. Cruz. A copy of the visit note was provided to the patient's primary care provider.    Will follow up in 1 year, sooner if needed.    The patient declined a summary of these recommendations.     Adriana Hernandez PharmD  PGY1 Medication Therapy Management Resident   [phone]    The patient was seen independently by Dr. Hernandez.  I have read the note and agree with the assessment and plan.  Janice Tierney, Jovi, [facility]  Medication Therapy Management Provider  Pager: [phone]

## 2020-05-04 ENCOUNTER — TELEPHONE (OUTPATIENT)
Dept: CARDIOLOGY | Facility: CLINIC | Age: 70
End: 2020-05-04

## 2020-05-04 DIAGNOSIS — E78.2 MIXED HYPERLIPIDEMIA: ICD-10-CM

## 2020-05-04 RX ORDER — ROSUVASTATIN CALCIUM 10 MG/1
10 TABLET, COATED ORAL DAILY
Qty: 90 TABLET | Refills: 0 | Status: SHIPPED | OUTPATIENT
Start: 2020-05-04 | End: 2020-07-28

## 2020-05-04 NOTE — TELEPHONE ENCOUNTER
Patient called to reschedule [facility] PATIENCE Visit 5-. Attempted to contact Patient, message left to please call scheduling to postpone or change visit. Time.  Scheduling phone number given.

## 2020-05-07 DIAGNOSIS — E78.5 HYPERLIPIDEMIA LDL GOAL <70: ICD-10-CM

## 2020-05-07 LAB
CHOLEST SERPL-MCNC: 166 MG/DL
HDLC SERPL-MCNC: 83 MG/DL
LDLC SERPL CALC-MCNC: 69 MG/DL
NONHDLC SERPL-MCNC: 83 MG/DL
TRIGL SERPL-MCNC: 72 MG/DL

## 2020-05-07 PROCEDURE — 36415 COLL VENOUS BLD VENIPUNCTURE: CPT | Performed by: INTERNAL MEDICINE

## 2020-05-07 PROCEDURE — 80061 LIPID PANEL: CPT | Performed by: INTERNAL MEDICINE

## 2020-05-14 ENCOUNTER — VIRTUAL VISIT (OUTPATIENT)
Dept: CARDIOLOGY | Facility: CLINIC | Age: 70
End: 2020-05-14
Payer: MEDICARE

## 2020-05-14 DIAGNOSIS — R93.1 HIGH CORONARY ARTERY CALCIUM SCORE: ICD-10-CM

## 2020-05-14 DIAGNOSIS — E78.2 MIXED HYPERLIPIDEMIA: Primary | ICD-10-CM

## 2020-05-14 PROCEDURE — 99441 ZZC PHYSICIAN TELEPHONE EVALUATION 5-10 MIN GOVT: CPT | Performed by: PHYSICIAN ASSISTANT

## 2020-05-14 NOTE — PROGRESS NOTES
"Svetlana Adair is a 69 year old female who is being evaluated via a billable telephone visit.      The patient has been notified of following:     \"This telephone visit will be conducted via a call between you and your physician/provider. We have found that certain health care needs can be provided without the need for a physical exam.  This service lets us provide the care you need with a short phone conversation.  If a prescription is necessary we can send it directly to your pharmacy.  If lab work is needed we can place an order for that and you can then stop by our lab to have the test done at a later time.    Telephone visits are billed at different rates depending on your insurance coverage. During this emergency period, for some insurers they may be billed the same as an in-person visit.  Please reach out to your insurance provider with any questions.    If during the course of the call the physician/provider feels a telephone visit is not appropriate, you will not be charged for this service.\"    Patient has given verbal consent for Telephone visit?  Yes    What phone number would you like to be contacted at? [phone]    How would you like to obtain your AVS? MyChart  Vital signs reported by patient: Unable to assess  Review Of Systems  Skin: NEGATIVE  Eyes:Ears/Nose/Throat: NEGATIVE  Respiratory: NEGATIVE  Cardiovascular:palpitations  Gastrointestinal: NEGATIVE  Genitourinary:NEGATIVE   Musculoskeletal: NEGATIVE  Neurologic: NEGATIVE  Psychiatric: NEGATIVE  Hematologic/Lymphatic/Immunologic: NEGATIVE  Endocrine:  NEGATIVE  Anitra Lott Lpn    -----------------------------------------------------------------------------------------------------------    Primary Cardiologist: Dr. Perez    Reason for Phone Visit: 6 month follow up    HPI:  This is a very pleasant 69-year-old female with medical history is notable for high calcium score of 790 (negative nuclear stress scan) , former tobacco user, " and hyperlipidemia.     She appears to be doing well with no symptoms of CP, SOB, palpitations, peripheral edema, PND, orthopnea, lightheadedness, or bleeding issues. She tries to exercise regularly.     A/P:   This is a very pleasant 69-year-old female with medical history is notable for high calcium score of 790 (negative nuclear stress scan) , former tobacco user, and hyperlipidemia.     She is doing well. Her lipid panel last week shows good control of her numbers. We will not make any medications changes at this time.     Call Duration: 5 minutes    This visit is being conducted as a virtual visit due to the emphasis on mitigation of the COVID-19 virus pandemic. The clinician has decided that the risk of an in-office visit outweighs the benefit for this patient.     Taiwo Anthony PA-C   5/14/2020  Pager: [phone]

## 2020-05-14 NOTE — LETTER
5/14/2020      RE: Svetlana Adair  [address]  [place] [address]       Dear Colleague,    Thank you for the opportunity to participate in the care of your patient, Svetlana Adair, at the [facility]. Please see a copy of my visit note below.    Primary Cardiologist: Dr. Perez    Reason for Phone Visit: 6 month follow up    HPI:  This is a very pleasant 69-year-old female with medical history is notable for high calcium score of 790 (negative nuclear stress scan) , former tobacco user, and hyperlipidemia.     She appears to be doing well with no symptoms of CP, SOB, palpitations, peripheral edema, PND, orthopnea, lightheadedness, or bleeding issues. She tries to exercise regularly.     A/P:   This is a very pleasant 69-year-old female with medical history is notable for high calcium score of 790 (negative nuclear stress scan) , former tobacco user, and hyperlipidemia.     She is doing well. Her lipid panel last week shows good control of her numbers. We will not make any medications changes at this time.     Call Duration: 5 minutes    This visit is being conducted as a virtual visit due to the emphasis on mitigation of the COVID-19 virus pandemic. The clinician has decided that the risk of an in-office visit outweighs the benefit for this patient.     Taiwo Anthony PA-C   5/14/2020  Pager: [phone]    Please do not hesitate to contact me if you have any questions/concerns.     Sincerely,     Taiwo Anthony PA-C

## 2020-05-14 NOTE — PATIENT INSTRUCTIONS
Today's Plan:   1) Cholesterol numbers are great. No medication changes.   2) Follow up in 6 months.    If you have questions or concerns please call my nurse team at [phone].     Scheduling phone number: [phone]  Reminder: Please bring in all current medications, over the counter supplements and vitamin bottles to your next appointment.    It was a pleasure seeing you today!     Taiwo Anthony PA-C  5/14/2020

## 2020-06-01 ENCOUNTER — NURSE TRIAGE (OUTPATIENT)
Dept: NURSING | Facility: CLINIC | Age: 70
End: 2020-06-01

## 2020-06-01 DIAGNOSIS — Z11.59 SCREENING FOR VIRAL DISEASE: Primary | ICD-10-CM

## 2020-06-01 DIAGNOSIS — F11.90 CHRONIC, CONTINUOUS USE OF OPIOIDS: ICD-10-CM

## 2020-06-01 DIAGNOSIS — M54.50 LOW BACK PAIN WITHOUT SCIATICA, UNSPECIFIED BACK PAIN LATERALITY, UNSPECIFIED CHRONICITY: ICD-10-CM

## 2020-06-01 NOTE — TELEPHONE ENCOUNTER
Reason for Call:  Medication or medication refill:    Do you use a [facility]?  Name of the pharmacy and phone number for the current request:  [id number] - [place], [place] - [address] AT [facility] [address]    Name of the medication requested: HYDROcodone-acetaminophen (NORCO) 5-325 MG tablet     Other request:     Can we leave a detailed message on this number? YES    Phone number patient can be reached at: Cell number on file:    Telephone Information:   Mobile [phone]     Best Time: any    Call taken on 6/1/2020 at 1:13 PM by Rachele Morrissey

## 2020-06-01 NOTE — TELEPHONE ENCOUNTER
"Patient is calling requesting COVID serologic antibody testing.  NOTE: Serologic testing is a blood test for 'antibodies' which are made at 10-14 days after you have had symptoms of COVID or were exposed and had an asymptomatic infection.  This does NOT test you for 'active' infection or tell you if you are contagious.    Are you a healthcare worker?  Yes  Do you work for [employer]?   No  Do you currently have a cough, fever, body aches, shortness of breath, or difficulty breathing?  No  Have you been exposed to (or come into close contact with) someone who tested POSITIVE for COVID-19 or someone who had a possible case of COVID-19?    Confirmed exposure > 14 days ago.  Lab order placed per SARS-CoV-2 Serology test Standing Order using indication \"Exposed to known COVID >14 days ago\" and diagnosis code  \"Exposure to COVID-19 Virus\" (Z20.828)    The patient was informed: \"Testing is limited each day and it may take time for testing to be available to everyone who has called. You will receive a call within 48-72 hours to schedule the serology testing. Please confirm the best number to reach you is [phone]. If you have any questions about scheduling, call [phone].\"     "

## 2020-06-02 NOTE — TELEPHONE ENCOUNTER
Hydrocodone-APAP 5-325mg      Last Written Prescription Date:  4/22/2020   Last Fill Quantity: 20,   # refills: 0  Last Office Visit: 3/23/2020   Future Office visit:       Routing refill request to provider for review/approval because:  Drug not on the [facility], U[facility] or  Health refill protocol or controlled substance    Last [facility] website verification: 4/21/20 es   [email or web address]    Jasmyn RAZA RN

## 2020-06-03 RX ORDER — HYDROCODONE BITARTRATE AND ACETAMINOPHEN 5; 325 MG/1; MG/1
1 TABLET ORAL DAILY PRN
Qty: 20 TABLET | Refills: 0 | Status: SHIPPED | OUTPATIENT
Start: 2020-06-03 | End: 2020-07-09

## 2020-06-05 DIAGNOSIS — Z11.59 SCREENING FOR VIRAL DISEASE: ICD-10-CM

## 2020-06-05 PROCEDURE — 36415 COLL VENOUS BLD VENIPUNCTURE: CPT | Performed by: INTERNAL MEDICINE

## 2020-06-05 PROCEDURE — 99000 SPECIMEN HANDLING OFFICE-LAB: CPT | Performed by: INTERNAL MEDICINE

## 2020-06-05 PROCEDURE — 86769 SARS-COV-2 COVID-19 ANTIBODY: CPT | Performed by: INTERNAL MEDICINE

## 2020-06-12 ENCOUNTER — COMMUNICATION - HEALTHEAST (OUTPATIENT)
Dept: SCHEDULING | Facility: CLINIC | Age: 70
End: 2020-06-12

## 2020-06-12 LAB
COVID-19 SPIKE RBD ABY TITER: NORMAL
COVID-19 SPIKE RBD ABY: POSITIVE

## 2020-06-12 NOTE — RESULT ENCOUNTER NOTE
Serology (COVID-19) Notification    You have tested POSITIVE for COVID-19 antibodies  Letter sent to patient

## 2020-06-23 ENCOUNTER — TRANSFERRED RECORDS (OUTPATIENT)
Dept: HEALTH INFORMATION MANAGEMENT | Facility: CLINIC | Age: 70
End: 2020-06-23

## 2020-07-09 ENCOUNTER — MYC REFILL (OUTPATIENT)
Dept: FAMILY MEDICINE | Facility: CLINIC | Age: 70
End: 2020-07-09

## 2020-07-09 DIAGNOSIS — M54.50 LOW BACK PAIN WITHOUT SCIATICA, UNSPECIFIED BACK PAIN LATERALITY, UNSPECIFIED CHRONICITY: ICD-10-CM

## 2020-07-10 RX ORDER — HYDROCODONE BITARTRATE AND ACETAMINOPHEN 5; 325 MG/1; MG/1
1 TABLET ORAL DAILY PRN
Qty: 20 TABLET | Refills: 0 | Status: SHIPPED | OUTPATIENT
Start: 2020-07-10 | End: 2020-08-17

## 2020-07-10 NOTE — TELEPHONE ENCOUNTER
Controlled Substance Refill Request for     HYDROcodone-acetaminophen (NORCO) 5-325 MG tablet  20 tablet  0  6/3/2020   No    Sig - Route: Take 1 tablet by mouth daily as needed for pain      Associated Diagnoses   Low back pain without sciatica, unspecified back pain laterality, unspecified chronicity [M54.5]         Last Written Prescription Date:  06/03/2020  Last Fill Quantity: 20,  # refills: 0   Last office visit: 2/6/2020 with prescribing provider:     Future Office Visit:      Problem List Complete:  Yes   Chronic, continuous use of opioids   Change Dx Resolve       Details  Code: F11.90  Sort Priority: Medium  Noted: 4/26/2018  Share w/ Pt: []       Overview  Edited:  Jasmyn Regan, RN  6/2/2020  Patient is followed by Vladislav Cruz MD for ongoing prescription of pain medication. All refills should only be approved by this provider, or covering partner.     Medication(s): norco 5/325.   Maximum quantity per month: #20  Clinic visit frequency required: Q 6 months      Controlled substance agreement:      Encounter-Level CSA:      There are no encounter-level csa.          Pain Clinic evaluation in the past: No     DIRE Total Score(s):  No flowsheet data found.     Last [facility] website verification: 4/21/20 es  [email or web address]                       checked in past 3 months?  Yes 04/21/2020

## 2020-07-28 DIAGNOSIS — E78.2 MIXED HYPERLIPIDEMIA: ICD-10-CM

## 2020-07-28 RX ORDER — ROSUVASTATIN CALCIUM 10 MG/1
10 TABLET, COATED ORAL DAILY
Qty: 90 TABLET | Refills: 1 | Status: SHIPPED | OUTPATIENT
Start: 2020-07-28 | End: 2021-01-07

## 2020-08-17 ENCOUNTER — MYC REFILL (OUTPATIENT)
Dept: FAMILY MEDICINE | Facility: CLINIC | Age: 70
End: 2020-08-17

## 2020-08-17 DIAGNOSIS — F11.90 CHRONIC, CONTINUOUS USE OF OPIOIDS: ICD-10-CM

## 2020-08-17 DIAGNOSIS — M54.50 LOW BACK PAIN WITHOUT SCIATICA, UNSPECIFIED BACK PAIN LATERALITY, UNSPECIFIED CHRONICITY: ICD-10-CM

## 2020-08-18 NOTE — TELEPHONE ENCOUNTER
,     Please call patient and schedule for an appointment.  If patient needs temporary supply, please route back to triage.    Thank you,  Emily BAINS RN

## 2020-08-19 RX ORDER — HYDROCODONE BITARTRATE AND ACETAMINOPHEN 5; 325 MG/1; MG/1
1 TABLET ORAL DAILY PRN
Qty: 20 TABLET | Refills: 0 | Status: SHIPPED | OUTPATIENT
Start: 2020-08-19 | End: 2020-08-24

## 2020-08-19 NOTE — TELEPHONE ENCOUNTER
Routing refill request to provider for review/approval because:  Drug not on the [facility] refill protocol     Last Written Prescription Date:  7-  Last Fill Quantity: 20,  # refills: 0   Last appointment  3- with prescribing provider:  Dr Cruz   Future Office Visit:  8-    RX [place] ([place]) reviewed:  reviewed- no concerns    [place] profile:  [email or web address]      Edith AMADOR RN,BSN

## 2020-08-19 NOTE — TELEPHONE ENCOUNTER
Reason for Call:  Other prescription    Detailed comments: Svetlana calling in to schedule appt. Scheduled vrt for 8/24/20 @ 10:30.    Please refill.    Phone Number Patient can be reached at: Cell number on file:    Telephone Information:   Mobile [phone]       Best Time: any    Can we leave a detailed message on this number? YES    Call taken on 8/19/2020 at 10:35 AM by Jerzy Donovan

## 2020-08-24 ENCOUNTER — VIRTUAL VISIT (OUTPATIENT)
Dept: FAMILY MEDICINE | Facility: CLINIC | Age: 70
End: 2020-08-24
Payer: MEDICARE

## 2020-08-24 DIAGNOSIS — M25.569 CHRONIC KNEE PAIN, UNSPECIFIED LATERALITY: ICD-10-CM

## 2020-08-24 DIAGNOSIS — Z17.0 MALIGNANT NEOPLASM OF AXILLARY TAIL OF RIGHT BREAST IN FEMALE, ESTROGEN RECEPTOR POSITIVE (H): Primary | ICD-10-CM

## 2020-08-24 DIAGNOSIS — C50.611 MALIGNANT NEOPLASM OF AXILLARY TAIL OF RIGHT BREAST IN FEMALE, ESTROGEN RECEPTOR POSITIVE (H): Primary | ICD-10-CM

## 2020-08-24 DIAGNOSIS — G89.29 CHRONIC KNEE PAIN, UNSPECIFIED LATERALITY: ICD-10-CM

## 2020-08-24 DIAGNOSIS — M54.2 NECK PAIN: ICD-10-CM

## 2020-08-24 DIAGNOSIS — F43.23 ADJUSTMENT DISORDER WITH MIXED ANXIETY AND DEPRESSED MOOD: ICD-10-CM

## 2020-08-24 DIAGNOSIS — F11.90 CHRONIC, CONTINUOUS USE OF OPIOIDS: ICD-10-CM

## 2020-08-24 DIAGNOSIS — M54.50 LOW BACK PAIN WITHOUT SCIATICA, UNSPECIFIED BACK PAIN LATERALITY, UNSPECIFIED CHRONICITY: ICD-10-CM

## 2020-08-24 PROCEDURE — 99213 OFFICE O/P EST LOW 20 MIN: CPT | Mod: 95 | Performed by: INTERNAL MEDICINE

## 2020-08-24 RX ORDER — HYDROCODONE BITARTRATE AND ACETAMINOPHEN 5; 325 MG/1; MG/1
1 TABLET ORAL DAILY PRN
Qty: 20 TABLET | Refills: 0 | Status: SHIPPED | OUTPATIENT
Start: 2020-08-24 | End: 2020-09-22

## 2020-08-24 ASSESSMENT — PATIENT HEALTH QUESTIONNAIRE - PHQ9: SUM OF ALL RESPONSES TO PHQ QUESTIONS 1-9: 6

## 2020-08-24 NOTE — PROGRESS NOTES
"Svetlana Adair is a 70 year old female who is being evaluated via a billable video visit.      The patient has been notified of following:     \"This video visit will be conducted via a call between you and your physician/provider. We have found that certain health care needs can be provided without the need for an in-person physical exam.  This service lets us provide the care you need with a video conversation.  If a prescription is necessary we can send it directly to your pharmacy.  If lab work is needed we can place an order for that and you can then stop by our lab to have the test done at a later time.    Video visits are billed at different rates depending on your insurance coverage.  Please reach out to your insurance provider with any questions.    If during the course of the call the physician/provider feels a video visit is not appropriate, you will not be charged for this service.\"    Patient has given verbal consent for Video visit? Yes  How would you like to obtain your AVS? MyChart  If you are dropped from the video visit, the video invite should be resent to: MyChart  Will anyone else be joining your video visit? No    Subjective     Svetlana Adair is a 70 year old female who presents today via video visit for the following health issues:    HPI    Medication Followup of hydrocodone-acetaminophen     Taking Medication as prescribed: yes    Side Effects:  None    Medication Helping Symptoms:  yes          Video Start Time: 10:43    She is using one half of a norco tablet per day on some days to manage chronic neck, back and knee pain  She takes it with over the counter ibuprofen with good pain relief  She asks for a refill  She does not get side effects   Her mood is improved on the higher dose of duloxetine  She probably had COVID in Jan or Feb   She had URI symptoms and a positive antibody test  She has felt non specific mild to moderate fatigue and 'brain fog' since then       Review of " Systems   Constitutional, HEENT, cardiovascular, pulmonary, gi and gu systems are negative, except as otherwise noted.      Objective           Vitals:  No vitals were obtained today due to virtual visit.    Physical Exam     GENERAL: Healthy, alert and no distress  PSYCH: Mentation appears normal, affect normal/bright, judgement and insight intact, normal speech and appearance well-groomed.              Assessment & Plan     Svetlana was seen today for recheck medication.    Diagnoses and all orders for this visit:    Malignant neoplasm of axillary tail of right breast in female, estrogen receptor positive (H)    Low back pain without sciatica, unspecified back pain laterality, unspecified chronicity  -     HYDROcodone-acetaminophen (NORCO) 5-325 MG tablet; Take 1 tablet by mouth daily as needed for pain    Neck pain    Chronic knee pain, unspecified laterality    Adjustment disorder with mixed anxiety and depressed mood    Chronic, continuous use of opioids    The benefits of the low dose of norco likely outweigh the risks  Check UDS when she returns in Feb  Continue follow up with oncology as directed  Mood is probably as good as at can be given the isolation and stress of COVID  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  .rej     Vladislav Cruz MD  [facility]      Video-Visit Details    Type of service:  Video Visit    Video End Time:11:10 AM    Originating Location (pt. Location): Home    Distant Location (provider location):  [facility]     Platform used for Video Visit: Nataliia

## 2020-09-22 ENCOUNTER — MYC REFILL (OUTPATIENT)
Dept: FAMILY MEDICINE | Facility: CLINIC | Age: 70
End: 2020-09-22

## 2020-09-22 DIAGNOSIS — M54.50 LOW BACK PAIN WITHOUT SCIATICA, UNSPECIFIED BACK PAIN LATERALITY, UNSPECIFIED CHRONICITY: ICD-10-CM

## 2020-09-23 RX ORDER — HYDROCODONE BITARTRATE AND ACETAMINOPHEN 5; 325 MG/1; MG/1
1 TABLET ORAL DAILY PRN
Qty: 20 TABLET | Refills: 0 | Status: SHIPPED | OUTPATIENT
Start: 2020-09-23 | End: 2020-11-18

## 2020-11-16 ENCOUNTER — TELEPHONE (OUTPATIENT)
Dept: CARDIOLOGY | Facility: CLINIC | Age: 70
End: 2020-11-16

## 2020-11-16 DIAGNOSIS — R00.2 PALPITATIONS: ICD-10-CM

## 2020-11-16 DIAGNOSIS — E78.5 HYPERLIPIDEMIA LDL GOAL <70: ICD-10-CM

## 2020-11-16 DIAGNOSIS — R53.83 FATIGUE: ICD-10-CM

## 2020-11-16 DIAGNOSIS — R93.1 HIGH CORONARY ARTERY CALCIUM SCORE: Primary | Chronic | ICD-10-CM

## 2020-11-16 NOTE — TELEPHONE ENCOUNTER
Call from patient, she states she had COVID-19 in February. Since then she has felt very fatigued and has had some palpitations again. She can't remember what they were like in 2017 to compare but did go for a long time without any sensations.  Patient also mentions many episodes of sharp pains in her thighs, calves, arms and once shooting up her back.   Patient wonders if she needs any testing?  She wonders if she should talk with PATIENCE Taiwo Anthony.     Reviewed with patient that PATIENCE Taiwo Anthony mentioned a 6 month follow up. She is due to a visit with Dr. Perez and lipids in April 2021.  Hx: CAD (calcium jafsm=482, 99th percentile), carotid plaque, hyperlipidemia       Will message PATIENCE Taiwo Anthony to review

## 2020-11-17 NOTE — TELEPHONE ENCOUNTER
Taiwo Anthony, Svetlana Carty RN 25 minutes ago (10:44 AM)     Sure we can do a virtual visit. I also recommend she discusses these symptoms with her PCP.     Taiwo        Spoke to patient and reviewed Taiwo's message as above. Pt verbalized understanding. Message to scheduling to arrange f/up.

## 2020-11-18 ENCOUNTER — MYC REFILL (OUTPATIENT)
Dept: FAMILY MEDICINE | Facility: CLINIC | Age: 70
End: 2020-11-18

## 2020-11-18 DIAGNOSIS — M54.50 LOW BACK PAIN WITHOUT SCIATICA, UNSPECIFIED BACK PAIN LATERALITY, UNSPECIFIED CHRONICITY: ICD-10-CM

## 2020-11-20 RX ORDER — HYDROCODONE BITARTRATE AND ACETAMINOPHEN 5; 325 MG/1; MG/1
1 TABLET ORAL DAILY PRN
Qty: 20 TABLET | Refills: 0 | Status: SHIPPED | OUTPATIENT
Start: 2020-11-20 | End: 2021-01-11

## 2021-01-07 DIAGNOSIS — E78.2 MIXED HYPERLIPIDEMIA: ICD-10-CM

## 2021-01-07 RX ORDER — ROSUVASTATIN CALCIUM 10 MG/1
10 TABLET, COATED ORAL DAILY
Qty: 90 TABLET | Refills: 0 | Status: SHIPPED | OUTPATIENT
Start: 2021-01-07 | End: 2021-04-12

## 2021-01-08 ASSESSMENT — ACTIVITIES OF DAILY LIVING (ADL): CURRENT_FUNCTION: NO ASSISTANCE NEEDED

## 2021-01-11 ENCOUNTER — OFFICE VISIT (OUTPATIENT)
Dept: FAMILY MEDICINE | Facility: CLINIC | Age: 71
End: 2021-01-11
Payer: MEDICARE

## 2021-01-11 VITALS
SYSTOLIC BLOOD PRESSURE: 125 MMHG | TEMPERATURE: 97.8 F | WEIGHT: 147.8 LBS | HEIGHT: 64 IN | BODY MASS INDEX: 25.23 KG/M2 | OXYGEN SATURATION: 98 % | HEART RATE: 65 BPM | DIASTOLIC BLOOD PRESSURE: 84 MMHG

## 2021-01-11 DIAGNOSIS — C50.611 MALIGNANT NEOPLASM OF AXILLARY TAIL OF RIGHT BREAST IN FEMALE, ESTROGEN RECEPTOR POSITIVE (H): ICD-10-CM

## 2021-01-11 DIAGNOSIS — F32.0 MILD MAJOR DEPRESSION (H): ICD-10-CM

## 2021-01-11 DIAGNOSIS — Z17.0 MALIGNANT NEOPLASM OF AXILLARY TAIL OF RIGHT BREAST IN FEMALE, ESTROGEN RECEPTOR POSITIVE (H): ICD-10-CM

## 2021-01-11 DIAGNOSIS — F10.21 ALCOHOL DEPENDENCE IN REMISSION (H): ICD-10-CM

## 2021-01-11 DIAGNOSIS — U07.1 INFECTION DUE TO 2019 NOVEL CORONAVIRUS: ICD-10-CM

## 2021-01-11 DIAGNOSIS — Z87.891 PERSONAL HISTORY OF TOBACCO USE: ICD-10-CM

## 2021-01-11 DIAGNOSIS — Z00.00 ROUTINE GENERAL MEDICAL EXAMINATION AT A HEALTH CARE FACILITY: Primary | ICD-10-CM

## 2021-01-11 DIAGNOSIS — R53.83 OTHER FATIGUE: ICD-10-CM

## 2021-01-11 DIAGNOSIS — M54.2 NECK PAIN ON RIGHT SIDE: ICD-10-CM

## 2021-01-11 DIAGNOSIS — E21.3 HYPERPARATHYROIDISM (H): ICD-10-CM

## 2021-01-11 LAB
ERYTHROCYTE [DISTWIDTH] IN BLOOD BY AUTOMATED COUNT: 13.1 % (ref 10–15)
HCT VFR BLD AUTO: 40.6 % (ref 35–47)
HGB BLD-MCNC: 13.6 G/DL (ref 11.7–15.7)
MCH RBC QN AUTO: 32.6 PG (ref 26.5–33)
MCHC RBC AUTO-ENTMCNC: 33.5 G/DL (ref 31.5–36.5)
MCV RBC AUTO: 97 FL (ref 78–100)
PLATELET # BLD AUTO: 304 10E9/L (ref 150–450)
RBC # BLD AUTO: 4.17 10E12/L (ref 3.8–5.2)
VIT B12 SERPL-MCNC: 452 PG/ML (ref 193–986)
WBC # BLD AUTO: 7.3 10E9/L (ref 4–11)

## 2021-01-11 PROCEDURE — 99214 OFFICE O/P EST MOD 30 MIN: CPT | Mod: 25 | Performed by: INTERNAL MEDICINE

## 2021-01-11 PROCEDURE — G0439 PPPS, SUBSEQ VISIT: HCPCS | Performed by: INTERNAL MEDICINE

## 2021-01-11 PROCEDURE — 84443 ASSAY THYROID STIM HORMONE: CPT | Performed by: INTERNAL MEDICINE

## 2021-01-11 PROCEDURE — 36415 COLL VENOUS BLD VENIPUNCTURE: CPT | Performed by: INTERNAL MEDICINE

## 2021-01-11 PROCEDURE — 82607 VITAMIN B-12: CPT | Performed by: INTERNAL MEDICINE

## 2021-01-11 PROCEDURE — 99000 SPECIMEN HANDLING OFFICE-LAB: CPT | Performed by: INTERNAL MEDICINE

## 2021-01-11 PROCEDURE — G0296 VISIT TO DETERM LDCT ELIG: HCPCS | Performed by: INTERNAL MEDICINE

## 2021-01-11 PROCEDURE — 80048 BASIC METABOLIC PNL TOTAL CA: CPT | Performed by: INTERNAL MEDICINE

## 2021-01-11 PROCEDURE — 80307 DRUG TEST PRSMV CHEM ANLYZR: CPT | Mod: 90 | Performed by: INTERNAL MEDICINE

## 2021-01-11 PROCEDURE — 85027 COMPLETE CBC AUTOMATED: CPT | Performed by: INTERNAL MEDICINE

## 2021-01-11 RX ORDER — HYDROCODONE BITARTRATE AND ACETAMINOPHEN 5; 325 MG/1; MG/1
1 TABLET ORAL DAILY PRN
Qty: 20 TABLET | Refills: 0 | Status: SHIPPED | OUTPATIENT
Start: 2021-01-11 | End: 2021-02-24

## 2021-01-11 ASSESSMENT — ACTIVITIES OF DAILY LIVING (ADL): CURRENT_FUNCTION: NO ASSISTANCE NEEDED

## 2021-01-11 ASSESSMENT — MIFFLIN-ST. JEOR: SCORE: 1189.42

## 2021-01-11 NOTE — LETTER
January 12, 2021      Svetlana Adair  [address]   [facility] [address]        Dear ,    The following letter pertains to your most recent diagnostic tests:     -TSH (thyroid stimulating hormone) level is normal which indicates normal circulating thyroid hormone levels.       -Kidney function is normal for you (Creatinine, GFR), Sodium is normal for you, Potassium is normal for you, Calcium is normal for you, Glucose (blood sugar) is mildly elevated, but not in the diabetic range.       -Your vitamin B12 level is normal.     -Your complete blood counts including your hemoglobin returned normal for you.       Bottom line:  These results do not show any obvious easily reversible cause for your fatigue.  These are good results.       Resulted Orders   Basic metabolic panel   Result Value Ref Range    Sodium 134 133 - 144 mmol/L    Potassium 4.2 3.4 - 5.3 mmol/L    Chloride 101 94 - 109 mmol/L    Carbon Dioxide 24 20 - 32 mmol/L    Anion Gap 9 3 - 14 mmol/L    Glucose 108 (H) 70 - 99 mg/dL    Urea Nitrogen 17 7 - 30 mg/dL    Creatinine 0.74 0.52 - 1.04 mg/dL    GFR Estimate 83 >60 mL/min/[1.73_m2]      Comment:      Non  GFR Calc  Starting 12/18/2018, serum creatinine based estimated GFR (eGFR) will be   calculated using the Chronic Kidney Disease Epidemiology Collaboration   (CKD-EPI) equation.      GFR Estimate If Black >90 >60 mL/min/[1.73_m2]      Comment:       GFR Calc  Starting 12/18/2018, serum creatinine based estimated GFR (eGFR) will be   calculated using the Chronic Kidney Disease Epidemiology Collaboration   (CKD-EPI) equation.      Calcium 9.2 8.5 - 10.1 mg/dL   CBC with platelets   Result Value Ref Range    WBC 7.3 4.0 - 11.0 10e9/L    RBC Count 4.17 3.8 - 5.2 10e12/L    Hemoglobin 13.6 11.7 - 15.7 g/dL    Hematocrit 40.6 35.0 - 47.0 %    MCV 97 78 - 100 fl    MCH 32.6 26.5 - 33.0 pg    MCHC 33.5 31.5 - 36.5 g/dL    RDW 13.1 10.0 - 15.0 %     Platelet Count 304 150 - 450 10e9/L   TSH with free T4 reflex   Result Value Ref Range    TSH 1.59 0.40 - 4.00 mU/L   Vitamin B12   Result Value Ref Range    Vitamin B12 452 193 - 986 pg/mL       If you have any questions or concerns, please call the clinic at the number listed above.       Sincerely,      Vladislav Cruz MD  court
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
## 2021-01-11 NOTE — PATIENT INSTRUCTIONS
Brian MARTLEL  Psychotherapist in [place], [place]  Address: [address], [place] [address]  Phone: [phone]    Dr Teena Helms PhD LP  Psychologist in [place], [place]  Address: [address], [place] [address]  Phone: [phone]    Please call [facility] at [phone] to schedule your lung cancer screening CT.     Lung Cancer Screening   Frequently Asked Questions  If you are at high-risk for lung cancer, getting screened with low-dose computed tomography (LDCT) every year can help save your life. This handout offers answers to some of the most common questions about lung cancer screening. If you have other questions, please call [phone] ([phone]).     What is it?  Lung cancer screening uses special X-ray technology to create an image of your lung tissue. The exam is quick and easy and takes less than 10 seconds. We don t give you any medicine or use any needles. You can eat before and after the exam. You don t need to change your clothes as long as the clothing on your chest doesn t contain metal. But, you do need to be able to hold your breath for at least 6 seconds during the exam.    What is the goal of lung cancer screening?  The goal of lung cancer screening is to save lives. Many times, lung cancer is not found until a person starts having physical symptoms. Lung cancer screening can help detect lung cancer in the earliest stages when it may be easier to treat.    Who should be screened for lung cancer?  We suggest lung cancer screening for anyone who is at high-risk for lung cancer. You are in the high-risk group if you:      are between the ages of 55 and 79, and    have smoked at least 1 pack of cigarettes a day for 30 or more years, and    still smoke or have quit within the past 15 years.    However, if you have a new cough or shortness of breath, you should talk to your doctor before being screened.    Some national lung health advocacy groups  also recommend screening for people ages 50 to 79 who have smoked an average of 1 pack of cigarettes a day for 20 years. They must also have at least 1 other risk factor for lung cancer, not including exposure to secondhand smoke. Other risk factors are having had cancer in the past, emphysema, pulmonary fibrosis, COPD, a family history of lung cancer, or exposure to certain materials such as arsenic, asbestos, beryllium, cadmium, chromium, diesel fumes, nickel, radon or silica. Your care team can help you know if you have one of these risk factors.     Why does it matter if I have symptoms?  Certain symptoms can be a sign that you have a condition in your lungs that should be checked and treated by your doctor. These symptoms include fever, chest pain, a new or changing cough, shortness of breath that you have never felt before, coughing up blood or unexplained weight loss. Having any of these symptoms can greatly affect the results of lung cancer screening.       Should all smokers get an LDCT lung cancer screening exam?  It depends. Lung cancer screening is for a very specific group of men and women who have a history of heavy smoking over a long period of time (see  Who should be screened for lung cancer  above).  I am in the high-risk group, but have been diagnosed with cancer in the past. Is LDCT lung cancer screening right for me?  In some cases, you should not have LDCT lung screening, such as when your doctor is already following your cancer with CT scan studies. Your doctor will help you decide if LDCT lung screening is right for you.  Do I need to have a screening exam every year?  Yes. If you are in the high-risk group described earlier, you should get an LDCT lung cancer screening exam every year until you are 79, or are no longer willing or able to undergo screening and possible procedures to diagnose and treat lung cancer.  How effective is LDCT at preventing death from lung cancer?  Studies have  shown that LDCT lung cancer screening can lower the risk of death from lung cancer by 20 percent in people who are at high-risk.  What are the risks?  There are some risks and limitations of LDCT lung cancer screening. We want to make sure you understand the risks and benefits, so please let us know if you have any questions. Your doctor may want to talk with you more about these risks.    Radiation exposure: As with any exam that uses radiation, there is a very small increased risk of cancer. The amount of radiation in LDCT is small--about the same amount a person would get from a mammogram. Your doctor orders the exam when he or she feels the potential benefits outweigh the risks.    False negatives: No test is perfect, including LDCT. It is possible that you may have a medical condition, including lung cancer, that is not found during your exam. This is called a false negative result.    False positives and more testing: LDCT very often finds something in the lung that could be cancer, but in fact is not. This is called a false positive result. False positive tests often cause anxiety. To make sure these findings are not cancer, you may need to have more tests. These tests will be done only if you give us permission. Sometimes patients need a treatment that can have side effects, such as a biopsy. For more information on false positives, see  What can I expect from the results?     Findings not related to lung cancer: Your LDCT exam also takes pictures of areas of your body next to your lungs. In a very small number of cases, the CT scan will show an abnormal finding in one of these areas, such as your kidneys, adrenal glands, liver or thyroid. This finding may not be serious, but you may need more tests. Your doctor can help you decide what other tests you may need, if any.  What can I expect from the results?  About 1 out of 4 LDCT exams will find something that may need more tests. Most of the time, these  findings are lung nodules. Lung nodules are very small collections of tissue in the lung. These nodules are very common, and the vast majority--more than 97 percent--are not cancer (benign). Most are normal lymph nodes or small areas of scarring from past infections.  But, if a small lung nodule is found to be cancer, the cancer can be cured more than 90 percent of the time. To know if the nodule is cancer, we may need to get more images before your next yearly screening exam. If the nodule has suspicious features (for example, it is large, has an odd shape or grows over time), we will refer you to a specialist for further testing.  Will my doctor also get the results?  Yes. Your doctor will get a copy of your results.  Is it okay to keep smoking now that there s a cancer screening exam?  No. Tobacco is one of the strongest cancer-causing agents. It causes not only lung cancer, but other cancers and cardiovascular (heart) diseases as well. The damage caused by smoking builds over time. This means that the longer you smoke, the higher your risk of disease. While it is never too late to quit, the sooner you quit, the better.  Where can I find help to quit smoking?  The best way to prevent lung cancer is to stop smoking. If you have already quit smoking, congratulations and keep it up! For help on quitting smoking, please call [facility] at 2-247-759-FEZB ([phone]) or the American Cancer Society at 1-622.423.6561 to find local resources near you.  One-on-one health coaching:  If you d prefer to work individually with a health care provider on tobacco cessation, we offer:      Medication Therapy Management:  Our specially trained pharmacists work closely with you and your doctor to help you quit smoking.  Call [phone] or [phone] (toll free).     Can Do: Health coaching offered by [facility].  [email or web address]

## 2021-01-12 LAB
ANION GAP SERPL CALCULATED.3IONS-SCNC: 9 MMOL/L (ref 3–14)
BUN SERPL-MCNC: 17 MG/DL (ref 7–30)
CALCIUM SERPL-MCNC: 9.2 MG/DL (ref 8.5–10.1)
CHLORIDE SERPL-SCNC: 101 MMOL/L (ref 94–109)
CO2 SERPL-SCNC: 24 MMOL/L (ref 20–32)
CREAT SERPL-MCNC: 0.74 MG/DL (ref 0.52–1.04)
GFR SERPL CREATININE-BSD FRML MDRD: 83 ML/MIN/{1.73_M2}
GLUCOSE SERPL-MCNC: 108 MG/DL (ref 70–99)
POTASSIUM SERPL-SCNC: 4.2 MMOL/L (ref 3.4–5.3)
SODIUM SERPL-SCNC: 134 MMOL/L (ref 133–144)
TSH SERPL DL<=0.005 MIU/L-ACNC: 1.59 MU/L (ref 0.4–4)

## 2021-01-12 NOTE — RESULT ENCOUNTER NOTE
The following letter pertains to your most recent diagnostic tests:    -TSH (thyroid stimulating hormone) level is normal which indicates normal circulating thyroid hormone levels.      -Kidney function is normal for you (Creatinine, GFR), Sodium is normal for you, Potassium is normal for you, Calcium is normal for you, Glucose (blood sugar) is mildly elevated, but not in the diabetic range.      -Your vitamin B12 level is normal.     -Your complete blood counts including your hemoglobin returned normal for you.       Bottom line:  These results do not show any obvious easily reversible cause for your fatigue.  These are good results.            Sincerely,    Dr. Cruz

## 2021-01-14 ENCOUNTER — TELEPHONE (OUTPATIENT)
Dept: FAMILY MEDICINE | Facility: CLINIC | Age: 71
End: 2021-01-14

## 2021-01-14 NOTE — TELEPHONE ENCOUNTER
[phone]    Pt called, she received a call from the [facility] they thought her COVID corine lab did not go through some sort of mistake and pt was told to wait until today to see if it went through     Advised on our end just looks like a future order in her chart    She scheduled a lab visit to have this completed next week   Jasmyn RAZA RN

## 2021-01-15 LAB — PAIN DRUG SCR UR W RPTD MEDS: NORMAL

## 2021-01-22 ENCOUNTER — TRANSFERRED RECORDS (OUTPATIENT)
Dept: HEALTH INFORMATION MANAGEMENT | Facility: CLINIC | Age: 71
End: 2021-01-22

## 2021-01-28 ENCOUNTER — HOSPITAL ENCOUNTER (OUTPATIENT)
Dept: CT IMAGING | Facility: CLINIC | Age: 71
Discharge: HOME | End: 2021-01-28
Attending: INTERNAL MEDICINE | Admitting: INTERNAL MEDICINE
Payer: MEDICARE

## 2021-01-28 DIAGNOSIS — Z87.891 PERSONAL HISTORY OF TOBACCO USE: ICD-10-CM

## 2021-01-28 PROCEDURE — 71271 CT THORAX LUNG CANCER SCR C-: CPT

## 2021-01-28 NOTE — LETTER
"[facility]  [address] [facility]  [address]  [place]  [address]  Tel: [phone]    January 29, 2021    Svetlana Adair  [address]   [facility] [address]        Dear Ms. Adair,    The following letter pertains to your most recent diagnostic tests:     Good news!  The CT scan does not show evidence for lung cancer.  This scan can be repeated in 1 year.       Sincerely,     Dr. Cruz            Enclosure: Lab Results  Results for orders placed or performed during the hospital encounter of 01/28/21   CT Chest Lung Cancer Scrn Low Dose wo     Status: None    Narrative    CT CHEST LOW DOSE  WITHOUT CONTRAST 1/28/2021 11:56 AM     HISTORY: Personal history of tobacco use    COMPARISON: January 16, 2020    TECHNIQUE: Scans obtained from the apices through the diaphragm  without IV contrast. Low dose CT chest technique was utilized.  Radiation dose for this scan was reduced using automated exposure  control, adjustment of the mA and/or kV according to patient size, or  iterative reconstruction technique.    FINDINGS:     Lungs:  Scattered nodules up to 4 mm are unchanged from previous. No  new nodules.  No infiltrates. Scattered dilated impacted airways are  stable.    Additional findings: No pleural or pericardial effusion. Normal  caliber thoracic aorta.  There are extensive coronary vascular  calcifications and/or stents consistent with coronary artery disease.  Normal heart size. Adrenal glands unremarkable. Remaining visualized  upper abdomen unremarkable. No frankly destructive bony lesions.        Impression    IMPRESSION:   1. ACR Assessment Category:  Lung-RADS Category 2. Benign appearance  or behavior. Recommendation: continue annual screening if clinically  relevant (please order exam code IMG 2290).    2. Significant Incidental Finding(s):  Category S: Yes. Coronary  artery calcium moderate or severe.      Download the \"LungRADS Assessment Categories\" table at this site: "   http://www.acr.org/Quality-Safety/Resources/LungRADS    BROOKLYN MARIEE MD

## 2021-01-29 NOTE — RESULT ENCOUNTER NOTE
The following letter pertains to your most recent diagnostic tests:    Good news!  The CT scan does not show evidence for lung cancer.  This scan can be repeated in 1 year.      Sincerely,    Dr. Cruz

## 2021-02-16 DIAGNOSIS — U07.1 INFECTION DUE TO 2019 NOVEL CORONAVIRUS: ICD-10-CM

## 2021-02-16 PROCEDURE — 86769 SARS-COV-2 COVID-19 ANTIBODY: CPT | Performed by: INTERNAL MEDICINE

## 2021-02-16 PROCEDURE — 36415 COLL VENOUS BLD VENIPUNCTURE: CPT | Performed by: INTERNAL MEDICINE

## 2021-02-16 NOTE — LETTER
February 22, 2021      Svetlana Mccloudchristian  [address]   [place] [address]        Dear ,    We are writing to inform you of your test results.    The following letter pertains to your most recent diagnostic tests:     This test demonstrates the presence of COVID antibodies.  This suggests previous infection.  I am not certain how this changes your risk for subsequent infections, so I would continue to advise wearing masks and social distancing to minimize risk for COVID infection.             Resulted Orders   COVID-19 Virus (Coronavirus) Antibody & Titer Reflex   Result Value Ref Range    COVID-19 Antibody Screen Positive       Comment:      Antibodies to COVID-19 detected, which may be due to COVID-19 vaccination, or   past or current COVID-19 infection.      COVID-19 Antibody, IgG Titer 1:25,600       Comment:      Qualitative screen for IgG, IgM and IgA antibodies to COVID-19 (SARS-CoV-2)   spike receptor binding domain (RBD) protein, with semi-quantitative   measurement of IgG COVID-19 spike RBD antibodies by endpoint titer. COVID-19   spike RBD antibodies may be elevated due to vaccination, or a past or current   COVID-19 infection.  The qualitative screen for IgG, IgM and IgA COVID-19 spike RBD antibodies is   performed on the [facility], and this test is approved by the FDA under the   Emergency Use Authorization (EUA).  The IgG titer test was developed and its performance characteristics   determined by the [facility], which is regulated under CLIA as qualified to perform   high-complexity testing. The IgG titer test has not been reviewed by the FDA.   Negative results do not rule out COVID-19 infection.  Results from antibody testing should not be used as the sole basis to diagnose   or exclude SARS-CoV-2 infection or to inform in fection status. COVID-19 PCR   test should be ordered if current infection is suspected. False  positive   results may occur in rare cases due to cross-reacting antibodies.  Testing performed by [facility], [facility], [address], [address], [place] [address]         If you have any questions or concerns, please call the clinic at the number listed above.       Sincerely,      Vladislav Cruz MD

## 2021-02-18 LAB
SARS-COV-2 AB PNL SERPL IA: POSITIVE
SARS-COV-2 IGG SERPL IA-ACNC: NORMAL

## 2021-02-20 DIAGNOSIS — M54.2 NECK PAIN ON RIGHT SIDE: Primary | ICD-10-CM

## 2021-02-20 NOTE — TELEPHONE ENCOUNTER
Reason for call:  Medication   If this is a refill request, has the caller requested the refill from the pharmacy already? Yes  Will the patient be using a [facility]? No  Name of the pharmacy and phone number for the current request: [facility] [place] [phone]    Name of the medication requested: Hydrocodone    Other request: N/A    Phone number to reach patient:  Cell number on file:    Telephone Information:   Mobile [phone]       Best Time:  Anytime    Can we leave a detailed message on this number?  YES    Travel screening: Not Applicable

## 2021-02-21 NOTE — RESULT ENCOUNTER NOTE
The following letter pertains to your most recent diagnostic tests:    This test demonstrates the presence of COVID antibodies.  This suggests previous infection.  I am not certain how this changes your risk for subsequent infections, so I would continue to advise wearing masks and social distancing to minimize risk for COVID infection.          Sincerely,    Dr. Cruz

## 2021-02-23 NOTE — TELEPHONE ENCOUNTER
Controlled Substance Refill Request for Norco  Problem List Complete:    Yes    Last Written Prescription Date:  01/11/2021  Last Fill Quantity: 20,   # refills: 0    THE MOST RECENT OFFICE VISIT MUST BE WITHIN THE PAST 3 MONTHS. AT LEAST ONE FACE TO FACE VISIT MUST OCCUR EVERY 6 MONTHS. ADDITIONAL VISITS CAN BE VIRTUAL.  (THIS STATEMENT SHOULD BE DELETED.)    Last Office Visit with [facility] primary care provider: 01/11/2021    Future Office visit:     Controlled substance agreement:   Encounter-Level CSA - 04/26/2018:    Controlled Substance Agreement - Scan on 5/2/2018 10:48 AM: CONTROLLED SUBSTANCE AGREEMENT     Patient-Level CSA:    There are no patient-level csa.         Last Urine Drug Screen:   Pain Drug SCR UR W RPTD Meds   Date Value Ref Range Status   01/11/2021 FINAL  Final     Comment:     (Note)  ====================================================================  [facility] COMP DRUG ANALYSIS,UR  ====================================================================  Test                             Result       Flag       Units        Drug Present   Carboxy-THC                    55                      ng/mg creat    Carboxy-THC is a metabolite of tetrahydrocannabinol  (THC).    Source of THC is most commonly illicit, but THC is also present    in a scheduled prescription medication.   Hydrocodone                    597                     ng/mg creat   Norhydrocodone                 655                     ng/mg creat    Sources of hydrocodone include scheduled prescription    medications. Norhydrocodone is an expected metabolite of    hydrocodone.   Duloxetine                     PRESENT                               Acetaminophen                  PRESENT                              ====================================================================  Test                      Result    Flag   Units      Ref Range        Creatinine              31               mg/dL      >=20             ====================================================================  Declared Medications:  Medication list was not provided.  ====================================================================  For clinical consultation, please call [phone].  ====================================================================  Analysis performed by [facility], [address] [place] [address]     , No results found for: COMDAT,   Cannabinoids (73-aca-7-carboxy-9-THC)   Date Value Ref Range Status   02/21/2020 Not Detected NDET^Not Detected ng/mL Final     Comment:     Cutoff for a negative cannabinoid is 50 ng/mL or less.     Phencyclidine (Phencyclidine)   Date Value Ref Range Status   02/21/2020 Not Detected NDET^Not Detected ng/mL Final     Comment:     Cutoff for a negative PCP is 25 ng/mL or less.     Cocaine (Benzoylecgonine)   Date Value Ref Range Status   02/21/2020 Not Detected NDET^Not Detected ng/mL Final     Comment:     Cutoff for a negative cocaine is 150 ng/ml or less.     Methamphetamine (d-Methamphetamine)   Date Value Ref Range Status   02/21/2020 Not Detected NDET^Not Detected ng/mL Final     Comment:     Cutoff for a negative methamphetamine is 500 ng/ml or less.     Opiates (Morphine)   Date Value Ref Range Status   02/21/2020 Not Detected NDET^Not Detected ng/mL Final     Comment:     Cutoff for a negative opiate is 100 ng/ml or less.     Amphetamine (d-Amphetamine)   Date Value Ref Range Status   02/21/2020 Not Detected NDET^Not Detected ng/mL Final     Comment:     Cutoff for a negative amphetamine is 500 ng/mL or less.     Benzodiazepines (Nordiazepam)   Date Value Ref Range Status   02/21/2020 Not Detected NDET^Not Detected ng/mL Final     Comment:     Cutoff for a negative benzodiazepine is 150 ng/ml or less.     Tricyclic Antidepressants (Desipramine)   Date Value Ref Range Status   02/21/2020 Not Detected NDET^Not Detected ng/mL Final     Comment:     Cutoff for a negative tricyclic  antidepressant is 300 ng/ml or less.     Methadone (Methadone)   Date Value Ref Range Status   02/21/2020 Not Detected NDET^Not Detected ng/mL Final     Comment:     Cutoff for a negative methadone is 200 ng/ml or less.     Barbiturates (Butalbital)   Date Value Ref Range Status   02/21/2020 Not Detected NDET^Not Detected ng/mL Final     Comment:     Cutoff for a negative barbituate is 200 ng/ml or less.     Oxycodone (Oxycodone)   Date Value Ref Range Status   02/21/2020 Not Detected NDET^Not Detected ng/mL Final     Comment:     Cutoff for a negative Oxycodone is 100 ng/mL or less.     Propoxyphene (Norpropoxyphene)   Date Value Ref Range Status   02/21/2020 Not Detected NDET^Not Detected ng/mL Final     Comment:     Cutoff for a negative propoxyphene is 300 ng/ml or less     Buprenorphine (Buprenorphine)   Date Value Ref Range Status   02/21/2020 Not Detected NDET^Not Detected ng/mL Final     Comment:     Cutoff for a negative buprenorphine is 10 ng/ml or less        Processing:  Rx to be electronically transmitted to pharmacy by provider     [email or web address]   checked in past 3 months?  No, route to RN     Jaye Sher MA

## 2021-02-24 RX ORDER — HYDROCODONE BITARTRATE AND ACETAMINOPHEN 5; 325 MG/1; MG/1
1 TABLET ORAL DAILY PRN
Qty: 20 TABLET | Refills: 0 | Status: SHIPPED | OUTPATIENT
Start: 2021-02-24 | End: 2021-03-25

## 2021-03-05 ENCOUNTER — TELEPHONE (OUTPATIENT)
Dept: FAMILY MEDICINE | Facility: CLINIC | Age: 71
End: 2021-03-05

## 2021-03-05 DIAGNOSIS — I89.0 LYMPHEDEMA OF RIGHT ARM: Primary | ICD-10-CM

## 2021-03-05 NOTE — TELEPHONE ENCOUNTER
Pt requesting new referral for lymphedema treatment to Dr. Richard.  Last referral  (year ago) and needs new referral for insurance for Rt Arm Lymphedema.         [facility]    [address]    [place], [place] [address] [phone]   Teena Mckinney MD    [address]    [address]    [place], [place] [id number] [phone] (Fax)         OK to leave a detailed vm on:   [phone]   Dana Roberson, RN  [facility] RN Triage Team

## 2021-03-23 ENCOUNTER — MYC MEDICAL ADVICE (OUTPATIENT)
Dept: FAMILY MEDICINE | Facility: CLINIC | Age: 71
End: 2021-03-23

## 2021-03-23 DIAGNOSIS — E78.5 HYPERLIPIDEMIA LDL GOAL <70: ICD-10-CM

## 2021-03-23 LAB
CHOLEST SERPL-MCNC: 179 MG/DL
HDLC SERPL-MCNC: 108 MG/DL
LDLC SERPL CALC-MCNC: 54 MG/DL
NONHDLC SERPL-MCNC: 71 MG/DL
TRIGL SERPL-MCNC: 87 MG/DL

## 2021-03-23 PROCEDURE — 80061 LIPID PANEL: CPT | Performed by: INTERNAL MEDICINE

## 2021-03-23 PROCEDURE — 36415 COLL VENOUS BLD VENIPUNCTURE: CPT | Performed by: INTERNAL MEDICINE

## 2021-03-25 ENCOUNTER — VIRTUAL VISIT (OUTPATIENT)
Dept: FAMILY MEDICINE | Facility: CLINIC | Age: 71
End: 2021-03-25
Payer: MEDICARE

## 2021-03-25 DIAGNOSIS — M54.2 NECK PAIN ON RIGHT SIDE: ICD-10-CM

## 2021-03-25 DIAGNOSIS — F33.2 SEVERE EPISODE OF RECURRENT MAJOR DEPRESSIVE DISORDER, WITHOUT PSYCHOTIC FEATURES (H): Primary | ICD-10-CM

## 2021-03-25 PROCEDURE — 99443 PR PHYSICIAN TELEPHONE EVALUATION 21-30 MIN: CPT | Mod: 95 | Performed by: INTERNAL MEDICINE

## 2021-03-25 RX ORDER — HYDROCODONE BITARTRATE AND ACETAMINOPHEN 5; 325 MG/1; MG/1
1 TABLET ORAL DAILY PRN
Qty: 20 TABLET | Refills: 0 | Status: SHIPPED | OUTPATIENT
Start: 2021-03-25 | End: 2021-04-28

## 2021-03-25 ASSESSMENT — PATIENT HEALTH QUESTIONNAIRE - PHQ9
SUM OF ALL RESPONSES TO PHQ QUESTIONS 1-9: 11
5. POOR APPETITE OR OVEREATING: SEVERAL DAYS

## 2021-03-25 ASSESSMENT — ANXIETY QUESTIONNAIRES
5. BEING SO RESTLESS THAT IT IS HARD TO SIT STILL: SEVERAL DAYS
IF YOU CHECKED OFF ANY PROBLEMS ON THIS QUESTIONNAIRE, HOW DIFFICULT HAVE THESE PROBLEMS MADE IT FOR YOU TO DO YOUR WORK, TAKE CARE OF THINGS AT HOME, OR GET ALONG WITH OTHER PEOPLE: VERY DIFFICULT
7. FEELING AFRAID AS IF SOMETHING AWFUL MIGHT HAPPEN: NOT AT ALL
6. BECOMING EASILY ANNOYED OR IRRITABLE: MORE THAN HALF THE DAYS
3. WORRYING TOO MUCH ABOUT DIFFERENT THINGS: MORE THAN HALF THE DAYS
GAD7 TOTAL SCORE: 8
2. NOT BEING ABLE TO STOP OR CONTROL WORRYING: SEVERAL DAYS
1. FEELING NERVOUS, ANXIOUS, OR ON EDGE: SEVERAL DAYS

## 2021-03-25 NOTE — PROGRESS NOTES
Mahnaz is a 70 year old who is being evaluated via a billable telephone visit.      What phone number would you like to be contacted at? home  How would you like to obtain your AVS? MyChart    Assessment & Plan     Severe episode of recurrent major depressive disorder, without psychotic features (H)  - MENTAL HEALTH REFERRAL  - Adult; Psychiatry; Psychiatry; [facility]: Psychiatry Clinic [phone]; We will contact you to schedule the appointment or please call with any questions    This patient has severe depressive symptoms that have been refractory to multiple medications.  She does try to participate in counseling, but due to high demand, she is having trouble connecting with the counselors.  She is trying to get more active, but due to fatigue associated with her recent Covid illness, this is a struggle to although she was encouraged to start with small amounts of exercise and slowly increase frequency and duration.  She did require a great deal of support during this telephone interaction.  Since she has tried and failed so many drug therapies for her mood, I think she might be a candidate for transcranial magnetic therapy?  I think that this could be facilitated through the [facility] psychiatry clinic.  I again sent a referral to the clinic.  I also gave her contact.  Hopefully, she can connect with that clinic and I want to submit that therapy for some other therapy that I might have thought of.  Until then, we both agree that she should remain off of it present at none seem to help her and many positive side effects.      30 minutes spent on the date of the encounter doing chart review, history and exam, documentation and further activities as noted above           Return in about 3 months (around 6/25/2021) for Mood Check.  Patient instructed to return to clinic or contact us sooner if symptoms worsen or new symptoms develop.     Vladislav Cruz MD  [facility]    Mahnaz is a 70 year old who presents for the following health issues     HPI     Follow up on depression, has weaned herself off of depression medication and hasn't felt much difference since stopping the medication.    Tried duloxetine, venlafaxine, escitalopram, sertraline, bupropion     70-year-old history of breast cancer, depression, anxiety, chronic pain with history of alcoholism, hyperlipidemia, gastroesophageal reflux disease, impaired fasting glucose, History of tobacco use.    She presents with frustrations about her mood.  She was diagnosed with Covid many months ago and still has fatigue related to that.  Most recently, she was on duloxetine but found that the sweating side effects were intolerable.  She weaned herself off of duloxetine, and has found no worsening of her mood although she feels better since she has been sweating so much.  She is having trouble connecting with her psychotherapist due to high demand because of the Covid crisis.  She has tried several medications for her mood as outlined above without any success.  She remains very interested in inquiring about transcranial magnetic therapy.  She was unable to connect with the [facility] psychiatry clinic when last referred several months ago.  She tells me that she never heard back from the clinic.    Review of Systems         Objective           Vitals:  No vitals were obtained today due to virtual visit.    Physical Exam   healthy, alert and no distress  PSYCH: Alert and oriented times 3; coherent speech, normal   rate and volume, able to articulate logical thoughts, able   to abstract reason, no tangential thoughts, no hallucinations   or delusions  Her affect is normal  RESP: No cough, no audible wheezing, able to talk in full sentences  Remainder of exam unable to be completed due to telephone visits                Phone call duration: 25 minutes

## 2021-03-25 NOTE — TELEPHONE ENCOUNTER
Pending Prescriptions:                       Disp   Refills    HYDROcodone-acetaminophen (NORCO) 5-325 M*20 tab*0            Sig: Take 1 tablet by mouth daily as needed for pain    Reports forgot to request refill for this at video today.    Last Written Prescription Date:  02/24/21  Last Fill Quantity: 20,  # refills: 0   Last office visit: 1/11/2021 with prescribing provider:  Virtual today with major   Future Office Visit:   Next 5 appointments (look out 90 days)    Mar 26, 2021  1:00 PM  Return Visit with Taiwo Anthony PA-C  [facility] ([facility] ) [address] [place] [id number] [phone]

## 2021-03-25 NOTE — TELEPHONE ENCOUNTER
Reason for Call:  Medication or medication refill:    Do you use a [facility]?  Name of the pharmacy and phone number for the current request:  [id number] - [place], [place] - [address] AT [facility] [address]    Name of the medication requested: HYDROcodone-acetaminophen (NORCO) 5-325 MG tablet     Other request: Pt had francisco't today 03.25.21 and forgot to ask PCP for refill    Can we leave a detailed message on this number? YES    Phone number patient can be reached at: Cell number on file:    Telephone Information:   Mobile [phone]     Best Time: any    Call taken on 3/25/2021 at 12:14 PM by Rachele Morrissey

## 2021-03-26 ENCOUNTER — OFFICE VISIT (OUTPATIENT)
Dept: CARDIOLOGY | Facility: CLINIC | Age: 71
End: 2021-03-26
Payer: MEDICARE

## 2021-03-26 VITALS
HEART RATE: 78 BPM | WEIGHT: 144.2 LBS | DIASTOLIC BLOOD PRESSURE: 77 MMHG | SYSTOLIC BLOOD PRESSURE: 119 MMHG | HEIGHT: 64 IN | BODY MASS INDEX: 24.62 KG/M2

## 2021-03-26 DIAGNOSIS — E78.5 HYPERLIPIDEMIA LDL GOAL <100: Primary | ICD-10-CM

## 2021-03-26 DIAGNOSIS — R93.1 ELEVATED CORONARY ARTERY CALCIUM SCORE: ICD-10-CM

## 2021-03-26 PROCEDURE — 99214 OFFICE O/P EST MOD 30 MIN: CPT | Performed by: PHYSICIAN ASSISTANT

## 2021-03-26 ASSESSMENT — MIFFLIN-ST. JEOR: SCORE: 1173.06

## 2021-03-26 ASSESSMENT — ANXIETY QUESTIONNAIRES: GAD7 TOTAL SCORE: 8

## 2021-03-26 NOTE — PROGRESS NOTES
Primary Cardiologist: Dr. Perez    Reason for Visit: Routine follow up    History of Present Illness:   Mahnaz is a very pleasant 70-year-old female with medical history is notable for high calcium score of 790 (negative nuclear stress scan) , former tobacco user, and hyperlipidemia.      She appears to be doing well with no symptoms of CP, SOB, palpitations, peripheral edema, PND, orthopnea, lightheadedness, or bleeding issues. She tries to exercise regularly.     Mahnaz states she is doing well.  She had Covid almost a year ago and has been dealing with the residual symptoms from that.  She tells me she developed significant fatigue since her Covid infection.  She gets tired easily.  She denies chest discomfort, shortness of breath, PND, peripheral edema, or bleeding issues.    Assessment and Plan:  Mahnaz is a very pleasant 70-year-old female with medical history is notable for high calcium score of 790 (negative nuclear stress scan) , former tobacco user, and hyperlipidemia.     Mahnaz is doing well from a cardiac standpoint.  We talked about her calcium CT score from last year.  I reviewed with her that she does not have 99% blockage in her artery but the report states she has calcium score of 94 and one of the arteries.  I explained that we completed a stress test after her calcium score test which did not show any abnormalities.  I described that usually lesion has to be 70% or greater for her to show on a stress test.  She was relieved when she understood this.  Her blood pressure and heart rate are within normal limits.  We reviewed her lipid panel today.  Her numbers are under excellent control.  She will continue with her current regimen without any changes.  I will have her come back in 1 year with repeat lipid panel/ALT.      This note was completed in part using Dragon voice recognition software. Although reviewed after completion, some word and grammatical errors may occur.    Orders this Visit:  No orders  of the defined types were placed in this encounter.    No orders of the defined types were placed in this encounter.    There are no discontinued medications.      No diagnosis found.    CURRENT MEDICATIONS:  Current Outpatient Medications   Medication Sig Dispense Refill     ASHWAGANDHA PO Take 1 tablet by mouth daily prn       aspirin 81 MG tablet Take 1 tablet (81 mg) by mouth daily 30 tablet      calcium carbonate 600 mg-vitamin D 400 units (CALTRATE) 600-400 MG-UNIT per tablet Take 1 tablet by mouth daily        Cholecalciferol (VITAMIN D-3) 5000 units TABS Take 1 tablet by mouth daily        Coenzyme Q10 300 MG CAPS Take 300 mg by mouth daily       HYDROcodone-acetaminophen (NORCO) 5-325 MG tablet Take 1 tablet by mouth daily as needed for pain 20 tablet 0     IBUPROFEN PO Take 200 mg by mouth every 4 hours as needed for moderate pain        lactobacillus rhamnosus, GG, (CULTURELL) capsule Take 1 capsule by mouth daily        letrozole (FEMARA) 2.5 MG tablet Take 1 tablet by mouth daily  5     LYSINE 1-3 daily as needed       Melatonin 10 MG TABS tablet Take 10 mg by mouth nightly as needed for sleep       multivitamin (OCUVITE) TABS tablet Take 1 tablet by mouth daily       nicotine polacrilex (NICORETTE) 2 MG gum Place 1 each (2 mg) inside cheek as needed for smoking cessation 30 tablet 11     omeprazole 20 MG tablet Take 1 tablet (20 mg) by mouth as needed 90 tablet 3     rosuvastatin (CRESTOR) 10 MG tablet Take 1 tablet (10 mg) by mouth daily 90 tablet 0     UNABLE TO FIND Take 1 tablet by mouth daily MEDICATION NAME: Zymex - has almond, gig, papain, bromelain, amylase, cellulase, and lipase.       UNABLE TO FIND Take 1 tablet by mouth daily MEDICATION NAME: Aidee Duong - chinese supplement       UNABLE TO FIND MEDICATION NAME: Somnapure - 2 tablets = 500mg valerian root, 300mg lemon balm extract, 200mg l-theanine, 120mg hops extract, 50mg chamomile flower extract, 50mg passion flower extract, 3mg melatonin.   Takes 1-2 tablets at bedtime       UNABLE TO FIND MEDICATION NAME: Moringa 1 serving of powder daily       UNABLE TO FIND MEDICATION NAME: Premium Amla Berry 2 capsules = 4mg Vitamin C, 966mg organic amla, organic amla extract.  Takes 2 capsules daily       UNABLE TO FIND MEDICATION NAME: OmegaKrill 5x 3 capsules = 480mg of total omega-3, 64mg EPA, 392mg DHA, 300mcg astaxanthin.  Takes 3 capsules daily       UNABLE TO FIND MEDICATION NAME: Super Colon Cleanse 2 capsules = 665mg senna leaf powder, 321mg psyllium husk powder, 21mg fennel seed powder, 21mg papaya leaf powder, 21mg yolanda hips fruit powder, 11mg l-acidophilus.  Taking 4 capsules daily       valACYclovir (VALTREX) 1000 mg tablet TAKE 2 TABLETS(2000 MG) BY MOUTH TWICE DAILY AS NEEDED 8 tablet 0     ZOLEDRONIC ACID IV Inject into the vein every 6 months       UNABLE TO FIND MEDICATION NAME: Majestha powder daily       ZINC SULFATE-VITAMIN C MT Take 1 tablet by mouth daily Also contains copper, magnesium, and manganese.         ALLERGIES     Allergies   Allergen Reactions     Cats      Codeine Sulfate GI Disturbance       PAST MEDICAL HISTORY:  Past Medical History:   Diagnosis Date     Alcoholism (H)      Allergies     Fall     Arthritis      Basal cell cancer     back, chest, face     Breast cancer (H)      Coronary artery disease     CT calcium nlnyq=279 Nov 2015     Depression      Hyperlipidemia LDL goal <130 12/2/2015     Hypertension     borderline     PONV (postoperative nausea and vomiting)      Squamous cell carcinoma     left upper arm, chin       PAST SURGICAL HISTORY:  Past Surgical History:   Procedure Laterality Date     ABDOMEN SURGERY  2007?    Hysterectomy     COLONOSCOPY       COLONOSCOPY  11/17/2011    Procedure:COLONOSCOPY; Colonoscopy; Surgeon:MEKA RUSS; Location: GI     COLONOSCOPY N/A 2/10/2020    Procedure: COLONOSCOPY, WITH POLYPECTOMY AND BIOPSY;  Surgeon: Opal Ace MD;  Location: [facility]     DISSECT LYMPH NODE  AXILLA Right 11/2/2017    Procedure: DISSECT LYMPH NODE AXILLA;  RIGHT AXILLARY LYMPH NODE DISSECTION;  Surgeon: Cortez White MD;  Location: [facility]     GYN SURGERY  2005    hysterectomy after hx of ovarian cyst, ended up being benign     HYSTERECTOMY, PAP NO LONGER INDICATED       MASTECTOMY MODIFIED RADICAL  2011    bilateral     MASTECTOMY, BILATERAL       ORTHOPEDIC SURGERY      rt. knee arthroscopy     ORTHOPEDIC SURGERY Right     toe surgery     REALIGN PATELLA  1973    right      TOE SURGERY      right grt OA        FAMILY HISTORY:  Family History   Problem Relation Age of Onset     Cancer Mother 60        leukemia     Arthritis Mother         osteo     Eye Disorder Mother         glaucoma     Gastrointestinal Disease Mother         gallbladder     Other Cancer Mother      Gallbladder Disease Mother      Alcohol/Drug Father         alcohol     Heart Disease Father         ? CHF     Respiratory Father         emphysema     Coronary Artery Disease Father      Substance Abuse Father      Substance Abuse Sister      Anxiety Disorder Sister      Asthma Sister      Colon Cancer Brother      Breast Cancer Maternal Grandmother      Asthma Sister      Substance Abuse Sister      Cancer - colorectal Brother 50     Prostate Cancer Brother      Allergies Brother         bee      Arthritis Sister         osteo     Arthritis Sister         osteo     Arthritis Brother         osteo     Depression Sister      Psychotic Disorder Sister         bipolar     Respiratory Sister      Colon Cancer Brother      Prostate Cancer Brother      Depression Sister      Asthma Sister        SOCIAL HISTORY:  Social History     Socioeconomic History     Marital status:      Spouse name: None     Number of children: None     Years of education: None     Highest education level: None   Occupational History     None   Social Needs     Financial resource strain: None     Food insecurity     Worry: None     Inability: None      Transportation needs     Medical: None     Non-medical: None   Tobacco Use     Smoking status: Former Smoker     Packs/day: 1.00     Years: 22.00     Pack years: 22.00     Types: Cigarettes     Start date: 7/7/1969     Quit date: 4/28/2010     Years since quitting: 10.9     Smokeless tobacco: Never Used     Tobacco comment: I have been chewing nicorette gum 3 yrs and 3 months now. I have quit 8 and 9 yrs and periods in bet   Substance and Sexual Activity     Alcohol use: No     Alcohol/week: 0.0 standard drinks     Comment: intermittent sobriety 8/24/11     Drug use: Yes     Types: Marijuana     Comment: for sleeping/occ     Sexual activity: Yes     Partners: Male     Birth control/protection: Surgical     Comment: hyst   Lifestyle     Physical activity     Days per week: None     Minutes per session: None     Stress: None   Relationships     Social connections     Talks on phone: None     Gets together: None     Attends Baptism service: None     Active member of club or organization: None     Attends meetings of clubs or organizations: None     Relationship status: None     Intimate partner violence     Fear of current or ex partner: None     Emotionally abused: None     Physically abused: None     Forced sexual activity: None   Other Topics Concern      Service No     Blood Transfusions No     Caffeine Concern Yes     Comment: 4-5 cups caffeine per day     Occupational Exposure No     Hobby Hazards No     Sleep Concern Yes     Stress Concern Yes     Weight Concern No     Special Diet Yes     Comment: organic foods     Back Care No     Exercise No     Bike Helmet No     Seat Belt Yes     Self-Exams Yes     Parent/sibling w/ CABG, MI or angioplasty before 65F 55M? No   Social History Narrative     None       Review of Systems:  Skin:  Negative     Eyes:  Positive for glasses  ENT:  Negative    Respiratory:  Negative    Cardiovascular:  Negative    Gastroenterology: Negative    Genitourinary:  Negative   "  Musculoskeletal:  Positive for arthritis  Neurologic:  Positive for headaches  Psychiatric:  Positive for sleep disturbances  Heme/Lymph/Imm:  Negative    Endocrine:  Negative      Physical Exam:  Vitals: /77   Pulse 78   Ht 1.632 m (5' 4.25\")   Wt 65.4 kg (144 lb 3.2 oz)   BMI 24.56 kg/m       GEN:  NAD  NECK: No JVD  C/V:  Regular rate and rhythm, no murmur, rub or gallop.  RESP: Clear to auscultation bilaterally without wheezing, rales, or rhonchi.  GI: Abdomen soft, nontender, nondistended. No HSM appreciated.   EXTREM: No LE edema.   NEURO: Alert and oriented, cooperative. No obvious focal deficits.   PSYCH: Normal affect.  SKIN: Warm and dry.       Recent Lab Results:  LIPID RESULTS:  Lab Results   Component Value Date    CHOL 179 03/23/2021     03/23/2021    LDL 54 03/23/2021    TRIG 87 03/23/2021    CHOLHDLRATIO 3.3 10/29/2015       LIVER ENZYME RESULTS:  Lab Results   Component Value Date    AST 18 10/31/2019    ALT 35 10/31/2019       CBC RESULTS:  Lab Results   Component Value Date    WBC 7.3 01/11/2021    RBC 4.17 01/11/2021    HGB 13.6 01/11/2021    HCT 40.6 01/11/2021    MCV 97 01/11/2021    MCH 32.6 01/11/2021    MCHC 33.5 01/11/2021    RDW 13.1 01/11/2021     01/11/2021       BMP RESULTS:  Lab Results   Component Value Date     01/11/2021    POTASSIUM 4.2 01/11/2021    CHLORIDE 101 01/11/2021    CO2 24 01/11/2021    ANIONGAP 9 01/11/2021     (H) 01/11/2021    BUN 17 01/11/2021    CR 0.74 01/11/2021    GFRESTIMATED 83 01/11/2021    GFRESTBLACK >90 01/11/2021    YASMIN 9.2 01/11/2021        A1C RESULTS:  Lab Results   Component Value Date    A1C 5.6 11/18/2016       INR RESULTS:  No results found for: INR        Taiwo Anthony PA-C  March 24, 2021     "

## 2021-03-26 NOTE — LETTER
3/26/2021    Vladislav Cruz MD  [address]  [facility] [address]    RE: Svetlana [place]lamont       Dear Colleague,    I had the pleasure of seeing Svetlana Adair in the [facility].    Primary Cardiologist: Dr. Perez    Reason for Visit: Routine follow up    History of Present Illness:   Mahnaz is a very pleasant 70-year-old female with medical history is notable for high calcium score of 790 (negative nuclear stress scan) , former tobacco user, and hyperlipidemia.      She appears to be doing well with no symptoms of CP, SOB, palpitations, peripheral edema, PND, orthopnea, lightheadedness, or bleeding issues. She tries to exercise regularly.     Mahnaz states she is doing well.  She had Covid almost a year ago and has been dealing with the residual symptoms from that.  She tells me she developed significant fatigue since her Covid infection.  She gets tired easily.  She denies chest discomfort, shortness of breath, PND, peripheral edema, or bleeding issues.    Assessment and Plan:  Mahnaz is a very pleasant 70-year-old female with medical history is notable for high calcium score of 790 (negative nuclear stress scan) , former tobacco user, and hyperlipidemia.     Mahnaz is doing well from a cardiac standpoint.  We talked about her calcium CT score from last year.  I reviewed with her that she does not have 99% blockage in her artery but the report states she has calcium score of 94 and one of the arteries.  I explained that we completed a stress test after her calcium score test which did not show any abnormalities.  I described that usually lesion has to be 70% or greater for her to show on a stress test.  She was relieved when she understood this.  Her blood pressure and heart rate are within normal limits.  We reviewed her lipid panel today.  Her numbers are under excellent control.  She will continue with her current regimen without any changes.  I  will have her come back in 1 year with repeat lipid panel/ALT.      This note was completed in part using Dragon voice recognition software. Although reviewed after completion, some word and grammatical errors may occur.    Orders this Visit:  No orders of the defined types were placed in this encounter.    No orders of the defined types were placed in this encounter.    There are no discontinued medications.      No diagnosis found.    CURRENT MEDICATIONS:  Current Outpatient Medications   Medication Sig Dispense Refill     ASHWAGANDHA PO Take 1 tablet by mouth daily prn       aspirin 81 MG tablet Take 1 tablet (81 mg) by mouth daily 30 tablet      calcium carbonate 600 mg-vitamin D 400 units (CALTRATE) 600-400 MG-UNIT per tablet Take 1 tablet by mouth daily        Cholecalciferol (VITAMIN D-3) 5000 units TABS Take 1 tablet by mouth daily        Coenzyme Q10 300 MG CAPS Take 300 mg by mouth daily       HYDROcodone-acetaminophen (NORCO) 5-325 MG tablet Take 1 tablet by mouth daily as needed for pain 20 tablet 0     IBUPROFEN PO Take 200 mg by mouth every 4 hours as needed for moderate pain        lactobacillus rhamnosus, GG, (CULTURELL) capsule Take 1 capsule by mouth daily        letrozole (FEMARA) 2.5 MG tablet Take 1 tablet by mouth daily  5     LYSINE 1-3 daily as needed       Melatonin 10 MG TABS tablet Take 10 mg by mouth nightly as needed for sleep       multivitamin (OCUVITE) TABS tablet Take 1 tablet by mouth daily       nicotine polacrilex (NICORETTE) 2 MG gum Place 1 each (2 mg) inside cheek as needed for smoking cessation 30 tablet 11     omeprazole 20 MG tablet Take 1 tablet (20 mg) by mouth as needed 90 tablet 3     rosuvastatin (CRESTOR) 10 MG tablet Take 1 tablet (10 mg) by mouth daily 90 tablet 0     UNABLE TO FIND Take 1 tablet by mouth daily MEDICATION NAME: Zymex - has almond, gig, papain, bromelain, amylase, cellulase, and lipase.       UNABLE TO FIND Take 1 tablet by mouth daily MEDICATION  NAME: Aidee Chiao - chinese supplement       UNABLE TO FIND MEDICATION NAME: Somnapure - 2 tablets = 500mg valerian root, 300mg lemon balm extract, 200mg l-theanine, 120mg hops extract, 50mg chamomile flower extract, 50mg passion flower extract, 3mg melatonin.  Takes 1-2 tablets at bedtime       UNABLE TO FIND MEDICATION NAME: Moringa 1 serving of powder daily       UNABLE TO FIND MEDICATION NAME: Premium Amla Berry 2 capsules = 4mg Vitamin C, 966mg organic amla, organic amla extract.  Takes 2 capsules daily       UNABLE TO FIND MEDICATION NAME: OmegaKrill 5x 3 capsules = 480mg of total omega-3, 64mg EPA, 392mg DHA, 300mcg astaxanthin.  Takes 3 capsules daily       UNABLE TO FIND MEDICATION NAME: Super Colon Cleanse 2 capsules = 665mg senna leaf powder, 321mg psyllium husk powder, 21mg fennel seed powder, 21mg papaya leaf powder, 21mg yolanda hips fruit powder, 11mg l-acidophilus.  Taking 4 capsules daily       valACYclovir (VALTREX) 1000 mg tablet TAKE 2 TABLETS(2000 MG) BY MOUTH TWICE DAILY AS NEEDED 8 tablet 0     ZOLEDRONIC ACID IV Inject into the vein every 6 months       UNABLE TO FIND MEDICATION NAME: Majestha powder daily       ZINC SULFATE-VITAMIN C MT Take 1 tablet by mouth daily Also contains copper, magnesium, and manganese.         ALLERGIES     Allergies   Allergen Reactions     Cats      Codeine Sulfate GI Disturbance       PAST MEDICAL HISTORY:  Past Medical History:   Diagnosis Date     Alcoholism (H)      Allergies     Fall     Arthritis      Basal cell cancer     back, chest, face     Breast cancer (H)      Coronary artery disease     CT calcium usihi=774 Nov 2015     Depression      Hyperlipidemia LDL goal <130 12/2/2015     Hypertension     borderline     PONV (postoperative nausea and vomiting)      Squamous cell carcinoma     left upper arm, chin       PAST SURGICAL HISTORY:  Past Surgical History:   Procedure Laterality Date     ABDOMEN SURGERY  2007?    Hysterectomy     COLONOSCOPY        COLONOSCOPY  11/17/2011    Procedure:COLONOSCOPY; Colonoscopy; Surgeon:MEKA RUSS; Location: GI     COLONOSCOPY N/A 2/10/2020    Procedure: COLONOSCOPY, WITH POLYPECTOMY AND BIOPSY;  Surgeon: Opal Ace MD;  Location:  GI     DISSECT LYMPH NODE AXILLA Right 11/2/2017    Procedure: DISSECT LYMPH NODE AXILLA;  RIGHT AXILLARY LYMPH NODE DISSECTION;  Surgeon: Cortez White MD;  Location:  [facility]     GYN SURGERY  2005    hysterectomy after hx of ovarian cyst, ended up being benign     HYSTERECTOMY, PAP NO LONGER INDICATED       MASTECTOMY MODIFIED RADICAL  2011    bilateral     MASTECTOMY, BILATERAL       ORTHOPEDIC SURGERY      rt. knee arthroscopy     ORTHOPEDIC SURGERY Right     toe surgery     REALIGN PATELLA  1973    right      TOE SURGERY      right grt OA        FAMILY HISTORY:  Family History   Problem Relation Age of Onset     Cancer Mother 60        leukemia     Arthritis Mother         osteo     Eye Disorder Mother         glaucoma     Gastrointestinal Disease Mother         gallbladder     Other Cancer Mother      Gallbladder Disease Mother      Alcohol/Drug Father         alcohol     Heart Disease Father         ? CHF     Respiratory Father         emphysema     Coronary Artery Disease Father      Substance Abuse Father      Substance Abuse Sister      Anxiety Disorder Sister      Asthma Sister      Colon Cancer Brother      Breast Cancer Maternal Grandmother      Asthma Sister      Substance Abuse Sister      Cancer - colorectal Brother 50     Prostate Cancer Brother      Allergies Brother         bee      Arthritis Sister         osteo     Arthritis Sister         osteo     Arthritis Brother         osteo     Depression Sister      Psychotic Disorder Sister         bipolar     Respiratory Sister      Colon Cancer Brother      Prostate Cancer Brother      Depression Sister      Asthma Sister        SOCIAL HISTORY:  Social History     Socioeconomic History     Marital status:       Spouse name: None     Number of children: None     Years of education: None     Highest education level: None   Occupational History     None   Social Needs     Financial resource strain: None     Food insecurity     Worry: None     Inability: None     Transportation needs     Medical: None     Non-medical: None   Tobacco Use     Smoking status: Former Smoker     Packs/day: 1.00     Years: 22.00     Pack years: 22.00     Types: Cigarettes     Start date: 7/7/1969     Quit date: 4/28/2010     Years since quitting: 10.9     Smokeless tobacco: Never Used     Tobacco comment: I have been chewing nicorette gum 3 yrs and 3 months now. I have quit 8 and 9 yrs and periods in bet   Substance and Sexual Activity     Alcohol use: No     Alcohol/week: 0.0 standard drinks     Comment: intermittent sobriety 8/24/11     Drug use: Yes     Types: Marijuana     Comment: for sleeping/occ     Sexual activity: Yes     Partners: Male     Birth control/protection: Surgical     Comment: hyst   Lifestyle     Physical activity     Days per week: None     Minutes per session: None     Stress: None   Relationships     Social connections     Talks on phone: None     Gets together: None     Attends Restorationism service: None     Active member of club or organization: None     Attends meetings of clubs or organizations: None     Relationship status: None     Intimate partner violence     Fear of current or ex partner: None     Emotionally abused: None     Physically abused: None     Forced sexual activity: None   Other Topics Concern      Service No     Blood Transfusions No     Caffeine Concern Yes     Comment: 4-5 cups caffeine per day     Occupational Exposure No     Hobby Hazards No     Sleep Concern Yes     Stress Concern Yes     Weight Concern No     Special Diet Yes     Comment: organic foods     Back Care No     Exercise No     Bike Helmet No     Seat Belt Yes     Self-Exams Yes     Parent/sibling w/ CABG, MI or angioplasty  "before 65F 55M? No   Social History Narrative     None       Review of Systems:  Skin:  Negative     Eyes:  Positive for glasses  ENT:  Negative    Respiratory:  Negative    Cardiovascular:  Negative    Gastroenterology: Negative    Genitourinary:  Negative    Musculoskeletal:  Positive for arthritis  Neurologic:  Positive for headaches  Psychiatric:  Positive for sleep disturbances  Heme/Lymph/Imm:  Negative    Endocrine:  Negative      Physical Exam:  Vitals: /77   Pulse 78   Ht 1.632 m (5' 4.25\")   Wt 65.4 kg (144 lb 3.2 oz)   BMI 24.56 kg/m       GEN:  NAD  NECK: No JVD  C/V:  Regular rate and rhythm, no murmur, rub or gallop.  RESP: Clear to auscultation bilaterally without wheezing, rales, or rhonchi.  GI: Abdomen soft, nontender, nondistended. No HSM appreciated.   EXTREM: No LE edema.   NEURO: Alert and oriented, cooperative. No obvious focal deficits.   PSYCH: Normal affect.  SKIN: Warm and dry.       Recent Lab Results:  LIPID RESULTS:  Lab Results   Component Value Date    CHOL 179 03/23/2021     03/23/2021    LDL 54 03/23/2021    TRIG 87 03/23/2021    CHOLHDLRATIO 3.3 10/29/2015       LIVER ENZYME RESULTS:  Lab Results   Component Value Date    AST 18 10/31/2019    ALT 35 10/31/2019       CBC RESULTS:  Lab Results   Component Value Date    WBC 7.3 01/11/2021    RBC 4.17 01/11/2021    HGB 13.6 01/11/2021    HCT 40.6 01/11/2021    MCV 97 01/11/2021    MCH 32.6 01/11/2021    MCHC 33.5 01/11/2021    RDW 13.1 01/11/2021     01/11/2021       BMP RESULTS:  Lab Results   Component Value Date     01/11/2021    POTASSIUM 4.2 01/11/2021    CHLORIDE 101 01/11/2021    CO2 24 01/11/2021    ANIONGAP 9 01/11/2021     (H) 01/11/2021    BUN 17 01/11/2021    CR 0.74 01/11/2021    GFRESTIMATED 83 01/11/2021    GFRESTBLACK >90 01/11/2021    YASMIN 9.2 01/11/2021        A1C RESULTS:  Lab Results   Component Value Date    A1C 5.6 11/18/2016       INR RESULTS:  No results found for: " INR        Taiwo Anthony PA-C  March 24, 2021       cc:   No referring provider defined for this encounter.

## 2021-03-30 ENCOUNTER — TELEPHONE (OUTPATIENT)
Dept: PSYCHIATRY | Facility: CLINIC | Age: 71
End: 2021-03-30

## 2021-03-30 NOTE — TELEPHONE ENCOUNTER
PSYCHIATRY CLINIC PHONE INTAKE     SERVICES REQUESTED / INTERESTED IN          Other:  TMS    Presenting Problem and Brief History                              What would you like to be seen for? (brief description):  Pt was diagnosed over 10 years ago. She has tried several medications, but hasn't found any to be successful. She's interested in TMS. Pt just weened herself off of her last mental health medications. Sleep is terrible, it's hard to stay asleep. Pt has no energy, restless, and anxious.     Have you received a mental health diagnosis? Yes   Which one (s): Depression  Is there any history of developmental delay?  No   Are you currently seeing a mental health provider?  No            Who / month last seen:  NA  Do you have mental health records elsewhere?  No  Will you sign a release so we can obtain them?  No    Have you ever been hospitalized for psychiatric reasons?  No  Describe:  NA    Do you have current thoughts of self-harm?  No    Do you currently have thoughts of harming others?  No       Substance Use History     Do you have any history of alcohol / illicit drug use?  Yes Describe:  Alcohol abuse. Pt has slipped a few times lately.        Social History     Who is the patient's a guardian?  No    Name / number: NA  Have you had an ACT team in last 12 months?  No  Describe: NA   Do you have any current or past legal issues?  No  Describe: NA   OK to leave a detailed voicemail?  Yes    Medical/ Surgical History                                   Patient Active Problem List   Diagnosis     Breast cancer est/prog +, HER2 ERNIE -     Osteoarthritis     Moderate episode of recurrent major depressive disorder (H)     Advanced directives, counseling/discussion     Knee pain     Past use of tobacco     IFG (impaired fasting glucose)     Low back pain     Malignant neoplasm of right breast (H)     Hyperlipidemia LDL goal <70     Follow-up examination following surgery     Atherosclerosis of left carotid  artery     Chronic, continuous use of opioids     Adjustment disorder with mixed anxiety and depressed mood     Neck pain on right side     Hyperparathyroidism (H)     Alcohol dependence in remission (H)     High coronary artery calcium score          Medications             Current Outpatient Medications   Medication Sig Dispense Refill     ASHWAGANDHA PO Take 1 tablet by mouth daily prn       aspirin 81 MG tablet Take 1 tablet (81 mg) by mouth daily 30 tablet      calcium carbonate 600 mg-vitamin D 400 units (CALTRATE) 600-400 MG-UNIT per tablet Take 1 tablet by mouth daily        Cholecalciferol (VITAMIN D-3) 5000 units TABS Take 1 tablet by mouth daily        Coenzyme Q10 300 MG CAPS Take 300 mg by mouth daily       HYDROcodone-acetaminophen (NORCO) 5-325 MG tablet Take 1 tablet by mouth daily as needed for pain 20 tablet 0     IBUPROFEN PO Take 200 mg by mouth every 4 hours as needed for moderate pain        lactobacillus rhamnosus, GG, (CULTURELL) capsule Take 1 capsule by mouth daily        letrozole (FEMARA) 2.5 MG tablet Take 1 tablet by mouth daily  5     LYSINE 1-3 daily as needed       Melatonin 10 MG TABS tablet Take 10 mg by mouth nightly as needed for sleep       multivitamin (OCUVITE) TABS tablet Take 1 tablet by mouth daily       nicotine polacrilex (NICORETTE) 2 MG gum Place 1 each (2 mg) inside cheek as needed for smoking cessation 30 tablet 11     omeprazole 20 MG tablet Take 1 tablet (20 mg) by mouth as needed 90 tablet 3     rosuvastatin (CRESTOR) 10 MG tablet Take 1 tablet (10 mg) by mouth daily 90 tablet 0     UNABLE TO FIND Take 1 tablet by mouth daily MEDICATION NAME: Zymex - has almond, gig, papain, bromelain, amylase, cellulase, and lipase.       UNABLE TO FIND Take 1 tablet by mouth daily MEDICATION NAME: Aidee Duong - chinese supplement       UNABLE TO FIND MEDICATION NAME: Somnapure - 2 tablets = 500mg valerian root, 300mg lemon balm extract, 200mg l-theanine, 120mg hops extract, 50mg  chamomile flower extract, 50mg passion flower extract, 3mg melatonin.  Takes 1-2 tablets at bedtime       UNABLE TO FIND MEDICATION NAME: Moringa 1 serving of powder daily       UNABLE TO FIND MEDICATION NAME: Premium Amla Berry 2 capsules = 4mg Vitamin C, 966mg organic amla, organic amla extract.  Takes 2 capsules daily       UNABLE TO FIND MEDICATION NAME: OmegaKrill 5x 3 capsules = 480mg of total omega-3, 64mg EPA, 392mg DHA, 300mcg astaxanthin.  Takes 3 capsules daily       UNABLE TO FIND MEDICATION NAME: Super Colon Cleanse 2 capsules = 665mg senna leaf powder, 321mg psyllium husk powder, 21mg fennel seed powder, 21mg papaya leaf powder, 21mg yolanda hips fruit powder, 11mg l-acidophilus.  Taking 4 capsules daily       UNABLE TO FIND MEDICATION NAME: Majestha powder daily       valACYclovir (VALTREX) 1000 mg tablet TAKE 2 TABLETS(2000 MG) BY MOUTH TWICE DAILY AS NEEDED 8 tablet 0     ZINC SULFATE-VITAMIN C MT Take 1 tablet by mouth daily Also contains copper, magnesium, and manganese.       ZOLEDRONIC ACID IV Inject into the vein every 6 months           DISPOSITION      3/30/21 Intake complete. Sending to Melinda Yung for review.     [place] [employer],

## 2021-04-12 DIAGNOSIS — E78.2 MIXED HYPERLIPIDEMIA: ICD-10-CM

## 2021-04-12 RX ORDER — ROSUVASTATIN CALCIUM 10 MG/1
10 TABLET, COATED ORAL DAILY
Qty: 90 TABLET | Refills: 2 | Status: SHIPPED | OUTPATIENT
Start: 2021-04-12 | End: 2022-07-10

## 2021-04-14 ENCOUNTER — OFFICE VISIT (OUTPATIENT)
Dept: PSYCHIATRY | Facility: CLINIC | Age: 71
End: 2021-04-14
Payer: MEDICARE

## 2021-04-14 DIAGNOSIS — F33.2 SEVERE EPISODE OF RECURRENT MAJOR DEPRESSIVE DISORDER, WITHOUT PSYCHOTIC FEATURES (H): Primary | ICD-10-CM

## 2021-04-14 NOTE — PROGRESS NOTES
"[facility] Treatment Resistant Depression Program  Diagnostic Assessment  A part of the [facility] Psychiatry Mood Disorders Program    Svetlana Adair MRN# [id number]   Age: 70 year old YOB: 1950     Date of Evaluation: 4/14/21  Start Time: 9:00am; End Time: 10:20am           Care Team     PCP- Vladislav Cruz  Specialty Providers- no  Therapist- no  Psychiatric Med Management Provider- no  Other Mental Health Providers- no    Referred by:  Self  Referred for evaluation of:  depression.         Contributors to the Assessment     Chart Reviewed.   Interview completed with Svetlana Adair.  Releases of information signed by Svetlana for none.  Collateral information obtained from none.         Chief Complaint     \"I feel like I'm grieving all over again, my depression has really worsened the past year or so.\"         History of Present Illness      Svetlana Adair is a 70 year old female who prefers the name Mahnaz & pronouns she, her, hers.    Mahnaz states she has been dealing with depression off and on for most of her adult life, however, symptoms  have been worse the last year or so.  Mahnaz reported that she first  her high school sweetheart and lived a young life full of partying and drinking. After a divorce and a DWI,  sought treatment through [facility] - she remained sober for 10 years before beginning to drink periodically.     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 Owning the business came with additional stress, ultimately resulting in financial struggles that led the couple to see two of the franchises. During this time, Mahnaz sought out both counseling and medication to help deal with symptoms of depression. Since then, Mahnaz has taken medication  off and on.  In 1998, Mahnaz saw the frequency of her drinking increase and decided to attend an outpatient women s program at [facility] for alcohol use.     In " 1999, Laurel was diagnosed with early on-set Alzheimer s. The following year Mahnaz s mother passed away, around the time that her closest friends moved out of state. Laurel and Mahnaz joined an Alzheimer s support group with a psychoeducational component. They were able to travel for a few years until Laurel s symptoms worsened. For many years, Mahnaz served as his primary caretaker, eventually placing Laurel in a long-term care institute. Mahnaz states it was difficult to find proper care for Laurel and many facilities refused to house him. Mahnaz states the residential care facility  was the beginning of the end for Laurel  and that  he was over-medicated and not himself anymore.  Laurel ultimately passed away in 2009, in a hospital with Mahnaz and his two sons present.    Just two years later, Mahnaz was diagnosed with breast cancer. After treatment, Mahnaz was given the  all clear,  stating she was cancer free. However, in 2017, Mahnaz received a Lymphedema diagnosis, of which she is still fighting. In 2019, Mahnaz contracted COVID-19. She states she got extremely ill and that even her  cells felt heavy.  Mahnaz states she has residual symptoms such as  brain fog  and fatigue. It was around this time that Mahnaz began drinking again and depression symptoms worsened. Mahnaz reports she now consumes  2-4 drinks daily  and feels as though she  is grieving all over again.   Mahnaz currently states she feels  hopeless, trapped, exhausted, forgetful, and extremely bored.  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  Mahnaz states she cannot leave the job because she is  essentially broke  after investing the majority of her finances in a wellness company called  [employer].      Last week, Mahnaz reports finding her neighbor dead, after the family had asked Mahnaz to check on her.     Mahnaz is currently not on  any medications for her depression, however, plans to begin seeing a therapist in the near future.     Psych critical item history includes trauma hx, substance use: alcohol, substance use treatment  and major medical problems (Lymphemdema).     DATA     PHQ9 was completed today, 21  Scored at 15    Over the last 2 weeks, how often have you been bothered by any the following problems?    1. Little interest or pleasure in doing things: 2 - More than half the days  2. Feeling down, depressed, or hopeless: 3 - Nearly every day  3. Trouble falling or staying asleep, or sleeping too much: 3 - Nearly every day  4. Feeling tired or having little energy: 3 - Nearly every day  5. Poor appetite or overeatin - Several days  6. Feeling bad about yourself - or that you are a failure or have let yourself or your family down: 1 - Several days  7. Trouble concentrating on things, such as reading the newspaper or watching television: 2 - More than half the days  8. Moving or speaking so slowly that other people could have noticed. Or the opposite-being so fidgety or restless that you have been moving around a lot more than usual: 0 - Not at all  9. Thoughts that you would be better off dead, or of hurting yourself in some way: 0 - Not at all    If you checked off any problems, how difficulty have these problems made it for you to do your work, take care of things at home, or get along with other people? Somewhat difficult     GAD7 was completed today, 21  Scored at 13    Over the last 2 weeks, how often have you been bothered by the following problems?    1. Feeling nervous, anxious or on edge: 2 - More than half the days  2. Not being able to stop or control worryin - More than half the days  3. Worrying too much about different things: 2 - More than half the days  4. Trouble relaxing: 3 - Nearly every day  5. Being so restless that it is hard to sit still: 1 - Several days  6. Becoming easily annoyed or  "irritable: 2 - More than half the days  7. Feeling afraid as if something awful might happen: 1 - Several days     CAGE-AID was completed today, 4/14/21  1. In the last three months, have you felt you should cut down or stop drinking or using drugs? yes  2. In the last three months, has anyone annoyed you or gotten on your nerves by telling you to cut down or stop drinking or using drugs? yes  3. In the last three months, have you felt guilty or bad about how much you drink or use drugs? yes  4. In the last three months, have you been waking up wanting to have an alcoholic drink or use drugs? yes         Psychiatric Review of Systems (Completed M.I.N.I. Version 7.0.2: Yes)     A. DEPRESSION  Past 2 Weeks:  low mood nearly every day, anhedonia most of the time, appetite change (decrease), difficulties with sleep, psychomotor changes (agitation), low energy, worthlessness and/or guilt, difficulty concentrating, thinking or making decisions and suicidal ideation without plan, without intent    Past Episode:  low mood nearly every day, anhedonia most of the time, difficulties with sleep, psychomotor changes (agitation), low energy and difficulty concentrating, thinking or making decisions    B. SUICIDALITY: Current: Yes, risk Low  -reports 0 lifetime suicide attempts  -reports 0% in response to \"How likely are to you to try to kill yourself within the next 3 months on a scale from 0-100%?\"  -denies current SI, denies intent and denies plan  -denies current SIB/Self Injurious Behavior    C. PEDRO/HYPOMANIA  Current Episode:  none    Past Episode:  none    D. PANIC:  none    E. AGORAPHOBIA:  none    F. SOCIAL ANXIETY:  none    G. OBSESSIVE-COMPULSIVE:  none    H. TRAUMA:  experienced traumatic event and witnessed traumatic event    I. ALCOHOL & J. NON-ALCOHOL:  See below    K. PSYCHOSIS:   none    L-M. EATING DISORDER: none    N. GENERALIZED ANXIETY:  excessive anxiety or worry about several routine things, most days, " with difficulty controlling worry, feel restless, keyed up or on edge, muscle tension, easily tired, weak or exhausted, difficulty concentrating or mind goes blank, irritability and difficulty sleeping    O. RULE OUT MEDICAL, ORGANIC OR DRUG CAUSES FOR ALL DISORDERS  During any current disorder or past mood episode, patient reports:  A. Substance use or withdrawal: No  B. Medical illness: No    P. ANTISOCIAL PERSONALITY:  none     Other Cluster B Traits Identified (not formally assessed):  unstable/intense interpersonal relationships, impulsive spending and impulsive substance abuse    SUBSTANCE USE HISTORY                                                                 RECENT SUBSTANCE USE:     TOBACCO- Nicorette gum daily     CAFFEINE- 2 cups/day of coffee   ALCOHOL- 2-4 drink(s) per day     CANNABIS- weekly             OTHER ILLICIT DRUGS- none    Past Use-   TOBACCO- previously smoked cigarettes       CAFFEINE- 2 cups/day of coffee   ALCOHOL- quit twice in lifetime, last began drinking in 2019    CANNABIS- weekly            OTHER ILLICIT DRUGS- none    CD Treatment Hx: Yes - 1980's and 1998 for alcohol use  Medical Consequences (eg HIV/Hepatitis)- No  Legal Consequences- DWI     PSYCHIATRIC HISTORY     Past diagnoses: Major Depressive Disorder    Past medication trials: See PharmD report    Hospitalizations: 0    Commitment: No, Current Posadas order: No    ECT trials: No    TMS trials:  No      Suicide attempts: No    Self-injurious behavior: No    Violent behavior: No    Outpatient Programs & Services [Psychotherapy, DBT, Day Treatment, Eating Disorder Tx etc]:   Current:  Not currently recieving services    Past:  Medication management, Outpatient individual psychotherapy and Substance use treatment    SOCIAL and FAMILY HISTORY                        patient reported                                     Living situation: Svetlana lives alone, in a Private Residence.   Guns, weapons, or other means to harm  oneself in the home? No  Pets at home? 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    Finances: Svetlana is financial supported by Employment and SSI, Supplemental Security Income    Relationships: Specific Relationships & Quality of Relationship:  -Sister, brother  - 2 close friends - Hailey and Xavier  -AA Support group; sponsor   - Meditation group    Spiritual considerations: No    Cultural influences: Svetlana identifies is race as white. Svetlana reports  No  to cultural considerations to take into account when providing treatment.     Sexuality:  Svetlana identifies as female with heterosexual sexual orientation and preferred pronouns she, her, hers.    Strengths & Coping Strategies:    Mahnaz is resilient and maintains a strong sense of humor. She engages in a daily meditation group in order to help manage symptoms and practice mindfulness.     Legal Hx: Yes - DWI in 1980s    Trauma/Abuse Hx: Yes - caretaker for  with Alzheimer's, found neighbor after they passed     Hx: No    Family Mental Health Hx- Sister with Bipolar; Mom with Depression    PAST PSYCH MED TRIALS      Will be reviewed during MTM.    MEDICAL / SURGICAL HISTORY                                   Patient Active Problem List   Diagnosis     Breast cancer est/prog +, HER2 ERNIE -     Osteoarthritis     Moderate episode of recurrent major depressive disorder (H)     Advanced directives, counseling/discussion     Knee pain     Past use of tobacco     IFG (impaired fasting glucose)     Low back pain     Malignant neoplasm of right breast (H)     Hyperlipidemia LDL goal <70     Follow-up examination following surgery     Atherosclerosis of left carotid artery     Chronic, continuous use of opioids     Adjustment disorder with mixed anxiety and depressed mood     Neck pain on right side     Hyperparathyroidism (H)     Alcohol dependence in  remission (H)     High coronary artery calcium score       Past Surgical History:   Procedure Laterality Date     ABDOMEN SURGERY  2007?    Hysterectomy     COLONOSCOPY       COLONOSCOPY  11/17/2011    Procedure:COLONOSCOPY; Colonoscopy; Surgeon:MEKA RUSS; Location: GI     COLONOSCOPY N/A 2/10/2020    Procedure: COLONOSCOPY, WITH POLYPECTOMY AND BIOPSY;  Surgeon: Opla Ace MD;  Location:  GI     DISSECT LYMPH NODE AXILLA Right 11/2/2017    Procedure: DISSECT LYMPH NODE AXILLA;  RIGHT AXILLARY LYMPH NODE DISSECTION;  Surgeon: Cortez White MD;  Location:  [facility]     GYN SURGERY  2005    hysterectomy after hx of ovarian cyst, ended up being benign     HYSTERECTOMY, PAP NO LONGER INDICATED       MASTECTOMY MODIFIED RADICAL  2011    bilateral     MASTECTOMY, BILATERAL       ORTHOPEDIC SURGERY      rt. knee arthroscopy     ORTHOPEDIC SURGERY Right     toe surgery     REALIGN PATELLA  1973    right      TOE SURGERY      right grt OA         History of seizures: no   History of head trauma/loss of consciousness: no     ALLERGY                                Cats and Codeine sulfate    MEDICATIONS                               Current Outpatient Medications   Medication Sig Dispense Refill     ASHWAGANDHA PO Take 1 tablet by mouth daily prn       aspirin 81 MG tablet Take 1 tablet (81 mg) by mouth daily 30 tablet      calcium carbonate 600 mg-vitamin D 400 units (CALTRATE) 600-400 MG-UNIT per tablet Take 1 tablet by mouth daily        Cholecalciferol (VITAMIN D-3) 5000 units TABS Take 1 tablet by mouth daily        Coenzyme Q10 300 MG CAPS Take 300 mg by mouth daily       HYDROcodone-acetaminophen (NORCO) 5-325 MG tablet Take 1 tablet by mouth daily as needed for pain 20 tablet 0     IBUPROFEN PO Take 200 mg by mouth every 4 hours as needed for moderate pain        lactobacillus rhamnosus, GG, (CULTURELL) capsule Take 1 capsule by mouth daily        letrozole (FEMARA) 2.5 MG tablet Take 1 tablet by  mouth daily  5     LYSINE 1-3 daily as needed       Melatonin 10 MG TABS tablet Take 10 mg by mouth nightly as needed for sleep       multivitamin (OCUVITE) TABS tablet Take 1 tablet by mouth daily       nicotine polacrilex (NICORETTE) 2 MG gum Place 1 each (2 mg) inside cheek as needed for smoking cessation 30 tablet 11     omeprazole 20 MG tablet Take 1 tablet (20 mg) by mouth as needed 90 tablet 3     rosuvastatin (CRESTOR) 10 MG tablet Take 1 tablet (10 mg) by mouth daily 90 tablet 2     UNABLE TO FIND Take 1 tablet by mouth daily MEDICATION NAME: Zymex - has almond, gig, papain, bromelain, amylase, cellulase, and lipase.       UNABLE TO FIND Take 1 tablet by mouth daily MEDICATION NAME: Yin Kolbyao - chinese supplement       UNABLE TO FIND MEDICATION NAME: Somnapure - 2 tablets = 500mg valerian root, 300mg lemon balm extract, 200mg l-theanine, 120mg hops extract, 50mg chamomile flower extract, 50mg passion flower extract, 3mg melatonin.  Takes 1-2 tablets at bedtime       UNABLE TO FIND MEDICATION NAME: Moringa 1 serving of powder daily       UNABLE TO FIND MEDICATION NAME: Premium Amla Berry 2 capsules = 4mg Vitamin C, 966mg organic amla, organic amla extract.  Takes 2 capsules daily       UNABLE TO FIND MEDICATION NAME: OmegaKrill 5x 3 capsules = 480mg of total omega-3, 64mg EPA, 392mg DHA, 300mcg astaxanthin.  Takes 3 capsules daily       UNABLE TO FIND MEDICATION NAME: Super Colon Cleanse 2 capsules = 665mg senna leaf powder, 321mg psyllium husk powder, 21mg fennel seed powder, 21mg papaya leaf powder, 21mg yolanda hips fruit powder, 11mg l-acidophilus.  Taking 4 capsules daily       UNABLE TO FIND MEDICATION NAME: Majestha powder daily       valACYclovir (VALTREX) 1000 mg tablet TAKE 2 TABLETS(2000 MG) BY MOUTH TWICE DAILY AS NEEDED 8 tablet 0     ZINC SULFATE-VITAMIN C MT Take 1 tablet by mouth daily Also contains copper, magnesium, and manganese.       ZOLEDRONIC ACID IV Inject into the vein every 6 months          VITALS                                                                                                                            3, 3   There were no vitals taken for this visit.     MENTAL STATUS EXAM                                                                                    9, 14 cog gs     Alertness: alert   Appearance: well groomed  Behavior/Demeanor: cooperative, with good  eye contact   Speech: increased rate  Language: intact and no problems  Psychomotor: normal or unremarkable  Mood: depressed and anxious  Affect: tearful and appropriate; was congruent to mood; was congruent to content  Thought Process/Associations: tangential  Thought Content:  Reports none;  Denies suicidal ideation, violent ideation and delusions  Perception:  Reports none;  Denies auditory hallucinations and visual hallucinations  Insight: good  Judgment: adequate for safety  Cognition: (6) does  appear grossly intact; formal cognitive testing was not done    PSYCHIATRIC DIAGNOSES                                                                                               Major Depressive Disorder, recurrent, severe  Generalized Anxiety Disorder     ASSESSMENT                                                                                                          m2, h3     Please note, writer did not receive all pertinent medical records as of the time of this assessment. Svetlana will sign NEIDA's for additional records.     Svetlana Adair is a 70 year old   female with a psychiatric history of Major Depressive Disorder who presents for a [facility] Treatment Resistant Depression program evaluation. Svetlana was referred by self. She has a history of 0 psychiatric hospitalization(s). Family mental health history includes major depressive disorder and bipolar disorder.    Today, Svetlana presents as a Good historian with Adequate insight. She estimates 1 lifetime episodes of depression with onset in her 30s.  Svetlana s past and present depressive symptoms seem consistent with a diagnosis of Major Depressive Disorder, recurrent, severe. Depressive symptoms seem to contribute to impaired functioning in the areas of ADLs, family / partner relationships , social relationships, occupational performance and emotional wellbeing . Precipitating factors seem to include loss of , substance use. Perpetuating factors may consist of financial struggles, grief, substance use. Past treatment approaches include medication management, individual therapy, substance use treatment. Svetlana is presently participating in a AA support group with interest in TMS or ketamine.    In addition, the M.I.N.I. Interview scores positively for a diagnosis/diagnoses of Generalized Anxiety Disorder. Substance use does seem to be a current problem.     Today, Svetlana denies SI, denies intent, and denies plan. She has notable risk factors for self-harm including feels trapped, hopelessness, lives alone/ isolated, substance use / pending treatment and financial/legal stress.  However, risk is mitigated by no h/o suicide attempt, no plan or intent and describes a safety plan.  Based on all available evidence she does not appear to be at imminent risk for self-harm therefore does not meet criteria for a 72-hr hold/  involuntary hospitalization. Additional steps to minimize risk include: SAFETY PLAN completed to include reaching out to AA sponsor and going to an ED if feeling unsafe.. 24/7 crisis resources were provided in writing.     PLAN                                                                                                                        m2, h3   Next steps are as follows with intention of completing a comprehensive multi-disciplinary assessment utilizing today's evaluation, the expertise of a PharmD, as well as a Psychiatrist. Informed Svetlana that if deemed appropriate for Interventional Psychiatry treatments, care will be provided  with goal of stabilization with subsequent transfer back to the community (I.e. PCP or previous psychiatrist).    Medications: Will be addressed during an MTM visit and new patient medication evaluation with a Psychiatrist.   Current medication provider is (if none, offer referral): see Care Team above    Therapy:  No    Crisis Numbers:  did provided 24/7 crisis resources including but not limited to the following:  National Suicide Prevention Hotline: 5-575-026-MJIM (3249)  Crisis Text Line: Text START to 803-798    Other Referrals:  Yes - Counseling referrals were provided to the patient via email.     RTC: For MTM visit.    Mansi Nelson     [Billing Code 72317 for diagnostic assessment without medical services]

## 2021-04-15 ENCOUNTER — TELEPHONE (OUTPATIENT)
Dept: PSYCHIATRY | Facility: CLINIC | Age: 71
End: 2021-04-15

## 2021-04-15 NOTE — TELEPHONE ENCOUNTER
Patient called to relay some more info from the appointment yesterday. Please call back at your earliest convenience.

## 2021-04-19 NOTE — TELEPHONE ENCOUNTER
Patient called again asking for a return call from [facility]. Please call back when available.

## 2021-04-26 ENCOUNTER — TRANSFERRED RECORDS (OUTPATIENT)
Dept: HEALTH INFORMATION MANAGEMENT | Facility: CLINIC | Age: 71
End: 2021-04-26

## 2021-04-28 ENCOUNTER — VIRTUAL VISIT (OUTPATIENT)
Dept: PSYCHIATRY | Facility: CLINIC | Age: 71
End: 2021-04-28
Payer: MEDICARE

## 2021-04-28 DIAGNOSIS — F33.2 SEVERE EPISODE OF RECURRENT MAJOR DEPRESSIVE DISORDER, WITHOUT PSYCHOTIC FEATURES (H): Primary | ICD-10-CM

## 2021-04-28 DIAGNOSIS — M54.2 NECK PAIN ON RIGHT SIDE: ICD-10-CM

## 2021-04-28 RX ORDER — HYDROCODONE BITARTRATE AND ACETAMINOPHEN 5; 325 MG/1; MG/1
1 TABLET ORAL DAILY PRN
Qty: 20 TABLET | Refills: 0 | Status: SHIPPED | OUTPATIENT
Start: 2021-04-28 | End: 2021-05-25

## 2021-04-28 NOTE — TELEPHONE ENCOUNTER
Reason for Call:  Medication or medication refill:    Do you use a [facility]?  Name of the pharmacy and phone number for the current request:  [id number] - [place], [place] - [address] AT [facility] [address],    Name of the medication requested: HYDROcodone-acetaminophen (NORCO) 5-325 MG tablet     Other request:     Can we leave a detailed message on this number? YES    Phone number patient can be reached at: Cell number on file:    Telephone Information:   Mobile [phone]     Best Time: any    Call taken on 4/28/2021 at 9:41 AM by Rachele Morrissey

## 2021-04-28 NOTE — TELEPHONE ENCOUNTER
Routing refill request to provider for review/approval because:  Drug not on the G refill protocol     Pending Prescriptions:                       Disp   Refills    HYDROcodone-acetaminophen (NORCO) 5-325 M*20 tab*0            Sig: Take 1 tablet by mouth daily as needed for pain    Last Written Prescription Date:  3/25/21  Last Fill Quantity: 20,  # refills: 0   Last office visit: virtual 3/25/21 with PCP   Future Office Visit:   Next 5 appointments (look out 90 days)    Jun 28, 2021 11:30 AM  Office Visit with Vladislav Cruz MD  [facility] [address] [facility] [address]  [phone]           Jasmyn Regan, RN  [facility]

## 2021-04-28 NOTE — PROGRESS NOTES
Mahnaz is a 70 year old who is being evaluated via a billable video visit.      How would you like to obtain your AVS? Inside Jobshart  If the video visit is dropped, the invitation should be resent by: Text to cell phone: [phone]  Will anyone else be joining your video visit? No      Video Start Time: 10:00 am  Video-Visit Details    Type of service:  Video Visit    Video End Time:11:15 am    Originating Location (pt. Location): Home    Distant Location (provider location):  [facility] PSYCHIATRY     Platform used for Video Visit: Tanner

## 2021-04-30 ENCOUNTER — MYC REFILL (OUTPATIENT)
Dept: FAMILY MEDICINE | Facility: CLINIC | Age: 71
End: 2021-04-30

## 2021-04-30 DIAGNOSIS — B00.9 HSV (HERPES SIMPLEX VIRUS) INFECTION: ICD-10-CM

## 2021-05-03 ENCOUNTER — MYC MEDICAL ADVICE (OUTPATIENT)
Dept: PHARMACY | Facility: CLINIC | Age: 71
End: 2021-05-03

## 2021-05-03 ENCOUNTER — TELEPHONE (OUTPATIENT)
Dept: NEUROLOGY | Facility: CLINIC | Age: 71
End: 2021-05-03

## 2021-05-03 RX ORDER — VALACYCLOVIR HYDROCHLORIDE 1 G/1
2000 TABLET, FILM COATED ORAL 2 TIMES DAILY PRN
Qty: 8 TABLET | Refills: 0 | Status: SHIPPED | OUTPATIENT
Start: 2021-05-03 | End: 2021-08-13

## 2021-05-03 NOTE — TELEPHONE ENCOUNTER
What is the concern that needs to be addressed by a nurse? Pt has appt on Thursday and wanted to include product she is taking:   asea - salt water, but it rejuvenates cells. Can look up more information on [employer]  Pt just started taking product this week.     May a detailed message be left on voicemail? Yes, but no call back unless nurse or provider has questions.     Date of last office visit: 4/28/21    Message routed to: psych rn pool      0

## 2021-05-03 NOTE — TELEPHONE ENCOUNTER
Routing refill request to provider for review/approval because:  Last RX Valacyclovir: 10-31-19  Please review sig, quantity and #refills      Edith MILNER, RN      May 3, 2021  10:14 AM

## 2021-05-05 NOTE — PROGRESS NOTES
Service Date: 2021    VIDEO VISIT     M PHYSICIAN TRD PROGRAM MEDICATION THERAPY MANAGEMENT    This was a video visit from my home to the patient's home via [employer], and we used the patient's SMS #[phone].  The patient's MRN is [id number], and the , Tennille Carrera, was present for observation and training purposes with the patient's permission.  We used her e-mail [email or web address].  Starting time was 10:00 a.m.  Ending time was 11:15 a.m.       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  According to the patient, she has several clients.  She was seen for an M Physician TRD MTM consultation.  She lives in [place], [place].  This is a new adult patient to the M Physician TRD program.      Psychiatrist is Dr. Alvina Whiteside, and she will see her on 2021 in person, and this was communicated to the patient.    CHIEF COMPLAINTS:  Depression, anxiety, brain fog and no energy.    REASON FOR CONSULTATION:  Rating medication trials for antidepressant failure and assessment of antidepressant drugs and their history.      Informants include the patient and review of past medical record.      Time spent was 1 hour without the patient and 1 hour 15 minutes with the patient.    MEDICATION INFORMATION:  1.  Current Psychotropic Medications:  None.      2.  Concomitant Medications:    a.  Aspirin 81 mg.  b.  Ibuprofen 200 mg once a day up to 2 at noon and 2 at bedtime for a total of 1000 mg per day.Indication: pain  c.  Femara/letrozole 2.5 mg 1 a day.Indication: breast cancer treatment  d.  Norco/hydrocodone/acetaminophen 5/325, 1/2-1 pill a day.Indication severe pain  e.  Omeprazole 20 mg p.r.n.Indicatioon: heartburn   f.  Rosuvastatin/Crestor 10 mg 1 a day.Indication:  hypercholesteremia   g.  Valacyclovir/Valtrex 1000 mg 2 b.i.d. p.r.n. Indication: Herpes virus infection.  h.  Zoledronic acid IV injected into the vein every 6 months.  She got her last one.  That is for higher calcium levels caused by cancer.  i.  Nicorette 2 mg gum.    3.  Herbal Remedies:  a.  Cannabis.  The patient uses it weekly, a couple of hits a few times a week.    b.  Ashwagandha 1 tablet p.r.n.  c.  Calcium citrate and vitamin D.  d.  Vitamin D3, 5000 units a day.  e.  Coenzyme Q10, 300 mg per day.  f.  Multivitamin.  g.  Lactobacillus rhamnousus.    h.  Zymex whole food fiber.  i.  Advanced memory formula.  Has ginkgo biloba in it.  j.  MitoCORE has a proprietary blend and all kinds of herbals.  k.  Moringa shakes, BioTrust low-carb protein drinks.  l.  Vitamin C 1 a day.  m.  Celery powder.  n.  Bio Nutrition 40 foods and vegetables.  o.  Magnesium 80 mg.  p.  B6, 10 mg.  q.  Zinc 10 mg.    4.  DRUG-DRUG INTERACTIONS:  None with her medication.   All her herbal remedies are difficult to check because there is not an official governmental  body checking the content .      5.  CURRENT REPORTED SIDE EFFECTS:  None.    6.  GENE TESTING:  Was not done.    7.  ALLERGIES:    a.  Codeine:  Vomiting.    PAST MEDICAL HISTORY:  1.  Depression.  2.  History of breast cancer in 2011.  3.  Hypercholesteremia.  4.  Osteoarthritis.  5.  Diagnosis of lymphedema in 2017.  6.  History of alcohol abuse and currently using again.  7.  2020, COVID-19, 5 weeks headaches and got ill and has residual symptom, such as brain fog and fatigue, and that is what caused her to start drinking again.     8.  The patient has a sister with bipolar and a mom with depression.    DEPRESSION HISTORY:    1.  Depression off and on for most of her adult life.      2.  First time antidepressant drug started was in 1993, and that was Zoloft.      3.  Last episode started last year.    4.  Hospitalization for severe suicidal ideation or suicide  attempt:  Denied.    5.  Suicide ideation currently:  Denied.    6.  Individual psychotherapy:  Did counseling before around 1990, and she stated it did not help.    7.  The patient is in AA, and that is what she says was helping her.       SOCIAL AND ENVIRONMENTAL ASPECTS:  She lives alone.    PHARMACOTHERAPY INDICATORS:    A.  Pharmaceutical Aspects:  1.  Economic Assessment:  The patient has Medicare/Blue [employer] Blue Shield.  Prescription drug co-payment is manageable.    The cost of obtaining prescribed medications does not interfere with compliance.      2.  Ph[facility]:  [facility] in [place].      3.  Compliance Assessment:  The patient is independent in medication administration.      The patient is a good historian.    She does use a pillbox.    It is a daily one.    She misses medication rarely.  When her mood is an issue and she feels low, she has friends and friends from  and a couple of women she has known her whole life to whom she can reach out to.        B.  Pharmacokinetic Aspects:  4.  Habits and Chemical Use:  a.  Alcohol:  During her first marriage was partying and drinking, and after her divorce, she stopped for 10 years.  She again started a little bit and then again quit for 5 years.  She restarted with drinking 2-4 drinks a day of vodka currently.   Treatment:  The patient had treatment through [facility], remarried and got sober for 10 years, then started drinking periodically.  Built a franchise, [employer] franchise and additional stress started, and she started with medication and counseling off and on.  In 1998, drinking increased and went to outpatient women's program at [facility] for alcohol abuse.  In 1999, , Laurel, was diagnosed with early Alzheimer, and that was then again the cause of her alcohol abuse.      b.  Tobacco:  The patient uses Nicorette gum.  She was smoking a pack a day, but stopped 8 years ago.      c.  Caffeine:  Three cups a  day, and she stops before 3:00 in the afternoon.      d.  Recreational drugs:  Marijuana several times per week.  In the past, LSD, mescaline maybe 3-4 times, cocaine twice, mushroom once.  The patient states marijuana makes her sleepy.      e.  Exercise:  Used to exercise a lot, but nothing since COVID, but she works physically with patients.    f.  Hobbies:  Horseback riding, tennis and loved before photography.    RATING OF ANTIDEPRESSANT DRUGS:    1.  Selective serotonin reuptake inhibitors (SSRIs):  a.  Escitalopram/Lexapro:  08/2018.  I saw 10 mg.    b.  Sertraline/Zoloft in the 1990s.  She might have been on it for a year.    2.  Serotonin noradrenaline reuptake inhibitors (SNRIs):  a.  Duloxetine/Cymbalta:  6864-3757, 60 mg.  She stated her mood with the drug got worse.  In the note, it stated that the osteoarthritis worsened since she stopped it, but the patient says that is not true.  Dose gives it a rating of 4.  b.  Venlafaxine/Effexor was used between 1910-4068.  My max dose I saw was 75 mg per day.  This was used during her breast cancertreatment, but the patient does not remember that.      3.  Serotonin modulator and stimulators:  Negative.    4.  Noradrenaline and dopamine reuptake inhibitors (NDRIs):    Bupropion/Wellbutrin:She took it in the 1990s, and it was prescribed in 2020, 150 mg.  According to the patient, she never started it in 2020.  According to her, it helped with stopping smoking in the 1990s.    5.  Tricyclic antidepressants (TCAs):  Negative.    6.  Tetracylic antidepressants:  Negative.    7.  Monoamine oxidase inhibitors (MAOIs):  Negative.    Augmentation therapy:  Negative.    Miscellaneous:    a.  Gabapentin:  In 2018, 600 mg for pain.     b.  Omega 3/triglycerides/fish oil:  She still takes it.    c.  Iban wort was used many years ago.  No change.  d.  SHIRAZ-e was tried.  No change.  e.  Somnapure, melatonin and valerian root were tried.      Miscellaneous sleep aids:  a.   Diphenhydramine:  One time.  No change.  b.  Melatonin and Somnapure, and it helped.  c.  Gabapentin was tried.    Ketamine treatment history:  Negative.       Other reported treatments for depression and related mood disorders:    a.  TMS:  Negative.  b.  ECT:  Negative.  c.  VNS:  Negative.  d.  Bright lights:  Negative.    COMMENTS:  1.  Discussed with the patient that she has to stop alcohol consumption; otherwise, she would not be eligible for TMS because it could lower the seizure threshold, and she could have a seizure.  2.  The patient will follow up with MTM as needed.  3.  All MTM findings will be shared with the clinic psychiatrist.  4.  The patient was instructed to return for upcoming appointment with Dr. Whiteside on 2021 for recommendation and future treatment options.    Thank you for the opportunity to participate in the care of this patient.      Mi Oro, Jovi  Pharmaceutical Care Coordinator    Mi Oro PharmD        D: 2021   T: 2021   MT: mecca    Name:     ERIKA VALENCIA  MRN:      [id number]        Account:      [id number]   :      1950           Service Date: 2021       Document: [id number]

## 2021-05-06 ENCOUNTER — OFFICE VISIT (OUTPATIENT)
Dept: PSYCHIATRY | Facility: CLINIC | Age: 71
End: 2021-05-06
Payer: MEDICARE

## 2021-05-06 VITALS — BODY MASS INDEX: 24.93 KG/M2 | WEIGHT: 146.4 LBS | TEMPERATURE: 97.3 F

## 2021-05-06 DIAGNOSIS — F33.2 SEVERE RECURRENT MAJOR DEPRESSION WITHOUT PSYCHOTIC FEATURES (H): Primary | ICD-10-CM

## 2021-05-06 NOTE — PROGRESS NOTES
"     [facility] TMS Program  [address], [address]  [place] [address]  New Patient Evaluation       Svetlana Adair is a 70 year old female who prefers the name Mahnaz and pronoun she, her, hers.  Therapist: None currently - has appt scheduled for September 2021 (next available)  PCP: Vladislav Cruz  Other Providers: None  Referred by Primary Care Doctor for evaluation of depression.     History was provided by patient.      Chief Complaint                                                                                                        \"I  feel like someone turned my brain inside out this year.\"      History of Present Illness                                                                                4, 4      Pertinent Background:  This patient initially experienced symptoms in the 1990s when her  at the time was developing symptoms of early-onset Alzheimer's in his 40s. She received mental health care at this time including an selective serotonin reuptake inhibitor as well as therapy; she is not sure if either helped, but feels that life stressors were so intense that no medication could have helped. She has many major life stressors including  Laurel's alzheimer's diagnosis in 1999, mother's death in 2000, Laurel's death in 2009, breast cancer diagnosis in 2011, recurrence with lymphedema in 2017.  Psych critical item history includes mutiple psychotropic trials  and substance use: alcohol.     Most recent history began about a year ago, with worsening life stressors secondary to Covid pandemic, a Covid-19 infection, and financial and work stress. She reports feeling tired all the time, lack of energy, poor sleep (waking up every 2 hours all night), as well as racing thoughts, difficulty concentrating on tasks, depressed mood, and constant worries and anxious thoughts about the future. She recently \"snapped\" at a client at work, which felt like a sign her mood had gotten " "very bad. She feels work has gotten too stressful but she cannot afford to cut back. Mahnaz last felt like herself around 2018, when she was in a relationship that brought her some symone, despite some problems in the relationship. She reports she currently spends most of her time (6 days a week) at work, and finds her work has been more stressful and less enjoyable due to her clients' worsening health during the pandemic. After work, she only has energy to sit on the couch and watch TV. She is sleeping 12-13 hours a night on weekends, though is waking up frequently. She denies any history of suicidal ideation and denies current suicidal ideation. Over the last year she had occasional thoughts of \"maybe if my cancer returned I wouldn't treat it,\" but feels these thoughts are temporary and when she thinks about, she would want to treat it and to live. She gets some symone from seeing friends and playing tennis, but doesn't do these activities often due to low energy and Covid precautions. She has been avoiding calls from friends and says this is unlike her. She reports feeling \"hopeless\" and \"trapped.\"     She reports drinking 2-5 shots of vodka nightly; the amount depends on how much time she has. She finds it helps her anxiety and racing thoughts, and helps her fall asleep but not stay asleep. She is open to sobriety during TMS treatment and reports a desire to cut back. She was sober for 10 years on two different occasions in her life. She continues to find support in an AA group. She has never had a withdrawal seizure.     She takes several complementary supplements- finds these help with joint pain and she takes them primarily for her immune system and not for her mood. She does not notice an improvement in her mood with any supplements. She tried [place]'s Wort in the past but does not currently take it.     Patient also notes that she was diagnosed with covid infection in 2020. Her initial infectious symptoms were " "mild, but she feels she developed fatigue, worsening memory and brain fog following the infection.       Psychiatric Review of Symptoms:   Depression:  depressed mood, anhedonia, low energy, insomnia, weight changes, poor concentration /memory, indecisiveness, feeling hopeless, feeling trapped and overwhelmed  Anxiety: Positive for racing thoughts, excessive worries, indecisiveness, insomnia  PTSD: negative  Marce: positive for talkative, racing thoughts  Psychosis: Negative   Eating disorder: Negative  Homicidal Ideation: Negative    Recent Substance Use:  Currently drinking 2-5 shots of vodka nightly after work. Previously sober for 5 years until 2020.       Substance Use History     Per Progress Note by [facility], 5/5/2021:   \"Alcohol:  During her first marriage was partying and drinking, and after her divorce, she stopped for 10 years.  She again started a little bit and then again quit for 5 years.  She restarted with drinking 2-5 drinks a day of vodka currently.    Treatment:  [facility] for substance use (women's program), [facility] for alcohol use (1990s),      Tobacco:  The patient uses Nicorette gum.  She was smoking a pack a day, but stopped 8 years ago.      Recreational drugs:  Marijuana several times per week.  In the past, LSD, mescaline maybe 3-4 times, cocaine twice, mushroom once. Marijuana helps with sleep.\"     Psychiatric History     Suicidal ideation- None   Suicide Attempt:: never  SIB- None   Marce- None    Psychosis- None    Violence/Aggression- None   Psych Hosp- None   ECT, VNS, TMS, bright lights, ketamine - None   Eating Disorder- None   Outpatient Programs- None        Psychiatric Medication Trials   Per rogress Note by [facility], 5/5/2021:    RATING OF ANTIDEPRESSANT DRUGS:    1.  Selective serotonin reuptake inhibitors (SSRIs):  a.  Escitalopram/Lexapro:  08/2018.  I saw 10 mg.    b.  Sertraline/Zoloft in the 1990s.  She might have been on it for a " "year.     2.  Serotonin noradrenaline reuptake inhibitors (SNRIs):  a.  Duloxetine/Cymbalta:  4914-2817, 60 mg.  She stated her mood with the drug got worse.  In the note, it stated that the osteoarthritis worsened since she stopped it, but the patient says that is not true.  Dose gives it a rating of 4.  b.  Venlafaxine/Effexor was used between 2914-3988.  My max dose I saw was 75 mg per day.  This was used during her breast cancer treatment-- most likely for side effects from aromatase inhibitor, as patient does not remember this being prescribed for depression.     3.  Serotonin modulator and stimulators:  Negative.     4.  Noradrenaline and dopamine reuptake inhibitors (NDRIs):    Bupropion/Wellbutrin:She took it in the 1990s, and it was prescribed in 2020, 150 mg.  According to the patient, she never started it in 2020.  According to her, it helped with stopping smoking in the 1990s.     5.  Tricyclic antidepressants (TCAs):  Negative.     6.  Tetracylic antidepressants:  Negative.     7.  Monoamine oxidase inhibitors (MAOIs):  Negative.     Augmentation therapy:  Negative.     Miscellaneous:    a.  Gabapentin:  In 2018, 600 mg for pain.     b.  Omega 3/triglycerides/fish oil:  She still takes it.    c.  Iban wort was used many years ago.  No change.  d.  SHIRAZ-e was tried.  No change.  e.  Somnapure, melatonin and valerian root were tried.       Miscellaneous sleep aids:  a.  Diphenhydramine:  One time.  No change.  b.  Melatonin and Somnapure, and it helped.  c.  Gabapentin was tried.     Ketamine treatment history:  Negative.         Social/ Family History               [per patient report]                                                 1ea, 1ea     FINANCIAL SUPPORT- working full time as a caretaker, sliding scale insulin, -- \"Mahnaz states she cannot leave the job because she is  essentially broke  after investing the majority of her finances in a wellness company called  [employer].    "   RELATIONSHIPS/CHILDREN- No children   high school boyfriend --   Remarried Laurel--> opened [employer]  LIVING SITUATION- lives alone      LEGAL- DUI over 20 years ago  EARLY HISTORY/ EDUCATION- not addressed today. Has some college.   SOCIAL/ SPIRITUAL SUPPORT-has many friends in area, finds support through AA group, practices meditation  CULTURAL INFLUENCES/ IMPACT- none       TRAUMA HISTORY (self-report)- None  FEELS SAFE AT HOME- Yes  FAMILY HISTORY-  Bipolar disorder in sister; depression in mother  Hobbies: horseback riding, tennis with friends  Previously competitive swimmer when younger     Medical / Surgical History     Patient Active Problem List   Diagnosis     Breast cancer est/prog +, HER2 ERNIE -     Osteoarthritis     Moderate episode of recurrent major depressive disorder (H)     Advanced directives, counseling/discussion     Knee pain     Past use of tobacco     IFG (impaired fasting glucose)     Low back pain     Malignant neoplasm of right breast (H)     Hyperlipidemia LDL goal <70     Follow-up examination following surgery     Atherosclerosis of left carotid artery     Chronic, continuous use of opioids     Adjustment disorder with mixed anxiety and depressed mood     Neck pain on right side     Hyperparathyroidism (H)     Alcohol dependence in remission (H)     High coronary artery calcium score       Past Surgical History:   Procedure Laterality Date     ABDOMEN SURGERY  2007?    Hysterectomy     COLONOSCOPY       COLONOSCOPY  11/17/2011    Procedure:COLONOSCOPY; Colonoscopy; Surgeon:MEKA RUSS; Location: GI     COLONOSCOPY N/A 2/10/2020    Procedure: COLONOSCOPY, WITH POLYPECTOMY AND BIOPSY;  Surgeon: Opal Ace MD;  Location:  GI     DISSECT LYMPH NODE AXILLA Right 11/2/2017    Procedure: DISSECT LYMPH NODE AXILLA;  RIGHT AXILLARY LYMPH NODE DISSECTION;  Surgeon: Cortez White MD;  Location: [facility]     GYN SURGERY  2005    hysterectomy  after hx of ovarian cyst, ended up being benign     HYSTERECTOMY, PAP NO LONGER INDICATED       MASTECTOMY MODIFIED RADICAL  2011    bilateral     MASTECTOMY, BILATERAL       ORTHOPEDIC SURGERY      rt. knee arthroscopy     ORTHOPEDIC SURGERY Right     toe surgery     REALIGN PATELLA  1973    right      TOE SURGERY      right grt OA         Additional Medical Screening Questions                                                                                       2, 10     Metals Screen *need to confirm*   Yes No Item      Implanted or lodged metals in body      Implanted surgical devices      Metal containing facial or scalp tattoos      Non removable piercings   Seizure Screen  Yes No Item     X Current Seizure Disorder?     X History of Seizure?     Does patient have a cochlear implant? No  Does patient have any shunts? No  Does patient have a pacemaker? No  Does patient have a vagus nerve stimulator? No  Does patient have a deep brain stimulator? No  Any other implanted device? No       Allergy   Cats and Codeine sulfate     Current Medications     1.  Current Psychotropic Medications:  None.       2.  Concomitant Medications:    a.  Aspirin 81 mg.  b.  Ibuprofen 200 mg once a day up to 2 at noon and 2 at bedtime for a total of 1000 mg per day.Indication: pain  c.  Femara/letrozole 2.5 mg 1 a day.Indication: breast cancer treatment  d.  Norco/hydrocodone/acetaminophen 5/325, 1/2-1 pill a day.Indication severe pain  e.  Omeprazole 20 mg p.r.n.Indicatioon: heartburn   f.  Rosuvastatin/Crestor 10 mg 1 a day.Indication: hypercholesteremia   g.  Valacyclovir/Valtrex 1000 mg 2 b.i.d. p.r.n. Indication: Herpes virus infection.  h.  Zoledronic acid IV injected into the vein every 6 months.  She got her last one.  That is for higher calcium levels caused by cancer.  i.  Nicorette 2 mg gum.     3.  Herbal Remedies:  a.  Cannabis.  The patient uses it weekly, a couple of hits a few times a week.    b.  Ashwagandha 1 tablet  p.r.n.  c.  Calcium citrate and vitamin D.  d.  Vitamin D3, 5000 units a day.  e.  Coenzyme Q10, 300 mg per day.  f.  Multivitamin.  g.  Lactobacillus rhamnousus.    h.  Zymex whole food fiber.  i.  Advanced memory formula.  Has ginkgo biloba in it.  j.  MitoCORE has a proprietary blend and all kinds of herbals.  k.  Moringa shakes, BioTrust low-carb protein drinks.  l.  Vitamin C 1 a day.  m.  Celery powder.  n.  Bio Nutrition 40 foods and vegetables.  o.  Magnesium 80 mg.  p.  B6, 10 mg.  q.  Zinc 10 mg.  R. asea (salt water) supplement [email or web address].[email or web address]      Vitals                                                                                                                        3, 3     There were no vitals taken for this visit.      Mental Status Exam                                                                                   9, 14 cog gs     Alertness: alert , oriented  Appearance: well groomed, wearing jewelry coordinated to outfit  Behavior/Demeanor: cooperative and pleasant, with good  eye contact   Speech: somewhat rapid pace, normal volume  Language: intact  Psychomotor: restless  Mood: depressed ,anxious  Affect: full range, tearful and appropriate; was congruent to mood; was congruent to content  Thought Process/Associations: overinclusive , tangential  Thought Content:   Denies SI, SIB, HI, psychosis  Insight: good  Judgment: intact  Cognition: (6) does  appear grossly intact; formal cognitive testing was not done  Gait and Station: unremarkable     Labs and Data     Rating Scales:      PHQ9 Today:  19    PHQ 2/6/2020 8/24/2020 3/25/2021   PHQ-9 Total Score 15 6 11   Q9: Thoughts of better off dead/self-harm past 2 weeks Not at all Not at all Not at all         Recent Labs   Lab Test 01/11/21  1241 10/31/19  1740 10/19/18  0931   CR 0.74 0.80 0.71   GFRESTIMATED 83 76 83     Recent Labs   Lab Test 10/31/19  1740 04/30/19  1003 10/19/18  0931   AST 18  --  23   ALT 35 33 38   ALKPHOS 49  --   43       Diagnosis and Assessment                                                                             m2, h3     Svetlana Adair is a 70 year old female with previous psychiatric history of MDD, recurrent, severe who presents for evaluation of candidacy for Transcranial Magnetic Stimulation for treatment resistant depression. Patient was referred by her Primary Care Physician. She has a well documented failure of adequate trials of four antidepressants (2 SSRIs and 2 SNRIs) which represent multiple antidepressant classes. The patient has completed an adequate dose of individual psychotherapy. Patient is burdened by her chronic symptoms of depression and her current episode has lasted over 12 months causing significant psychosocial dysfunction. Due to remaining profound depression and numerous failed previous treatment modalities the patient is a candidate for TMS treatment.     The risks, benefits, alternatives and potential adverse effects have been explained and are understood by the patient. Svetlana Adair agrees to the treatment plan with the ability to do so. The pt knows to call the clinic for any problems or access emergency care if needed. There are medical considerations relevant to treatment, as noted above, particularly long COVID symptoms and alcohol use disorder. The patient also takes an extensive list of alternative supplements, which should be reviewed regularly.    Svetlana opted to trial a new medication today. She will reassess her interest in TMS in 4-6 weeks after trialing a new class of antidepressant. She agreed to try taking mirtazapine, which was selected to help with sleep. Risks and benefits of mirtazapine were reviewed today.     Of note, Svetlana has a family history of bipolar disorder and presented today with somewhat rapid speech that was difficult to interrupt. She reports racing thoughts. Her presentation is most likely consistent with anxiety. She did not report other  "symptoms of christie, and notably reported profound fatigue and lack of energy. However she did report to the  on 4/14/21 that she \"invested  the majority of her finances in a wellness company called  [employer],\" resulting in financial strain for her. It is unclear when she invested her finances in this company, but her history warrants a thorough, ongoing assessment for manic symptoms.     Suicide Risk Assessment:  Today Svetlana Adair reports no current or history of suicidal ideation. In addition, she has notable risk factors for self-harm, including feels trapped and hopelessness. However, risk is mitigated by no h/o suicide attempt, no plan or intent, h/o seeking help when needed, future oriented, feeling hopeful, good social support   and stable housing. Therefore, based on all available evidence including the factors cited above, she does not appear to be at imminent risk for self-harm, does not meet criteria for a 72-hr hold, and therefore involuntary hospitalization will not be pursued at this  time.     Plan                                                                                                                     m2, h3     1) Major depressive disorder, recurrent, severe  -- Medications: Start mirtazapine 15mg at bedtime     -- Procedures:    - Pt is approved for an acute series of rTMS   - Coil: F8 or H1  -- Return to clinic in 4-6 weeks   -- Alcohol use addressed in clinic today. Patient understands reason behind need for sobriety during TMS and will make effort to cut back before next appointment.    Therapy- Continue with scheduled appt for Sep 2021- patient would appreciate referral to other therapist with earlier start dates    RTC: 4-6 weeks     CRISIS NUMBERS:   Provided routinely in AVS.    Treatment Risk Statement:  The patient understands the risks, benefits, adverse effects and alternatives. Agrees to treatment with the capacity to do so. No medical " contraindications to treatment. Agrees to call clinic for any problems. The patient understands to call 911 or go to the nearest ED if life threatening or urgent symptoms occur.         This patient was seen and discussed with the attending physician.    Sulma Irby MD   PGY-2 Psychiatry    PROVIDER:  Alvina Whiteside MD      Attestation:  I, Alvina Whiteside MD,  have personally performed an examination of this patient on May 6, 2021 and I have reviewed the resident's documentation.  I have edited the note to reflect all relevant changes. I agree with the resident findings and plan in this resident note.  I have reviewed all vitals and laboratory findings.        Alvina Whiteside MD  [facility]romod[facility]

## 2021-05-07 DIAGNOSIS — K21.9 GERD (GASTROESOPHAGEAL REFLUX DISEASE): Primary | ICD-10-CM

## 2021-05-07 NOTE — TELEPHONE ENCOUNTER
Pt called to see if she still needs this appt. She just had an appt with a pharmacist at  psychiatry in [facility] for pre-TMS treatment care. She wants us to review the notes from that visit and respond to her with whether or not she still needs a visit with our MTM dept.

## 2021-05-10 RX ORDER — MIRTAZAPINE 15 MG/1
15 TABLET, FILM COATED ORAL AT BEDTIME
Qty: 30 TABLET | Refills: 1 | Status: SHIPPED | OUTPATIENT
Start: 2021-05-10 | End: 2021-05-18

## 2021-05-10 RX ORDER — NICOTINE POLACRILEX 4 MG/1
20 GUM, CHEWING ORAL PRN
Qty: 90 TABLET | Refills: 3 | Status: SHIPPED | OUTPATIENT
Start: 2021-05-10 | End: 2022-06-10

## 2021-05-10 NOTE — TELEPHONE ENCOUNTER
Called and spoke with patient. She feels that she did a thorough review of her medications with  Mi Oro [facility] on 4/28/21 and does not need another MTM visit at this time. Will reach out to patient in 3 months for follow up.     Lisa Magallon, PharmD  MTM Pharmacy Resident

## 2021-05-17 ENCOUNTER — NURSE TRIAGE (OUTPATIENT)
Dept: NURSING | Facility: CLINIC | Age: 71
End: 2021-05-17

## 2021-05-18 ENCOUNTER — OFFICE VISIT (OUTPATIENT)
Dept: FAMILY MEDICINE | Facility: CLINIC | Age: 71
End: 2021-05-18
Payer: MEDICARE

## 2021-05-18 VITALS
WEIGHT: 148.1 LBS | SYSTOLIC BLOOD PRESSURE: 124 MMHG | DIASTOLIC BLOOD PRESSURE: 62 MMHG | OXYGEN SATURATION: 96 % | HEART RATE: 69 BPM | BODY MASS INDEX: 25.29 KG/M2 | TEMPERATURE: 97.9 F | HEIGHT: 64 IN

## 2021-05-18 DIAGNOSIS — Z23 NEED FOR VACCINATION: Primary | ICD-10-CM

## 2021-05-18 DIAGNOSIS — F33.2 SEVERE EPISODE OF RECURRENT MAJOR DEPRESSIVE DISORDER, WITHOUT PSYCHOTIC FEATURES (H): ICD-10-CM

## 2021-05-18 DIAGNOSIS — S61.218A LACERATION OF INDEX FINGER WITHOUT FOREIGN BODY WITHOUT DAMAGE TO NAIL, UNSPECIFIED LATERALITY, INITIAL ENCOUNTER: ICD-10-CM

## 2021-05-18 PROCEDURE — 90714 TD VACC NO PRESV 7 YRS+ IM: CPT | Performed by: INTERNAL MEDICINE

## 2021-05-18 PROCEDURE — 90471 IMMUNIZATION ADMIN: CPT | Performed by: INTERNAL MEDICINE

## 2021-05-18 PROCEDURE — 99214 OFFICE O/P EST MOD 30 MIN: CPT | Performed by: INTERNAL MEDICINE

## 2021-05-18 RX ORDER — BUPROPION HYDROCHLORIDE 150 MG/1
150 TABLET ORAL EVERY MORNING
Qty: 90 TABLET | Refills: 3 | Status: SHIPPED | OUTPATIENT
Start: 2021-05-18 | End: 2022-04-14

## 2021-05-18 SDOH — HEALTH STABILITY: MENTAL HEALTH: HOW OFTEN DO YOU HAVE 6 OR MORE DRINKS ON ONE OCCASION?: NOT ASKED

## 2021-05-18 SDOH — HEALTH STABILITY: MENTAL HEALTH: HOW MANY STANDARD DRINKS CONTAINING ALCOHOL DO YOU HAVE ON A TYPICAL DAY?: NOT ASKED

## 2021-05-18 SDOH — HEALTH STABILITY: MENTAL HEALTH: HOW OFTEN DO YOU HAVE A DRINK CONTAINING ALCOHOL?: NOT ASKED

## 2021-05-18 ASSESSMENT — MIFFLIN-ST. JEOR: SCORE: 1190.75

## 2021-05-18 NOTE — NURSING NOTE
Prior to immunization administration, verified patients identity using patient s name and date of birth. Please see Immunization Activity for additional information.     Screening Questionnaire for Adult Immunization    Are you sick today?   No   Do you have allergies to medications, food, a vaccine component or latex?   Yes   Have you ever had a serious reaction after receiving a vaccination?   No   Do you have a long-term health problem with heart, lung, kidney, or metabolic disease (e.g., diabetes), asthma, a blood disorder, no spleen, complement component deficiency, a cochlear implant, or a spinal fluid leak?  Are you on long-term aspirin therapy?   No   Do you have cancer, leukemia, HIV/AIDS, or any other immune system problem?   No   Do you have a parent, brother, or sister with an immune system problem?   No   In the past 3 months, have you taken medications that affect  your immune system, such as prednisone, other steroids, or anticancer drugs; drugs for the treatment of rheumatoid arthritis, Crohn s disease, or psoriasis; or have you had radiation treatments?   No   Have you had a seizure, or a brain or other nervous system problem?   No   During the past year, have you received a transfusion of blood or blood    products, or been given immune (gamma) globulin or antiviral drug?   No   For women: Are you pregnant or is there a chance you could become       pregnant during the next month?   No   Have you received any vaccinations in the past 4 weeks?   No     Immunization questionnaire was positive for at least one answer.  Notified Dr. Cruz.        Per orders of Dr. Cruz, injection of Td given by Bing Matos MA. Patient instructed to remain in clinic for 15 minutes afterwards, and to report any adverse reaction to me immediately.  Patient verified       Screening performed by Bing Matos MA on 5/18/2021 at 1:00 PM.

## 2021-05-18 NOTE — PROGRESS NOTES
Assessment & Plan     Severe episode of recurrent major depressive disorder, without psychotic features (H)  The weight gain is an intolerable side effect of the mirtazapine, try Wellbutrin as below, risks and side effects of Wellbutrin were discussed with her in detail, no history of seizure, recheck at the end of June at her previously scheduled visit, sooner if needed  - buPROPion (WELLBUTRIN XL) 150 MG 24 hr tablet; Take 1 tablet (150 mg) by mouth every morning    Laceration of index finger without foreign body without damage to nail, unspecified laterality, initial encounter  The area was irrigated with sterile water, a Tegaderm was applied to the laceration, I do not think the wound is amenable to suturing, wound care was discussed, update tetanus        30 minutes spent on the date of the encounter doing chart review, history and exam, documentation and further activities per the note           Return in about 6 weeks (around 6/28/2021) for Mood Check.  Patient instructed to return to clinic or contact us sooner if symptoms worsen or new symptoms develop.     Vladislav Cruz MD  [facility] BELINDA Joya is a 70 year old who presents for the following health issues     HPI   Right middle Fingertip laceration 5-  Also, she feels depressed  She is a candidate for transcranial magnetic therapy  This will not start until late August according to her report  The psychiatrist that she saw in preparation for transcranial magnetic therapy started her on Remeron for treatment in the interim  She thinks that the Remeron helps, but she is alarmed by a large amount of weight gain since starting that drug which is a deal breaker for her  She wants to know if there is anything else that can help with her severe depressed mood until she can start the transcranial magnetic therapy?    Review of Systems         Objective    There were no vitals taken for this visit.  There is no height or  weight on file to calculate BMI.  Physical Exam   General: This is a well-appearing, comfortable appearing female no acute distress.  Skin: There is a laceration on the fingertip with a clean base there is no evidence of surrounding skin erythema or areas of fluctuance or wound infection.  Mental status: She reports a depressed mood but often smiles and laughs with the examiner, she is well-groomed, speech is normal, no suicidal ideation, reasonable insight and judgment

## 2021-05-18 NOTE — TELEPHONE ENCOUNTER
Caller has an avulsion laceration to a finger tip from a mandolin slicer that occurred at work;    States a friend  cleaned it with peroxide and applied a holistic salve, a  Tegaderm over wound; then  wrapped it  With cotton batting and  Coban and told her to leave it for a week.  She cannot tell if it is still bleeding   Caller states that she has been  Advised to seek medical attention as it is a work related injury   Caller wants to know what to do   Caller advised of standard wound  Care for this type of injury based on FNA previous experience and that dressing should be  changed  daily and when wet or dirty;   Caller is advised of  UC options to have wound checked tomorrow   Caller advised that  TDap is current  Caller will consider options   Mary Park RN  FNA       Reason for Disposition    Skin is split open or gaping (or length > 1/2 inch or 12 mm on the skin, 1/4 inch or 6 mm on the face)    Additional Information    Negative: [1] Major bleeding (e.g., actively dripping or spurting) AND [2] can't be stopped    Negative: Amputation    Negative: Shock suspected (e.g., cold/pale/clammy skin, too weak to stand, low BP, rapid pulse)    Negative: [1] Knife wound (or other possibly deep cut) AND [2] to chest, abdomen, back, neck, or head    Negative: [1] Cutter (self-mutilator) AND [2] suicidal or out-of-control    Negative: Sounds like a life-threatening emergency to the triager    Negative: [1] Animal bite AND [2] broken skin    Negative: [1] Human bite AND [2] broken skin    Protocols used: CUTS AND [id number]

## 2021-05-18 NOTE — TELEPHONE ENCOUNTER
Patient calling back and states that her middle finger is continuing to bleed. Requests appointment to have it looked at today. Dr. Cruz's 11:30 appointment was on hold as same day.  Gave patient this appointment. If not OK, please call patient back ASAP and advise to go to , Laceration is a work related injury.  Shruthi Tabares RN

## 2021-05-21 ENCOUNTER — NURSE TRIAGE (OUTPATIENT)
Dept: FAMILY MEDICINE | Facility: CLINIC | Age: 71
End: 2021-05-21

## 2021-05-21 ENCOUNTER — MYC MEDICAL ADVICE (OUTPATIENT)
Dept: FAMILY MEDICINE | Facility: CLINIC | Age: 71
End: 2021-05-21

## 2021-05-21 NOTE — TELEPHONE ENCOUNTER
Patient called requesting advice re: index finger laceration wound. She noticed white area around wound. Denies signs of infection and sent picture attachments via my chart. Pt advised to monitor symptoms. See UC if any signs of infection- fever, pain, redness or discharge or if she would like a provider to evaluate. Pt verbalized agreement with plan.

## 2021-05-25 DIAGNOSIS — M54.2 NECK PAIN ON RIGHT SIDE: ICD-10-CM

## 2021-05-25 RX ORDER — HYDROCODONE BITARTRATE AND ACETAMINOPHEN 5; 325 MG/1; MG/1
1 TABLET ORAL DAILY PRN
Qty: 20 TABLET | Refills: 0 | Status: SHIPPED | OUTPATIENT
Start: 2021-05-25 | End: 2021-06-21

## 2021-05-25 NOTE — TELEPHONE ENCOUNTER
Pt called having trouble with MyChart     Leaving in 2 days     Last OV 5/18/21     HYDROcodone-acetaminophen (NORCO) 5-325 MG tablet 20 tablet 0 4/28/2021  No   Sig - Route: Take 1 tablet by mouth daily as needed for pain - Oral   Sent to pharmacy as: HYDROcodone-Acetaminophen 5-325 MG Oral Tablet (NORCO)   Class: E-Prescribe   Earliest Fill Date: 4/28/2021   Order: [id number]   E-Prescribing Status: Receipt confirmed by pharmacy (4/28/2021 10:57 AM CDT)     Routing refill request to provider for review/approval because:  Drug not on the [facility] refill protocol     Jasmyn RAZA RN

## 2021-06-08 NOTE — TELEPHONE ENCOUNTER
Svetlana called in to get tested for covid -19, antibody came positive.    Svetlana was advice on the site to go get tested and the importance to keep safety precautions from getting infected.    COVID 19 Nurse Triage Plan/Patient Instructions    Please be aware that novel coronavirus (COVID-19) may be circulating in the community. If you develop symptoms such as fever, cough, or SOB or if you have concerns about the presence of another infection including coronavirus (COVID-19), please contact your health care provider or visit [email or web address].     Disposition/Instructions    Patient to stay at home and follow home care protocol based instructions.    Thank you for taking steps to prevent the spread of this virus.  o Limit your contact with others.  o Wear a simple mask to cover your cough.  o Wash your hands well and often.    Resources    [facility]: About COVID-19: [email or web address]    CDC: What to Do If You're Sick: www.cdc.gov/coronavirus/2019-ncov/about/steps-when-sick.html    CDC: Ending Home Isolation: www.cdc.gov/coronavirus/2019-ncov/hcp/disposition-in-home-patients.html     CDC: Caring for Someone: www.cdc.gov/coronavirus/2019-ncov/if-you-are-sick/care-for-someone.html     [facility]: Interim Guidance for Hospital Discharge to Home: www.health.[facility].[place]/diseases/coronavirus/hcp/hospdischarge.pdf    [facility] clinical trials (COVID-19 research studies): clinicalaffairs.[facility]-clinical-trials     Below are the COVID-19 hotlines at the [facility] ([facility]). Interpreters are available.   o For health questions: Call 741-000-0587 or 1-852.553.4146 (7 a.m. to 7 p.m.)  o For questions about schools and childcare: Call 575-690-1990 or 1-165.655.2255 (7 a.m. to 7 p.m.)     Reason for Disposition    COVID-19 Testing, questions about    Additional Information    Negative: [1] COVID-19 exposure AND [2] no symptoms    Negative: COVID-19 and Breastfeeding, questions  about    Negative: [1] Adult with possible COVID-19 symptoms AND [2] triager concerned about severity of symptoms or other causes    Negative: SEVERE difficulty breathing (e.g., struggling for each breath, speaks in single words)    Negative: Difficult to awaken or acting confused (e.g., disoriented, slurred speech)    Negative: Bluish (or gray) lips or face now    Negative: Shock suspected (e.g., cold/pale/clammy skin, too weak to stand, low BP, rapid pulse)    Negative: Sounds like a life-threatening emergency to the triager    Negative: SEVERE or constant chest pain or pressure (Exception: mild central chest pain, present only when coughing)    Negative: MODERATE difficulty breathing (e.g., speaks in phrases, SOB even at rest, pulse 100-120)    Negative: Patient sounds very sick or weak to the triager    Negative: MILD difficulty breathing (e.g., minimal/no SOB at rest, SOB with walking, pulse <100)    Negative: Chest pain or pressure    Negative: Fever > 103 F (39.4 C)    Negative: [1] Fever > 101 F (38.3 C) AND [2] age > 60    Negative: [1] Fever > 100.0 F (37.8 C) AND [2] bedridden (e.g., nursing home patient, CVA, chronic illness, recovering from surgery)    Negative: HIGH RISK patient (e.g., age > 64 years, diabetes, heart or lung disease, weak immune system)    Negative: Fever present > 3 days (72 hours)    Negative: [1] Fever returns after gone for over 24 hours AND [2] symptoms worse or not improved    Negative: [1] Continuous (nonstop) coughing interferes with work or school AND [2] no improvement using cough treatment per protocol    Negative: [1] COVID-19 infection suspected by caller or triager AND [2] mild symptoms (cough, fever, or others) AND [3] no complications or SOB    Negative: Cough present > 3 weeks    Negative: [1] COVID-19 diagnosed by positive lab test AND [2] mild symptoms (e.g., cough, fever, others) AND [3] no complications or SOB    Negative: [1] COVID-19 diagnosed by HCP (doctor, NP  or PA) AND [2] mild symptoms (e.g., cough, fever, others) AND [3] no complications or SOB    Negative: COVID-19 Home Isolation, questions about    Protocols used: CORONAVIRUS (COVID-19) DIAGNOSED OR [id number] 5.16.20

## 2021-06-17 ENCOUNTER — VIRTUAL VISIT (OUTPATIENT)
Dept: PSYCHIATRY | Facility: CLINIC | Age: 71
End: 2021-06-17
Payer: MEDICARE

## 2021-06-17 DIAGNOSIS — F33.2 SEVERE RECURRENT MAJOR DEPRESSION WITHOUT PSYCHOTIC FEATURES (H): Primary | ICD-10-CM

## 2021-06-17 RX ORDER — VENLAFAXINE HYDROCHLORIDE 75 MG/1
TABLET, EXTENDED RELEASE ORAL
Qty: 60 TABLET | Refills: 1 | Status: SHIPPED | OUTPATIENT
Start: 2021-06-17 | End: 2021-06-25 | Stop reason: ALTCHOICE

## 2021-06-17 ASSESSMENT — PATIENT HEALTH QUESTIONNAIRE - PHQ9: SUM OF ALL RESPONSES TO PHQ QUESTIONS 1-9: 9

## 2021-06-17 NOTE — PROGRESS NOTES
"  Psychiatry Clinic Progress Note                                                                   Svetlana Adair is a 70 year old female who prefers the name \"Prerna" and pronoun she, hers.  Therapist: Scheduled to start in September 2021  PCP: Vladislav Cruz  Other Providers: None    Pertinent Background:  This patient initially experienced symptoms in the 1990s when her  at the time was developing symptoms of early-onset Alzheimer's in his 40s. She received mental health care at this time including an selective serotonin reuptake inhibitor as well as therapy; she is not sure if either helped, but feels that life stressors were so intense that no medication could have helped. She has many major life stressors including  Laurel's alzheimer's diagnosis in 1999, mother's death in 2000, Laurel's death in 2009, breast cancer diagnosis in 2011, recurrence with lymphedema in 2017.  Psych critical item history includes mutiple psychotropic trials  and substance use: alcohol.    Interim History                                                                                                        4, 4     The patient is a good historian.  Since the last visit     States that she lasted on mirtazapine about 2-3 weeks but noted that she had gained weight (5 lbs) so stopped this medication. She was seen by her PCP who placed her back on Wellbutrin 150 mg. She denies obtaining significant benefit from Wellbutrin in the past     Previously had been on Wellbutrin. Spoke with her PCP who placed her back on .    Discussed risks and benefits of restarting venlafaxine. Instructed her to start 75 mg x 1 week then increase to 150 mg and continue on this dose.    Send rx to [facility] and mail AVS.    At the conclusion of the visit, pt noted that  that she has had several brain injuries, however, denied loss of consciousness or being diagnosed with a severe TBI. She also reported that she has \"gotten the shakes\" in the " "past when disconintuing alcohol use. At previous visit she noted some increased use of EtOH to manage mood/anxiety and was encouraged to taper to discontinuation alcohol use as this was both contraindicated with TMS and not helpful for managing mood. Reiterated recommendation that she gradually reduce her alcohol use to point that she is able to stop or use infrequently.     Recent Symptoms:   Depression:  depressed mood, anhedonia, low energy, insomnia, appetite changes, weight changes and poor concentration /memory     Recent Substance Use:  Alcohol- yes         Social/ Family History                                  [per patient report]                                 1ea,1ea     FINANCIAL SUPPORT- working full time as a caretaker, sliding scale insulin, -- \"Mahnaz states she cannot leave the job because she is  essentially broke  after investing the majority of her finances in a wellness company called  [employer].      RELATIONSHIPS/CHILDREN- No children   high school boyfriend --   Remarried Laurel--> opened [employer]  LIVING SITUATION- lives alone      LEGAL- DUI over 20 years ago  EARLY HISTORY/ EDUCATION- not addressed today. Has some college.   SOCIAL/ SPIRITUAL SUPPORT-has many friends in area, finds support through AA group, practices meditation  CULTURAL INFLUENCES/ IMPACT- none       TRAUMA HISTORY (self-report)- None  FEELS SAFE AT HOME- Yes  FAMILY HISTORY-  Bipolar disorder in sister; depression in mother  Hobbies: horseback riding, tennis with friends  Previously competitive swimmer when younger    Medical / Surgical History                                                                                                                  Patient Active Problem List   Diagnosis     Breast cancer est/prog +, HER2 ERNIE -     Osteoarthritis     Moderate episode of recurrent major depressive disorder (H)     Advanced directives, counseling/discussion     Knee pain     Past " use of tobacco     IFG (impaired fasting glucose)     Low back pain     Malignant neoplasm of right breast (H)     Hyperlipidemia LDL goal <70     Follow-up examination following surgery     Atherosclerosis of left carotid artery     Chronic, continuous use of opioids     Adjustment disorder with mixed anxiety and depressed mood     Neck pain on right side     Hyperparathyroidism (H)     Alcohol dependence in remission (H)     High coronary artery calcium score       Past Surgical History:   Procedure Laterality Date     ABDOMEN SURGERY  2007?    Hysterectomy     COLONOSCOPY       COLONOSCOPY  11/17/2011    Procedure:COLONOSCOPY; Colonoscopy; Surgeon:MEKA RUSS; Location: GI     COLONOSCOPY N/A 2/10/2020    Procedure: COLONOSCOPY, WITH POLYPECTOMY AND BIOPSY;  Surgeon: Opal Ace MD;  Location:  GI     DISSECT LYMPH NODE AXILLA Right 11/2/2017    Procedure: DISSECT LYMPH NODE AXILLA;  RIGHT AXILLARY LYMPH NODE DISSECTION;  Surgeon: Cortez White MD;  Location:  [facility]     GYN SURGERY  2005    hysterectomy after hx of ovarian cyst, ended up being benign     HYSTERECTOMY, PAP NO LONGER INDICATED       MASTECTOMY MODIFIED RADICAL  2011    bilateral     MASTECTOMY, BILATERAL       ORTHOPEDIC SURGERY      rt. knee arthroscopy     ORTHOPEDIC SURGERY Right     toe surgery     REALIGN PATELLA  1973    right      TOE SURGERY      right grt OA         Medical Review of Systems                                                                                                    2,10   A comprehensive review of systems was performed and is negative other than noted in the HPI.    Allergy                                Cats and Codeine sulfate    Current Medications                                                                                                       Current Outpatient Medications   Medication Sig Dispense Refill     ASHWAGANDHA PO Take 1 tablet by mouth daily prn       aspirin 81 MG tablet  Take 1 tablet (81 mg) by mouth daily 30 tablet      buPROPion (WELLBUTRIN XL) 150 MG 24 hr tablet Take 1 tablet (150 mg) by mouth every morning 90 tablet 3     calcium carbonate 600 mg-vitamin D 400 units (CALTRATE) 600-400 MG-UNIT per tablet Take 1 tablet by mouth daily        Cholecalciferol (VITAMIN D-3) 5000 units TABS Take 1 tablet by mouth daily        Coenzyme Q10 300 MG CAPS Take 300 mg by mouth daily       HYDROcodone-acetaminophen (NORCO) 5-325 MG tablet Take 1 tablet by mouth daily as needed for pain 20 tablet 0     IBUPROFEN PO Take 200 mg by mouth every 4 hours as needed for moderate pain        lactobacillus rhamnosus, GG, (CULTURELL) capsule Take 1 capsule by mouth daily        letrozole (FEMARA) 2.5 MG tablet Take 1 tablet by mouth daily  5     LYSINE 1-3 daily as needed       Melatonin 10 MG TABS tablet Take 10 mg by mouth nightly as needed for sleep       multivitamin (OCUVITE) TABS tablet Take 1 tablet by mouth daily       nicotine polacrilex (NICORETTE) 2 MG gum Place 1 each (2 mg) inside cheek as needed for smoking cessation (Patient not taking: Reported on 5/18/2021) 30 tablet 11     omeprazole 20 MG tablet Take 1 tablet (20 mg) by mouth as needed 90 tablet 3     rosuvastatin (CRESTOR) 10 MG tablet Take 1 tablet (10 mg) by mouth daily 90 tablet 2     UNABLE TO FIND Take by mouth daily MEDICATION NAME: Asea       UNABLE TO FIND Take 1 tablet by mouth daily MEDICATION NAME: Zymex - has almond, gig, papain, bromelain, amylase, cellulase, and lipase.       UNABLE TO FIND Take 1 tablet by mouth daily MEDICATION NAME: Aidee Jarrettao - chinese supplement       UNABLE TO FIND MEDICATION NAME: Somnapure - 2 tablets = 500mg valerian root, 300mg lemon balm extract, 200mg l-theanine, 120mg hops extract, 50mg chamomile flower extract, 50mg passion flower extract, 3mg melatonin.  Takes 1-2 tablets at bedtime       UNABLE TO FIND MEDICATION NAME: Moringa 1 serving of powder daily       UNABLE TO FIND MEDICATION  NAME: Premium Amla Alvarez 2 capsules = 4mg Vitamin C, 966mg organic amla, organic amla extract.  Takes 2 capsules daily       UNABLE TO FIND MEDICATION NAME: OmegaKrill 5x 3 capsules = 480mg of total omega-3, 64mg EPA, 392mg DHA, 300mcg astaxanthin.  Takes 3 capsules daily       UNABLE TO FIND MEDICATION NAME: Super Colon Cleanse 2 capsules = 665mg senna leaf powder, 321mg psyllium husk powder, 21mg fennel seed powder, 21mg papaya leaf powder, 21mg yolanda hips fruit powder, 11mg l-acidophilus.  Taking 4 capsules daily       UNABLE TO FIND MEDICATION NAME: Majestha powder daily       valACYclovir (VALTREX) 1000 mg tablet Take 2 tablets (2,000 mg) by mouth 2 times daily as needed 8 tablet 0     ZINC SULFATE-VITAMIN C MT Take 1 tablet by mouth daily Also contains copper, magnesium, and manganese.       ZOLEDRONIC ACID IV Inject into the vein every 6 months         Vitals                                                                                                                       3, 3   There were no vitals taken for this visit.     Mental Status Exam                                                                                    9, 14 cog gs     Alertness: alert  and oriented  Appearance: unable to assess via telephone  Behavior/Demeanor: cooperative and pleasant  Speech: normal and regular rate and rhythm  Language: intact and no problems  Psychomotor: not assessed  Mood: depressed and anxious  Affect: restricted; was congruent to mood; was congruent to content  Thought Process/Associations: unremarkable  Thought Content:  Reports none;  Denies suicidal and violent ideation  Perception:  Reports none;  Denies auditory hallucinations and visual hallucinations  Insight: good  Judgment: good  Cognition: (6) oriented: time, person, and place  attention span: intact  concentration: intact  recent memory: intact  remote memory: intact  fund of knowledge: appropriate  Gait/Station and/or Muscle Strength/Tone: not  assessed    Labs and Data                                                                                                                 Rating Scales:    PHQ9    PHQ9 Today:  9  PHQ 2/6/2020 8/24/2020 3/25/2021   PHQ-9 Total Score 15 6 11   Q9: Thoughts of better off dead/self-harm past 2 weeks Not at all Not at all Not at all         Diagnosis and Assessment                                                                             m2, h3     Svetlana Adair is a 70 year old female with previous psychiatric history of MDD, recurrent, severe who presents for evaluation of candidacy for Transcranial Magnetic Stimulation for treatment resistant depression. Patient was referred by her Primary Care Physician. She has a well documented failure of adequate trials of four antidepressants (2 SSRIs and 2 SNRIs) which represent multiple antidepressant classes. The patient has completed an adequate dose of individual psychotherapy. Patient is burdened by her chronic symptoms of depression and her current episode has lasted over 12 months causing significant psychosocial dysfunction. Due to remaining profound depression and numerous failed previous treatment modalities the patient is a candidate for TMS treatment.     At last visit, Svetlana opted to trial a new medication, mirtazapine, an antidepressant to help her with sleep.     Today the following issues were addressed:    1) Major depressive disorder, recurrent, severe  2) Alcohol use disorder    [place] Prescription Monitoring Program [] review was not needed today.    PSYCHOTROPIC DRUG INTERACTIONS: none clinically relevant    Plan                                                                                                                    m2, h3      1)Major depressive disorder, recurrent, severe  -- Medications:    - Self-discontinued mirtazapine    - Start venlafaxine 75 mg x 1 week then increase to 150 mg and continue on this dose.  -- Procedures:                - Pt is approved for an acute series of rTMS              - Coil: F8 or H1    -- Alcohol use addressed in clinic visit; recommended that pt taper use to discontinuation.     Therapy- Continue with scheduled appt for Sep 2021- patient would appreciate referral to other therapist with earlier start dates      RTC: 6 weeks for MFU    CRISIS NUMBERS:   Provided routinely in AVS.    Treatment Risk Statement:  The patient understands the risks, benefits, adverse effects and alternatives. Agrees to treatment with the capacity to do so. No medical contraindications to treatment. Agrees to call clinic for any problems. The patient understands to call 911 or go to the nearest ED if life threatening or urgent symptoms occur.       PROVIDER:  Alvina Whiteside MD

## 2021-06-17 NOTE — PROGRESS NOTES
Mahnaz is a 70 year old who is being evaluated via a billable telephone visit.      What phone number would you like to be contacted at? [phone]  How would you like to obtain your AVS? Larry  Phone call duration: 20 minutes

## 2021-06-18 ENCOUNTER — TELEPHONE (OUTPATIENT)
Dept: PSYCHIATRY | Facility: CLINIC | Age: 71
End: 2021-06-18

## 2021-06-18 DIAGNOSIS — F33.2 SEVERE RECURRENT MAJOR DEPRESSION WITHOUT PSYCHOTIC FEATURES (H): ICD-10-CM

## 2021-06-18 DIAGNOSIS — F33.2 SEVERE EPISODE OF RECURRENT MAJOR DEPRESSIVE DISORDER, WITHOUT PSYCHOTIC FEATURES (H): Primary | ICD-10-CM

## 2021-06-18 NOTE — PATIENT INSTRUCTIONS
"Today's Recommendations:  - Start venlafaxine ER 75 mg (1 tablet) daily for 1 week, then increase to 150 mg (2 tablets) and continue on this dose.    Common side effects of Effexor include nausea and headache. If you develop these symptoms and they are difficult to tolerate, please call the clinic    To recap, at our last visit we discussed beginning the process of authorization for TMS. This has two steps: 1) getting approval from insurance and 2) being on the wait list for TMS in our clinic.    The first step is two weeks in the simple case. If your insurance denies and we have to appeal, it could take months.    The second is waiting for an open treatment slot. When we have one, someone will call you. If you cannot take that specific time, you will go back on the top of the waiting list.    It can take several months on the waiting list when our service is busy. You are always welcome to call or send a MyChart to ask about status updates.    The waiting process can be long and frustrating when you have this severe a symptom level. IF YOU ARE GETTING WORSE, CALL A CRISIS NUMBER (below) OR GO TO AN EMERGENCY ROOM. TMS is not an emergency treatment, and the first priority is keeping you stable while we are getting set up.      Research  The \"Measurement Based Care\" research study is being conducted throughout the [facility] of Psychiatry and Behavioral Sciences. This provides some additional testing that might be diagnostically helpful or may help us to know what treatments are better suited to different mental health symptoms. This study will enable us how to better incorporate testing into clinical care.    More information about this study is at: [email or web address]    If you are interested in the study you can email, [email or web address].    If you do not want to be contacted about research, please let us know. (Being called does not mean you have to " "be in a  study.)    Our clinic is also conducting clinical trials of new brain stimulation treatments. Other studies are what we would call \"biomarker\" studies, where we ask you to participate in some kind of measurement while you are undergoing regular treatment. You may be called by one of our clinical research coordinators about these studies.      General Information:  Our Treatment-Resistant Disorders/Interventional Psychiatry clinic is what is often called a \"consultation\" or \"tertiary care\" clinic. That means we do not see patients long-term for medication management or talk therapy. We offer thorough evaluations, recommendations about medications/therapies you may have not yet tried, and in some cases, brain stimulation or office-based treatments. If you are likely to benefit from one of those advanced treatments, we will have talked about it today (or at a previous visit). Once that treatment is complete, we will see you once or twice afterwards to check in, and then you will return to getting ongoing psychiatric care from whomever you were seeing before you came for your evaluation with us. If for some reason you no longer have access to that clinician, we can help with a referral to our main [facility].      Our recommendations almost always include some kind of cognitive-behavioral therapy (CBT) if you are not getting it already. Brain and nerve stimulation treatments work best when combined with certain talk therapies. We make these recommendations because we strongly believe that, without the therapy piece, most people will not get better, or will have limited clinical benefit. It is often difficult or inconvenient to add therapy to an already busy schedule, but it is also necessary.    It is important to remember that, like all mental health treatments, our interventional therapies are not perfect. About one third of people will not feel better after treatment, and even when they work, " they do not take away the symptoms entirely. If we have recommended something above, it means we think there is a better than even chance of it working, but things are never guaranteed. This is another reason we usually recommend therapy along with our advanced treatments -- it can address the symptoms that remain after the brain stimulation treatment.       While we are waiting to implement the recommendations you and I have discussed, you should know what to do if your symptoms worsen. A variety of crisis numbers/resources are available. They include:    CRISIS GENERAL NUMBERS   0-180-SKBTIWU (1-634.988.2462)  OR  911     CRISIS INTERVENTION TEAM (CIT) - this is a POLICE UNIT, specially trained.  This website has information for all of [place]'s CITs. Lays out which areas have this team.  Http://cit.[facility]/citmap/[place].php  However, one can just call 911 and ask for this special team.   If police need to be called, DO ask for this team.    CRISIS MOBILE TEAMS  [and see end of this phrase*]  [facility] -COPE: 24hrs/7days:    [phone]  (Adults > 19yo)    [phone]  (Child   < 16yo)                                       FRONT DOOR (during the day could call)   [phone]    [facility] -[phone] (Adult)  [phone] (Child)     [facility] -[phone] (Adult and Child)     [facility] -[phone] ([employer] mobile crisis team; Adult and Child; 24hr)     [facility] -[phone] (Adult and Child)     CRISIS HOUSING  [facility]                           [address]              [phone]  [facility]                                [address]                       [phone]  [facility]                               [address]     [phone]   [facility]idence  [address]                 "[phone]    [facility] EMERGENCY NUMBERS      Crisis Connection:                                [phone]    [facility]:     [phone]    Crisis Intervention:                                [phone] or [phone]     COPE: Mobile Team 24hrs/7days:    [phone]  (Adults > 17yo)                                                                            [phone]  (Child   < 16yo)  [facility]        [phone] 24/7 line and Mobile Team   [address] [place] [address]  DROP IN:  M-F: 8:00 am - 7:00 pm  Sat: 11:00 am - 3:00 pm   Sun: Closed     WALK-IN COUNSELING:  [facility]       [phone]    [address]:   M, W, F:  1:00-3:00PM    M-Th:  6:30-8:30PM    TRANS and LGBT  Call [employer] at [phone]. This service is staffed by trans people 24/7.   LGBT youth <24 contemplating suicide, call the [employer] [phone].    POISON CONTROL CENTER  [phone]    OR  go to nearest ER    CHILD  \"[facility] has a needs assessment team who will do an intake evaluation. Based on the results of the intake they will recommended inpatient admission, partial hospitalization, intensive outpatient or outpatient care. The number is [phone]. \"    CRISIS TEXT LINE  Http://www.crisistextline.org  FREE SUPPORT AT YOUR FINGERTIPS, 24/7  Crisis Text Line serves anyone, in any type of crisis, providing access to free, 24/7 support and information via the medium people already use and trust: text. Here s how it works:  1)  Text [phone] from anywhere in the [place], anytime, about any type of crisis.  2)  A live, trained Crisis Counselor receives the text and responds quickly.      The volunteer Crisis Counselor will help you move from a 'hot moment to a cool moment'    [facility] EMERGENCY NUMBERS      Crisis Connection:                                [phone]    [facility]:      " "        [phone]    Crisis Intervention:                                [phone] or [phone]     COPE: Mobile Team 24hrs/7days:    [phone]  (Adults > 17yo)                                                                            [phone]  (Child   < 18yo)  [facility]        [phone]  CALL 24 hours a day.  [address] [place] [address]  DROP IN:  M-F: 8:00 am - 7:00 pm  Sat: 11:00 am - 3:00 pm   Sun: Closed    WALK-IN COUNSELIN)  [facility]                                  [phone]        [place], [address]:                  M, W:      5:00-7:00PM       2)  [facility]                            [phone]        [place], [address]                T, Th:      6:00-8:00PM    TRANS and LGBT  Call [employer] at [phone]. This service is staffed by trans people .   LGBT youth <24 contemplating suicide, call the [employer] [phone].    POISON CONTROL CENTER  [phone]    OR  go to nearest ER    CHILD  \"[facility] has a needs assessment team who will do an intake evaluation. Based on the results of the intake they will recommended inpatient admission, partial hospitalization, intensive outpatient or outpatient care. The number is [phone] or [phone]. \"    CRISIS TEXT LINE  Http://www.crisistextline.org  FREE SUPPORT AT YOUR FINGERTIPS,   Crisis Text Line serves anyone, in any type of crisis, providing access to free,  support and information via the medium people already use and trust: text. Here s how it works:  1)  Text [phone] from anywhere in the [place], anytime, about any type of crisis.  2)  A live, trained Crisis Counselor receives the text and responds quickly.      The volunteer Crisis Counselor will help you move from a 'hot moment to a cool moment'      * A Community Paramedic (CP) is an advanced paramedic that works to increase access to primary and " preventive care and decrease use of emergency departments, which in turn decreases health care costs. Among other things, CPs may play a key role in providing follow-up services after a hospital discharge to prevent hospital readmission. CPs can provide health assessments, chronic disease monitoring and education, medication management, immunizations and vaccinations, laboratory specimen collection, hospital discharge follow-up care and minor medical procedures. CPs work under the direction of an Ambulance Medical Director.

## 2021-06-21 ENCOUNTER — MYC REFILL (OUTPATIENT)
Dept: FAMILY MEDICINE | Facility: CLINIC | Age: 71
End: 2021-06-21

## 2021-06-21 DIAGNOSIS — M54.2 NECK PAIN ON RIGHT SIDE: ICD-10-CM

## 2021-06-21 NOTE — TELEPHONE ENCOUNTER
Central Prior Authorization Team   Phone: [phone]      PA Initiation    Medication: venlafaxine (EFFEXOR-ER) 75 MG 24 hr tablet  Insurance Company: [employer] - Phone [phone] Fax [phone]  Pharmacy Filling the Rx: [id number] - [place], [place] - [address] AT [facility] [address]  Filling Pharmacy Phone: [phone]  Filling Pharmacy Fax:    Start Date: 6/21/2021

## 2021-06-22 RX ORDER — HYDROCODONE BITARTRATE AND ACETAMINOPHEN 5; 325 MG/1; MG/1
1 TABLET ORAL DAILY PRN
Qty: 20 TABLET | Refills: 0 | Status: SHIPPED | OUTPATIENT
Start: 2021-06-22 | End: 2021-07-25

## 2021-06-22 NOTE — TELEPHONE ENCOUNTER
Routing refill request to provider for review/approval because:  Drug not on the [facility] refill protocol     Pending Prescriptions:                       Disp   Refills    HYDROcodone-acetaminophen (NORCO) 5-325 MG*20 tab*0        Sig: Take 1 tablet by mouth daily as needed for pain    Last Written Prescription Date:  5-  Last Fill Quantity: 20,  # refills: 0   Last office visit: 5/18/2021 with prescribing provider:  Anthony   Future Office Visit: None  Next 5 appointments (look out 90 days)    Jun 28, 2021 11:30 AM  Office Visit with Vladislav Cruz MD  [facility] ([facility] ) [address], [address]  [facility] [address] [phone]           Denice Roman RN  [facility]

## 2021-06-23 NOTE — TELEPHONE ENCOUNTER
Prior Authorization Not Needed per Insurance    Medication: venlafaxine (EFFEXOR-ER) 75 MG 24 hr tablet-PA Not Needed  Insurance Company: "[employer] - Phone [phone] Fax [phone]  Expected CoPay:      Pharmacy Filling the Rx: [id number] - [place], [place] - [address] AT [facility] [address]  Pharmacy Notified: Yes  Patient Notified: No    Spoke to pharmacy regarding PA not needed per insurance. Per tech, they are getting an error that capsules are preferred and asked if they are ok to change to capsules. Will notify provider.

## 2021-06-25 RX ORDER — VENLAFAXINE HYDROCHLORIDE 75 MG/1
CAPSULE, EXTENDED RELEASE ORAL
Qty: 60 CAPSULE | Refills: 1 | Status: SHIPPED | OUTPATIENT
Start: 2021-06-25 | End: 2021-07-23

## 2021-06-29 ENCOUNTER — TELEPHONE (OUTPATIENT)
Dept: PSYCHIATRY | Facility: CLINIC | Age: 71
End: 2021-06-29
Payer: MEDICARE

## 2021-06-30 ENCOUNTER — TELEPHONE (OUTPATIENT)
Dept: CARDIOLOGY | Facility: CLINIC | Age: 71
End: 2021-06-30

## 2021-06-30 DIAGNOSIS — R06.00 DYSPNEA: Primary | ICD-10-CM

## 2021-06-30 NOTE — TELEPHONE ENCOUNTER
Patient called, she had Covid about 15 months ago and since then has been very fatigued, Shortness of breath with mild exercise, and diaphoretic ( to some degree usual for her). Symptoms are not getting better/     Patient has seen PCP for symptoms.    Patient wondering if she should have a imaging test, prefers not to have a stress exercise test as she is so out of shape she thinks she would not be able to do it.    Patient also wonders if she should make a F/up PATIENCE visit?    Last [facility] PATIENCE visit was March 2021,

## 2021-07-01 ENCOUNTER — VIRTUAL VISIT (OUTPATIENT)
Dept: FAMILY MEDICINE | Facility: CLINIC | Age: 71
End: 2021-07-01
Payer: MEDICARE

## 2021-07-01 VITALS — BODY MASS INDEX: 23.33 KG/M2 | WEIGHT: 137 LBS

## 2021-07-01 DIAGNOSIS — R53.83 OTHER FATIGUE: ICD-10-CM

## 2021-07-01 DIAGNOSIS — Z17.0 MALIGNANT NEOPLASM OF AXILLARY TAIL OF RIGHT BREAST IN FEMALE, ESTROGEN RECEPTOR POSITIVE (H): ICD-10-CM

## 2021-07-01 DIAGNOSIS — C50.611 MALIGNANT NEOPLASM OF AXILLARY TAIL OF RIGHT BREAST IN FEMALE, ESTROGEN RECEPTOR POSITIVE (H): ICD-10-CM

## 2021-07-01 DIAGNOSIS — F33.2 SEVERE EPISODE OF RECURRENT MAJOR DEPRESSIVE DISORDER, WITHOUT PSYCHOTIC FEATURES (H): Primary | ICD-10-CM

## 2021-07-01 PROCEDURE — 99442 PR PHYSICIAN TELEPHONE EVALUATION 11-20 MIN: CPT | Mod: 95 | Performed by: INTERNAL MEDICINE

## 2021-07-01 NOTE — TELEPHONE ENCOUNTER
Order placed for stress MRI. Called patient to discuss, she states she is not claustrophobic. She will bring her sponge ear protectors in case of loud noises.    Message to scheduling to contact patient and set up testing.

## 2021-07-01 NOTE — TELEPHONE ENCOUNTER
Lets do a stress MRI as this will allow us to look for any Covid myocarditis and coronary disease.  If this is unrevealing then she should be referred to the [facility]

## 2021-07-01 NOTE — PROGRESS NOTES
Mahnaz is a 70 year old who is being evaluated via a billable telephone visit.      What phone number would you like to be contacted at? [phone]  How would you like to obtain your AVS? MyChart    Assessment & Plan     Severe episode of recurrent major depressive disorder, without psychotic features (H)  Continue follow-up with psychiatry      Malignant neoplasm of axillary tail of right breast in female, estrogen receptor positive (H)  Continue follow-up with hematology oncology    Other fatigue  Etiology remains unclear, cardiology is evaluating for post Covid cardiac disease that might be contributing to symptoms, we also had an excellent suggestion about seeing a post Covid specialty clinic at [facility]        25 minutes spent on the date of the encounter doing chart review, history and exam, documentation and further activities per the note      Return in about 6 months (around 1/1/2022) for Preventive Visit.  Patient instructed to return to clinic or contact us sooner if symptoms worsen or new symptoms develop.     Vladislav Cruz MD  [facility]    Santos Joya is a 70 year old who presents for the following health issues     HPI     6 week mood check  She saw psychiatry  Venlafaxine was added to Wellbutrin  Too soon to tell mood is improving  Plan for magnetic stimulation but earliest appointment is later this summer  Saw cardiology, cardiac MRI is planned  Consideration for referral to a post Covid syndrome clinic  Hematology oncology did a PET scan  Patient tells me that there was no metastatic/recurrent disease identified        Review of Systems         Objective           Vitals:  No vitals were obtained today due to virtual visit.    Physical Exam   healthy, alert and no distress  PSYCH: Alert and oriented times 3; coherent speech, normal   rate and volume, able to articulate logical thoughts, able   to abstract reason, no tangential thoughts, no hallucinations    or delusions  Her affect is normal  RESP: No cough, no audible wheezing, able to talk in full sentences  Remainder of exam unable to be completed due to telephone visits                Phone call duration:  19:25  minutes

## 2021-07-21 ENCOUNTER — TELEPHONE (OUTPATIENT)
Dept: CARDIOLOGY | Facility: CLINIC | Age: 71
End: 2021-07-21

## 2021-07-21 ENCOUNTER — HOSPITAL ENCOUNTER (OUTPATIENT)
Dept: CARDIOLOGY | Facility: CLINIC | Age: 71
Discharge: HOME | End: 2021-07-21
Attending: INTERNAL MEDICINE | Admitting: INTERNAL MEDICINE
Payer: MEDICARE

## 2021-07-21 VITALS — HEART RATE: 69 BPM | DIASTOLIC BLOOD PRESSURE: 75 MMHG | SYSTOLIC BLOOD PRESSURE: 127 MMHG

## 2021-07-21 DIAGNOSIS — R06.00 DYSPNEA: ICD-10-CM

## 2021-07-21 LAB
CREAT BLD-MCNC: 0.7 MG/DL (ref 0.5–1)
GFR SERPL CREATININE-BSD FRML MDRD: >60 ML/MIN/1.73M2

## 2021-07-21 PROCEDURE — 93018 CV STRESS TEST I&R ONLY: CPT | Performed by: INTERNAL MEDICINE

## 2021-07-21 PROCEDURE — 82565 ASSAY OF CREATININE: CPT

## 2021-07-21 PROCEDURE — 75563 CARD MRI W/STRESS IMG & DYE: CPT | Mod: ME

## 2021-07-21 PROCEDURE — A9585 GADOBUTROL INJECTION: HCPCS | Performed by: INTERNAL MEDICINE

## 2021-07-21 PROCEDURE — 93016 CV STRESS TEST SUPVJ ONLY: CPT | Performed by: INTERNAL MEDICINE

## 2021-07-21 PROCEDURE — 250N000011 HC RX IP 250 OP 636: Performed by: INTERNAL MEDICINE

## 2021-07-21 PROCEDURE — 255N000002 HC RX 255 OP 636: Performed by: INTERNAL MEDICINE

## 2021-07-21 PROCEDURE — G1004 CDSM NDSC: HCPCS | Performed by: INTERNAL MEDICINE

## 2021-07-21 PROCEDURE — 75563 CARD MRI W/STRESS IMG & DYE: CPT | Mod: 26 | Performed by: INTERNAL MEDICINE

## 2021-07-21 RX ORDER — REGADENOSON 0.08 MG/ML
0.4 INJECTION, SOLUTION INTRAVENOUS ONCE
Status: COMPLETED | OUTPATIENT
Start: 2021-07-21 | End: 2021-07-21

## 2021-07-21 RX ORDER — METHYLPREDNISOLONE SODIUM SUCCINATE 125 MG/2ML
125 INJECTION, POWDER, LYOPHILIZED, FOR SOLUTION INTRAMUSCULAR; INTRAVENOUS
Status: DISCONTINUED | OUTPATIENT
Start: 2021-07-21 | End: 2021-07-22 | Stop reason: HOSPADM

## 2021-07-21 RX ORDER — ALBUTEROL SULFATE 90 UG/1
2 AEROSOL, METERED RESPIRATORY (INHALATION) EVERY 5 MIN PRN
Status: DISCONTINUED | OUTPATIENT
Start: 2021-07-21 | End: 2021-07-22 | Stop reason: HOSPADM

## 2021-07-21 RX ORDER — GADOBUTROL 604.72 MG/ML
18 INJECTION INTRAVENOUS ONCE
Status: COMPLETED | OUTPATIENT
Start: 2021-07-21 | End: 2021-07-21

## 2021-07-21 RX ORDER — AMINOPHYLLINE 25 MG/ML
100 INJECTION, SOLUTION INTRAVENOUS ONCE
Status: DISCONTINUED | OUTPATIENT
Start: 2021-07-21 | End: 2021-07-22 | Stop reason: HOSPADM

## 2021-07-21 RX ORDER — DIAZEPAM 5 MG
5 TABLET ORAL EVERY 30 MIN PRN
Status: DISCONTINUED | OUTPATIENT
Start: 2021-07-21 | End: 2021-07-22 | Stop reason: HOSPADM

## 2021-07-21 RX ORDER — DIPHENHYDRAMINE HCL 25 MG
25 CAPSULE ORAL
Status: DISCONTINUED | OUTPATIENT
Start: 2021-07-21 | End: 2021-07-22 | Stop reason: HOSPADM

## 2021-07-21 RX ORDER — ONDANSETRON 2 MG/ML
4 INJECTION INTRAMUSCULAR; INTRAVENOUS
Status: DISCONTINUED | OUTPATIENT
Start: 2021-07-21 | End: 2021-07-22 | Stop reason: HOSPADM

## 2021-07-21 RX ORDER — ACYCLOVIR 200 MG/1
0-1 CAPSULE ORAL
Status: DISCONTINUED | OUTPATIENT
Start: 2021-07-21 | End: 2021-07-22 | Stop reason: HOSPADM

## 2021-07-21 RX ORDER — DIPHENHYDRAMINE HYDROCHLORIDE 50 MG/ML
25-50 INJECTION INTRAMUSCULAR; INTRAVENOUS
Status: DISCONTINUED | OUTPATIENT
Start: 2021-07-21 | End: 2021-07-22 | Stop reason: HOSPADM

## 2021-07-21 RX ORDER — CAFFEINE CITRATE 20 MG/ML
60 SOLUTION INTRAVENOUS
Status: DISCONTINUED | OUTPATIENT
Start: 2021-07-21 | End: 2021-07-22 | Stop reason: HOSPADM

## 2021-07-21 RX ADMIN — GADOBUTROL 18 ML: 604.72 INJECTION INTRAVENOUS at 11:31

## 2021-07-21 RX ADMIN — REGADENOSON 0.4 MG: 0.08 INJECTION, SOLUTION INTRAVENOUS at 10:55

## 2021-07-21 NOTE — TELEPHONE ENCOUNTER
Cardiac MR 7-21-21  1. The LV is normal in cavity size and wall thickness. The global systolic function is normal. The LVEF is  67 %. There are no regional wall motion abnormalities.  2. The RV is normal in cavity size. The global systolic function is normal. The RVEF is 64 %.   3. Late gadolinium enhancement imaging shows no MI, fibrosis or infiltrative disease.   4. Regadenoson stress perfusion imaging shows no ischemia.  5. There is no pericardial effusion.  6. There is no intracardiac thrombus.    Test recommended per phone note 6-30-21 by Dr. Ana Cotres do a stress MRI as this will allow us to look for any Covid myocarditis and coronary disease.  If this is unrevealing then she should be referred to the [facility] Covid clinic    Last [facility]  visit 3-26-21- recommendation F/Up visit with cardiologist in 1 yr.

## 2021-07-21 NOTE — PROGRESS NOTES
Cardiac Stress protocol  T2 Mapping  Contrast administered per weight based protocol.  Per Dr Cho

## 2021-07-21 NOTE — TELEPHONE ENCOUNTER
Stress MRI looks good.  No evidence of ischemia.  No evidence of any damage from Covid.  If she is still having limiting symptoms consider referral to the [facility].  Otherwise continue with current plan and regular exercise

## 2021-07-21 NOTE — TELEPHONE ENCOUNTER
Spoke with patient, she was pleased with her report. She states she has been feeling better the last 2 weeks, so will hold off on the COVID clinic for now.

## 2021-07-22 ENCOUNTER — TELEPHONE (OUTPATIENT)
Dept: PSYCHIATRY | Facility: CLINIC | Age: 71
End: 2021-07-22

## 2021-07-22 DIAGNOSIS — F33.2 SEVERE EPISODE OF RECURRENT MAJOR DEPRESSIVE DISORDER, WITHOUT PSYCHOTIC FEATURES (H): ICD-10-CM

## 2021-07-22 NOTE — TELEPHONE ENCOUNTER
Last seen: 06/17/2021  RTC: 6 weeks  Cancel: none  No-show: none  Next appt: 08/19/2021    Incoming refill from patient via phone    Medication requested: Venlafaxine 150 MG   Directions: take 1 tablet (150 MG) by mouth daily   Qty: 30  Last refilled: 06/25/2021    Medication refill approved per refill protocol

## 2021-07-22 NOTE — TELEPHONE ENCOUNTER
Patient called to let Dr. Whiteside know the new medication is going great! Going so well she would like to hold off on TMS for now.

## 2021-07-23 RX ORDER — VENLAFAXINE HYDROCHLORIDE 150 MG/1
150 CAPSULE, EXTENDED RELEASE ORAL DAILY
Qty: 30 CAPSULE | Refills: 0 | Status: SHIPPED | OUTPATIENT
Start: 2021-07-23 | End: 2021-08-19

## 2021-07-23 NOTE — TELEPHONE ENCOUNTER
Called patient and informed her dose of medication would be switched from 75 MG to 150 MG, changing instructions to one tablet a day versus 2. Patient verbalized understanding.

## 2021-07-25 ENCOUNTER — MYC REFILL (OUTPATIENT)
Dept: FAMILY MEDICINE | Facility: CLINIC | Age: 71
End: 2021-07-25

## 2021-07-25 DIAGNOSIS — M54.2 NECK PAIN ON RIGHT SIDE: ICD-10-CM

## 2021-07-26 ENCOUNTER — TELEPHONE (OUTPATIENT)
Dept: PSYCHOLOGY | Facility: CLINIC | Age: 71
End: 2021-07-26

## 2021-07-26 RX ORDER — HYDROCODONE BITARTRATE AND ACETAMINOPHEN 5; 325 MG/1; MG/1
1 TABLET ORAL DAILY PRN
Qty: 20 TABLET | Refills: 0 | Status: SHIPPED | OUTPATIENT
Start: 2021-07-26 | End: 2021-09-02

## 2021-07-26 NOTE — TELEPHONE ENCOUNTER
Pt returning provider call  regarding coming in for an earlier appt time. Pt stated she can't come in earlier because her brother is helping her get her car. She will like to keep her appt.

## 2021-07-26 NOTE — TELEPHONE ENCOUNTER
Routing refill request to provider for review/approval because:  Drug not on the [facility] refill protocol   Sherice Garcia RN

## 2021-08-06 ENCOUNTER — TELEPHONE (OUTPATIENT)
Dept: PSYCHIATRY | Facility: CLINIC | Age: 71
End: 2021-08-06

## 2021-08-06 NOTE — TELEPHONE ENCOUNTER
Writer returned call to patient.  Received voicemail.  Left message asking for a return call, clinic number provided.

## 2021-08-06 NOTE — TELEPHONE ENCOUNTER
Writer called patient back.      Patient reports that she is sweating more and has been having tremor.  However she feels that the venlafaxine is helping her mood is much better with the venlafaxine.       Patient is seeing provider 8/19/21, and will continue to take it and further discuss it with Dr. Whiteside then.

## 2021-08-06 NOTE — TELEPHONE ENCOUNTER
What is the concern that needs to be addressed by a nurse? Question about venlafaxine (EFFEXOR-XR) 150 MG 24 hr capsule. Was taking 2 tablets of 75mg, just refilled and now takes the 150mg tablet. She read on the bottle it can cause trembling and she has the sx, would like to know if that is normal and if that is concerning. Also wondering if caffeine or alcohol is bad to mix w medication. Has seen the sx more w the new rx, but has only been taking it for two weeks. Please call back. In a meeting from 10-12, please call later.     May a detailed message be left on [employer]? yes    Date of last office visit: 07/01/21    Message routed to: psych rn pool

## 2021-08-13 DIAGNOSIS — B00.9 HSV (HERPES SIMPLEX VIRUS) INFECTION: ICD-10-CM

## 2021-08-13 DIAGNOSIS — M54.2 NECK PAIN ON RIGHT SIDE: ICD-10-CM

## 2021-08-13 RX ORDER — VALACYCLOVIR HYDROCHLORIDE 1 G/1
2000 TABLET, FILM COATED ORAL 2 TIMES DAILY PRN
Qty: 8 TABLET | Refills: 0 | Status: SHIPPED | OUTPATIENT
Start: 2021-08-13 | End: 2022-05-19

## 2021-08-13 NOTE — TELEPHONE ENCOUNTER
Prescription approved per [facility] Refill Protocol.  Denice Roman RN  [facility]

## 2021-08-19 ENCOUNTER — VIRTUAL VISIT (OUTPATIENT)
Dept: PSYCHIATRY | Facility: CLINIC | Age: 71
End: 2021-08-19
Payer: MEDICARE

## 2021-08-19 ENCOUNTER — VIRTUAL VISIT (OUTPATIENT)
Dept: PHARMACY | Facility: CLINIC | Age: 71
End: 2021-08-19
Payer: COMMERCIAL

## 2021-08-19 ENCOUNTER — TELEPHONE (OUTPATIENT)
Dept: PSYCHIATRY | Facility: CLINIC | Age: 71
End: 2021-08-19

## 2021-08-19 DIAGNOSIS — F32.0 MILD MAJOR DEPRESSION (H): Primary | ICD-10-CM

## 2021-08-19 DIAGNOSIS — R53.83 FATIGUE, UNSPECIFIED TYPE: ICD-10-CM

## 2021-08-19 DIAGNOSIS — F33.2 SEVERE EPISODE OF RECURRENT MAJOR DEPRESSIVE DISORDER, WITHOUT PSYCHOTIC FEATURES (H): Primary | ICD-10-CM

## 2021-08-19 PROCEDURE — 99607 MTMS BY PHARM ADDL 15 MIN: CPT | Performed by: PHARMACIST

## 2021-08-19 PROCEDURE — 99606 MTMS BY PHARM EST 15 MIN: CPT | Performed by: PHARMACIST

## 2021-08-19 RX ORDER — VENLAFAXINE HYDROCHLORIDE 75 MG/1
75 CAPSULE, EXTENDED RELEASE ORAL DAILY
Qty: 90 CAPSULE | Refills: 1 | Status: SHIPPED | OUTPATIENT
Start: 2021-08-19 | End: 2021-12-16

## 2021-08-19 RX ORDER — GLUCOSAMINE/CHONDR SU A SOD 750-600 MG
1 TABLET ORAL DAILY
COMMUNITY

## 2021-08-19 RX ORDER — MULTIVIT WITH MINERALS/LUTEIN
250 TABLET ORAL DAILY
COMMUNITY

## 2021-08-19 ASSESSMENT — PATIENT HEALTH QUESTIONNAIRE - PHQ9: SUM OF ALL RESPONSES TO PHQ QUESTIONS 1-9: 4

## 2021-08-19 NOTE — PROGRESS NOTES
Mahnaz is a 70 year old who is being evaluated via a billable telephone visit.      What phone number would you like to be contacted at? [phone]  How would you like to obtain your AVS? Larry  Phone call duration: 36 minutes

## 2021-08-19 NOTE — TELEPHONE ENCOUNTER
Called pharmacy and clarified venlafaxine order as ordered was 75 MG but directions state to take 150 MG.   Correct order is 75 MG once per day  According to  Dr. Whiteside's note.

## 2021-08-19 NOTE — PROGRESS NOTES
"Start: 11:17 AM  End: 11:53 AM      Psychiatry Clinic Progress Note                                                                   Svetlana Adair is a 70 year old female who prefers the name \"Prerna" and pronoun she, hers.  Therapist: Scheduled to start in September 2021  PCP: Vladislav Cruz  Other Providers: None    Pertinent Background:  This patient initially experienced symptoms in the 1990s when her  at the time was developing symptoms of early-onset Alzheimer's in his 40s. She received mental health care at this time including an selective serotonin reuptake inhibitor as well as therapy; she is not sure if either helped, but feels that life stressors were so intense that no medication could have helped. She has many major life stressors including  Laurel's alzheimer's diagnosis in 1999, mother's death in 2000, Laurel's death in 2009, breast cancer diagnosis in 2011, recurrence with lymphedema in 2017.  Psych critical item history includes mutiple psychotropic trials  and substance use: alcohol.    Interim History                                                                                                        4, 4     The patient is a good historian.      Since the last visit:    Patient states that she thinks that she is \"better\" although she thinks that she has developed an intention tremor since starting venlafaxine.    Is now taking Effexor every other day. Feels that tremor is better now.    Has started taking acai on a daily basis. Believes that she is sleeping much better and that her digestion has been regular. Also feels that her energy is much better. Because she started acai first she believes that this has been most effective for energy.    Has good blood pressure and hasn't noticed any effect of Effexor on this.    Discussed continuing at Effexor 75 mg to see if tremor and hyperhidrosis are tolerable with good response for mood. Encouraged her to continue on this dose until " "she is next seen unless she feels side effects are worse in which case she could call our clinic and we could discuss tapering medication.    Overall, thinks that she is much improved now with better energy. Feels that she is able to do her work and has more interest in socializing. Currently working half days 6 days per week.     Also discussed how it does not seem that TMS is currently indicated given the improvement in mood and energy.    Recommended following up with this provider at next available appt.     Recent Symptoms:   Depression:  depressed mood, anhedonia, low energy, insomnia, appetite changes, weight changes and poor concentration /memory     Recent Substance Use:  Alcohol- yes         Social/ Family History                                  [per patient report]                                 1ea,1ea     FINANCIAL SUPPORT- working full time as a caretaker, sliding scale insulin, -- \"Mahnaz states she cannot leave the job because she is  essentially broke  after investing the majority of her finances in a wellness company called  [employer].      RELATIONSHIPS/CHILDREN- No children   high school boyfriend --   Remarried Laurel--> [facility] [employer]  LIVING SITUATION- lives alone      LEGAL- DUI over 20 years ago  EARLY HISTORY/ EDUCATION- not addressed today. Has some college.   SOCIAL/ SPIRITUAL SUPPORT-has many friends in area, finds support through AA group, practices meditation  CULTURAL INFLUENCES/ IMPACT- none       TRAUMA HISTORY (self-report)- None  FEELS SAFE AT HOME- Yes  FAMILY HISTORY-  Bipolar disorder in sister; depression in mother  Hobbies: horseback riding, tennis with friends  Previously competitive swimmer when younger    Medical / Surgical History                                                                                                                  Patient Active Problem List   Diagnosis     Breast cancer est/prog +, HER2 ERNIE -     " Osteoarthritis     Moderate episode of recurrent major depressive disorder (H)     Advanced directives, counseling/discussion     Knee pain     Past use of tobacco     IFG (impaired fasting glucose)     Low back pain     Malignant neoplasm of right breast (H)     Hyperlipidemia LDL goal <70     Follow-up examination following surgery     Atherosclerosis of left carotid artery     Chronic, continuous use of opioids     Adjustment disorder with mixed anxiety and depressed mood     Neck pain on right side     Hyperparathyroidism (H)     Alcohol dependence in remission (H)     High coronary artery calcium score       Past Surgical History:   Procedure Laterality Date     ABDOMEN SURGERY  2007?    Hysterectomy     COLONOSCOPY       COLONOSCOPY  11/17/2011    Procedure:COLONOSCOPY; Colonoscopy; Surgeon:MEKA RUSS; Location: GI     COLONOSCOPY N/A 2/10/2020    Procedure: COLONOSCOPY, WITH POLYPECTOMY AND BIOPSY;  Surgeon: Opal Ace MD;  Location:  GI     DISSECT LYMPH NODE AXILLA Right 11/2/2017    Procedure: DISSECT LYMPH NODE AXILLA;  RIGHT AXILLARY LYMPH NODE DISSECTION;  Surgeon: Cortez White MD;  Location: [facility]     GYN SURGERY  2005    hysterectomy after hx of ovarian cyst, ended up being benign     HYSTERECTOMY, PAP NO LONGER INDICATED       MASTECTOMY MODIFIED RADICAL  2011    bilateral     MASTECTOMY, BILATERAL       ORTHOPEDIC SURGERY      rt. knee arthroscopy     ORTHOPEDIC SURGERY Right     toe surgery     REALIGN PATELLA  1973    right      TOE SURGERY      right grt OA         Medical Review of Systems                                                                                                    2,10   A comprehensive review of systems was performed and is negative other than noted in the HPI.    Allergy                                Cats and Codeine sulfate    Current Medications                                                                                                        Current Outpatient Medications   Medication Sig Dispense Refill     ASHWAGANDHA PO Take 1 tablet by mouth daily prn       aspirin 81 MG tablet Take 1 tablet (81 mg) by mouth daily 30 tablet      buPROPion (WELLBUTRIN XL) 150 MG 24 hr tablet Take 1 tablet (150 mg) by mouth every morning 90 tablet 3     calcium carbonate 600 mg-vitamin D 400 units (CALTRATE) 600-400 MG-UNIT per tablet Take 1 tablet by mouth daily        Cholecalciferol (VITAMIN D-3) 5000 units TABS Take 1 tablet by mouth daily        Coenzyme Q10 300 MG CAPS Take 300 mg by mouth daily       HYDROcodone-acetaminophen (NORCO) 5-325 MG tablet Take 1 tablet by mouth daily as needed for pain 20 tablet 0     IBUPROFEN PO Take 200 mg by mouth every 4 hours as needed for moderate pain        lactobacillus rhamnosus, GG, (CULTURELL) capsule Take 1 capsule by mouth daily        letrozole (FEMARA) 2.5 MG tablet Take 1 tablet by mouth daily  5     LYSINE 1-3 daily as needed       Melatonin 10 MG TABS tablet Take 10 mg by mouth nightly as needed for sleep       multivitamin (OCUVITE) TABS tablet Take 1 tablet by mouth daily       nicotine polacrilex (NICORETTE) 2 MG gum Place 1 each (2 mg) inside cheek as needed for smoking cessation 30 tablet 11     omeprazole 20 MG tablet Take 1 tablet (20 mg) by mouth as needed 90 tablet 3     rosuvastatin (CRESTOR) 10 MG tablet Take 1 tablet (10 mg) by mouth daily 90 tablet 2     UNABLE TO FIND Take by mouth daily MEDICATION NAME: Asea       UNABLE TO FIND Take 1 tablet by mouth daily MEDICATION NAME: Zymex - has almond, gig, papain, bromelain, amylase, cellulase, and lipase.       UNABLE TO FIND Take 1 tablet by mouth daily MEDICATION NAME: Aidee Duong - chinese supplement       UNABLE TO FIND MEDICATION NAME: Somnapure - 2 tablets = 500mg valerian root, 300mg lemon balm extract, 200mg l-theanine, 120mg hops extract, 50mg chamomile flower extract, 50mg passion flower extract, 3mg melatonin.  Takes 1-2 tablets at bedtime        UNABLE TO FIND MEDICATION NAME: Moringa 1 serving of powder daily       UNABLE TO FIND MEDICATION NAME: Premium Amla Berry 2 capsules = 4mg Vitamin C, 966mg organic amla, organic amla extract.  Takes 2 capsules daily       UNABLE TO FIND MEDICATION NAME: OmegaKrill 5x 3 capsules = 480mg of total omega-3, 64mg EPA, 392mg DHA, 300mcg astaxanthin.  Takes 3 capsules daily       UNABLE TO FIND MEDICATION NAME: Super Colon Cleanse 2 capsules = 665mg senna leaf powder, 321mg psyllium husk powder, 21mg fennel seed powder, 21mg papaya leaf powder, 21mg yolanda hips fruit powder, 11mg l-acidophilus.  Taking 4 capsules daily       UNABLE TO FIND MEDICATION NAME: Majestha powder daily       valACYclovir (VALTREX) 1000 mg tablet Take 2 tablets (2,000 mg) by mouth 2 times daily as needed (Take two tablets by mouth PRN) 8 tablet 0     venlafaxine (EFFEXOR-XR) 150 MG 24 hr capsule Take 1 capsule (150 mg) by mouth daily Take 1 capsule of 150 MG daily and continue on this dose 30 capsule 0     ZINC SULFATE-VITAMIN C MT Take 1 tablet by mouth daily Also contains copper, magnesium, and manganese.       ZOLEDRONIC ACID IV Inject into the vein every 6 months         Vitals                                                                                                                       3, 3   There were no vitals taken for this visit.     Mental Status Exam                                                                                    9, 14 cog gs     Alertness: alert  and oriented   Appearance: unable to assess via telephone  Behavior/Demeanor: cooperative and pleasant  Speech: normal and regular rate and rhythm  Language: intact and no problems  Psychomotor: not assessed  Mood: depressed and anxious  Affect: restricted; was congruent to mood; was congruent to content  Thought Process/Associations: unremarkable  Thought Content:  Reports none;  Denies suicidal and violent ideation  Perception:  Reports none;  Denies auditory  hallucinations and visual hallucinations  Insight: good  Judgment: good  Cognition: (6) oriented: time, person, and place  attention span: intact  concentration: intact  recent memory: intact  remote memory: intact  fund of knowledge: appropriate  Gait/Station and/or Muscle Strength/Tone: not assessed    Labs and Data                                                                                                                 Rating Scales:    PHQ9    PHQ9 Today:  9  PHQ 8/24/2020 3/25/2021 6/17/2021   PHQ-9 Total Score 6 11 9   Q9: Thoughts of better off dead/self-harm past 2 weeks Not at all Not at all Not at all         Diagnosis and Assessment                                                                             m2, h3     Svetlana Adair is a 70 year old female with previous psychiatric history of MDD, recurrent, severe who presents for evaluation of candidacy for Transcranial Magnetic Stimulation for treatment resistant depression. Patient was referred by her Primary Care Physician. She has a well documented failure of adequate trials of four antidepressants (2 SSRIs and 2 SNRIs) which represent multiple antidepressant classes. The patient has completed an adequate dose of individual psychotherapy. Patient is burdened by her chronic symptoms of depression and her current episode has lasted over 12 months causing significant psychosocial dysfunction. Due to remaining profound depression and numerous failed previous treatment modalities the patient is a candidate for TMS treatment.     At last visit, Svetlana opted to trial a new medication, mirtazapine, an antidepressant to help her with sleep.     Today the following issues were addressed:    1) Major depressive disorder, recurrent, severe  2) Alcohol use disorder    [place] Prescription Monitoring Program [] review was not needed today.    PSYCHOTROPIC DRUG INTERACTIONS: none clinically relevant    Plan                                                                                                                     m2, h3      1)Major depressive disorder, recurrent, severe  -- Medications:    - Decrease venlafaxine to 75 mg daily and continue on this dose (90 day rx sent 08/19/21)  -- Procedures:               - Pt is approved for an acute series of rTMS              - Coil: F8 or H1    -- Alcohol use addressed in clinic visit; recommended that pt taper use to discontinuation.     Therapy- Continue with scheduled appt for Sep 2021- patient would appreciate referral to other therapist with earlier start dates      RTC: 6 weeks for MFU    CRISIS NUMBERS:   Provided routinely in AVS.    Treatment Risk Statement:  The patient understands the risks, benefits, adverse effects and alternatives. Agrees to treatment with the capacity to do so. No medical contraindications to treatment. Agrees to call clinic for any problems. The patient understands to call 911 or go to the nearest ED if life threatening or urgent symptoms occur.       PROVIDER:  Alvina Whiteside MD

## 2021-08-19 NOTE — PROGRESS NOTES
"Medication Therapy Management (MTM) Encounter    ASSESSMENT:                            Medication Adherence/Access: No issues identified    Depression: May benefit from reducing venlafaxine XR back to 75mg daily to prevent tremor and maintain consistent drug levels.     Fatigue: From what I can tell, the only ingredients in Asea Redox are water and sodium chloride.  I would not expect any therapeutic benefit from this; although it's likely not harmful.  She feels it's been helpful and plans to continue using.  Blood pressure has been normal, need to continue monitoring this given sodium content in Asea Redox.    PLAN:                            1.  Encouraged Mahnaz to talk with psychiatry about reducing venlafaxine XR back to 75mg daily.  2.  I do not expect any therapeutic benefit from Asea Redox.    Follow-up: Return in about 13 weeks (around 11/18/2021) for Follow-up Medication Review.    SUBJECTIVE/OBJECTIVE:                          Svetlana Adair is a 70 year old female called for a follow-up visit. She was referred to me from Dr. Cruz.  Today's visit is a follow-up MTM visit from 4/29/20, serving as an initial visit for 2021.     Reason for visit: Routine f/up.    Tobacco: She reports that she quit smoking about 11 years ago. Her smoking use included cigarettes. She started smoking about 52 years ago. She has a 22.00 pack-year smoking history. She has never used smokeless tobacco.  Alcohol: not currently using    Medication Adherence/Access: no issues reported    Depression:  Current medications include: bupropion XR 150mg daily and venlafaxine XR 150mg every other day (she reduced on her own recently).  She has been on this for a few months and has now noticed a tremor, which is why she reduced frequency of administration.  She has a f/up with psychiatry later today so will discuss then.  She feels her mental health is doing ok - \"it's not terrible and it's not perfect.\"  She denies suicidal ideation. "   PHQ 3/25/2021 6/17/2021 8/19/2021   PHQ-9 Total Score 11 9 4   Q9: Thoughts of better off dead/self-harm past 2 weeks Not at all Not at all Not at all      Fatigue:  Patient had COVID-19 in 2020.  She had extreme exhaustion for 17 months.  She started Asea Redox supplement about 3 months ago and she's feeling much better in the last 5 weeks or so.      Today's Vitals: There were no vitals taken for this visit.  ----------------    I spent 30 minutes with this patient today. I offer these suggestions for consideration by Mahnaz's psychiatry provider. A copy of the visit note was provided to the patient's primary care provider.    The patient was sent via [facility] labs a summary of these recommendations.     Janice Tierney, PharmD, [facility]  Medication Therapy Management Provider  Pager: [phone]     Telemedicine Visit Details  Type of service:  Telephone visit  Start Time: 10:34 AM  End Time: 11:04 AM  Originating Location (patient location): [place]  Distant Location (provider location):  [facility]     Medication Therapy Recommendations  Adjustment disorder with mixed anxiety and depressed mood    Current Medication: venlafaxine (EFFEXOR-XR) 150 MG 24 hr capsule (Discontinued)   Rationale: Undesirable effect - Adverse medication event - Safety   Recommendation: Decrease Dose - venlafaxine 75 MG 24 hr capsule   Status: Contact Provider - Awaiting Response         Fatigue, unspecified type    Current Medication: UNABLE TO FIND   Rationale: Nonmedication therapy more appropriate - Unnecessary medication therapy - Indication   Recommendation: Discontinue Medication   Status: Declined per Patient

## 2021-08-24 NOTE — PATIENT INSTRUCTIONS
Recommendations from today's MTM visit:                                                    MTM (medication therapy management) is a service provided by a clinical pharmacist designed to help you get the most of out of your medicines.   Today we reviewed what your medicines are for, how to know if they are working, that your medicines are safe and how to make your medicine regimen as easy as possible.      1.  Please talk with psychiatry about reducing venlafaxine XR back to 75mg daily.    Follow-up: Return in about 13 weeks (around 11/18/2021) for Follow-up Medication Review.    It was great to speak with you today.  I value your experience and would be very thankful for your time with providing feedback on our clinic survey. You may receive a survey via email or text message in the next few days.     To schedule another MTM appointment, please call the clinic directly or you may call the MTM scheduling line at [phone] or toll-free at [phone].     My Clinical Pharmacist's contact information:                                                      Please feel free to contact me with any questions or concerns you have.      Janice Tierney PharmD, [facility]  Medication Therapy Management Provider  Pager: [phone]     Lori Greene, Jovi  Medication Therapy Management Resident  Pager: [phone]

## 2021-09-02 DIAGNOSIS — M54.2 NECK PAIN ON RIGHT SIDE: ICD-10-CM

## 2021-09-02 RX ORDER — HYDROCODONE BITARTRATE AND ACETAMINOPHEN 5; 325 MG/1; MG/1
1 TABLET ORAL DAILY PRN
Qty: 20 TABLET | Refills: 0 | Status: SHIPPED | OUTPATIENT
Start: 2021-09-02 | End: 2021-09-25

## 2021-09-03 ENCOUNTER — TELEPHONE (OUTPATIENT)
Dept: PSYCHIATRY | Facility: CLINIC | Age: 71
End: 2021-09-03

## 2021-09-05 ASSESSMENT — ANXIETY QUESTIONNAIRES
7. FEELING AFRAID AS IF SOMETHING AWFUL MIGHT HAPPEN: NOT AT ALL
4. TROUBLE RELAXING: SEVERAL DAYS
5. BEING SO RESTLESS THAT IT IS HARD TO SIT STILL: NOT AT ALL
2. NOT BEING ABLE TO STOP OR CONTROL WORRYING: SEVERAL DAYS
GAD7 TOTAL SCORE: 4
3. WORRYING TOO MUCH ABOUT DIFFERENT THINGS: SEVERAL DAYS
6. BECOMING EASILY ANNOYED OR IRRITABLE: NOT AT ALL
8. IF YOU CHECKED OFF ANY PROBLEMS, HOW DIFFICULT HAVE THESE MADE IT FOR YOU TO DO YOUR WORK, TAKE CARE OF THINGS AT HOME, OR GET ALONG WITH OTHER PEOPLE?: NOT DIFFICULT AT ALL
1. FEELING NERVOUS, ANXIOUS, OR ON EDGE: SEVERAL DAYS
GAD7 TOTAL SCORE: 4
GAD7 TOTAL SCORE: 4
7. FEELING AFRAID AS IF SOMETHING AWFUL MIGHT HAPPEN: NOT AT ALL

## 2021-09-05 ASSESSMENT — PATIENT HEALTH QUESTIONNAIRE - PHQ9
SUM OF ALL RESPONSES TO PHQ QUESTIONS 1-9: 7
10. IF YOU CHECKED OFF ANY PROBLEMS, HOW DIFFICULT HAVE THESE PROBLEMS MADE IT FOR YOU TO DO YOUR WORK, TAKE CARE OF THINGS AT HOME, OR GET ALONG WITH OTHER PEOPLE: SOMEWHAT DIFFICULT
SUM OF ALL RESPONSES TO PHQ QUESTIONS 1-9: 7

## 2021-09-06 ASSESSMENT — PATIENT HEALTH QUESTIONNAIRE - PHQ9: SUM OF ALL RESPONSES TO PHQ QUESTIONS 1-9: 7

## 2021-09-06 ASSESSMENT — ANXIETY QUESTIONNAIRES: GAD7 TOTAL SCORE: 4

## 2021-09-07 ENCOUNTER — VIRTUAL VISIT (OUTPATIENT)
Dept: PSYCHOLOGY | Facility: CLINIC | Age: 71
End: 2021-09-07
Payer: MEDICARE

## 2021-09-07 DIAGNOSIS — F33.1 MDD (MAJOR DEPRESSIVE DISORDER), RECURRENT EPISODE, MODERATE (H): Primary | ICD-10-CM

## 2021-09-07 PROCEDURE — 90791 PSYCH DIAGNOSTIC EVALUATION: CPT | Mod: 95 | Performed by: SOCIAL WORKER

## 2021-09-07 ASSESSMENT — COLUMBIA-SUICIDE SEVERITY RATING SCALE - C-SSRS
2. HAVE YOU ACTUALLY HAD ANY THOUGHTS OF KILLING YOURSELF?: NO
1. IN THE PAST MONTH, HAVE YOU WISHED YOU WERE DEAD OR WISHED YOU COULD GO TO SLEEP AND NOT WAKE UP?: NO
1. IN THE PAST MONTH, HAVE YOU WISHED YOU WERE DEAD OR WISHED YOU COULD GO TO SLEEP AND NOT WAKE UP?: NO
ATTEMPT PAST THREE MONTHS: NO
2. HAVE YOU ACTUALLY HAD ANY THOUGHTS OF KILLING YOURSELF LIFETIME?: NO
ATTEMPT LIFETIME: NO

## 2021-09-07 NOTE — PROGRESS NOTES
"[facility]  Provider Name:  Raffaele Meng     Credentials:  HUE, DIMAS    PATIENT'S NAME: Svetlana Adair  PREFERRED NAME: Mahnaz  PRONOUNS:     she her hers  MRN: [id number]  : 1950  ADDRESS: [facility] [address] [place] [address]  ACCT. NUMBER:  [id number]  DATE OF SERVICE: 21  START TIME: 2pm  END TIME: 250  PREFERRED PHONE: [phone]  May we leave a program related message: Yes  SERVICE MODALITY:  Video Visit:      Provider verified identity through the following two step process.  Patient provided:  Patient photo, Patient  and Patient is known previously to provider    Telemedicine Visit: The patient's condition can be safely assessed and treated via synchronous audio and visual telemedicine encounter.      Reason for Telemedicine Visit: Services only offered telehealth    Originating Site (Patient Location): Patient's home    Distant Site (Provider Location): Provider Remote Setting- Home Office    Consent:  The patient/guardian has verbally consented to: the potential risks and benefits of telemedicine (video visit) versus in person care; bill my insurance or make self-payment for services provided; and responsibility for payment of non-covered services.     Patient would like the video invitation sent by:  My Chart    Mode of Communication:  Video Conference via [facility]    As the provider I attest to compliance with applicable laws and regulations related to telemedicine.    UNIVERSAL ADULT Mental Health DIAGNOSTIC ASSESSMENT    Identifying Information:  Patient is a 71 year old,  single femaleCaucasian.  The pronoun use throughout this assessment reflects the patient's chosen pronoun.  Patient was referred for an assessment by Current psychiatric provider andself.  Patient attended the session alone.     Chief Complaint:   The reason for seeking services at this time is: \" Post COVID hopelessness and exhaustion and depression.\"  Client further reports " "decreased energy and concentration and brain fog after having contracted COVID-19 in 2020.  This was later confirmed by an antibody test in 2020.  She reports 17 months of dealing with managing these health issues while having to work 6 days a week due to financial stressors.  The problem(s) began last summer 2020 when she felt her depression worsened. Patient has attempted to resolve these concerns in the past through Currently being managed under the care of a psychiatrist and followed closely by her GP.  She has been approved for TMS to try to treat her depression as numerous medication trials have not proven successful.  She reports currently being on Wellbutrin and Effexor and she states it is beginning to feel like they are helping so she is wanting to postpone TMS treatment for now.  She reports taking a supplement for the past 3 months which seems to help with improving sleep and energy.  Significant to note that this client is a former patient of this providers having been seen for a brief episode of care between May 2018 and 2018. .    Social/Family History:  Patient reported they grew up in [place], [place].  They were raised by biological parents.  Parents stayed ..   Patient reported that their childhood was \"I was the youngest thought it was really good but I always sought connection and felt lonely a lot\".  Client further reports taking the approval of her father was more focused on her brother's accomplishments.  This got her into competitive swimming and helped build a sense of purpose and identity.  patient described their current relationships with family of origin as stable and meaningful.  Both parents are .    The patient describes their cultural background as raised Mandaeism and Hoahaoism.  Cultural influences and impact on patient's life structure, values, norms, and healthcare: Dominant culture middleclass.  Contextual influences on patient's health " include: Early on issue with substance use disorder.  Client has completed chemical dependency treatment in the 1980s.  Has routinely sought mental health intervention and care when needed during times of severe stress in the past.    These factors will be addressed in the Preliminary Treatment plan.  Patient identified their preferred language to be English. Patient reported they does not need the assistance of an  or other support involved in therapy.     Patient reported had no significant delays in developmental tasks.   Patient's highest education level was some college. Patient identified the following learning problems: none reported.  Modifications will not be used to assist communication in therapy.   Patient reports they are  able to understand written materials.    Patient reported the following relationship history:  briefly and .  Remarried and then subsequently .  Patient's current relationship status is single for several years.   Patient identified their sexual orientation as heterosexual.  Patient reported having zero child(sarai). Patient identified siblings and friends as part of their support system.  Patient identified the quality of these relationships as stable and meaningful.      Patient's current living/housing situation involves staying in own home/apartment.  They live with them self and they report that housing is stable.     Patient is currently employed full time and reports they are able to function appropriately at work..  Patient reports their finances are obtained through employment.  Patient does identify finances as a current stressor.      Patient reported that they have been involved with the legal system.  Client reports having incurred 2 DUI's, 1 at age 32 and 1 at age 62.  Patient denies being on probation / parole / under the jurisdiction of the court.    Patient's Strengths and Limitations:  Patient identified the following strengths or  resources that will help them succeed in treatment: misa / spirituality, friends / good social support, insight and intelligence. Things that may interfere with the patient's success in treatment include: none identified.     Personal and Family Medical History:   Patient does report a family history of mental health concerns.  Patient reports family history includes Alcohol/Drug in her father; Allergies in her brother; Anxiety Disorder in her sister; Arthritis in her brother, mother, sister, and sister; Asthma in her sister, sister, and sister; Breast Cancer in her maternal grandmother; Cancer (age of onset: 60) in her mother; Cancer - colorectal (age of onset: 50) in her brother; Colon Cancer in her brother and brother; Coronary Artery Disease in her father; Depression in her sister and sister; Eye Disorder in her mother; Gallbladder Disease in her mother; Gastrointestinal Disease in her mother; Heart Disease in her father; Other Cancer in her mother; Prostate Cancer in her brother and brother; Psychotic Disorder in her sister; Respiratory in her father and sister; Substance Abuse in her father, sister, and sister..     Patient does report Mental Health Diagnosis and/or Treatment.  Patient Patient reported the following previous diagnoses which include(s): Adjustment disorder with depression and anxiety and history of depression.  Patient reported symptoms began several decades ago but more currently increase in depression during the summer 2020.   Patient has received mental health services in the past: Medication management, MICD outpatient treatment, and individual therapy.  Psychiatric Hospitalizations: None.  Patient denies a history of civil commitment.  Patient is receiving other mental health services.  These include Medication management through her psychiatrist outpatient..         Patient has had a physical exam to rule out medical causes for current symptoms.  Date of last physical exam was within the  past year. Client was encouraged to follow up with PCP if symptoms were to develop. The patient has a [facility] Provider, who is named Vladislav Cruz.  Patient reports the following current medical concerns: Wonders whether she has long COVID.  Patient denies any issues with pain..   There are not significant appetite / nutritional concerns / weight changes.   Patient does not report a history of head injury / trauma / cognitive impairment.  She does report intermittent brain fog related to post COVID-19.    Patient reports current meds as:   No outpatient medications have been marked as taking for the 9/7/21 encounter (Virtual Visit) with Monserrat Meng LICSW.       Medication Adherence:  Patient reports taking prescribed medications as prescribed.    Patient Allergies:    Allergies   Allergen Reactions     Cats      Codeine Sulfate GI Disturbance       Medical History:    Past Medical History:   Diagnosis Date     Alcoholism (H)      Allergies     Fall     Arthritis      Basal cell cancer     back, chest, face     Breast cancer (H)      Coronary artery disease     CT calcium nqbay=728 Nov 2015     Depression      Hyperlipidemia LDL goal <130 12/2/2015     Hypertension     borderline     PONV (postoperative nausea and vomiting)      Squamous cell carcinoma     left upper arm, chin         Current Mental Status Exam:   Appearance:  Appropriate    Eye Contact:  Good   Psychomotor:  Normal       Gait / station:  no problem  Attitude / Demeanor: Cooperative  Interested Friendly Pleasant  Speech      Rate / Production: Emotional Talkative      Volume:  Normal  volume      Language:  intact  Mood:   Anxious  Depressed   Affect:   Appropriate    Thought Content: Clear  Rumination   Thought Process: Coherent  Goal Directed  Logical       Associations: No loosening of associations  Insight:   Good  and Intellectual Insight  Judgment:  Intact   Orientation:  All  Attention/concentration: Fair    Rating  "Scales:    PHQ9:    PHQ-9 SCORE 6/17/2021 8/19/2021 9/5/2021   PHQ-9 Total Score - - -   PHQ-9 Total Score MyChart - - 7 (Mild depression)   PHQ-9 Total Score 9 4 7   ;    GAD7:    ANGY-7 SCORE 2/6/2020 3/25/2021 9/5/2021   Total Score - - 4 (minimal anxiety)   Total Score 10 8 4     CGI:     First:Considering your total clinical experience with this particular patient population, how severe are the patient's symptoms at this time?: 4 (9/7/2021  2:00 PM)  ;    Most recentNo data recorded    Substance Use:  Patient did report a family history of substance use concerns; see medical history section for details.  Patient Has received chemical dependency treatment in the past through [facility], and [facility] outpatient program for women in the 1980s.  Patient has not ever been to detox.      Patient is not currently receiving any chemical dependency treatment. Patient reported the following problems as a result of their substance use:  DUI, financial problems, legal issues and relationship problems.    Patient reports using alcohol 1 times per day and has 3-5 shots Of vodka at a time. Patient first started drinking at age Unknown.  Patient reported date of last use was Within the last 2 days.   Patient denies using tobacco.  Patient denies using cannabis.  She states she will occasionally every few months \"take 1 puff\" off of her friends [employer]  Patient reports using caffeine 4 times per day and drinks 1 at a time. Patient started using caffeine at age Unknown.  Patient reports using/abusing the following substance(s). Patient reported no other substance use.     CAGE- AID:  No flowsheet data found.  3 out of 4.    Substance Use: other substance related medical issue E.G alcohol being a central nervous system depressant interacting with her own depression, daily use and substance related legal problems    Based on the positive CAGE score and clinical interview there  are indications of drug or alcohol abuse. Client " reports not being interested in a chemical dependency evaluation and feels her alcohol use is related to managing depression.      Significant Losses / Trauma / Abuse / Neglect Issues:   Patient did not  serve in the .  There are indications or report of significant loss, trauma, abuse or neglect issues related to: are no indications and client denies any losses, trauma, abuse, or neglect concerns.  Concerns for possible neglect are not present.     Safety Assessment:   Current Safety Concerns:  [place] Suicide Severity Rating Scale (Lifetime/Recent)  [place] Suicide Severity Rating (Lifetime/Recent) 9/7/2021   1. Wish to be Dead (Lifetime) No   1. Wish to be Dead (Recent) No   2. Non-Specific Active Suicidal Thoughts (Lifetime) No   2. Non-Specific Active Suicidal Thoughts (Recent) No   Actual Attempt (Lifetime) No   Actual Attempt (Past 3 Months) No   Has subject engaged in non-suicidal self-injurious behavior? (Lifetime) No   Has subject engaged in non-suicidal self-injurious behavior? (Past 3 Months) No     Patient denies current homicidal ideation and behaviors.  Patient denies current self-injurious ideation and behaviors.    Patient denied risk behaviors associated with substance use.  Patient denies any high risk behaviors associated with mental health symptoms.  Patient reports the following current concerns for their personal safety: None.  Patient reports there   firearms in the house.       No firearms in her home.    History of Safety Concerns:  Patient denied a history of homicidal ideation.     Patient denied a history of personal safety concerns.    Patient denied a history of assaultive behaviors.    Patient denied a history of sexual assault behaviors.     Patient denied a history of risk behaviors associated with substance use.  Patient reported a history of reckless driving reported a history of substance use associated with mental health symptoms.  Patient reports the following  protective factors:      Risk Plan:  See Recommendations for Safety and Risk Management Plan    Review of Symptoms per patient report:  Depression: Change in sleep, Lack of interest, Change in energy level, Difficulties concentrating, Psychomotor slowing or agitation and Feelings of hopelessness  Marce:  No Symptoms  Psychosis: No Symptoms  Anxiety: Nervousness, Ruminations and Poor concentration  Panic:  No symptoms  Post Traumatic Stress Disorder:  No Symptoms   Eating Disorder: No Symptoms  ADD / ADHD:  No symptoms  Conduct Disorder: No symptoms  Autism Spectrum Disorder: No symptoms  Obsessive Compulsive Disorder: No Symptoms    Patient reports the following compulsive behaviors and treatment history: Not indicated.      Diagnostic Criteria:   A) Recurrent episode(s) - symptoms have been present during the same 2-week period and represent a change from previous functioning 5 or more symptoms (required for diagnosis)   - Depressed mood. Note: In children and adolescents, can be irritable mood.     - Diminished interest or pleasure in all, or almost all, activities.    - Psychomotor activity retardation.    - Fatigue or loss of energy.    - Diminished ability to think or concentrate, or indecisiveness.     Functional Status:  Patient reports the following functional impairments: health maintenance, self-care and social interactions.     WHODAS:   WHODAS 2.0 Total Score 9/7/2021   Total Score 16     Nonprogrammatic care:  Patient is requesting basic services to address current mental health concerns.    Clinical Summary:  1. Reason for assessment: Client seeking a resumption of individual therapy to help her address and manage coping more effectively with depression.  2. Psychosocial, Cultural and Contextual Factors: History of chemical dependency treatment, currently using alcohol.  3. Principal DSM5 Diagnoses  (Sustained by DSM5 Criteria Listed Above):   296.32 (F33.1) Major Depressive Disorder, Recurrent  Episode, Moderate With anxious distress  Substance-Related & Addictive Disorders 291.9 (F10.99) Unspecified Alcohol Related Disorder.  4. Other Diagnoses that is relevant to services:   None at this time.  5. Provisional Diagnosis:  296.22 (F32.1)  Major Depressive Disorder, Single Episode, Moderate _ and With anxious distress as evidenced by medical interview and client self-report.  6. Prognosis: Expect Improvement and Maintain Current Status / Prevent Deterioration.  7. Likely consequences of symptoms if not treated: Likely exacerbation of symptoms.  8. Client strengths include:  educated, employed, has a previous history of therapy, insightful and intelligent .     Recommendations:     1. Plan for Safety and Risk Management:   Recommended that patient call 911 or go to the local ED should there be a change in any of these risk factors..          Report to child / adult protection services was NA.     2. Patient's identified Barriers to seeking individual therapy.  Client is not interested in a chemical dependency evaluation at this time.     3. Initial Treatment will focus on:    Obtaining intervention around depression management.  Client aware provider will be monitoring her use of alcohol during this episode of care and has recommended reduction of use.     4. Resources/Service Plan:    services are not indicated.   Modifications to assist communication are not indicated.   Additional disability accommodations are not indicated.      5. Collaboration:   Collaboration / coordination of treatment will be initiated with the following  support professionals: Clients outpatient psychiatrist.      6.  Referrals:   The following referral(s) will be initiated: None at this time. Next Scheduled Appointment: Within 2 weeks.     A Release of Information has been obtained for the following: Not yet indicated.    7. LM:    LM:  Discussed the general effects of drugs and alcohol on health and well-being.  Provider gave patient printed information about the effects of chemical use on their health and well being. Recommendations: We will monitor client's use of alcohol.  Referral for a chemical evaluation may be indicated.    8. Records:   These were not available for review at time of assessment.   Information in this assessment was obtained from the medical record and  provided by patient who is a good historian.    Patient will have open access to their mental health medical record.      Provider Name/ Credentials: HUE Lyle, [facility]  September 7, 2021          Answers for HPI/ROS submitted by the patient on 9/5/2021  If you checked off any problems, how difficult have these problems made it for you to do your work, take care of things at home, or get along with other people?: Somewhat difficult  PHQ9 TOTAL SCORE: 7  ANGY 7 TOTAL SCORE: 4

## 2021-09-14 ENCOUNTER — VIRTUAL VISIT (OUTPATIENT)
Dept: PSYCHOLOGY | Facility: CLINIC | Age: 71
End: 2021-09-14
Payer: MEDICARE

## 2021-09-14 DIAGNOSIS — F33.1 MDD (MAJOR DEPRESSIVE DISORDER), RECURRENT EPISODE, MODERATE (H): Primary | ICD-10-CM

## 2021-09-14 PROCEDURE — 90834 PSYTX W PT 45 MINUTES: CPT | Mod: 95 | Performed by: SOCIAL WORKER

## 2021-09-14 NOTE — PROGRESS NOTES
Progress Note    Patient Name: Svetlana Adair  Date: 9/14/21         Service Type: Individual      Session Start Time: 11am  Session End Time: 1144     Session Length: 44    Session #: 2    Attendees: Client attended alone    Service Modality:  Video Visit:      Provider verified identity through the following two step process.  Patient provided:  Patient is known previously to provider    Telemedicine Visit: The patient's condition can be safely assessed and treated via synchronous audio and visual telemedicine encounter.      Reason for Telemedicine Visit: Patient has requested telehealth visit    Originating Site (Patient Location): Patient's home    Distant Site (Provider Location): Provider Remote Setting- Home Office    Consent:  The patient/guardian has verbally consented to: the potential risks and benefits of telemedicine (video visit) versus in person care; bill my insurance or make self-payment for services provided; and responsibility for payment of non-covered services.     Patient would like the video invitation sent by:  My Chart    Mode of Communication:  Video Conference via Amwell    As the provider I attest to compliance with applicable laws and regulations related to telemedicine.     Treatment Plan Last Reviewed: in progress  PHQ-9 / ANGY-7 : today    DATA  Interactive Complexity: No  Crisis: No       Progress Since Last Session (Related to Symptoms / Goals / Homework):   Symptoms: No change establishing baseline    Homework: prioritzed therapy appointment      Episode of Care Goals: Minimal progress - PREPARATION (Decided to change - considering how); Intervened by negotiating a change plan and determining options / strategies for behavior change, identifying triggers, exploring social supports, and working towards setting a date to begin behavior change     Current / Ongoing Stressors and Concerns:   Long covid; lonely; financially  stressed.     Treatment Objective(s) Addressed in This Session:   mood management. Broadening out base of supports.       Intervention:   evans medrano  Was involved in a mental health program that she invested financially in that did not work out but that still she found helpful tools from. Lonely and very fatigued which interacts with feeling hopeless and depressed.  Suggested she contact [employer] to see about support options. Still in touch with her AA friends. Drinking is unchanged. Also suggested she resume meditation and breathing tools learned from former program.     ASSESSMENT: Current Emotional / Mental Status (status of significant symptoms):   Risk status (Self / Other harm or suicidal ideation)   Patient denies current fears or concerns for personal safety.   Patient denies current or recent suicidal ideation or behaviors.   Patient denies current or recent homicidal ideation or behaviors.   Patient denies current or recent self injurious behavior or ideation.   Patient denies other safety concerns.   Patient reports there has been no change in risk factors since their last session.     Patient reports there has been no change in protective factors since their last session.     Recommended that patient call 911 or go to the local ED should there be a change in any of these risk factors.     Appearance:   Appropriate    Eye Contact:   Good    Psychomotor Behavior: Normal    Attitude:   Cooperative  Interested Friendly Pleasant   Orientation:   All   Speech    Rate / Production: Talkative Normal     Volume:  Normal    Mood:    Anxious  stressed   Affect:    Appropriate    Thought Content:  Clear    Thought Form:  Coherent  Goal Directed  Logical    Insight:    Good  and Intellectual Insight     Medication Review:   No changes to current psychiatric medication(s)     Medication Compliance:   Yes     Changes in Health Issues:   None reported     Chemical Use  Review:   Substance Use:  Continues using etoh.     Tobacco Use: No current tobacco use.      Diagnosis:  1. MDD (major depressive disorder), recurrent episode, moderate (H)        Collateral Reports Completed:   Not Applicable    PLAN: (Patient Tasks / Therapist Tasks / Other)  Complete tx plan.  Suggested [facility] long covid study and support options  Suggested resumption of tools used in past to help with energy and mood.        Monserrat Meng, [facility] 9/14/21                                                         ______________________________________________________________________    Treatment Plan    Patient's Name: Svetlana Adair  YOB: 1950    Date: 9/14/21    DSM5 Diagnoses: 296.31 (F33.0) Major Depressive Disorder, Recurrent Episode, Mild _ and With anxious distress  Psychosocial / Contextual Factors: Long covid; hx of problematic etoh use  WHODAS:     Referral / Collaboration:  Referral to another professional/service is not indicated at this time..    Anticipated number of session or this episode of care: eval every 90 days      MeasurableTreatment Goal(s) related to diagnosis / functional impairment(s)  Goal 1: Patient will improve coping with health and mood issues    I will know I've met my goal when  I feel more back to my normal self.      Objective #A (Patient Action)    Patient will Increase interest, engagement, and pleasure in doing things  Decrease frequency and intensity of feeling down, depressed, hopeless  Feel less tired and more energy during the day   Identify negative self-talk and behaviors: challenge core beliefs, myths, and actions  Improve concentration, focus, and mindfulness in daily activities .  Status: new     Intervention(s)  Therapist will  and support use of CBT, some DBT and mindfulness based pratices.    Objective #B  Patient will monitor and decrease etoh use.  Status: new     Intervention(s)  Therapist will encourage efforts; may rec resumption of  AA and /or new CD eval  .      Patient has reviewed and agreed to the above plan.      Monserrat Meng, [facility]  September 14, 2021

## 2021-09-21 ENCOUNTER — VIRTUAL VISIT (OUTPATIENT)
Dept: PSYCHOLOGY | Facility: CLINIC | Age: 71
End: 2021-09-21
Payer: MEDICARE

## 2021-09-21 DIAGNOSIS — F33.1 MDD (MAJOR DEPRESSIVE DISORDER), RECURRENT EPISODE, MODERATE (H): Primary | ICD-10-CM

## 2021-09-21 PROCEDURE — 90834 PSYTX W PT 45 MINUTES: CPT | Mod: 95 | Performed by: SOCIAL WORKER

## 2021-09-21 NOTE — PROGRESS NOTES
"                                             Progress Note    Patient Name: Svetlana Adair  Date: 9/21/21         Service Type: Individual      Session Start Time: 10am  Session End Time: 1044     Session Length: 45    Session #: 3    Attendees: Client attended alone    Service Modality:  Video Visit:      Provider verified identity through the following two step process.  Patient provided:  Patient is known previously to provider    Telemedicine Visit: telephone call today; ct's Amwell did not load.   The patient's condition can be safely assessed and treated via synchronous audio and visual telemedicine encounter.    The patient has been notified of the following:      \"We have found that certain health care needs can be provided without the need for a face to face visit.  This service lets us provide the care you need with a phone conversation.       I will have full access to your [facility] medical record during this entire phone call.   I will be taking notes for your medical record.      Since this is like an office visit, we will bill your insurance company for this service.       There are potential benefits and risks of telephone visits (e.g. limits to patient confidentiality) that differ from in-person visits.?  Confidentiality still applies for telephone services, and nobody will record the visit.  It is important to be in a quiet, private space that is free of distractions (including cell phone or other devices) during the visit.??      If during the course of the call I believe a telephone visit is not appropriate, you will not be charged for this service\"     Consent has been obtained for this service by care team member: Yes   Reason for Telemedicine Visit: Patient has requested telehealth visit    Originating Site (Patient Location): Patient's home    Distant Site (Provider Location): Provider Remote Setting- Home Office    Consent:  The patient/guardian has verbally consented to: the potential " risks and benefits of telemedicine (video visit) versus in person care; bill my insurance or make self-payment for services provided; and responsibility for payment of non-covered services.     Patient would like the video invitation sent by:  My Chart next time    Mode of Communication:  Video Conference via TElephone    As the provider I attest to compliance with applicable laws and regulations related to telemedicine.     Treatment Plan Last Reviewed:  Update Plan CGI 12/21  PHQ-9 / ANGY-7 : today    DATA  Interactive Complexity: No  Crisis: No       Progress Since Last Session (Related to Symptoms / Goals / Homework):   Symptoms: No change establishing baseline    Homework: prioritzed therapy appointment      Episode of Care Goals: Minimal progress - PREPARATION (Decided to change - considering how); Intervened by negotiating a change plan and determining options / strategies for behavior change, identifying triggers, exploring social supports, and working towards setting a date to begin behavior change.  Working on getting materials she has used in past to aid in decreasing depression. Walking with good friend frequently. Short walks. Fatigue decreases her motivation to move. Long Covid issues.     Current / Ongoing Stressors and Concerns:   Long covid; lonely; financially stressed.     Treatment Objective(s) Addressed in This Session:   mood management. Broadening out base of supports.  Behavioral activation discussed.   Received booster shot.     Intervention:   supportive exploratory; encouraging her to do things at her pace.  Was involved in a mental health program that she invested financially in that did not work out but that still she found helpful tools from. Lonely and very fatigued which interacts with feeling hopeless and depressed.  Suggested she contact [employer] long covid programming to see about support options. Still in touch with her AA friends. Drinking is unchanged. Also suggested she resume  meditation and breathing tools learned from former program.     ASSESSMENT: Current Emotional / Mental Status (status of significant symptoms):   Risk status (Self / Other harm or suicidal ideation)   Patient denies current fears or concerns for personal safety.   Patient denies current or recent suicidal ideation or behaviors.   Patient denies current or recent homicidal ideation or behaviors.   Patient denies current or recent self injurious behavior or ideation.   Patient denies other safety concerns.   Patient reports there has been no change in risk factors since their last session.     Patient reports there has been no change in protective factors since their last session.     Recommended that patient call 911 or go to the local ED should there be a change in any of these risk factors.     Appearance:   Appropriate    Eye Contact:   Good    Psychomotor Behavior: Normal    Attitude:   Cooperative  Interested Friendly Pleasant   Orientation:   All   Speech    Rate / Production: Talkative Normal     Volume:  Normal    Mood:    Anxious  stressed   Affect:    Appropriate    Thought Content:  Clear    Thought Form:  Coherent  Goal Directed  Logical    Insight:    Good  and Intellectual Insight     Medication Review:   No changes to current psychiatric medication(s)     Medication Compliance:   Yes     Changes in Health Issues:   None reported     Chemical Use Review:   Substance Use:  Continues using etoh.     Tobacco Use: No current tobacco use.      Diagnosis:  1. MDD (major depressive disorder), recurrent episode, moderate (H)        Collateral Reports Completed:   Not Applicable    PLAN: (Patient Tasks / Therapist Tasks / Other)  Encouraging activation; see below.  Suggested [facility] long covid study and support options  Suggested resumption of tools used in past to help with energy and mood.        Monserrat Meng, [facility] 9/21/21                                                          ______________________________________________________________________    Treatment Plan    Patient's Name: Svetlana Adair  YOB: 1950    Date: 9/14/21    DSM5 Diagnoses: 296.31 (F33.0) Major Depressive Disorder, Recurrent Episode, Mild _ and With anxious distress  Psychosocial / Contextual Factors: Long covid; hx of problematic etoh use  WHODAS:     Referral / Collaboration:  Referral to another professional/service is not indicated at this time..    Anticipated number of session or this episode of care: eval every 90 days      MeasurableTreatment Goal(s) related to diagnosis / functional impairment(s)  Goal 1: Patient will improve coping with health and mood issues    I will know I've met my goal when  I feel more back to my normal self.      Objective #A (Patient Action)    Patient will Increase interest, engagement, and pleasure in doing things  Decrease frequency and intensity of feeling down, depressed, hopeless  Feel less tired and more energy during the day   Identify negative self-talk and behaviors: challenge core beliefs, myths, and actions  Improve concentration, focus, and mindfulness in daily activities .  Status: new     Intervention(s)  Therapist will  and support use of CBT, some DBT and mindfulness based pratices.    Objective #B  Patient will monitor and decrease etoh use.  Status: new     Intervention(s)  Therapist will encourage efforts; may rec resumption of AA and /or new CD eval  .      Patient has reviewed and agreed to the above plan.      Monserrat Meng, [facility]  September 14, 2021

## 2021-09-25 ENCOUNTER — MYC REFILL (OUTPATIENT)
Dept: FAMILY MEDICINE | Facility: CLINIC | Age: 71
End: 2021-09-25

## 2021-09-25 DIAGNOSIS — M54.2 NECK PAIN ON RIGHT SIDE: ICD-10-CM

## 2021-09-28 RX ORDER — HYDROCODONE BITARTRATE AND ACETAMINOPHEN 5; 325 MG/1; MG/1
1 TABLET ORAL DAILY PRN
Qty: 20 TABLET | Refills: 0 | Status: SHIPPED | OUTPATIENT
Start: 2021-09-28 | End: 2021-10-25

## 2021-09-28 NOTE — TELEPHONE ENCOUNTER
Per message below, pt is going out of town and is requesting early fill. She is hoping to fill rx 09/29 or 09/30.      Routing refill request to provider for review/approval because:  Drug not on the [facility] refill protocol     Pending Prescriptions:                       Disp   Refills    HYDROcodone-acetaminophen (NORCO) 5-325 M*20 tab*0            Sig: Take 1 tablet by mouth daily as needed for pain    Last Written Prescription Date:  09/02/2021  Last Fill Quantity: 20,  # refills: 0   Last office visit: 5/18/2021 with prescribing provider:  07/21/2021  Future Office Visit:      Obed Rosales RN  [facility] Triage Nurse

## 2021-10-23 DIAGNOSIS — M54.2 NECK PAIN ON RIGHT SIDE: ICD-10-CM

## 2021-10-23 NOTE — TELEPHONE ENCOUNTER
Reason for call:  Medication   If this is a refill request, has the caller requested the refill from the pharmacy already? No  Will the patient be using a [facility]? No  Name of the pharmacy and phone number for the current request: [facility] [place] [address]  [phone]    Name of the medication requested: HYDROcodone-acetaminophen (NORCO) 5-325 MG     Other request: n/a    Phone number to reach patient:  Home number on file [phone] (home)    Best Time:  Any time    Can we leave a detailed message on this number?  YES    Travel screening: Not Applicable

## 2021-10-25 RX ORDER — HYDROCODONE BITARTRATE AND ACETAMINOPHEN 5; 325 MG/1; MG/1
1 TABLET ORAL DAILY PRN
Qty: 20 TABLET | Refills: 0 | Status: SHIPPED | OUTPATIENT
Start: 2021-10-25 | End: 2021-12-01

## 2021-10-25 NOTE — TELEPHONE ENCOUNTER
Routing refill request to provider for review/approval because:  Drug not on the [facility] refill protocol     Jazmyn YEH RN  EP Triage

## 2021-10-25 NOTE — TELEPHONE ENCOUNTER
Controlled Substance Refill Request for Norco  Problem List Complete:    Yes    Last Written Prescription Date:  09/28/2021  Last Fill Quantity: 20,   # refills: 0    THE MOST RECENT OFFICE VISIT MUST BE WITHIN THE PAST 3 MONTHS. AT LEAST ONE FACE TO FACE VISIT MUST OCCUR EVERY 6 MONTHS. ADDITIONAL VISITS CAN BE VIRTUAL.  (THIS STATEMENT SHOULD BE DELETED.)    [place] [facility] [place]: 07/01/2021      Future Office visit:     Controlled substance agreement:   Encounter-Level CSA - 04/26/2018:    Controlled Substance Agreement - Scan on 5/2/2018 10:48 AM: CONTROLLED SUBSTANCE AGREEMENT     Patient-Level CSA:    There are no patient-level csa.         Last Urine Drug Screen:   Pain Drug SCR UR W RPTD Meds   Date Value Ref Range Status   01/11/2021 FINAL  Final     Comment:     (Note)  ====================================================================  [facility] COMP DRUG ANALYSIS,UR  ====================================================================  Test                             Result       Flag       Units        Drug Present   Carboxy-THC                    55                      ng/mg creat    Carboxy-THC is a metabolite of tetrahydrocannabinol  (THC).    Source of THC is most commonly illicit, but THC is also present    in a scheduled prescription medication.   Hydrocodone                    597                     ng/mg creat   Norhydrocodone                 655                     ng/mg creat    Sources of hydrocodone include scheduled prescription    medications. Norhydrocodone is an expected metabolite of    hydrocodone.   Duloxetine                     PRESENT                               Acetaminophen                  PRESENT                              ====================================================================  Test                      Result    Flag   Units      Ref Range        Creatinine              31               mg/dL      >=20             ====================================================================  Declared Medications:  Medication list was not provided.  ====================================================================  For clinical consultation, please call [phone].  ====================================================================  Analysis performed by [email or web address], [email or web address] [place] [address]     , No results found for: COMDAT,   Cannabinoids (85-nub-4-carboxy-9-THC)   Date Value Ref Range Status   02/21/2020 Not Detected NDET^Not Detected ng/mL Final     Comment:     Cutoff for a negative cannabinoid is 50 ng/mL or less.     Phencyclidine (Phencyclidine)   Date Value Ref Range Status   02/21/2020 Not Detected NDET^Not Detected ng/mL Final     Comment:     Cutoff for a negative PCP is 25 ng/mL or less.     Cocaine (Benzoylecgonine)   Date Value Ref Range Status   02/21/2020 Not Detected NDET^Not Detected ng/mL Final     Comment:     Cutoff for a negative cocaine is 150 ng/ml or less.     Methamphetamine (d-Methamphetamine)   Date Value Ref Range Status   02/21/2020 Not Detected NDET^Not Detected ng/mL Final     Comment:     Cutoff for a negative methamphetamine is 500 ng/ml or less.     Opiates (Morphine)   Date Value Ref Range Status   02/21/2020 Not Detected NDET^Not Detected ng/mL Final     Comment:     Cutoff for a negative opiate is 100 ng/ml or less.     Amphetamine (d-Amphetamine)   Date Value Ref Range Status   02/21/2020 Not Detected NDET^Not Detected ng/mL Final     Comment:     Cutoff for a negative amphetamine is 500 ng/mL or less.     Benzodiazepines (Nordiazepam)   Date Value Ref Range Status   02/21/2020 Not Detected NDET^Not Detected ng/mL Final     Comment:     Cutoff for a negative benzodiazepine is 150 ng/ml or less.     Tricyclic Antidepressants (Desipramine)   Date Value Ref Range Status   02/21/2020 Not Detected NDET^Not Detected ng/mL Final     Comment:     Cutoff for a negative tricyclic  antidepressant is 300 ng/ml or less.     Methadone (Methadone)   Date Value Ref Range Status   02/21/2020 Not Detected NDET^Not Detected ng/mL Final     Comment:     Cutoff for a negative methadone is 200 ng/ml or less.     Barbiturates (Butalbital)   Date Value Ref Range Status   02/21/2020 Not Detected NDET^Not Detected ng/mL Final     Comment:     Cutoff for a negative barbituate is 200 ng/ml or less.     Oxycodone (Oxycodone)   Date Value Ref Range Status   02/21/2020 Not Detected NDET^Not Detected ng/mL Final     Comment:     Cutoff for a negative Oxycodone is 100 ng/mL or less.     Propoxyphene (Norpropoxyphene)   Date Value Ref Range Status   02/21/2020 Not Detected NDET^Not Detected ng/mL Final     Comment:     Cutoff for a negative propoxyphene is 300 ng/ml or less     Buprenorphine (Buprenorphine)   Date Value Ref Range Status   02/21/2020 Not Detected NDET^Not Detected ng/mL Final     Comment:     Cutoff for a negative buprenorphine is 10 ng/ml or less        Processing:  Rx to be electronically transmitted to pharmacy by provider     [email or web address]   checked in past 3 months?  No, route to RN      Jaye Sher MA

## 2021-10-29 ENCOUNTER — TELEPHONE (OUTPATIENT)
Dept: PSYCHOLOGY | Facility: CLINIC | Age: 71
End: 2021-10-29

## 2021-11-03 NOTE — PROGRESS NOTES
"Start: 11:17 AM  End: 11:53 AM      Psychiatry Clinic Progress Note                                                                   Svetlana Adair is a 71 year old female who prefers the name \"Mahnaz\" and pronoun she, hers.  Therapist: Scheduled to start in September 2021  PCP: Vladislav Cruz  Other Providers: None    Pertinent Background:  This patient initially experienced symptoms in the 1990s when her  at the time was developing symptoms of early-onset Alzheimer's in his 40s. She received mental health care at this time including an selective serotonin reuptake inhibitor as well as therapy; she is not sure if either helped, but feels that life stressors were so intense that no medication could have helped. She has many major life stressors including  Laurel's alzheimer's diagnosis in 1999, mother's death in 2000, Laurel's death in 2009, breast cancer diagnosis in 2011, recurrence with lymphedema in 2017.  Psych critical item history includes mutiple psychotropic trials  and substance use: alcohol.    Interim History                                                                                                        4, 4     The patient is a good historian.      Since the last visit:   -Mahnaz reports that she unfortunately got a DWI on Monday night, and was on the phone with the courts when the team first tried to reach her. This is her second DWI in 9 years and her car has been impounded and 's license revoked.  Plans to attend an inpatient alcohol treatment program - \"I think I need to get myself into treatment\". Mahnaz reports that she is an alcoholic. Was previously able to sustain remission from alcohol for \"long periods of time\" with the help of AA, and notes that she has not been as active in the program more recently.  - She has been drinking more recently due to stress, and notes that she filed for bankrupcy and another time that she drank heavily was when her  developed " "Alzheimers and then passed away. Reports that she drinks \"one-shotters\" 3-5 times a night \"fast\" and can lose control when drinking. Also reports increased tolerance to alcohol, so amount has increased.   - Depression is \"about the same\" with waxing and waning severity. She has a good day and then a couple days later will feel hopeless (\"some really depressing clients\" make her \"not want to get old\"). She feels like medicines havent helped her mood in the past, but notes that she was also using alcohol at the time, so it is hard to be certain.  -Self weaned from Effexor because it was making her anxious, have tremors (\"It was like I got Parkinson's overnight\"), and feel \"more ADD\". She was still shaking at a lower dose, so Mahnaz took it every other day and then discontinued altogether. Since stopping the med, the tremors have gone away.  - She has been suffering from fatigue ever since her COVID illness 19 month ago, which has made it hard to function. She describes it as a \"personal hell\".  - Mahnaz would like to concentrate on her sobriety right now, and is amenable to re-evaluating treatment options at a later time.      **Treatment and then re-evaluate after recovery  ** TMS may then be a good option, sensitive to meds, etc      Patient states that she thinks that she is \"better\" although she thinks that she has developed an intention tremor since starting venlafaxine.    Is now taking Effexor every other day. Feels that tremor is better now.    Has started taking acai on a daily basis. Believes that she is sleeping much better and that her digestion has been regular. Also feels that her energy is much better. Because she started acai first she believes that this has been most effective for energy.    Has good blood pressure and hasn't noticed any effect of Effexor on this.    Discussed continuing at Effexor 75 mg to see if tremor and hyperhidrosis are tolerable with good response for mood. Encouraged her to " "continue on this dose until she is next seen unless she feels side effects are worse in which case she could call our clinic and we could discuss tapering medication.    Overall, thinks that she is much improved now with better energy. Feels that she is able to do her work and has more interest in socializing. Currently working half days 6 days per week.     Also discussed how it does not seem that TMS is currently indicated given the improvement in mood and energy.    Recommended following up with this provider at next available appt.     Recent Symptoms:   Depression:  depressed mood, anhedonia, low energy, insomnia, appetite changes, weight changes and poor concentration /memory     Recent Substance Use:  Alcohol- yes with consequences        Social/ Family History                                  [per patient report]                                 1ea,1ea     FINANCIAL SUPPORT- working full time as a caretaker, sliding scale insulin, -- \"Mahnaz states she cannot leave the job because she is  essentially broke  after investing the majority of her finances in a wellness company called  [employer].      RELATIONSHIPS/CHILDREN- No children   high school boyfriend --   Remarried Laurel--> opened [employer] --> he passed away in 2009  LIVING SITUATION- lives alone      LEGAL- DUI, multiple  EARLY HISTORY/ EDUCATION- Has some college.   SOCIAL/ SPIRITUAL SUPPORT-has many friends in area, finds support through AA group, practices meditation  CULTURAL INFLUENCES/ IMPACT- none       TRAUMA HISTORY (self-report)- None  FEELS SAFE AT HOME- Yes  FAMILY HISTORY-  Bipolar disorder in sister; depression in mother  Hobbies: horseback riding, tennis with friends  Previously competitive swimmer when younger    Medical / Surgical History                                                                                                                  Patient Active Problem List   Diagnosis     Breast " cancer est/prog +, HER2 ERNIE -     Osteoarthritis     Moderate episode of recurrent major depressive disorder (H)     Advanced directives, counseling/discussion     Knee pain     Past use of tobacco     IFG (impaired fasting glucose)     Low back pain     Malignant neoplasm of right breast (H)     Hyperlipidemia LDL goal <70     Follow-up examination following surgery     Atherosclerosis of left carotid artery     Chronic, continuous use of opioids     Adjustment disorder with mixed anxiety and depressed mood     Neck pain on right side     Hyperparathyroidism (H)     Alcohol dependence in remission (H)     High coronary artery calcium score       Past Surgical History:   Procedure Laterality Date     ABDOMEN SURGERY  2007?    Hysterectomy     COLONOSCOPY       COLONOSCOPY  11/17/2011    Procedure:COLONOSCOPY; Colonoscopy; Surgeon:MEKA RUSS; Location: GI     COLONOSCOPY N/A 2/10/2020    Procedure: COLONOSCOPY, WITH POLYPECTOMY AND BIOPSY;  Surgeon: Opal Ace MD;  Location:  GI     DISSECT LYMPH NODE AXILLA Right 11/2/2017    Procedure: DISSECT LYMPH NODE AXILLA;  RIGHT AXILLARY LYMPH NODE DISSECTION;  Surgeon: Cortez White MD;  Location:  [facility]     GYN SURGERY  2005    hysterectomy after hx of ovarian cyst, ended up being benign     HYSTERECTOMY, PAP NO LONGER INDICATED       MASTECTOMY MODIFIED RADICAL  2011    bilateral     MASTECTOMY, BILATERAL       ORTHOPEDIC SURGERY      rt. knee arthroscopy     ORTHOPEDIC SURGERY Right     toe surgery     REALIGN PATELLA  1973    right      TOE SURGERY      right grt OA         Medical Review of Systems                                                                                                    2,10   A comprehensive review of systems was performed and is negative other than noted in the HPI.    Allergy                                Cats and Codeine sulfate    Current Medications                                                                                                        Current Outpatient Medications   Medication Sig Dispense Refill     ASHWAGANDHA PO Take 1 tablet by mouth daily as needed At onset of illness symptoms       aspirin 81 MG tablet Take 1 tablet (81 mg) by mouth daily 30 tablet      buPROPion (WELLBUTRIN XL) 150 MG 24 hr tablet Take 1 tablet (150 mg) by mouth every morning 90 tablet 3     calcium carbonate-vitamin D (OS-YASMIN) 500-400 MG-UNIT tablet Take 1 tablet by mouth daily        Cholecalciferol (VITAMIN D-3) 5000 units TABS Take 1 tablet by mouth daily        Coenzyme Q10 300 MG CAPS Take 300 mg by mouth daily       HYDROcodone-acetaminophen (NORCO) 5-325 MG tablet Take 1 tablet by mouth daily as needed for pain 20 tablet 0     IBUPROFEN PO Take 200 mg by mouth every 4 hours as needed for moderate pain        lactobacillus rhamnosus, GG, (CULTURELL) capsule Take 1 capsule by mouth daily        letrozole (FEMARA) 2.5 MG tablet Take 1 tablet by mouth daily  5     Lutein-Zeaxanthin 25-5 MG CAPS Take 1 capsule by mouth daily       LYSINE 1-3 daily as needed (Patient not taking: Reported on 8/19/2021)       Melatonin 10 MG TABS tablet Take 10 mg by mouth nightly as needed for sleep (Patient not taking: Reported on 8/19/2021)       multivitamin (OCUVITE) TABS tablet Take 1 tablet by mouth daily       nicotine polacrilex (NICORETTE) 2 MG gum Place 1 each (2 mg) inside cheek as needed for smoking cessation 30 tablet 11     omeprazole 20 MG tablet Take 1 tablet (20 mg) by mouth as needed (Patient not taking: Reported on 8/19/2021) 90 tablet 3     rosuvastatin (CRESTOR) 10 MG tablet Take 1 tablet (10 mg) by mouth daily 90 tablet 2     UNABLE TO FIND Take by mouth daily MEDICATION NAME: Asea Redox - 1oz twice daily       UNABLE TO FIND Take 1 tablet by mouth daily MEDICATION NAME: Yin Chiao - chinese supplement takes at onset of illness symptoms       UNABLE TO FIND MEDICATION NAME: Somnapure - 2 tablets = 500mg valerian root, 300mg lemon  "balm extract, 200mg l-theanine, 120mg hops extract, 50mg chamomile flower extract, 50mg passion flower extract, 3mg melatonin.  Takes 1-2 tablets at bedtime (Patient not taking: Reported on 8/19/2021)       UNABLE TO FIND MEDICATION NAME: Moringa 1 serving of powder daily       UNABLE TO FIND MEDICATION NAME: Premium Amla Berry 2 capsules = 4mg Vitamin C, 966mg organic amla, organic amla extract.  Takes 2 capsules daily       UNABLE TO FIND MEDICATION NAME: OmegaKrill 5x 3 capsules = 480mg of total omega-3, 64mg EPA, 392mg DHA, 300mcg astaxanthin.  Takes 3 capsules daily       UNABLE TO FIND MEDICATION NAME: Super Colon Cleanse 2 capsules = 665mg senna leaf powder, 321mg psyllium husk powder, 21mg fennel seed powder, 21mg papaya leaf powder, 21mg yolanda hips fruit powder, 11mg l-acidophilus.  Taking 4 capsules daily       valACYclovir (VALTREX) 1000 mg tablet Take 2 tablets (2,000 mg) by mouth 2 times daily as needed (Take two tablets by mouth PRN) (Patient not taking: Reported on 8/19/2021) 8 tablet 0     venlafaxine (EFFEXOR-XR) 75 MG 24 hr capsule Take 1 capsule (75 mg) by mouth daily Take 1 capsule of 150 MG daily and continue on this dose 90 capsule 1     vitamin C (ASCORBIC ACID) 250 MG tablet Take 250 mg by mouth daily         Vitals                                                                                                                       3, 3   There were no vitals taken for this visit.     Mental Status Exam                                                                                    9, 14 cog gs     Alertness: alert  and oriented   Appearance: unable to assess via telephone  Behavior/Demeanor: cooperative and pleasant  Speech: normal and regular rate and rhythm  Language: intact and no problems  Psychomotor: not assessed  Mood: \"about the same\" depressed and anxious  Affect:  restricted; was congruent to mood; was congruent to content  Thought Process/Associations: unremarkable  Thought " Content:  Reports none;  Denies suicidal and violent ideation  Perception:  Reports none;  Denies auditory hallucinations and visual hallucinations  Insight: good  Judgment: good overall (CD treatment), poor during periods of impulsivity  Cognition: (6) oriented: time, person, and place  attention span: intact  concentration: intact  recent memory: intact  remote memory: intact  fund of knowledge: appropriate  Gait/Station and/or Muscle Strength/Tone: not assessed via telephone    Labs and Data                                                                                                                 Rating Scales:    PHQ9    PHQ9 Today:  17  PHQ 9/5/2021 9/14/2021 11/4/2021   PHQ-9 Total Score 7 7 17   Q9: Thoughts of better off dead/self-harm past 2 weeks Not at all Not at all Several days         Diagnosis and Assessment                                                                             m2, h3     Svetlana Adair is a 70 year old female with previous psychiatric history of MDD, recurrent, severe and alcohol use disorder who presents for scheduled follow up appointment. She has a well documented failure of adequate trials of four antidepressants (2 SSRIs and 2 SNRIs) which represent multiple antidepressant classes. The patient has completed an adequate dose of individual psychotherapy. Patient is burdened by her chronic symptoms of depression and her current episode has lasted over 12 months causing significant psychosocial dysfunction. Due to remaining profound depression and numerous failed previous treatment modalities the patient is a candidate for TMS treatment.     Svetlana previously tried mirtazepine, but had severe sedation, and at the last appointment she decreased venlafaxine, although this medication caused unbearable tremors even at low doses. Svetlana self-discontinued the medication with resolution of tremors. Chronic alcohol use is having adverse effects on mood, treatment response,  and life in general, with Svetlana recently receiving a DWI. For this reason, she has elected to concentrate on her sobriety with inpatient treatment and follow up with the [facility] clinic when alcohol use and court process has been resolved. She may be a candidate for TMS at that time is depression persists, and she amenable to discussing this at a later time.     Today the following issues were addressed:    1) Major depressive disorder, recurrent, severe  2) Alcohol use disorder, severe, dependence    [place] Prescription Monitoring Program [] review was not needed today.    PSYCHOTROPIC DRUG INTERACTIONS: none clinically relevant    Plan                                                                                                                    m2, h3      1)Major depressive disorder, recurrent, severe  -- Medications:    - continue bupropion   - agree with stopping venlafaxine  -- Procedures:    - Will re-evaluate need for TMS following alcohol recovery              - Coil: F8 or H1    2) Alcohol use disorder, severe, dependence  -- Alcohol use addressed in clinic visit; agree with stated plan for inpatient alcohol recovery recommended that pt taper use to discontinuation.     Therapy-  Continue       RTC: following alcohol recovery    CRISIS NUMBERS:   Provided routinely in AVS.    Treatment Risk Statement:  The patient understands the risks, benefits, adverse effects and alternatives. Agrees to treatment with the capacity to do so. No medical contraindications to treatment. Agrees to call clinic for any problems. The patient understands to call 911 or go to the nearest ED if life threatening or urgent symptoms occur.     Prudencio Greene MD, PhD  PGY-2 Psychiatry Resident      Physician Attestation   I, Alvina Whiteside MD, saw this patient and agree with the findings and plan of care as documented in the note.      Items personally reviewed/procedural attestation: I was present for and supervised the  entire interview.      PROVIDER:  Alvina Whiteside MD

## 2021-11-03 NOTE — PATIENT INSTRUCTIONS
"Today's Recommendations:  - We are sorry you are undergoing a tough time right now. We are glad you are making reasonable decisions to take care of yourself. If you feel that things are getting out of hand and your mood is getting worse and suicidal, please do not hesitate to call the clinic and/or use the crisis numbers written below  - We agree that getting inpatient treatment for your alcohol use is a great plan at this stage, especially that getting alcohol out of your system will have beneficial effects on psychiatric medications and alcohol can decrease benefits of treatments like TMS and ketamine    - We discuss how TMS can be an option after you finish inpatient treatment     We are specifically a university and research clinic, and there are often research studies ongoing. Some of those are clinical trials of new brain stimulation treatments. Others are what we would call \"biomarker\" studies, where we ask you to participate in some kind of measurement while you are undergoing regular treatment. You may be called by one of our clinical research coordinators about these studies. If you do not want to be contacted, please let us know. (Being called does not mean you have to be in a study.) In some cases, a clinical trial is the only way to get access to an advanced treatment that is not covered by your insurance. Usually, we will talk about this in your visit if it is an option.    Outside of this specific clinic, you might also be interested in the \"Measurement Based Care\" research study that is being conducted throughout the [facility] Department of Psychiatry and Behavioral Sciences. This provides some additional testing that might be diagnostically helpful, although we are still trying to learn how best to use it.  More information about that is at: [email or web address]    If you are interested in the study you can email, [email or web address].    Patient " "Education       General Information:  Our Treatment-Resistant Disorders/Interventional Psychiatry clinic is what is often called a \"consultation\" or \"tertiary care\" clinic. That means we do not see patients long-term for medication management or talk therapy. We offer thorough evaluations, recommendations about medications/therapies you may have not yet tried, and in some cases, brain stimulation or office-based treatments. If you are likely to benefit from one of those advanced treatments, we will have talked about it today. Once that treatment is complete, we will see you once or twice afterwards to check in, and then you will return to getting ongoing psychiatric care from whomever you were seeing before you came for your evaluation with us. If for some reason you no longer have access to that clinician, we can help with a referral to our main [facility]. The only patients we see long-term are patients with implanted medical devices that require ongoing monitoring and programming.     Our recommendations almost always include some kind of cognitive-behavioral therapy (CBT) if you are not getting it already. Brain and nerve stimulation treatments work best when combined with certain talk therapies. We make these recommendations because we strongly believe that, without the therapy piece, most people will not get better, or will have limited clinical benefit. It is often difficult or inconvenient to add therapy to an already busy schedule, but it is also necessary.    It is important to remember that, like all mental health treatments, our interventional therapies are not perfect. About one third of people will not feel better after treatment, and even when they work, they do not take away the symptoms entirely.If we have recommended something above, it means we think there is a better than even chance of it working, but things are never guaranteed. This is another reason we usually recommend CBT " along with our advanced treatments -- it can address the symptoms that remain after the stimulation/ketamine treatment.       While we are waiting to implement the recommendations you and I have discussed, you should know what to do if your symptoms worsen. A variety of crisis numbers/resources are available. They include:    CRISIS GENERAL NUMBERS   4-406-BFNCXLX (1-738.218.6632)  OR  911     CRISIS INTERVENTION TEAM (CIT) - this is a POLICE UNIT, specially trained.  This website has information for all of [place]'s CITs. Lays out which areas have this team.  [email or web address]  However, one can just call 911 and ask for this special team.   If police need to be called, DO ask for this team.    CRISIS MOBILE TEAMS  [and see end of this phrase*]  [facility] -COPE: 24hrs/7days:    [phone]  (Adults > 17yo)    [phone]  (Child   < 18yo)                                       FRONT DOOR (during the day could call)   [phone]    [facility] -[phone] (Adult)  [phone] (Child)     [facility] -[phone] (Adult and Child)     [facility] -[phone] ([employer] mobile crisis team; Adult and Child; 24hr)     [facility] -[phone] (Adult and Child)     CRISIS HOUSING  [facility]                           [address]              [phone]  [facility]                                [address]                       [phone]  [facility]                               [address]     [phone]   [facility]  [address]                [phone]    [place] EMERGENCY NUMBERS      Crisis Connection:                                [phone]    [facility]:     [phone]    Crisis Intervention:                                [phone] or  "[phone]     COPE: Mobile Team 24hrs/7days:    [phone]  (Adults > 19yo)                                                                            [phone]  (Child   < 18yo)  [facility]        [phone] 24/7 line and Mobile Team   [address] [place] [address]  DROP IN:  M-F: 8:00 am - 7:00 pm  Sat: 11:00 am - 3:00 pm   Sun: Closed     WALK-IN COUNSELING:  [facility]       [phone]    [address]:   M, W, F:  1:00-3:00PM    M-Th:  6:30-8:30PM    TRANS and LGBT  Call [employer] at [phone]. This service is staffed by trans people 24/7.   LGBT youth <24 contemplating suicide, call the [employer] [phone].    POISON CONTROL CENTER  [phone]    OR  go to nearest ER    CHILD  \"[facility] has a needs assessment team who will do an intake evaluation. Based on the results of the intake they will recommended inpatient admission, partial hospitalization, intensive outpatient or outpatient care. The number is [phone]. \"    CRISIS TEXT LINE  Http://www.crisistextline.org  FREE SUPPORT AT YOUR FINGERTIPS, 24/7  Crisis Text Line serves anyone, in any type of crisis, providing access to free, 24/7 support and information via the medium people already use and trust: text. Here s how it works:  1)  Text [phone] from anywhere in the [place], anytime, about any type of crisis.  2)  A live, trained Crisis Counselor receives the text and responds quickly.      The volunteer Crisis Counselor will help you move from a 'hot moment to a cool moment'    [facility] EMERGENCY NUMBERS      Crisis Connection:                                [phone]    [facility]:              [phone]    Crisis Intervention:                                [phone] or [phone]     COPE: Mobile Team 24hrs/7days:    [phone]  (Adults > 19yo)                                                                 " "           [phone]  (Child   < 16yo)  [facility]        [phone]  CALL 24 hours a day.  [address] [place] [address]  DROP IN:  M-F: 8:00 am - 7:00 pm  Sat: 11:00 am - 3:00 pm   Sun: Closed    WALK-IN COUNSELIN)  [facility]                                  [phone]        [place], [address]:                  M, W:      5:00-7:00PM       2)  [facility]                            [phone]        [place], [address]                T, Th:      6:00-8:00PM    TRANS and LGBT  Call [employer] at [phone]. This service is staffed by trans people .   LGBT youth <24 contemplating suicide, call the [employer] Lifeline [phone].    POISON CONTROL CENTER  [phone]    OR  go to nearest ER    CHILD  \"[facility] has a needs assessment team who will do an intake evaluation. Based on the results of the intake they will recommended inpatient admission, partial hospitalization, intensive outpatient or outpatient care. The number is [phone] or [phone]. \"    CRISIS TEXT LINE  Http://www.crisistextline.org  FREE SUPPORT AT YOUR FINGERTIPS,   Crisis Text Line serves anyone, in any type of crisis, providing access to free,  support and information via the medium people already use and trust: text. Here s how it works:  1)  Text [phone] from anywhere in the [place], anytime, about any type of crisis.  2)  A live, trained Crisis Counselor receives the text and responds quickly.      The volunteer Crisis Counselor will help you move from a 'hot moment to a cool moment'      * A Community Paramedic (CP) is an advanced paramedic that works to increase access to primary and preventive care and decrease use of emergency departments, which in turn decreases health care costs. Among other things, CPs may play a key role in providing follow-up services after a hospital discharge to prevent hospital readmission. CPs " can provide health assessments, chronic disease monitoring and education, medication management, immunizations and vaccinations, laboratory specimen collection, hospital discharge follow-up care and minor medical procedures. CPs work under the direction of an Ambulance Medical Director.

## 2021-11-04 ENCOUNTER — VIRTUAL VISIT (OUTPATIENT)
Dept: PSYCHIATRY | Facility: CLINIC | Age: 71
End: 2021-11-04
Payer: MEDICARE

## 2021-11-04 DIAGNOSIS — F33.2 SEVERE RECURRENT MAJOR DEPRESSION WITHOUT PSYCHOTIC FEATURES (H): Primary | ICD-10-CM

## 2021-11-04 DIAGNOSIS — F41.1 GENERALIZED ANXIETY DISORDER: ICD-10-CM

## 2021-11-04 DIAGNOSIS — F10.20 ALCOHOL USE DISORDER, SEVERE, DEPENDENCE (H): ICD-10-CM

## 2021-11-04 ASSESSMENT — PATIENT HEALTH QUESTIONNAIRE - PHQ9: SUM OF ALL RESPONSES TO PHQ QUESTIONS 1-9: 17

## 2021-11-04 NOTE — PROGRESS NOTES
Mahnaz is a 71 year old who is being evaluated via a billable telephone visit.      What phone number would you like to be contacted at? [phone]  How would you like to obtain your AVS? Larry  Phone call duration: 36 minutes  .  Depression Response    Patient completed the PHQ-9 assessment for depression and scored >9? Yes  Question 9 on the PHQ-9 was positive for suicidality? Yes  Does patient have current mental health provider? Yes    Is this a virtual visit? Yes   Does patient have suicidal ideation (positive question 9)? No - offer to place Mental Health Referral.  Patient declined referral/not needed    I personally notified the following: visit provider

## 2021-11-29 DIAGNOSIS — M54.2 NECK PAIN ON RIGHT SIDE: ICD-10-CM

## 2021-12-01 RX ORDER — HYDROCODONE BITARTRATE AND ACETAMINOPHEN 5; 325 MG/1; MG/1
1 TABLET ORAL DAILY PRN
Qty: 20 TABLET | Refills: 0 | Status: SHIPPED | OUTPATIENT
Start: 2021-12-01 | End: 2022-01-05

## 2021-12-01 NOTE — TELEPHONE ENCOUNTER
HYDROcodone-acetaminophen (NORCO) 5-325 MG tablet 20 tablet 0 10/25/2021  No   Sig - Route: Take 1 tablet by mouth daily as needed for pain - Oral   Sent to pharmacy as: HYDROcodone-Acetaminophen 5-325 MG Oral Tablet (NORCO)   Class: E-Prescribe   Earliest Fill Date: 10/25/2021   Order: [id number]   E-Prescribing Status: Receipt confirmed by pharmacy (10/25/2021  5:30 PM CDT)       Next 5 appointments (look out 90 days)    Dec 20, 2021 10:30 AM  Pharmacist Visit with Janice Tierney PharmD  [facility] ([facility] ) [address] [place] [address] [address]  [facility] [phone]   Jan 17, 2022  9:00 AM  (Arrive by 8:40 AM)  Annual Wellness Visit with Vladilsav Cruz MD  [facility] ([facility] ) [address] [place] [address], [address]  [facility] [phone]        Routing refill request to provider for review/approval because:  Drug not on the [facility] refill protocol     Chet Tolbert RN  [facility]

## 2021-12-10 ENCOUNTER — VIRTUAL VISIT (OUTPATIENT)
Dept: PSYCHOLOGY | Facility: CLINIC | Age: 71
End: 2021-12-10
Payer: MEDICARE

## 2021-12-10 ENCOUNTER — TELEPHONE (OUTPATIENT)
Dept: PSYCHIATRY | Facility: CLINIC | Age: 71
End: 2021-12-10
Payer: MEDICARE

## 2021-12-10 DIAGNOSIS — F33.1 MDD (MAJOR DEPRESSIVE DISORDER), RECURRENT EPISODE, MODERATE (H): Primary | ICD-10-CM

## 2021-12-10 PROCEDURE — 90834 PSYTX W PT 45 MINUTES: CPT | Mod: 95 | Performed by: SOCIAL WORKER

## 2021-12-10 ASSESSMENT — ANXIETY QUESTIONNAIRES
7. FEELING AFRAID AS IF SOMETHING AWFUL MIGHT HAPPEN: SEVERAL DAYS
3. WORRYING TOO MUCH ABOUT DIFFERENT THINGS: MORE THAN HALF THE DAYS
6. BECOMING EASILY ANNOYED OR IRRITABLE: SEVERAL DAYS
GAD7 TOTAL SCORE: 9
7. FEELING AFRAID AS IF SOMETHING AWFUL MIGHT HAPPEN: SEVERAL DAYS
2. NOT BEING ABLE TO STOP OR CONTROL WORRYING: SEVERAL DAYS
4. TROUBLE RELAXING: MORE THAN HALF THE DAYS
1. FEELING NERVOUS, ANXIOUS, OR ON EDGE: MORE THAN HALF THE DAYS
GAD7 TOTAL SCORE: 9
5. BEING SO RESTLESS THAT IT IS HARD TO SIT STILL: NOT AT ALL
GAD7 TOTAL SCORE: 9

## 2021-12-10 ASSESSMENT — PATIENT HEALTH QUESTIONNAIRE - PHQ9: SUM OF ALL RESPONSES TO PHQ QUESTIONS 1-9: 6

## 2021-12-10 NOTE — TELEPHONE ENCOUNTER
Writer returned call to patient.  Sent her the from to request a change to medical records.        Also stated that writer would check in with Dr. Whiteside on starting TMS in January.

## 2021-12-10 NOTE — PROGRESS NOTES
Progress Note    Patient Name: Svetlana Adair  Date: December 10, 2021         Service Type: Individual      Session Start Time: 9:30 AM session End Time: 10:15 AM   Session Length: 45    Session #: 4    Attendees: Client attended alone    Service Modality:  Video Visit:      Provider verified identity through the following two step process.  Patient provided:  Patient is known previously to provider    Telemedicine Visit: Am well video conferencing          Consent has been obtained for this service by care team member: Yes   Reason for Telemedicine Visit: Patient has requested telehealth visit    Originating Site (Patient Location): Patient's home    Distant Site (Provider Location): Provider Remote Setting- Home Office    Consent:  The patient/guardian has verbally consented to: the potential risks and benefits of telemedicine (video visit) versus in person care; bill my insurance or make self-payment for services provided; and responsibility for payment of non-covered services.     Patient would like the video invitation sent by:  My Chart    Mode of Communication:  Video Conference via [facility]well    As the provider I attest to compliance with applicable laws and regulations related to telemedicine.     Treatment Plan Last Reviewed:  Update Plan CGI 12/21  PHQ-9 / ANGY-7 : today    DATA  Interactive Complexity: No  Crisis: No       Progress Since Last Session (Related to Symptoms / Goals / Homework):   Symptoms: Worsening Depression that is been building since COVID infection and intermittent relapse with alcohol resulting in a DUI in November    Homework: prioritzed therapy appointment ; last seen in September 2021    Episode of Care Goals: Minimal progress - PREPARATION (Decided to change - considering how); Intervened by negotiating a change plan and determining options / strategies for behavior change, identifying triggers, exploring social supports, and working  towards setting a date to begin behavior change.  Not seen in 3 months.  Since that time, client has been using more alcohol and most recently last month was charged with a DUI and is now involved with the court system, awaiting decision around treatment recommendations, findings etc.   Current / Ongoing Stressors and Concerns:   Long covid; lonely; financially stressed.  Recent relapsing creating financial difficulty and occupational difficulty.     Treatment Objective(s) Addressed in This Session:   mood management. Broadening out base of supports.  Behavioral activation discussed.   Determining next right levels of care and direction for treatment.  Client reports depression without evidence for risk for self-harm.   Intervention:   supportive exploratory;   Clients unclear but believes she had a chemical dependency evaluation as part of her legal consequences for the DUI.  She reports not knowing the recommendations that came out of this evaluation; provider questioned whether or not she actually had an actual CD evaluation or not but that she could check with her  and begin the process of engagement with treatment instead of waiting to her court date at the end of the month.  Provider also recommending that she reach out to her sober supports via her AA meeting and fellowship.  We also discussed client's lack of engagement with individual therapy and provider posited an option of her seeing a dually licensed therapist in both mental health and with an LADC.  Client seen this very interested in this option and provider agreed to check within [facility] system for this option.  Provider also recommending client round back to her psychiatrist for consultation around length of sobriety needed before she could access TMS.   ASSESSMENT: Current Emotional / Mental Status (status of significant symptoms):   Risk status (Self / Other harm or suicidal ideation)   Patient denies current fears or concerns for  personal safety.   Patient denies current or recent suicidal ideation or behaviors.   Patient denies current or recent homicidal ideation or behaviors.   Patient denies current or recent self injurious behavior or ideation.   Patient denies other safety concerns.   Patient reports there has been no change in risk factors since their last session.     Patient reports there has been no change in protective factors since their last session.     Recommended that patient call 911 or go to the local ED should there be a change in any of these risk factors.     Appearance:   Appropriate    Eye Contact:   Good    Psychomotor Behavior: Normal    Attitude:   Cooperative  Interested Friendly Pleasant   Orientation:   All   Speech    Rate / Production: Talkative Normal     Volume:  Normal    Mood:    Anxious  stressed worried depressed   Affect:    Appropriate    Thought Content:  Clear    Thought Form:  Coherent  Goal Directed  Logical    Insight:    Good  and Intellectual Insight     Medication Review:   No changes to current psychiatric medication(s)     Medication Compliance:   Yes     Changes in Health Issues:   None reported     Chemical Use Review:   Substance Use: Reports abstinence for the past 6 weeks     Tobacco Use: No current tobacco use.      Diagnosis:  1. MDD (major depressive disorder), recurrent episode, moderate (H)        Collateral Reports Completed:   Not Applicable    PLAN: (Patient Tasks / Therapist Tasks / Other)  Recommended completing a CD evaluation if she has not done so already and following treatment recommendations  Recommending she begin treatment for alcohol abuse and remain engaged with sober support system including AA meetings  Recommended she stay on her medication and reach out to psychiatrist with consultation question around when she could access TMS.  Client and provider are in agreement that a transfer [facility] a dually licensed [facility] be [facility] her best  interest for ongoing therapeutic support during and post CD treatment.  Suggested [facility]'s long covid study and support options  Suggested resumption of tools used in past to help with energy and mood.        DIMAS Sorto December 10, 2021                                                         ______________________________________________________________________    Treatment Plan    Patient's Name: Svetlana Adair  YOB: 1950    Date: 9/14/21    DSM5 Diagnoses: 296.31 (F33.0) Major Depressive Disorder, Recurrent Episode, Mild _ and With anxious distress  Psychosocial / Contextual Factors: Long covid; hx of problematic etoh use  WHODAS:     Referral / Collaboration:  Referral to another professional/service is not indicated at this time..    Anticipated number of session or this episode of care: eval every 90 days      MeasurableTreatment Goal(s) related to diagnosis / functional impairment(s)  Goal 1: Patient will improve coping with health and mood issues    I will know I've met my goal when  I feel more back to my normal self.      Objective #A (Patient Action)    Patient will Increase interest, engagement, and pleasure in doing things  Decrease frequency and intensity of feeling down, depressed, hopeless  Feel less tired and more energy during the day   Identify negative self-talk and behaviors: challenge core beliefs, myths, and actions  Improve concentration, focus, and mindfulness in daily activities .  Status: new     Intervention(s)  Therapist will  and support use of CBT, some DBT and mindfulness based pratices.    Objective #B  Patient will monitor and decrease etoh use.  Status: new     Intervention(s)  Therapist will encourage efforts; may rec resumption of AA and /or new CD eval  .      Patient has reviewed and agreed to the above plan.      DIMAS Sorto  September 14, 2021  Answers for HPI/ROS submitted by the patient on 12/10/2021  ANGY 7 TOTAL SCORE: 9

## 2021-12-10 NOTE — Clinical Note
Met with client today after not having seen her for 3 months.  See note for detail of what I am continuing to recommend including a transfer to a dually licensed individual therapist with a specialty in chemical dependency.

## 2021-12-10 NOTE — TELEPHONE ENCOUNTER
What is the concern that needs to be addressed by a nurse? Patient called to report inaccurate documenting in her chart. Patient Does Not want to go to inpatient treatment. She would like that corrected in her chart. She also inquired about TMS. Hasn't drank in 6 weeks, can she start TMS soon? Would need am treatments.    May a detailed message be left on voicemail? Yes    Date of last office visit:     Message routed to: [facility] RN pool

## 2021-12-11 ASSESSMENT — ANXIETY QUESTIONNAIRES: GAD7 TOTAL SCORE: 9

## 2021-12-14 ENCOUNTER — TELEPHONE (OUTPATIENT)
Dept: PSYCHOLOGY | Facility: CLINIC | Age: 71
End: 2021-12-14
Payer: MEDICARE

## 2021-12-15 NOTE — TELEPHONE ENCOUNTER
Writer spoke with Dr. Whiteside.  Per DR. Whiteside, she is ok with putting patient back on the TMS list, however she cannot be treated if she is drinking due to seizure risk and it limiting the effectiveness of TMS.

## 2021-12-15 NOTE — TELEPHONE ENCOUNTER
Writer called patient to discuss this.  Received voicemail.  Left message asking for a return call.  Clinic number provided.

## 2021-12-16 ENCOUNTER — NURSE TRIAGE (OUTPATIENT)
Dept: FAMILY MEDICINE | Facility: CLINIC | Age: 71
End: 2021-12-16
Payer: MEDICARE

## 2021-12-16 ENCOUNTER — OFFICE VISIT (OUTPATIENT)
Dept: URGENT CARE | Facility: URGENT CARE | Age: 71
End: 2021-12-16
Payer: MEDICARE

## 2021-12-16 VITALS
OXYGEN SATURATION: 98 % | SYSTOLIC BLOOD PRESSURE: 114 MMHG | TEMPERATURE: 99.1 F | HEART RATE: 67 BPM | DIASTOLIC BLOOD PRESSURE: 64 MMHG

## 2021-12-16 DIAGNOSIS — B37.31 YEAST INFECTION OF THE VAGINA: ICD-10-CM

## 2021-12-16 DIAGNOSIS — L08.9 LOCAL INFECTION OF SKIN AND SUBCUTANEOUS TISSUE: ICD-10-CM

## 2021-12-16 DIAGNOSIS — Z20.822 SUSPECTED 2019 NOVEL CORONAVIRUS INFECTION: Primary | ICD-10-CM

## 2021-12-16 DIAGNOSIS — R50.9 FEVER, UNSPECIFIED: ICD-10-CM

## 2021-12-16 LAB
FLUAV AG SPEC QL IA: NEGATIVE
FLUBV AG SPEC QL IA: NEGATIVE

## 2021-12-16 PROCEDURE — U0005 INFEC AGEN DETEC AMPLI PROBE: HCPCS | Performed by: FAMILY MEDICINE

## 2021-12-16 PROCEDURE — U0003 INFECTIOUS AGENT DETECTION BY NUCLEIC ACID (DNA OR RNA); SEVERE ACUTE RESPIRATORY SYNDROME CORONAVIRUS 2 (SARS-COV-2) (CORONAVIRUS DISEASE [COVID-19]), AMPLIFIED PROBE TECHNIQUE, MAKING USE OF HIGH THROUGHPUT TECHNOLOGIES AS DESCRIBED BY CMS-2020-01-R: HCPCS | Performed by: FAMILY MEDICINE

## 2021-12-16 PROCEDURE — 87804 INFLUENZA ASSAY W/OPTIC: CPT | Performed by: FAMILY MEDICINE

## 2021-12-16 PROCEDURE — 99214 OFFICE O/P EST MOD 30 MIN: CPT | Performed by: FAMILY MEDICINE

## 2021-12-16 RX ORDER — FLUCONAZOLE 150 MG/1
150 TABLET ORAL ONCE
Qty: 1 TABLET | Refills: 0 | Status: SHIPPED | OUTPATIENT
Start: 2021-12-16 | End: 2021-12-16

## 2021-12-16 RX ORDER — CEPHALEXIN 500 MG/1
500 CAPSULE ORAL 3 TIMES DAILY
Qty: 15 CAPSULE | Refills: 0 | Status: SHIPPED | OUTPATIENT
Start: 2021-12-16 | End: 2021-12-21

## 2021-12-16 NOTE — TELEPHONE ENCOUNTER
"Patient woke up this morning     \"feeling like I was hit by a bus\"     Sharp shooting pains joins   Headache   Stomach pains like indigestion   Stomach is uncomfortable   Red rash - mostly over right breast     Rash is not raised, no blistering spots     No fever 97.something temp     Rash is not painful itchy or burning     No OVs in clinic today - pt agreed to  visit to have rash and symptoms evaluated    Jasmyn RAZA Triage RN  [facility]       Reason for Disposition    Patient wants to be seen    Additional Information    Negative: Possible contact with poison ivy or oak    Negative: Insect bite(s) suspected    Negative: Athlete's Foot suspected (i.e., itchy rash between the toes)    Negative: Jock Itch suspected (i.e., itchy rash on inner thighs near genital area)    Negative: Wound infection suspected (i.e., pain, spreading redness, or pus; in a cut, puncture, scrape or sutured wound)    Negative: Rash of external female genital area (vulva)    Negative: Rash of penis or scrotum    Negative: Small spot, skin growth, or mole    Negative: Sounds like a life-threatening emergency to the triager    Negative: Fever and localized purple or blood-colored spots or dots that are not from injury or friction    Negative: Fever and localized rash is very painful    Negative: Patient sounds very sick or weak to the triager    Negative: Painful rash with multiple small blisters grouped together (i.e., dermatomal distribution or 'band' or 'stripe')    Negative: Localized rash is very painful (no fever)    Negative: Localized purple or blood-colored spots or dots that are not from injury or friction (no fever)    Negative: Lyme disease suspected (e.g., bull's-eye rash or tick bite / exposure)    Negative: Looks like a boil, infected sore, deep ulcer, or other infected rash (spreading redness, pus)    Protocols used: RASH OR REDNESS - [id number]      "

## 2021-12-16 NOTE — TELEPHONE ENCOUNTER
Writer spoke with patient.  She agreed to abstain from EOTH during TMS.  Writer will put her back on the TMS wait list.

## 2021-12-16 NOTE — PROGRESS NOTES
Assessment & Plan     Suspected 2019 novel coronavirus infection  Fever, unspecified  Given low grade fever/headache, will check for covid  - Influenza A & B Antigen - Clinic Collect    Local infection of skin and subcutaneous tissue  Probable cause of her rash and low grade fever although unusual to have no pain. Recommended close follow up if worsening and with her doctor if the rash does not respond.   - cephALEXin (KEFLEX) 500 MG capsule  Dispense: 15 capsule; Refill: 0    Yeast infection of the vagina  To use if needed as she has hx of yeast frequently when she takes antibiotics.   - fluconazole (DIFLUCAN) 150 MG tablet  Dispense: 1 tablet; Refill: 0       [id number]}    Return in about 1 month (around 1/16/2022) for follow up with your regular clinic or provider fi the rash is worsening. .    Guerrero Woody MD  [facility]    ------------------------------------------------------------------------  Subjective     Svetlana Adair presents to clinic today for the following health issues:  chief complaint  HPI  Low grade fever and rash and fleeting migratory joint pains in her hands, knees, etc. The rash is located in the area of her mastectomy scar right side with no associated pain nor pruritis. She also has some headache.       Review of Systems  No trouble breathing. No significant cough. She did have some skin lesions frozen off at the dermatolgoy recently on the right shoulder. These are healing well.       Objective    /64 (BP Location: Left arm, Patient Position: Sitting, Cuff Size: Adult Regular)   Pulse 67   Temp 99.1  F (37.3  C) (Oral)   SpO2 98%   Breastfeeding No   Physical Exam   GENERAL: healthy, alert and no distress  EYES: Eyes grossly normal to inspection  HENT: ear canals and TM's normal, nose and mouth without ulcers or lesions  NECK: no adenopathy, no asymmetry, masses, or scars and thyroid normal to palpation  RESP: lungs clear to auscultation - no rales, rhonchi or  wheezes  BREAST: no mass on right side in area of mastectomy and reconstruction.   CV: regular rates and rhythm, peripheral pulses strong and no peripheral edema  SKIN: area of confluent well demarcated  erythema across right chest wall and breast extending to skin over the xiphoid. No scale. No tenderness to palpation  But this area francisco feel warm.

## 2021-12-17 LAB — SARS-COV-2 RNA RESP QL NAA+PROBE: NEGATIVE

## 2021-12-20 ENCOUNTER — VIRTUAL VISIT (OUTPATIENT)
Dept: PHARMACY | Facility: CLINIC | Age: 71
End: 2021-12-20
Payer: COMMERCIAL

## 2021-12-20 DIAGNOSIS — Z86.16 HISTORY OF COVID-19: Primary | ICD-10-CM

## 2021-12-20 DIAGNOSIS — R21 RASH: ICD-10-CM

## 2021-12-20 DIAGNOSIS — R53.83 FATIGUE, UNSPECIFIED TYPE: ICD-10-CM

## 2021-12-20 DIAGNOSIS — F32.0 MILD MAJOR DEPRESSION (H): ICD-10-CM

## 2021-12-20 PROCEDURE — 99606 MTMS BY PHARM EST 15 MIN: CPT | Performed by: PHARMACIST

## 2021-12-20 NOTE — PROGRESS NOTES
"Medication Therapy Management (MTM) Encounter    ASSESSMENT:                            Medication Adherence/Access: No issues identified    COVID-19: Patient needed additional education regarding ivermectin use; studies have not supported its use and this can provide adverse effects - would advise against use.    Rash: Improved.    Depression: Plan in place.     Fatigue: Improved, but likely not due to supplement use.  May benefit from stopping, she declines.    PLAN:                            1.  Recommended NOT using ivermectin.  2.  May benefit from stopping supplements - she declines.    Follow-up: Return in about 6 months (around 6/20/2022) for Follow-up Medication Review.     SUBJECTIVE/OBJECTIVE:                          Svetlana Adair is a 71 year old female called for a follow-up visit. She was referred to me from Dr. Cruz.  Today's visit is a follow-up MTM visit from 8/19/2021.     Reason for visit: Routine follow-up; she wonders what ivermectin is for.    Tobacco: She reports that she quit smoking about 11 years ago. Her smoking use included cigarettes. She started smoking about 52 years ago. She has a 22.00 pack-year smoking history. She has never used smokeless tobacco.  Alcohol: not currently using    Medication Adherence/Access: no issues reported    COVID-19:  Patient has a bottle of ivermectin at home - she wonders what this is for and where she may have received it.  Upon discussion that many are using this to treat COVID-19, she indicates that a friend mailed this to her.    Rash: Patient was seen in urgent care and also saw dermatology.  She's now taking cephalexin 500mg three times daily x5 days and reports symptoms are improving.  She denies side effects.    Depression:  Current medications include: bupropion XR 150mg daily.  She's off venlafaxine due to tremor and reports she may be proceeding with TMS treatment.  She reports mental health has been challenging due to \"lots going on.\"  She " denies suicidal ideation.   PHQ 11/4/2021 11/27/2021 12/10/2021   PHQ-9 Total Score 17 5 6   Q9: Thoughts of better off dead/self-harm past 2 weeks Several days Not at all Not at all       Fatigue:  Patient had COVID-19 in 2020.  She had extreme exhaustion since that time, although notes this is gradually improving.  She feels her supplements are quite helpful and wishes to continue taking.    Today's Vitals: There were no vitals taken for this visit.  ----------------    I spent 15 minutes with this patient today. A copy of the visit note was provided to the patient's primary care provider.    The patient was sent via [employer] a summary of these recommendations.     Courtney MejiaD, [facility]  Medication Therapy Management Provider  Pager: [phone]     Telemedicine Visit Details  Type of service:  Telephone visit  Start Time: 10:43 AM  End Time: 10:58 AM  Originating Location (patient location): [place]  Distant Location (provider location):  [facility]     Medication Therapy Recommendations  Fatigue, unspecified type    Current Medication: UNABLE TO FIND   Rationale: Nonmedication therapy more appropriate - Unnecessary medication therapy - Indication   Recommendation: Discontinue Medication   Status: Declined per Patient         History of COVID-19    Rationale: Unsafe medication for the patient - Adverse medication event - Safety   Recommendation: Provide Education   Status: Patient Agreed - Adherence/Education

## 2021-12-20 NOTE — PATIENT INSTRUCTIONS
Recommendations from today's MTM visit:                                                    MTM (medication therapy management) is a service provided by a clinical pharmacist designed to help you get the most of out of your medicines.   Today we reviewed what your medicines are for, how to know if they are working, that your medicines are safe and how to make your medicine regimen as easy as possible.      Please do not use ivermectin    Follow-up: Return in about 6 months (around 6/20/2022) for Follow-up Medication Review.    It was great to speak with you today.  I value your experience and would be very thankful for your time with providing feedback on our clinic survey. You may receive a survey via email or text message in the next few days.     To schedule another MTM appointment, please call the clinic directly or you may call the MTM scheduling line at [phone] or toll-free at [phone].     My Clinical Pharmacist's contact information:                                                      Please feel free to contact me with any questions or concerns you have.      Janice Tierney PharmD, [facility]  Medication Therapy Management Provider  Pager: [phone]     Lori Greene, [facility]  Medication Therapy Management Resident  Pager: [phone]

## 2022-01-05 DIAGNOSIS — Z20.822 COVID-19 RULED OUT: Primary | ICD-10-CM

## 2022-01-05 DIAGNOSIS — M54.2 NECK PAIN ON RIGHT SIDE: ICD-10-CM

## 2022-01-05 RX ORDER — HYDROCODONE BITARTRATE AND ACETAMINOPHEN 5; 325 MG/1; MG/1
1 TABLET ORAL DAILY PRN
Qty: 20 TABLET | Refills: 0 | Status: SHIPPED | OUTPATIENT
Start: 2022-01-05 | End: 2022-02-09

## 2022-01-05 NOTE — TELEPHONE ENCOUNTER
Pt complains of cold symptoms similar to those she had when dx with COVID.  She reports HA, cough, nasal congestion and fatigue for 10 days. Had a negative COVID-19 test. Please advice if provider would recommend and approve COVID PCR test. 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      Pt would need a call back with providers advice. Ok to lave a message [phone].      Routing refill request to provider for review/approval because:  Drug not on the [facility] refill protocol     Pending Prescriptions:                       Disp   Refills    HYDROcodone-acetaminophen (NORCO) 5-325 M*20 tab*0            Sig: Take 1 tablet by mouth daily as needed for pain    Last Written Prescription Date:  12/01/2021  Last Fill Quantity: 20,  # refills: 0   Last office visit: 5/18/2021 with prescribing provider:  07/01/2021  Future Office Visit:   Next 5 appointments (look out 90 days)    Jan 17, 2022  9:00 AM  (Arrive by 8:40 AM)  Annual Wellness Visit with Vladislav Cruz MD  [facility] ([facility] [address] [facility], [address]  [place] [address] [phone]           Obed Rosales, RN  [facility] Triage Nurse

## 2022-01-09 ASSESSMENT — PATIENT HEALTH QUESTIONNAIRE - PHQ9
SUM OF ALL RESPONSES TO PHQ QUESTIONS 1-9: 11
SUM OF ALL RESPONSES TO PHQ QUESTIONS 1-9: 11
10. IF YOU CHECKED OFF ANY PROBLEMS, HOW DIFFICULT HAVE THESE PROBLEMS MADE IT FOR YOU TO DO YOUR WORK, TAKE CARE OF THINGS AT HOME, OR GET ALONG WITH OTHER PEOPLE: NOT DIFFICULT AT ALL

## 2022-01-10 ENCOUNTER — VIRTUAL VISIT (OUTPATIENT)
Dept: PSYCHOLOGY | Facility: CLINIC | Age: 72
End: 2022-01-10
Payer: MEDICARE

## 2022-01-10 DIAGNOSIS — F33.1 MDD (MAJOR DEPRESSIVE DISORDER), RECURRENT EPISODE, MODERATE (H): Primary | ICD-10-CM

## 2022-01-10 PROCEDURE — 90837 PSYTX W PT 60 MINUTES: CPT | Mod: 95 | Performed by: SOCIAL WORKER

## 2022-01-10 ASSESSMENT — PATIENT HEALTH QUESTIONNAIRE - PHQ9: SUM OF ALL RESPONSES TO PHQ QUESTIONS 1-9: 11

## 2022-01-10 NOTE — PROGRESS NOTES
Progress Note    Patient Name: Svetlana Adair  Date: 01/10/2022       Service Type: Individual      Session Start Time: 9:33 am   Session End Time: 10:34 am      Session Length: 61 minutes (additional time as a transfer visit-need to do Tx plan in the near future)    Session #: 1st session-transferred from [facility]    Attendees: Client attended alone    Service Modality:  Video Visit:      Provider verified identity through the following two step process.  Patient provided:  Patient photo and Patient was verified at admission/transfer    Telemedicine Visit: The patient's condition can be safely assessed and treated via synchronous audio and visual telemedicine encounter.      Reason for Telemedicine Visit: Patient has requested telehealth visit    Originating Site (Patient Location): Patient's home    Distant Site (Provider Location): Provider Remote Setting- Home Office    Consent:  The patient/guardian has verbally consented to: the potential risks and benefits of telemedicine (video visit) versus in person care; bill my insurance or make self-payment for services provided; and responsibility for payment of non-covered services.     Patient would like the video invitation sent by:  My Chart    Mode of Communication:  Video Conference via [employer]    As the provider I attest to compliance with applicable laws and regulations related to telemedicine.     Treatment Plan Last Reviewed: Plan updated 12/21  PHQ-9 / ANGY-7 : Completed recently-12/2021 and 1/9/2022     DATA  Interactive Complexity: No  Crisis: No       Progress Since Last Session (Related to Symptoms / Goals / Homework):   Symptoms: Started drinking again a few years ago related to  stress with losing all of her finances, getting COVID and physical  pain. 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     Homework: Per previous therapist prioritizing therapy  Next session need to update treatment plan       Episode of Care Goals: Minimal progress - PREPARATION (Decided to change - considering how); Intervened by negotiating a change plan and determining options / strategies for behavior change, identifying triggers, exploring social supports, and working towards setting a date to begin behavior change     Current / Ongoing Stressors and Concerns:   Financial problems, had a lot of finances, but lost money,attended AA in the past,   passed away.  had Alzeihmers before he passed away and it took a  long time to diagnose him. Needs to utilize a breathalyzer and also will have an ankle bracelet. Strained relationship with family: siblings.      Treatment Objective(s) Addressed in This Session:   NA-treatment plan is out of date due to transferring providers, so we will update next time      Intervention:   Solution Focused: Focused on discussing what the client needs from this writer. We will focus on treatnent planning going forward.       Motivational Interviewing: Listened, provided empathy and support              ASSESSMENT: Current Emotional / Mental Status (status of significant symptoms):   Risk status (Self / Other harm or suicidal ideation)   Patient denies current fears or concerns for personal safety.              Patient denies current or recent suicidal ideation or behaviors.              Patient denies current or recent homicidal ideation or behaviors.              Patient denies current or recent self injurious behavior or ideation.              Patient denies other safety concerns.              Patient reports there has been no change in risk  factors since their last session.                Patient reports there has been no change in protective factors since their last session.                Recommended that patient call 911 or go to the local ED should there be a change in any of these risk factors.      Appearance:   Appropriate    Eye Contact:   Good    Psychomotor Behavior: Normal    Attitude:   Cooperative  Friendly Pleasant   Orientation:   All   Speech    Rate / Production: Normal/ Responsive Talkative    Volume:  Normal    Mood:    Anxious    Affect:    Worrisome    Thought Content:  Clear    Thought Form:  Coherent  Logical    Insight:    Good      Medication Review:   No changes to current psychiatric medication(s)     Medication Compliance:   Yes     Changes in Health Issues:   None reported     Chemical Use Review:   Substance Use: Drank in November and got a DWI, interlock  and ankle bracelet.      Tobacco Use: No current tobacco use.      Diagnosis:  1. MDD (major depressive disorder), recurrent episode, moderate (H)      Collateral Reports Completed:   Not Applicable    PLAN: (Patient Tasks / Therapist Tasks / Other)    Per DIMAS Sorto the client was working on a discontinue evaluation, attending AA, sobriety and working on managing mental health symptoms. We will work on treatment planning. Primarily listened to the client today to get to know her since she is transferring therapists and appeared to need to talk.     Velma Colon, [facility], [facility]                                                         Answers for HPI/ROS submitted by the patient on 1/9/2022  If you checked off any problems, how difficult have these problems made it for you to do your work, take care of things at home, or get along with other people?: Not difficult at all  PHQ9 TOTAL SCORE: 11

## 2022-01-11 ASSESSMENT — ENCOUNTER SYMPTOMS
FEVER: 0
DIARRHEA: 0
PARESTHESIAS: 0
DYSURIA: 0
MYALGIAS: 0
HEMATURIA: 0
CONSTIPATION: 0
HEADACHES: 0
SORE THROAT: 0
DIZZINESS: 0
HEARTBURN: 0
NERVOUS/ANXIOUS: 1
NAUSEA: 0
JOINT SWELLING: 1
BREAST MASS: 0
ABDOMINAL PAIN: 0
ARTHRALGIAS: 1
COUGH: 0
CHILLS: 0
EYE PAIN: 0
PALPITATIONS: 0
SHORTNESS OF BREATH: 0
HEMATOCHEZIA: 0
WEAKNESS: 0
FREQUENCY: 1

## 2022-01-11 ASSESSMENT — PATIENT HEALTH QUESTIONNAIRE - PHQ9
10. IF YOU CHECKED OFF ANY PROBLEMS, HOW DIFFICULT HAVE THESE PROBLEMS MADE IT FOR YOU TO DO YOUR WORK, TAKE CARE OF THINGS AT HOME, OR GET ALONG WITH OTHER PEOPLE: NOT DIFFICULT AT ALL
SUM OF ALL RESPONSES TO PHQ QUESTIONS 1-9: 10
SUM OF ALL RESPONSES TO PHQ QUESTIONS 1-9: 10

## 2022-01-11 ASSESSMENT — ACTIVITIES OF DAILY LIVING (ADL): CURRENT_FUNCTION: NO ASSISTANCE NEEDED

## 2022-01-12 ASSESSMENT — PATIENT HEALTH QUESTIONNAIRE - PHQ9: SUM OF ALL RESPONSES TO PHQ QUESTIONS 1-9: 10

## 2022-01-17 ENCOUNTER — ANCILLARY PROCEDURE (OUTPATIENT)
Dept: GENERAL RADIOLOGY | Facility: CLINIC | Age: 72
End: 2022-01-17
Attending: INTERNAL MEDICINE
Payer: MEDICARE

## 2022-01-17 ENCOUNTER — OFFICE VISIT (OUTPATIENT)
Dept: FAMILY MEDICINE | Facility: CLINIC | Age: 72
End: 2022-01-17
Payer: MEDICARE

## 2022-01-17 VITALS
HEIGHT: 65 IN | HEART RATE: 51 BPM | RESPIRATION RATE: 16 BRPM | DIASTOLIC BLOOD PRESSURE: 74 MMHG | OXYGEN SATURATION: 98 % | TEMPERATURE: 97 F | WEIGHT: 146 LBS | SYSTOLIC BLOOD PRESSURE: 116 MMHG | BODY MASS INDEX: 24.32 KG/M2

## 2022-01-17 DIAGNOSIS — Z00.00 ROUTINE GENERAL MEDICAL EXAMINATION AT A HEALTH CARE FACILITY: ICD-10-CM

## 2022-01-17 DIAGNOSIS — G89.4 CHRONIC PAIN SYNDROME: ICD-10-CM

## 2022-01-17 DIAGNOSIS — Z87.891 PERSONAL HISTORY OF TOBACCO USE: ICD-10-CM

## 2022-01-17 DIAGNOSIS — Z23 NEED FOR VACCINATION: Primary | ICD-10-CM

## 2022-01-17 DIAGNOSIS — G89.29 CHRONIC PAIN OF RIGHT KNEE: ICD-10-CM

## 2022-01-17 DIAGNOSIS — Z17.0 MALIGNANT NEOPLASM OF AXILLARY TAIL OF RIGHT BREAST IN FEMALE, ESTROGEN RECEPTOR POSITIVE (H): ICD-10-CM

## 2022-01-17 DIAGNOSIS — E21.3 HYPERPARATHYROIDISM (H): ICD-10-CM

## 2022-01-17 DIAGNOSIS — E78.5 HYPERLIPIDEMIA LDL GOAL <70: ICD-10-CM

## 2022-01-17 DIAGNOSIS — F10.20 ALCOHOL USE DISORDER, SEVERE, DEPENDENCE (H): ICD-10-CM

## 2022-01-17 DIAGNOSIS — F11.90 CHRONIC, CONTINUOUS USE OF OPIOIDS: ICD-10-CM

## 2022-01-17 DIAGNOSIS — M25.561 CHRONIC PAIN OF RIGHT KNEE: ICD-10-CM

## 2022-01-17 DIAGNOSIS — C50.611 MALIGNANT NEOPLASM OF AXILLARY TAIL OF RIGHT BREAST IN FEMALE, ESTROGEN RECEPTOR POSITIVE (H): ICD-10-CM

## 2022-01-17 DIAGNOSIS — F33.9 EPISODE OF RECURRENT MAJOR DEPRESSIVE DISORDER, UNSPECIFIED DEPRESSION EPISODE SEVERITY (H): ICD-10-CM

## 2022-01-17 LAB
ALBUMIN SERPL-MCNC: 3.9 G/DL (ref 3.4–5)
ALP SERPL-CCNC: 46 U/L (ref 40–150)
ALT SERPL W P-5'-P-CCNC: 26 U/L (ref 0–50)
ANION GAP SERPL CALCULATED.3IONS-SCNC: 2 MMOL/L (ref 3–14)
AST SERPL W P-5'-P-CCNC: 13 U/L (ref 0–45)
BILIRUB SERPL-MCNC: 0.3 MG/DL (ref 0.2–1.3)
BUN SERPL-MCNC: 11 MG/DL (ref 7–30)
CALCIUM SERPL-MCNC: 9.3 MG/DL (ref 8.5–10.1)
CANNABINOIDS UR QL SCN: NORMAL
CHLORIDE BLD-SCNC: 103 MMOL/L (ref 94–109)
CHOLEST SERPL-MCNC: 160 MG/DL
CO2 SERPL-SCNC: 30 MMOL/L (ref 20–32)
CREAT SERPL-MCNC: 0.73 MG/DL (ref 0.52–1.04)
CREAT UR-MCNC: 48 MG/DL
ERYTHROCYTE [DISTWIDTH] IN BLOOD BY AUTOMATED COUNT: 12.3 % (ref 10–15)
FASTING STATUS PATIENT QL REPORTED: YES
GFR SERPL CREATININE-BSD FRML MDRD: 89 ML/MIN/1.73M2
GLUCOSE BLD-MCNC: 112 MG/DL (ref 70–99)
HCT VFR BLD AUTO: 42.9 % (ref 35–47)
HDLC SERPL-MCNC: 72 MG/DL
HGB BLD-MCNC: 14.1 G/DL (ref 11.7–15.7)
LDLC SERPL CALC-MCNC: 74 MG/DL
MCH RBC QN AUTO: 31.8 PG (ref 26.5–33)
MCHC RBC AUTO-ENTMCNC: 32.9 G/DL (ref 31.5–36.5)
MCV RBC AUTO: 97 FL (ref 78–100)
NONHDLC SERPL-MCNC: 88 MG/DL
PLATELET # BLD AUTO: 312 10E3/UL (ref 150–450)
POTASSIUM BLD-SCNC: 4.3 MMOL/L (ref 3.4–5.3)
PROT SERPL-MCNC: 7.7 G/DL (ref 6.8–8.8)
RBC # BLD AUTO: 4.43 10E6/UL (ref 3.8–5.2)
SODIUM SERPL-SCNC: 135 MMOL/L (ref 133–144)
TRIGL SERPL-MCNC: 72 MG/DL
WBC # BLD AUTO: 6.1 10E3/UL (ref 4–11)

## 2022-01-17 PROCEDURE — 73560 X-RAY EXAM OF KNEE 1 OR 2: CPT | Mod: RT | Performed by: RADIOLOGY

## 2022-01-17 PROCEDURE — 99214 OFFICE O/P EST MOD 30 MIN: CPT | Mod: 25 | Performed by: INTERNAL MEDICINE

## 2022-01-17 PROCEDURE — 90732 PPSV23 VACC 2 YRS+ SUBQ/IM: CPT | Performed by: INTERNAL MEDICINE

## 2022-01-17 PROCEDURE — G0296 VISIT TO DETERM LDCT ELIG: HCPCS | Performed by: INTERNAL MEDICINE

## 2022-01-17 PROCEDURE — G0009 ADMIN PNEUMOCOCCAL VACCINE: HCPCS | Performed by: INTERNAL MEDICINE

## 2022-01-17 PROCEDURE — G0439 PPPS, SUBSEQ VISIT: HCPCS | Performed by: INTERNAL MEDICINE

## 2022-01-17 PROCEDURE — 80053 COMPREHEN METABOLIC PANEL: CPT | Performed by: INTERNAL MEDICINE

## 2022-01-17 PROCEDURE — 80307 DRUG TEST PRSMV CHEM ANLYZR: CPT | Performed by: INTERNAL MEDICINE

## 2022-01-17 PROCEDURE — 80061 LIPID PANEL: CPT | Performed by: INTERNAL MEDICINE

## 2022-01-17 PROCEDURE — 85027 COMPLETE CBC AUTOMATED: CPT | Performed by: INTERNAL MEDICINE

## 2022-01-17 PROCEDURE — 36415 COLL VENOUS BLD VENIPUNCTURE: CPT | Performed by: INTERNAL MEDICINE

## 2022-01-17 ASSESSMENT — ENCOUNTER SYMPTOMS
HEMATOCHEZIA: 0
FEVER: 0
HEMATURIA: 0
WEAKNESS: 0
CHILLS: 0
HEADACHES: 0
DIARRHEA: 0
PALPITATIONS: 0
COUGH: 0
SHORTNESS OF BREATH: 0
CONSTIPATION: 0
ABDOMINAL PAIN: 0
DIZZINESS: 0
DYSURIA: 0
EYE PAIN: 0
HEARTBURN: 0
JOINT SWELLING: 1
PARESTHESIAS: 0
BREAST MASS: 0
SORE THROAT: 0
NERVOUS/ANXIOUS: 1
MYALGIAS: 0
NAUSEA: 0
FREQUENCY: 1
ARTHRALGIAS: 1

## 2022-01-17 ASSESSMENT — MIFFLIN-ST. JEOR: SCORE: 1180.19

## 2022-01-17 ASSESSMENT — ACTIVITIES OF DAILY LIVING (ADL): CURRENT_FUNCTION: NO ASSISTANCE NEEDED

## 2022-01-17 NOTE — LETTER
January 17, 2022      Svetlana Adair  [address]   [place] [address]        Dear ,    We are writing to inform you of your test results.    The following letter pertains to your most recent diagnostic tests:     -The metabolic panel is normal with the exception of a mildly elevated blood glucose level that is not in the diabetic range.     -Your cholesterol panel looks healthy.     -Your complete blood counts including your hemoglobin returned normal for you.             Resulted Orders   Comprehensive metabolic panel   Result Value Ref Range    Sodium 135 133 - 144 mmol/L    Potassium 4.3 3.4 - 5.3 mmol/L    Chloride 103 94 - 109 mmol/L    Carbon Dioxide (CO2) 30 20 - 32 mmol/L    Anion Gap 2 (L) 3 - 14 mmol/L    Urea Nitrogen 11 7 - 30 mg/dL    Creatinine 0.73 0.52 - 1.04 mg/dL    Calcium 9.3 8.5 - 10.1 mg/dL    Glucose 112 (H) 70 - 99 mg/dL    Alkaline Phosphatase 46 40 - 150 U/L    AST 13 0 - 45 U/L    ALT 26 0 - 50 U/L    Protein Total 7.7 6.8 - 8.8 g/dL    Albumin 3.9 3.4 - 5.0 g/dL    Bilirubin Total 0.3 0.2 - 1.3 mg/dL    GFR Estimate 89 >60 mL/min/1.73m2      Comment:      Effective December 21, 2021 eGFRcr in adults is calculated using the 2021 CKD-EPI creatinine equation which includes age and gender (Maldonado et al., NE, DOI: 10.1056/QMFLoe9921594)   CBC with platelets   Result Value Ref Range    WBC Count 6.1 4.0 - 11.0 10e3/uL    RBC Count 4.43 3.80 - 5.20 10e6/uL    Hemoglobin 14.1 11.7 - 15.7 g/dL    Hematocrit 42.9 35.0 - 47.0 %    MCV 97 78 - 100 fL    MCH 31.8 26.5 - 33.0 pg    MCHC 32.9 31.5 - 36.5 g/dL    RDW 12.3 10.0 - 15.0 %    Platelet Count 312 150 - 450 10e3/uL   Lipid panel reflex to direct LDL Fasting   Result Value Ref Range    Cholesterol 160 <200 mg/dL    Triglycerides 72 <150 mg/dL    Direct Measure HDL 72 >=50 mg/dL    LDL Cholesterol Calculated 74 <=100 mg/dL    Non HDL Cholesterol 88 <130 mg/dL    Patient Fasting > 8hrs? Yes     Narrative     Cholesterol  Desirable:  <200 mg/dL    Triglycerides  Normal:  Less than 150 mg/dL  Borderline High:  150-199 mg/dL  High:  200-499 mg/dL  Very High:  Greater than or equal to 500 mg/dL    Direct Measure HDL  Female:  Greater than or equal to 50 mg/dL   Male:  Greater than or equal to 40 mg/dL    LDL Cholesterol  Desirable:  <100mg/dL  Above Desirable:  100-129 mg/dL   Borderline High:  130-159 mg/dL   High:  160-189 mg/dL   Very High:  >= 190 mg/dL    Non HDL Cholesterol  Desirable:  130 mg/dL  Above Desirable:  130-159 mg/dL  Borderline High:  160-189 mg/dL  High:  190-219 mg/dL  Very High:  Greater than or equal to 220 mg/dL   Urine Creatinine for Drug Screen Panel   Result Value Ref Range    Creatinine Urine for Drug Screen 48 mg/dL      Comment:      The reference range has not been established for creatinine in random urines. The results should be integrated into the clinical context for interpretation.   Cannabinoids qualitative urine   Result Value Ref Range    Cannabinoids Urine Screen Negative Screen Negative      Comment:      Cutoff for a negative cannabinoid is 50 ng/mL or less.       If you have any questions or concerns, please call the clinic at the number listed above.       Sincerely,      Vladislav Cruz MD

## 2022-01-17 NOTE — LETTER
Opioid / Opioid Plus Controlled Substance Agreement    This is an agreement between you and your provider about the safe and appropriate use of controlled substance/opioids prescribed by your care team. Controlled substances are medicines that can cause physical and mental dependence (abuse).    There are strict laws about having and using these medicines. We here at [facility] are committing to working with you in your efforts to get better. To support you in this work, we ll help you schedule regular office appointments for medicine refills. If we must cancel or change your appointment for any reason, we ll make sure you have enough medicine to last until your next appointment.     As a Provider, I will:    Listen carefully to your concerns and treat you with respect.     Recommend a treatment plan that I believe is in your best interest. This plan may involve therapies other than opioid pain medication.     Talk with you often about the possible benefits, and the risk of harm of any medicine that we prescribe for you.     Provide a plan on how to taper (discontinue or go off) using this medicine if the decision is made to stop its use.    As a Patient, I understand that opioid(s):     Are a controlled substance prescribed by my care team to help me function or work and manage my condition(s).     Are strong medicines and can cause serious side effects such as:    Drowsiness, which can seriously affect my driving ability    A lower breathing rate, enough to cause death    Harm to my thinking ability     Depression     Abuse of and addiction to this medicine    Need to be taken exactly as prescribed. Combining opioids with certain medicines or chemicals (such as illegal drugs, sedatives, sleeping pills, and benzodiazepines) can be dangerous or even fatal. If I stop opioids suddenly, I may have severe withdrawal symptoms.    Do not work for all types of pain nor for all patients. If they re not helpful, I may  be asked to stop them.        The risks, benefits and side effects of these medicine(s) were explained to me. I agree that:  1. I will take part in other treatments as advised by my care team. This may be psychiatry or counseling, physical therapy, behavioral therapy, group treatment or a referral to a specialist.     2. I will keep all my appointments. I understand that this is part of the monitoring of opioids. My care team may require an office visit for EVERY opioid/controlled substance refill. If I miss appointments or don t follow instructions, my care team may stop my medicine.    3. I will take my medicines as prescribed. I will not change the dose or schedule unless my care team tells me to. There will be no refills if I run out early.     4. I may be asked to come to the clinic and complete a urine drug test or complete a pill count at any time. If I don t give a urine sample or participate in a pill count, the care team may stop my medicine.    5. I will only receive prescriptions from this clinic for chronic pain. If I am treated by another provider for acute pain issues, I will tell them that I am taking opioid pain medication for chronic pain and that I have a treatment agreement with this provider. I will inform my [facility] care team within one business day if I am given a prescription for any pain medication by another healthcare provider. My [facility] care team can contact other providers and pharmacists about my use of any medicines.    6. It is up to me to make sure that I don t run out of my medicines on weekends or holidays. If my care team is willing to refill my opioid prescription without a visit, I must request refills only during office hours. Refills may take up to 3 business days to process. I will use one pharmacy to fill all my opioid and other controlled substance prescriptions. I will notify the clinic about any changes to my insurance or medication  availability.    7. I am responsible for my prescriptions. If the medicine/prescription is lost, stolen or destroyed, it will not be replaced. I also agree not to share controlled substance medicines with anyone.    8. I am aware I should not use any illegal or recreational drugs. I agree not to drink alcohol unless my care team says I can.       9. If I enroll in the [place] [facility] program, I will tell my care team prior to my next refill.     10. I will tell my care team right away if I become pregnant, have a new medical problem treated outside of my regular clinic, or have a change in my medications.    11. I understand that this medicine can affect my thinking, judgment and reaction time. Alcohol and drugs affect the brain and body, which can affect the safety of my driving. Being under the influence of alcohol or drugs can affect my decision-making, behaviors, personal safety, and the safety of others. Driving while impaired (DWI) can occur if a person is driving, operating, or in physical control of a car, motorcycle, boat, snowmobile, ATV, motorbike, off-road vehicle, or any other motor vehicle ([place] Statute 169A.20). I understand the risk if I choose to drive or operate any vehicle or machinery.    I understand that if I do not follow any of the conditions above, my prescriptions or treatment may be stopped or changed.          Opioids  What You Need to Know    What are opioids?   Opioids are pain medicines that must be prescribed by a doctor. They are also known as narcotics.     Examples are:   1. morphine (MS Contin, Hortencia)  2. oxycodone (Oxycontin)  3. oxycodone and acetaminophen (Percocet)  4. hydrocodone and acetaminophen (Vicodin, Norco)   5. fentanyl patch (Duragesic)   6. hydromorphone (Dilaudid)   7. methadone  8. codeine (Tylenol #3)     What do opioids do well?   Opioids are best for severe short-term pain such as after a surgery or injury. They may work well for cancer pain. They may  help some people with long-lasting (chronic) pain.     What do opioids NOT do well?   Opioids never get rid of pain entirely, and they don t work well for most patients with chronic pain. Opioids don t reduce swelling, one of the causes of pain.                                    Other ways to manage chronic pain and improve function include:       Treat the health problem that may be causing pain    Anti-inflammation medicines, which reduce swelling and tenderness, such as ibuprofen (Advil, Motrin) or naproxen (Aleve)    Acetaminophen (Tylenol)    Antidepressants and anti-seizure medicines, especially for nerve pain    Topical treatments such as patches or creams    Injections or nerve blocks    Chiropractic or osteopathic treatment    Acupuncture, massage, deep breathing, meditation, visual imagery, aromatherapy    Use heat or ice at the pain site    Physical therapy     Exercise    Stop smoking    Take part in therapy       Risks and side effects     Talk to your doctor before you start or decide to keep taking opioids. Possible side effects include:      Lowering your breathing rate enough to cause death    Overdose, including death, especially if taking higher than prescribed doses    Worse depression symptoms; less pleasure in things you usually enjoy    Feeling tired or sluggish    Slower thoughts or cloudy thinking    Being more sensitive to pain over time; pain is harder to control    Trouble sleeping or restless sleep    Changes in hormone levels (for example, less testosterone)    Changes in sex drive or ability to have sex    Constipation    Unsafe driving    Itching and sweating    Dizziness    Nausea, throwing up and dry mouth    What else should I know about opioids?    Opioids may lead to dependence, tolerance, or addiction.      Dependence means that if you stop or reduce the medicine too quickly, you will have withdrawal symptoms. These include loose poop (diarrhea), jitters, flu-like symptoms,  nervousness and tremors. Dependence is not the same as addiction.                       Tolerance means needing higher doses over time to get the same effect. This may increase the chance of serious side effects.      Addiction is when people improperly use a substance that harms their body, their mind or their relations with others. Use of opiates can cause a relapse of addiction if you have a history of drug or alcohol abuse.      People who have used opioids for a long time may have a lower quality of life, worse depression, higher levels of pain and more visits to doctors.    You can overdose on opioids. Take these steps to lower your risk of overdose:    1. Recognize the signs:  Signs of overdose include decrease or loss of consciousness (blackout), slowed breathing, trouble waking up and blue lips. If someone is worried about overdose, they should call 911.    2. Talk to your doctor about Narcan (naloxone).   If you are at risk for overdose, you may be given a prescription for Narcan. This medicine very quickly reverses the effects of opioids.   If you overdose, a friend or family member can give you Narcan while waiting for the ambulance. They need to know the signs of overdose and how to give Narcan.     3. Don't use alcohol or street drugs.   Taking them with opioids can cause death.    4. Do not take any of these medicines unless your doctor says it s OK. Taking these with opioids can cause death:    Benzodiazepines, such as lorazepam (Ativan), alprazolam (Xanax) or diazepam (Valium)    Muscle relaxers, such as cyclobenzaprine (Flexeril)    Sleeping pills like zolpidem (Ambien)     Other opioids      How to keep you and other people safe while taking opioids:    1. Never share your opioids with others.  Opioid medicines are regulated by the Drug Enforcement Agency ([place]). Selling or sharing medications is a criminal act.    2. Be sure to store opioids in a secure place, locked up if possible. Young children  can easily swallow them and overdose.    3. When you are traveling with your medicines, keep them in the original bottles. If you use a pill box, be sure you also carry a copy of your medicine list from your clinic or pharmacy.    4. Safe disposal of opioids    Most pharmacies have places to get rid of medicine, called disposal kiosks. Medicine disposal options are also available in every [facility]. Search your county and  medication disposal  to find more options. You can find more details at:  [email or web address]     I agree that my provider, clinic care team, and pharmacy may work with any city, state or federal law enforcement agency that investigates the misuse, sale, or other diversion of my controlled medicine. I will allow my provider to discuss my care with, or share a copy of, this agreement with any other treating provider, pharmacy or emergency room where I receive care.    I have read this agreement and have asked questions about anything I did not understand.    _______________________________________________________  Patient Signature - Svetlana Adair _____________________                   Date     _______________________________________________________  Provider Signature - Vladislav Cruz MD   _____________________                   Date     _______________________________________________________  Witness Signature (required if provider not present while patient signing)   _____________________                   Date

## 2022-01-17 NOTE — RESULT ENCOUNTER NOTE
The following letter pertains to your most recent diagnostic tests:    -The metabolic panel is normal with the exception of a mildly elevated blood glucose level that is not in the diabetic range.    -Your cholesterol panel looks healthy.     -Your complete blood counts including your hemoglobin returned normal for you.           Sincerely,    Dr. Cruz

## 2022-01-17 NOTE — RESULT ENCOUNTER NOTE
The following letter pertains to your most recent diagnostic tests:    The knee x-ray confirms at least moderate degenerative arthritis in the right knee.  I believe that you could start by trying some physical therapy for the knee to see if this improves the situation.  If the physical therapy exercises are too painful to complete, you have the option to schedule an appointment with me for cortisone injection.    Sincerely,    Dr. Cruz

## 2022-01-17 NOTE — PROGRESS NOTES
"SUBJECTIVE:   Svetlana Adair is a 71 year old female who presents for Preventive Visit.      Patient has been advised of split billing requirements and indicates understanding: Yes   Are you in the first 12 months of your Medicare coverage?  No    Healthy Habits:     In general, how would you rate your overall health?  Good    Frequency of exercise:  None    Do you usually eat at least 4 servings of fruit and vegetables a day, include whole grains    & fiber and avoid regularly eating high fat or \"junk\" foods?  No    Taking medications regularly:  Yes    Barriers to taking medications:  None    Medication side effects:  None    Ability to successfully perform activities of daily living:  No assistance needed    Home Safety:  No safety concerns identified    Hearing Impairment:  Difficulty following a conversation in a noisy restaurant or crowded room and difficulty understanding soft or whispered speech    In the past 6 months, have you been bothered by leaking of urine?  No    In general, how would you rate your overall mental or emotional health?  Fair      PHQ-2 Total Score: 4    Additional concerns today:  No    She went through a stop sign drunk and got DUI  She has been sober for 3 months   Mood has been persistently problematic, seeing psychiatry, considering TMS  She takes one-half of a hydrocodone daily for chronic neck pain, but lately more for right knee pain  History of two knee surgeries     Do you feel safe in your environment? Yes    Have you ever done Advance Care Planning? (For example, a Health Directive, POLST, or a discussion with a medical provider or your loved ones about your wishes): No, advance care planning information given to patient to review.  Patient plans to discuss their wishes with loved ones or provider.         Fall risk  Fallen 2 or more times in the past year?: No  Any fall with injury in the past year?: No    Cognitive Screening   1) Repeat 3 items (Leader, Season, Table)  "   2) Clock draw: NORMAL  3) 3 item recall: Recalls 3 objects  Results: 3 items recalled: COGNITIVE IMPAIRMENT LESS LIKELY    Mini-CogTM Copyright PATTI Martinez. Licensed by the author for use in [facility]; reprinted with permission (luis antonio@[facility]). All rights reserved.      Do you have sleep apnea, excessive snoring or daytime drowsiness?: no    Reviewed and updated as needed this visit by clinical staff  Tobacco  Allergies  Meds  Problems            Reviewed and updated as needed this visit by Provider               Social History     Tobacco Use     Smoking status: Former Smoker     Packs/day: 1.00     Years: 22.00     Pack years: 22.00     Types: Cigarettes     Start date: 1969     Quit date: 2010     Years since quittin.7     Smokeless tobacco: Never Used     Tobacco comment: I have been chewing nicorette gum 3 yrs and 3 months now. I have quit 8 and 9 yrs and periods in bet   Substance Use Topics     Alcohol use: Not Currently     Comment: 2 drinks per day     If you drink alcohol do you typically have >3 drinks per day or >7 drinks per week? No    Alcohol Use 2022   Prescreen: >3 drinks/day or >7 drinks/week? -   Prescreen: >3 drinks/day or >7 drinks/week? No       Current providers sharing in care for this patient include:   Patient Care Team:  Vladislav Cruz MD as PCP - General (Internal Medicine)  Pavan Fuentes MD as MD (Oncology)  Vladislav Cruz MD as Assigned PCP  Janice Tierney, PharmD as Pharmacist (Pharmacist)  Taiwo Anthony PA-C as Assigned Heart and Vascular Provider  Alvina Whiteside MD as Assigned Behavioral Health Provider  [facility]ist    The following health maintenance items are reviewed in Epic and correct as of today:  Health Maintenance Due   Topic Date Due     ANNUAL REVIEW OF HM ORDERS  Never done     TREATMENT AGREEMENT FOR CHRONIC PAIN MANAGEMENT  Never done     DEXA  2018     Pneumococcal Vaccine: 65+  Years (2 of 2 - PPSV23) 12/10/2020     URINE DRUG SCREEN  01/11/2022     LUNG CANCER SCREENING  01/28/2022     Patient Active Problem List   Diagnosis     Breast cancer est/prog +, HER2 ERNIE -     Osteoarthritis     Moderate episode of recurrent major depressive disorder (H)     Advanced directives, counseling/discussion     Knee pain     Past use of tobacco     IFG (impaired fasting glucose)     Low back pain     Malignant neoplasm of right breast (H)     Hyperlipidemia LDL goal <70     Follow-up examination following surgery     Atherosclerosis of left carotid artery     Chronic, continuous use of opioids     Adjustment disorder with mixed anxiety and depressed mood     Neck pain on right side     Hyperparathyroidism (H)     Alcohol dependence in remission (H)     High coronary artery calcium score     Past Surgical History:   Procedure Laterality Date     ABDOMEN SURGERY  2007?    Hysterectomy     COLONOSCOPY       COLONOSCOPY  11/17/2011    Procedure:COLONOSCOPY; Colonoscopy; Surgeon:MEKA RUSS; Location: GI     COLONOSCOPY N/A 2/10/2020    Procedure: COLONOSCOPY, WITH POLYPECTOMY AND BIOPSY;  Surgeon: Opal Ace MD;  Location:  GI     DISSECT LYMPH NODE AXILLA Right 11/2/2017    Procedure: DISSECT LYMPH NODE AXILLA;  RIGHT AXILLARY LYMPH NODE DISSECTION;  Surgeon: Cortez White MD;  Location:  [facility]     GYN SURGERY  2005    hysterectomy after hx of ovarian cyst, ended up being benign     HYSTERECTOMY, PAP NO LONGER INDICATED       MASTECTOMY MODIFIED RADICAL  2011    bilateral     MASTECTOMY, BILATERAL       ORTHOPEDIC SURGERY      rt. knee arthroscopy     ORTHOPEDIC SURGERY Right     toe surgery     REALIGN PATELLA  1973    right      TOE SURGERY      right grt OA        Social History     Tobacco Use     Smoking status: Former Smoker     Packs/day: 1.00     Years: 22.00     Pack years: 22.00     Types: Cigarettes     Start date: 7/7/1969     Quit date: 4/28/2010     Years since  quittin.7     Smokeless tobacco: Never Used     Tobacco comment: I have been chewing nicorette gum 3 yrs and 3 months now. I have quit 8 and 9 yrs and periods in bet   Substance Use Topics     Alcohol use: Not Currently     Comment: 2 drinks per day     Family History   Problem Relation Age of Onset     Cancer Mother 60        leukemia     Arthritis Mother         osteo     Eye Disorder Mother         glaucoma     Gastrointestinal Disease Mother         gallbladder     Other Cancer Mother      Gallbladder Disease Mother      Alcohol/Drug Father         alcohol     Heart Disease Father         ? CHF     Respiratory Father         emphysema     Coronary Artery Disease Father      Substance Abuse Father      Substance Abuse Sister      Anxiety Disorder Sister      Asthma Sister      Colon Cancer Brother      Breast Cancer Maternal Grandmother      Asthma Sister      Substance Abuse Sister      Cancer - colorectal Brother 50     Prostate Cancer Brother      Allergies Brother         bee      Arthritis Sister         osteo     Arthritis Sister         osteo     Arthritis Brother         osteo     Depression Sister      Psychotic Disorder Sister         bipolar     Respiratory Sister      Colon Cancer Brother      Prostate Cancer Brother      Depression Sister      Asthma Sister          Current Outpatient Medications   Medication Sig Dispense Refill     ASHWAGANDHA PO Take 1 tablet by mouth daily as needed At onset of illness symptoms; as needed       aspirin 81 MG tablet Take 1 tablet (81 mg) by mouth daily 30 tablet      buPROPion (WELLBUTRIN XL) 150 MG 24 hr tablet Take 1 tablet (150 mg) by mouth every morning 90 tablet 3     calcium carbonate-vitamin D (OS-YASMIN) 500-400 MG-UNIT tablet Take 1 tablet by mouth daily        Cholecalciferol (VITAMIN D-3) 5000 units TABS Take 1 tablet by mouth daily        Coenzyme Q10 300 MG CAPS Take 300 mg by mouth daily       HYDROcodone-acetaminophen (NORCO) 5-325 MG tablet Take 1  tablet by mouth daily as needed for pain 20 tablet 0     IBUPROFEN PO Take 200 mg by mouth every 4 hours as needed for moderate pain        lactobacillus rhamnosus, GG, (CULTURELL) capsule Take 1 capsule by mouth daily        letrozole (FEMARA) 2.5 MG tablet Take 1 tablet by mouth daily  5     Lutein-Zeaxanthin 25-5 MG CAPS Take 1 capsule by mouth daily       LYSINE 1-3 daily as needed       Melatonin 10 MG TABS tablet Take 10 mg by mouth nightly as needed for sleep        multivitamin (OCUVITE) TABS tablet Take 1 tablet by mouth daily        nicotine polacrilex (NICORETTE) 2 MG gum Place 1 each (2 mg) inside cheek as needed for smoking cessation 30 tablet 11     omeprazole 20 MG tablet Take 1 tablet (20 mg) by mouth as needed 90 tablet 3     rosuvastatin (CRESTOR) 10 MG tablet Take 1 tablet (10 mg) by mouth daily 90 tablet 2     UNABLE TO FIND Take by mouth daily MEDICATION NAME: Asea Redox - 1oz twice daily       UNABLE TO FIND Take 1 tablet by mouth daily MEDICATION NAME: Yin Kolbyao - chinese supplement takes at onset of illness symptoms       UNABLE TO FIND MEDICATION NAME: Somnapure - 2 tablets = 500mg valerian root, 300mg lemon balm extract, 200mg l-theanine, 120mg hops extract, 50mg chamomile flower extract, 50mg passion flower extract, 3mg melatonin.  Takes 1-2 tablets at bedtime        UNABLE TO FIND MEDICATION NAME: Moringa 1 serving of powder daily       UNABLE TO FIND MEDICATION NAME: Premium Amla Berry 2 capsules = 4mg Vitamin C, 966mg organic amla, organic amla extract.  Takes 2 capsules daily        UNABLE TO FIND MEDICATION NAME: OmegaKrill 5x 3 capsules = 480mg of total omega-3, 64mg EPA, 392mg DHA, 300mcg astaxanthin.  Takes 3 capsules daily       UNABLE TO FIND MEDICATION NAME: Super Colon Cleanse 2 capsules = 665mg senna leaf powder, 321mg psyllium husk powder, 21mg fennel seed powder, 21mg papaya leaf powder, 21mg yolanda hips fruit powder, 11mg l-acidophilus.  Taking 4 capsules daily        "valACYclovir (VALTREX) 1000 mg tablet Take 2 tablets (2,000 mg) by mouth 2 times daily as needed (Take two tablets by mouth PRN) 8 tablet 0     vitamin C (ASCORBIC ACID) 250 MG tablet Take 250 mg by mouth daily       Allergies   Allergen Reactions     Cats      Codeine Sulfate GI Disturbance       Review of Systems   Constitutional: Negative for chills and fever.   HENT: Negative for congestion, ear pain, hearing loss and sore throat.    Eyes: Negative for pain and visual disturbance.   Respiratory: Negative for cough and shortness of breath.    Cardiovascular: Negative for chest pain, palpitations and peripheral edema.   Gastrointestinal: Negative for abdominal pain, constipation, diarrhea, heartburn, hematochezia and nausea.   Breasts:  Negative for tenderness, breast mass and discharge.   Genitourinary: Positive for frequency. Negative for dysuria, genital sores, hematuria, pelvic pain, urgency, vaginal bleeding and vaginal discharge.   Musculoskeletal: Positive for arthralgias and joint swelling. Negative for myalgias.   Skin: Negative for rash.   Neurological: Negative for dizziness, weakness, headaches and paresthesias.   Psychiatric/Behavioral: Positive for mood changes. The patient is nervous/anxious.          OBJECTIVE:   /74 (BP Location: Left arm, Patient Position: Chair, Cuff Size: Adult Large)   Pulse 51   Temp 97  F (36.1  C) (Tympanic)   Resp 16   Ht 1.638 m (5' 4.5\")   Wt 66.2 kg (146 lb)   SpO2 98%   Breastfeeding No   BMI 24.67 kg/m   Estimated body mass index is 24.67 kg/m  as calculated from the following:    Height as of this encounter: 1.638 m (5' 4.5\").    Weight as of this encounter: 66.2 kg (146 lb).  Physical Exam  GENERAL APPEARANCE: healthy, alert and no distress  EYES: Eyes grossly normal to inspection, PERRL and conjunctivae and sclerae normal  HENT: ear canals and TM's normal, nose and mouth without ulcers or lesions, oropharynx clear and oral mucous membranes " moist  NECK: no adenopathy, no asymmetry, masses, or scars and thyroid normal to palpation  RESP: lungs clear to auscultation - no rales, rhonchi or wheezes  CV: regular rate and rhythm, normal S1 S2, no S3 or S4, no murmur, click or rub, no peripheral edema and peripheral pulses strong  ABDOMEN: soft, nontender, no hepatosplenomegaly, no masses and bowel sounds normal  MS: no musculoskeletal defects are noted and gait is age appropriate without ataxia  SKIN: no suspicious lesions or rashes  NEURO: Normal strength and tone, sensory exam grossly normal, mentation intact and speech normal  PSYCH: mentation appears normal and affect normal/bright    Labs pending     ASSESSMENT / PLAN:   (Z00.00) Routine general medical examination at a health care facility  (primary encounter diagnosis)    (F10.20) Alcohol use disorder, severe, dependence (H)  Comment: sober for 3 months; temporary relapse   Plan: OFFICE/OUTPT VISIT,EST,LEVL IV            (F33.9) Episode of recurrent major depressive disorder, unspecified depression episode severity (H)  Comment: continue working with psychiatriy   Plan: OFFICE/OUTPT VISIT,EST,LEVL IV            (E21.3) Hyperparathyroidism (H)  Comment: recheck metabolic panel  Plan: OFFICE/OUTPT VISIT,EST,LEVL IV            (C50.611,  Z17.0) Malignant neoplasm of axillary tail of right breast in female, estrogen receptor positive (H)  Comment: continue follow up with Dr. Rivero      (E78.5) Hyperlipidemia LDL goal <70  Comment: continue crestor; recheck labs   Plan: Comprehensive metabolic panel, CBC with         platelets, Lipid panel reflex to direct LDL         Fasting, OFFICE/OUTPT VISIT,EST,LEVL IV            (G89.4) Chronic pain syndrome  Comment: benefits of hydrocodone low dose seem to outweigh risk; check UTox, sign agreement   Plan: [id number] - Urine Drug Confirmation Panel         (Comprehensive), OFFICE/OUTPT VISIT,EST,LEVL IV            (F11.90) Chronic, continuous use of opioids  Comment:  "  Plan: OFFICE/OUTPT VISIT,EST,LEVL IV            (Z87.891) Personal history of tobacco use  Comment:   Plan: Prof Fee: Shared Decision Making Visit for Lung        Cancer Screening, CT Chest Lung Cancer Scrn Low        Dose wo            (M25.561,  G89.29) Chronic pain of right knee  Comment: check ; x-ray ; try PT pending result or return for injections  Plan: XR Knee Standing Right 2 Views, OFFICE/OUTPT         VISIT,EST,LEVL IV, Physical Therapy Referral            (Z23) Need for vaccination  Comment:   Plan: Pneumococcal vaccine 23 valent PPSV23          (Pneumovax) [95642], ADMIN MEDICARE:         Pneumococcal Vaccine ()              Patient has been advised of split billing requirements and indicates understanding: Yes  COUNSELING:  Reviewed preventive health counseling, as reflected in patient instructions  Special attention given to:       Regular exercise       Healthy diet/nutrition       Immunizations    P23 shot today; other shots are up to date              Consider lung cancer screening for ages 55-80 years and 30 pack-year smoking history ; referred for exam        Colon cancer screening;consider repeat 2/2023 due to multiple polyps in 2020       Hepatitis C screening       HIV screening for high risk patient       Mammogram:  Post mastectomies     Estimated body mass index is 24.67 kg/m  as calculated from the following:    Height as of this encounter: 1.638 m (5' 4.5\").    Weight as of this encounter: 66.2 kg (146 lb).        She reports that she quit smoking about 11 years ago. Her smoking use included cigarettes. She started smoking about 52 years ago. She has a 22.00 pack-year smoking history. She has never used smokeless tobacco.      Appropriate preventive services were discussed with this patient, including applicable screening as appropriate for cardiovascular disease, diabetes, osteopenia/osteoporosis, and glaucoma.  As appropriate for age/gender, discussed screening for colorectal " cancer, prostate cancer, breast cancer, and cervical cancer. Checklist reviewing preventive services available has been given to the patient.    Reviewed patients plan of care and provided an AVS. The Basic Care Plan (routine screening as documented in Health Maintenance) for Svetlana meets the Care Plan requirement. This Care Plan has been established and reviewed with the Patient.    Counseling Resources:  ATP IV Guidelines  Pooled Cohorts Equation Calculator  Breast Cancer Risk Calculator  Breast Cancer: Medication to Reduce Risk  FRAX Risk Assessment  ICSI Preventive Guidelines  Dietary Guidelines for Americans, 2010  [employer]'s MyPlate  ASA Prophylaxis  Lung CA Screening    Vladislav Cruz MD  [facility]    Identified Health Risks:  Answers for HPI/ROS submitted by the patient on 1/11/2022  If you checked off any problems, how difficult have these problems made it for you to do your work, take care of things at home, or get along with other people?: Not difficult at all  PHQ9 TOTAL SCORE: 10      Lung Cancer Screening Shared Decision Making Visit     Svetlana Adair is eligible for lung cancer screening on the basis of the information provided in my signed lung cancer screening order.     I have discussed with patient the risks and benefits of screening for lung cancer with low-dose CT.     The risks include:  radiation exposure: one low dose chest CT has as much ionizing radiation as about 15 chest x-rays or 6 months of background radiation living in [place]    false positives: 96% of positive findings/nodules are NOT cancer, but some might still require additional diagnostic evaluation, including biopsy  over-diagnosis: some slow growing cancers that might never have been clinically significant will be detected and treated unnecessarily     The benefit of early detection of lung cancer is contingent upon adherence to annual screening or more frequent follow up if indicated.      Furthermore, reaping the benefits of screening requires Svetlana Adair to be willing and physically able to undergo diagnostic procedures, if indicated. Although no specific guide is available for determining severity of comorbidities, it is reasonable to withhold screening in patients who have greater mortality risk from other diseases.     We did discuss that the only way to prevent lung cancer is to not smoke. Smoking cessation counseling was not given.      I did not offer risk estimation using a calculator such as this one:    ShouldIScreen

## 2022-01-17 NOTE — LETTER
January 17, 2022      Svetlana Adair  [address]   [facility] [address]        Dear ,    We are writing to inform you of your test results.    The following letter pertains to your most recent diagnostic tests:     The knee x-ray confirms at least moderate degenerative arthritis in the right knee.  I believe that you could start by trying some physical therapy for the knee to see if this improves the situation.  If the physical therapy exercises are too painful to complete, you have the option to schedule an appointment with me for cortisone injection.       Resulted Orders   XR Knee Standing Right 2 Views    Narrative    EXAM: KNEE STANDING RIGHT TWO VIEWS  DATE/TIME: 1/17/2022 10:02 AM     INDICATION: Right-sided knee pain.   COMPARISON: None.      Impression    IMPRESSION:  1.  Moderate right knee degenerative arthrosis, most marked in the  patellofemoral compartment. This includes lateral compartment marginal  osteophytosis, medial compartment narrowing and medial compartment  marginal osteophytosis. Small joint effusion.  2.  No fracture or joint malalignment.       JAZLYN RASHID MD         SYSTEM ID:  [id number]       If you have any questions or concerns, please call the clinic at the number listed above.       Sincerely,      Jazlyn Cruz MD

## 2022-01-17 NOTE — PATIENT INSTRUCTIONS
Please call [facility] at [phone] to schedule your CT of the lung.         Lung Cancer Screening   Frequently Asked Questions  If you are at high-risk for lung cancer, getting screened with low-dose computed tomography (LDCT) every year can help save your life. This handout offers answers to some of the most common questions about lung cancer screening. If you have other questions, please call [phone] ([phone]).     What is it?  Lung cancer screening uses special X-ray technology to create an image of your lung tissue. The exam is quick and easy and takes less than 10 seconds. We don t give you any medicine or use any needles. You can eat before and after the exam. You don t need to change your clothes as long as the clothing on your chest doesn t contain metal. But, you do need to be able to hold your breath for at least 6 seconds during the exam.    What is the goal of lung cancer screening?  The goal of lung cancer screening is to save lives. Many times, lung cancer is not found until a person starts having physical symptoms. Lung cancer screening can help detect lung cancer in the earliest stages when it may be easier to treat.    Who should be screened for lung cancer?  We suggest lung cancer screening for anyone who is at high-risk for lung cancer. You are in the high-risk group if you:      are between the ages of 55 and 79, and    have smoked at least 1 pack of cigarettes a day for 30 or more years, and    still smoke or have quit within the past 15 years.    However, if you have a new cough or shortness of breath, you should talk to your doctor before being screened.    Some national lung health advocacy groups also recommend screening for people ages 50 to 79 who have smoked an average of 1 pack of cigarettes a day for 20 years. They must also have at least 1 other risk factor for lung cancer, not including exposure to secondhand smoke. Other risk factors are having had cancer  in the past, emphysema, pulmonary fibrosis, COPD, a family history of lung cancer, or exposure to certain materials such as arsenic, asbestos, beryllium, cadmium, chromium, diesel fumes, nickel, radon or silica. Your care team can help you know if you have one of these risk factors.     Why does it matter if I have symptoms?  Certain symptoms can be a sign that you have a condition in your lungs that should be checked and treated by your doctor. These symptoms include fever, chest pain, a new or changing cough, shortness of breath that you have never felt before, coughing up blood or unexplained weight loss. Having any of these symptoms can greatly affect the results of lung cancer screening.       Should all smokers get an LDCT lung cancer screening exam?  It depends. Lung cancer screening is for a very specific group of men and women who have a history of heavy smoking over a long period of time (see  Who should be screened for lung cancer  above).  I am in the high-risk group, but have been diagnosed with cancer in the past. Is LDCT lung cancer screening right for me?  In some cases, you should not have LDCT lung screening, such as when your doctor is already following your cancer with CT scan studies. Your doctor will help you decide if LDCT lung screening is right for you.  Do I need to have a screening exam every year?  Yes. If you are in the high-risk group described earlier, you should get an LDCT lung cancer screening exam every year until you are 79, or are no longer willing or able to undergo screening and possible procedures to diagnose and treat lung cancer.  How effective is LDCT at preventing death from lung cancer?  Studies have shown that LDCT lung cancer screening can lower the risk of death from lung cancer by 20 percent in people who are at high-risk.  What are the risks?  There are some risks and limitations of LDCT lung cancer screening. We want to make sure you understand the risks and  benefits, so please let us know if you have any questions. Your doctor may want to talk with you more about these risks.    Radiation exposure: As with any exam that uses radiation, there is a very small increased risk of cancer. The amount of radiation in LDCT is small--about the same amount a person would get from a mammogram. Your doctor orders the exam when he or she feels the potential benefits outweigh the risks.    False negatives: No test is perfect, including LDCT. It is possible that you may have a medical condition, including lung cancer, that is not found during your exam. This is called a false negative result.    False positives and more testing: LDCT very often finds something in the lung that could be cancer, but in fact is not. This is called a false positive result. False positive tests often cause anxiety. To make sure these findings are not cancer, you may need to have more tests. These tests will be done only if you give us permission. Sometimes patients need a treatment that can have side effects, such as a biopsy. For more information on false positives, see  What can I expect from the results?     Findings not related to lung cancer: Your LDCT exam also takes pictures of areas of your body next to your lungs. In a very small number of cases, the CT scan will show an abnormal finding in one of these areas, such as your kidneys, adrenal glands, liver or thyroid. This finding may not be serious, but you may need more tests. Your doctor can help you decide what other tests you may need, if any.  What can I expect from the results?  About 1 out of 4 LDCT exams will find something that may need more tests. Most of the time, these findings are lung nodules. Lung nodules are very small collections of tissue in the lung. These nodules are very common, and the vast majority--more than 97 percent--are not cancer (benign). Most are normal lymph nodes or small areas of scarring from past infections.  But,  if a small lung nodule is found to be cancer, the cancer can be cured more than 90 percent of the time. To know if the nodule is cancer, we may need to get more images before your next yearly screening exam. If the nodule has suspicious features (for example, it is large, has an odd shape or grows over time), we will refer you to a specialist for further testing.  Will my doctor also get the results?  Yes. Your doctor will get a copy of your results.  Is it okay to keep smoking now that there s a cancer screening exam?  No. Tobacco is one of the strongest cancer-causing agents. It causes not only lung cancer, but other cancers and cardiovascular (heart) diseases as well. The damage caused by smoking builds over time. This means that the longer you smoke, the higher your risk of disease. While it is never too late to quit, the sooner you quit, the better.  Where can I find help to quit smoking?  The best way to prevent lung cancer is to stop smoking. If you have already quit smoking, congratulations and keep it up! For help on quitting smoking, please call [employer] at [phone]-JJZV ([phone]) or the American [employer] Society at 1-803.326.9718 to find local resources near you.  One-on-one health coaching:  If you d prefer to work individually with a health care provider on tobacco cessation, we offer:      Medication Therapy Management:  Our specially trained pharmacists work closely with you and your doctor to help you quit smoking.  Call [phone] or [phone] (toll free).     Can Do: Health coaching offered by [facility].  [email or web address]

## 2022-01-17 NOTE — PROGRESS NOTES
Prior to immunization administration, verified patients identity using patient s name and date of birth. Please see Immunization Activity for additional information.     Screening Questionnaire for Adult Immunization    Are you sick today?   No   Do you have allergies to medications, food, a vaccine component or latex?   No   Have you ever had a serious reaction after receiving a vaccination?   No   Do you have a long-term health problem with heart, lung, kidney, or metabolic disease (e.g., diabetes), asthma, a blood disorder, no spleen, complement component deficiency, a cochlear implant, or a spinal fluid leak?  Are you on long-term aspirin therapy?   No   Do you have cancer, leukemia, HIV/AIDS, or any other immune system problem?   No   Do you have a parent, brother, or sister with an immune system problem?   No   In the past 3 months, have you taken medications that affect  your immune system, such as prednisone, other steroids, or anticancer drugs; drugs for the treatment of rheumatoid arthritis, Crohn s disease, or psoriasis; or have you had radiation treatments?   No   Have you had a seizure, or a brain or other nervous system problem?   No   During the past year, have you received a transfusion of blood or blood    products, or been given immune (gamma) globulin or antiviral drug?   No   For women: Are you pregnant or is there a chance you could become       pregnant during the next month?   No   Have you received any vaccinations in the past 4 weeks?   No     Immunization questionnaire answers were all negative.        Per orders of Dr. Cruz, injection of Pneumovax 23 given by Shivani Javed CMA. Patient instructed to remain in clinic for 15 minutes afterwards, and to report any adverse reaction to me immediately.       Screening performed by Shivani Javed CMA on 1/17/2022 at 10:29 AM.

## 2022-01-19 ENCOUNTER — VIRTUAL VISIT (OUTPATIENT)
Dept: PSYCHOLOGY | Facility: CLINIC | Age: 72
End: 2022-01-19
Payer: MEDICARE

## 2022-01-19 DIAGNOSIS — F10.21 ALCOHOL DEPENDENCE IN REMISSION (H): ICD-10-CM

## 2022-01-19 DIAGNOSIS — F33.1 MDD (MAJOR DEPRESSIVE DISORDER), RECURRENT EPISODE, MODERATE (H): Primary | ICD-10-CM

## 2022-01-19 PROCEDURE — 90834 PSYTX W PT 45 MINUTES: CPT | Mod: 95 | Performed by: SOCIAL WORKER

## 2022-01-19 NOTE — PROGRESS NOTES
Progress Note    Patient Name: Svetlana Adair  Date: 01/19/2022         Service Type: Individual      Session Start Time: 11:39 am  Session End Time: 12:30 pm      Session Length: 51 minutes     Session #: 2nd visit with this writer     Attendees: Client attended alone    Service Modality:  Video Visit:      Provider verified identity through the following two step process.  Patient provided:  Patient photo and Patient was verified at admission/transfer    Telemedicine Visit: The patient's condition can be safely assessed and treated via synchronous audio and visual telemedicine encounter.      Reason for Telemedicine Visit: Patient has requested telehealth visit    Originating Site (Patient Location): Patient's home    Distant Site (Provider Location): Provider Remote Setting- Home Office    Consent:  The patient/guardian has verbally consented to: the potential risks and benefits of telemedicine (video visit) versus in person care; bill my insurance or make self-payment for services provided; and responsibility for payment of non-covered services.     Patient would like the video invitation sent by:  My Chart    Mode of Communication:  Video Conference via [employer]    As the provider I attest to compliance with applicable laws and regulations related to telemedicine.     Treatment Plan Last Reviewed: Plan updated 12/21  PHQ-9 / ANGY-7 : Completed recently-12/2021 and 1/9/2022    DATA  Interactive Complexity: No  Crisis: No       Progress Since Last Session (Related to Symptoms / Goals / Homework):   Symptoms: Improving depression and reports doing better now    Feeling more hopeful now and doing better    Homework: Goal to attend therapy sessions; completed/acheived today      Episode of Care Goals: Minimal progress - PREPARATION (Decided to change - considering how); Intervened by negotiating a change plan and determining options / strategies for behavior change,  "identifying triggers, exploring social supports, and working towards setting a date to begin behavior change     Current / Ongoing Stressors and Concerns:   Lack of finances, \"growing older\", required to have a breathalyzer  in her vehicle/interlock. Alcoholism in her past and relapse in  November 2021. Reports struggling with her sister lately.    Family member who she has also had a relationship with  (cousin) was diagnosed with cancer and has a short time to live.      Treatment Objective(s) Addressed in This Session:   Mainly focused on what she can control and what she cannot.   Patient will Increase interest, engagement, and pleasure in doing things  Decrease frequency and intensity of feeling down, depressed, hopeless  Feel less tired and more energy during the day   Identify negative self-talk and behaviors: challenge core beliefs, myths, and actions  Improve concentration, focus, and mindfulness in daily activities      Intervention:   Processed experiencing, solution focused, motivational interviewing:    Processed distancing herself from her sister. She reports that  she feels sad with not connecting with her sister as often as she  used to. Also feeling like this is a good idea and says her friends  have been supportive of her with disconnecting from her sister  as they report that her sister has not been supportive of her.  Processed how her friends have been more there for her than  her sister.       Attending meditation group, AA meetings and talking to friends for support. Also focusing on her spirituality.      ASSESSMENT: Current Emotional / Mental Status (status of significant symptoms):   Risk status (Self / Other harm or suicidal ideation)   Patient denies current fears or concerns for personal safety.              Patient denies current or recent suicidal ideation or behaviors.              Patient denies current or recent homicidal ideation or behaviors.              Patient denies current or " recent self injurious behavior or ideation.              Patient denies other safety concerns.              Patient reports there has been no change in risk factors since their last session.                Patient reports there has been no change in protective factors since their last session.                Recommended that patient call 911 or go to the local ED should there be a change in any of these risk factors.                 Appearance:                            Appropriate               Eye Contact:                           Good               Psychomotor Behavior:          Normal               Attitude:                                   Cooperative  Friendly Pleasant              Orientation:                             All              Speech                          Rate / Production:       Normal/ Responsive Talkative                          Volume:                       Normal               Mood:                                      Anxious               Affect:                                      Worrisome               Thought Content:                    Clear               Thought Form:                        Coherent  Logical               Insight:                                     Good      Medication Review:   No changes to current psychiatric medication(s)     Medication Compliance:   Yes     Changes in Health Issues:   None reported     Chemical Use Review:   Substance Use: Last drank in November 2021 and has not drank since then      Tobacco Use: No current tobacco use.      Diagnosis:  1. MDD (major depressive disorder), recurrent episode, moderate (H)    2. Alcohol dependence in remission (H)        Collateral Reports Completed:   Not Applicable    PLAN: (Patient Tasks / Therapist Tasks / Other)    Follow all recommendations of the courts/probation.   Attend AA meetings, meditation for support and talk to friends for support.   Continue medications and talk to psychiatry for mental  health medication management.   Client feels like things are going better and looking up, so we will we schedule for a month or so out and see where she is at with her mental health at that time.       Velma Colon, [facility], [facility]                                                         __________________________________________________________     Treatment Plan     Patient's Name: Svetlana Adair              YOB: 1950     Date: 01/19/2022, we could consider adding goals or updating them but will continue with the current goals for now     DSM5 Diagnoses: 296.31 (F33.0) Major Depressive Disorder, Recurrent Episode, Mild _ and With anxious distress  Psychosocial / Contextual Factors: Long covid; hx of problematic etoh use  WHODAS:      Referral / Collaboration:  Referral to another professional/service is not indicated at this time..     Anticipated number of session or this episode of care: eval every 90 days        MeasurableTreatment Goal(s) related to diagnosis / functional impairment(s)  Goal 1: Patient will improve coping with health and mood issues    I will know I've met my goal when  I feel more back to my normal self.       Objective #A (Patient Action)                          Patient will Increase interest, engagement, and pleasure in doing things  Decrease frequency and intensity of feeling down, depressed, hopeless  Feel less tired and more energy during the day   Identify negative self-talk and behaviors: challenge core beliefs, myths, and actions  Improve concentration, focus, and mindfulness in daily activities .  Status: new      Intervention(s)  Therapist will  and support use of CBT, some DBT and mindfulness based pratices.     Objective #B  Patient will monitor and decrease etoh use.  Status: new      Intervention(s)  Therapist will encourage efforts; may rec resumption of AA and /or new CD eval  .        Patient has reviewed and agreed to the above plan.        Monserrat  DIMAS Meng                    September 14, 2021    DIMAS Bui, [facility]            January 19th, 2022, continued the goals she was working with with DIMAS Sorto  Answers for HPI/ROS submitted by the patient on 12/10/2021  ANGY 7 TOTAL SCORE: 9      Answers for HPI/ROS submitted by the patient on 1/19/2022  If you checked off any problems, how difficult have these problems made it for you to do your work, take care of things at home, or get along with other people?: Somewhat difficult  PHQ9 TOTAL SCORE: 7

## 2022-01-20 ASSESSMENT — PATIENT HEALTH QUESTIONNAIRE - PHQ9: SUM OF ALL RESPONSES TO PHQ QUESTIONS 1-9: 7

## 2022-01-22 DIAGNOSIS — E78.2 MIXED HYPERLIPIDEMIA: Primary | ICD-10-CM

## 2022-01-22 RX ORDER — ROSUVASTATIN CALCIUM 10 MG/1
10 TABLET, COATED ORAL DAILY
Qty: 90 TABLET | Refills: 3 | Status: SHIPPED | OUTPATIENT
Start: 2022-01-22 | End: 2022-06-28

## 2022-01-26 ENCOUNTER — HOSPITAL ENCOUNTER (OUTPATIENT)
Dept: CT IMAGING | Facility: CLINIC | Age: 72
Discharge: HOME | End: 2022-01-26
Attending: INTERNAL MEDICINE | Admitting: INTERNAL MEDICINE
Payer: MEDICARE

## 2022-01-26 ENCOUNTER — TRANSFERRED RECORDS (OUTPATIENT)
Dept: HEALTH INFORMATION MANAGEMENT | Facility: CLINIC | Age: 72
End: 2022-01-26
Payer: MEDICARE

## 2022-01-26 DIAGNOSIS — Z87.891 PERSONAL HISTORY OF TOBACCO USE: ICD-10-CM

## 2022-01-26 PROCEDURE — G1004 CDSM NDSC: HCPCS

## 2022-01-26 NOTE — RESULT ENCOUNTER NOTE
The following letter pertains to your most recent diagnostic tests:    The CT scan looks OK.  We should recheck on one year's time.        Sincerely,    Dr. Cruz

## 2022-01-26 NOTE — LETTER
"January 26, 2022      Mahnaz Adair  [address]   [place] [address]        Dear ,    We are writing to inform you of your test results.    The following letter pertains to your most recent diagnostic tests:     The CT scan looks OK.  We should recheck on one year's time.         Sincerely,     Dr. Cruz /mitch Anderson Orders   CT Chest Lung Cancer Scrn Low Dose wo    Narrative    CT CHEST LOW DOSE WITHOUT CONTRAST 1/26/2022 12:17 PM     HISTORY: Lung cancer screening, >= 20 pk-yr smoking history, risk  factor(s) (Age >= 50y). Personal history of tobacco use.    COMPARISON: January 28, 2021    TECHNIQUE: Scans obtained from the apices through the diaphragm  without IV contrast. Low dose CT chest technique was utilized.  Radiation dose for this scan was reduced using automated exposure  control, adjustment of the mA and/or kV according to patient size, or  iterative reconstruction technique.    FINDINGS:     Lungs:  Pulmonary nodules measuring up to 5 mm noted and unchanged. No  definite new nodules. Probable dilated and impacted airway on the  right laterally, image 197 series 3. This is also stable. A few  scattered additional nodular densities are stable.  No infiltrates.    Additional findings: No pleural or pericardial effusion. Normal  caliber thoracic aorta.  There are extensive coronary vascular  calcifications and/or stents consistent with coronary artery disease.  Normal heart size. Adrenal glands unremarkable. Remaining visualized  upper abdomen unremarkable. No frankly destructive bony lesions.      Impression    IMPRESSION:   1. ACR Assessment Category:  Lung-RADS Category 2. Benign appearance  or behavior. Recommendation: continue annual screening if clinically  relevant (please order exam code IMG 2290).    2. Significant Incidental Finding(s):  Category S: Yes. Coronary  artery calcium moderate or severe.      Download the \"LungRADS Assessment Categories\" table at this " site:   http://www.acr.org/Quality-Safety/Resources/LungRADS    BROOKLYN MARIEE MD         SYSTEM ID:  [id number]       If you have any questions or concerns, please call the clinic at the number listed above.       Sincerely,      Vladislav Cruz MD

## 2022-01-27 ENCOUNTER — OFFICE VISIT (OUTPATIENT)
Dept: PSYCHIATRY | Facility: CLINIC | Age: 72
End: 2022-01-27
Payer: MEDICARE

## 2022-01-27 ENCOUNTER — ALLIED HEALTH/NURSE VISIT (OUTPATIENT)
Dept: PSYCHIATRY | Facility: CLINIC | Age: 72
End: 2022-01-27
Payer: MEDICARE

## 2022-01-27 DIAGNOSIS — F33.2 SEVERE RECURRENT MAJOR DEPRESSION WITHOUT PSYCHOTIC FEATURES (H): Primary | ICD-10-CM

## 2022-01-27 ASSESSMENT — PATIENT HEALTH QUESTIONNAIRE - PHQ9: SUM OF ALL RESPONSES TO PHQ QUESTIONS 1-9: 7

## 2022-01-27 NOTE — PROGRESS NOTES
TMS Education     Svetlana Adair MRN# [id number]  Age: 71 year old year old YOB: 1950        Patient is here in clinic for TMS education and consenting.  Patient will be starting TMS today on the [employer].  Writer and patient reviewed mechanics of TMS.  Discussed using magnetic pulses to stimulate and induce an electrical field inside the brain.  Discussed the difference between F8 and H1 coil.  Patient was able to verbalize understanding of this.  Discussed that the idea is that we are treating the DLPFC of the brain, as it has been shown by in studies that people who have depression have decreased activity in this part of the brain, the idea is that we stimulate this part of the brain to increase activity.       Reviewed processing of mapping and how visit today would go.  Encouraged patient to communicate with staff if treatment at anytime became painful.         Discussed side effects of TMS.  Side effects include headaches after treatment, hearing loss, lightheadedness/dizziness, short lived vision changes, and seizures.  Discussed ways that TMS Clinic helps with preventing these side effects.  Such as retesting motor thresholds and having patient wear ear plugs.  Instructed patient that she can take OTC pain relievers such as tylenol or IBU if she has a headache after today's treatment.  Reviewed safety concerns regarding sleep and use of illegal drugs/alcohol.        Patient was able to ask questions regarding procedure.  Patient informed of 10 minute late policy and attendance policy.  Consent was signed by patient today.

## 2022-01-27 NOTE — PROGRESS NOTES
[facility] TMS Program  [address], [address]  [place] [address]  TMS Procedure Note   Svetlana Adair MRN# [id number]  Age: 71 year old year old YOB: 1950    Pre-Procedure:  History and Physical: Reviewed in medical record  Consent Signed by: Svetlana Adair  On: 1/27/2022    Clinical Narrative:  Patient presents to initiate an acute course of TMS for management of her symptoms of depression that have not responded to conventional interventions (pharmacotherapy or psychotherapy).    Indications for TMS:  MDD, recurrent, severe; 4+ medication trials (from 2+ classes) ineffective; Psychotherapy ineffective.     Pre-Procedure Diagnosis:  MDD, recurrent, severe F33.2    Treatment Hx:  Treatment number this series: 1  Total lifetime treatment number: 1    Allergies   Allergen Reactions     Cats      Codeine Sulfate GI Disturbance        Pause for the Cause  Right patient:  Yes  Right procedure/correct coil:  Yes; rTMS; cpt 47088; H1 coil.   Earplugs in place:  Yes     Procedure  Patient was seated in procedure chair. Identity and procedure was verified. Ear plugs were placed in ears and patient-specific cap was placed on head and tightened appropriately. Ruler locations were placed on head per  specifications. Coil was placed at  0-7-16 and stimulator was set to initial 50%.  Mapping pulses were delivered and response was quantified by visual inspection..  MT was found with specific location and energy detailed below. Coil was then placed at treatment location and stimulator was set to parameters below Given pt tolerance, 55 treatment trains were delivered. Pt tolerated procedure with with facial and jaw movement    Motor Threshold Determination  Distance from nasion to inion: 38.0  MT 1: 0 - 8 - 14 @ 51% on 1/27/2022    Stimulation Parameters  Frequency: 18 Hz     Train duration: 2 sec  Total pulses delivered: 1980  Inter-train interval: 20 sec  Tx Loc: 0 - eyebrows  -  14  Energy: 51% (100% MT)  Trains: 55 trains    Date MADRS IDS-SR PHQ-9   1/27/22 19 28 7                                            Post-Procedure Diagnosis:  MDD, recurrent, severe 296.33    Caroline Hawkins  [facility]  Mental [facility] Neuromodulation      Plan   - Cont TMS  - increase energy as tolerated     I completed motor mapping and determination of the pt's motor threshold during the visit today. I was available in the clinic throughout the treatment.    Ady North MD  [facility] Neuromodulation

## 2022-01-28 ENCOUNTER — OFFICE VISIT (OUTPATIENT)
Dept: PSYCHIATRY | Facility: CLINIC | Age: 72
End: 2022-01-28
Payer: MEDICARE

## 2022-01-28 DIAGNOSIS — F33.2 SEVERE EPISODE OF RECURRENT MAJOR DEPRESSIVE DISORDER, WITHOUT PSYCHOTIC FEATURES (H): Primary | ICD-10-CM

## 2022-01-28 ASSESSMENT — PATIENT HEALTH QUESTIONNAIRE - PHQ9: SUM OF ALL RESPONSES TO PHQ QUESTIONS 1-9: 7

## 2022-01-28 NOTE — PROGRESS NOTES
[facility] TMS Program  [address], [address]  [place] [address]  TMS Procedure Note   Svetlana Adair MRN# [id number]  Age: 71 year old year old YOB: 1950    Pre-Procedure:  History and Physical: Reviewed in medical record  Consent Signed by: Svetlana Adair  On: 1/27/2022    Clinical Narrative:  The patient is tolerating treatment today.  Increased MT% to 105%  Indications for TMS:  MDD, recurrent, severe; 4+ medication trials (from 2+ classes) ineffective; Psychotherapy ineffective.     Pre-Procedure Diagnosis:  MDD, recurrent, severe F33.2    Treatment Hx:  Treatment number this series: 2  Total lifetime treatment number: 2    Allergies   Allergen Reactions     Cats      Codeine Sulfate GI Disturbance        Pause for the Cause  Right patient:  Yes  Right procedure/correct coil:  Yes; rTMS; cpt 02244; H1 coil.   Earplugs in place:  Yes     Procedure  Patient was seated in procedure chair. Identity and procedure was verified. Ear plugs were placed in ears and patient-specific cap was placed on head and tightened appropriately. Ruler locations were placed on head per  specifications. Coil was placed at  0-7-16 and stimulator was set to initial 50%.  Mapping pulses were delivered and response was quantified by visual inspection..  MT was found with specific location and energy detailed below. Coil was then placed at treatment location and stimulator was set to parameters below Given pt tolerance, 55 treatment trains were delivered. Pt tolerated procedure with with facial and jaw movement    Motor Threshold Determination  Distance from nasion to inion: 38.0  MT 1: 0 - 8 - 14 @ 51% on 1/27/2022    Stimulation Parameters  Frequency: 18 Hz     Train duration: 2 sec  Total pulses delivered: 1980  Inter-train interval: 20 sec  Tx Loc: 0 - eyebrows  - 14  Energy: 51% (100% MT)  Trains: 55 trains    Date MADRS IDS-SR PHQ-9   1/27/22 19  7                                             Post-Procedure Diagnosis:  MDD, recurrent, severe 296.33    Blanca Akins CMA  [facility]  Mental [facility] Neuromodulation      Plan   - Cont TMS  - increase energy as tolerated     I did not see patient but remained available in the clinic throughout the TMS session.      Melvina Claudio MD  [facility] Neuromodulation

## 2022-01-31 ENCOUNTER — OFFICE VISIT (OUTPATIENT)
Dept: PSYCHIATRY | Facility: CLINIC | Age: 72
End: 2022-01-31
Payer: MEDICARE

## 2022-01-31 DIAGNOSIS — F33.2 SEVERE EPISODE OF RECURRENT MAJOR DEPRESSIVE DISORDER, WITHOUT PSYCHOTIC FEATURES (H): Primary | ICD-10-CM

## 2022-01-31 ASSESSMENT — PATIENT HEALTH QUESTIONNAIRE - PHQ9: SUM OF ALL RESPONSES TO PHQ QUESTIONS 1-9: 6

## 2022-01-31 NOTE — PROGRESS NOTES
[facility] TMS Program  [address], [address]  [place] [address]  TMS Procedure Note   Svetlana Adair MRN# [id number]  Age: 71 year old year old YOB: 1950    Pre-Procedure:  History and Physical: Reviewed in medical record  Consent Signed by: Svetlana Adair  On: 1/27/2022    Clinical Narrative:  The patient is tolerating treatment with no side effects.  Reports may have felt a little bit of increased energy, but unsure.      Indications for TMS:  MDD, recurrent, severe; 4+ medication trials (from 2+ classes) ineffective; Psychotherapy ineffective.     Pre-Procedure Diagnosis:  MDD, recurrent, severe F33.2    Treatment Hx:  Treatment number this series: 3  Total lifetime treatment number: 3    Allergies   Allergen Reactions     Cats      Codeine Sulfate GI Disturbance        Pause for the Cause  Right patient:  Yes  Right procedure/correct coil:  Yes; rTMS; cpt 40695; H1 coil.   Earplugs in place:  Yes    Procedure  Patient was seated in procedure chair. Identity and procedure was verified. Ear plugs were placed in ears and patient-specific cap was placed on head and tightened appropriately. Ruler locations were verified. Coil was placed at treatment location and stimulator was set to parameters described below. A test train was delivered and pt tolerated train. Given pt tolerance, 55 treatment trains were delivered. Pt tolerated procedure with facial and very minimal right hand movement.    Motor Threshold Determination  Distance from nasion to inion: 38.0  MT 1: 0 - 8 - 14 @ 51% on 1/27/2022    Stimulation Parameters  Frequency: 18 Hz     Train duration: 2 sec  Total pulses delivered: 1980  Inter-train interval: 20 sec  Tx Loc: 0 - eyebrows  - 14  Energy: 56% (110% MT)  Trains: 55 trains    Date MADRS IDS-SR PHQ-9   1/27/22 19  7                                            Post-Procedure Diagnosis:  MDD, recurrent, severe 296.33    Caroline Hawkins Psychometrist  [facility]  [facility] Neuromodulation      Plan   - Cont TMS  - increase energy as tolerated     I did not see patient but remained available in the clinic throughout the TMS session.        Melvina Claudio MD  [facility] Neuromodulation

## 2022-02-02 ENCOUNTER — OFFICE VISIT (OUTPATIENT)
Dept: PSYCHIATRY | Facility: CLINIC | Age: 72
End: 2022-02-02
Payer: MEDICARE

## 2022-02-02 ENCOUNTER — TELEPHONE (OUTPATIENT)
Dept: FAMILY MEDICINE | Facility: CLINIC | Age: 72
End: 2022-02-02
Payer: MEDICARE

## 2022-02-02 DIAGNOSIS — F33.2 SEVERE EPISODE OF RECURRENT MAJOR DEPRESSIVE DISORDER, WITHOUT PSYCHOTIC FEATURES (H): Primary | ICD-10-CM

## 2022-02-02 ASSESSMENT — PATIENT HEALTH QUESTIONNAIRE - PHQ9: SUM OF ALL RESPONSES TO PHQ QUESTIONS 1-9: 8

## 2022-02-02 NOTE — PROGRESS NOTES
[facility] TMS Program  [address], [address]  [place] [address]  TMS Procedure Note   Svetlana Adair MRN# [id number]  Age: 71 year old year old YOB: 1950    Pre-Procedure:  History and Physical: Reviewed in medical record  Consent Signed by: Svetlana Adair  On: 1/27/2022    Clinical Narrative:  The patient is tolerating treatment with no side effects.  Reports increased irritability and decreased patience which is leading to anger and frustration.  Discussed how this may be a sign of her brain responding to treatment.      Indications for TMS:  MDD, recurrent, severe; 4+ medication trials (from 2+ classes) ineffective; Psychotherapy ineffective.     Pre-Procedure Diagnosis:  MDD, recurrent, severe F33.2    Treatment Hx:  Treatment number this series: 4  Total lifetime treatment number: 4    Allergies   Allergen Reactions     Cats      Codeine Sulfate GI Disturbance        Pause for the Cause  Right patient:  Yes  Right procedure/correct coil:  Yes; rTMS; cpt 89136; H1 coil.   Earplugs in place:  Yes    Procedure  Patient was seated in procedure chair. Identity and procedure was verified. Ear plugs were placed in ears and patient-specific cap was placed on head and tightened appropriately. Ruler locations were verified. Coil was placed at treatment location and stimulator was set to parameters described below. A test train was delivered and pt tolerated train. Given pt tolerance, 55 treatment trains were delivered. Pt tolerated procedure with facial and very minimal right hand movement.    Motor Threshold Determination  Distance from nasion to inion: 38.0  MT 1: 0 - 8 - 14 @ 51% on 1/27/2022    Stimulation Parameters  Frequency: 18 Hz     Train duration: 2 sec  Total pulses delivered: 1980  Inter-train interval: 20 sec  Tx Loc: 0 - eyebrows  - 14  Energy: 59% (115% MT)  Trains: 55 trains    Date MADRS IDS-SR PHQ-9   1/27/22 19  7                                             Post-Procedure Diagnosis:  MDD, recurrent, severe 296.33    Caroline Hawkins, Psychometrist  [facility] Neuromodulation      Plan   - Cont TMS  - increase energy as tolerated     I did not see patient but remained available in the clinic throughout the TMS session.        Melvina Claudio MD  [facility] Neuromodulation

## 2022-02-02 NOTE — TELEPHONE ENCOUNTER
Pt received letter from imaging department dated 01/27/2022.    We are writing to inform you that your recent lung cancer screening CT shows a minor abnormality that has a very low likelihood of becoming lung cancer.  We recommend following this with another CT scan in a year. Furthermore, there is a finding on your exam that is not lung cancer but may require further evaluation. This does not necessarily mean there is a problem, however, further evaluation is recommended.  Please contact your health care provider to discuss these results and any next steps in your medical care.     Pt wants to know what finding this letter is referring to. She received providers message -  The CT scan looks OK.  We should recheck on one year's time.    That is why she is questioning letter. Please advice if any other recommendations from provider.     Ok to leave a message with providers response.

## 2022-02-02 NOTE — TELEPHONE ENCOUNTER
They are referring to coronary calcifications  This has already been identified by [facility] in 2015  She is on Crestor and she has had stress testing  Repeat CT in one year

## 2022-02-03 ENCOUNTER — OFFICE VISIT (OUTPATIENT)
Dept: PSYCHIATRY | Facility: CLINIC | Age: 72
End: 2022-02-03
Payer: MEDICARE

## 2022-02-03 VITALS
WEIGHT: 146.6 LBS | BODY MASS INDEX: 24.43 KG/M2 | DIASTOLIC BLOOD PRESSURE: 81 MMHG | SYSTOLIC BLOOD PRESSURE: 130 MMHG | HEIGHT: 65 IN | TEMPERATURE: 97.1 F | HEART RATE: 58 BPM

## 2022-02-03 DIAGNOSIS — F41.1 GENERALIZED ANXIETY DISORDER: ICD-10-CM

## 2022-02-03 DIAGNOSIS — F10.20 ALCOHOL USE DISORDER, SEVERE, DEPENDENCE (H): ICD-10-CM

## 2022-02-03 DIAGNOSIS — F33.2 SEVERE EPISODE OF RECURRENT MAJOR DEPRESSIVE DISORDER, WITHOUT PSYCHOTIC FEATURES (H): Primary | ICD-10-CM

## 2022-02-03 ASSESSMENT — PATIENT HEALTH QUESTIONNAIRE - PHQ9: SUM OF ALL RESPONSES TO PHQ QUESTIONS 1-9: 8

## 2022-02-03 ASSESSMENT — MIFFLIN-ST. JEOR: SCORE: 1182.91

## 2022-02-03 NOTE — PROGRESS NOTES
"  Psychiatry Clinic Progress Note                                                                   Svetlana Adair is a 71 year old female who prefers the name \"Prerna" and pronoun she, hers.  Therapist: Scheduled to start in September 2021  PCP: Vladislav Cruz  Other Providers: None    Pertinent Background:  This patient initially experienced symptoms in the 1990s when her  at the time was developing symptoms of early-onset Alzheimer's in his 40s. She received mental health care at this time including an selective serotonin reuptake inhibitor as well as therapy; she is not sure if either helped, but feels that life stressors were so intense that no medication could have helped. She has many major life stressors including  Laurel's alzheimer's diagnosis in 1999, mother's death in 2000, Laurel's death in 2009, breast cancer diagnosis in 2011, recurrence with lymphedema in 2017.  Psych critical item history includes mutiple psychotropic trials  and substance use: alcohol.    Interim History                                                                                                        4, 4     The patient is a good historian.      Since the last visit:   - Today, she reports feeling \"Good, actually, DWI stuff is over\"  - Before TMS she was feeling numb when hearing news and now she is feeling more emotions. For example, she had a recent disagreement with client and experienced a lot of feelings. Very irritable. She doesn't think she would have felt the irritability as much, before starting TMS  - discussed TMS being an activating treatment, with TMS anxiety may get worse before depression is getting better. She is noticing a little bit more energy  - Wondered if it effects other things: memory (short term gone with COVID) - discussed usually TMS improves energy  - In regards to depressed feelings: \"still trying to pull out of hopelessness\", hopelessness bothers here. COVID really effected her " "negatively. \"think its better\"  - She is also noticing more motivation   - Denies SI but did throughout COVID, had frequent thoughts of \"fine if die young\"   - She received letter from doctor's office that she needs close lung imaging follow-up and is worried about having breast cancer again   - She is also going through bankruptcy   - Discussed history of TMS and indications of TMS    Recent Symptoms:   Depression:  Slight hopelessness and low energy     Recent Substance Use:  Alcohol- sober        Social/ Family History                                  [per patient report]                                 1ea,1ea     FINANCIAL SUPPORT- working full time as a caretaker, sliding scale insulin, -- \"Mahnaz states she cannot leave the job because she is  essentially broke  after investing the majority of her finances in a wellness company called  [employer].      RELATIONSHIPS/CHILDREN- No children   high school boyfriend --   Remarried Laurel--> opened [employer] --> he passed away in 2009  LIVING SITUATION- lives alone      LEGAL- DUI, multiple  EARLY HISTORY/ EDUCATION- Has some college.   SOCIAL/ SPIRITUAL SUPPORT-has many friends in area, finds support through AA group, practices meditation  CULTURAL INFLUENCES/ IMPACT- none       TRAUMA HISTORY (self-report)- None  FEELS SAFE AT HOME- Yes  FAMILY HISTORY-  Bipolar disorder in sister; depression in mother  Hobbies: horseback riding, tennis with friends  Previously competitive swimmer when younger    Medical / Surgical History                                                                                                                  Patient Active Problem List   Diagnosis     Breast cancer est/prog +, HER2 ERNIE -     Osteoarthritis     Moderate episode of recurrent major depressive disorder (H)     Advanced directives, counseling/discussion     Knee pain     Past use of tobacco     IFG (impaired fasting glucose)     Low back pain "     Malignant neoplasm of right breast (H)     Hyperlipidemia LDL goal <70     Follow-up examination following surgery     Atherosclerosis of left carotid artery     Chronic, continuous use of opioids     Adjustment disorder with mixed anxiety and depressed mood     Neck pain on right side     Hyperparathyroidism (H)     Alcohol dependence in remission (H)     High coronary artery calcium score       Past Surgical History:   Procedure Laterality Date     ABDOMEN SURGERY  2007?    Hysterectomy     COLONOSCOPY       COLONOSCOPY  11/17/2011    Procedure:COLONOSCOPY; Colonoscopy; Surgeon:MEKA RUSS; Location: GI     COLONOSCOPY N/A 2/10/2020    Procedure: COLONOSCOPY, WITH POLYPECTOMY AND BIOPSY;  Surgeon: Opal Ace MD;  Location:  GI     DISSECT LYMPH NODE AXILLA Right 11/2/2017    Procedure: DISSECT LYMPH NODE AXILLA;  RIGHT AXILLARY LYMPH NODE DISSECTION;  Surgeon: Cortez White MD;  Location: [facility]     GYN SURGERY  2005    hysterectomy after hx of ovarian cyst, ended up being benign     HYSTERECTOMY, PAP NO LONGER INDICATED       MASTECTOMY MODIFIED RADICAL  2011    bilateral     MASTECTOMY, BILATERAL       ORTHOPEDIC SURGERY      rt. knee arthroscopy     ORTHOPEDIC SURGERY Right     toe surgery     REALIGN PATELLA  1973    right      TOE SURGERY      right grt OA         Medical Review of Systems                                                                                                    2,10   A comprehensive review of systems was performed and is negative other than noted in the HPI.    Allergy                                Cats and Codeine sulfate    Current Medications                                                                                                       Current Outpatient Medications   Medication Sig Dispense Refill     ASHWAGANDHA PO Take 1 tablet by mouth daily as needed At onset of illness symptoms; as needed       aspirin 81 MG tablet Take 1 tablet (81 mg)  by mouth daily 30 tablet      buPROPion (WELLBUTRIN XL) 150 MG 24 hr tablet Take 1 tablet (150 mg) by mouth every morning 90 tablet 3     calcium carbonate-vitamin D (OS-YASMIN) 500-400 MG-UNIT tablet Take 1 tablet by mouth daily        Cholecalciferol (VITAMIN D-3) 5000 units TABS Take 1 tablet by mouth daily        Coenzyme Q10 300 MG CAPS Take 300 mg by mouth daily       HYDROcodone-acetaminophen (NORCO) 5-325 MG tablet Take 1 tablet by mouth daily as needed for pain 20 tablet 0     IBUPROFEN PO Take 200 mg by mouth every 4 hours as needed for moderate pain        lactobacillus rhamnosus, GG, (CULTURELL) capsule Take 1 capsule by mouth daily        letrozole (FEMARA) 2.5 MG tablet Take 1 tablet by mouth daily  5     Lutein-Zeaxanthin 25-5 MG CAPS Take 1 capsule by mouth daily       LYSINE 1-3 daily as needed       Melatonin 10 MG TABS tablet Take 10 mg by mouth nightly as needed for sleep        multivitamin (OCUVITE) TABS tablet Take 1 tablet by mouth daily        nicotine polacrilex (NICORETTE) 2 MG gum Place 1 each (2 mg) inside cheek as needed for smoking cessation 30 tablet 11     omeprazole 20 MG tablet Take 1 tablet (20 mg) by mouth as needed 90 tablet 3     rosuvastatin (CRESTOR) 10 MG tablet Take 1 tablet (10 mg) by mouth daily 90 tablet 2     UNABLE TO FIND Take by mouth daily MEDICATION NAME: Asea Redox - 1oz twice daily       UNABLE TO FIND Take 1 tablet by mouth daily MEDICATION NAME: Yin Kolbyao - chinese supplement takes at onset of illness symptoms       UNABLE TO FIND MEDICATION NAME: Somnapure - 2 tablets = 500mg valerian root, 300mg lemon balm extract, 200mg l-theanine, 120mg hops extract, 50mg chamomile flower extract, 50mg passion flower extract, 3mg melatonin.  Takes 1-2 tablets at bedtime        UNABLE TO FIND MEDICATION NAME: Moringa 1 serving of powder daily       UNABLE TO FIND MEDICATION NAME: Premium Amla Berry 2 capsules = 4mg Vitamin C, 966mg organic amla, organic amla extract.  Takes 2  "capsules daily        UNABLE TO FIND MEDICATION NAME: OmegaKrill 5x 3 capsules = 480mg of total omega-3, 64mg EPA, 392mg DHA, 300mcg astaxanthin.  Takes 3 capsules daily       UNABLE TO FIND MEDICATION NAME: Super Colon Cleanse 2 capsules = 665mg senna leaf powder, 321mg psyllium husk powder, 21mg fennel seed powder, 21mg papaya leaf powder, 21mg yolanda hips fruit powder, 11mg l-acidophilus.  Taking 4 capsules daily       valACYclovir (VALTREX) 1000 mg tablet Take 2 tablets (2,000 mg) by mouth 2 times daily as needed (Take two tablets by mouth PRN) 8 tablet 0     vitamin C (ASCORBIC ACID) 250 MG tablet Take 250 mg by mouth daily       rosuvastatin (CRESTOR) 10 MG tablet Take 1 tablet (10 mg) by mouth daily 90 tablet 3       Vitals                                                                                                                       3, 3   /81   Pulse 58   Temp 97.1  F (36.2  C) (Temporal)   Ht 1.638 m (5' 4.5\")   Wt 66.5 kg (146 lb 9.6 oz)   BMI 24.78 kg/m       Mental Status Exam                                                                                    9, 14 cog gs     Alertness: alert  and oriented   Appearance: adequately groomed  Behavior/Demeanor: cooperative and pleasant  Speech: normal and regular rate and rhythm  Language: intact and no problems  Psychomotor: normal or unremarkable  Mood: \"Good, actually, DWI stuff is over\"  Affect:  appropriate; was congruent to mood; was congruent to content  Thought Process/Associations: unremarkable  Thought Content:  Reports none;  Denies suicidal and violent ideation  Perception:  Reports none;  Denies auditory hallucinations and visual hallucinations  Insight: good  Judgment: good   Cognition: (6) oriented: time, person, and place  attention span: intact  concentration: intact  recent memory: intact  remote memory: intact  fund of knowledge: appropriate  Gait/Station and/or Muscle Strength/Tone: unremarkable    Labs and Data            "                                                                                                      Rating Scales:    PHQ9    PHQ9 Today:  17  PHQ 1/31/2022 2/2/2022 2/3/2022   PHQ-9 Total Score 6 8 8   Q9: Thoughts of better off dead/self-harm past 2 weeks Not at all Not at all Not at all         Diagnosis and Assessment                                                                             m2, h3     Svetlana Adair is a 70 year old female with previous psychiatric history of MDD, recurrent, severe and alcohol use disorder who presents for scheduled follow up appointment. She has a well documented failure of adequate trials of four antidepressants (2 SSRIs and 2 SNRIs) which represent multiple antidepressant classes. The patient has completed an adequate dose of individual psychotherapy. Patient is burdened by her chronic symptoms of depression and her current episode has lasted over 12 months causing significant psychosocial dysfunction. Due to remaining profound depression and numerous failed previous treatment modalities the patient is a candidate for TMS treatment.     Svetlana has completed five TMS treatments so far and is doing well. She reports being able to feel emotions more strongly compared to before TMS treatments. We discussed that it's not always a linear progression and can be bumpy at times. She is dealing with multiple social factors, going through a divorce and news that she needs to have follow-up imaging for her lung.     Today the following issues were addressed:    1) Major depressive disorder, recurrent, severe  2) Alcohol use disorder, severe, dependence    [place] Prescription Monitoring Program [] review was not needed today.    PSYCHOTROPIC DRUG INTERACTIONS: none clinically relevant    Plan                                                                                                                    m2, h3      1)Major depressive disorder, recurrent, severe  -- Medications:     - continue bupropion  -- Procedures:    - Continue TMS              - Coil: Currently receive active course of rTMS on H1 coil    2) Alcohol use disorder, severe, dependence  -- Patient is currently sober      Therapy-  Continue       RTC: following completion of TMS course    CRISIS NUMBERS:   Provided routinely in AVS.    Treatment Risk Statement:  The patient understands the risks, benefits, adverse effects and alternatives. Agrees to treatment with the capacity to do so. No medical contraindications to treatment. Agrees to call clinic for any problems. The patient understands to call 911 or go to the nearest ED if life threatening or urgent symptoms occur.     Corrie Conroy MD  PGY-2 Psychiatry Resident      Physician Attestation   I, Alvina Whiteside MD, saw this patient and agree with the findings and plan of care as documented in the note.      Items personally reviewed/procedural attestation: I was present for and supervised the entire interview.      PROVIDER:  Alvina Whiteside MD    Level of Medical Decision Making:   - *HIGH ACUITY* - At least 1 chronic problem with severe exacerbation, progression, or side effects of treatment  - Moderate (or greater) risk of harm to self or others

## 2022-02-03 NOTE — PROGRESS NOTES
[facility] TMS Program  [address], [address]  [place] [address]  TMS Procedure Note   Svetlana Adair MRN# [id number]  Age: 71 year old year old YOB: 1950    Pre-Procedure:  History and Physical: Reviewed in medical record  Consent Signed by: Svetlana Adair  On: 1/27/2022    Clinical Narrative:  The patient is tolerating treatment.    Indications for TMS:  MDD, recurrent, severe; 4+ medication trials (from 2+ classes) ineffective; Psychotherapy ineffective.     Pre-Procedure Diagnosis:  MDD, recurrent, severe F33.2    Treatment Hx:  Treatment number this series: 5  Total lifetime treatment number: 5    Allergies   Allergen Reactions     Cats      Codeine Sulfate GI Disturbance        Pause for the Cause  Right patient:  Yes  Right procedure/correct coil:  Yes; rTMS; cpt 33452; H1 coil.   Earplugs in place:  Yes    Procedure  Patient was seated in procedure chair. Identity and procedure was verified. Ear plugs were placed in ears and patient-specific cap was placed on head and tightened appropriately. Ruler locations were verified. Coil was placed at treatment location and stimulator was set to parameters described below. A test train was delivered and pt tolerated train. Given pt tolerance, 55 treatment trains were delivered. Pt tolerated procedure with facial and  right hand movement.    Motor Threshold Determination  Distance from nasion to inion: 38.0  MT 1: 0 - 8 - 14 @ 51% on 1/27/2022    Stimulation Parameters  Frequency: 18 Hz     Train duration: 2 sec  Total pulses delivered: 1980  Inter-train interval: 20 sec  Tx Loc: 0 - eyebrows  - 14  Energy: 61% (120% MT)  Trains: 55 trains    Date MADRS IDS-SR PHQ-9   1/27/22 19  7                                            Post-Procedure Diagnosis:  MDD, recurrent, severe 296.33    Caroline Hawkins, Psychometrist  [facility]  Mental [facility] Neuromodulation      Plan   - Cont TMS      I did not see the patient  during/after treatment but remained available in the clinic during  treatment.    Alvina Whiteside MD  [facility] Neuromodulation

## 2022-02-04 ENCOUNTER — OFFICE VISIT (OUTPATIENT)
Dept: PSYCHIATRY | Facility: CLINIC | Age: 72
End: 2022-02-04
Payer: MEDICARE

## 2022-02-04 DIAGNOSIS — F33.2 SEVERE EPISODE OF RECURRENT MAJOR DEPRESSIVE DISORDER, WITHOUT PSYCHOTIC FEATURES (H): Primary | ICD-10-CM

## 2022-02-04 ASSESSMENT — PATIENT HEALTH QUESTIONNAIRE - PHQ9: SUM OF ALL RESPONSES TO PHQ QUESTIONS 1-9: 7

## 2022-02-04 NOTE — PROGRESS NOTES
[facility] TMS Program  [address], [address]  [place] [address]  TMS Procedure Note   Svetlana Adair MRN# [id number]  Age: 71 year old year old YOB: 1950    Pre-Procedure:  History and Physical: Reviewed in medical record  Consent Signed by: Svetlana Adair  On: 1/27/2022    Clinical Narrative:  The patient is tolerating treatment. Patient reports having a fun but busy weekend.    Indications for TMS:  MDD, recurrent, severe; 4+ medication trials (from 2+ classes) ineffective; Psychotherapy ineffective.     Pre-Procedure Diagnosis:  MDD, recurrent, severe F33.2    Treatment Hx:  Treatment number this series: 6  Total lifetime treatment number: 6    Allergies   Allergen Reactions     Cats      Codeine Sulfate GI Disturbance        Pause for the Cause  Right patient:  Yes  Right procedure/correct coil:  Yes; rTMS; cpt 92956; H1 coil.   Earplugs in place:  Yes    Procedure  Patient was seated in procedure chair. Identity and procedure was verified. Ear plugs were placed in ears and patient-specific cap was placed on head and tightened appropriately. Ruler locations were verified. Coil was placed at treatment location and stimulator was set to parameters described below. A test train was delivered and pt tolerated train. Given pt tolerance, 55 treatment trains were delivered. Pt tolerated procedure with facial and  right hand movement.    Motor Threshold Determination  Distance from nasion to inion: 38.0  MT 1: 0 - 8 - 14 @ 51% on 1/27/2022    Stimulation Parameters  Frequency: 18 Hz     Train duration: 2 sec  Total pulses delivered: 1980  Inter-train interval: 20 sec  Tx Loc: 0 - eyebrows  - 14  Energy: 61% (120% MT)  Trains: 55 trains    Date MADRS IDS-SR PHQ-9   1/27/22 19  7   2/4/22  30 7                                      Post-Procedure Diagnosis:  MDD, recurrent, severe 296.33    Socorro Caraballo, EMT  [facility] Neuromodulation      Plan   - Cont  TMS      I did not see the patient during/after treatment but remained available in the clinic during  treatment.      Ryan Joe MD PhD  [facility] Neuromodulation

## 2022-02-04 NOTE — TELEPHONE ENCOUNTER
Patient Contact    Attempt # 1    Was call answered?  Yes.  Relayed below message to patient. Pt verbalized understanding.     Meghann Osborne RN

## 2022-02-07 ENCOUNTER — OFFICE VISIT (OUTPATIENT)
Dept: PSYCHIATRY | Facility: CLINIC | Age: 72
End: 2022-02-07
Payer: MEDICARE

## 2022-02-07 DIAGNOSIS — F33.2 SEVERE EPISODE OF RECURRENT MAJOR DEPRESSIVE DISORDER, WITHOUT PSYCHOTIC FEATURES (H): Primary | ICD-10-CM

## 2022-02-07 ASSESSMENT — PATIENT HEALTH QUESTIONNAIRE - PHQ9: SUM OF ALL RESPONSES TO PHQ QUESTIONS 1-9: 10

## 2022-02-07 NOTE — PROGRESS NOTES
[facility] TMS Program  [address], [address]  [place] [address]  TMS Procedure Note   Svetlana Adair MRN# [id number]  Age: 71 year old year old YOB: 1950    Pre-Procedure:  History and Physical: Reviewed in medical record  Consent Signed by: Svetlana Adair  On: 1/27/2022    Clinical Narrative:  The patient is tolerating treatment. Patient reports feeling more restless than normal and not sleeping very well, but the sleep has been an issue since COVID started.    Daily Adult Stimulation Safety Questionnaire: No or Yes in past 24 hours     1) Have you discontinued or started any new medication? No  2) Have you missed any doses of your medication differently then prescribed? No  3) Have you consumed alcohol or drugs (other than prescribed)?  No  4) Did you not sleep AT ALL last night?  No  5) Has your SI changed or worsened? No    Indications for TMS:  MDD, recurrent, severe; 4+ medication trials (from 2+ classes) ineffective; Psychotherapy ineffective.     Pre-Procedure Diagnosis:  MDD, recurrent, severe F33.2    Treatment Hx:  Treatment number this series: 7  Total lifetime treatment number: 7    Allergies   Allergen Reactions     Cats      Codeine Sulfate GI Disturbance        Pause for the Cause  Right patient:  Yes  Right procedure/correct coil:  Yes; rTMS; cpt 92197; H1 coil.   Earplugs in place:  Yes    Procedure  Patient was seated in procedure chair. Identity and procedure was verified. Ear plugs were placed in ears and patient-specific cap was placed on head and tightened appropriately. Ruler locations were verified. Coil was placed at treatment location and stimulator was set to parameters described below. A test train was delivered and pt tolerated train. Given pt tolerance, 55 treatment trains were delivered. Pt tolerated procedure with facial and  right hand movement.    Motor Threshold Determination  Distance from nasion to inion: 38.0  MT 1: 0 - 8 - 14 @ 51% on  1/27/2022    Stimulation Parameters  Frequency: 18 Hz     Train duration: 2 sec  Total pulses delivered: 1980  Inter-train interval: 20 sec  Tx Loc: 0 - eyebrows  - 14  Energy: 61% (120% MT)  Trains: 55 trains    Date MADRS IDS-SR PHQ-9   1/27/22 19  7   2/4/22  30 7                                      Post-Procedure Diagnosis:  MDD, recurrent, severe 296.33    Socorro Caraballo, EMT  [facility] Neuromodulation      Plan   - Cont TMS      I did not see the patient during/after treatment but remained available in the clinic during  treatment.      Melvina Claudio MD  [facility] Neuromodulation

## 2022-02-09 ENCOUNTER — OFFICE VISIT (OUTPATIENT)
Dept: PSYCHIATRY | Facility: CLINIC | Age: 72
End: 2022-02-09
Payer: MEDICARE

## 2022-02-09 DIAGNOSIS — F33.2 SEVERE EPISODE OF RECURRENT MAJOR DEPRESSIVE DISORDER, WITHOUT PSYCHOTIC FEATURES (H): Primary | ICD-10-CM

## 2022-02-09 DIAGNOSIS — M54.2 NECK PAIN ON RIGHT SIDE: ICD-10-CM

## 2022-02-09 RX ORDER — HYDROCODONE BITARTRATE AND ACETAMINOPHEN 5; 325 MG/1; MG/1
1 TABLET ORAL DAILY PRN
Qty: 20 TABLET | Refills: 0 | Status: SHIPPED | OUTPATIENT
Start: 2022-02-09 | End: 2022-03-14

## 2022-02-09 ASSESSMENT — PATIENT HEALTH QUESTIONNAIRE - PHQ9: SUM OF ALL RESPONSES TO PHQ QUESTIONS 1-9: 10

## 2022-02-09 NOTE — PROGRESS NOTES
[facility] TMS Program  [address], [address]  [place] [address]  TMS Procedure Note   Svetlana Adair MRN# [id number]  Age: 71 year old year old YOB: 1950    Pre-Procedure:  History and Physical: Reviewed in medical record  Consent Signed by: Svetlana Adair  On: 1/27/2022    Clinical Narrative:  The patient is tolerating treatment. Patient reports feeling more restless than normal still and feeling more reflective.    Daily Adult Stimulation Safety Questionnaire: No or Yes in past 24 hours     1) Have you discontinued or started any new medication? No  2) Have you missed any doses of your medication differently then prescribed? No  3) Have you consumed alcohol or drugs (other than prescribed)?  No  4) Did you not sleep AT ALL last night?  No  5) Has your SI changed or worsened? No    Indications for TMS:  MDD, recurrent, severe; 4+ medication trials (from 2+ classes) ineffective; Psychotherapy ineffective.     Pre-Procedure Diagnosis:  MDD, recurrent, severe F33.2    Treatment Hx:  Treatment number this series: 8  Total lifetime treatment number: 8    Allergies   Allergen Reactions     Cats      Codeine Sulfate GI Disturbance        Pause for the Cause  Right patient:  Yes  Right procedure/correct coil:  Yes; rTMS; cpt 40139; H1 coil.   Earplugs in place:  Yes    Procedure  Patient was seated in procedure chair. Identity and procedure was verified. Ear plugs were placed in ears and patient-specific cap was placed on head and tightened appropriately. Ruler locations were verified. Coil was placed at treatment location and stimulator was set to parameters described below. A test train was delivered and pt tolerated train. Given pt tolerance, 55 treatment trains were delivered. Pt tolerated procedure with facial and  right hand movement.    Motor Threshold Determination  Distance from nasion to inion: 38.0  MT 1: 0 - 8 - 14 @ 51% on 1/27/2022    Stimulation Parameters  Frequency:  18 Hz     Train duration: 2 sec  Total pulses delivered: 1980  Inter-train interval: 20 sec  Tx Loc: 0 - eyebrows  - 14  Energy: 61% (120% MT)  Trains: 55 trains    Date MADRS IDS-SR PHQ-9   1/27/22 19  7   2/4/22  30 7                                      Post-Procedure Diagnosis:  MDD, recurrent, severe 296.33    Socorro Caraballo, EMT  [facility] Neuromodulation      Plan   - Cont TMS      I did not see the patient during/after treatment but remained available in the clinic during  treatment.      Melvina Claudio MD  [facility] Neuromodulation

## 2022-02-09 NOTE — TELEPHONE ENCOUNTER
Patient calling requesting refill of Norco.    Routing refill request to provider for review/approval because:  Drug not on the [facility] refill protocol     Pending Prescriptions:                       Disp   Refills    HYDROcodone-acetaminophen (NORCO) 5-325 M*20 tab*0            Sig: Take 1 tablet by mouth daily as needed for pain    Last Written Prescription Date:  1/5/22  Last Fill Quantity: 20,  # refills: 0   Last office visit: 1/17/2022 with prescribing provider:  Vladislav Cruz   Future Office Visit:      Callback: [phone] to leave detailed VM.     Lorenzo Mckeon RN  [facility]

## 2022-02-10 ENCOUNTER — OFFICE VISIT (OUTPATIENT)
Dept: PSYCHIATRY | Facility: CLINIC | Age: 72
End: 2022-02-10
Payer: MEDICARE

## 2022-02-10 ENCOUNTER — TELEPHONE (OUTPATIENT)
Dept: PSYCHIATRY | Facility: CLINIC | Age: 72
End: 2022-02-10

## 2022-02-10 DIAGNOSIS — F33.2 SEVERE EPISODE OF RECURRENT MAJOR DEPRESSIVE DISORDER, WITHOUT PSYCHOTIC FEATURES (H): Primary | ICD-10-CM

## 2022-02-10 ASSESSMENT — PATIENT HEALTH QUESTIONNAIRE - PHQ9: SUM OF ALL RESPONSES TO PHQ QUESTIONS 1-9: 10

## 2022-02-10 NOTE — TELEPHONE ENCOUNTER
What is the concern that needs to be addressed by a nurse? Patient would like a call back to discuss some anger and frustration feelings and this is new for her , she said she had  been numb for years and not sure if the TMS TX is causing for her to have this kind of feelings.    May a detailed message be left on voicemail? Yes     Date of last office visit:01/27/2022    Message routed to: [facility],RN

## 2022-02-10 NOTE — PROGRESS NOTES
[facility] TMS Program  [address], [address]  [place] [address]  TMS Procedure Note   Svetlana Adair MRN# [id number]  Age: 71 year old year old YOB: 1950    Pre-Procedure:  History and Physical: Reviewed in medical record  Consent Signed by: Svetlana Adair  On: 1/27/2022    Clinical Narrative:  The patient is tolerating treatment.    Daily Adult Stimulation Safety Questionnaire: No or Yes in past 24 hours     1) Have you discontinued or started any new medication? No  2) Have you missed any doses of your medication differently then prescribed? No  3) Have you consumed alcohol or drugs (other than prescribed)?  No  4) Did you not sleep AT ALL last night?  No  5) Has your SI changed or worsened? No    Indications for TMS:  MDD, recurrent, severe; 4+ medication trials (from 2+ classes) ineffective; Psychotherapy ineffective.     Pre-Procedure Diagnosis:  MDD, recurrent, severe F33.2    Treatment Hx:  Treatment number this series: 9  Total lifetime treatment number: 9    Allergies   Allergen Reactions     Cats      Codeine Sulfate GI Disturbance        Pause for the Cause  Right patient:  Yes  Right procedure/correct coil:  Yes; rTMS; cpt 12831; H1 coil.   Earplugs in place:  Yes    Procedure  Patient was seated in procedure chair. Identity and procedure was verified. Ear plugs were placed in ears and patient-specific cap was placed on head and tightened appropriately. Ruler locations were verified. Coil was placed at treatment location and stimulator was set to parameters described below. A test train was delivered and pt tolerated train. Given pt tolerance, 55 treatment trains were delivered. Pt tolerated procedure with facial and  right hand movement.    Motor Threshold Determination  Distance from nasion to inion: 38.0  MT 1: 0 - 8 - 14 @ 51% on 1/27/2022    Stimulation Parameters  Frequency: 18 Hz     Train duration: 2 sec  Total pulses delivered: 1980  Inter-train interval:  20 sec  Tx Loc: 0 - eyebrows  - 14  Energy: 61% (120% MT)  Trains: 55 trains    Date MADRS IDS-SR PHQ-9   1/27/22 19  7   2/4/22  30 7                                      Post-Procedure Diagnosis:  MDD, recurrent, severe 296.33    Caroline Hawkins, Psychometrist  [facility] Neuromodulation      Plan   - Cont TMS      I did not see the patient during/after treatment but remained available in the clinic during  treatment.    Alvina Whiteside MD  [facility] Neuromodulation

## 2022-02-11 ENCOUNTER — OFFICE VISIT (OUTPATIENT)
Dept: PSYCHIATRY | Facility: CLINIC | Age: 72
End: 2022-02-11
Payer: MEDICARE

## 2022-02-11 DIAGNOSIS — F33.2 SEVERE EPISODE OF RECURRENT MAJOR DEPRESSIVE DISORDER, WITHOUT PSYCHOTIC FEATURES (H): Primary | ICD-10-CM

## 2022-02-11 ASSESSMENT — PATIENT HEALTH QUESTIONNAIRE - PHQ9: SUM OF ALL RESPONSES TO PHQ QUESTIONS 1-9: 10

## 2022-02-11 NOTE — PROGRESS NOTES
[facility] TMS Program  [address], [address]  [place] [address]  TMS Procedure Note   Svetlana Adair MRN# [id number]  Age: 71 year old year old YOB: 1950    Pre-Procedure:  History and Physical: Reviewed in medical record  Consent Signed by: Svetlana Adair  On: 1/27/2022    Clinical Narrative:  The patient is tolerating treatment.  Patient reported some explosive anger yesterday.      Daily Adult Stimulation Safety Questionnaire: No or Yes in past 24 hours     1) Have you discontinued or started any new medication? No  2) Have you missed any doses of your medication differently then prescribed? No  3) Have you consumed alcohol or drugs (other than prescribed)?  No  4) Did you not sleep AT ALL last night?  No  5) Has your SI changed or worsened? No    Indications for TMS:  MDD, recurrent, severe; 4+ medication trials (from 2+ classes) ineffective; Psychotherapy ineffective.     Pre-Procedure Diagnosis:  MDD, recurrent, severe F33.2    Treatment Hx:  Treatment number this series: 10  Total lifetime treatment number: 10    Allergies   Allergen Reactions     Cats      Codeine Sulfate GI Disturbance        Pause for the Cause  Right patient:  Yes  Right procedure/correct coil:  Yes; rTMS; cpt 05725; H1 coil.   Earplugs in place:  Yes    Procedure  Patient was seated in procedure chair. Identity and procedure was verified. Ear plugs were placed in ears and patient-specific cap was placed on head and tightened appropriately. Ruler locations were verified. Coil was placed at treatment location and stimulator was set to parameters described below. A test train was delivered and pt tolerated train. Given pt tolerance, 55 treatment trains were delivered. Pt tolerated procedure with facial and  right hand movement.    Motor Threshold Determination  Distance from nasion to inion: 38.0  MT 1: 0 - 8 - 14 @ 51% on 1/27/2022    Stimulation Parameters  Frequency: 18 Hz     Train duration: 2  sec  Total pulses delivered: 1980  Inter-train interval: 20 sec  Tx Loc: 0 - eyebrows  - 14  Energy: 61% (120% MT)  Trains: 55 trains    Date MADRS IDS-SR PHQ-9   1/27/22 19  7   2/4/22  30 7   2/11/22  19 10                                Post-Procedure Diagnosis:  MDD, recurrent, severe 296.33    Caroline Hawkins, Psychometrist  [facility]  Mental [facility] Neuromodulation      Plan   - Cont TMS      I did not see patient but remained available in the clinic throughout the TMS session.        Melvina Claudio MD  [facility] Neuromodulation

## 2022-02-14 ENCOUNTER — OFFICE VISIT (OUTPATIENT)
Dept: PSYCHIATRY | Facility: CLINIC | Age: 72
End: 2022-02-14
Payer: MEDICARE

## 2022-02-14 DIAGNOSIS — F33.2 SEVERE EPISODE OF RECURRENT MAJOR DEPRESSIVE DISORDER, WITHOUT PSYCHOTIC FEATURES (H): Primary | ICD-10-CM

## 2022-02-14 ASSESSMENT — PATIENT HEALTH QUESTIONNAIRE - PHQ9: SUM OF ALL RESPONSES TO PHQ QUESTIONS 1-9: 11

## 2022-02-14 NOTE — TELEPHONE ENCOUNTER
Writer called patient back.  States that she noted that the anger and frustration and irritability has started about after 5 sessions.    Discussed the [employer] machine can cause some increased anxiety/irritability.  This actually might be a good sign, as if TMS is not going to work, it does nothing.  So that fact that she is noticing some change is a good sign.

## 2022-02-14 NOTE — PROGRESS NOTES
[facility] TMS Program  [address], [address]  [place] [address]  TMS Procedure Note   Svetlana Adair MRN# [id number]  Age: 71 year old year old YOB: 1950    Pre-Procedure:  History and Physical: Reviewed in medical record  Consent Signed by: Svetlana Adair  On: 1/27/2022    Clinical Narrative:  The patient is tolerating treatment. Patient reports talking with RN and feeling better about treatment.    Daily Adult Stimulation Safety Questionnaire: No or Yes in past 24 hours     1) Have you discontinued or started any new medication? No  2) Have you missed any doses of your medication differently then prescribed? No  3) Have you consumed alcohol or drugs (other than prescribed)?  No  4) Did you not sleep AT ALL last night?  No  5) Has your SI changed or worsened? No    Indications for TMS:  MDD, recurrent, severe; 4+ medication trials (from 2+ classes) ineffective; Psychotherapy ineffective.     Pre-Procedure Diagnosis:  MDD, recurrent, severe F33.2    Treatment Hx:  Treatment number this series: 11  Total lifetime treatment number: 11    Allergies   Allergen Reactions     Cats      Codeine Sulfate GI Disturbance        Pause for the Cause  Right patient:  Yes  Right procedure/correct coil:  Yes; rTMS; cpt 79975; H1 coil.   Earplugs in place:  Yes    Procedure  Patient was seated in procedure chair. Identity and procedure was verified. Ear plugs were placed in ears and patient-specific cap was placed on head and tightened appropriately. Ruler locations were verified. Coil was placed at treatment location and stimulator was set to parameters described below. A test train was delivered and pt tolerated train. Given pt tolerance, 55 treatment trains were delivered. Pt tolerated procedure with facial and  right hand movement.    Motor Threshold Determination  Distance from nasion to inion: 38.0  MT 1: 0 - 8 - 14 @ 51% on 1/27/2022    Stimulation Parameters  Frequency: 18 Hz     Train  duration: 2 sec  Total pulses delivered: 1980  Inter-train interval: 20 sec  Tx Loc: 0 - eyebrows  - 14  Energy: 61% (120% MT)  Trains: 55 trains    Date MADRS IDS-SR PHQ-9   1/27/22 19  7   2/4/22  30 7   2/11/22  19 10                                Post-Procedure Diagnosis:  MDD, recurrent, severe 296.33    Socorro Caraballo, EMT  [facility]  Mental [facility] Neuromodulation      Plan   - Cont TMS      I did not see patient but remained available in the clinic throughout the TMS session.        Melvina Claudio MD  [facility] Neuromodulation

## 2022-02-15 ENCOUNTER — OFFICE VISIT (OUTPATIENT)
Dept: PSYCHIATRY | Facility: CLINIC | Age: 72
End: 2022-02-15
Payer: MEDICARE

## 2022-02-15 DIAGNOSIS — F33.2 SEVERE EPISODE OF RECURRENT MAJOR DEPRESSIVE DISORDER, WITHOUT PSYCHOTIC FEATURES (H): Primary | ICD-10-CM

## 2022-02-15 ASSESSMENT — PATIENT HEALTH QUESTIONNAIRE - PHQ9: SUM OF ALL RESPONSES TO PHQ QUESTIONS 1-9: 7

## 2022-02-15 NOTE — PROGRESS NOTES
[facility] TMS Program  [address], [address]  [place] [address]  TMS Procedure Note   Svetlana Adair MRN# [id number]  Age: 71 year old year old YOB: 1950    Pre-Procedure:  History and Physical: Reviewed in medical record  Consent Signed by: Svetlana Adair  On: 1/27/2022    Clinical Narrative:  The patient is tolerating treatment. No changes reported.    Daily Adult Stimulation Safety Questionnaire: No or Yes in past 24 hours     1) Have you discontinued or started any new medication? No  2) Have you missed any doses of your medication differently then prescribed? No  3) Have you consumed alcohol or drugs (other than prescribed)?  No  4) Did you not sleep AT ALL last night?  No  5) Has your SI changed or worsened? No    Indications for TMS:  MDD, recurrent, severe; 4+ medication trials (from 2+ classes) ineffective; Psychotherapy ineffective.     Pre-Procedure Diagnosis:  MDD, recurrent, severe F33.2    Treatment Hx:  Treatment number this series: 12  Total lifetime treatment number: 12    Allergies   Allergen Reactions     Cats      Codeine Sulfate GI Disturbance        Pause for the Cause  Right patient:  Yes  Right procedure/correct coil:  Yes; rTMS; cpt 36434; H1 coil.   Earplugs in place:  Yes    Procedure  Patient was seated in procedure chair. Identity and procedure was verified. Ear plugs were placed in ears and patient-specific cap was placed on head and tightened appropriately. Ruler locations were verified. Coil was placed at treatment location and stimulator was set to parameters described below. A test train was delivered and pt tolerated train. Given pt tolerance, 55 treatment trains were delivered. Pt tolerated procedure with facial and  right hand movement.    Motor Threshold Determination  Distance from nasion to inion: 38.0  MT 1: 0 - 8 - 14 @ 51% on 1/27/2022    Stimulation Parameters  Frequency: 18 Hz     Train duration: 2 sec  Total pulses delivered:  1980  Inter-train interval: 20 sec  Tx Loc: 0 - eyebrows  - 14  Energy: 61% (120% MT)  Trains: 55 trains    Date MADRS IDS-SR PHQ-9   1/27/22 19  7   2/4/22  30 7   2/11/22  19 10                                Post-Procedure Diagnosis:  MDD, recurrent, severe 296.33    Socorro Caraballo, EMT  [facility] Neuromodulation      Plan   - Cont TMS    I did not see the patient but remained available in the clinic throughout the TMS session.     Venkatesh Knapp M.D.   [facility] Neuromodulation

## 2022-02-16 ENCOUNTER — OFFICE VISIT (OUTPATIENT)
Dept: PSYCHIATRY | Facility: CLINIC | Age: 72
End: 2022-02-16
Payer: MEDICARE

## 2022-02-16 DIAGNOSIS — F33.2 SEVERE EPISODE OF RECURRENT MAJOR DEPRESSIVE DISORDER, WITHOUT PSYCHOTIC FEATURES (H): Primary | ICD-10-CM

## 2022-02-16 ASSESSMENT — PATIENT HEALTH QUESTIONNAIRE - PHQ9: SUM OF ALL RESPONSES TO PHQ QUESTIONS 1-9: 9

## 2022-02-16 NOTE — PROGRESS NOTES
[facility] TMS Program  [address], [address]  [place] [address]  TMS Procedure Note   Svetlana Adair MRN# [id number]  Age: 71 year old year old YOB: 1950    Pre-Procedure:  History and Physical: Reviewed in medical record  Consent Signed by: Svetlana Adair  On: 1/27/2022    Clinical Narrative:  The patient is tolerating treatment. Patient states she has a busy day today and tomorrow.    Daily Adult Stimulation Safety Questionnaire: No or Yes in past 24 hours     1) Have you discontinued or started any new medication? No  2) Have you missed any doses of your medication differently then prescribed? No  3) Have you consumed alcohol or drugs (other than prescribed)?  No  4) Did you not sleep AT ALL last night?  No  5) Has your SI changed or worsened? No    Indications for TMS:  MDD, recurrent, severe; 4+ medication trials (from 2+ classes) ineffective; Psychotherapy ineffective.     Pre-Procedure Diagnosis:  MDD, recurrent, severe F33.2    Treatment Hx:  Treatment number this series: 13  Total lifetime treatment number: 13    Allergies   Allergen Reactions     Cats      Codeine Sulfate GI Disturbance        Pause for the Cause  Right patient:  Yes  Right procedure/correct coil:  Yes; rTMS; cpt 45384; H1 coil.   Earplugs in place:  Yes    Procedure  Patient was seated in procedure chair. Identity and procedure was verified. Ear plugs were placed in ears and patient-specific cap was placed on head and tightened appropriately. Ruler locations were verified. Coil was placed at treatment location and stimulator was set to parameters described below. A test train was delivered and pt tolerated train. Given pt tolerance, 55 treatment trains were delivered. Pt tolerated procedure with facial and  right hand movement.    Motor Threshold Determination  Distance from nasion to inion: 38.0  MT 1: 0 - 8 - 14 @ 51% on 1/27/2022    Stimulation Parameters  Frequency: 18 Hz     Train duration: 2  sec  Total pulses delivered: 1980  Inter-train interval: 20 sec  Tx Loc: 0 - eyebrows  - 14  Energy: 61% (120% MT)  Trains: 55 trains    Date MADRS IDS-SR PHQ-9   1/27/22 19  7   2/4/22  30 7   2/11/22  19 10                                Post-Procedure Diagnosis:  MDD, recurrent, severe 296.33    Socorro Caraballo, EMT  [facility] Neuromodulation      Plan   - Cont TMS      I did not see patient but remained available in the clinic throughout the TMS session.        Melvina Claudio MD  [facility] Neuromodulation

## 2022-02-17 ENCOUNTER — OFFICE VISIT (OUTPATIENT)
Dept: PSYCHIATRY | Facility: CLINIC | Age: 72
End: 2022-02-17
Payer: MEDICARE

## 2022-02-17 DIAGNOSIS — F33.2 SEVERE EPISODE OF RECURRENT MAJOR DEPRESSIVE DISORDER, WITHOUT PSYCHOTIC FEATURES (H): Primary | ICD-10-CM

## 2022-02-17 PROBLEM — F11.90 CHRONIC, CONTINUOUS USE OF OPIOIDS: Status: ACTIVE | Noted: 2018-04-26

## 2022-02-17 ASSESSMENT — PATIENT HEALTH QUESTIONNAIRE - PHQ9: SUM OF ALL RESPONSES TO PHQ QUESTIONS 1-9: 6

## 2022-02-17 NOTE — PROGRESS NOTES
"     [facility] TMS Program  [address], [address]  [place] [address]  TMS Procedure Note   Svetlana Adair MRN# [id number]  Age: 71 year old year old YOB: 1950    Pre-Procedure:  History and Physical: Reviewed in medical record  Consent Signed by: Svetlana Adair  On: 1/27/2022    Clinical Narrative:  The patient is tolerating treatment. Asked her how it was going.  Patient stated she was filing out the PHQ 9 incorrectly.  She read the directions \"Oops I haven't been scoring that way!\"      Daily Adult Stimulation Safety Questionnaire: No or Yes in past 24 hours     1) Have you discontinued or started any new medication? No  2) Have you missed any doses of your medication differently then prescribed? No  3) Have you consumed alcohol or drugs (other than prescribed)?  No  4) Did you not sleep AT ALL last night?  No  5) Has your SI changed or worsened? No    Indications for TMS:  MDD, recurrent, severe; 4+ medication trials (from 2+ classes) ineffective; Psychotherapy ineffective.     Pre-Procedure Diagnosis:  MDD, recurrent, severe F33.2    Treatment Hx:  Treatment number this series: 14  Total lifetime treatment number: 14    Allergies   Allergen Reactions     Cats      Codeine Sulfate GI Disturbance        Pause for the Cause  Right patient:  Yes  Right procedure/correct coil:  Yes; rTMS; cpt 64069; H1 coil.   Earplugs in place:  Yes    Procedure  Patient was seated in procedure chair. Identity and procedure was verified. Ear plugs were placed in ears and patient-specific cap was placed on head and tightened appropriately. Ruler locations were verified. Coil was placed at treatment location and stimulator was set to parameters described below. A test train was delivered and pt tolerated train. Given pt tolerance, 55 treatment trains were delivered. Pt tolerated procedure with facial and  right hand movement.    Motor Threshold Determination  Distance from nasion to inion: 38.0  MT 1: " 0 - 8 - 14 @ 51% on 1/27/2022    Stimulation Parameters  Frequency: 18 Hz     Train duration: 2 sec  Total pulses delivered: 1980  Inter-train interval: 20 sec  Tx Loc: 0 - eyebrows  - 14  Energy: 61% (120% MT)  Trains: 55 trains    Date MADRS IDS-SR PHQ-9   1/27/22 19  7   2/4/22  30 7   2/11/22  19 10                                Post-Procedure Diagnosis:  MDD, recurrent, severe 296.33    Blanca Akins CMA  [facility]  [facility] Neuromod[facility]      Plan   - Cont TMS      I did not see the patient during/after treatment but remained available in the clinic during  treatment.    Ady North MD  [facility] Neuromodulation

## 2022-02-18 ENCOUNTER — ALLIED HEALTH/NURSE VISIT (OUTPATIENT)
Dept: PSYCHIATRY | Facility: CLINIC | Age: 72
End: 2022-02-18
Payer: MEDICARE

## 2022-02-18 ENCOUNTER — OFFICE VISIT (OUTPATIENT)
Dept: PSYCHIATRY | Facility: CLINIC | Age: 72
End: 2022-02-18
Payer: MEDICARE

## 2022-02-18 DIAGNOSIS — F33.2 SEVERE EPISODE OF RECURRENT MAJOR DEPRESSIVE DISORDER, WITHOUT PSYCHOTIC FEATURES (H): Primary | ICD-10-CM

## 2022-02-18 ASSESSMENT — PATIENT HEALTH QUESTIONNAIRE - PHQ9: SUM OF ALL RESPONSES TO PHQ QUESTIONS 1-9: 8

## 2022-02-18 NOTE — PROGRESS NOTES
[facility] :  Care Coordination Note     SITUATION   Svetlana Adair is a 71 year old female who is receiving support for:  Clinic Care Coordination - Face To Face (3 Week TMS Check in)      BACKGROUND     15 Sessions of TMS completed     ASSESSMENT     Writer met with patient after TMS today.  Patient reports that she has been having more feelings.  States that she has gotten very angry with her sister.  Patient reports that she didn't realize how numb she was actually feeling until she started to feel after about 2 weeks of TMS.         Patient continues to report difficulties sleeping as well.  States she usually struggles to sleep and wakes up frequently till about 4am and then she goes to sleep well.      Encouraged patient to give herself lenka, as getting better is not a perfect science and there will be bad and good days.          PLAN              Follow-up plan:  Cont TMS       Noemi Shaw RN

## 2022-02-18 NOTE — PROGRESS NOTES
[facility] TMS Program  [address], [address]  [place] [address]  TMS Procedure Note   Svetlana Adair MRN# [id number]  Age: 71 year old year old YOB: 1950    Pre-Procedure:  History and Physical: Reviewed in medical record  Consent Signed by: Svetlana Adair  On: 1/27/2022    Clinical Narrative:  The patient is tolerating treatment. No changes reported.     Daily Adult Stimulation Safety Questionnaire: No or Yes in past 24 hours     1) Have you discontinued or started any new medication? No  2) Have you missed any doses of your medication differently then prescribed? No  3) Have you consumed alcohol or drugs (other than prescribed)?  No  4) Did you not sleep AT ALL last night?  No  5) Has your SI changed or worsened? No    Indications for TMS:  MDD, recurrent, severe; 4+ medication trials (from 2+ classes) ineffective; Psychotherapy ineffective.     Pre-Procedure Diagnosis:  MDD, recurrent, severe F33.2    Treatment Hx:  Treatment number this series: 15  Total lifetime treatment number: 15    Allergies   Allergen Reactions     Cats      Codeine Sulfate GI Disturbance        Pause for the Cause  Right patient:  Yes  Right procedure/correct coil:  Yes; rTMS; cpt 42345; H1 coil.   Earplugs in place:  Yes    Procedure  Patient was seated in procedure chair. Identity and procedure was verified. Ear plugs were placed in ears and patient-specific cap was placed on head and tightened appropriately. Ruler locations were verified. Coil was placed at treatment location and stimulator was set to parameters described below. A test train was delivered and pt tolerated train. Given pt tolerance, 55 treatment trains were delivered. Pt tolerated procedure with facial and  right hand movement.    Motor Threshold Determination  Distance from nasion to inion: 38.0  MT 1: 0 - 8 - 14 @ 51% on 1/27/2022    Stimulation Parameters  Frequency: 18 Hz     Train duration: 2 sec  Total pulses delivered:  1980  Inter-train interval: 20 sec  Tx Loc: 0 - eyebrows  - 14  Energy: 61% (120% MT)  Trains: 55 trains    Date MADRS IDS-SR PHQ-9   1/27/22 19  7   2/4/22  30 7   2/11/22  19 10   2/18/22  28 8                          Post-Procedure Diagnosis:  MDD, recurrent, severe 296.33    Socorro Caraballo, EMT  [facility] Neuromodulation      Plan   - Cont TMS      I did not see the patient during/after treatment but remained available in the clinic during  treatment.    Melvina Claudio MD  [facility] Neuromodulation

## 2022-02-21 ENCOUNTER — OFFICE VISIT (OUTPATIENT)
Dept: PSYCHIATRY | Facility: CLINIC | Age: 72
End: 2022-02-21
Payer: MEDICARE

## 2022-02-21 DIAGNOSIS — F33.2 SEVERE EPISODE OF RECURRENT MAJOR DEPRESSIVE DISORDER, WITHOUT PSYCHOTIC FEATURES (H): Primary | ICD-10-CM

## 2022-02-21 ASSESSMENT — PATIENT HEALTH QUESTIONNAIRE - PHQ9: SUM OF ALL RESPONSES TO PHQ QUESTIONS 1-9: 7

## 2022-02-21 NOTE — PROGRESS NOTES
[facility] TMS Program  [address], [address]  [place] [address]  TMS Procedure Note   Svetlana Adair MRN# [id number]  Age: 71 year old year old YOB: 1950    Pre-Procedure:  History and Physical: Reviewed in medical record  Consent Signed by: Svetlana Adair  On: 1/27/2022    Clinical Narrative:  The patient is tolerating treatment. Patient reports having a good weekend.    Daily Adult Stimulation Safety Questionnaire: No or Yes in past 24 hours     1) Have you discontinued or started any new medication? No  2) Have you missed any doses of your medication differently then prescribed? No  3) Have you consumed alcohol or drugs (other than prescribed)?  No  4) Did you not sleep AT ALL last night?  No  5) Has your SI changed or worsened? No    Indications for TMS:  MDD, recurrent, severe; 4+ medication trials (from 2+ classes) ineffective; Psychotherapy ineffective.     Pre-Procedure Diagnosis:  MDD, recurrent, severe F33.2    Treatment Hx:  Treatment number this series: 16  Total lifetime treatment number: 16    Allergies   Allergen Reactions     Cats      Codeine Sulfate GI Disturbance        Pause for the Cause  Right patient:  Yes  Right procedure/correct coil:  Yes; rTMS; cpt 27042; H1 coil.   Earplugs in place:  Yes    Procedure  Patient was seated in procedure chair. Identity and procedure was verified. Ear plugs were placed in ears and patient-specific cap was placed on head and tightened appropriately. Ruler locations were verified. Coil was placed at treatment location and stimulator was set to parameters described below. A test train was delivered and pt tolerated train. Given pt tolerance, 55 treatment trains were delivered. Pt tolerated procedure with facial and  right hand movement.    Motor Threshold Determination  Distance from nasion to inion: 38.0  MT 1: 0 - 8 - 14 @ 51% on 1/27/2022    Stimulation Parameters  Frequency: 18 Hz     Train duration: 2 sec  Total pulses  delivered: 1980  Inter-train interval: 20 sec  Tx Loc: 0 - eyebrows  - 14  Energy: 61% (120% MT)  Trains: 55 trains    Date MADRS IDS-SR PHQ-9   1/27/22 19  7   2/4/22  30 7   2/11/22  19 10   2/18/22  28 8                          Post-Procedure Diagnosis:  MDD, recurrent, severe 296.33    Socorro Caraballo, EMT  [facility] Neuromodulation      Plan   - Cont TMS      I did not see the patient during/after treatment but remained available in the clinic during  treatment.    Melvina Claudio MD  [facility] Neuromodulation

## 2022-02-22 ENCOUNTER — MEDICAL CORRESPONDENCE (OUTPATIENT)
Dept: HEALTH INFORMATION MANAGEMENT | Facility: OTHER | Age: 72
End: 2022-02-22

## 2022-02-22 ENCOUNTER — OFFICE VISIT (OUTPATIENT)
Dept: PSYCHIATRY | Facility: CLINIC | Age: 72
End: 2022-02-22
Payer: MEDICARE

## 2022-02-22 DIAGNOSIS — F33.2 SEVERE EPISODE OF RECURRENT MAJOR DEPRESSIVE DISORDER, WITHOUT PSYCHOTIC FEATURES (H): Primary | ICD-10-CM

## 2022-02-22 ASSESSMENT — PATIENT HEALTH QUESTIONNAIRE - PHQ9: SUM OF ALL RESPONSES TO PHQ QUESTIONS 1-9: 8

## 2022-02-22 NOTE — PROGRESS NOTES
[facility] TMS Program  [address], [address]  [place] [address]  TMS Procedure Note   Svetlana Adair MRN# [id number]  Age: 71 year old year old YOB: 1950    Pre-Procedure:  History and Physical: Reviewed in medical record  Consent Signed by: Svetlana Adair  On: 1/27/2022    Clinical Narrative:  The patient is tolerating treatment. No changes reported.    Daily Adult Stimulation Safety Questionnaire: No or Yes in past 24 hours     1) Have you discontinued or started any new medication? No  2) Have you missed any doses of your medication differently then prescribed? No  3) Have you consumed alcohol or drugs (other than prescribed)?  No  4) Did you not sleep AT ALL last night?  No  5) Has your SI changed or worsened? No    Indications for TMS:  MDD, recurrent, severe; 4+ medication trials (from 2+ classes) ineffective; Psychotherapy ineffective.     Pre-Procedure Diagnosis:  MDD, recurrent, severe F33.2    Treatment Hx:  Treatment number this series: 17  Total lifetime treatment number: 17    Allergies   Allergen Reactions     Cats      Codeine Sulfate GI Disturbance        Pause for the Cause  Right patient:  Yes  Right procedure/correct coil:  Yes; rTMS; cpt 04666; H1 coil.   Earplugs in place:  Yes    Procedure  Patient was seated in procedure chair. Identity and procedure was verified. Ear plugs were placed in ears and patient-specific cap was placed on head and tightened appropriately. Ruler locations were verified. Coil was placed at treatment location and stimulator was set to parameters described below. A test train was delivered and pt tolerated train. Given pt tolerance, 55 treatment trains were delivered. Pt tolerated procedure with facial and  right hand movement.    Motor Threshold Determination  Distance from nasion to inion: 38.0  MT 1: 0 - 8 - 14 @ 51% on 1/27/2022    Stimulation Parameters  Frequency: 18 Hz     Train duration: 2 sec  Total pulses delivered:  1980  Inter-train interval: 20 sec  Tx Loc: 0 - eyebrows  - 14  Energy: 61% (120% MT)  Trains: 55 trains    Date MADRS IDS-SR PHQ-9   1/27/22 19  7   2/4/22  30 7   2/11/22  19 10   2/18/22  28 8                          Post-Procedure Diagnosis:  MDD, recurrent, severe 296.33    Socorro Caraballo, EMT  [facility] Neuromodulation      Plan   - Cont TMS      I did not see the patient during/after treatment but remained available in the clinic during  treatment.    Alvina Whiteside MD  [facility] Neuromodulation

## 2022-02-23 ENCOUNTER — OFFICE VISIT (OUTPATIENT)
Dept: PSYCHIATRY | Facility: CLINIC | Age: 72
End: 2022-02-23
Payer: MEDICARE

## 2022-02-23 DIAGNOSIS — F33.2 SEVERE EPISODE OF RECURRENT MAJOR DEPRESSIVE DISORDER, WITHOUT PSYCHOTIC FEATURES (H): Primary | ICD-10-CM

## 2022-02-23 ASSESSMENT — PATIENT HEALTH QUESTIONNAIRE - PHQ9: SUM OF ALL RESPONSES TO PHQ QUESTIONS 1-9: 8

## 2022-02-23 NOTE — PROGRESS NOTES
[facility] TMS Program  [address], [address]  [place] [address]  TMS Procedure Note   Svetlana Adair MRN# [id number]  Age: 71 year old year old YOB: 1950    Pre-Procedure:  History and Physical: Reviewed in medical record  Consent Signed by: Svetlana Adair  On: 1/27/2022    Clinical Narrative:  The patient is tolerating treatment. Patient reports having a good mood yesterday. She reports finding herself laughing at shows more often.    Daily Adult Stimulation Safety Questionnaire: No or Yes in past 24 hours     1) Have you discontinued or started any new medication? No  2) Have you missed any doses of your medication differently then prescribed? No  3) Have you consumed alcohol or drugs (other than prescribed)?  No  4) Did you not sleep AT ALL last night?  No  5) Has your SI changed or worsened? No    Indications for TMS:  MDD, recurrent, severe; 4+ medication trials (from 2+ classes) ineffective; Psychotherapy ineffective.     Pre-Procedure Diagnosis:  MDD, recurrent, severe F33.2    Treatment Hx:  Treatment number this series: 18  Total lifetime treatment number: 18    Allergies   Allergen Reactions     Cats      Codeine Sulfate GI Disturbance        Pause for the Cause  Right patient:  Yes  Right procedure/correct coil:  Yes; rTMS; cpt 91964; H1 coil.   Earplugs in place:  Yes    Procedure  Patient was seated in procedure chair. Identity and procedure was verified. Ear plugs were placed in ears and patient-specific cap was placed on head and tightened appropriately. Ruler locations were verified. Coil was placed at treatment location and stimulator was set to parameters described below. A test train was delivered and pt tolerated train. Given pt tolerance, 55 treatment trains were delivered. Pt tolerated procedure with facial and  right hand movement.    Motor Threshold Determination  Distance from nasion to inion: 38.0  MT 1: 0 - 8 - 14 @ 51% on 1/27/2022    Stimulation  Parameters  Frequency: 18 Hz     Train duration: 2 sec  Total pulses delivered: 1980  Inter-train interval: 20 sec  Tx Loc: 0 - eyebrows  - 14  Energy: 61% (120% MT)  Trains: 55 trains    Date MADRS IDS-SR PHQ-9   1/27/22 19  7   2/4/22  30 7   2/11/22  19 10   2/18/22  28 8                          Post-Procedure Diagnosis:  MDD, recurrent, severe 296.33    Socorro Caraballo, EMT  [facility] Neuromodulation      Plan   - Cont TMS      I did not see the patient during/after treatment but remained available in the clinic during  treatment.    Melvina Claudio MD  [facility] Neuromodulation

## 2022-02-24 ENCOUNTER — OFFICE VISIT (OUTPATIENT)
Dept: PSYCHIATRY | Facility: CLINIC | Age: 72
End: 2022-02-24
Payer: MEDICARE

## 2022-02-24 DIAGNOSIS — F33.2 SEVERE EPISODE OF RECURRENT MAJOR DEPRESSIVE DISORDER, WITHOUT PSYCHOTIC FEATURES (H): Primary | ICD-10-CM

## 2022-02-24 ASSESSMENT — PATIENT HEALTH QUESTIONNAIRE - PHQ9: SUM OF ALL RESPONSES TO PHQ QUESTIONS 1-9: 7

## 2022-02-24 NOTE — PROGRESS NOTES
[facility] TMS Program  [address], [address]  [place] [address]  TMS Procedure Note   Svetlana Adair MRN# [id number]  Age: 71 year old year old YOB: 1950    Pre-Procedure:  History and Physical: Reviewed in medical record  Consent Signed by: Svetlana Adair  On: 1/27/2022    Clinical Narrative:  The patient is tolerating treatment. Patient reports sleeping well last night.    Daily Adult Stimulation Safety Questionnaire: No or Yes in past 24 hours     1) Have you discontinued or started any new medication? No  2) Have you missed any doses of your medication differently then prescribed? No  3) Have you consumed alcohol or drugs (other than prescribed)?  No  4) Did you not sleep AT ALL last night?  No  5) Has your SI changed or worsened? No    Indications for TMS:  MDD, recurrent, severe; 4+ medication trials (from 2+ classes) ineffective; Psychotherapy ineffective.     Pre-Procedure Diagnosis:  MDD, recurrent, severe F33.2    Treatment Hx:  Treatment number this series: 19  Total lifetime treatment number: 19    Allergies   Allergen Reactions     Cats      Codeine Sulfate GI Disturbance        Pause for the Cause  Right patient:  Yes  Right procedure/correct coil:  Yes; rTMS; cpt 36858; H1 coil.   Earplugs in place:  Yes    Procedure  Patient was seated in procedure chair. Identity and procedure was verified. Ear plugs were placed in ears and patient-specific cap was placed on head and tightened appropriately. Ruler locations were verified. Coil was placed at treatment location and stimulator was set to parameters described below. A test train was delivered and pt tolerated train. Given pt tolerance, 55 treatment trains were delivered. Pt tolerated procedure with facial and  right hand movement.    Motor Threshold Determination  Distance from nasion to inion: 38.0  MT 1: 0 - 8 - 14 @ 51% on 1/27/2022    Stimulation Parameters  Frequency: 18 Hz     Train duration: 2 sec  Total  pulses delivered: 1980  Inter-train interval: 20 sec  Tx Loc: 0 - eyebrows  - 14  Energy: 61% (120% MT)  Trains: 55 trains    Date MADRS IDS-SR PHQ-9   1/27/22 19  7   2/4/22  30 7   2/11/22  19 10   2/18/22  28 8                          Post-Procedure Diagnosis:  MDD, recurrent, severe 296.33    Socorro Caraballo, EMT  [facility] Neuromodulation      Plan   - Cont TMS      I did not see the patient during/after treatment but remained available in the clinic during  treatment.    Alvina Whiteside MD  [facility] Neuromod[facility]

## 2022-02-25 ENCOUNTER — OFFICE VISIT (OUTPATIENT)
Dept: PSYCHIATRY | Facility: CLINIC | Age: 72
End: 2022-02-25
Payer: MEDICARE

## 2022-02-25 DIAGNOSIS — F33.2 SEVERE EPISODE OF RECURRENT MAJOR DEPRESSIVE DISORDER, WITHOUT PSYCHOTIC FEATURES (H): Primary | ICD-10-CM

## 2022-02-25 ASSESSMENT — PATIENT HEALTH QUESTIONNAIRE - PHQ9: SUM OF ALL RESPONSES TO PHQ QUESTIONS 1-9: 8

## 2022-02-25 NOTE — PROGRESS NOTES
[facility] TMS Program  [address], [address]  [place] [address]  TMS Procedure Note   Svetlana Adair MRN# [id number]  Age: 71 year old year old YOB: 1950    Pre-Procedure:  History and Physical: Reviewed in medical record  Consent Signed by: Svetlana Adair  On: 1/27/2022    Clinical Narrative:  The patient is tolerating treatment. No changes reported.    Daily Adult Stimulation Safety Questionnaire: No or Yes in past 24 hours     1) Have you discontinued or started any new medication? No  2) Have you missed any doses of your medication differently then prescribed? No  3) Have you consumed alcohol or drugs (other than prescribed)?  No  4) Did you not sleep AT ALL last night?  No  5) Has your SI changed or worsened? No    Indications for TMS:  MDD, recurrent, severe; 4+ medication trials (from 2+ classes) ineffective; Psychotherapy ineffective.     Pre-Procedure Diagnosis:  MDD, recurrent, severe F33.2    Treatment Hx:  Treatment number this series: 20  Total lifetime treatment number: 20    Allergies   Allergen Reactions     Cats      Codeine Sulfate GI Disturbance        Pause for the Cause  Right patient:  Yes  Right procedure/correct coil:  Yes; rTMS; cpt 33729; H1 coil.   Earplugs in place:  Yes    Procedure  Patient was seated in procedure chair. Identity and procedure was verified. Ear plugs were placed in ears and patient-specific cap was placed on head and tightened appropriately. Ruler locations were verified. Coil was placed at treatment location and stimulator was set to parameters described below. A test train was delivered and pt tolerated train. Given pt tolerance, 55 treatment trains were delivered. Pt tolerated procedure with facial and  right hand movement.    Motor Threshold Determination  Distance from nasion to inion: 38.0  MT 1: 0 - 8 - 14 @ 51% on 1/27/2022    Stimulation Parameters  Frequency: 18 Hz     Train duration: 2 sec  Total pulses delivered:  1980  Inter-train interval: 20 sec  Tx Loc: 0 - eyebrows  - 14  Energy: 61% (120% MT)  Trains: 55 trains    Date MADRS IDS-SR PHQ-9   1/27/22 19  7   2/4/22  30 7   2/11/22  19 10   2/18/22  28 8   2/25/22  25 8                    Post-Procedure Diagnosis:  MDD, recurrent, severe 296.33    Socorro Caraballo, EMT  [facility]  Mental [facility] Neuromodulation      Plan   - Cont TMS      I did not see the patient during/after treatment but remained available in the clinic during  treatment.    Melvina Claudio MD  [facility] Neuromodulation

## 2022-02-28 ENCOUNTER — OFFICE VISIT (OUTPATIENT)
Dept: PSYCHIATRY | Facility: CLINIC | Age: 72
End: 2022-02-28
Payer: MEDICARE

## 2022-02-28 DIAGNOSIS — F33.2 SEVERE EPISODE OF RECURRENT MAJOR DEPRESSIVE DISORDER, WITHOUT PSYCHOTIC FEATURES (H): Primary | ICD-10-CM

## 2022-02-28 ASSESSMENT — PATIENT HEALTH QUESTIONNAIRE - PHQ9: SUM OF ALL RESPONSES TO PHQ QUESTIONS 1-9: 7

## 2022-02-28 NOTE — PROGRESS NOTES
[facility] TMS Program  [address], [address]  [place] [address]  TMS Procedure Note   Svetlana Adair MRN# [id number]  Age: 71 year old year old YOB: 1950    Pre-Procedure:  History and Physical: Reviewed in medical record  Consent Signed by: Svetlana Adair  On: 1/27/2022    Clinical Narrative:  The patient is tolerating treatment. Reports having a fun weekend getting lunch with her brother.    Daily Adult Stimulation Safety Questionnaire: No or Yes in past 24 hours     1) Have you discontinued or started any new medication? No  2) Have you missed any doses of your medication differently then prescribed? No  3) Have you consumed alcohol or drugs (other than prescribed)?  No  4) Did you not sleep AT ALL last night?  No  5) Has your SI changed or worsened? No    Indications for TMS:  MDD, recurrent, severe; 4+ medication trials (from 2+ classes) ineffective; Psychotherapy ineffective.     Pre-Procedure Diagnosis:  MDD, recurrent, severe F33.2    Treatment Hx:  Treatment number this series: 21  Total lifetime treatment number: 21    Allergies   Allergen Reactions     Cats      Codeine Sulfate GI Disturbance        Pause for the Cause  Right patient:  Yes  Right procedure/correct coil:  Yes; rTMS; cpt 42113; H1 coil.   Earplugs in place:  Yes    Procedure  Patient was seated in procedure chair. Identity and procedure was verified. Ear plugs were placed in ears and patient-specific cap was placed on head and tightened appropriately. Ruler locations were verified. Coil was placed at treatment location and stimulator was set to parameters described below. A test train was delivered and pt tolerated train. Given pt tolerance, 55 treatment trains were delivered. Pt tolerated procedure with facial and  right hand movement.    Motor Threshold Determination  Distance from nasion to inion: 38.0  MT 1: 0 - 8 - 14 @ 51% on 1/27/2022    Stimulation Parameters  Frequency: 18 Hz     Train  duration: 2 sec  Total pulses delivered: 1980  Inter-train interval: 20 sec  Tx Loc: 0 - eyebrows  - 14  Energy: 61% (120% MT)  Trains: 55 trains    Date MADRS IDS-SR PHQ-9   1/27/22 19  7   2/4/22  30 7   2/11/22  19 10   2/18/22  28 8   2/25/22  25 8                    Post-Procedure Diagnosis:  MDD, recurrent, severe 296.33    Socorro Caraballo, EMT  [facility]  [facility]ental [facility] Neuromodulation      Plan   - Cont TMS      I did not see the patient during/after treatment but remained available in the clinic during  treatment.    Ady North MD  [facility] Neuromodulation

## 2022-03-02 ENCOUNTER — OFFICE VISIT (OUTPATIENT)
Dept: PSYCHIATRY | Facility: CLINIC | Age: 72
End: 2022-03-02
Payer: MEDICARE

## 2022-03-02 DIAGNOSIS — F33.2 SEVERE EPISODE OF RECURRENT MAJOR DEPRESSIVE DISORDER, WITHOUT PSYCHOTIC FEATURES (H): Primary | ICD-10-CM

## 2022-03-02 ASSESSMENT — PATIENT HEALTH QUESTIONNAIRE - PHQ9: SUM OF ALL RESPONSES TO PHQ QUESTIONS 1-9: 7

## 2022-03-02 NOTE — PROGRESS NOTES
[facility] TMS Program  [address], [address]  [place] [address]  TMS Procedure Note   Svetlana Adair MRN# [id number]  Age: 71 year old year old YOB: 1950    Pre-Procedure:  History and Physical: Reviewed in medical record  Consent Signed by: Svetlana Adair  On: 1/27/2022    Clinical Narrative:  The patient is tolerating treatment. Reports having a good experience with [employer] yesterday.    Daily Adult Stimulation Safety Questionnaire: No or Yes in past 24 hours     1) Have you discontinued or started any new medication? No  2) Have you missed any doses of your medication differently then prescribed? No  3) Have you consumed alcohol or drugs (other than prescribed)?  No  4) Did you not sleep AT ALL last night?  No  5) Has your SI changed or worsened? No    Indications for TMS:  MDD, recurrent, severe; 4+ medication trials (from 2+ classes) ineffective; Psychotherapy ineffective.     Pre-Procedure Diagnosis:  MDD, recurrent, severe F33.2    Treatment Hx:  Treatment number this series: 22  Total lifetime treatment number: 22    Allergies   Allergen Reactions     Cats      Codeine Sulfate GI Disturbance        Pause for the Cause  Right patient:  Yes  Right procedure/correct coil:  Yes; rTMS; cpt 97024; H1 coil.   Earplugs in place:  Yes    Procedure  Patient was seated in procedure chair. Identity and procedure was verified. Ear plugs were placed in ears and patient-specific cap was placed on head and tightened appropriately. Ruler locations were verified. Coil was placed at treatment location and stimulator was set to parameters described below. A test train was delivered and pt tolerated train. Given pt tolerance, 55 treatment trains were delivered. Pt tolerated procedure with facial and  right hand movement.    Motor Threshold Determination  Distance from nasion to inion: 38.0  MT 1: 0 - 8 - 14 @ 51% on 1/27/2022    Stimulation Parameters  Frequency: 18 Hz     Train duration:  2 sec  Total pulses delivered: 1980  Inter-train interval: 20 sec  Tx Loc: 0 - eyebrows  - 14  Energy: 61% (120% MT)  Trains: 55 trains    Date MADRS IDS-SR PHQ-9   1/27/22 19  7   2/4/22  30 7   2/11/22  19 10   2/18/22  28 8   2/25/22  25 8                    Post-Procedure Diagnosis:  MDD, recurrent, severe 296.33    Socorro Caraballo, EMT  [facility]  Mental [facility] Neuromodulation      Plan   - Cont TMS      I did not see the patient during/after treatment but remained available in the clinic during  treatment.    Ady North MD  [facility] Neuromodulation

## 2022-03-03 ENCOUNTER — OFFICE VISIT (OUTPATIENT)
Dept: PSYCHIATRY | Facility: CLINIC | Age: 72
End: 2022-03-03
Payer: MEDICARE

## 2022-03-03 DIAGNOSIS — F33.2 SEVERE EPISODE OF RECURRENT MAJOR DEPRESSIVE DISORDER, WITHOUT PSYCHOTIC FEATURES (H): Primary | ICD-10-CM

## 2022-03-03 ASSESSMENT — PATIENT HEALTH QUESTIONNAIRE - PHQ9: SUM OF ALL RESPONSES TO PHQ QUESTIONS 1-9: 7

## 2022-03-03 NOTE — PROGRESS NOTES
[facility] TMS Program  [address], [address]  [place] [address]  TMS Procedure Note   Svetlana Adair MRN# [id number]  Age: 71 year old year old YOB: 1950    Pre-Procedure:  History and Physical: Reviewed in medical record  Consent Signed by: Svetlana Adair  On: 1/27/2022    Clinical Narrative:  The patient is tolerating treatment. No changes reported.    Daily Adult Stimulation Safety Questionnaire: No or Yes in past 24 hours     1) Have you discontinued or started any new medication? No  2) Have you missed any doses of your medication differently then prescribed? No  3) Have you consumed alcohol or drugs (other than prescribed)?  No  4) Did you not sleep AT ALL last night?  No  5) Has your SI changed or worsened? No    Indications for TMS:  MDD, recurrent, severe; 4+ medication trials (from 2+ classes) ineffective; Psychotherapy ineffective.     Pre-Procedure Diagnosis:  MDD, recurrent, severe F33.2    Treatment Hx:  Treatment number this series: 23  Total lifetime treatment number: 23    Allergies   Allergen Reactions     Cats      Codeine Sulfate GI Disturbance        Pause for the Cause  Right patient:  Yes  Right procedure/correct coil:  Yes; rTMS; cpt 83428; H1 coil.   Earplugs in place:  Yes    Procedure  Patient was seated in procedure chair. Identity and procedure was verified. Ear plugs were placed in ears and patient-specific cap was placed on head and tightened appropriately. Ruler locations were verified. Coil was placed at treatment location and stimulator was set to parameters described below. A test train was delivered and pt tolerated train. Given pt tolerance, 55 treatment trains were delivered. Pt tolerated procedure with facial and  right hand movement.    Motor Threshold Determination  Distance from nasion to inion: 38.0  MT 1: 0 - 8 - 14 @ 51% on 1/27/2022    Stimulation Parameters  Frequency: 18 Hz     Train duration: 2 sec  Total pulses delivered:  1980  Inter-train interval: 20 sec  Tx Loc: 0 - eyebrows  - 14  Energy: 61% (120% MT)  Trains: 55 trains    Date MADRS IDS-SR PHQ-9   1/27/22 19  7   2/4/22  30 7   2/11/22  19 10   2/18/22  28 8   2/25/22  25 8                    Post-Procedure Diagnosis:  MDD, recurrent, severe 296.33    Socorro Caraballo, EMT  [facility]  Mental [facility] Neuromodulation      Plan   - Cont TMS      I did not see the patient during/after treatment but remained available in the clinic during  treatment.    Alvina Whiteside MD  [facility] Neuromodulation

## 2022-03-04 ENCOUNTER — OFFICE VISIT (OUTPATIENT)
Dept: PSYCHIATRY | Facility: CLINIC | Age: 72
End: 2022-03-04
Payer: MEDICARE

## 2022-03-04 DIAGNOSIS — F33.2 SEVERE EPISODE OF RECURRENT MAJOR DEPRESSIVE DISORDER, WITHOUT PSYCHOTIC FEATURES (H): Primary | ICD-10-CM

## 2022-03-04 ASSESSMENT — PATIENT HEALTH QUESTIONNAIRE - PHQ9: SUM OF ALL RESPONSES TO PHQ QUESTIONS 1-9: 7

## 2022-03-04 NOTE — PROGRESS NOTES
[facility] TMS Program  [address], [address]  [place] [address]  TMS Procedure Note   Svetlana Adair MRN# [id number]  Age: 71 year old year old YOB: 1950    Pre-Procedure:  History and Physical: Reviewed in medical record  Consent Signed by: Svetlana Adair  On: 1/27/2022    Clinical Narrative:  The patient is tolerating treatment. Reports sleeping well last night.    Daily Adult Stimulation Safety Questionnaire: No or Yes in past 24 hours     1) Have you discontinued or started any new medication? No  2) Have you missed any doses of your medication differently then prescribed? No  3) Have you consumed alcohol or drugs (other than prescribed)?  No  4) Did you not sleep AT ALL last night?  No  5) Has your SI changed or worsened? No    Indications for TMS:  MDD, recurrent, severe; 4+ medication trials (from 2+ classes) ineffective; Psychotherapy ineffective.     Pre-Procedure Diagnosis:  MDD, recurrent, severe F33.2    Treatment Hx:  Treatment number this series: 24  Total lifetime treatment number: 24    Allergies   Allergen Reactions     Cats      Codeine Sulfate GI Disturbance        Pause for the Cause  Right patient:  Yes  Right procedure/correct coil:  Yes; rTMS; cpt 77414; H1 coil.   Earplugs in place:  Yes    Procedure  Patient was seated in procedure chair. Identity and procedure was verified. Ear plugs were placed in ears and patient-specific cap was placed on head and tightened appropriately. Ruler locations were verified. Coil was placed at treatment location and stimulator was set to parameters described below. A test train was delivered and pt tolerated train. Given pt tolerance, 55 treatment trains were delivered. Pt tolerated procedure with facial and  right hand movement.    Motor Threshold Determination  Distance from nasion to inion: 38.0  MT 1: 0 - 8 - 14 @ 51% on 1/27/2022    Stimulation Parameters  Frequency: 18 Hz     Train duration: 2 sec  Total pulses  delivered: 1980  Inter-train interval: 20 sec  Tx Loc: 0 - eyebrows  - 14  Energy: 61% (120% MT)  Trains: 55 trains    Date MADRS IDS-SR PHQ-9   1/27/22 19  7   2/4/22  30 7   2/11/22  19 10   2/18/22  28 8   2/25/22  25 8   3/4/22  28 7              Post-Procedure Diagnosis:  MDD, recurrent, severe 296.33    Socorro Caraballo, EMT  [facility]  Mental [facility] Neuromodulation      Plan   - Cont TMS      I did not see the patient during/after treatment but remained available in the clinic during  treatment.    Ady North MD  [facility] Neuromodulation

## 2022-03-07 ENCOUNTER — OFFICE VISIT (OUTPATIENT)
Dept: PSYCHIATRY | Facility: CLINIC | Age: 72
End: 2022-03-07
Payer: MEDICARE

## 2022-03-07 DIAGNOSIS — F33.2 SEVERE EPISODE OF RECURRENT MAJOR DEPRESSIVE DISORDER, WITHOUT PSYCHOTIC FEATURES (H): Primary | ICD-10-CM

## 2022-03-07 ASSESSMENT — PATIENT HEALTH QUESTIONNAIRE - PHQ9: SUM OF ALL RESPONSES TO PHQ QUESTIONS 1-9: 5

## 2022-03-07 NOTE — PROGRESS NOTES
[facility] TMS Program  [address], [address]  [place] [address]  TMS Procedure Note   Svetlana Adair MRN# [id number]  Age: 71 year old year old YOB: 1950    Pre-Procedure:  History and Physical: Reviewed in medical record  Consent Signed by: Svetlana Adair  On: 1/27/2022    Clinical Narrative:  The patient is tolerating treatment. Reports having arthritis pain this weekend but other than that having a good weekend.    Daily Adult Stimulation Safety Questionnaire: No or Yes in past 24 hours     1) Have you discontinued or started any new medication? No  2) Have you missed any doses of your medication differently then prescribed? No  3) Have you consumed alcohol or drugs (other than prescribed)?  No  4) Did you not sleep AT ALL last night?  No  5) Has your SI changed or worsened? No    Indications for TMS:  MDD, recurrent, severe; 4+ medication trials (from 2+ classes) ineffective; Psychotherapy ineffective.     Pre-Procedure Diagnosis:  MDD, recurrent, severe F33.2    Treatment Hx:  Treatment number this series: 25  Total lifetime treatment number: 25    Allergies   Allergen Reactions     Cats      Codeine Sulfate GI Disturbance        Pause for the Cause  Right patient:  Yes  Right procedure/correct coil:  Yes; rTMS; cpt 04086; H1 coil.   Earplugs in place:  Yes    Procedure  Patient was seated in procedure chair. Identity and procedure was verified. Ear plugs were placed in ears and patient-specific cap was placed on head and tightened appropriately. Ruler locations were verified. Coil was placed at treatment location and stimulator was set to parameters described below. A test train was delivered and pt tolerated train. Given pt tolerance, 55 treatment trains were delivered. Pt tolerated procedure with facial and  right hand movement.    Motor Threshold Determination  Distance from nasion to inion: 38.0  MT 1: 0 - 8 - 14 @ 51% on 1/27/2022    Stimulation  Parameters  Frequency: 18 Hz     Train duration: 2 sec  Total pulses delivered: 1980  Inter-train interval: 20 sec  Tx Loc: 0 - eyebrows  - 14  Energy: 61% (120% MT)  Trains: 55 trains    Date MADRS IDS-SR PHQ-9   1/27/22 19  7   2/4/22  30 7   2/11/22  19 10   2/18/22  28 8   2/25/22  25 8   3/4/22  28 7              Post-Procedure Diagnosis:  MDD, recurrent, severe 296.33    KRISTA Adames  [facility] Neuromodulation      Plan   - Cont TMS      I did not see the patient during/after treatment but remained available in the clinic during  treatment.    Ady North MD  [facility] Neuromodulation

## 2022-03-08 ENCOUNTER — OFFICE VISIT (OUTPATIENT)
Dept: PSYCHIATRY | Facility: CLINIC | Age: 72
End: 2022-03-08
Payer: MEDICARE

## 2022-03-08 ENCOUNTER — ALLIED HEALTH/NURSE VISIT (OUTPATIENT)
Dept: PSYCHIATRY | Facility: CLINIC | Age: 72
End: 2022-03-08
Payer: MEDICARE

## 2022-03-08 DIAGNOSIS — F33.2 SEVERE EPISODE OF RECURRENT MAJOR DEPRESSIVE DISORDER, WITHOUT PSYCHOTIC FEATURES (H): Primary | ICD-10-CM

## 2022-03-08 ASSESSMENT — PATIENT HEALTH QUESTIONNAIRE - PHQ9: SUM OF ALL RESPONSES TO PHQ QUESTIONS 1-9: 5

## 2022-03-08 NOTE — Clinical Note
Depression is improving.  She is describing more feelings related to grief and loss in regards to aging and not having any grandkids.      Noemi Null-do you know of any good therapists that could help Mahnaz process her sense of loss when it comes to aging?     Noemi

## 2022-03-08 NOTE — PROGRESS NOTES
[facility] :  Care Coordination Note     SITUATION   Svetlana Adair is a 71 year old female who is receiving support for:  Clinic Care Coordination - Face To Face (6 week TMS check in )      BACKGROUND     26 Sessions of TMS completed     ASSESSMENT     Patient reports she feels like TMS has been helpful.  States she is feeling more and is feeling more like she is able to do things, however continues to struggle at times with motivation.  Patient reports she is sleeping better and is not waking up as frequently.      Describes a sense of loss and grief associated with aging and her body not being able to do what is use to do.  Discussed talk therapy, and finding someone to help her through this grief.  Patient was hesitant as she has tried many therapists and didn't feel like they gave her feedback, but was open to talking more about it.         PLAN           Follow-up plan:  Finish up TMS.         Noemi Shaw RN

## 2022-03-08 NOTE — PROGRESS NOTES
[facility] TMS Program  [address], [address]  [place] [address]  TMS Procedure Note   Svetlana Adair MRN# [id number]  Age: 71 year old year old YOB: 1950    Pre-Procedure:  History and Physical: Reviewed in medical record  Consent Signed by: Svetlana Adair  On: 1/27/2022    Clinical Narrative:  The patient is tolerating treatment. Reports feeling tired.    Daily Adult Stimulation Safety Questionnaire: No or Yes in past 24 hours     1) Have you discontinued or started any new medication? No  2) Have you missed any doses of your medication differently then prescribed? No  3) Have you consumed alcohol or drugs (other than prescribed)?  No  4) Did you not sleep AT ALL last night?  No  5) Has your SI changed or worsened? No    Indications for TMS:  MDD, recurrent, severe; 4+ medication trials (from 2+ classes) ineffective; Psychotherapy ineffective.     Pre-Procedure Diagnosis:  MDD, recurrent, severe F33.2    Treatment Hx:  Treatment number this series: 26  Total lifetime treatment number: 26    Allergies   Allergen Reactions     Cats      Codeine Sulfate GI Disturbance        Pause for the Cause  Right patient:  Yes  Right procedure/correct coil:  Yes; rTMS; cpt 74478; H1 coil.   Earplugs in place:  Yes    Procedure  Patient was seated in procedure chair. Identity and procedure was verified. Ear plugs were placed in ears and patient-specific cap was placed on head and tightened appropriately. Ruler locations were verified. Coil was placed at treatment location and stimulator was set to parameters described below. A test train was delivered and pt tolerated train. Given pt tolerance, 55 treatment trains were delivered. Pt tolerated procedure with facial and  right hand movement.    Motor Threshold Determination  Distance from nasion to inion: 38.0  MT 1: 0 - 8 - 14 @ 51% on 1/27/2022    Stimulation Parameters  Frequency: 18 Hz     Train duration: 2 sec  Total pulses delivered:  1980  Inter-train interval: 20 sec  Tx Loc: 0 - eyebrows  - 14  Energy: 61% (120% MT)  Trains: 55 trains    Date MADRS IDS-SR PHQ-9   1/27/22 19  7   2/4/22  30 7   2/11/22  19 10   2/18/22  28 8   2/25/22  25 8   3/4/22  28 7              Post-Procedure Diagnosis:  MDD, recurrent, severe 296.33    Socorro Caraballo, EMT  [facility]  Mental [facility] Neuromodulation      Plan   - Cont TMS      I did not see the patient during/after treatment but remained available in the clinic during  treatment.    Alvina Whiteside MD  [facility] Neuromodulation

## 2022-03-09 ENCOUNTER — OFFICE VISIT (OUTPATIENT)
Dept: PSYCHIATRY | Facility: CLINIC | Age: 72
End: 2022-03-09
Payer: MEDICARE

## 2022-03-09 DIAGNOSIS — F33.2 SEVERE EPISODE OF RECURRENT MAJOR DEPRESSIVE DISORDER, WITHOUT PSYCHOTIC FEATURES (H): Primary | ICD-10-CM

## 2022-03-09 ASSESSMENT — PATIENT HEALTH QUESTIONNAIRE - PHQ9: SUM OF ALL RESPONSES TO PHQ QUESTIONS 1-9: 6

## 2022-03-09 NOTE — PROGRESS NOTES
[facility] TMS Program  [address], [address]  [place] [address]  TMS Procedure Note   Svetlana Adair MRN# [id number]  Age: 71 year old year old YOB: 1950    Pre-Procedure:  History and Physical: Reviewed in medical record  Consent Signed by: Svetlana Adair  On: 1/27/2022    Clinical Narrative:  The patient is tolerating treatment. 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    Daily Adult Stimulation Safety Questionnaire: No or Yes in past 24 hours     1) Have you discontinued or started any new medication? No  2) Have you missed any doses of your medication differently then prescribed? No  3) Have you consumed alcohol or drugs (other than prescribed)?  No  4) Did you not sleep AT ALL last night?  No  5) Has your SI changed or worsened? No    Indications for TMS:  MDD, recurrent, severe; 4+ medication trials (from 2+ classes) ineffective; Psychotherapy ineffective.     Pre-Procedure Diagnosis:  MDD, recurrent, severe F33.2    Treatment Hx:  Treatment number this series: 27  Total lifetime treatment number: 27    Allergies   Allergen Reactions     Cats      Codeine Sulfate GI Disturbance        Pause for the Cause  Right patient:  Yes  Right procedure/correct coil:  Yes; rTMS; cpt 59177; H1 coil.   Earplugs in place:  Yes    Procedure  Patient was seated in procedure chair. Identity and procedure was verified. Ear plugs were placed in ears and patient-specific cap was placed on head and tightened appropriately. Ruler locations were verified. Coil was placed at treatment location and stimulator was set to parameters described below. A test train was delivered and pt tolerated train. Given pt tolerance, 55 treatment trains were delivered. Pt tolerated procedure with facial and  right hand movement.    Motor Threshold Determination  Distance from nasion to inion: 38.0  MT 1: 0 - 8 - 14 @ 51% on 1/27/2022    Stimulation Parameters  Frequency: 18 Hz     Train  duration: 2 sec  Total pulses delivered: 1980  Inter-train interval: 20 sec  Tx Loc: 0 - eyebrows  - 14  Energy: 61% (120% MT)  Trains: 55 trains    Date MADRS IDS-SR PHQ-9   1/27/22 19  7   2/4/22  30 7   2/11/22  19 10   2/18/22  28 8   2/25/22  25 8   3/4/22  28 7              Post-Procedure Diagnosis:  MDD, recurrent, severe 296.33    Socorro Caraballo, EMT  [facility] Neuromodulation      Plan   - Cont TMS      I did not see the patient during/after treatment but remained available in the clinic during  treatment.    Ady North MD  [facility] Neuromodulation

## 2022-03-10 ENCOUNTER — OFFICE VISIT (OUTPATIENT)
Dept: PSYCHIATRY | Facility: CLINIC | Age: 72
End: 2022-03-10
Payer: MEDICARE

## 2022-03-10 DIAGNOSIS — F33.2 SEVERE EPISODE OF RECURRENT MAJOR DEPRESSIVE DISORDER, WITHOUT PSYCHOTIC FEATURES (H): Primary | ICD-10-CM

## 2022-03-10 ASSESSMENT — PATIENT HEALTH QUESTIONNAIRE - PHQ9: SUM OF ALL RESPONSES TO PHQ QUESTIONS 1-9: 7

## 2022-03-10 NOTE — PROGRESS NOTES
[facility] TMS Program  [address], [address]  [place] [address]  TMS Procedure Note   Svetlana Adair MRN# [id number]  Age: 71 year old year old YOB: 1950    Pre-Procedure:  History and Physical: Reviewed in medical record  Consent Signed by: Svetlana Adair  On: 1/27/2022    Clinical Narrative:  The patient is tolerating treatment. 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    Daily Adult Stimulation Safety Questionnaire: No or Yes in past 24 hours     1) Have you discontinued or started any new medication? No  2) Have you missed any doses of your medication differently then prescribed? No  3) Have you consumed alcohol or drugs (other than prescribed)?  No  4) Did you not sleep AT ALL last night?  No  5) Has your SI changed or worsened? No    Indications for TMS:  MDD, recurrent, severe; 4+ medication trials (from 2+ classes) ineffective; Psychotherapy ineffective.     Pre-Procedure Diagnosis:  MDD, recurrent, severe F33.2    Treatment Hx:  Treatment number this series: 28  Total lifetime treatment number: 28    Allergies   Allergen Reactions     Cats      Codeine Sulfate GI Disturbance        Pause for the Cause  Right patient:  Yes  Right procedure/correct coil:  Yes; rTMS; cpt 45908; H1 coil.   Earplugs in place:  Yes    Procedure  Patient was seated in procedure chair. Identity and procedure was verified. Ear plugs were placed in ears and patient-specific cap was placed on head and tightened appropriately. Ruler locations were verified. Coil was placed at treatment location and stimulator was set to parameters described below. A test train was delivered and pt tolerated train. Given pt tolerance, 55 treatment trains were delivered. Pt tolerated procedure with facial and  right hand movement.    Motor Threshold Determination  Distance from nasion to inion: 38.0  MT 1: 0 - 8 - 14 @ 51% on 1/27/2022    Stimulation Parameters  Frequency: 18 Hz     Train  duration: 2 sec  Total pulses delivered: 1980  Inter-train interval: 20 sec  Tx Loc: 0 - eyebrows  - 14  Energy: 61% (120% MT)  Trains: 55 trains    Date MADRS IDS-SR PHQ-9   1/27/22 19  7   2/4/22  30 7   2/11/22  19 10   2/18/22  28 8   2/25/22  25 8   3/4/22  28 7              Post-Procedure Diagnosis:  MDD, recurrent, severe 296.33    Socorro Caraballo, EMT  [facility]  Mental [facility] Neuromodulation      Plan   - Cont TMS      I did not see the patient during/after treatment but remained available in the clinic during  treatment.    Alvina Whiteside MD  [facility] Neuromodulation

## 2022-03-11 ENCOUNTER — OFFICE VISIT (OUTPATIENT)
Dept: PSYCHIATRY | Facility: CLINIC | Age: 72
End: 2022-03-11
Payer: MEDICARE

## 2022-03-11 DIAGNOSIS — F33.2 SEVERE EPISODE OF RECURRENT MAJOR DEPRESSIVE DISORDER, WITHOUT PSYCHOTIC FEATURES (H): Primary | ICD-10-CM

## 2022-03-11 ASSESSMENT — PATIENT HEALTH QUESTIONNAIRE - PHQ9: SUM OF ALL RESPONSES TO PHQ QUESTIONS 1-9: 7

## 2022-03-11 NOTE — PROGRESS NOTES
[facility] TMS Program  [address], [address]  [place] [address]  TMS Procedure Note   Svetlana Adair MRN# [id number]  Age: 71 year old year old YOB: 1950    Pre-Procedure:  History and Physical: Reviewed in medical record  Consent Signed by: Svetlana Adair  On: 1/27/2022    Clinical Narrative:  The patient is tolerating treatment. She reports she thinks getting a grief counselor is a good idea per her last TMS check in with RN. Reports being tired but it may be from long COVID.    Daily Adult Stimulation Safety Questionnaire: No or Yes in past 24 hours     1) Have you discontinued or started any new medication? No  2) Have you missed any doses of your medication differently then prescribed? No  3) Have you consumed alcohol or drugs (other than prescribed)?  No  4) Did you not sleep AT ALL last night?  No  5) Has your SI changed or worsened? No    Indications for TMS:  MDD, recurrent, severe; 4+ medication trials (from 2+ classes) ineffective; Psychotherapy ineffective.     Pre-Procedure Diagnosis:  MDD, recurrent, severe F33.2    Treatment Hx:  Treatment number this series: 29  Total lifetime treatment number: 29    Allergies   Allergen Reactions     Cats      Codeine Sulfate GI Disturbance        Pause for the Cause  Right patient:  Yes  Right procedure/correct coil:  Yes; rTMS; cpt 14478; H1 coil.   Earplugs in place:  Yes    Procedure  Patient was seated in procedure chair. Identity and procedure was verified. Ear plugs were placed in ears and patient-specific cap was placed on head and tightened appropriately. Ruler locations were verified. Coil was placed at treatment location and stimulator was set to parameters described below. A test train was delivered and pt tolerated train. Given pt tolerance, 55 treatment trains were delivered. Pt tolerated procedure with facial and  right hand movement.    Motor Threshold Determination  Distance from nasion to inion: 38.0  MT 1: 0  - 8 - 14 @ 51% on 1/27/2022    Stimulation Parameters  Frequency: 18 Hz     Train duration: 2 sec  Total pulses delivered: 1980  Inter-train interval: 20 sec  Tx Loc: 0 - eyebrows  - 14  Energy: 61% (120% MT)  Trains: 55 trains    Date MADRS IDS-SR PHQ-9   1/27/22 19  7   2/4/22  30 7   2/11/22  19 10   2/18/22  28 8   2/25/22  25 8   3/4/22  28 7   3/11/22  24 7        Post-Procedure Diagnosis:  MDD, recurrent, severe 296.33    Socorro Caraballo, EMT  [facility]      Plan   - Cont TMS      I did not see the patient during/after treatment but remained available in the clinic during  treatment.    Ryan Joe MD PhD  [facility]

## 2022-03-14 ENCOUNTER — OFFICE VISIT (OUTPATIENT)
Dept: PSYCHIATRY | Facility: CLINIC | Age: 72
End: 2022-03-14
Payer: MEDICARE

## 2022-03-14 DIAGNOSIS — M54.2 NECK PAIN ON RIGHT SIDE: ICD-10-CM

## 2022-03-14 DIAGNOSIS — F33.2 SEVERE EPISODE OF RECURRENT MAJOR DEPRESSIVE DISORDER, WITHOUT PSYCHOTIC FEATURES (H): Primary | ICD-10-CM

## 2022-03-14 RX ORDER — HYDROCODONE BITARTRATE AND ACETAMINOPHEN 5; 325 MG/1; MG/1
1 TABLET ORAL DAILY PRN
Qty: 20 TABLET | Refills: 0 | Status: SHIPPED | OUTPATIENT
Start: 2022-03-14 | End: 2022-04-14

## 2022-03-14 ASSESSMENT — PATIENT HEALTH QUESTIONNAIRE - PHQ9: SUM OF ALL RESPONSES TO PHQ QUESTIONS 1-9: 7

## 2022-03-14 NOTE — PROGRESS NOTES
[facility] TMS Program  [address], [address]  [place] [address]  TMS Procedure Note   Svetlana Adair MRN# [id number]  Age: 71 year old year old YOB: 1950    Pre-Procedure:  History and Physical: Reviewed in medical record  Consent Signed by: Svetlana Adair  On: 1/27/2022    Clinical Narrative:  The patient is tolerating treatment. Reports seeing her cousin this weekend. She got a new mattress and reports much better sleep and less joint pain this weekend.    Daily Adult Stimulation Safety Questionnaire: No or Yes in past 24 hours     1) Have you discontinued or started any new medication? No  2) Have you missed any doses of your medication differently then prescribed? No  3) Have you consumed alcohol or drugs (other than prescribed)?  No  4) Did you not sleep AT ALL last night?  No  5) Has your SI changed or worsened? No    Indications for TMS:  MDD, recurrent, severe; 4+ medication trials (from 2+ classes) ineffective; Psychotherapy ineffective.     Pre-Procedure Diagnosis:  MDD, recurrent, severe F33.2    Treatment Hx:  Treatment number this series: 30  Total lifetime treatment number: 30    Allergies   Allergen Reactions     Cats      Codeine Sulfate GI Disturbance        Pause for the Cause  Right patient:  Yes  Right procedure/correct coil:  Yes; rTMS; cpt 09194; H1 coil.   Earplugs in place:  Yes    Procedure  Patient was seated in procedure chair. Identity and procedure was verified. Ear plugs were placed in ears and patient-specific cap was placed on head and tightened appropriately. Ruler locations were verified. Coil was placed at treatment location and stimulator was set to parameters described below. A test train was delivered and pt tolerated train. Given pt tolerance, 55 treatment trains were delivered. Pt tolerated procedure with facial and  right hand movement.    Motor Threshold Determination  Distance from nasion to inion: 38.0  MT 1: 0 - 8 - 14 @ 51% on  1/27/2022    Stimulation Parameters  Frequency: 18 Hz     Train duration: 2 sec  Total pulses delivered: 1980  Inter-train interval: 20 sec  Tx Loc: 0 - eyebrows  - 14  Energy: 61% (120% MT)  Trains: 55 trains    Date MADRS IDS-SR PHQ-9   1/27/22 19  7   2/4/22  30 7   2/11/22  19 10   2/18/22  28 8   2/25/22  25 8   3/4/22  28 7   3/11/22  24 7        Post-Procedure Diagnosis:  MDD, recurrent, severe 296.33    Socorro Caraballo, EMT  [facility]      Plan   - Cont TMS      I did not see the patient during/after treatment but remained available in the clinic during  treatment.    Ady North MD  [facility]od[facility]

## 2022-03-14 NOTE — TELEPHONE ENCOUNTER
Routing refill request to provider for review/approval because:  Drug not on the [facility] refill protocol .    Last refill 2-9-2022 with 20 tablets and no refill.     Denice Roman RN  [facility]

## 2022-03-15 ENCOUNTER — OFFICE VISIT (OUTPATIENT)
Dept: PSYCHIATRY | Facility: CLINIC | Age: 72
End: 2022-03-15
Payer: MEDICARE

## 2022-03-15 DIAGNOSIS — F33.2 SEVERE EPISODE OF RECURRENT MAJOR DEPRESSIVE DISORDER, WITHOUT PSYCHOTIC FEATURES (H): Primary | ICD-10-CM

## 2022-03-15 ASSESSMENT — PATIENT HEALTH QUESTIONNAIRE - PHQ9: SUM OF ALL RESPONSES TO PHQ QUESTIONS 1-9: 5

## 2022-03-15 NOTE — PROGRESS NOTES
[facility] TMS Program  [address], [address]  [place] [address]  TMS Procedure Note   Svetlana Adair MRN# [id number]  Age: 71 year old year old YOB: 1950    Pre-Procedure:  History and Physical: Reviewed in medical record  Consent Signed by: Svetlana Adari  On: 1/27/2022    Clinical Narrative:  The patient is tolerating treatment. She's going to go see her sister for her birthday.    Daily Adult Stimulation Safety Questionnaire: No or Yes in past 24 hours     1) Have you discontinued or started any new medication? No  2) Have you missed any doses of your medication differently then prescribed? No  3) Have you consumed alcohol or drugs (other than prescribed)?  No  4) Did you not sleep AT ALL last night?  No  5) Has your SI changed or worsened? No    Indications for TMS:  MDD, recurrent, severe; 4+ medication trials (from 2+ classes) ineffective; Psychotherapy ineffective.     Pre-Procedure Diagnosis:  MDD, recurrent, severe F33.2    Treatment Hx:  Treatment number this series: 31  Total lifetime treatment number: 31    Allergies   Allergen Reactions     Cats      Codeine Sulfate GI Disturbance        Pause for the Cause  Right patient:  Yes  Right procedure/correct coil:  Yes; rTMS; cpt 70147; H1 coil.   Earplugs in place:  Yes    Procedure  Patient was seated in procedure chair. Identity and procedure was verified. Ear plugs were placed in ears and patient-specific cap was placed on head and tightened appropriately. Ruler locations were verified. Coil was placed at treatment location and stimulator was set to parameters described below. A test train was delivered and pt tolerated train. Given pt tolerance, 55 treatment trains were delivered. Pt tolerated procedure with facial and  right hand movement.    Motor Threshold Determination  Distance from nasion to inion: 38.0  MT 1: 0 - 8 - 14 @ 51% on 1/27/2022    Stimulation Parameters  Frequency: 18 Hz     Train duration: 2  sec  Total pulses delivered: 1980  Inter-train interval: 20 sec  Tx Loc: 0 - eyebrows  - 14  Energy: 61% (120% MT)  Trains: 55 trains    Date MADRS IDS-SR PHQ-9   1/27/22 19  7   2/4/22  30 7   2/11/22  19 10   2/18/22  28 8   2/25/22  25 8   3/4/22  28 7   3/11/22  24 7        Post-Procedure Diagnosis:  MDD, recurrent, severe 296.33    Socorro Caraballo, EMT  [facility]      Plan   - Cont TMS      I did not see the patient during/after treatment but remained available in the clinic during  treatment.    Alvina Whiteside MD  [facility]od[facility]

## 2022-03-16 ENCOUNTER — OFFICE VISIT (OUTPATIENT)
Dept: PSYCHIATRY | Facility: CLINIC | Age: 72
End: 2022-03-16
Payer: MEDICARE

## 2022-03-16 ENCOUNTER — TRANSFERRED RECORDS (OUTPATIENT)
Dept: HEALTH INFORMATION MANAGEMENT | Facility: CLINIC | Age: 72
End: 2022-03-16
Payer: MEDICARE

## 2022-03-16 DIAGNOSIS — F33.2 SEVERE EPISODE OF RECURRENT MAJOR DEPRESSIVE DISORDER, WITHOUT PSYCHOTIC FEATURES (H): Primary | ICD-10-CM

## 2022-03-16 ASSESSMENT — PATIENT HEALTH QUESTIONNAIRE - PHQ9: SUM OF ALL RESPONSES TO PHQ QUESTIONS 1-9: 5

## 2022-03-16 NOTE — PROGRESS NOTES
[facility] TMS Program  [address], [address]  [place] [address]  TMS Procedure Note   Svetlana Adair MRN# [id number]  Age: 71 year old year old YOB: 1950    Pre-Procedure:  History and Physical: Reviewed in medical record  Consent Signed by: Svetlana Adair  On: 1/27/2022    Clinical Narrative:  The patient is tolerating treatment. She had a frustrating time seeing her sister yesterday.     Daily Adult Stimulation Safety Questionnaire: No or Yes in past 24 hours     1) Have you discontinued or started any new medication? No  2) Have you missed any doses of your medication differently then prescribed? No  3) Have you consumed alcohol or drugs (other than prescribed)?  No  4) Did you not sleep AT ALL last night?  No  5) Has your SI changed or worsened? No    Indications for TMS:  MDD, recurrent, severe; 4+ medication trials (from 2+ classes) ineffective; Psychotherapy ineffective.     Pre-Procedure Diagnosis:  MDD, recurrent, severe F33.2    Treatment Hx:  Treatment number this series: 32  Total lifetime treatment number: 32    Allergies   Allergen Reactions     Cats      Codeine Sulfate GI Disturbance        Pause for the Cause  Right patient:  Yes  Right procedure/correct coil:  Yes; rTMS; cpt 42000; H1 coil.   Earplugs in place:  Yes    Procedure  Patient was seated in procedure chair. Identity and procedure was verified. Ear plugs were placed in ears and patient-specific cap was placed on head and tightened appropriately. Ruler locations were verified. Coil was placed at treatment location and stimulator was set to parameters described below. A test train was delivered and pt tolerated train. Given pt tolerance, 55 treatment trains were delivered. Pt tolerated procedure with facial and  right hand movement.    Motor Threshold Determination  Distance from nasion to inion: 38.0  MT 1: 0 - 8 - 14 @ 51% on 1/27/2022    Stimulation Parameters  Frequency: 18 Hz     Train duration: 2  sec  Total pulses delivered: 1980  Inter-train interval: 20 sec  Tx Loc: 0 - eyebrows  - 14  Energy: 61% (120% MT)  Trains: 55 trains    Date MADRS IDS-SR PHQ-9   1/27/22 19  7   2/4/22  30 7   2/11/22  19 10   2/18/22  28 8   2/25/22  25 8   3/4/22  28 7   3/11/22  24 7        Post-Procedure Diagnosis:  MDD, recurrent, severe 296.33    Socorro Caraballo, EMT  [facility] Neu[facility]od[facility]      Plan   - Cont TMS      I did not see the patient during/after treatment but remained available in the clinic during  treatment.    Ady North MD  [facility] Neuromod[facility]

## 2022-03-17 ENCOUNTER — OFFICE VISIT (OUTPATIENT)
Dept: PSYCHIATRY | Facility: CLINIC | Age: 72
End: 2022-03-17
Payer: MEDICARE

## 2022-03-17 DIAGNOSIS — F33.2 SEVERE EPISODE OF RECURRENT MAJOR DEPRESSIVE DISORDER, WITHOUT PSYCHOTIC FEATURES (H): Primary | ICD-10-CM

## 2022-03-17 ASSESSMENT — PATIENT HEALTH QUESTIONNAIRE - PHQ9: SUM OF ALL RESPONSES TO PHQ QUESTIONS 1-9: 6

## 2022-03-17 NOTE — PROGRESS NOTES
[facility] TMS Program  [address], [address]  [place] [address]  TMS Procedure Note   Svetlana Adair MRN# [id number]  Age: 71 year old year old YOB: 1950    Pre-Procedure:  History and Physical: Reviewed in medical record  Consent Signed by: Svetlana Adair  On: 1/27/2022    Clinical Narrative:  The patient is tolerating treatment. Pt is in a good mood today, no changes to report.    Daily Adult Stimulation Safety Questionnaire: No or Yes in past 24 hours     1) Have you discontinued or started any new medication? No  2) Have you missed any doses of your medication differently then prescribed? No  3) Have you consumed alcohol or drugs (other than prescribed)?  No  4) Did you not sleep AT ALL last night?  No  5) Has your SI changed or worsened? No    Indications for TMS:  MDD, recurrent, severe; 4+ medication trials (from 2+ classes) ineffective; Psychotherapy ineffective.     Pre-Procedure Diagnosis:  MDD, recurrent, severe F33.2    Treatment Hx:  Treatment number this series: 33  Total lifetime treatment number: 33    Allergies   Allergen Reactions     Cats      Codeine Sulfate GI Disturbance        Pause for the Cause  Right patient:  Yes  Right procedure/correct coil:  Yes; rTMS; cpt 01900; H1 coil.   Earplugs in place:  Yes    Procedure  Patient was seated in procedure chair. Identity and procedure was verified. Ear plugs were placed in ears and patient-specific cap was placed on head and tightened appropriately. Ruler locations were verified. Coil was placed at treatment location and stimulator was set to parameters described below. A test train was delivered and pt tolerated train. Given pt tolerance, 55 treatment trains were delivered. Pt tolerated procedure with facial and  right hand movement.    Motor Threshold Determination  Distance from nasion to inion: 38.0  MT 1: 0 - 8 - 14 @ 51% on 1/27/2022    Stimulation Parameters  Frequency: 18 Hz     Train duration: 2  sec  Total pulses delivered: 1980  Inter-train interval: 20 sec  Tx Loc: 0 - eyebrows  - 14  Energy: 61% (120% MT)  Trains: 55 trains    Date MADRS IDS-SR PHQ-9   1/27/22 19  7   2/4/22  30 7   2/11/22  19 10   2/18/22  28 8   2/25/22  25 8   3/4/22  28 7   3/11/22  24 7        Post-Procedure Diagnosis:  MDD, recurrent, severe 296.33    Kevin Mack TMS Technician  [facility]      Plan   - Cont TMS      I did not see the patient during/after treatment but remained available in the clinic during  treatment.    Alvina Whiteside MD  [facility]

## 2022-03-18 ENCOUNTER — OFFICE VISIT (OUTPATIENT)
Dept: PSYCHIATRY | Facility: CLINIC | Age: 72
End: 2022-03-18
Payer: MEDICARE

## 2022-03-18 DIAGNOSIS — F33.2 SEVERE EPISODE OF RECURRENT MAJOR DEPRESSIVE DISORDER, WITHOUT PSYCHOTIC FEATURES (H): Primary | ICD-10-CM

## 2022-03-18 ASSESSMENT — PATIENT HEALTH QUESTIONNAIRE - PHQ9: SUM OF ALL RESPONSES TO PHQ QUESTIONS 1-9: 5

## 2022-03-18 NOTE — PROGRESS NOTES
[facility] TMS Program  [address], [address]  [place] [address]  TMS Procedure Note   Svetlana Adair MRN# [id number]  Age: 71 year old year old YOB: 1950    Pre-Procedure:  History and Physical: Reviewed in medical record  Consent Signed by: Svetlana Adair  On: 1/27/2022    Clinical Narrative:  The patient is tolerating treatment.     Daily Adult Stimulation Safety Questionnaire: No or Yes in past 24 hours     1) Have you discontinued or started any new medication? No  2) Have you missed any doses of your medication differently then prescribed? No  3) Have you consumed alcohol or drugs (other than prescribed)?  No  4) Did you not sleep AT ALL last night?  No  5) Has your SI changed or worsened? No    Indications for TMS:  MDD, recurrent, severe; 4+ medication trials (from 2+ classes) ineffective; Psychotherapy ineffective.     Pre-Procedure Diagnosis:  MDD, recurrent, severe F33.2    Treatment Hx:  Treatment number this series: 34  Total lifetime treatment number: 34    Allergies   Allergen Reactions     Cats      Codeine Sulfate GI Disturbance        Pause for the Cause  Right patient:  Yes  Right procedure/correct coil:  Yes; rTMS; cpt 40673; H1 coil.   Earplugs in place:  Yes    Procedure  Patient was seated in procedure chair. Identity and procedure was verified. Ear plugs were placed in ears and patient-specific cap was placed on head and tightened appropriately. Ruler locations were verified. Coil was placed at treatment location and stimulator was set to parameters described below. A test train was delivered and pt tolerated train. Given pt tolerance, 55 treatment trains were delivered. Pt tolerated procedure with facial and  right hand movement.    Motor Threshold Determination  Distance from nasion to inion: 38.0  MT 1: 0 - 8 - 14 @ 51% on 1/27/2022    Stimulation Parameters  Frequency: 18 Hz     Train duration: 2 sec  Total pulses delivered: 1980  Inter-train  interval: 20 sec  Tx Loc: 0 - eyebrows  - 14  Energy: 61% (120% MT)  Trains: 55 trains    Date MADRS IDS-SR PHQ-9   1/27/22 19  7   2/4/22  30 7   2/11/22  19 10   2/18/22  28 8   2/25/22  25 8   3/4/22  28 7   3/11/22  24 7   3/18/22  22 5        Post-Procedure Diagnosis:  MDD, recurrent, severe 296.33    Socorro Caraballo, EMT  [facility]  [facility]      Plan   - Cont TMS      I did not see the patient during/after treatment but remained available in the clinic during  treatment.     Ady North MD  [facility]

## 2022-03-21 ENCOUNTER — OFFICE VISIT (OUTPATIENT)
Dept: PSYCHIATRY | Facility: CLINIC | Age: 72
End: 2022-03-21
Payer: MEDICARE

## 2022-03-21 DIAGNOSIS — F33.2 SEVERE EPISODE OF RECURRENT MAJOR DEPRESSIVE DISORDER, WITHOUT PSYCHOTIC FEATURES (H): Primary | ICD-10-CM

## 2022-03-21 ASSESSMENT — PATIENT HEALTH QUESTIONNAIRE - PHQ9: SUM OF ALL RESPONSES TO PHQ QUESTIONS 1-9: 6

## 2022-03-21 NOTE — PROGRESS NOTES
[facility] TMS Program  [address], [address]  [place] [address]  TMS Procedure Note   Svetlana Adair MRN# [id number]  Age: 71 year old year old YOB: 1950    Pre-Procedure:  History and Physical: Reviewed in medical record  Consent Signed by: Svetlana Adair  On: 1/27/2022    Clinical Narrative:  The patient is tolerating treatment.     Daily Adult Stimulation Safety Questionnaire: No or Yes in past 24 hours     1) Have you discontinued or started any new medication? No  2) Have you missed any doses of your medication differently then prescribed? No  3) Have you consumed alcohol or drugs (other than prescribed)?  No  4) Did you not sleep AT ALL last night?  No  5) Has your SI changed or worsened? No    Indications for TMS:  MDD, recurrent, severe; 4+ medication trials (from 2+ classes) ineffective; Psychotherapy ineffective.     Pre-Procedure Diagnosis:  MDD, recurrent, severe F33.2    Treatment Hx:  Treatment number this series: 35  Total lifetime treatment number: 35    Allergies   Allergen Reactions     Cats      Codeine Sulfate GI Disturbance        Pause for the Cause  Right patient:  Yes  Right procedure/correct coil:  Yes; rTMS; cpt 49209; H1 coil.   Earplugs in place:  Yes    Procedure  Patient was seated in procedure chair. Identity and procedure was verified. Ear plugs were placed in ears and patient-specific cap was placed on head and tightened appropriately. Ruler locations were verified. Coil was placed at treatment location and stimulator was set to parameters described below. A test train was delivered and pt tolerated train. Given pt tolerance, 55 treatment trains were delivered. Pt tolerated procedure with facial and  right hand movement.    Motor Threshold Determination  Distance from nasion to inion: 38.0  MT 1: 0 - 8 - 14 @ 51% on 1/27/2022    Stimulation Parameters  Frequency: 18 Hz     Train duration: 2 sec  Total pulses delivered: 1980  Inter-train  interval: 20 sec  Tx Loc: 0 - eyebrows  - 14  Energy: 61% (120% MT)  Trains: 55 trains    Date MADRS IDS-SR PHQ-9   1/27/22 19  7   2/4/22  30 7   2/11/22  19 10   2/18/22  28 8   2/25/22  25 8   3/4/22  28 7   3/11/22  24 7   3/18/22  22 5        Post-Procedure Diagnosis:  MDD, recurrent, severe 296.33    Caroline Hawkins, Psychometrist  [facility]      Plan   - Cont TMS      I did not see the patient during/after treatment but remained available in the clinic during  treatment.    Ady North MD  [facility]

## 2022-03-22 ENCOUNTER — OFFICE VISIT (OUTPATIENT)
Dept: PSYCHIATRY | Facility: CLINIC | Age: 72
End: 2022-03-22
Payer: MEDICARE

## 2022-03-22 DIAGNOSIS — F33.2 SEVERE EPISODE OF RECURRENT MAJOR DEPRESSIVE DISORDER, WITHOUT PSYCHOTIC FEATURES (H): Primary | ICD-10-CM

## 2022-03-22 ASSESSMENT — PATIENT HEALTH QUESTIONNAIRE - PHQ9: SUM OF ALL RESPONSES TO PHQ QUESTIONS 1-9: 5

## 2022-03-22 NOTE — PROGRESS NOTES
[facility] TMS Program  [address], [address]  [place] [address]  TMS Procedure Note   Svetlana Adair MRN# [id number]  Age: 71 year old year old YOB: 1950    Pre-Procedure:  History and Physical: Reviewed in medical record  Consent Signed by: Svetlana Adair  On: 1/27/2022    Clinical Narrative:  The patient is tolerating treatment. No changes to report.    Daily Adult Stimulation Safety Questionnaire: No or Yes in past 24 hours     1) Have you discontinued or started any new medication? No  2) Have you missed any doses of your medication differently then prescribed? No  3) Have you consumed alcohol or drugs (other than prescribed)?  No  4) Did you not sleep AT ALL last night?  No  5) Has your SI changed or worsened? No    Indications for TMS:  MDD, recurrent, severe; 4+ medication trials (from 2+ classes) ineffective; Psychotherapy ineffective.     Pre-Procedure Diagnosis:  MDD, recurrent, severe F33.2    Treatment Hx:  Treatment number this series: 36  Total lifetime treatment number: 36    Allergies   Allergen Reactions     Cats      Codeine Sulfate GI Disturbance        Pause for the Cause  Right patient:  Yes  Right procedure/correct coil:  Yes; rTMS; cpt 97696; H1 coil.   Earplugs in place:  Yes    Procedure  Patient was seated in procedure chair. Identity and procedure was verified. Ear plugs were placed in ears and patient-specific cap was placed on head and tightened appropriately. Ruler locations were verified. Coil was placed at treatment location and stimulator was set to parameters described below. A test train was delivered and pt tolerated train. Given pt tolerance, 55 treatment trains were delivered. Pt tolerated procedure with facial and  right hand movement.    Motor Threshold Determination  Distance from nasion to inion: 38.0  MT 1: 0 - 8 - 14 @ 51% on 1/27/2022    Stimulation Parameters  Frequency: 18 Hz     Train duration: 2 sec  Total pulses delivered:  1980  Inter-train interval: 20 sec  Tx Loc: 0 - eyebrows  - 14  Energy: 61% (120% MT)  Trains: 55 trains    Date MADRS IDS-SR PHQ-9   1/27/22 19  7   2/4/22  30 7   2/11/22  19 10   2/18/22  28 8   2/25/22  25 8   3/4/22  28 7   3/11/22  24 7   3/18/22  22 5        Post-Procedure Diagnosis:  MDD, recurrent, severe 296.33    Kevin Mack, RANULFO Technician  [facility] Neu[facility]od[facility]      Plan   - Cont TMS    I did not see the patient during/after treatment but remained available in the clinic during  treatment.    Alvina Whiteside MD  [facility]

## 2022-03-23 ENCOUNTER — OFFICE VISIT (OUTPATIENT)
Dept: PSYCHIATRY | Facility: CLINIC | Age: 72
End: 2022-03-23
Payer: MEDICARE

## 2022-03-23 DIAGNOSIS — F33.2 SEVERE EPISODE OF RECURRENT MAJOR DEPRESSIVE DISORDER, WITHOUT PSYCHOTIC FEATURES (H): Primary | ICD-10-CM

## 2022-03-23 ASSESSMENT — PATIENT HEALTH QUESTIONNAIRE - PHQ9: SUM OF ALL RESPONSES TO PHQ QUESTIONS 1-9: 4

## 2022-03-23 NOTE — PROGRESS NOTES
[facility] TMS Program  [address], [address]  [place] [address]  TMS Procedure Note   Svetlana Adair MRN# [id number]  Age: 71 year old year old YOB: 1950    Pre-Procedure:  History and Physical: Reviewed in medical record  Consent Signed by: Svetlana Adair  On: 1/27/2022    Clinical Narrative:  The patient is tolerating treatment. Today is her last treatment. She is excited to see her great great niece today. She states she feels more aware of her emotions.    Daily Adult Stimulation Safety Questionnaire: No or Yes in past 24 hours     1) Have you discontinued or started any new medication? No  2) Have you missed any doses of your medication differently then prescribed? No  3) Have you consumed alcohol or drugs (other than prescribed)?  No  4) Did you not sleep AT ALL last night?  No  5) Has your SI changed or worsened? No    Indications for TMS:  MDD, recurrent, severe; 4+ medication trials (from 2+ classes) ineffective; Psychotherapy ineffective.     Pre-Procedure Diagnosis:  MDD, recurrent, severe F33.2    Treatment Hx:  Treatment number this series: 37  Total lifetime treatment number: 37    Allergies   Allergen Reactions     Cats      Codeine Sulfate GI Disturbance        Pause for the Cause  Right patient:  Yes  Right procedure/correct coil:  Yes; rTMS; cpt 98145; H1 coil.   Earplugs in place:  Yes    Procedure  Patient was seated in procedure chair. Identity and procedure was verified. Ear plugs were placed in ears and patient-specific cap was placed on head and tightened appropriately. Ruler locations were verified. Coil was placed at treatment location and stimulator was set to parameters described below. A test train was delivered and pt tolerated train. Given pt tolerance, 55 treatment trains were delivered. Pt tolerated procedure with facial and  right hand movement.    Motor Threshold Determination  Distance from nasion to inion: 38.0  MT 1: 0 - 8 - 14 @ 51% on  1/27/2022    Stimulation Parameters  Frequency: 18 Hz     Train duration: 2 sec  Total pulses delivered: 1980  Inter-train interval: 20 sec  Tx Loc: 0 - eyebrows  - 14  Energy: 61% (120% MT)  Trains: 55 trains    Date MADRS IDS-SR PHQ-9   1/27/22 19  7   2/4/22  30 7   2/11/22  19 10   2/18/22  28 8   2/25/22  25 8   3/4/22  28 7   3/11/22  24 7   3/18/22  22 5   3/23/22 9 17 4        Post-Procedure Diagnosis:  MDD, recurrent, severe 296.33    Socorro Caraballo, EMT  [facility] Neuromod[facility]      Plan   -TMS is complete. Schedule follow-up appointment with TRD provider    I did not see the patient during/after treatment but remained available in the clinic during  treatment.    Ady North MD  [facility] Neu[facility]od[facility]

## 2022-03-24 ENCOUNTER — TELEPHONE (OUTPATIENT)
Dept: FAMILY MEDICINE | Facility: CLINIC | Age: 72
End: 2022-03-24
Payer: MEDICARE

## 2022-03-24 NOTE — TELEPHONE ENCOUNTER
Symptoms: ongoing bilateral knee pain--worse in right knee.   Patient reports been going to PHYSICAL THERAPY---no improvement.     Requesting cortisone injection(s)    1/17/22 knee Xray results/recommendation:  The knee x-ray confirms at least moderate degenerative arthritis in the right knee.  I believe that you could start by trying some physical therapy for the knee to see if this improves the situation.  If the physical therapy exercises are too painful to complete, you have the option to schedule an appointment with me for cortisone injection.      Scheduled appointment with Dr Cruz for cortisone inj.     Edith MILNER, RN      March 24, 2022  2:41 PM

## 2022-03-28 ENCOUNTER — VIRTUAL VISIT (OUTPATIENT)
Dept: PSYCHIATRY | Facility: CLINIC | Age: 72
End: 2022-03-28
Payer: MEDICARE

## 2022-03-28 DIAGNOSIS — F33.2 SEVERE EPISODE OF RECURRENT MAJOR DEPRESSIVE DISORDER, WITHOUT PSYCHOTIC FEATURES (H): Primary | ICD-10-CM

## 2022-03-28 NOTE — TELEPHONE ENCOUNTER
"Patient called has future appt:  2022 04:30 PM - Provider Visit  [facility] Daly Curz MD     PCP: patient wonderin. If provider gives other knee injection besides cortisone \"the one that lubricates your joint\", \"not cortisone\"\"same serum in knee\" patient unable to give name of injection they are referring to. If unable advise on referral for who can do in injection.     2. \"can I get an antibody test when I'm there\"      Callback: [phone]- okay to leave detailed VM.     Lorenzo Mckeon RN  [facility]      "

## 2022-03-28 NOTE — PROGRESS NOTES
"[facility] :  Care Coordination Note     SITUATION   Svetlana Adair is a 71 year old female who is receiving support for:  Clinic Care Coordination - Follow-up (Post TMS )      BACKGROUND     One week Post TMS     ASSESSMENT     Patient reports that she is doing well.  She reports that she had a really good weekend.  Patient reports that she has been more active and has been reaching out to do things with friends.  She went for a walk with a neighbor and hung out with a friend and watched a movie this weekend.    Patient reports that TMS went very well for her.  States that she enjoyed working the TMS techs and enjoyed the conversations with them.  Patient reports that she had a very positive experience.  States that overall she found TMS very helpful.     States that her energy is better, but overall is not as good as it has been.  States that she has gotten better at reaching out to people and doing things.  States that she is \"brighter.\"  She reports that she got her hair cut and her hairdresser hadn't seen her since before TMS and hairdresser noted that she has a sparkle in her eye.      Talked about what she is going to do instead of coming to TMS everyday.  She reports that she is going to try to exercise.  States that she is going to try walking in the morning with a neighbor.            PLAN               Follow-up plan:  F/u with Dr. Whiteside on 4/28/22       Noemi Shaw RN   "

## 2022-03-28 NOTE — TELEPHONE ENCOUNTER
Sorry X 2:  I do steroid injections, but not Synvysc injections, and also I don't think she needs a COVID ab tests

## 2022-03-29 NOTE — TELEPHONE ENCOUNTER
Writer called patient, reviewed provider information: that provider only does osteroid injections, but not Synvysc injections, and patient does not need a COVID ab tests. Patient had follow up questions, writer advised pateint to discuss at next OV on 4/14/22.    Patient driving requesting to writer to callback and [facility] provider information below. Writer called and left VM within information below.     Lorenzo Mckeon RN  [facility]

## 2022-03-29 NOTE — TELEPHONE ENCOUNTER
PCP: please advise patient requesting referral as to where they can receive injection - [facility]?     Lorenzo Mckeon RN  [facility]

## 2022-03-29 NOTE — TELEPHONE ENCOUNTER
No formal referral necessary for [facility]  Recommend Dr. Churchill or Dr. Cristhian Lynch  [phone]

## 2022-04-06 ENCOUNTER — TRANSFERRED RECORDS (OUTPATIENT)
Dept: HEALTH INFORMATION MANAGEMENT | Facility: CLINIC | Age: 72
End: 2022-04-06
Payer: MEDICARE

## 2022-04-13 ENCOUNTER — TRANSFERRED RECORDS (OUTPATIENT)
Dept: HEALTH INFORMATION MANAGEMENT | Facility: CLINIC | Age: 72
End: 2022-04-13
Payer: MEDICARE

## 2022-04-14 ENCOUNTER — OFFICE VISIT (OUTPATIENT)
Dept: FAMILY MEDICINE | Facility: CLINIC | Age: 72
End: 2022-04-14
Payer: MEDICARE

## 2022-04-14 VITALS
BODY MASS INDEX: 24.32 KG/M2 | SYSTOLIC BLOOD PRESSURE: 123 MMHG | DIASTOLIC BLOOD PRESSURE: 77 MMHG | WEIGHT: 146 LBS | HEIGHT: 65 IN | TEMPERATURE: 97.2 F | HEART RATE: 66 BPM | RESPIRATION RATE: 16 BRPM | OXYGEN SATURATION: 98 %

## 2022-04-14 DIAGNOSIS — M54.2 NECK PAIN ON RIGHT SIDE: ICD-10-CM

## 2022-04-14 DIAGNOSIS — Z23 HIGH PRIORITY FOR 2019-NCOV VACCINE: ICD-10-CM

## 2022-04-14 DIAGNOSIS — F33.2 SEVERE EPISODE OF RECURRENT MAJOR DEPRESSIVE DISORDER, WITHOUT PSYCHOTIC FEATURES (H): ICD-10-CM

## 2022-04-14 DIAGNOSIS — R53.83 FATIGUE, UNSPECIFIED TYPE: Primary | ICD-10-CM

## 2022-04-14 PROCEDURE — 91305 COVID-19,PF,PFIZER (12+ YRS): CPT | Performed by: INTERNAL MEDICINE

## 2022-04-14 PROCEDURE — 0054A COVID-19,PF,PFIZER (12+ YRS): CPT | Performed by: INTERNAL MEDICINE

## 2022-04-14 PROCEDURE — 99214 OFFICE O/P EST MOD 30 MIN: CPT | Performed by: INTERNAL MEDICINE

## 2022-04-14 RX ORDER — BUPROPION HYDROCHLORIDE 150 MG/1
150 TABLET ORAL EVERY MORNING
Qty: 90 TABLET | Refills: 3 | Status: SHIPPED | OUTPATIENT
Start: 2022-04-14 | End: 2022-05-10

## 2022-04-14 RX ORDER — HYDROCODONE BITARTRATE AND ACETAMINOPHEN 5; 325 MG/1; MG/1
1 TABLET ORAL DAILY PRN
Qty: 20 TABLET | Refills: 0 | Status: SHIPPED | OUTPATIENT
Start: 2022-04-14 | End: 2022-05-19

## 2022-04-14 ASSESSMENT — PAIN SCALES - GENERAL: PAINLEVEL: MILD PAIN (3)

## 2022-04-14 NOTE — PROGRESS NOTES
Assessment & Plan     Fatigue, unspecified type  We discussed the possibility of rechecking her thyroid function test to see if this might be a reversible cause of fatigue although at the conclusion of our discussion she elected to not have those tests drawn.    Severe episode of recurrent major depressive disorder, without psychotic features (H)  We discussed the possibility of increasing her Wellbutrin to see if this improves her fatigue symptoms.  She will consider this.  Side effects and risks were discussed.  If she does end up on a 300 mg/day dose and it helps her energy levels, she will communicate this to me via [employer] or by message so that a 300 mg tablet can be sent to her pharmacy.  She also plans to follow-up with the psychiatrist who administered the transcranial magnetic therapy.  - buPROPion (WELLBUTRIN XL) 150 MG 24 hr tablet; Take 1 tablet (150 mg) by mouth every morning    Neck pain on right side  Continue current dose of hydrocodone.  Benefits seem to outweigh the risks.  - HYDROcodone-acetaminophen (NORCO) 5-325 MG tablet; Take 1 tablet by mouth daily as needed for pain    High priority for 2019-nCoV vaccine    - COVID-19,PF,PFIZER (12+ Yrs GRAY LABEL)      30 minutes spent on the date of the encounter doing chart review, history and exam, documentation and further activities per the note           Return in about 9 months (around 1/14/2023) for Preventive Visit.   Patient instructed to return to clinic or contact us sooner if symptoms worsen or new symptoms develop.     Vladislav Cruz MD  [facility] BELINDA Joya is a 71 year old who presents for the following health issues     This is a 71-year-old female with breast cancer, depression, alcoholism, hyperlipidemia, coronary artery calcifications    She made this appointment because she desired a cortisone injection in her right knee joint    However, she received a cortisone injection from her orthopedic surgeon  "just yesterday    Symptoms are improved since then    She wanted to take advantage of this appointment slot to discuss her persistent fatigue since her Covid diagnosis at the beginning of 2020    Fortunately, she did receive some improvement in her mood with transcranial magnetic therapy    She has recently completed her transcranial magnetic therapy treatments    She did also review the story of her DUI experience several months ago    She tells me that she has been sober for 6 months    She continues to use low doses of hydrocodone for her musculoskeletal pain that is chronic without side effects    She had some questions about her previous notation of Hashimoto thyroiditis noted in her chart in the remote past    We reviewed her TSH results recorded in the epic computer system dating back to 2013 which have all been normal        Review of Systems         Objective    /77 (BP Location: Left arm, Patient Position: Chair, Cuff Size: Adult Regular)   Pulse 66   Temp 97.2  F (36.2  C) (Temporal)   Resp 16   Ht 1.638 m (5' 4.5\")   Wt 66.2 kg (146 lb)   SpO2 98%   BMI 24.67 kg/m    Body mass index is 24.67 kg/m .  Physical Exam   General: This is a well-appearing, comfortable appearing female in no acute distress.  Mental State: She describes fatigue and exhaustion although she seems quite animated in clinic today, she has long and detailed descriptions of her experiences and it is difficult for the examiner to get a word and at times, she states that her mood is improved, she appears well groomed                "

## 2022-04-27 ENCOUNTER — TRANSFERRED RECORDS (OUTPATIENT)
Dept: HEALTH INFORMATION MANAGEMENT | Facility: CLINIC | Age: 72
End: 2022-04-27
Payer: MEDICARE

## 2022-05-12 ENCOUNTER — OFFICE VISIT (OUTPATIENT)
Dept: PSYCHIATRY | Facility: CLINIC | Age: 72
End: 2022-05-12
Payer: MEDICARE

## 2022-05-12 VITALS
TEMPERATURE: 97.3 F | DIASTOLIC BLOOD PRESSURE: 91 MMHG | HEART RATE: 56 BPM | BODY MASS INDEX: 24.36 KG/M2 | HEIGHT: 65 IN | WEIGHT: 146.2 LBS | SYSTOLIC BLOOD PRESSURE: 132 MMHG

## 2022-05-12 DIAGNOSIS — F33.2 SEVERE EPISODE OF RECURRENT MAJOR DEPRESSIVE DISORDER, WITHOUT PSYCHOTIC FEATURES (H): Primary | ICD-10-CM

## 2022-05-12 DIAGNOSIS — F10.20 ALCOHOL USE DISORDER, SEVERE, DEPENDENCE (H): ICD-10-CM

## 2022-05-12 ASSESSMENT — PATIENT HEALTH QUESTIONNAIRE - PHQ9: SUM OF ALL RESPONSES TO PHQ QUESTIONS 1-9: 2

## 2022-05-12 NOTE — PROGRESS NOTES
"  Psychiatry Clinic Progress Note                                                                   Svetlana Adair is a 71 year old female who prefers the name \"Prerna" and pronoun she, hers.  Therapist: Scheduled to start in September 2021  PCP: Vladislav Cruz  Other Providers: None    Pertinent Background:  This patient initially experienced symptoms in the 1990s when her  at the time was developing symptoms of early-onset Alzheimer's in his 40s. She received mental health care at this time including an selective serotonin reuptake inhibitor as well as therapy; she is not sure if either helped, but feels that life stressors were so intense that no medication could have helped. She has many major life stressors including  Laurel's alzheimer's diagnosis in 1999, mother's death in 2000, Laurel's death in 2009, breast cancer diagnosis in 2011, recurrence with lymphedema in 2017.  Psych critical item history includes mutiple psychotropic trials  and substance use: alcohol.    Interim History                                                                                                        4, 4     The patient is a good historian.      Since the last visit:     Today, she reports feeling \"Good, actually, DWI stuff is over\"  - Before TMS she was feeling numb when hearing news and now she is feeling more emotions. For example, she had a recent disagreement with client and experienced a lot of feelings. Very irritable. She doesn't think she would have felt the irritability as much, before starting TMS  - discussed TMS being an activating treatment, with TMS anxiety may get worse before depression is getting better. She is noticing a little bit more energy  - Wondered if it effects other things: memory (short term gone with COVID) - discussed usually TMS improves energy  - In regards to depressed feelings: \"still trying to pull out of hopelessness\", hopelessness bothers here. COVID really effected her " "negatively. \"think its better\"  - She is also noticing more motivation   - Denies SI but did throughout COVID, had frequent thoughts of \"fine if die young\"   - She received letter from doctor's office that she needs close lung imaging follow-up and is worried about having breast cancer again   - She is also going through bankruptcy   - Discussed history of TMS and indications of TMS    Recent Symptoms:   Depression:  Slight hopelessness and low energy     Recent Substance Use:  Alcohol- sober        Social/ Family History                                  [per patient report]                                 1ea,1ea     FINANCIAL SUPPORT- working full time as a caretaker, sliding scale insulin, -- \"Mahnaz states she cannot leave the job because she is  essentially broke  after investing the majority of her finances in a wellness company called  [employer].      RELATIONSHIPS/CHILDREN- No children   high school boyfriend --   Remarried Laurel--> opened [employer] --> he passed away in 2009  LIVING SITUATION- lives alone      LEGAL- DUI, multiple  EARLY HISTORY/ EDUCATION- Has some college.   SOCIAL/ SPIRITUAL SUPPORT-has many friends in area, finds support through AA group, practices meditation  CULTURAL INFLUENCES/ IMPACT- none       TRAUMA HISTORY (self-report)- None  FEELS SAFE AT HOME- Yes  FAMILY HISTORY-  Bipolar disorder in sister; depression in mother  Hobbies: horseback riding, tennis with friends  Previously competitive swimmer when younger    Medical / Surgical History                                                                                                                  Patient Active Problem List   Diagnosis     Breast cancer est/prog +, HER2 ERNIE -     Osteoarthritis     Moderate episode of recurrent major depressive disorder (H)     Advanced directives, counseling/discussion     Knee pain     Past use of tobacco     IFG (impaired fasting glucose)     Low back pain "     Malignant neoplasm of right breast (H)     Hyperlipidemia LDL goal <70     Follow-up examination following surgery     Atherosclerosis of left carotid artery     Chronic, continuous use of opioids     Adjustment disorder with mixed anxiety and depressed mood     Neck pain on right side     Hyperparathyroidism (H)     Alcohol dependence in remission (H)     High coronary artery calcium score       Past Surgical History:   Procedure Laterality Date     ABDOMEN SURGERY  2007?    Hysterectomy     COLONOSCOPY       COLONOSCOPY  11/17/2011    Procedure:COLONOSCOPY; Colonoscopy; Surgeon:MEKA RUSS; Location: GI     COLONOSCOPY N/A 2/10/2020    Procedure: COLONOSCOPY, WITH POLYPECTOMY AND BIOPSY;  Surgeon: Opal Ace MD;  Location:  GI     DISSECT LYMPH NODE AXILLA Right 11/2/2017    Procedure: DISSECT LYMPH NODE AXILLA;  RIGHT AXILLARY LYMPH NODE DISSECTION;  Surgeon: Cortez White MD;  [facility]: [facility]     GYN SURGERY  2005    hysterectomy after hx of ovarian cyst, ended up being benign     HYSTERECTOMY, PAP NO LONGER INDICATED       MASTECTOMY MODIFIED RADICAL  2011    bilateral     MASTECTOMY, BILATERAL       ORTHOPEDIC SURGERY      rt. knee arthroscopy     ORTHOPEDIC SURGERY Right     toe surgery     REALIGN PATELLA  1973    right      TOE SURGERY      right grt OA         Medical Review of Systems                                                                                                    2,10   A comprehensive review of systems was performed and is negative other than noted in the HPI.    Allergy                                Cats and Codeine sulfate    Current Medications                                                                                                       Current Outpatient Medications   Medication Sig Dispense Refill     ASHWAGANDHA PO Take 1 tablet by mouth daily as needed At onset of illness symptoms; as needed       aspirin 81 MG tablet Take 1 tablet (81 mg)  by mouth daily 30 tablet      buPROPion (WELLBUTRIN XL) 150 MG 24 hr tablet TAKE 1 TABLET(150 MG) BY MOUTH EVERY MORNING (Patient taking differently: Take 300 mg by mouth daily) 90 tablet 2     calcium carbonate-vitamin D (OS-YASMIN) 500-400 MG-UNIT tablet Take 1 tablet by mouth daily        Cholecalciferol (VITAMIN D-3) 5000 units TABS Take 1 tablet by mouth daily        Coenzyme Q10 300 MG CAPS Take 300 mg by mouth daily       HYDROcodone-acetaminophen (NORCO) 5-325 MG tablet Take 1 tablet by mouth daily as needed for pain 20 tablet 0     IBUPROFEN PO Take 200 mg by mouth every 4 hours as needed for moderate pain        lactobacillus rhamnosus, GG, (CULTURELL) capsule Take 1 capsule by mouth daily        letrozole (FEMARA) 2.5 MG tablet Take 1 tablet by mouth daily  5     Lutein-Zeaxanthin 25-5 MG CAPS Take 1 capsule by mouth daily       LYSINE 1-3 daily as needed       Melatonin 10 MG TABS tablet Take 10 mg by mouth nightly as needed for sleep        multivitamin (OCUVITE) TABS tablet Take 1 tablet by mouth daily        nicotine polacrilex (NICORETTE) 2 MG gum Place 1 each (2 mg) inside cheek as needed for smoking cessation 30 tablet 11     omeprazole 20 MG tablet Take 1 tablet (20 mg) by mouth as needed 90 tablet 3     rosuvastatin (CRESTOR) 10 MG tablet Take 1 tablet (10 mg) by mouth daily 90 tablet 2     UNABLE TO FIND Take by mouth daily MEDICATION NAME: Asea Redox - 1oz twice daily       UNABLE TO FIND Take 1 tablet by mouth daily MEDICATION NAME: Aidee Duong - chinese supplement takes at onset of illness symptoms       UNABLE TO FIND MEDICATION NAME: Somnapure - 2 tablets = 500mg valerian root, 300mg lemon balm extract, 200mg l-theanine, 120mg hops extract, 50mg chamomile flower extract, 50mg passion flower extract, 3mg melatonin.  Takes 1-2 tablets at bedtime        UNABLE TO FIND MEDICATION NAME: Moringa 1 serving of powder daily       UNABLE TO FIND MEDICATION NAME: Premium Amla Berry 2 capsules = 4mg Vitamin  "C, 966mg organic amla, organic amla extract.  Takes 2 capsules daily        UNABLE TO FIND MEDICATION NAME: OmegaKrill 5x 3 capsules = 480mg of total omega-3, 64mg EPA, 392mg DHA, 300mcg astaxanthin.  Takes 3 capsules daily       UNABLE TO FIND MEDICATION NAME: Super Colon Cleanse 2 capsules = 665mg senna leaf powder, 321mg psyllium husk powder, 21mg fennel seed powder, 21mg papaya leaf powder, 21mg yolanda hips fruit powder, 11mg l-acidophilus.  Taking 4 capsules daily       valACYclovir (VALTREX) 1000 mg tablet Take 2 tablets (2,000 mg) by mouth 2 times daily as needed (Take two tablets by mouth PRN) 8 tablet 0     vitamin C (ASCORBIC ACID) 250 MG tablet Take 250 mg by mouth daily       rosuvastatin (CRESTOR) 10 MG tablet Take 1 tablet (10 mg) by mouth daily 90 tablet 3       Vitals                                                                                                                       3, 3   BP (!) 132/91   Pulse 56   Temp 97.3  F (36.3  C) (Temporal)   Ht 1.638 m (5' 4.5\")   Wt 66.3 kg (146 lb 3.2 oz)   BMI 24.71 kg/m       Mental Status Exam                                                                                    9, 14 cog gs     Alertness: alert  and oriented   Appearance: adequately groomed  Behavior/Demeanor: cooperative and pleasant  Speech: normal and regular rate and rhythm  Language: intact and no problems  Psychomotor: normal or unremarkable  Mood: \"Good, actually, DWI stuff is over\"  Affect:  appropriate; was congruent to mood; was congruent to content  Thought Process/Associations: unremarkable  Thought Content:  Reports none;  Denies suicidal and violent ideation  Perception:  Reports none;  Denies auditory hallucinations and visual hallucinations  Insight: good  Judgment: good   Cognition: (6) oriented: time, person, and place  attention span: intact  concentration: intact  recent memory: intact  remote memory: intact  fund of knowledge: appropriate  Gait/Station and/or " Muscle Strength/Tone: unremarkable    Labs and Data                                                                                                                 Rating Scales:    PHQ9    PHQ9 Today:  17  PHQ 3/22/2022 3/23/2022 5/12/2022   PHQ-9 Total Score 5 4 2   Q9: Thoughts of better off dead/self-harm past 2 weeks Not at all Not at all Not at all         Diagnosis and Assessment                                                                             m2, h3     Svetlana Adair is a 70 year old female with previous psychiatric history of MDD, recurrent, severe and alcohol use disorder who presents for scheduled follow up appointment. She has a well documented failure of adequate trials of four antidepressants (2 SSRIs and 2 SNRIs) which represent multiple antidepressant classes. The patient has completed an adequate dose of individual psychotherapy. Patient is burdened by her chronic symptoms of depression and her current episode has lasted over 12 months causing significant psychosocial dysfunction. Due to remaining profound depression and numerous failed previous treatment modalities the patient is a candidate for TMS treatment.     Svetlana has completed five TMS treatments so far and is doing well. She reports being able to feel emotions more strongly compared to before TMS treatments. We discussed that it's not always a linear progression and can be bumpy at times. She is dealing with multiple social factors, going through a divorce and news that she needs to have follow-up imaging for her lung.     Today the following issues were addressed:    1) Major depressive disorder, recurrent, severe  2) Alcohol use disorder, severe, dependence    [place] Prescription Monitoring Program [] review was not needed today.    PSYCHOTROPIC DRUG INTERACTIONS: none clinically relevant    Plan                                                                                                                    m2, h3       1)Major depressive disorder, recurrent, severe  -- Medications:    - continue bupropion  -- Procedures:    - Continue TMS              - Coil: Currently receive active course of rTMS on H1 coil    2) Alcohol use disorder, severe, dependence  -- Patient is currently sober      Therapy-  Continue       RTC: following completion of TMS course    CRISIS NUMBERS:   Provided routinely in AVS.    Treatment Risk Statement:  The patient understands the risks, benefits, adverse effects and alternatives. Agrees to treatment with the capacity to do so. No medical contraindications to treatment. Agrees to call clinic for any problems. The patient understands to call 911 or go to the nearest ED if life threatening or urgent symptoms occur.     Corrie Conroy MD  PGY-2 Psychiatry Resident      Physician Attestation   I, Alvina Whiteside MD, saw this patient and agree with the findings and plan of care as documented in the note.      Items personally reviewed/procedural attestation: I was present for and supervised the entire interview.      PROVIDER:  Alvina Whiteside MD    Level of Medical Decision Making:   - *HIGH ACUITY* - At least 1 chronic problem with severe exacerbation, progression, or side effects of treatment  - Moderate (or greater) risk of harm to self or others

## 2022-05-12 NOTE — PROGRESS NOTES
"  Psychiatry Clinic Progress Note                                                                   Svetlana Adair is a 71 year old female who prefers the name \"Prerna" and pronoun she, hers.  Therapist: Scheduled to start in September 2021  PCP: Vladislav Cruz  Other Providers: None    Pertinent Background:  This patient initially experienced symptoms in the 1990s when her  at the time was developing symptoms of early-onset Alzheimer's in his 40s. She received mental health care at this time including an selective serotonin reuptake inhibitor as well as therapy; she is not sure if either helped, but feels that life stressors were so intense that no medication could have helped. She has many major life stressors including  Laurel's alzheimer's diagnosis in 1999, mother's death in 2000, Laurel's death in 2009, breast cancer diagnosis in 2011, recurrence with lymphedema in 2017.  Psych critical item history includes mutiple psychotropic trials  and substance use: alcohol.    Interim History                                                                                                        4, 4     The patient is a good historian.      Since the last visit:     Pt states that she is doing \"really good.\" Mood has been good since completing TMS. Feels TMS went well and that it \"turned everything around.\" Discussed research on long-term effects and strategies to maintain mood response. Also discussed likelihood of good response in future should depression return.    Notes that PCP recently increased bupropion to 300mg in effort to improve energy. Denies having any side effects.    Reviewed history of depression which seemed to worsen substantially with COVID diagnosis. Discussed recommendation to continue on burpropion for some time before slow taper to prevent depression relapse.    Pt had questions about recommendations re: recreational cannabis use. Suggested that she refrain from using regularly " "given potential effect on energy, motivation, and cognition.    Continues to feel that short-term memory is bad. Attributes this worsening to previous COVID-19 infection. Denies observation of improvement in memory or attention associated with TMS.    Reviewed plan going forward including how to get connected with our program should her depression return in a significant way.     Overall, doing very well s/p completion of acute TMS course.    Recent Symptoms:   Depression:  Slight hopelessness and low energy     Recent Substance Use:  Alcohol- sober        Social/ Family History                                  [per patient report]                                 1ea,1ea     FINANCIAL SUPPORT- working full time as a caretaker, sliding scale insulin, -- \"Mahnaz states she cannot leave the job because she is  essentially broke  after investing the majority of her finances in a wellness company called  [employer].      RELATIONSHIPS/CHILDREN- No children   high school boyfriend --   Remarried Laurel--> opened [employer] --> he passed away in 2009  LIVING SITUATION- lives alone      LEGAL- DUI, multiple  EARLY HISTORY/ EDUCATION- Has some college.   SOCIAL/ SPIRITUAL SUPPORT-has many friends in area, finds support through AA group, practices meditation  CULTURAL INFLUENCES/ IMPACT- none       TRAUMA HISTORY (self-report)- None  FEELS SAFE AT HOME- Yes  FAMILY HISTORY-  Bipolar disorder in sister; depression in mother  Hobbies: horseback riding, tennis with friends  Previously competitive swimmer when younger    Medical Review of Systems                                                                                                    2,10   A comprehensive review of systems was performed and is negative other than noted in the HPI.    Allergy                                Cats and Codeine sulfate    Current Medications                                                                      "                                  Current Outpatient Medications   Medication Sig Dispense Refill     ASHWAGANDHA PO Take 1 tablet by mouth daily as needed At onset of illness symptoms; as needed       aspirin 81 MG tablet Take 1 tablet (81 mg) by mouth daily 30 tablet      buPROPion (WELLBUTRIN XL) 150 MG 24 hr tablet TAKE 1 TABLET(150 MG) BY MOUTH EVERY MORNING (Patient taking differently: Take 300 mg by mouth daily) 90 tablet 2     calcium carbonate-vitamin D (OS-YASMIN) 500-400 MG-UNIT tablet Take 1 tablet by mouth daily        Cholecalciferol (VITAMIN D-3) 5000 units TABS Take 1 tablet by mouth daily        Coenzyme Q10 300 MG CAPS Take 300 mg by mouth daily       HYDROcodone-acetaminophen (NORCO) 5-325 MG tablet Take 1 tablet by mouth daily as needed for pain 20 tablet 0     IBUPROFEN PO Take 200 mg by mouth every 4 hours as needed for moderate pain        lactobacillus rhamnosus, GG, (CULTURELL) capsule Take 1 capsule by mouth daily        letrozole (FEMARA) 2.5 MG tablet Take 1 tablet by mouth daily  5     Lutein-Zeaxanthin 25-5 MG CAPS Take 1 capsule by mouth daily       LYSINE 1-3 daily as needed       Melatonin 10 MG TABS tablet Take 10 mg by mouth nightly as needed for sleep        multivitamin (OCUVITE) TABS tablet Take 1 tablet by mouth daily        nicotine polacrilex (NICORETTE) 2 MG gum Place 1 each (2 mg) inside cheek as needed for smoking cessation 30 tablet 11     omeprazole 20 MG tablet Take 1 tablet (20 mg) by mouth as needed 90 tablet 3     rosuvastatin (CRESTOR) 10 MG tablet Take 1 tablet (10 mg) by mouth daily 90 tablet 2     UNABLE TO FIND Take by mouth daily MEDICATION NAME: Asea Redox - 1oz twice daily       UNABLE TO FIND Take 1 tablet by mouth daily MEDICATION NAME: Aidee Duong - chinese supplement takes at onset of illness symptoms       UNABLE TO FIND MEDICATION NAME: Somnapure - 2 tablets = 500mg valerian root, 300mg lemon balm extract, 200mg l-theanine, 120mg hops extract, 50mg chamomile  "flower extract, 50mg passion flower extract, 3mg melatonin.  Takes 1-2 tablets at bedtime        UNABLE TO FIND MEDICATION NAME: Moringa 1 serving of powder daily       UNABLE TO FIND MEDICATION NAME: Premium Amla Berry 2 capsules = 4mg Vitamin C, 966mg organic amla, organic amla extract.  Takes 2 capsules daily        UNABLE TO FIND MEDICATION NAME: OmegaKrill 5x 3 capsules = 480mg of total omega-3, 64mg EPA, 392mg DHA, 300mcg astaxanthin.  Takes 3 capsules daily       UNABLE TO FIND MEDICATION NAME: Super Colon Cleanse 2 capsules = 665mg senna leaf powder, 321mg psyllium husk powder, 21mg fennel seed powder, 21mg papaya leaf powder, 21mg yolanda hips fruit powder, 11mg l-acidophilus.  Taking 4 capsules daily       valACYclovir (VALTREX) 1000 mg tablet Take 2 tablets (2,000 mg) by mouth 2 times daily as needed (Take two tablets by mouth PRN) 8 tablet 0     vitamin C (ASCORBIC ACID) 250 MG tablet Take 250 mg by mouth daily       rosuvastatin (CRESTOR) 10 MG tablet Take 1 tablet (10 mg) by mouth daily 90 tablet 3       Vitals                                                                                                                       3, 3   BP (!) 132/91   Pulse 56   Temp 97.3  F (36.3  C) (Temporal)   Ht 1.638 m (5' 4.5\")   Wt 66.3 kg (146 lb 3.2 oz)   BMI 24.71 kg/m       Mental Status Exam                                                                                    9, 14 cog gs     Alertness: alert  and oriented   Appearance: adequately groomed  Behavior/Demeanor: cooperative and pleasant  Speech: normal and regular rate and rhythm  Language: intact and no problems  Psychomotor: normal or unremarkable  Mood: \"Good\"  Affect:  appropriate; was congruent to mood; was congruent to content  Thought Process/Associations: unremarkable  Thought Content:  Reports none;  Denies suicidal and violent ideation  Perception:  Reports none;  Denies auditory hallucinations and visual hallucinations  Insight: " good  Judgment: good   Cognition: (6) oriented: time, person, and place  attention span: intact  concentration: intact  recent memory: intact  remote memory: intact  fund of knowledge: appropriate  Gait/Station and/or Muscle Strength/Tone: unremarkable    Labs and Data                                                                                                                 Rating Scales:    PHQ9    PHQ9 Today:  2  PHQ 3/22/2022 3/23/2022 5/12/2022   PHQ-9 Total Score 5 4 2   Q9: Thoughts of better off dead/self-harm past 2 weeks Not at all Not at all Not at all         Diagnosis and Assessment                                                                             m2, h3     Svetlana Adair is a 70 year old female with previous psychiatric history of MDD, recurrent, severe and alcohol use disorder who presents for visit s/p completion of an acute course or rTMS. Patient describes signficant improvement in mood s/p completion of acute TMS course.     Today the following issues were addressed:    1) Major depressive disorder, recurrent, severe  2) Alcohol use disorder, severe, dependence    [place] Prescription Monitoring Program [] review was not needed today.    PSYCHOTROPIC DRUG INTERACTIONS: none clinically relevant    Plan                                                                                                                    m2, h3      1)Major depressive disorder, recurrent, severe  -- Medications:    - Continue bupropion (managed by PCP)  -- Psychotherapy:   - Continue supportive psychotherapy with this provider  -- Procedures:    - s/p H1 coil LDLPFC rTMS x 37 sessions in remission per MADRS (19-->9).    2) Alcohol use disorder, severe, dependence  -- Patient is currently sober     RTC: PRN    CRISIS NUMBERS:   Provided routinely in AVS.    Treatment Risk Statement:  The patient understands the risks, benefits, adverse effects and alternatives. Agrees to treatment with the capacity to do  so. No medical contraindications to treatment. Agrees to call clinic for any problems. The patient understands to call 911 or go to the nearest ED if life threatening or urgent symptoms occur.       PROVIDER:  Alvina Whiteside MD       Psychiatry Clinic Individual Psychotherapy Note                                                                     [16]     Start time: 11:22 AM        End time: 11:40 AM  Date last reviewed: 05/12/22       Date next due: 08/11/22     Subjective: This supportive psychotherapy session addressed issues related to orientation to therapy, goals of therapy, and current stressors consisting of current mood symptoms. Patient's reaction: Maintenance in the context of mental status appropriate for ambulatory setting.  Progress: good  Plan: RTC PRN  Psychotherapy services during this visit included myself and the patient.   Treatment Plan      SYMPTOMS; PROBLEMS   MEASURABLE GOALS;    FUNCTIONAL IMPROVEMENT INTERVENTIONS;   GAINS MADE DISCHARGE CRITERIA   Depression: anhedonia   find enjoyment at least once a day self-care skills  strength focus marked symptom improvement   Depression: depressed mood and feeling hopelesss   develop strategies for thought distraction when ruminating and reduce feeling overwhelmed/ improve decision making skills increase coping skills  strength focus  stress management marked symptom improvement     PROVIDER:  Alvina Whiteside MD      Level of Medical Decision Making:   - At least 2 stable chronic problems  - Moderate (or greater) risk of harm to self or others

## 2022-05-19 DIAGNOSIS — M54.2 NECK PAIN ON RIGHT SIDE: ICD-10-CM

## 2022-05-19 DIAGNOSIS — B00.9 HSV (HERPES SIMPLEX VIRUS) INFECTION: ICD-10-CM

## 2022-05-19 RX ORDER — VALACYCLOVIR HYDROCHLORIDE 1 G/1
2000 TABLET, FILM COATED ORAL 2 TIMES DAILY PRN
Qty: 8 TABLET | Refills: 1 | Status: SHIPPED | OUTPATIENT
Start: 2022-05-19 | End: 2023-03-03

## 2022-05-19 RX ORDER — HYDROCODONE BITARTRATE AND ACETAMINOPHEN 5; 325 MG/1; MG/1
1 TABLET ORAL DAILY PRN
Qty: 20 TABLET | Refills: 0 | Status: SHIPPED | OUTPATIENT
Start: 2022-05-19 | End: 2022-06-21

## 2022-05-19 NOTE — TELEPHONE ENCOUNTER
Routing refill request to provider for review/approval because:  Drug not on the [facility] refill protocol     Pending Prescriptions:                       Disp   Refills    HYDROcodone-acetaminophen (NORCO) 5-325 M*20 tab*0            Sig: Take 1 tablet by mouth daily as needed for pain    Signed Prescriptions:                        Disp   Refills    valACYclovir (VALTREX) 1000 mg tablet      8 tabl*1        Sig: Take 2 tablets (2,000 mg) by mouth 2 times daily as           needed (Take two tablets by mouth PRN)  Authorizing Provider: JAZLYN FLOWERS  Ordering User: INDERJIT CHAVEZ    Last Written Prescription Date:  04/14/2022  Last Fill Quantity: 20,  # refills: 0   Last office visit: 4/14/2022 with prescribing provider:  04/14/2022   Future Office Visit:      Inderjit Chavez RN  [facility] Triage Nurse

## 2022-05-27 ENCOUNTER — VIRTUAL VISIT (OUTPATIENT)
Dept: URGENT CARE | Facility: CLINIC | Age: 72
End: 2022-05-27
Payer: MEDICARE

## 2022-05-27 DIAGNOSIS — J40 BRONCHITIS: Primary | ICD-10-CM

## 2022-05-27 PROCEDURE — 99442 PR PHYSICIAN TELEPHONE EVALUATION 11-20 MIN: CPT | Mod: 95 | Performed by: EMERGENCY MEDICINE

## 2022-05-27 RX ORDER — DOXYCYCLINE HYCLATE 100 MG
100 TABLET ORAL 2 TIMES DAILY
Qty: 14 TABLET | Refills: 0 | Status: SHIPPED | OUTPATIENT
Start: 2022-05-27 | End: 2022-06-03

## 2022-05-27 NOTE — PROGRESS NOTES
"  The patient has been notified of following:     \"This telephone visit will be conducted via a call between you and your physician/provider. We have found that certain health care needs can be provided without the need for a physical exam.  This service lets us provide the care you need with a short phone conversation.  If a prescription is necessary we can send it directly to your pharmacy.  If lab work is needed we can place an order for that and you can then stop by our lab to have the test done at a later time.    Telephone visits are billed at different rates depending on your insurance coverage. During this emergency period, for some insurers they may be billed the same as an in-person visit.  Please reach out to your insurance provider with any questions.    If during the course of the call the physician/provider feels a telephone visit is not appropriate, you will not be charged for this service.\"    Patient has given verbal consent for Telephone visit?  Yes    What phone number would you like to be contacted at? [phone]    How would you like to obtain your AVS? [place]    Subjective   CC: Svetlana Adair  is a 71 year old female who presents via phone visit today for the following health issues:   Chief Complaint   Patient presents with     Infection        COVID-19 Symptom Review  How many days ago did these symptoms start? 6    Are any of the following symptoms significant for you?  New or worsening difficulty breathing? Yes    Please describe what kind of difficulty you are having breathing:Mild dyspnea (able to do ADLs without difficulty, mild shortness of breath with activities such as climbing one or two flights of stairs or walking briskly)    Worsening cough? Yes, I am coughing up mucus.    Fever or chills? No    Headache: YES-     Sore throat: YES- slight    Chest pain: no    Diarrhea: no    Body aches? YES-     What treatments has patient tried?    Does patient live in a nursing home, " group home, or shelter? no  Does patient have a way to get food/medications during quarantined? Yes, I have a friend or family member who can help me. and Yes                       Reviewed and updated as needed this visit by Provider                    Review of Systems         Objective    Gen: Patient is alert, oriented  Gen: alert. Non-dyspneic              Assessment/Plan:  Patient is a 71-year-old female with a history of treated breast cancer who is been ill for 6 days with COVID symptoms.  She had a sense of persistent respiratory complaints including productive cough and does feel slightly dyspneic.  Discussion undertaken with patient that monoclonal antibody is the only treatment option and she wishes to proceed.  Information entered to the state instructed patient week follow-up.  Due to the worsening productive cough at 1 weeks duration I did order doxycycline to cover for possible secondary bacterial infection.      Phone call duration:  15 minutes    Bryant Espinal MD

## 2022-06-08 DIAGNOSIS — K21.9 GERD (GASTROESOPHAGEAL REFLUX DISEASE): ICD-10-CM

## 2022-06-17 DIAGNOSIS — F43.23 ADJUSTMENT DISORDER WITH MIXED ANXIETY AND DEPRESSED MOOD: Primary | ICD-10-CM

## 2022-06-17 RX ORDER — BUPROPION HYDROCHLORIDE 300 MG/1
300 TABLET ORAL EVERY MORNING
Qty: 90 TABLET | Refills: 0 | Status: SHIPPED | OUTPATIENT
Start: 2022-06-17 | End: 2022-09-22

## 2022-06-17 NOTE — TELEPHONE ENCOUNTER
Pt is out of Wellbutrin because she increased her dose to 300 mg tablet daily per PCP advice. Please see 04/14/2022 OV notes from doctor Anthony below. Pt report improved fatigue while on 300 mg tablets daily  She is requesting refill for Wellbutrin 300 mg tablets to 's pharmacy today since she is out of medication.     Severe episode of recurrent major depressive disorder, without psychotic features (H)  We discussed the possibility of increasing her Wellbutrin to see if this improves her fatigue symptoms.  She will consider this.  Side effects and risks were discussed.  If she does end up on a 300 mg/day dose and it helps her energy levels, she will communicate this to me via [employer] or by message so that a 300 mg tablet can be sent to her pharmacy.  She also plans to follow-up with the psychiatrist who administered the transcranial magnetic therapy.  - buPROPion (WELLBUTRIN XL) 150 MG 24 hr tablet; Take 1 tablet (150 mg) by mouth every morning

## 2022-06-18 ENCOUNTER — NURSE TRIAGE (OUTPATIENT)
Dept: NURSING | Facility: CLINIC | Age: 72
End: 2022-06-18
Payer: MEDICARE

## 2022-06-18 DIAGNOSIS — M54.2 NECK PAIN ON RIGHT SIDE: ICD-10-CM

## 2022-06-18 RX ORDER — HYDROCODONE BITARTRATE AND ACETAMINOPHEN 5; 325 MG/1; MG/1
1 TABLET ORAL DAILY PRN
Qty: 20 TABLET | Refills: 0 | Status: CANCELLED | OUTPATIENT
Start: 2022-06-18

## 2022-06-18 NOTE — TELEPHONE ENCOUNTER
Pt calling in to nurse triage this evening to request her Norco 5-325 PO PRN tabs Rx be refilled. RN routed refill to PCP for authorization and approval. Pt understands disposition, is amenable to plan and agrees verbally.  Eva Hightower, [place], [place], PHN on 6/18/2022 at 7:11 PM  [facility]  RN utilized sound nursing judgement based on facility triage protocols during this encounter.          Reason for Disposition    Caller requesting a CONTROLLED substance prescription refill (e.g., narcotics, ADHD medicines)    Additional Information    Negative: Drug overdose and triager unable to answer question    Negative: Caller requesting information unrelated to medicine    Negative: Caller requesting a prescription for Strep throat and has a positive culture result    Negative: Rash while taking a medication or within 3 days of stopping it    Negative: Immunization reaction suspected    Negative: [1] Influenza symptoms AND [2] anti-viral med prescription request, such as Tamiflu    Negative: [1] Asthma and [2] having symptoms of asthma (cough, wheezing, etc.)    Negative: [1] Symptom of illness (e.g., headache, abdominal pain, earache, vomiting) AND [2] more than mild    Negative: MORE THAN A DOUBLE DOSE of a prescription or over-the-counter (OTC) drug    Negative: [1] DOUBLE DOSE (an extra dose or lesser amount) of over-the-counter (OTC) drug AND [2] any symptoms (e.g., dizziness, nausea, pain, sleepiness)    Negative: [1] DOUBLE DOSE (an extra dose or lesser amount) of prescription drug AND [2] any symptoms (e.g., dizziness, nausea, pain, sleepiness)    Negative: Took another person's prescription drug    Negative: [1] DOUBLE DOSE (an extra dose or lesser amount) of prescription drug AND [2] NO symptoms (Exception: a double dose of antibiotics)    Negative: Diabetes drug error or overdose (e.g., took wrong type of insulin or took extra dose)    Negative: [1] Request for URGENT new prescription or refill  "of \"essential\" medication (i.e., likelihood of harm to patient if not taken) AND [2] triager unable to fill per unit policy    Negative: [1] Prescription not at pharmacy AND [2] was prescribed by PCP recently    Negative: [1] Pharmacy calling with prescription questions AND [2] triager unable to answer question    Negative: [1] Caller has URGENT medication question about med that PCP or specialist prescribed AND [2] triager unable to answer question    Negative: [1] Caller has NON-URGENT medication question about med that PCP prescribed AND [2] triager unable to answer question    Negative: [1] Caller requesting a NON-URGENT new prescription or refill AND [2] triager unable to refill per unit policy    Negative: [1] Caller has medication question about med not prescribed by PCP AND [2] triager unable to answer question (e.g., compatibility with other med, storage)    Answer Assessment - Initial Assessment Questions  1.   NAME of MEDICATION: \"What medicine are you calling about?\"      Hydrocodone Rx needs refill   2.   QUESTION: \"What is your question?\"        3.   PRESCRIBING HCP: \"Who prescribed it?\" Reason: if prescribed by specialist, call should be referred to that group.     Dr. Cruz    Protocols used: MEDICATION QUESTION CALL-A-    COVID 19 Nurse Triage Plan/Patient Instructions    Please be aware that novel coronavirus (COVID-19) may be circulating in the community. If you develop symptoms such as fever, cough, or SOB or if you have concerns about the presence of another infection including coronavirus (COVID-19), please contact your health care provider or visit [email or web address].     Disposition/Instructions    Home care recommended. Follow home care protocol based instructions.    Thank you for taking steps to prevent the spread of this virus.  o Limit your contact with others.  o Wear a simple mask to cover your cough.  o Wash your hands well and often.    Resources     [facility]: " About COVID-19: [email or web address]    CDC: What to Do If You're Sick: www.cdc.gov/coronavirus/2019-ncov/about/steps-when-sick.html    CDC: Ending Home Isolation: www.cdc.gov/coronavirus/2019-ncov/hcp/disposition-in-home-patients.html     CDC: Caring for Someone: www.cdc.gov/coronavirus/2019-ncov/if-you-are-sick/care-for-someone.html     [facility]: Interim Guidance for Hospital Discharge to Home: www.[email or web address].[email or web address]/diseases/coronavirus/hcp/hospdischarge.pdf    [facility] clinical trials (COVID-19 research studies): [email or web address]-clinical-trials     Below are the COVID-19 hotlines at the [place] Department of Health ([facility]). Interpreters are available.   o For health questions: Call 111-950-9192 or 1-492.968.4211 (7 a.m. to 7 p.m.)  o For questions about schools and childcare: Call 910-554-4523 or 1-394.981.7615 (7 a.m. to 7 p.m.)

## 2022-06-19 NOTE — TELEPHONE ENCOUNTER
Routing refill request to provider for review/approval because:  Controlled substance needs provider approval     Last Written Prescription Date:  5/19/22  Last Fill Quantity: 20,  # refills: 0   Last office visit provider: 4/14/22    Requested Prescriptions   Pending Prescriptions Disp Refills     HYDROcodone-acetaminophen (NORCO) 5-325 MG tablet 20 tablet 0     Sig: Take 1 tablet by mouth daily as needed for pain       There is no refill protocol information for this order          DELLA FROST RN 06/18/22 7:03 PM

## 2022-06-21 ENCOUNTER — MYC REFILL (OUTPATIENT)
Dept: FAMILY MEDICINE | Facility: CLINIC | Age: 72
End: 2022-06-21
Payer: MEDICARE

## 2022-06-21 ENCOUNTER — TRANSFERRED RECORDS (OUTPATIENT)
Dept: HEALTH INFORMATION MANAGEMENT | Facility: CLINIC | Age: 72
End: 2022-06-21

## 2022-06-21 DIAGNOSIS — M54.2 NECK PAIN ON RIGHT SIDE: ICD-10-CM

## 2022-06-21 RX ORDER — HYDROCODONE BITARTRATE AND ACETAMINOPHEN 5; 325 MG/1; MG/1
1 TABLET ORAL DAILY PRN
Qty: 20 TABLET | Refills: 0 | Status: SHIPPED | OUTPATIENT
Start: 2022-06-21 | End: 2022-07-25

## 2022-06-21 NOTE — TELEPHONE ENCOUNTER
Routing refill request to provider for review/approval because:  Drug not on the [facility] refill protocol     Last office vist: 4/14/2022    Pended 20 tablets & 0 refills. Please Review.    Next office visit: none scheduled    Sherice Garcia RN  [facility]

## 2022-06-27 NOTE — PROGRESS NOTES
Medication Therapy Management (MTM) Encounter    ASSESSMENT:                            Medication Adherence/Access: No issues identified    Diarrhea: Stable. Patient could consider stopping probiotic given limited effectiveness and because she is getting probiotics through her diet.     Smoking Cessation: Encouraged her to taper off nicotine gum, as she is able.      Breast Cancer: Stable.     CAD: Stable.     Depression: Stable.     Pain: Stable.      Cold Sores:  Stable.    GERD: Stable.     Supplements:  Stable, as discussed before, it's questionable how much benefit she's getting, and she's aware of potential risks/side effects. No interactions with current medication regimen. She may be taking higher vitamin D dose then necessary, so may be beneficial to have vitamin D level rechecked or have her decrease dose.    PLAN:                            1. Lori spoke with Dr. Cruz who would like for you to decrease the dose of vitamin D down to 1000 units daily.     2. Try working on slowly tapering off the nicotine gum. Try the technique we had discussed with designating an amount in a baggie for each day.     3. Consider stopping the probiotic since you are getting probiotics through yogurt.    Follow-up: annually    SUBJECTIVE/OBJECTIVE:                          Svetlana Adair is a 71 year old female called for a follow-up visit.  Today's visit is a follow-up MTM visit from 12/20/21 with Janice Tierney PharmD.     Reason for visit: routine f/up.    Allergies/ADRs: Reviewed in chart  Past Medical History: Reviewed in chart  Tobacco: She reports that she quit smoking about 12 years ago. Her smoking use included cigarettes. She started smoking about 53 years ago. She has a 22.00 pack-year smoking history. She has never used smokeless tobacco.    Medication Adherence/Access: no issues reported    Diarrhea: Current medication includes a probiotic daily. She reports consistent BM and is no longer having diarrhea,  but reports soft stools. No medication side effects noted. She wonders if the probiotic has been effective. She also mentions that she eats yogurt almost every morning.       Smoking Cessation:  Current medications include nicotine 2 mg gum. Svetlana quit smoking years ago, but has continued using NRT (gum) since that time.  She does continue to chew about 1 piece of gum every hour, but reports she doesn't chew this for very long. No medication side effects noted, aside from some insomnia and headaches on occasion.     Breast Cancer: Currently taking letrozole 2.5 mg daily and says she'll be on this for a total of 10 years (coming up on year 5 in the fall). She has noticed that her nails are weak and her hair has thinned, which she attributes to letrozole therapy.     CAD: Current therapy includes aspirin 81 mg daily and rosuvastatin 10 mg once daily. Denies side effects.    Depression:  Current medications include bupropion  mg daily. Denies SE. Mahnaz mentions that she had COVID at the beginning of the pandemic which caused her to have severe depression and fatigue. She also lost her . She had 37 treatments of transcranial magnetic stimulation, completed a few months ago, which she has found to be very helpful.     Pain:  She's currently taking hydrocodone/APAP - 1/2 tablet along with ibuprofen 200 mg daily.  She reports this has been effective for her aches/pains all over and she denies side effects.      Cold Sores:  She has Valtrex and L-lysine available for use if needed, which she finds effective. She denies side effects. Last use was about 1 month ago.    GERD: Current medications include: Prilosec (omeprazole) 20 mg once daily as needed. Patient reports no current symptoms.  Patient feels that current regimen is effective.     Supplements:  Svetlana is currently taking  Ashwaghanda as needed for immune system, amla beery as needed for immune system, Super Colon Cleanse daily, OmegaKrill 3 capsules  daily, Moringa daily for general health (after having cancer), Yin Chiao as needed for colds, Asea Redox for healing and skin health, Lutein-Zeaxanthin 25-5 mg daily, coenzyme Q-10 daily for deficiency, melatonin 5 mg daily as needed for sleep or Somnapure as needed for sleep, vitamin C daily, Vitamin D/Calcium supplement 500-400 mg-unit daily, vitamin D3 5000 units daily, and a probiotic as noted.   ----------------  I spent 49 minutes with this patient today. I offer these suggestions for consideration by Dr. Cruz. A copy of the visit note was provided to the patient's provider(s).    The patient was sent via [employer] a summary of these recommendations.     Lori Greene, CourtneyD   Pharmaceutical Care Resident   Medication Therapy Management    Preceptor cosignature: Svetlana Adair was seen independently by Dr. Greene. I have reviewed the assessment and plan. Larisa Judge PharmD, RAMIRO, BCACP    Telemedicine Visit Details  Type of service:  Telephone visit  Start Time: 9:29 AM  End Time: 10:18 AM  Originating Location (patient location): Home  Distant Location (provider location):  [facility]     Medication Therapy Recommendations  Encounter for smoking cessation counseling    Current Medication: nicotine polacrilex (NICORETTE) 2 MG gum   Rationale: No medical indication at this time - Unnecessary medication therapy - Indication   Recommendation: Decrease Frequency   Status: Patient Agreed - Adherence/Education         Takes dietary supplements    Current Medication: Cholecalciferol (VITAMIN D-3) 5000 units TABS   Rationale: Dose too high - Dosage too high - Safety   Recommendation: Decrease Dose - Vitamin D3 25 mcg (1000 units) tablet   Status: Accepted with Changes per Provider          Current Medication: lactobacillus rhamnosus, GG, (CULTURELL) capsule   Rationale: Nonmedication therapy more appropriate - Unnecessary medication therapy - Indication   Recommendation: Discontinue  Medication   Status: Declined per Patient

## 2022-06-28 ENCOUNTER — VIRTUAL VISIT (OUTPATIENT)
Dept: PHARMACY | Facility: CLINIC | Age: 72
End: 2022-06-28
Payer: COMMERCIAL

## 2022-06-28 DIAGNOSIS — F32.0 MILD MAJOR DEPRESSION (H): ICD-10-CM

## 2022-06-28 DIAGNOSIS — Z78.9 TAKES DIETARY SUPPLEMENTS: ICD-10-CM

## 2022-06-28 DIAGNOSIS — Z71.6 ENCOUNTER FOR SMOKING CESSATION COUNSELING: ICD-10-CM

## 2022-06-28 DIAGNOSIS — Z17.0 MALIGNANT NEOPLASM OF AXILLARY TAIL OF RIGHT BREAST IN FEMALE, ESTROGEN RECEPTOR POSITIVE (H): ICD-10-CM

## 2022-06-28 DIAGNOSIS — K21.00 GASTROESOPHAGEAL REFLUX DISEASE WITH ESOPHAGITIS, UNSPECIFIED WHETHER HEMORRHAGE: ICD-10-CM

## 2022-06-28 DIAGNOSIS — R19.7 DIARRHEA, UNSPECIFIED TYPE: Primary | ICD-10-CM

## 2022-06-28 DIAGNOSIS — I25.810 CORONARY ARTERY DISEASE INVOLVING AUTOLOGOUS ARTERY CORONARY BYPASS GRAFT WITHOUT ANGINA PECTORIS: ICD-10-CM

## 2022-06-28 DIAGNOSIS — C50.611 MALIGNANT NEOPLASM OF AXILLARY TAIL OF RIGHT BREAST IN FEMALE, ESTROGEN RECEPTOR POSITIVE (H): ICD-10-CM

## 2022-06-28 DIAGNOSIS — R52 PAIN: ICD-10-CM

## 2022-06-28 DIAGNOSIS — B00.1 COLD SORE: ICD-10-CM

## 2022-06-28 PROCEDURE — 99605 MTMS BY PHARM NP 15 MIN: CPT | Performed by: PHARMACIST

## 2022-06-28 PROCEDURE — 99607 MTMS BY PHARM ADDL 15 MIN: CPT | Performed by: PHARMACIST

## 2022-06-28 NOTE — PATIENT INSTRUCTIONS
"Recommendations from today's MTM visit:                                                       1. Lori has sent a message to Dr. Cruz about getting vitamin D level and CMP (to look at kidneys and liver function).     2. Try working on slowly tapering off the nicotine gum. Try the technique we had discussed with designating an amount in a baggie for each day.     3. Consider stopping the probiotic since you are getting probiotics through yogurt.    Follow-up: send Mahnaz Lydiahart after talking to Dr. Cruz. Then, after labs result - if stable, then annually.    It was great speaking with you today.  I value your experience and would be very thankful for your time in providing feedback in our clinic survey. In the next few days, you may receive an email or text message from [employer] with a link to a survey related to your  clinical pharmacist.\"     To schedule another MTM appointment, please call the clinic directly or you may call the MTM scheduling line at [phone] or toll-free at [phone].     My Clinical Pharmacist's contact information:                                                      Please feel free to contact me with any questions or concerns you have.      Lori Greene, PharmD   Medication Therapy Management   Pharmacist Resident  Pager: [phone]     "

## 2022-07-08 ENCOUNTER — TELEPHONE (OUTPATIENT)
Dept: FAMILY MEDICINE | Facility: CLINIC | Age: 72
End: 2022-07-08

## 2022-07-08 DIAGNOSIS — E78.2 MIXED HYPERLIPIDEMIA: ICD-10-CM

## 2022-07-08 NOTE — TELEPHONE ENCOUNTER
Reason for call:  Medication   If this is a refill request, has the caller requested the refill from the pharmacy already? No  Will the patient be using a [facility]? No  Name of the pharmacy and phone number for the [facility]: [facility]    Telephone Fax   [phone]       Pharmacy Address and Hours    Address Hours   [address]   [place] [address]          Name of the medication requested: rosuvastatin (CRESTOR) 10 MG tablet    Other request: refill     Phone number to reach patient:  Cell number on file:    Telephone Information:   Mobile [phone]       Best Time:  anytime    Can we leave a detailed message on this number?  NO    Travel screening: Not Applicable

## 2022-07-10 RX ORDER — ROSUVASTATIN CALCIUM 10 MG/1
10 TABLET, COATED ORAL DAILY
Qty: 90 TABLET | Refills: 3 | Status: SHIPPED | OUTPATIENT
Start: 2022-07-10 | End: 2023-03-31

## 2022-07-22 ENCOUNTER — TELEPHONE (OUTPATIENT)
Dept: FAMILY MEDICINE | Facility: CLINIC | Age: 72
End: 2022-07-22

## 2022-07-22 DIAGNOSIS — M54.2 NECK PAIN ON RIGHT SIDE: ICD-10-CM

## 2022-07-22 NOTE — TELEPHONE ENCOUNTER
Reason for call:  Medication   If this is a refill request, has the caller requested the refill from the pharmacy already? No  Will the patient be using a [facility]? No  Name of the pharmacy and phone number for the current request: [facility]   Telephone Fax   [phone]       Pharmacy Address and Hours    Address Hours   [address]   [place] [address]          Name of the medication requested: HYDROcodone-acetaminophen (NORCO) 5-325 MG tablet    Other request: refill     Phone number to reach patient:  Cell number on file:    Telephone Information:   Mobile [phone]       Best Time:  anytime    Can we leave a detailed message on this number?  YES    Travel screening: Not Applicable

## 2022-07-25 RX ORDER — HYDROCODONE BITARTRATE AND ACETAMINOPHEN 5; 325 MG/1; MG/1
1 TABLET ORAL DAILY PRN
Qty: 20 TABLET | Refills: 0 | Status: SHIPPED | OUTPATIENT
Start: 2022-07-25 | End: 2022-08-21

## 2022-08-21 ENCOUNTER — MYC REFILL (OUTPATIENT)
Dept: FAMILY MEDICINE | Facility: CLINIC | Age: 72
End: 2022-08-21

## 2022-08-21 DIAGNOSIS — M54.2 NECK PAIN ON RIGHT SIDE: ICD-10-CM

## 2022-08-22 RX ORDER — HYDROCODONE BITARTRATE AND ACETAMINOPHEN 5; 325 MG/1; MG/1
1 TABLET ORAL DAILY PRN
Qty: 20 TABLET | Refills: 0 | Status: SHIPPED | OUTPATIENT
Start: 2022-08-22 | End: 2022-09-20

## 2022-08-24 ENCOUNTER — TRANSFERRED RECORDS (OUTPATIENT)
Dept: FAMILY MEDICINE | Facility: CLINIC | Age: 72
End: 2022-08-24

## 2022-09-20 ENCOUNTER — MYC REFILL (OUTPATIENT)
Dept: FAMILY MEDICINE | Facility: CLINIC | Age: 72
End: 2022-09-20

## 2022-09-20 DIAGNOSIS — M54.2 NECK PAIN ON RIGHT SIDE: ICD-10-CM

## 2022-09-21 RX ORDER — HYDROCODONE BITARTRATE AND ACETAMINOPHEN 5; 325 MG/1; MG/1
1 TABLET ORAL DAILY PRN
Qty: 20 TABLET | Refills: 0 | Status: SHIPPED | OUTPATIENT
Start: 2022-09-21 | End: 2022-10-19

## 2022-10-15 ENCOUNTER — HEALTH MAINTENANCE LETTER (OUTPATIENT)
Age: 72
End: 2022-10-15

## 2022-10-19 ENCOUNTER — IMMUNIZATION (OUTPATIENT)
Dept: FAMILY MEDICINE | Facility: CLINIC | Age: 72
End: 2022-10-19
Payer: MEDICARE

## 2022-10-19 ENCOUNTER — TELEPHONE (OUTPATIENT)
Dept: FAMILY MEDICINE | Facility: CLINIC | Age: 72
End: 2022-10-19

## 2022-10-19 DIAGNOSIS — M54.2 NECK PAIN ON RIGHT SIDE: ICD-10-CM

## 2022-10-19 DIAGNOSIS — Z23 HIGH PRIORITY FOR 2019-NCOV VACCINE: ICD-10-CM

## 2022-10-19 DIAGNOSIS — Z23 NEED FOR PROPHYLACTIC VACCINATION AND INOCULATION AGAINST INFLUENZA: Primary | ICD-10-CM

## 2022-10-19 PROCEDURE — G0008 ADMIN INFLUENZA VIRUS VAC: HCPCS | Mod: 59

## 2022-10-19 PROCEDURE — 99207 PR NO CHARGE NURSE ONLY: CPT

## 2022-10-19 PROCEDURE — 90662 IIV NO PRSV INCREASED AG IM: CPT

## 2022-10-19 PROCEDURE — 0124A COVID-19,PF,PFIZER BOOSTER BIVALENT: CPT

## 2022-10-19 PROCEDURE — 91312 COVID-19,PF,PFIZER BOOSTER BIVALENT: CPT

## 2022-10-19 RX ORDER — HYDROCODONE BITARTRATE AND ACETAMINOPHEN 5; 325 MG/1; MG/1
1 TABLET ORAL DAILY PRN
Qty: 20 TABLET | Refills: 0 | Status: SHIPPED | OUTPATIENT
Start: 2022-10-19 | End: 2022-11-21

## 2022-10-19 NOTE — TELEPHONE ENCOUNTER
Medication Question or Refill        What medication are you calling about (include dose and sig)?: HYDROcodone-acetaminophen (NORCO) 5-325 MG tablet         Controlled Substance Agreement on file:   CSA -- Patient Level:     [Media Unavailable] Controlled Substance Agreement - Opioid - Scan on 1/17/2022 10:04 AM: 2022 Document       Who prescribed the medication?: Dr. Cruz    Do you need a refill? Yes:     When did you use the medication last?     Patient offered an appointment? No    Do you have any questions or concerns?  No    Preferred Pharmacy:   [id number]  [place] - [facility] [address] [facility] [address]  [address]  [place] [address]  Phone: [phone] Fax: [phone]      Could we send this information to you in [facility] or would you prefer to receive a phone call?:   No preference   Okay to leave a detailed message?: Yes at Home number on file [phone] (home)

## 2022-11-21 DIAGNOSIS — M54.2 NECK PAIN ON RIGHT SIDE: ICD-10-CM

## 2022-11-21 RX ORDER — HYDROCODONE BITARTRATE AND ACETAMINOPHEN 5; 325 MG/1; MG/1
1 TABLET ORAL DAILY PRN
Qty: 20 TABLET | Refills: 0 | Status: SHIPPED | OUTPATIENT
Start: 2022-11-21 | End: 2022-12-21

## 2022-11-21 NOTE — TELEPHONE ENCOUNTER
Medication Question or Refill        What medication are you calling about (include dose and sig)?:   HYDROcodone-acetaminophen (NORCO) 5-325 MG tablet    PT is out     Controlled Substance Agreement on file:   CSA -- Patient Level:     [Media Unavailable] Controlled Substance Agreement - Opioid - Scan on 1/17/2022 10:04 AM: 2022 Document       Who prescribed the medication?: Dr. Cruz    Do you need a refill? Yes:     When did you use the medication last? Over the weekend     Patient offered an appointment? No    Do you have any questions or concerns?  No    Preferred Pharmacy:   [id number]  [place] [address] [address] [facility] [place] [address] [place] [address]  Phone: [phone] Fax: [phone]      Could we send this information to you in [facility] or would you prefer to receive a phone call?:   Patient would prefer a phone call   Okay to leave a detailed message?: Yes at Cell number on file:    Telephone Information:   Mobile [phone]

## 2022-12-21 DIAGNOSIS — M54.2 NECK PAIN ON RIGHT SIDE: ICD-10-CM

## 2022-12-21 RX ORDER — HYDROCODONE BITARTRATE AND ACETAMINOPHEN 5; 325 MG/1; MG/1
1 TABLET ORAL DAILY PRN
Qty: 20 TABLET | Refills: 0 | Status: SHIPPED | OUTPATIENT
Start: 2022-12-21 | End: 2022-12-26

## 2022-12-21 NOTE — TELEPHONE ENCOUNTER
Pt called requesting monthly hydrocodone refill.     Routing refill request to provider for review/approval because:  Drug not on the [facility] refill protocol

## 2022-12-22 ENCOUNTER — TELEPHONE (OUTPATIENT)
Dept: FAMILY MEDICINE | Facility: CLINIC | Age: 72
End: 2022-12-22

## 2022-12-22 NOTE — TELEPHONE ENCOUNTER
Reason for Call:  Other call back    Detailed comments: PATIENT CALLED IN ASKING TO LEAVE NOTE FOR LIONEL TO SEE IF SHE CAN STOP TAKEN HER ANTIDEPRESSANT (BLUPROFIN) MEDICATION BECAUSE IT IS REALLY EXPENSIVE, OR DOSE LOWERED    Phone Number Patient can be reached at: Home number on file [phone] (home)    Best Time: ANY TIME    Can we leave a detailed message on this number? YES    Call taken on 12/22/2022 at 3:45 PM by Shereen Baer

## 2022-12-22 NOTE — TELEPHONE ENCOUNTER
Spoke with patient     All of a sudden Wellbutrin is $150, very expensive      Pt states she wants to see how she does off of medication weaning off of it, recommended a visit, agreed to VV     Scheduled for Monday    Appointments in Next Year    Dec 26, 2022  2:00 PM  (Arrive by 1:40 PM)  Provider Visit with Vladislav Cruz MD  [facility] ([facility] ) [phone]        Jasmyn RAZA Triage RN  [facility]

## 2022-12-26 ENCOUNTER — VIRTUAL VISIT (OUTPATIENT)
Dept: FAMILY MEDICINE | Facility: CLINIC | Age: 72
End: 2022-12-26
Payer: MEDICARE

## 2022-12-26 DIAGNOSIS — F43.23 ADJUSTMENT DISORDER WITH MIXED ANXIETY AND DEPRESSED MOOD: ICD-10-CM

## 2022-12-26 DIAGNOSIS — G47.19 EXCESSIVE DAYTIME SLEEPINESS: Primary | ICD-10-CM

## 2022-12-26 DIAGNOSIS — M54.2 NECK PAIN ON RIGHT SIDE: ICD-10-CM

## 2022-12-26 PROCEDURE — 99214 OFFICE O/P EST MOD 30 MIN: CPT | Mod: 95 | Performed by: INTERNAL MEDICINE

## 2022-12-26 RX ORDER — BUPROPION HYDROCHLORIDE 450 MG/1
450 TABLET, FILM COATED, EXTENDED RELEASE ORAL EVERY MORNING
Qty: 30 TABLET | Refills: 3 | Status: SHIPPED | OUTPATIENT
Start: 2022-12-26 | End: 2023-02-16

## 2022-12-26 RX ORDER — HYDROCODONE BITARTRATE AND ACETAMINOPHEN 5; 325 MG/1; MG/1
1 TABLET ORAL DAILY PRN
Qty: 20 TABLET | Refills: 0 | Status: SHIPPED | OUTPATIENT
Start: 2022-12-26 | End: 2023-02-20

## 2022-12-26 RX ORDER — BUPROPION HYDROCHLORIDE 450 MG/1
450 TABLET, FILM COATED, EXTENDED RELEASE ORAL EVERY MORNING
Qty: 90 TABLET | Refills: 3 | Status: SHIPPED | OUTPATIENT
Start: 2022-12-26 | End: 2022-12-26

## 2022-12-26 ASSESSMENT — ANXIETY QUESTIONNAIRES
GAD7 TOTAL SCORE: 1
1. FEELING NERVOUS, ANXIOUS, OR ON EDGE: NOT AT ALL
IF YOU CHECKED OFF ANY PROBLEMS ON THIS QUESTIONNAIRE, HOW DIFFICULT HAVE THESE PROBLEMS MADE IT FOR YOU TO DO YOUR WORK, TAKE CARE OF THINGS AT HOME, OR GET ALONG WITH OTHER PEOPLE: NOT DIFFICULT AT ALL
2. NOT BEING ABLE TO STOP OR CONTROL WORRYING: NOT AT ALL
7. FEELING AFRAID AS IF SOMETHING AWFUL MIGHT HAPPEN: NOT AT ALL
6. BECOMING EASILY ANNOYED OR IRRITABLE: NOT AT ALL
GAD7 TOTAL SCORE: 1
3. WORRYING TOO MUCH ABOUT DIFFERENT THINGS: NOT AT ALL
7. FEELING AFRAID AS IF SOMETHING AWFUL MIGHT HAPPEN: NOT AT ALL
4. TROUBLE RELAXING: SEVERAL DAYS
8. IF YOU CHECKED OFF ANY PROBLEMS, HOW DIFFICULT HAVE THESE MADE IT FOR YOU TO DO YOUR WORK, TAKE CARE OF THINGS AT HOME, OR GET ALONG WITH OTHER PEOPLE?: NOT DIFFICULT AT ALL
5. BEING SO RESTLESS THAT IT IS HARD TO SIT STILL: NOT AT ALL

## 2022-12-26 ASSESSMENT — PATIENT HEALTH QUESTIONNAIRE - PHQ9
SUM OF ALL RESPONSES TO PHQ QUESTIONS 1-9: 3
10. IF YOU CHECKED OFF ANY PROBLEMS, HOW DIFFICULT HAVE THESE PROBLEMS MADE IT FOR YOU TO DO YOUR WORK, TAKE CARE OF THINGS AT HOME, OR GET ALONG WITH OTHER PEOPLE: SOMEWHAT DIFFICULT
SUM OF ALL RESPONSES TO PHQ QUESTIONS 1-9: 3

## 2022-12-26 NOTE — PROGRESS NOTES
Mahnaz is a 72 year old who is being evaluated via a billable video visit.      How would you like to obtain your AVS? MyChart  If the video visit is dropped, the invitation should be resent by: Text to cell phone: [phone]  Will anyone else be joining your video visit? No        Assessment & Plan     Excessive daytime sleepiness  Referral for consideration of sleep test    - Adult Sleep Eval & Management  Referral; Future    Adjustment disorder with mixed anxiety and depressed mood  I would not recommend stopping buproprion if fatigue is the complaint as this medication often helps with that symptoms  I suggested she could even try increasing the dose  She would like to try that after discussion   Schedule follow up in 4 weeks to assess therapy; she would be due for preventive exam then anyway   - buPROPion HCl ER, XL, 450 MG TB24; Take 450 mg by mouth every morning    Neck pain on right side  Usually gets #20 norco, got only 7 last week  Prescription for #20 sent   - HYDROcodone-acetaminophen (NORCO) 5-325 MG tablet; Take 1 tablet by mouth daily as needed for pain      26 minutes spent on the date of the encounter doing chart review, history and exam, documentation and further activities per the note           Return in about 1 month (around 1/26/2023) for Preventive Visit ok to use non-preventive slot if needed .  Patient instructed to return to clinic or contact us sooner if symptoms worsen or new symptoms develop.     Vladislav Cruz MD  [facility]    Santos Joya is a 72 year old, presenting for the following health issues:  No chief complaint on file.      History of Present Illness       Reason for visit:  Discuss stopping antidepressant    She eats 0-1 servings of fruits and vegetables daily.She consumes 1 sweetened beverage(s) daily.She exercises with enough effort to increase her heart rate 9 or less minutes per day.  She exercises with enough effort to increase her  heart rate 3 or less days per week.   She is taking medications regularly.         TCMT worked great for her  But, she caught a cold in November and has had severe generalized fatigue and sleepiness since then   She wonders if it may be a side effects of wellbutrin and wonders if she should stop that drug?  She does not feel particularly depressed, but feels severe fatigue and sleepiness  She has never had sleep test     Review of Systems         Objective           Vitals:  No vitals were obtained today due to virtual visit.    Physical Exam   GENERAL: Healthy, alert and no distress  EYES: Eyes grossly normal to inspection.  No discharge or erythema, or obvious scleral/conjunctival abnormalities.  RESP: No audible wheeze, cough, or visible cyanosis.  No visible retractions or increased work of breathing.    SKIN: Visible skin clear. No significant rash, abnormal pigmentation or lesions.  NEURO: Cranial nerves grossly intact.  Mentation and speech appropriate for age.  PSYCH: Mentation appears normal, affect normal/bright, judgement and insight intact, normal speech and appearance well-groomed.                Video-Visit Details    Type of service:  Video Visit   Video Start Time: 2:14 PM  Video End Time:2:37 PM    Originating Location (pt. Location): Home  Distant Location (provider location):  On-site  Platform used for Video Visit: Tanner

## 2022-12-30 ENCOUNTER — TELEPHONE (OUTPATIENT)
Dept: FAMILY MEDICINE | Facility: CLINIC | Age: 72
End: 2022-12-30

## 2022-12-30 NOTE — TELEPHONE ENCOUNTER
Reason for Call:  Other call back    Detailed comments: PT NEEDS AN ORDER FOR IMAGING FOR HER LUNGS    PLEASE CALL AND ADVISE PT     Phone Number Patient can be reached at: Cell number on file:    Telephone Information:   Mobile [phone]       Best Time: ANYTIME    Can we leave a detailed message on this number? YES    Call taken on 12/30/2022 at 3:04 PM by Hu Quezada

## 2022-12-30 NOTE — TELEPHONE ENCOUNTER
Writer called patient to follow up on below. Patient denies any symptoms, states she is not having acute concerns but has a yearly scan. States her oncologist usually orders this for her.    Patient will reach out to oncologist for orders. Writer advised patient to call back to clinic with any further questions or concerns.    Signing encounter.    Conchis Paz RN  [facility]

## 2022-12-30 NOTE — TELEPHONE ENCOUNTER
Is there a new symptom that needs to be triaged?  This message does not contain enough information for action?

## 2023-01-05 ENCOUNTER — TELEPHONE (OUTPATIENT)
Dept: FAMILY MEDICINE | Facility: CLINIC | Age: 73
End: 2023-01-05

## 2023-01-05 NOTE — TELEPHONE ENCOUNTER
"Pt called the clinic stating-    12/29- pt went to  and was dx with nonspecific pneumonia and was on an abx. Went again today due to increased SOB- was given inhaler, and 2 more abx.   They did an EKG ([facility]) today and said it looked okay but advised pt to go to ED if breathing gets difficult/or SOB gets worse or chest pain.      Pt also stated the pharmacy told her she needs a PA for bupropion HCI ER XL 450mg. Pt stated she is not taking anything currently as she is all out of the 300mg she was taking. Pt stated she doesn't know if she will be able to afford it even if the PA goes through.     Pt stated \"if it makes it easier, if he wants to write an order for 100mg and then another one for 300mg, that's fine too\".     Routing to PCP to review and advise.        "

## 2023-01-05 NOTE — TELEPHONE ENCOUNTER
I'm a bit confused about what is being asked of the MD?  Yes, she should go to the ER if she is not getting adequate symptom relief from the antibiotics prescribed by UC  Why can't we process the PA for the 450 mg dose of wellbutrin?

## 2023-01-06 NOTE — TELEPHONE ENCOUNTER
Central Prior Authorization Team   Phone: [phone]      PA Initiation    Medication: buPROPion HCl ER, XL, 450 MG TB24 - PA INITIATED  Insurance Company: [employer] - Phone [phone] Fax [phone]  Pharmacy Filling the Rx: [id number] - [place], [place] - [address] [facility] [address]  Filling Pharmacy Phone: [phone]  Filling Pharmacy Fax:    Start Date: 1/6/2023

## 2023-01-06 NOTE — TELEPHONE ENCOUNTER
Prior Authorization Approval    Authorization Effective Date: 12/26/2022  Authorization Expiration Date: 12/26/2099  Medication: buPROPion HCl ER, XL, 450 MG TB24 - PA APPROVED  Insurance Company: [employer] - Phone [phone] Fax [phone]  Which Pharmacy is filling the prescription (Not needed for infusion/clinic administered): [id number] - [place], [place] - [address] [facility] [address]  Pharmacy Notified: Yes  Patient Notified: Yes (pharmacy will notify patient when ready)

## 2023-01-06 NOTE — TELEPHONE ENCOUNTER
Pt was called with providers message. She agreed to seek care at ER if symptoms worsen.     See new encounter sent today to PA [place] for Wellbutrin 450 mg. Pt states she will not pay if Rx is too costly even with PA. She agreed to proceeds PA to find out cost.

## 2023-02-20 ENCOUNTER — MYC REFILL (OUTPATIENT)
Dept: FAMILY MEDICINE | Facility: CLINIC | Age: 73
End: 2023-02-20
Payer: MEDICARE

## 2023-02-20 DIAGNOSIS — M54.2 NECK PAIN ON RIGHT SIDE: ICD-10-CM

## 2023-02-21 RX ORDER — HYDROCODONE BITARTRATE AND ACETAMINOPHEN 5; 325 MG/1; MG/1
1 TABLET ORAL DAILY PRN
Qty: 10 TABLET | Refills: 0 | Status: SHIPPED | OUTPATIENT
Start: 2023-02-21 | End: 2023-03-14

## 2023-02-24 ENCOUNTER — TELEPHONE (OUTPATIENT)
Dept: FAMILY MEDICINE | Facility: CLINIC | Age: 73
End: 2023-02-24
Payer: MEDICARE

## 2023-02-24 NOTE — TELEPHONE ENCOUNTER
Pt received 10 tablets of Norco on her last prescription. She is not of medication now, but wants to ensure she gets 20 tablets on her next prescription. Triage advised she call clinic back a few days before she runs out of medication to put in request with correct qty. Pt verbalized agreement with plan.

## 2023-03-03 DIAGNOSIS — B00.9 HSV (HERPES SIMPLEX VIRUS) INFECTION: ICD-10-CM

## 2023-03-03 RX ORDER — VALACYCLOVIR HYDROCHLORIDE 1 G/1
TABLET, FILM COATED ORAL
Qty: 8 TABLET | Refills: 1 | Status: SHIPPED | OUTPATIENT
Start: 2023-03-03 | End: 2023-09-15

## 2023-03-13 ENCOUNTER — TELEPHONE (OUTPATIENT)
Dept: FAMILY MEDICINE | Facility: CLINIC | Age: 73
End: 2023-03-13
Payer: MEDICARE

## 2023-03-13 DIAGNOSIS — M54.2 NECK PAIN ON RIGHT SIDE: ICD-10-CM

## 2023-03-13 NOTE — TELEPHONE ENCOUNTER
Reason for Call:  Medication or medication refill:    Do you use a [facility]?  Name of the pharmacy and phone number for the current request: [facility] in [place]     Name of the medication requested: hydrocodone    Other request: is out of meds. Wants a refill for hydrocodone. Last refill she got 10 tabs. The original rx was 20 tabs. For some reason she got 10 last time but would rather have 20.      Can we leave a detailed message on this number? YES    Phone number patient can be reached at: Cell number on file:    Telephone Information:   Mobile [phone]       Best Time: any    Call taken on 3/13/2023 at 12:21 PM by Verna Paz

## 2023-03-14 RX ORDER — HYDROCODONE BITARTRATE AND ACETAMINOPHEN 5; 325 MG/1; MG/1
1 TABLET ORAL DAILY PRN
Qty: 20 TABLET | Refills: 0 | Status: SHIPPED | OUTPATIENT
Start: 2023-03-14 | End: 2023-04-18

## 2023-03-23 ENCOUNTER — TELEPHONE (OUTPATIENT)
Dept: CARDIOLOGY | Facility: CLINIC | Age: 73
End: 2023-03-23
Payer: MEDICARE

## 2023-03-23 DIAGNOSIS — E78.5 HYPERLIPIDEMIA LDL GOAL <70: Primary | ICD-10-CM

## 2023-03-23 NOTE — TELEPHONE ENCOUNTER
Patient called and agrees with review med question with Dr. Perez at upcoming OV.     Matreina's PATIENCE reply - Ok, noted. She can discuss it with Dr. Perez on 3/29 when she sees him.

## 2023-03-23 NOTE — TELEPHONE ENCOUNTER
[facility]    Phone Message    May a detailed message be left on voicemail: yes     Reason for Call: Medication Question or concern regarding medication   Prescription Clarification  Name of Medication: Calcium pills   Prescribing Provider: N/A   Pharmacy:    What on the order needs clarification?   Patient has been taking calcium pills. Patient notice on paperwork it states not to take calcium. Please call pt back and let her know if that is ok.           Action Taken: Other: Cardiology    Travel Screening: Not Applicable     Thank you!  [facility]

## 2023-03-23 NOTE — TELEPHONE ENCOUNTER
My Requests a reply via My Chart.     Spoke with Patient who states what she was reading Patient's with CAD should not be taking Calcium -Vit D tablets.  Currently listed in Snap shot medications.     Patient will be calling scheduling for next available OV, Scheduling phone number given.     Last [facility] Mobile PATIENCE OV 3-26-21 - She will continue with her current regimen without any changes.  I will have her come back in 1 year with repeat lipid panel/ALT    Last Dr. Perez OV 5-2-19

## 2023-03-23 NOTE — PROGRESS NOTES
Message from chart prep team that patient is overdue for follow up. Lipid check is due for OV 3/29/2023 with Dr. Perez. Order placed and message sent to scheduling.

## 2023-03-26 ENCOUNTER — HEALTH MAINTENANCE LETTER (OUTPATIENT)
Age: 73
End: 2023-03-26

## 2023-03-28 ENCOUNTER — LAB (OUTPATIENT)
Dept: LAB | Facility: CLINIC | Age: 73
End: 2023-03-28
Payer: MEDICARE

## 2023-03-28 DIAGNOSIS — E78.5 HYPERLIPIDEMIA LDL GOAL <70: ICD-10-CM

## 2023-03-28 LAB
CHOLEST SERPL-MCNC: 157 MG/DL
HDLC SERPL-MCNC: 70 MG/DL
LDLC SERPL CALC-MCNC: 77 MG/DL
NONHDLC SERPL-MCNC: 87 MG/DL
TRIGL SERPL-MCNC: 51 MG/DL

## 2023-03-28 PROCEDURE — 36415 COLL VENOUS BLD VENIPUNCTURE: CPT | Performed by: INTERNAL MEDICINE

## 2023-03-28 PROCEDURE — 80061 LIPID PANEL: CPT | Performed by: INTERNAL MEDICINE

## 2023-03-29 ENCOUNTER — OFFICE VISIT (OUTPATIENT)
Dept: CARDIOLOGY | Facility: CLINIC | Age: 73
End: 2023-03-29
Payer: MEDICARE

## 2023-03-29 VITALS
DIASTOLIC BLOOD PRESSURE: 71 MMHG | WEIGHT: 144 LBS | OXYGEN SATURATION: 98 % | SYSTOLIC BLOOD PRESSURE: 138 MMHG | BODY MASS INDEX: 23.99 KG/M2 | HEIGHT: 65 IN | HEART RATE: 66 BPM

## 2023-03-29 DIAGNOSIS — R93.1 HIGH CORONARY ARTERY CALCIUM SCORE: Chronic | ICD-10-CM

## 2023-03-29 DIAGNOSIS — R53.83 OTHER FATIGUE: ICD-10-CM

## 2023-03-29 DIAGNOSIS — R73.01 IFG (IMPAIRED FASTING GLUCOSE): ICD-10-CM

## 2023-03-29 DIAGNOSIS — I65.22 ATHEROSCLEROSIS OF LEFT CAROTID ARTERY: ICD-10-CM

## 2023-03-29 DIAGNOSIS — E78.2 MIXED HYPERLIPIDEMIA: Primary | ICD-10-CM

## 2023-03-29 DIAGNOSIS — E78.5 HYPERLIPIDEMIA LDL GOAL <70: ICD-10-CM

## 2023-03-29 PROCEDURE — 99214 OFFICE O/P EST MOD 30 MIN: CPT | Performed by: INTERNAL MEDICINE

## 2023-03-29 NOTE — PROGRESS NOTES
HPI and Plan:   Ms. Adair is a very nice 72-year-old woman with past medical history significant for very high calcium score at 790, putting in the top 1% for age.  She is a former smoker, having quit in 2014.  She has hypercholesterolemia.  She is under a great deal of stress.    She lost her first  Alzheimer's her second  to lung cancer.  She had her own garcia with breast cancer in 2017.       I have not seen Mahnaz since 2019 she now comes back because she thinks she needs to get her heart checked out.  She has seen Valarie intermittently in the interim.  She states since February 2020 when she had COVID the first time she has been struggling with fatigue, lack of energy and brain fog she states she has had COVID a total of 3 times.  She states she just has no energy and this is not like her.  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  She has no chest arm neck jaw or shoulder discomfort.  No dyspnea on exertion orthopnea or PND.  No palpitations lightheadedness dizziness syncope or near syncope.  She is very worried about her heart because she knows she is in the 99th percentile.  She does get confused about 99% blocked her calcium score in the 99th percentile.    We did do a stress MRI in July 2021 that appeared to be normal.    Assessment and plan.  I do not think Mahnaz is having any anginal symptoms.  She has had problems with depression she may be having long COVID problems and I have told her she could look into the long COVID clinic at the [facility].  She is planning on getting a sleep study and seeing an endocrinologist.  We talked about her calcium score in the 99th percentile versus 99% blocked.  I do not think we need to run another stress test at this time.  I do think we need to get more aggressive with risk factors.    Fortunately she has stayed off of cigarettes for the last 10 years.  She states she still chews Nicorette gum on a daily basis.    We talked about the importance of  getting back to a regular exercise regiment, predominantly plant-based diet.  Cutting out some of her red meat.     Blood pressure is adequately controlled this time at 138/71 with a pulse of 66    Weight is 144 pounds giving a body mass index of 24.  Have encouraged her to try to lose weight.    Fasting lipid profile shows a cholesterol of 157 and HDL of 70 and LDL of 77 and triglycerides of 51 on 10 mg of rosuvastatin.  I have asked her to increase this to 20 mg and will recheck a fasting lipid profile in 1 month with an ALT.  I would like to drive her LDL below 55.  I will also check a high-sensitivity C-reactive protein to look for residual ischemic risk.  I may consider adding colchicine if her CRP is elevated.    I told her she should follow annually given her calcium score and her high risk.    Thank you for allow me to participate in her care.  Sincerely Jose Alberto Perez MD [facility]                Today's clinic visit entailed:  Review of the result(s) of each unique test - Lab work, stress MRI,  Ordering of each unique test  Prescription drug management  42 minutes spent by me on the date of the encounter doing chart review, history and exam, documentation and further activities per the note  Provider  Link to [facility] Help Grid     The level of medical decision making during this visit was of moderate complexity.      Orders Placed This Encounter   Procedures     Lipid Profile     ALT     CRP cardiac risk     Lipid Profile     ALT     Basic metabolic panel     Follow-Up with Cardiology PATIENCE     Follow-Up with Cardiology       No orders of the defined types were placed in this encounter.      There are no discontinued medications.      Encounter Diagnoses   Name Primary?     Mixed hyperlipidemia Yes     High coronary artery calcium score      Hyperlipidemia LDL goal <70      Atherosclerosis of left carotid artery      IFG (impaired fasting glucose)      Other fatigue        CURRENT MEDICATIONS:  Current Outpatient  Medications   Medication Sig Dispense Refill     ASHWAGANDHA PO Take 1 tablet by mouth daily as needed At onset of illness symptoms; as needed       aspirin 81 MG tablet Take 1 tablet (81 mg) by mouth daily 30 tablet      buPROPion (WELLBUTRIN XL) 300 MG 24 hr tablet Take 1 tablet (300 mg) by mouth every morning 90 tablet 3     calcium carbonate-vitamin D (OS-YASMIN) 500-400 MG-UNIT tablet Take 1 tablet by mouth daily        cholecalciferol (VITAMIN D3) 25 mcg (1000 units) capsule Take 1 capsule by mouth daily       Coenzyme Q10 300 MG CAPS Take 300 mg by mouth daily       HYDROcodone-acetaminophen (NORCO) 5-325 MG tablet Take 1 tablet by mouth daily as needed for pain 20 tablet 0     IBUPROFEN PO Take 200 mg by mouth every 4 hours as needed for moderate pain        lactobacillus rhamnosus, GG, (CULTURELL) capsule Take 1 capsule by mouth daily        letrozole (FEMARA) 2.5 MG tablet Take 1 tablet by mouth daily  5     Lutein-Zeaxanthin 25-5 MG CAPS Take 1 capsule by mouth daily       LYSINE 1-3 daily as needed       melatonin 5 MG tablet Take 5 mg by mouth nightly as needed for sleep       nicotine polacrilex (NICORETTE) 2 MG gum Place 1 each (2 mg) inside cheek as needed for smoking cessation 30 tablet 11     omeprazole (PRILOSEC) 20 MG DR capsule TAKE ONE CAPSULE BY MOUTH AS NEEDED 90 capsule 2     rosuvastatin (CRESTOR) 10 MG tablet Take 1 tablet (10 mg) by mouth daily 90 tablet 3     UNABLE TO FIND Take by mouth daily MEDICATION NAME: Asea Redox - 1oz twice daily       UNABLE TO FIND Take 1 tablet by mouth daily MEDICATION NAME: Aidee Duong - chinese supplement takes at onset of illness symptoms       UNABLE TO FIND MEDICATION NAME: Somnapure - 2 tablets = 500mg valerian root, 300mg lemon balm extract, 200mg l-theanine, 120mg hops extract, 50mg chamomile flower extract, 50mg passion flower extract, 3mg melatonin.  Takes 1-2 tablets at bedtime        UNABLE TO FIND MEDICATION NAME: Moringa 1 serving of powder daily        UNABLE TO FIND MEDICATION NAME: Premium Amla Berry 2 capsules = 4mg Vitamin C, 966mg organic amla, organic amla extract.  Takes 1 capsule daily as needed for immune system       UNABLE TO FIND MEDICATION NAME: OmegaKrill 5x 3 capsules = 480mg of total omega-3, 64mg EPA, 392mg DHA, 300mcg astaxanthin.  Takes 3 capsules daily       UNABLE TO FIND MEDICATION NAME: Super Colon Cleanse 2 capsules = 665mg senna leaf powder, 321mg psyllium husk powder, 21mg fennel seed powder, 21mg papaya leaf powder, 21mg yolanda hips fruit powder, 11mg l-acidophilus.  Taking 4 capsules daily       valACYclovir (VALTREX) 1000 mg tablet TAKE 2 TABLETS(2000 MG) BY MOUTH TWICE DAILY AS NEEDED 8 tablet 1     vitamin C (ASCORBIC ACID) 250 MG tablet Take 250 mg by mouth daily         ALLERGIES     Allergies   Allergen Reactions     Cats      Codeine Sulfate GI Disturbance       PAST MEDICAL HISTORY:  Past Medical History:   Diagnosis Date     Alcoholism (H)      Allergies     Fall     Arthritis      Basal cell cancer     back, chest, face     Breast cancer (H)      Coronary artery disease     CT calcium vxwzo=451 Nov 2015     Depression      Hyperlipidemia LDL goal <130 12/2/2015     Hypertension     borderline     PONV (postoperative nausea and vomiting)      Squamous cell carcinoma     left upper arm, chin       PAST SURGICAL HISTORY:  Past Surgical History:   Procedure Laterality Date     ABDOMEN SURGERY  2007?    Hysterectomy     COLONOSCOPY       COLONOSCOPY  11/17/2011    Procedure:COLONOSCOPY; Colonoscopy; Surgeon:MEKA RUSS; Location: GI     COLONOSCOPY N/A 2/10/2020    Procedure: COLONOSCOPY, WITH POLYPECTOMY AND BIOPSY;  Surgeon: Opal Ace MD;  Location:  GI     DISSECT LYMPH NODE AXILLA Right 11/2/2017    Procedure: DISSECT LYMPH NODE AXILLA;  RIGHT AXILLARY LYMPH NODE DISSECTION;  Surgeon: Cortez White MD;  Location: [facility]     GYN SURGERY  2005    hysterectomy after hx of ovarian cyst, ended up being  benign     HYSTERECTOMY, PAP NO LONGER INDICATED       MASTECTOMY MODIFIED RADICAL  2011    bilateral     MASTECTOMY, BILATERAL       ORTHOPEDIC SURGERY      rt. knee arthroscopy     ORTHOPEDIC SURGERY Right     toe surgery     REALIGN PATELLA  1973    right      TOE SURGERY      right grt OA        FAMILY HISTORY:  Family History   Problem Relation Age of Onset     Cancer Mother 60        leukemia     Arthritis Mother         osteo     Eye Disorder Mother         glaucoma     Gastrointestinal Disease Mother         gallbladder     Other Cancer Mother      Gallbladder Disease Mother      Alcohol/Drug Father         alcohol     Heart Disease Father         ? CHF     Respiratory Father         emphysema     Coronary Artery Disease Father      Substance Abuse Father      Substance Abuse Sister      Anxiety Disorder Sister      Asthma Sister      Colon Cancer Brother      Breast Cancer Maternal Grandmother      Asthma Sister      Substance Abuse Sister      Cancer - colorectal Brother 50     Prostate Cancer Brother      Allergies Brother         bee      Arthritis Sister         osteo     Arthritis Sister         osteo     Arthritis Brother         osteo     Depression Sister      Psychotic Disorder Sister         bipolar     Respiratory Sister      Colon Cancer Brother      Prostate Cancer Brother      Depression Sister      Asthma Sister        SOCIAL HISTORY:  Social History     Socioeconomic History     Marital status:      Spouse name: None     Number of children: None     Years of education: None     Highest education level: None   Tobacco Use     Smoking status: Former     Packs/day: 1.00     Years: 22.00     Pack years: 22.00     Types: Cigarettes     Start date: 1969     Quit date: 2010     Years since quittin.9     Smokeless tobacco: Never     Tobacco comments:     I have been chewing nicorette gum 3 yrs and 3 months now. I have quit 8 and 9 yrs and periods in bet   Substance and Sexual  "Activity     Alcohol use: Not Currently     Comment: No longer     Drug use: Yes     Types: Marijuana     Comment: for sleeping/occ     Sexual activity: Not Currently     Partners: Male     Birth control/protection: Surgical     Comment: hyst   Other Topics Concern      Service No     Blood Transfusions No     Caffeine Concern Yes     Comment: 4-5 cups caffeine per day     Occupational Exposure No     Hobby Hazards No     Sleep Concern Yes     Stress Concern Yes     Weight Concern No     Special Diet Yes     Comment: organic foods     Back Care No     Exercise No     Bike Helmet No     Seat Belt Yes     Self-Exams Yes     Parent/sibling w/ CABG, MI or angioplasty before 65F 55M? No       Review of Systems:  Skin:  Positive for rash Recently got over shingles 3 weeks ago   Eyes:  Negative      ENT:  Negative      Respiratory:  Positive for shortness of breath Occasional SOB upon exertion for several years   Cardiovascular:    fatigue;Positive for Major fatigue for 3 years since having COVID  Gastroenterology: Negative      Genitourinary:  Negative      Musculoskeletal:  Positive for back pain;neck pain;joint pain;joint stiffness upper back and neck pain  Neurologic:  Negative      Psychiatric:  Positive for excessive stress;sleep disturbances    Heme/Lymph/Imm:  Negative      Endocrine:  Negative        Physical Exam:  Vitals: /71   Pulse 66   Ht 1.651 m (5' 5\")   Wt 65.3 kg (144 lb)   SpO2 98%   BMI 23.96 kg/m      Constitutional:  cooperative, alert and oriented, well developed, well nourished, in no acute distress        Skin:  warm and dry to the touch, no apparent skin lesions or masses noted          Head:  normocephalic, no masses or lesions        Eyes:  pupils equal and round, conjunctivae and lids unremarkable, sclera white, no xanthalasma, EOMS intact, no nystagmus        Lymph:      ENT:  no pallor or cyanosis, dentition good        Neck:  carotid pulses are full and equal " bilaterally;no carotid bruit        Respiratory:  normal breath sounds, clear to auscultation, normal A-P diameter, normal symmetry, normal respiratory excursion, no use of accessory muscles         Cardiac: regular rhythm;normal S1 and S2;no S3 or S4       grade 1;systolic ejection murmur;LUSB        pulses full and equal                                        GI:           Extremities and Muscular Skeletal:  no edema;no spinal abnormalities noted;normal muscle strength and tone              Neurological:  no gross motor deficits        Psych:  affect appropriate, oriented to time, person and place        CC  Jose Alberto Perez MD  [id number]  [place] [id number]

## 2023-03-29 NOTE — LETTER
3/29/2023    Vladislav Cruz MD  [address]  [facility] [address]    RE: Svetlana Adair       Dear Colleague,     I had the pleasure of seeing Svetlana Adair in the [facility].  HPI and Plan:   Ms. Adair is a very nice 72-year-old woman with past medical history significant for very high calcium score at 790, putting in the top 1% for age.  She is a former smoker, having quit in 2014.  She has hypercholesterolemia.  She is under a great deal of stress.    She lost her first  Alzheimer's her second  to lung cancer.  She had her own garcia with breast cancer in 2017.       I have not seen Mahnaz since 2019 she now comes back because she thinks she needs to get her heart checked out.  She has seen Valarie intermittently in the interim.  She states since February 2020 when she had COVID the first time she has been struggling with fatigue, lack of energy and brain fog she states she has had COVID a total of 3 times.  She states she just has no energy and this is not like her.  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  She has no chest arm neck jaw or shoulder discomfort.  No dyspnea on exertion orthopnea or PND.  No palpitations lightheadedness dizziness syncope or near syncope.  She is very worried about her heart because she knows she is in the 99th percentile.  She does get confused about 99% blocked her calcium score in the 99th percentile.    We did do a stress MRI in July 2021 that appeared to be normal.    Assessment and plan.  I do not think Mahnaz is having any anginal symptoms.  She has had problems with depression she may be having long COVID problems and I have told her she could look into the long COVID clinic at the [facility].  She is planning on getting a sleep study and seeing an endocrinologist.  We talked about her calcium score in the 99th percentile versus 99% blocked.  I do not think we need to run another stress test at this time.  I do think we need  to get more aggressive with risk factors.    Fortunately she has stayed off of cigarettes for the last 10 years.  She states she still chews Nicorette gum on a daily basis.    We talked about the importance of getting back to a regular exercise regiment, predominantly plant-based diet.  Cutting out some of her red meat.     Blood pressure is adequately controlled this time at 138/71 with a pulse of 66    Weight is 144 pounds giving a body mass index of 24.  Have encouraged her to try to lose weight.    Fasting lipid profile shows a cholesterol of 157 and HDL of 70 and LDL of 77 and triglycerides of 51 on 10 mg of rosuvastatin.  I have asked her to increase this to 20 mg and will recheck a fasting lipid profile in 1 month with an ALT.  I would like to drive her LDL below 55.  I will also check a high-sensitivity C-reactive protein to look for residual ischemic risk.  I may consider adding colchicine if her CRP is elevated.    I told her she should follow annually given her calcium score and her high risk.    Thank you for allow me to participate in her care.  Sincerely Jose Alberto Perez MD [facility]                Today's clinic visit entailed:  Review of the result(s) of each unique test - Lab work, stress MRI,  Ordering of each unique test  Prescription drug management  42 minutes spent by me on the date of the encounter doing chart review, history and exam, documentation and further activities per the note  Provider  Link to [facility] Help Grid     The level of medical decision making during this visit was of moderate complexity.      Orders Placed This Encounter   Procedures     Lipid Profile     ALT     CRP cardiac risk     Lipid Profile     ALT     Basic metabolic panel     Follow-Up with Cardiology PATIENCE     Follow-Up with Cardiology       No orders of the defined types were placed in this encounter.      There are no discontinued medications.      Encounter Diagnoses   Name Primary?     Mixed hyperlipidemia Yes     High  coronary artery calcium score      Hyperlipidemia LDL goal <70      Atherosclerosis of left carotid artery      IFG (impaired fasting glucose)      Other fatigue        CURRENT MEDICATIONS:  Current Outpatient Medications   Medication Sig Dispense Refill     ASHWAGANDHA PO Take 1 tablet by mouth daily as needed At onset of illness symptoms; as needed       aspirin 81 MG tablet Take 1 tablet (81 mg) by mouth daily 30 tablet      buPROPion (WELLBUTRIN XL) 300 MG 24 hr tablet Take 1 tablet (300 mg) by mouth every morning 90 tablet 3     calcium carbonate-vitamin D (OS-YASMIN) 500-400 MG-UNIT tablet Take 1 tablet by mouth daily        cholecalciferol (VITAMIN D3) 25 mcg (1000 units) capsule Take 1 capsule by mouth daily       Coenzyme Q10 300 MG CAPS Take 300 mg by mouth daily       HYDROcodone-acetaminophen (NORCO) 5-325 MG tablet Take 1 tablet by mouth daily as needed for pain 20 tablet 0     IBUPROFEN PO Take 200 mg by mouth every 4 hours as needed for moderate pain        lactobacillus rhamnosus, GG, (CULTURELL) capsule Take 1 capsule by mouth daily        letrozole (FEMARA) 2.5 MG tablet Take 1 tablet by mouth daily  5     Lutein-Zeaxanthin 25-5 MG CAPS Take 1 capsule by mouth daily       LYSINE 1-3 daily as needed       melatonin 5 MG tablet Take 5 mg by mouth nightly as needed for sleep       nicotine polacrilex (NICORETTE) 2 MG gum Place 1 each (2 mg) inside cheek as needed for smoking cessation 30 tablet 11     omeprazole (PRILOSEC) 20 MG DR capsule TAKE ONE CAPSULE BY MOUTH AS NEEDED 90 capsule 2     rosuvastatin (CRESTOR) 10 MG tablet Take 1 tablet (10 mg) by mouth daily 90 tablet 3     UNABLE TO FIND Take by mouth daily MEDICATION NAME: Shankar Redox - 1oz twice daily       UNABLE TO FIND Take 1 tablet by mouth daily MEDICATION NAME: Aidee Duong - chinese supplement takes at onset of illness symptoms       UNABLE TO FIND MEDICATION NAME: Somnapure - 2 tablets = 500mg valerian root, 300mg lemon balm extract, 200mg  l-theanine, 120mg hops extract, 50mg chamomile flower extract, 50mg passion flower extract, 3mg melatonin.  Takes 1-2 tablets at bedtime        UNABLE TO FIND MEDICATION NAME: Moringa 1 serving of powder daily       UNABLE TO FIND MEDICATION NAME: Premium Amla Berry 2 capsules = 4mg Vitamin C, 966mg organic amla, organic amla extract.  Takes 1 capsule daily as needed for immune system       UNABLE TO FIND MEDICATION NAME: OmegaKrill 5x 3 capsules = 480mg of total omega-3, 64mg EPA, 392mg DHA, 300mcg astaxanthin.  Takes 3 capsules daily       UNABLE TO FIND MEDICATION NAME: Super Colon Cleanse 2 capsules = 665mg senna leaf powder, 321mg psyllium husk powder, 21mg fennel seed powder, 21mg papaya leaf powder, 21mg yolanda hips fruit powder, 11mg l-acidophilus.  Taking 4 capsules daily       valACYclovir (VALTREX) 1000 mg tablet TAKE 2 TABLETS(2000 MG) BY MOUTH TWICE DAILY AS NEEDED 8 tablet 1     vitamin C (ASCORBIC ACID) 250 MG tablet Take 250 mg by mouth daily         ALLERGIES     Allergies   Allergen Reactions     Cats      Codeine Sulfate GI Disturbance       PAST MEDICAL HISTORY:  Past Medical History:   Diagnosis Date     Alcoholism (H)      Allergies     Fall     Arthritis      Basal cell cancer     back, chest, face     Breast cancer (H)      Coronary artery disease     CT calcium dxxgs=112 Nov 2015     Depression      Hyperlipidemia LDL goal <130 12/2/2015     Hypertension     borderline     PONV (postoperative nausea and vomiting)      Squamous cell carcinoma     left upper arm, chin       PAST SURGICAL HISTORY:  Past Surgical History:   Procedure Laterality Date     ABDOMEN SURGERY  2007?    Hysterectomy     COLONOSCOPY       COLONOSCOPY  11/17/2011    Procedure:COLONOSCOPY; Colonoscopy; Surgeon:MEKA RUSS; Location: GI     COLONOSCOPY N/A 2/10/2020    Procedure: COLONOSCOPY, WITH POLYPECTOMY AND BIOPSY;  Surgeon: Opal Ace MD;  Location: [facility]     DISSECT LYMPH NODE AXILLA Right 11/2/2017     Procedure: DISSECT LYMPH NODE AXILLA;  RIGHT AXILLARY LYMPH NODE DISSECTION;  Surgeon: Cortez White MD;  Location: [facility]     GYN SURGERY  2005    hysterectomy after hx of ovarian cyst, ended up being benign     HYSTERECTOMY, PAP NO LONGER INDICATED       MASTECTOMY MODIFIED RADICAL  2011    bilateral     MASTECTOMY, BILATERAL       ORTHOPEDIC SURGERY      rt. knee arthroscopy     ORTHOPEDIC SURGERY Right     toe surgery     REALIGN PATELLA  1973    right      TOE SURGERY      right grt OA        FAMILY HISTORY:  Family History   Problem Relation Age of Onset     Cancer Mother 60        leukemia     Arthritis Mother         osteo     Eye Disorder Mother         glaucoma     Gastrointestinal Disease Mother         gallbladder     Other Cancer Mother      Gallbladder Disease Mother      Alcohol/Drug Father         alcohol     Heart Disease Father         ? CHF     Respiratory Father         emphysema     Coronary Artery Disease Father      Substance Abuse Father      Substance Abuse Sister      Anxiety Disorder Sister      Asthma Sister      Colon Cancer Brother      Breast Cancer Maternal Grandmother      Asthma Sister      Substance Abuse Sister      Cancer - colorectal Brother 50     Prostate Cancer Brother      Allergies Brother         bee      Arthritis Sister         osteo     Arthritis Sister         osteo     Arthritis Brother         osteo     Depression Sister      Psychotic Disorder Sister         bipolar     Respiratory Sister      Colon Cancer Brother      Prostate Cancer Brother      Depression Sister      Asthma Sister        SOCIAL HISTORY:  Social History     Socioeconomic History     Marital status:      Spouse name: None     Number of children: None     Years of education: None     Highest education level: None   Tobacco Use     Smoking status: Former     Packs/day: 1.00     Years: 22.00     Pack years: 22.00     Types: Cigarettes     Start date: 7/7/1969     Quit date: 4/28/2010     " Years since quittin.9     Smokeless tobacco: Never     Tobacco comments:     I have been chewing nicorette gum 3 yrs and 3 months now. I have quit 8 and 9 yrs and periods in bet   Substance and Sexual Activity     Alcohol use: Not Currently     Comment: No longer     Drug use: Yes     Types: Marijuana     Comment: for sleeping/occ     Sexual activity: Not Currently     Partners: Male     Birth control/protection: Surgical     Comment: hyst   Other Topics Concern      Service No     Blood Transfusions No     Caffeine Concern Yes     Comment: 4-5 cups caffeine per day     Occupational Exposure No     Hobby Hazards No     Sleep Concern Yes     Stress Concern Yes     Weight Concern No     Special Diet Yes     Comment: organic foods     Back Care No     Exercise No     Bike Helmet No     Seat Belt Yes     Self-Exams Yes     Parent/sibling w/ CABG, MI or angioplasty before 65F 55M? No     Review of Systems:  Skin:  Positive for rash Recently got over shingles 3 weeks ago   Eyes:  Negative      ENT:  Negative      Respiratory:  Positive for shortness of breath Occasional SOB upon exertion for several years   Cardiovascular:    fatigue;Positive for Major fatigue for 3 years since having COVID  Gastroenterology: Negative      Genitourinary:  Negative      Musculoskeletal:  Positive for back pain;neck pain;joint pain;joint stiffness upper back and neck pain  Neurologic:  Negative      Psychiatric:  Positive for excessive stress;sleep disturbances    Heme/Lymph/Imm:  Negative      Endocrine:  Negative        Physical Exam:  Vitals: /71   Pulse 66   Ht 1.651 m (5' 5\")   Wt 65.3 kg (144 lb)   SpO2 98%   BMI 23.96 kg/m      Constitutional:  cooperative, alert and oriented, well developed, well nourished, in no acute distress        Skin:  warm and dry to the touch, no apparent skin lesions or masses noted          Head:  normocephalic, no masses or lesions        Eyes:  pupils equal and round, " conjunctivae and lids unremarkable, sclera white, no xanthalasma, EOMS intact, no nystagmus        Lymph:      ENT:  no pallor or cyanosis, dentition good        Neck:  carotid pulses are full and equal bilaterally;no carotid bruit        Respiratory:  normal breath sounds, clear to auscultation, normal A-P diameter, normal symmetry, normal respiratory excursion, no use of accessory muscles         Cardiac: regular rhythm;normal S1 and S2;no S3 or S4       grade 1;systolic ejection murmur;LUSB        pulses full and equal                                        GI:           Extremities and Muscular Skeletal:  no edema;no spinal abnormalities noted;normal muscle strength and tone              Neurological:  no gross motor deficits        Psych:  affect appropriate, oriented to time, person and place      CC  Jose Alberto Perez MD  [address]  [place] [address]      Thank you for allowing me to participate in the care of your patient.      Sincerely,     Jose Alberto Perez MD     [facility]

## 2023-03-31 ENCOUNTER — TELEPHONE (OUTPATIENT)
Dept: CARDIOLOGY | Facility: CLINIC | Age: 73
End: 2023-03-31

## 2023-03-31 ENCOUNTER — TELEPHONE (OUTPATIENT)
Dept: CARDIOLOGY | Facility: CLINIC | Age: 73
End: 2023-03-31
Payer: MEDICARE

## 2023-03-31 DIAGNOSIS — E78.2 MIXED HYPERLIPIDEMIA: ICD-10-CM

## 2023-03-31 RX ORDER — ROSUVASTATIN CALCIUM 10 MG/1
10 TABLET, COATED ORAL DAILY
Qty: 90 TABLET | Refills: 0 | Status: SHIPPED | OUTPATIENT
Start: 2023-03-31 | End: 2023-05-23 | Stop reason: DRUGHIGH

## 2023-03-31 NOTE — TELEPHONE ENCOUNTER
OV note 3/29/23 by Dr Perez-  Fasting lipid profile shows a cholesterol of 157 and HDL of 70 and LDL of 77 and triglycerides of 51 on 10 mg of rosuvastatin.  I have asked her to increase this to 20 mg and will recheck a fasting lipid profile in 1 month with an ALT.  I would like to drive her LDL below 55.    Pt says she does not need a new Rx now, will just get it when she see's Aybike in May after her labs. She will double up on the 10mg tablets for now.

## 2023-03-31 NOTE — TELEPHONE ENCOUNTER
[facility] Call Center    Phone Message    May a detailed message be left on voicemail: yes     Reason for Call: Medication Refill Request    Has the patient contacted the pharmacy for the refill? Yes   Name of medication being requested: rosuvastatin (CRESTOR) 10 MG tablet  Provider who prescribed the medication: Dr. Perez  Pharmacy: [id number] - [place], [place] - [address] AT [facility] [address]    Date medication is needed: 03/31/2023         Action Taken: Message routed to:  Other: Cardiology    Travel Screening: Not Applicable       Thank you!  [facility]

## 2023-03-31 NOTE — TELEPHONE ENCOUNTER
[facility] Call Center    Phone Message    May a detailed message be left on voicemail: yes     Reason for Call: Medication Question or concern regarding medication   Prescription Clarification  Name of Medication: rosuvastatin (CRESTOR) 10 MG tablet [[id number]]    Prescribing Provider: Dr. Perez   Pharmacy: [facility] #[id number] - [place], [place] - [address] AT [facility] [address]   What on the order needs clarification? Mahnaz was under the impression that she and Dr. Perez agreed to up her medication to 20mg but she only got a 90 day supply of 10mg pills. Mahnaz will take two 10mg pills per day for 45 days and wanted to know if the next time her Rx is sent to the pharmacy if it will be for 20mg pills. Please reach out to  to discuss. Thank you!          Action Taken: Other: Cardiology    Travel Screening: Not Applicable     Thank you!  [facility]

## 2023-04-06 ENCOUNTER — OFFICE VISIT (OUTPATIENT)
Dept: SLEEP MEDICINE | Facility: CLINIC | Age: 73
End: 2023-04-06
Attending: INTERNAL MEDICINE
Payer: MEDICARE

## 2023-04-06 VITALS
HEART RATE: 66 BPM | SYSTOLIC BLOOD PRESSURE: 122 MMHG | HEIGHT: 65 IN | WEIGHT: 144 LBS | DIASTOLIC BLOOD PRESSURE: 77 MMHG | OXYGEN SATURATION: 99 % | BODY MASS INDEX: 23.99 KG/M2

## 2023-04-06 DIAGNOSIS — R06.83 SNORING: ICD-10-CM

## 2023-04-06 DIAGNOSIS — R53.82 CHRONIC FATIGUE: Primary | ICD-10-CM

## 2023-04-06 DIAGNOSIS — F33.1 MODERATE EPISODE OF RECURRENT MAJOR DEPRESSIVE DISORDER (H): ICD-10-CM

## 2023-04-06 PROCEDURE — 99215 OFFICE O/P EST HI 40 MIN: CPT | Performed by: PHYSICIAN ASSISTANT

## 2023-04-06 ASSESSMENT — SLEEP AND FATIGUE QUESTIONNAIRES
HOW LIKELY ARE YOU TO NOD OFF OR FALL ASLEEP WHILE SITTING INACTIVE IN A PUBLIC PLACE: WOULD NEVER DOZE
HOW LIKELY ARE YOU TO NOD OFF OR FALL ASLEEP WHILE SITTING AND READING: WOULD NEVER DOZE
HOW LIKELY ARE YOU TO NOD OFF OR FALL ASLEEP WHILE SITTING AND TALKING TO SOMEONE: WOULD NEVER DOZE
HOW LIKELY ARE YOU TO NOD OFF OR FALL ASLEEP IN A CAR, WHILE STOPPED FOR A FEW MINUTES IN TRAFFIC: WOULD NEVER DOZE
HOW LIKELY ARE YOU TO NOD OFF OR FALL ASLEEP WHILE LYING DOWN TO REST IN THE AFTERNOON WHEN CIRCUMSTANCES PERMIT: SLIGHT CHANCE OF DOZING
HOW LIKELY ARE YOU TO NOD OFF OR FALL ASLEEP WHILE WATCHING TV: WOULD NEVER DOZE
HOW LIKELY ARE YOU TO NOD OFF OR FALL ASLEEP WHEN YOU ARE A PASSENGER IN A CAR FOR AN HOUR WITHOUT A BREAK: WOULD NEVER DOZE
HOW LIKELY ARE YOU TO NOD OFF OR FALL ASLEEP WHILE SITTING QUIETLY AFTER LUNCH WITHOUT ALCOHOL: WOULD NEVER DOZE

## 2023-04-06 NOTE — NURSING NOTE
"Chief Complaint   Patient presents with     Sleep Problem     Fatigue- lifetime of always being tired but worse since having COVID       Initial /77   Pulse 66   Ht 1.651 m (5' 5\")   Wt 65.3 kg (144 lb)   SpO2 99%   BMI 23.96 kg/m   Estimated body mass index is 23.96 kg/m  as calculated from the following:    Height as of this encounter: 1.651 m (5' 5\").    Weight as of this encounter: 65.3 kg (144 lb).    Medication Reconciliation: complete  ESS 1  Neck circumference: 37 centimeters.  Violeta Silva MA  "

## 2023-04-06 NOTE — PATIENT INSTRUCTIONS
"      MY TREATMENT INFORMATION FOR SLEEP APNEA-  Svetlana S Giovany  Frequently asked questions:  1. What is Obstructive Sleep Apnea (LALITA)? LALITA is the most common type of sleep apnea. Apnea means, \"without breath.\"  Apnea is most often caused by narrowing or collapse of the upper airway as muscles relax during sleep.   Almost everyone has occasional apneas. Most people with sleep apnea have had brief interruptions at night frequently for many years.  The severity of sleep apnea is related to how frequent and severe the events are.   2. What are the consequences of LALITA? Symptoms include: feeling sleepy during the day, snoring loudly, gasping or stopping of breathing, trouble sleeping, and occasionally morning headaches or heartburn at night.  Sleepiness can be serious and even increase the risk of falling asleep while driving. Other health consequences may include development of high blood pressure and other cardiovascular disease in persons who are susceptible. Untreated LALITA  can contribute to heart disease, stroke and diabetes.   3. What are the treatment options? In most situations, sleep apnea is a lifelong disease that must be managed with daily therapy. Medications are not effective for sleep apnea and surgery is generally not considered until other therapies have been tried. Your treatment is your choice . Continuous Positive Airway (CPAP) works right away and is the therapy that is effective in nearly everyone. An oral device to hold your jaw forward is usually the next most reliable option. Other options include postioning devices (to keep you off your back), weight loss, and surgery including a tongue pacing device. There is more detail about some of these options below.  4. Are my sleep studies covered by insurance? Although we will request verification of coverage, we advise you also check in advance of the study to ensure there is coverage.    Important tips for those choosing CPAP and similar devices "   Know your equipment:  CPAP is continuous positive airway pressure that prevents obstructive sleep apnea by keeping the throat from collapsing while you are sleeping. In most cases, the device is  smart  and can slowly self-adjusts if your throat collapses and keeps a record every day of how well you are treated-this information is available to you and your care team.  BPAP is bilevel positive airway pressure that keeps your throat open and also assists each breath with a pressure boost to maintain adequate breathing.  Special kinds of BPAP are used in patients who have inadequate breathing from lung or heart disease. In most cases, the device is  smart  and can slowly self-adjusts to assist breathing. Like CPAP, the device keeps a record of how well you are treated.  Your mask is your connection to the device. You get to choose what feels most comfortable and the staff will help to make sure if fits. Here: are some examples of the different masks that are available:       Key points to remember on your journey with sleep apnea:  Sleep study.  PAP devices often need to be adjusted during a sleep study to show that they are effective and adjusted right.  Good tips to remember: Try wearing just the mask during a quiet time during the day so your body adapts to wearing it. A humidifier is recommended for comfort in most cases to prevent drying of your nose and throat. Allergy medication from your provider may help you if you are having nasal congestion.  Getting settled-in. It takes more than one night for most of us to get used to wearing a mask. Try wearing just the mask during a quiet time during the day so your body adapts to wearing it. A humidifier is recommended for comfort in most cases. Our team will work with you carefully on the first day and will be in contact within 4 days and again at 2 and 4 weeks for advice and remote device adjustments. Your therapy is evaluated by the device each day.   Use it every  night. The more you are able to sleep naturally for 7-8 hours, the more likely you will have good sleep and to prevent health risks or symptoms from sleep apnea. Even if you use it 4 hours it helps. Occasionally all of us are unable to use a medical therapy, in sleep apnea, it is not dangerous to miss one night.   Communicate. Call our skilled team on the number provided on the first day if your visit for problems that make it difficult to wear the device. Over 2 out of 3 patients can learn to wear the device long-term with help from our team. Remember to call our team or your sleep providers if you are unable to wear the device as we may have other solutions for those who cannot adapt to mask CPAP therapy. It is recommended that you sleep your sleep provider within the first 3 months and yearly after that if you are not having problems.   Use it for your health. We encourage use of CPAP masks during daytime quiet periods to allow your face and brain to adapt to the sensation of CPAP so that it will be a more natural sensation to awaken to at night or during naps. This can be very useful during the first few weeks or months of adapting to CPAP though it does not help medically to wear CPAP during wakefulness and  should not be used as a strategy just to meet guidelines.  Take care of your equipment. Make sure you clean your mask and tubing using directions every day and that your filter and mask are replaced as recommended or if they are not working.     BESIDES CPAP, WHAT OTHER THERAPIES ARE THERE?    Positioning Device  Positioning devices are generally used when sleep apnea is mild and only occurs on your back.This example shows a pillow that straps around the waist. It may be appropriate for those whose sleep study shows milder sleep apnea that occurs primarily when lying flat on one's back. Preliminary studies have shown benefit but effectiveness at home may need to be verified by a home sleep test. These  devices are generally not covered by medical insurance.  Examples of devices that maintain sleeping on the back to prevent snoring and mild sleep apnea.    Belt type body positioner  http://Safeway Safety Step.com/    Electronic reminder  http://nightshifttherapy.com/            Oral Appliance  What is oral appliance therapy?  An oral appliance device fits on your teeth at night like a retainer used after having braces. The device is made by a specialized dentist and requires several visits over 1-2 months before a manufactured device is made to fit your teeth and is adjusted to prevent your sleep apnea. Once an oral device is working properly, snoring should be improved. A home sleep test may be recommended at that time if to determine whether the sleep apnea is adequately treated.       Some things to remember:  -Oral devices are often, but not always, covered by your medical insurance. Be sure to check with your insurance provider.   -If you are referred for oral therapy, you will be given a list of specialized dentists to consider or you may choose to visit the Web site of the American Academy of Dental Sleep Medicine  -Oral devices are less likely to work if you have severe sleep apnea or are extremely overweight.     More detailed information  An oral appliance is a small acrylic device that fits over the upper and lower teeth  (similar to a retainer or a mouth guard). This device slightly moves jaw forward, which moves the base of the tongue forward, opens the airway, improves breathing for effective treat snoring and obstructive sleep apnea in perhaps 7 out of 10 people .  The best working devices are custom-made by a dental device  after a mold is made of the teeth 1, 2, 3.  When is an oral appliance indicated?  Oral appliance therapy is recommended as a first-line treatment for patients with primary snoring, mild sleep apnea, and for patients with moderate sleep apnea who prefer appliance therapy to use  of CPAP4, 5. Severity of sleep apnea is determined by sleep testing and is based on the number of respiratory events per hour of sleep.   How successful is oral appliance therapy?  The success rate of oral appliance therapy in patients with mild sleep apnea is 75-80% while in patients with moderate sleep apnea it is 50-70%. The chance of success in patients with severe sleep apnea is 40-50%. The research also shows that oral appliances have a beneficial effect on the cardiovascular health of LALITA patients at the same magnitude as CPAP therapy7.  Oral appliances should be a second-line treatment in cases of severe sleep apnea, but if not completely successful then a combination therapy utilizing CPAP plus oral appliance therapy may be effective. Oral appliances tend to be effective in a broad range of patients although studies show that the patients who have the highest success are females, younger patients, those with milder disease, and less severe obesity. 3, 6.   Finding a dentist that practices dental sleep medicine  Specific training is available through the American Academy of Dental Sleep Medicine for dentists interested in working in the field of sleep. To find a dentist who is educated in the field of sleep and the use of oral appliances, near you, visit the Web site of the American Academy of Dental Sleep Medicine.    References  1. Lucio, et al. Objectively measured vs self-reported compliance during oral appliance therapy for sleep-disordered breathing. Chest 2013; 144(5): 3590-3118.  2. Esteban et al. Objective measurement of compliance during oral appliance therapy for sleep-disordered breathing. Thorax 2013; 68(1): 91-96.  3. Jayde et al. Mandibular advancement devices in 620 men and women with LALITA and snoring: tolerability and predictors of treatment success. Chest 2004; 125: 4254-5415.  4. Junaid, et al. Oral appliances for snoring and LALITA: a review. Sleep 2006; 29: 244-262.  5.  Nima et al. Oral appliance treatment for LALITA: an update. J Clin Sleep Med 2014; 10(2): 215-227.  6. Abby et al. Predictors of OSAH treatment outcome. J Dent Res 2007; 86: 2880-6499.      Weight Loss:    Weight loss is a long-term strategy that may improve sleep apnea in some patients.    Weight management is a personal decision and the decision should be based on your interest and the potential benefits.  If you are interested in exploring weight loss strategies, the following discussion covers the impact on weight loss on sleep apnea and the approaches that may be successful.    Being overweight does not necessarily mean you will have health consequences.  Those who have BMI over 35 or over 27 with existing medical conditions carries greater risk.   Weight loss decreases severity of sleep apnea in most people with obesity. For those with mild obesity who have developed snoring with weight gain, even 15-30 pound weight loss can improve and occasionally eliminate sleep apnea.  Structured and life-long dietary and health habits are necessary to lose weight and keep healthier weight levels.     Though there may be significant health benefits from weight loss, long-term weight loss is very difficult to achieve- studies show success with dietary management in less than 10% of people. In addition, substantial weight loss may require years of dietary control and may be difficult if patients have severe obesity. In these cases, surgical management may be considered.  Finally, older individuals who have tolerated obesity without health complications may be less likely to benefit from weight loss strategies.      [unfilled]    Surgery:    Surgery for obstructive sleep apnea is considered generally only when other therapies fail to work. Surgery may be discussed with you if you are having a difficult time tolerating CPAP and or when there is an abnormal structure that requires surgical correction.  Nose and throat  surgeries often enlarge the airway to prevent collapse.  Most of these surgeries create pain for 1-2 weeks and up to half of the most common surgeries are not effective throughout life.  You should carefully discuss the benefits and drawbacks to surgery with your sleep provider and surgeon to determine if it is the best solution for you.   More information  Surgery for LALITA is directed at areas that are responsible for narrowing or complete obstruction of the airway during sleep.  There are a wide range of procedures available to enlarge and/or stabilize the airway to prevent blockage of breathing in the three major areas where it can occur: the palate, tongue, and nasal regions.  Successful surgical treatment depends on the accurate identification of the factors responsible for obstructive sleep apnea in each person.  A personalized approach is required because there is no single treatment that works well for everyone.  Because of anatomic variation, consultation with an examination by a sleep surgeon is a critical first step in determining what surgical options are best for each patient.  In some cases, examination during sedation may be recommended in order to guide the selection of procedures.  Patients will be counseled about risks and benefits as well as the typical recovery course after surgery. Surgery is typically not a cure for a person s LALITA.  However, surgery will often significantly improve one s LALITA severity (termed  success rate ).  Even in the absence of a cure, surgery will decrease the cardiovascular risk associated with OSA7; improve overall quality of life8 (sleepiness, functionality, sleep quality, etc).      Palate Procedures:  Patients with LALITA often have narrowing of their airway in the region of their tonsils and uvula.  The goals of palate procedures are to widen the airway in this region as well as to help the tissues resist collapse.  Modern palate procedure techniques focus on tissue  conservation and soft tissue rearrangement, rather than tissue removal.  Often the uvula is preserved in this procedure. Residual sleep apnea is common in patient after pharyngoplasty with an average reduction in sleep apnea events of 33%2.      Tongue Procedures:  ExamWhile patients are awake, the muscles that surround the throat are active and keep this region open for breathing. These muscles relax during sleep, allowing the tongue and other structures to collapse and block breathing.  There are several different tongue procedures available.  Selection of a tongue base procedure depends on characteristics seen on physical exam.  Generally, procedures are aimed at removing bulky tissues in this area or preventing the back of the tongue from falling back during sleep.  Success rates for tongue surgery range from 50-62%3.    Hypoglossal Nerve Stimulation:  Hypoglossal nerve stimulation has recently received approval from the [place] Food and Drug Administration for the treatment of obstructive sleep apnea.  This is based on research showing that the system was safe and effective in treating sleep apnea6.  Results showed that the median AHI score decreased 68%, from 29.3 to 9.0. This therapy uses an implant system that senses breathing patterns and delivers mild stimulation to airway muscles, which keeps the airway open during sleep.  The system consists of three fully implanted components: a small generator (similar in size to a pacemaker), a breathing sensor, and a stimulation lead.  Using a small handheld remote, a patient turns the therapy on before bed and off upon awakening.    Candidates for this device must be greater than 18 years of age, have moderate to severe LALITA (AHI between 15-65), BMI less than 35, have tried CPAP/oral appliance for at least 8 weeks without success, and have appropriate upper airway anatomy (determined by a sleep endoscopy performed by Dr. Sancho Gallego).    Hypoglossal Nerve  Stimulation Pathway:    The sleep surgeon s office will work with the patient through the insurance prior-authorization process (including communications and appeals).    Nasal Procedures:  Nasal obstruction can interfere with nasal breathing during the day and night.  Studies have shown that relief of nasal obstruction can improve the ability of some patients to tolerate positive airway pressure therapy for obstructive sleep apnea1.  Treatment options include medications such as nasal saline, topical corticosteroid and antihistamine sprays, and oral medications such as antihistamines or decongestants. Non-surgical treatments can include external nasal dilators for selected patients. If these are not successful by themselves, surgery can improve the nasal airway either alone or in combination with these other options.      Combination Procedures:  Combination of surgical procedures and other treatments may be recommended, particularly if patients have more than one area of narrowing or persistent positional disease.  The success rate of combination surgery ranges from 66-80%2,3.    References  Michelle GARCIA. The Role of the Nose in Snoring and Obstructive Sleep Apnoea: An Update.  Eur Arch Otorhinolaryngol. 2011; 268: 1365-73.   Walt SM; Azalea JA; Aleyda JR; Pallanch JF; Ellen MB; Nikita SG; Louann GAITAN. Surgical modifications of the upper airway for obstructive sleep apnea in adults: a systematic review and meta-analysis. SLEEP 2010;33(10):1549-6840. Nadege BERNARD. Hypopharyngeal surgery in obstructive sleep apnea: an evidence-based medicine review.  Arch Otolaryngol Head Neck Surg. 2006 Feb;132(2):206-13.  Leo YH1, Siobhan Y, Bong TD. The efficacy of anatomically based multilevel surgery for obstructive sleep apnea. Otolaryngol Head Neck Surg. 2003 Oct;129(4):327-35.  Kezirian E, Goldberg A. Hypopharyngeal Surgery in Obstructive Sleep Apnea: An Evidence-Based Medicine Review. Arch Otolaryngol Head Neck Surg. 2006  Feb;132(2):206-13.  Fabian PJ et al. Upper-Airway Stimulation for Obstructive Sleep Apnea.  N Engl J Med. 2014 Jan 9;370(2):139-49.  Julio Y et al. Increased Incidence of Cardiovascular Disease in Middle-aged Men with Obstructive Sleep Apnea. Am J Respir Crit Care Med; 2002 166: 159-165  Rosado EM et al. Studying Life Effects and Effectiveness of Palatopharyngoplasty (SLEEP) study: Subjective Outcomes of Isolated Uvulopalatopharyngoplasty. Otolaryngol Head Neck Surg. 2011; 144: 623-631.        WHAT IF I ONLY HAVE SNORING?    Mandibular advancement devices, lateral sleep positioning, long-term weight loss and treatment of nasal allergies have been shown to improve snoring.  Exercising tongue muscles with a game (https://Cr[employer].[email or web address]-reduce-snoring/[email or web address]) or stimulating the tongue during the day with a device (https://doi.org/10.3390/lhb01262855) have improved snoring in some individuals.    Remember to Drive Safe... Drive Alive     Sleep health profoundly affects your health, mood, and your safety.  Thirty three percent of the population (one in three of us) is not getting enough sleep and many have a sleep disorder. Not getting enough sleep or having an untreated / undertreated sleep condition may make us sleepy without even knowing it. In fact, our driving could be dramatically impaired due to our sleep health. As your provider, here are some things I would like you to know about driving:     Here are some warning signs for impairment and dangerous drowsy driving:              -Having been awake more than 16 hours               -Looking tired               -Eyelid drooping              -Head nodding (it could be too late at this point)              -Driving for more than 30 minutes     Some things you could do to make the driving safer if you are experiencing some drowsiness:              -Stop driving and rest              -Call for transportation              -Make sure your sleep  disorder is adequately treated     Some things that have been shown NOT to work when experiencing drowsiness while driving:              -Turning on the radio              -Opening windows              -Eating any  distracting  /  entertaining  foods (e.g., sunflower seeds, candy, or any other)              -Talking on the phone      Your decision may not only impact your life, but also the life of others. Please, remember to drive safe for yourself and all of us.

## 2023-04-06 NOTE — PROGRESS NOTES
Outpatient Sleep Medicine Consultation:      Name: Svetlana Adair MRN# [id number]   Age: 72 year old YOB: 1950     Date of Consultation: April 6, 2023  Consultation is requested by: Vladislav Cruz MD  [address] [facility] [address]  [place] [address] Vladislav Cruz  Primary care provider: Vladislav Cruz       Reason for Sleep Consult:     Svetlana Adair is sent by Vladislav Cruz for a sleep consultation regarding daytime sleepiness.    Patient s Reason for visit  Svetlana Adair main reason for visit:  always tired wake up tired  Patient states problem(s) started:  always tired since I was young my mother noticed  Svetlana Adair's goals for this visit:  see if my tiredness is due to lack of sleep         Assessment and Plan:     Summary Sleep Diagnoses:  1. Chronic fatigue  2. Snoring  Comorbid Diagnoses:  3. Moderate episode of recurrent major depressive disorder (H)    Patient presents to clinic today with complaint of tiredness. She denies any sleepiness with ESS 1, no insomnia no daytime napping. Her description to me sounds most consistent with fatigue than true sleepiness. Most likely etiology of fatigue sounds mood/depression related and/or post-COVID. She does snore however so some concern for possible LALITA. No bed partner for >10 years so no way of knowing any witnessed apneas, gasping/choking, etc. Age >50 has increased risk of LALITA. Denies any abnormal movements or behaviors in sleep that raise concern of parasomnia causing problems with fatigue. Though appears low risk we agreed to pursue PSG to screen for sleep disordered breathing. She is open to CPAP if PSG is positive and if this is recommended but more interested in MAD when discussed these two options today. Will see her back 1-2 weeks after PSG for results discussion. Of note, reports she has upcoming endocrine appt to look into thyroid as well, that may be contributing to fatigue.   - Comprehensive Sleep Study; Future          "History of Present Illness:     Svetlana Adair is a 72 year old female who presents to clinic today with chronic tiredness. States reason for visit is because \"every since I was a child I have been tired but it has gotten a lot worse since I had COVID 3 years ago and now it is an insane tiredness where I haven't exercised in 3 years because I have no energy because of the long haul and the brain fog and people think I have energy because I get a lot done but my body is weary\". Feels \"very content\" staying home and sitting on couch all day because no energy to do much. History of depression treated with TMS a couple years ago, currently on Wellbutrin.     SLEEP-WAKE SCHEDULE:     Work/School Days: Patient goes to school/work: Yes   Usually gets into bed at 10:30PM  Takes patient about <30 minutes to fall asleep  Has trouble falling asleep 1 nights per week  Wakes up in the middle of the night 3-4 times.  Wakes up due to uncertain reasons, bathroom x2  She has trouble falling back asleep 1 time a week.   It usually takes 10 minutes to get back to sleep  Patient is usually up by 9:00AM  Uses alarm:  Yes    Weekends/Non-work Days/All Other Days:  Usually gets into bed at 10-11:00PM  Takes patient about 15 minutes to fall asleep  Patient is usually up at 9-10:00AM  Uses alarm:  No    Sleep Need  Patient estimates she gets 6-7 hours sleep on average   Patient thinks she needs about 8 hours sleep    Svetlana Adair prefers to sleep in this position(s):   Side  Patient states they do the following activities in bed:  Read, use electronics    Naps  Patient takes a purposeful nap 0 times a week and naps are usually N/A in duration  She feels better after a nap: N/A  She dozes off unintentionally 0 days per week  Patient has had a driving accident or near-miss due to sleepiness/drowsiness:  No  She had a total Mcgregor Sleepiness Scale of 1 (04/06/23 6036), with scores of 10 or higher indicating abnormal levels of " sleepiness.    SLEEP DISRUPTIONS:    Breathing/Snoring  Patient snores: Yes but no bed partner for >10 years so unsure how regular  Other people complain about her snoring:  Yes sister and ex   Patient has been told she stops breathing in her sleep: No  No gasping/choking arousals   She has issues with the following:  morning headaches just this past week, getting up to urinate more than once    Movement:  Patient gets pain, discomfort, with an urge to move: Yes - knee or hand pain can interfere with sleep, no restless leg. Wears lymphedema sleeve at night s/p cancer  It happens when she is resting: No  It happens more at night: Yes  Patient has been told she kicks her legs at night:   No     Behaviors in Sleep:  Svetlana Adair has experienced the following behaviors while sleeping:   Teeth grinding  She has experienced sudden muscle weakness during the day:  No      Is there anything else you would like your sleep provider to know:  No      CAFFEINE AND OTHER SUBSTANCES:    Patient consumes caffeinated beverages per day:   5 cups coffee   Last caffeine use is usually:  2:00PM  List of any prescribed or over the counter stimulants that patient takes:  None  List of any prescribed or over the counter sleep medication patient takes:   Melatonin  Patient drinks alcohol to help them sleep:  No  Patient drinks alcohol near bedtime:  No    Family History:  Patient has a family member been diagnosed with a sleep disorder:  No      SCALES:    EPWORTH SLEEPINESS SCALE         4/6/2023     9:53 AM    [place] Sleepiness Scale ( JOSE LUIS Saenz  1990-1997[facility] [place]/English - Final version - 21 Nov 07 - [facility] [place].)   Sitting and reading Would never doze   Watching TV Would never doze   Sitting, inactive in a public place (e.g. a theatre or a meeting) Would never doze   As a passenger in a car for an hour without a break Would never doze   Lying down to rest in the afternoon when circumstances permit Slight chance of  dozing   Sitting and talking to someone Would never doze   Sitting quietly after a lunch without alcohol Would never doze   In a car, while stopped for a few minutes in traffic Would never doze   Petrolia Score (MC) 1   Petrolia Score (Sleep) 1         INSOMNIA SEVERITY INDEX (LIVE)          4/6/2023     9:28 AM   Insomnia Severity Index (LIVE)   Difficulty falling asleep 1   Difficulty staying asleep 2   Problems waking up too early 0   How SATISFIED/DISSATISFIED are you with your CURRENT sleep pattern? 2   How NOTICEABLE to others do you think your sleep problem is in terms of impairing the quality of your life? 0   How WORRIED/DISTRESSED are you about your current sleep problem? 2   To what extent do you consider your sleep problem to INTERFERE with your daily functioning (e.g. daytime fatigue, mood, ability to function at work/daily chores, concentration, memory, mood, etc.) CURRENTLY? 2   LIVE Total Score 9       Guidelines for Scoring/Interpretation:  Total score categories:  0-7 = No clinically significant insomnia   8-14 = Subthreshold insomnia   15-21 = Clinical insomnia (moderate severity)  22-28 = Clinical insomnia (severe)  Used via courtesy of www.[employer].va.gov with permission from Markus Winchester PhD., [facility]    Allergies:    Allergies   Allergen Reactions     Cats      Codeine Sulfate GI Disturbance       Medications:    Current Outpatient Medications   Medication Sig Dispense Refill     ASHWAGANDHA PO Take 1 tablet by mouth daily as needed At onset of illness symptoms; as needed       aspirin 81 MG tablet Take 1 tablet (81 mg) by mouth daily 30 tablet      buPROPion (WELLBUTRIN XL) 300 MG 24 hr tablet Take 1 tablet (300 mg) by mouth every morning 90 tablet 3     calcium carbonate-vitamin D (OS-YASMIN) 500-400 MG-UNIT tablet Take 1 tablet by mouth daily        cholecalciferol (VITAMIN D3) 25 mcg (1000 units) capsule Take 1 capsule by mouth daily       Coenzyme Q10 300 MG CAPS Take 300 mg by mouth  daily       HYDROcodone-acetaminophen (NORCO) 5-325 MG tablet Take 1 tablet by mouth daily as needed for pain 20 tablet 0     IBUPROFEN PO Take 200 mg by mouth every 4 hours as needed for moderate pain        lactobacillus rhamnosus, GG, (CULTURELL) capsule Take 1 capsule by mouth daily        letrozole (FEMARA) 2.5 MG tablet Take 1 tablet by mouth daily  5     Lutein-Zeaxanthin 25-5 MG CAPS Take 1 capsule by mouth daily       LYSINE 1-3 daily as needed       melatonin 5 MG tablet Take 5 mg by mouth nightly as needed for sleep       nicotine polacrilex (NICORETTE) 2 MG gum Place 1 each (2 mg) inside cheek as needed for smoking cessation 30 tablet 11     omeprazole (PRILOSEC) 20 MG DR capsule TAKE ONE CAPSULE BY MOUTH AS NEEDED 90 capsule 2     rosuvastatin (CRESTOR) 10 MG tablet Take 1 tablet (10 mg) by mouth daily 90 tablet 0     UNABLE TO FIND Take by mouth daily MEDICATION NAME: Asea Redox - 1oz twice daily       UNABLE TO FIND Take 1 tablet by mouth daily MEDICATION NAME: Yin Kolbyao - chinese supplement takes at onset of illness symptoms       UNABLE TO FIND MEDICATION NAME: Somnapure - 2 tablets = 500mg valerian root, 300mg lemon balm extract, 200mg l-theanine, 120mg hops extract, 50mg chamomile flower extract, 50mg passion flower extract, 3mg melatonin.  Takes 1-2 tablets at bedtime        UNABLE TO FIND MEDICATION NAME: Moringa 1 serving of powder daily       UNABLE TO FIND MEDICATION NAME: Premium Amla Berry 2 capsules = 4mg Vitamin C, 966mg organic amla, organic amla extract.  Takes 1 capsule daily as needed for immune system       UNABLE TO FIND MEDICATION NAME: OmegaKrill 5x 3 capsules = 480mg of total omega-3, 64mg EPA, 392mg DHA, 300mcg astaxanthin.  Takes 3 capsules daily       UNABLE TO FIND MEDICATION NAME: Super Colon Cleanse 2 capsules = 665mg senna leaf powder, 321mg psyllium husk powder, 21mg fennel seed powder, 21mg papaya leaf powder, 21mg yolanda hips fruit powder, 11mg l-acidophilus.  Taking 4  capsules daily       valACYclovir (VALTREX) 1000 mg tablet TAKE 2 TABLETS(2000 MG) BY MOUTH TWICE DAILY AS NEEDED 8 tablet 1     vitamin C (ASCORBIC ACID) 250 MG tablet Take 250 mg by mouth daily         Problem List:  Patient Active Problem List    Diagnosis Date Noted     Other fatigue 03/29/2023     Priority: Medium     High coronary artery calcium score 05/01/2019     Priority: Medium     Hyperparathyroidism (H) 08/02/2018     Priority: Medium     Alcohol dependence in remission (H) 08/02/2018     Priority: Medium     Neck pain on right side 05/29/2018     Priority: Medium     Adjustment disorder with mixed anxiety and depressed mood 05/08/2018     Priority: Medium     Chronic, continuous use of opioids 04/26/2018     Priority: Medium     Patient is followed by Vladislav Cruz MD for ongoing prescription of pain medication.  All refills should only be approved by this provider, or covering partner.    Medication(s): norco 5/325.   Maximum quantity per month: #20  Clinic visit frequency required: Q 6  months     Controlled substance agreement:  Encounter-Level CSA:     There are no encounter-level csa.        Pain Clinic evaluation in the past: No    DIRE Total Score(s):  No flowsheet data found.    Last [facility] website verification: 8-  SN [email or web address]         Atherosclerosis of left carotid artery 01/25/2018     Priority: Medium     Follow-up examination following surgery 11/10/2017     Priority: Medium     Hyperlipidemia LDL goal <70 12/02/2015     Priority: Medium     Malignant neoplasm of right breast (H) 10/14/2015     Priority: Medium     Low back pain 04/03/2013     Priority: Medium     Diagnosis updated by automated process. Provider to review and confirm.       IFG (impaired fasting glucose) 04/01/2013     Priority: Medium     Knee pain 11/19/2012     Priority: Medium     Past use of tobacco 11/19/2012     Priority: Medium     Advanced directives, counseling/discussion 09/06/2011      Priority: Medium     Advance Directive Problem List Overview:   Name Relationship Phone    Primary Health Care Agent            Alternative Health Care Agent          Patient states has Advance Directive and will bring in a copy to clinic. 9/6/2011          Moderate episode of recurrent major depressive disorder (H) 06/20/2011     Priority: Medium     Breast cancer est/prog +, HER2 ERNIE - 03/03/2011     Priority: Medium     Osteoarthritis 03/03/2011     Priority: Medium        Past Medical/Surgical History:  Past Medical History:   Diagnosis Date     Alcoholism (H)      Allergies     Fall     Arthritis      Basal cell cancer     back, chest, face     Breast cancer (H)      Coronary artery disease     CT calcium zoska=525 Nov 2015     Depression      Hyperlipidemia LDL goal <130 12/2/2015     Hypertension     borderline     PONV (postoperative nausea and vomiting)      Squamous cell carcinoma     left upper arm, chin     Past Surgical History:   Procedure Laterality Date     ABDOMEN SURGERY  2007?    Hysterectomy     COLONOSCOPY       COLONOSCOPY  11/17/2011    Procedure:COLONOSCOPY; Colonoscopy; Surgeon:MEKA RUSS; Location: GI     COLONOSCOPY N/A 2/10/2020    Procedure: COLONOSCOPY, WITH POLYPECTOMY AND BIOPSY;  Surgeon: Opal Ace MD;  Location:  GI     DISSECT LYMPH NODE AXILLA Right 11/2/2017    Procedure: DISSECT LYMPH NODE AXILLA;  RIGHT AXILLARY LYMPH NODE DISSECTION;  Surgeon: Cortez White MD;  Location: [facility]     GYN SURGERY  2005    hysterectomy after hx of ovarian cyst, ended up being benign     HYSTERECTOMY, PAP NO LONGER INDICATED       MASTECTOMY MODIFIED RADICAL  2011    bilateral     MASTECTOMY, BILATERAL       ORTHOPEDIC SURGERY      rt. knee arthroscopy     ORTHOPEDIC SURGERY Right     toe surgery     REALIGN PATELLA  1973    right      TOE SURGERY      right grt OA        Social History:  Social History     Socioeconomic History     Marital status:      Spouse  name: Not on file     Number of children: Not on file     Years of education: Not on file     Highest education level: Not on file   Occupational History     Not on file   Tobacco Use     Smoking status: Former     Packs/day: 1.00     Years: 22.00     Pack years: 22.00     Types: Cigarettes     Start date: 1969     Quit date: 2010     Years since quittin.9     Smokeless tobacco: Never     Tobacco comments:     I have been chewing nicorette gum 3 yrs and 3 months now. I have quit 8 and 9 yrs and periods in bet   Vaping Use     Vaping status: Never Used   Substance and Sexual Activity     Alcohol use: Not Currently     Comment: No longer     Drug use: Yes     Types: Marijuana     Comment: for sleeping/occ     Sexual activity: Not Currently     Partners: Male     Birth control/protection: Surgical     Comment: hyst   Other Topics Concern      Service No     Blood Transfusions No     Caffeine Concern Yes     Comment: 4-5 cups caffeine per day     Occupational Exposure No     Hobby Hazards No     Sleep Concern Yes     Stress Concern Yes     Weight Concern No     Special Diet Yes     Comment: organic foods     Back Care No     Exercise No     Bike Helmet No     Seat Belt Yes     Self-Exams Yes     Parent/sibling w/ CABG, MI or angioplasty before 65F 55M? No   Social History Narrative     Not on file     Social Determinants of Health     Financial Resource Strain: Not on file   Food Insecurity: Not on file   Transportation Needs: Not on file   Physical Activity: Not on file   Stress: Not on file   Social Connections: Not on file   Intimate Partner Violence: Not on file   Housing Stability: Not on file       Family History:  Family History   Problem Relation Age of Onset     Cancer Mother 60        leukemia     Arthritis Mother         osteo     Eye Disorder Mother         glaucoma     Gastrointestinal Disease Mother         gallbladder     Other Cancer Mother      Gallbladder Disease Mother       "Alcohol/Drug Father         alcohol     Heart Disease Father         ? CHF     Respiratory Father         emphysema     Coronary Artery Disease Father      Substance Abuse Father      Substance Abuse Sister      Anxiety Disorder Sister      Asthma Sister      Colon Cancer Brother      Breast Cancer Maternal Grandmother      Asthma Sister      Substance Abuse Sister      Cancer - colorectal Brother 50     Prostate Cancer Brother      Allergies Brother         bee      Arthritis Sister         osteo     Arthritis Sister         osteo     Arthritis Brother         osteo     Depression Sister      Psychotic Disorder Sister         bipolar     Respiratory Sister      Colon Cancer Brother      Prostate Cancer Brother      Depression Sister      Asthma Sister        Review of Systems:  Positive for shortness of breath with activity, urinating more than once at night, joint pain at night, headaches in the morning, depressed mood    Physical Examination:  Vitals: /77   Pulse 66   Ht 1.651 m (5' 5\")   Wt 65.3 kg (144 lb)   SpO2 99%   BMI 23.96 kg/m    BMI= Body mass index is 23.96 kg/m .  General appearance: Awake, alert, cooperative. Well groomed. Sitting comfortably in chair. In no apparent distress.  HEENT: Head: Normocephalic, atraumatic. Eyes: PERRL. Conjunctiva clear. Sclera normal. Remainder of face covered by mask.   Neck: Neck Cir (cm): 37 cm (4/6/2023  9:00 AM)  Skin:No rashes or significant lesions.   Neurologic: Alert, oriented x3. No focal neurological deficit. Gait normal.   Psychiatric: Mood euthymic. Affect congruent with full range and intensity.         Data: All pertinent previous laboratory data reviewed     Recent Labs   Lab Test 01/17/22  1025 07/21/21  0950 01/11/21  1241     --  134   POTASSIUM 4.3  --  4.2   CHLORIDE 103  --  101   CO2 30  --  24   ANIONGAP 2*  --  9   *  --  108*   BUN 11  --  17   CR 0.73 0.7 0.74   YASMIN 9.3  --  9.2       Recent Labs   Lab Test 01/17/22  1025 "   WBC 6.1   RBC 4.43   HGB 14.1   HCT 42.9   MCV 97   MCH 31.8   MCHC 32.9   RDW 12.3          Recent Labs   Lab Test 01/17/22  1025   PROTTOTAL 7.7   ALBUMIN 3.9   BILITOTAL 0.3   ALKPHOS 46   AST 13   ALT 26       TSH (mU/L)   Date Value   01/11/2021 1.59   10/19/2018 2.46       Cannabinoids Urine (no units)   Date Value   01/17/2022 Screen Negative       No results found for: IRONSAT, [id number], ALPA    No results found for: PH, PHARTERIAL, PO2, [id number], SAT, PCO2, HCO3, BASEEXCESS, SHAD, BEB    @LABRCNTIPR(phv:4,pco2v:4,po2v:4,hco3v:4,fátima:4,o2per:4)@    Echocardiology: No results found for this or any previous visit (from the past 4320 hour(s)).    Chest x-ray: No results found for this or any previous visit from the past 365 days.      Chest CT: No results found for this or any previous visit from the past 365 days.      PFT: Most Recent [facility]ze Pulmonary Function Testing    No results found for: 20001  No results found for: 20002  No results found for: 20003  No results found for: 20015  No results found for: 20016  No results found for: 20027  No results found for: 20028  No results found for: 20029  No results found for: 20079  No results found for: 20080  No results found for: 20081  No results found for: 20335  No results found for: 20105  No results found for: 20053  No results found for: 20054  No results found for: 20055      Lara Smith PA-C 4/6/2023     [facility]  [address], [place] [address]     [facility]  [address], [place] [address]    Chart documentation was completed, in part, with [employer] voice-recognition software. Even though reviewed, some grammatical, spelling, and word errors may remain.    63 minutes spent on day of encounter reviewing medical records, history and physical examination, review of previous testing and interpretation, documentation and further activities as noted  above

## 2023-04-18 ENCOUNTER — MYC REFILL (OUTPATIENT)
Dept: FAMILY MEDICINE | Facility: CLINIC | Age: 73
End: 2023-04-18
Payer: MEDICARE

## 2023-04-18 DIAGNOSIS — M54.2 NECK PAIN ON RIGHT SIDE: ICD-10-CM

## 2023-04-18 RX ORDER — HYDROCODONE BITARTRATE AND ACETAMINOPHEN 5; 325 MG/1; MG/1
1 TABLET ORAL DAILY PRN
Qty: 20 TABLET | Refills: 0 | Status: SHIPPED | OUTPATIENT
Start: 2023-04-18 | End: 2023-07-31

## 2023-04-26 ENCOUNTER — TELEPHONE (OUTPATIENT)
Dept: FAMILY MEDICINE | Facility: CLINIC | Age: 73
End: 2023-04-26
Payer: MEDICARE

## 2023-04-26 NOTE — TELEPHONE ENCOUNTER
Reason for Call:  Appointment Request    Patient requesting this type of appt: Chronic Diease Management/Medication/Follow-Up    Requested provider: Muriel Smith NP    Reason patient unable to be scheduled: Not within requested timeframe    When does patient want to be seen/preferred time: End of May as requested at last visit.    Comments: Patient was given another phone number to call for scheduling that was given in last appointment notes. Patient was advised she could schedule through [facility] but there was nothing available as F/U with this provider except a next day. Please advise at the number provided.    Could we send this information to you in Vimty or would you prefer to receive a phone call?:   Patient would prefer a phone call   Okay to leave a detailed message?: Yes at Cell number on file:    Telephone Information:   Mobile [phone]       Call taken on 4/26/2023 at 12:00 PM by Nohemi Meléndez

## 2023-05-05 ENCOUNTER — OFFICE VISIT (OUTPATIENT)
Dept: CARDIOLOGY | Facility: CLINIC | Age: 73
End: 2023-05-05
Payer: MEDICARE

## 2023-05-05 ENCOUNTER — LAB (OUTPATIENT)
Dept: LAB | Facility: CLINIC | Age: 73
End: 2023-05-05
Payer: MEDICARE

## 2023-05-05 VITALS
WEIGHT: 142 LBS | OXYGEN SATURATION: 99 % | SYSTOLIC BLOOD PRESSURE: 123 MMHG | BODY MASS INDEX: 23.66 KG/M2 | DIASTOLIC BLOOD PRESSURE: 75 MMHG | HEIGHT: 65 IN | HEART RATE: 71 BPM

## 2023-05-05 DIAGNOSIS — E78.2 MIXED HYPERLIPIDEMIA: ICD-10-CM

## 2023-05-05 LAB
ALT SERPL W P-5'-P-CCNC: 20 U/L (ref 10–35)
CHOLEST SERPL-MCNC: 130 MG/DL
CRP SERPL HS-MCNC: 3.61 MG/L
HDLC SERPL-MCNC: 74 MG/DL
LDLC SERPL CALC-MCNC: 46 MG/DL
NONHDLC SERPL-MCNC: 56 MG/DL
TRIGL SERPL-MCNC: 50 MG/DL

## 2023-05-05 PROCEDURE — 80061 LIPID PANEL: CPT

## 2023-05-05 PROCEDURE — 36415 COLL VENOUS BLD VENIPUNCTURE: CPT

## 2023-05-05 PROCEDURE — 99213 OFFICE O/P EST LOW 20 MIN: CPT | Performed by: PHYSICIAN ASSISTANT

## 2023-05-05 PROCEDURE — 86141 C-REACTIVE PROTEIN HS: CPT

## 2023-05-05 PROCEDURE — 84460 ALANINE AMINO (ALT) (SGPT): CPT

## 2023-05-05 NOTE — LETTER
5/5/2023    Vladislav Cruz MD  [address] [facility] [address]  [facility] [address]    RE: Svetlana Adair       Dear Colleague,     I had the pleasure of seeing Svetlana Adair in the [facility].  Primary Cardiologist: Dr. Perez    Reason for Visit: 1 month follow up after increase in rosuvastatin to 20 mg    History of Present Illness:   Mahnaz is a very pleasant 72-year-old female with medical history is notable for high calcium score of 790 (99th percentile; negative nuclear stress scan) , former tobacco user, and hyperlipidemia (recent increase in rosuvastatin).     Mahnaz is doing well. She denies any side effects with the increase in rosuvastatin.     Assessment and Plan:  Mahnaz is a very pleasant 72-year-old female with medical history is notable for high calcium score of 790 (99th percentile; negative nuclear stress scan) , former tobacco user, and hyperlipidemia (recent increase in rosuvastatin).     Mahnaz is doing well from a cardiac standpoint. Her lipid panel is pending at this time. We will go over the results once they are finalized.     This note was completed in part using Dragon voice recognition software. Although reviewed after completion, some word and grammatical errors may occur.    Orders this Visit:  No orders of the defined types were placed in this encounter.    No orders of the defined types were placed in this encounter.    There are no discontinued medications.      Encounter Diagnosis   Name Primary?    Mixed hyperlipidemia        CURRENT MEDICATIONS:  Current Outpatient Medications   Medication Sig Dispense Refill    ASHWAGANDHA PO Take 1 tablet by mouth daily as needed At onset of illness symptoms; as needed      aspirin 81 MG tablet Take 1 tablet (81 mg) by mouth daily 30 tablet     buPROPion (WELLBUTRIN XL) 300 MG 24 hr tablet Take 1 tablet (300 mg) by mouth every morning 90 tablet 3    calcium carbonate-vitamin D (OS-YASMIN) 500-400 MG-UNIT tablet Take 1 tablet by mouth  daily       cholecalciferol (VITAMIN D3) 25 mcg (1000 units) capsule Take 1 capsule by mouth daily      Coenzyme Q10 300 MG CAPS Take 300 mg by mouth daily      HYDROcodone-acetaminophen (NORCO) 5-325 MG tablet Take 1 tablet by mouth daily as needed for pain 20 tablet 0    IBUPROFEN PO Take 200 mg by mouth every 4 hours as needed for moderate pain       lactobacillus rhamnosus, GG, (CULTURELL) capsule Take 1 capsule by mouth daily       letrozole (FEMARA) 2.5 MG tablet Take 1 tablet by mouth daily  5    Lutein-Zeaxanthin 25-5 MG CAPS Take 1 capsule by mouth daily      LYSINE 1-3 daily as needed      melatonin 5 MG tablet Take 5 mg by mouth nightly as needed for sleep      nicotine polacrilex (NICORETTE) 2 MG gum Place 1 each (2 mg) inside cheek as needed for smoking cessation 30 tablet 11    omeprazole (PRILOSEC) 20 MG DR capsule TAKE ONE CAPSULE BY MOUTH AS NEEDED 90 capsule 2    rosuvastatin (CRESTOR) 10 MG tablet Take 1 tablet (10 mg) by mouth daily (Patient taking differently: Take 20 mg by mouth daily) 90 tablet 0    UNABLE TO FIND Take by mouth daily MEDICATION NAME: Asea Redox - 1oz twice daily      UNABLE TO FIND Take 1 tablet by mouth daily MEDICATION NAME: Yin Chiao - chinese supplement takes at onset of illness symptoms      UNABLE TO FIND MEDICATION NAME: Somnapure - 2 tablets = 500mg valerian root, 300mg lemon balm extract, 200mg l-theanine, 120mg hops extract, 50mg chamomile flower extract, 50mg passion flower extract, 3mg melatonin.  Takes 1-2 tablets at bedtime       UNABLE TO FIND MEDICATION NAME: Moringa 1 serving of powder daily      UNABLE TO FIND MEDICATION NAME: Premium Amla Berry 2 capsules = 4mg Vitamin C, 966mg organic amla, organic amla extract.  Takes 1 capsule daily as needed for immune system      UNABLE TO FIND MEDICATION NAME: OmegaKrill 5x 3 capsules = 480mg of total omega-3, 64mg EPA, 392mg DHA, 300mcg astaxanthin.  Takes 3 capsules daily      UNABLE TO FIND MEDICATION NAME: Super  Colon Cleanse 2 capsules = 665mg senna leaf powder, 321mg psyllium husk powder, 21mg fennel seed powder, 21mg papaya leaf powder, 21mg yolanda hips fruit powder, 11mg l-acidophilus.  Taking 4 capsules daily      valACYclovir (VALTREX) 1000 mg tablet TAKE 2 TABLETS(2000 MG) BY MOUTH TWICE DAILY AS NEEDED 8 tablet 1    vitamin C (ASCORBIC ACID) 250 MG tablet Take 250 mg by mouth daily         ALLERGIES     Allergies   Allergen Reactions    Cats     Codeine Sulfate GI Disturbance       PAST MEDICAL HISTORY:  Past Medical History:   Diagnosis Date    Alcoholism (H)     Allergies     Fall    Arthritis     Basal cell cancer     back, chest, face    Breast cancer (H)     Coronary artery disease     CT calcium ramqx=381 Nov 2015    Depression     Hyperlipidemia LDL goal <130 12/2/2015    Hypertension     borderline    PONV (postoperative nausea and vomiting)     Squamous cell carcinoma     left upper arm, chin       PAST SURGICAL HISTORY:  Past Surgical History:   Procedure Laterality Date    ABDOMEN SURGERY  2007?    Hysterectomy    COLONOSCOPY      COLONOSCOPY  11/17/2011    Procedure:COLONOSCOPY; Colonoscopy; Surgeon:MEKA RUSS; Location: GI    COLONOSCOPY N/A 2/10/2020    Procedure: COLONOSCOPY, WITH POLYPECTOMY AND BIOPSY;  Surgeon: Opal Ace MD;  Location:  GI    DISSECT LYMPH NODE AXILLA Right 11/2/2017    Procedure: DISSECT LYMPH NODE AXILLA;  RIGHT AXILLARY LYMPH NODE DISSECTION;  Surgeon: Cortez White MD;  Location:  [facility]    GYN SURGERY  2005    hysterectomy after hx of ovarian cyst, ended up being benign    HYSTERECTOMY, PAP NO LONGER INDICATED      MASTECTOMY MODIFIED RADICAL  2011    bilateral    MASTECTOMY, BILATERAL      ORTHOPEDIC SURGERY      rt. knee arthroscopy    ORTHOPEDIC SURGERY Right     toe surgery    REALIGN PATELLA  1973    right     TOE SURGERY      right grt OA        FAMILY HISTORY:  Family History   Problem Relation Age of Onset    Cancer Mother 60        leukemia     Arthritis Mother         osteo    Eye Disorder Mother         glaucoma    Gastrointestinal Disease Mother         gallbladder    Other Cancer Mother     Gallbladder Disease Mother     Alcohol/Drug Father         alcohol    Heart Disease Father         ? CHF    Respiratory Father         emphysema    Coronary Artery Disease Father     Substance Abuse Father     Substance Abuse Sister     Anxiety Disorder Sister     Asthma Sister     Colon Cancer Brother     Breast Cancer Maternal Grandmother     Asthma Sister     Substance Abuse Sister     Cancer - colorectal Brother 50    Prostate Cancer Brother     Allergies Brother         bee     Arthritis Sister         osteo    Arthritis Sister         osteo    Arthritis Brother         osteo    Depression Sister     Psychotic Disorder Sister         bipolar    Respiratory Sister     Colon Cancer Brother     Prostate Cancer Brother     Depression Sister     Asthma Sister        SOCIAL HISTORY:  Social History     Socioeconomic History    Marital status:      Spouse name: None    Number of children: None    Years of education: None    Highest education level: None   Tobacco Use    Smoking status: Former     Packs/day: 1.00     Years: 22.00     Pack years: 22.00     Types: Cigarettes     Start date: 1969     Quit date: 2010     Years since quittin.0    Smokeless tobacco: Never    Tobacco comments:     I have been chewing nicorette gum 3 yrs and 3 months now. I have quit 8 and 9 yrs and periods in bet   Vaping Use    Vaping status: Never Used   Substance and Sexual Activity    Alcohol use: Not Currently     Comment: No longer    Drug use: Yes     Types: Marijuana     Comment: for sleeping/occ    Sexual activity: Not Currently     Partners: Male     Birth control/protection: Surgical     Comment: hyst   Other Topics Concern     Service No    Blood Transfusions No    Caffeine Concern Yes     Comment: 4-5 cups caffeine per day    Occupational Exposure No     Hobby Hazards No    Sleep Concern Yes    Stress Concern Yes    Weight Concern No    Special Diet Yes     Comment: organic foods    Back Care No    Exercise No    Bike Helmet No    Seat Belt Yes    Self-Exams Yes    Parent/sibling w/ CABG, MI or angioplasty before 65F 55M? No       Review of Systems:  Skin:  Positive for rash   Eyes:  Negative    ENT:  Negative    Respiratory:  Positive for shortness of breath  Cardiovascular:    fatigue;Positive for  Gastroenterology: Negative    Genitourinary:  Negative    Musculoskeletal:  Positive for back pain;neck pain;joint pain;joint stiffness  Neurologic:  Negative    Psychiatric:  Positive for excessive stress;sleep disturbances  Heme/Lymph/Imm:  Negative    Endocrine:  Positive for      Physical Exam:  Vitals: There were no vitals taken for this visit.     GEN:  NAD  NECK: No JVD  C/V:  Regular rate and rhythm, no murmur, rub or gallop.  RESP: Clear to auscultation bilaterally without wheezing, rales, or rhonchi.  GI: Abdomen soft, nontender, nondistended.    EXTREM: No pitting LE edema.   NEURO: Alert and oriented, cooperative. No obvious focal deficits.   PSYCH: Normal affect.  SKIN: Warm and dry.       Recent Lab Results:  LIPID RESULTS:  Lab Results   Component Value Date    CHOL 157 03/28/2023    CHOL 179 03/23/2021    HDL 70 03/28/2023     03/23/2021    LDL 77 03/28/2023    LDL 54 03/23/2021    TRIG 51 03/28/2023    TRIG 87 03/23/2021    CHOLHDLRATIO 3.3 10/29/2015       LIVER ENZYME RESULTS:  Lab Results   Component Value Date    AST 13 01/17/2022    AST 18 10/31/2019    ALT 20 05/05/2023    ALT 35 10/31/2019       CBC RESULTS:  Lab Results   Component Value Date    WBC 6.1 01/17/2022    WBC 7.3 01/11/2021    RBC 4.43 01/17/2022    RBC 4.17 01/11/2021    HGB 14.1 01/17/2022    HGB 13.6 01/11/2021    HCT 42.9 01/17/2022    HCT 40.6 01/11/2021    MCV 97 01/17/2022    MCV 97 01/11/2021    MCH 31.8 01/17/2022    MCH 32.6 01/11/2021    MCHC 32.9 01/17/2022     MCHC 33.5 01/11/2021    RDW 12.3 01/17/2022    RDW 13.1 01/11/2021     01/17/2022     01/11/2021       BMP RESULTS:  Lab Results   Component Value Date     01/17/2022     01/11/2021    POTASSIUM 4.3 01/17/2022    POTASSIUM 4.2 01/11/2021    CHLORIDE 103 01/17/2022    CHLORIDE 101 01/11/2021    CO2 30 01/17/2022    CO2 24 01/11/2021    ANIONGAP 2 (L) 01/17/2022    ANIONGAP 9 01/11/2021     (H) 01/17/2022     (H) 01/11/2021    BUN 11 01/17/2022    BUN 17 01/11/2021    CR 0.73 01/17/2022    CR 0.74 01/11/2021    GFRESTIMATED 89 01/17/2022    GFRESTIMATED >60 07/21/2021    GFRESTIMATED 83 01/11/2021    GFRESTBLACK >90 01/11/2021    YASMIN 9.3 01/17/2022    YASMIN 9.2 01/11/2021        A1C RESULTS:  Lab Results   Component Value Date    A1C 5.6 11/18/2016       INR RESULTS:  No results found for: INR        Taiwo Anthony PA-C  May 5, 2023         Thank you for allowing me to participate in the care of your patient.      Sincerely,     Taiwo Anthony PA-C     [facility]  cc:   Jose Alberto Perez MD  [address]  [place] [id number]

## 2023-05-05 NOTE — PROGRESS NOTES
Primary Cardiologist: Dr. Perez    Reason for Visit: 1 month follow up after increase in rosuvastatin to 20 mg    History of Present Illness:   Mahnaz is a very pleasant 72-year-old female with medical history is notable for high calcium score of 790 (99th percentile; negative nuclear stress scan) , former tobacco user, and hyperlipidemia (recent increase in rosuvastatin).     Mahnaz is doing well. She denies any side effects with the increase in rosuvastatin.     Assessment and Plan:  Mahnaz is a very pleasant 72-year-old female with medical history is notable for high calcium score of 790 (99th percentile; negative nuclear stress scan) , former tobacco user, and hyperlipidemia (recent increase in rosuvastatin).     Mahnaz is doing well from a cardiac standpoint. Her lipid panel is pending at this time. We will go over the results once they are finalized.     This note was completed in part using Dragon voice recognition software. Although reviewed after completion, some word and grammatical errors may occur.    Orders this Visit:  No orders of the defined types were placed in this encounter.    No orders of the defined types were placed in this encounter.    There are no discontinued medications.      Encounter Diagnosis   Name Primary?     Mixed hyperlipidemia        CURRENT MEDICATIONS:  Current Outpatient Medications   Medication Sig Dispense Refill     ASHWAGANDHA PO Take 1 tablet by mouth daily as needed At onset of illness symptoms; as needed       aspirin 81 MG tablet Take 1 tablet (81 mg) by mouth daily 30 tablet      buPROPion (WELLBUTRIN XL) 300 MG 24 hr tablet Take 1 tablet (300 mg) by mouth every morning 90 tablet 3     calcium carbonate-vitamin D (OS-YASMIN) 500-400 MG-UNIT tablet Take 1 tablet by mouth daily        cholecalciferol (VITAMIN D3) 25 mcg (1000 units) capsule Take 1 capsule by mouth daily       Coenzyme Q10 300 MG CAPS Take 300 mg by mouth daily       HYDROcodone-acetaminophen (NORCO) 5-325 MG  tablet Take 1 tablet by mouth daily as needed for pain 20 tablet 0     IBUPROFEN PO Take 200 mg by mouth every 4 hours as needed for moderate pain        lactobacillus rhamnosus, GG, (CULTURELL) capsule Take 1 capsule by mouth daily        letrozole (FEMARA) 2.5 MG tablet Take 1 tablet by mouth daily  5     Lutein-Zeaxanthin 25-5 MG CAPS Take 1 capsule by mouth daily       LYSINE 1-3 daily as needed       melatonin 5 MG tablet Take 5 mg by mouth nightly as needed for sleep       nicotine polacrilex (NICORETTE) 2 MG gum Place 1 each (2 mg) inside cheek as needed for smoking cessation 30 tablet 11     omeprazole (PRILOSEC) 20 MG DR capsule TAKE ONE CAPSULE BY MOUTH AS NEEDED 90 capsule 2     rosuvastatin (CRESTOR) 10 MG tablet Take 1 tablet (10 mg) by mouth daily (Patient taking differently: Take 20 mg by mouth daily) 90 tablet 0     UNABLE TO FIND Take by mouth daily MEDICATION NAME: Asea Redox - 1oz twice daily       UNABLE TO FIND Take 1 tablet by mouth daily MEDICATION NAME: Yin Kolbyao - chinese supplement takes at onset of illness symptoms       UNABLE TO FIND MEDICATION NAME: Somnapure - 2 tablets = 500mg valerian root, 300mg lemon balm extract, 200mg l-theanine, 120mg hops extract, 50mg chamomile flower extract, 50mg passion flower extract, 3mg melatonin.  Takes 1-2 tablets at bedtime        UNABLE TO FIND MEDICATION NAME: Moringa 1 serving of powder daily       UNABLE TO FIND MEDICATION NAME: Premium Amla Berry 2 capsules = 4mg Vitamin C, 966mg organic amla, organic amla extract.  Takes 1 capsule daily as needed for immune system       UNABLE TO FIND MEDICATION NAME: OmegaKrill 5x 3 capsules = 480mg of total omega-3, 64mg EPA, 392mg DHA, 300mcg astaxanthin.  Takes 3 capsules daily       UNABLE TO FIND MEDICATION NAME: Super Colon Cleanse 2 capsules = 665mg senna leaf powder, 321mg psyllium husk powder, 21mg fennel seed powder, 21mg papaya leaf powder, 21mg yolanda hips fruit powder, 11mg l-acidophilus.  Taking 4  capsules daily       valACYclovir (VALTREX) 1000 mg tablet TAKE 2 TABLETS(2000 MG) BY MOUTH TWICE DAILY AS NEEDED 8 tablet 1     vitamin C (ASCORBIC ACID) 250 MG tablet Take 250 mg by mouth daily         ALLERGIES     Allergies   Allergen Reactions     Cats      Codeine Sulfate GI Disturbance       PAST MEDICAL HISTORY:  Past Medical History:   Diagnosis Date     Alcoholism (H)      Allergies     Fall     Arthritis      Basal cell cancer     back, chest, face     Breast cancer (H)      Coronary artery disease     CT calcium bcvpx=250 Nov 2015     Depression      Hyperlipidemia LDL goal <130 12/2/2015     Hypertension     borderline     PONV (postoperative nausea and vomiting)      Squamous cell carcinoma     left upper arm, chin       PAST SURGICAL HISTORY:  Past Surgical History:   Procedure Laterality Date     ABDOMEN SURGERY  2007?    Hysterectomy     COLONOSCOPY       COLONOSCOPY  11/17/2011    Procedure:COLONOSCOPY; Colonoscopy; Surgeon:MEKA RUSS; Location: GI     COLONOSCOPY N/A 2/10/2020    Procedure: COLONOSCOPY, WITH POLYPECTOMY AND BIOPSY;  Surgeon: Opal Ace MD;  Location:  GI     DISSECT LYMPH NODE AXILLA Right 11/2/2017    Procedure: DISSECT LYMPH NODE AXILLA;  RIGHT AXILLARY LYMPH NODE DISSECTION;  Surgeon: Cortez White MD;  Location:  [facility]     GYN SURGERY  2005    hysterectomy after hx of ovarian cyst, ended up being benign     HYSTERECTOMY, PAP NO LONGER INDICATED       MASTECTOMY MODIFIED RADICAL  2011    bilateral     MASTECTOMY, BILATERAL       ORTHOPEDIC SURGERY      rt. knee arthroscopy     ORTHOPEDIC SURGERY Right     toe surgery     REALIGN PATELLA  1973    right      TOE SURGERY      right grt OA        FAMILY HISTORY:  Family History   Problem Relation Age of Onset     Cancer Mother 60        leukemia     Arthritis Mother         osteo     Eye Disorder Mother         glaucoma     Gastrointestinal Disease Mother         gallbladder     Other Cancer Mother       Gallbladder Disease Mother      Alcohol/Drug Father         alcohol     Heart Disease Father         ? CHF     Respiratory Father         emphysema     Coronary Artery Disease Father      Substance Abuse Father      Substance Abuse Sister      Anxiety Disorder Sister      Asthma Sister      Colon Cancer Brother      Breast Cancer Maternal Grandmother      Asthma Sister      Substance Abuse Sister      Cancer - colorectal Brother 50     Prostate Cancer Brother      Allergies Brother         bee      Arthritis Sister         osteo     Arthritis Sister         osteo     Arthritis Brother         osteo     Depression Sister      Psychotic Disorder Sister         bipolar     Respiratory Sister      Colon Cancer Brother      Prostate Cancer Brother      Depression Sister      Asthma Sister        SOCIAL HISTORY:  Social History     Socioeconomic History     Marital status:      Spouse name: None     Number of children: None     Years of education: None     Highest education level: None   Tobacco Use     Smoking status: Former     Packs/day: 1.00     Years: 22.00     Pack years: 22.00     Types: Cigarettes     Start date: 1969     Quit date: 2010     Years since quittin.0     Smokeless tobacco: Never     Tobacco comments:     I have been chewing nicorette gum 3 yrs and 3 months now. I have quit 8 and 9 yrs and periods in bet   Vaping Use     Vaping status: Never Used   Substance and Sexual Activity     Alcohol use: Not Currently     Comment: No longer     Drug use: Yes     Types: Marijuana     Comment: for sleeping/occ     Sexual activity: Not Currently     Partners: Male     Birth control/protection: Surgical     Comment: hyst   Other Topics Concern      Service No     Blood Transfusions No     Caffeine Concern Yes     Comment: 4-5 cups caffeine per day     Occupational Exposure No     Hobby Hazards No     Sleep Concern Yes     Stress Concern Yes     Weight Concern No     Special Diet Yes      Comment: organic foods     Back Care No     Exercise No     Bike Helmet No     Seat Belt Yes     Self-Exams Yes     Parent/sibling w/ CABG, MI or angioplasty before 65F 55M? No       Review of Systems:  Skin:  Positive for rash   Eyes:  Negative    ENT:  Negative    Respiratory:  Positive for shortness of breath  Cardiovascular:    fatigue;Positive for  Gastroenterology: Negative    Genitourinary:  Negative    Musculoskeletal:  Positive for back pain;neck pain;joint pain;joint stiffness  Neurologic:  Negative    Psychiatric:  Positive for excessive stress;sleep disturbances  Heme/Lymph/Imm:  Negative    Endocrine:  Positive for      Physical Exam:  Vitals: There were no vitals taken for this visit.     GEN:  NAD  NECK: No JVD  C/V:  Regular rate and rhythm, no murmur, rub or gallop.  RESP: Clear to auscultation bilaterally without wheezing, rales, or rhonchi.  GI: Abdomen soft, nontender, nondistended.    EXTREM: No pitting LE edema.   NEURO: Alert and oriented, cooperative. No obvious focal deficits.   PSYCH: Normal affect.  SKIN: Warm and dry.       Recent Lab Results:  LIPID RESULTS:  Lab Results   Component Value Date    CHOL 157 03/28/2023    CHOL 179 03/23/2021    HDL 70 03/28/2023     03/23/2021    LDL 77 03/28/2023    LDL 54 03/23/2021    TRIG 51 03/28/2023    TRIG 87 03/23/2021    CHOLHDLRATIO 3.3 10/29/2015       LIVER ENZYME RESULTS:  Lab Results   Component Value Date    AST 13 01/17/2022    AST 18 10/31/2019    ALT 20 05/05/2023    ALT 35 10/31/2019       CBC RESULTS:  Lab Results   Component Value Date    WBC 6.1 01/17/2022    WBC 7.3 01/11/2021    RBC 4.43 01/17/2022    RBC 4.17 01/11/2021    HGB 14.1 01/17/2022    HGB 13.6 01/11/2021    HCT 42.9 01/17/2022    HCT 40.6 01/11/2021    MCV 97 01/17/2022    MCV 97 01/11/2021    MCH 31.8 01/17/2022    MCH 32.6 01/11/2021    MCHC 32.9 01/17/2022    MCHC 33.5 01/11/2021    RDW 12.3 01/17/2022    RDW 13.1 01/11/2021     01/17/2022      01/11/2021       BMP RESULTS:  Lab Results   Component Value Date     01/17/2022     01/11/2021    POTASSIUM 4.3 01/17/2022    POTASSIUM 4.2 01/11/2021    CHLORIDE 103 01/17/2022    CHLORIDE 101 01/11/2021    CO2 30 01/17/2022    CO2 24 01/11/2021    ANIONGAP 2 (L) 01/17/2022    ANIONGAP 9 01/11/2021     (H) 01/17/2022     (H) 01/11/2021    BUN 11 01/17/2022    BUN 17 01/11/2021    CR 0.73 01/17/2022    CR 0.74 01/11/2021    GFRESTIMATED 89 01/17/2022    GFRESTIMATED >60 07/21/2021    GFRESTIMATED 83 01/11/2021    GFRESTBLACK >90 01/11/2021    YASMIN 9.3 01/17/2022    YASMIN 9.2 01/11/2021        A1C RESULTS:  Lab Results   Component Value Date    A1C 5.6 11/18/2016       INR RESULTS:  No results found for: INR        Taiwo Anthony PA-C  May 5, 2023

## 2023-05-09 ENCOUNTER — TELEPHONE (OUTPATIENT)
Dept: CARDIOLOGY | Facility: CLINIC | Age: 73
End: 2023-05-09
Payer: MEDICARE

## 2023-05-09 NOTE — TELEPHONE ENCOUNTER
"Message from PATIENCE Taiwo Anthony re: pending lab results from visit on 5/5/2023:  Taiwo Anthony, BENI Pecaock, Rosalee FARRELL, RN; FELICIANO Govea [facility] Heart Team 2  Looks good. No changes.   Taiwo     Patient was unable to keep the call working. Sent a my chart update.    Nathan, Ms. Adair,    Your lab results were reviewed by PATIENCE Taiwo Anthony after your visit. She said, \"Looks good. No changes.\"    Component      Latest Ref Rng 5/5/2023  11:52 AM   Cholesterol      <200 mg/dL 130    Triglycerides      <150 mg/dL 50    HDL Cholesterol      >=50 mg/dL 74    LDL Cholesterol Calculated      <=100 mg/dL 46    Non HDL Cholesterol      <130 mg/dL 56    ALT      10 - 35 U/L 20    C-Reactive Protein High Sensitivity 3.61        Please call if you have any questions  Team 2 RNs  [phone]      "

## 2023-05-23 DIAGNOSIS — E78.5 HYPERLIPIDEMIA LDL GOAL <70: Primary | ICD-10-CM

## 2023-05-23 RX ORDER — ROSUVASTATIN CALCIUM 20 MG/1
20 TABLET, COATED ORAL DAILY
Qty: 90 TABLET | Refills: 3 | Status: SHIPPED | OUTPATIENT
Start: 2023-05-23 | End: 2023-12-01

## 2023-05-25 ENCOUNTER — TRANSFERRED RECORDS (OUTPATIENT)
Dept: HEALTH INFORMATION MANAGEMENT | Facility: CLINIC | Age: 73
End: 2023-05-25

## 2023-05-25 ENCOUNTER — VIRTUAL VISIT (OUTPATIENT)
Dept: FAMILY MEDICINE | Facility: CLINIC | Age: 73
End: 2023-05-25
Payer: MEDICARE

## 2023-05-25 DIAGNOSIS — U09.9 COVID-19 LONG HAULER: Primary | ICD-10-CM

## 2023-05-25 DIAGNOSIS — E06.3 HASHIMOTO'S THYROIDITIS: ICD-10-CM

## 2023-05-25 PROCEDURE — 99443 PR PHYSICIAN TELEPHONE EVALUATION 21-30 MIN: CPT | Mod: 95 | Performed by: NURSE PRACTITIONER

## 2023-05-25 NOTE — PROGRESS NOTES
Mahnaz is a 72 year old who is being evaluated via a billable telephone visit.      What phone number would you like to be contacted at? [phone]  How would you like to obtain your AVS? Larry  Distant Location (provider location):  Off-site    Assessment & Plan   Problem List Items Addressed This Visit    None  Visit Diagnoses     COVID-19 long hauler    -  Primary    Hashimoto's thyroiditis               Mahnaz is here to follow-up on her care for covid long haulers. I had seen her at [facility] but wanted to streamline her care here with her PCP. She is doing incredibly well on her new regimen of low dose naltrexone (LDN). She is currently on 3mg which will likely be a great dose for her. She can increase to 4.5mg if she needs a little extra support but she tried that and got woozy which is a common side effect. The goal is to find the lowest dose where there is symptom relief without side effects. She will plan to keep following with Dr Cruz and consult with me as needed. She will get future refills of LDN from [facility] (the cheapest around). They can compound tablets at 3mg or 4.5mg. She has follow-up scheduled with her PCP in a couple weeks   She is also interested in exploring PRP. She can discuss with her PCP and potentially get a referral to [place] to discuss options.       35 minutes spent on the date of the encounter doing chart review, history and exam, documentation and further activities as noted above     ANNAMARIA Soria CNP  [facility] BELINDA Joya is a 72 year old, presenting for the following health issues:  Recheck Medication      History of Present Illness       Reason for visit:  Check in, to see how my medication is doing and meet Muriel in person    She eats 2-3 servings of fruits and vegetables daily.She consumes 0 sweetened beverage(s) daily.She exercises with enough effort to increase her heart rate 9 or  less minutes per day.  She exercises with enough effort to increase her heart rate 3 or less days per week.   She is taking medications regularly.     I had seen the pt at [facility] for long haulers. We got her started on LDN.   Did 2 weeks of 1.5mg LDN   About 5 days in of 1.5mg she woke up at 8:30am and felt really good. She typically has a really hard time getting out of bed and will sleep until 11. That day had energy all day and was able to go thru her closet. It was a great day.   Then increased her dose (3mg then to 4.5mg) where she reached 4.5mg and would feel woozy on occasion.  She then cut it back to 3mg and felt better. Yesterday she had a really good day and was able to walk 12,000 steps which was extraordinary.     Stopped taking hydrocodone   Knee is feeling better.   She looked into PRP/stem cell injections but it is terribly expensive    She also saw an osteopath who does holistic care. She was put on many supplements. Dr. Jesusita Miguel. Just started those about 1 week ago. (Vitamin C, Zinc, NAC and some others)     Review of Systems   Detailed as above         Objective           Vitals:  No vitals were obtained today due to virtual visit.    Physical Exam   healthy, alert and no distress  PSYCH: Alert and oriented times 3; coherent speech, normal   rate and volume, able to articulate logical thoughts, able   to abstract reason, no tangential thoughts, no hallucinations   or delusions  Her affect is normal  RESP: No cough, no audible wheezing, able to talk in full sentences  Remainder of exam unable to be completed due to telephone visits            Phone call duration: 27 minutes

## 2023-06-26 ENCOUNTER — NURSE TRIAGE (OUTPATIENT)
Dept: FAMILY MEDICINE | Facility: CLINIC | Age: 73
End: 2023-06-26
Payer: MEDICARE

## 2023-06-26 DIAGNOSIS — B02.9 HERPES ZOSTER WITHOUT COMPLICATION: Primary | ICD-10-CM

## 2023-06-26 RX ORDER — VALACYCLOVIR HYDROCHLORIDE 1 G/1
1000 TABLET, FILM COATED ORAL 3 TIMES DAILY
Qty: 21 TABLET | Refills: 0 | Status: SHIPPED | OUTPATIENT
Start: 2023-06-26 | End: 2023-07-31

## 2023-06-26 NOTE — TELEPHONE ENCOUNTER
Prescription for valtrex sent to pharmacy  She should take as directed  If it does not help her symptoms, she should be seen in office to secure accurate diagnosis

## 2023-06-26 NOTE — TELEPHONE ENCOUNTER
Called patient regarding PCP message below. She verbalized understanding.     Tere Alvarez RN on 6/26/2023 at 5:03 PM

## 2023-06-26 NOTE — TELEPHONE ENCOUNTER
Nurse Triage SBAR    Is this a 2nd Level Triage? NO    Situation: Patient states that she did have shingles in February. States that the tingling started again yesterday on her left mid back, right below her bra line. States that she can't see it, but can feel 1 small bump.     Background: Shingles in February. Valacyclovir on medication list. States that she took 2 tablets yesterday and 2 today. States that she has only 2 tablets left.     Assessment: Shingles symptoms, onset less than 48 hours.     Protocol Recommended Disposition:   No disposition on file.    Recommendation: No virtual visits in the [place]. Please advise if OK for E visit or if should go to . Patient states that she won't go to UC     Routed to provider    Does the patient meet one of the following criteria for ADS visit consideration? 16+ years old, with an [facility] PCP     TIP  Providers, please consider if this condition is appropriate for management at one of our Acute and Diagnostic Services sites.     If patient is a good candidate, please use dotphrase <dot>triageresponse and select Refer to ADS to document.      Additional Information    Negative: Difficult to awaken or acting confused (e.g., disoriented, slurred speech)    Negative: Sounds like a life-threatening emergency to the triager    Negative: Localized rash and doesn't match the SYMPTOMS of shingles    Negative: Back pain and doesn't match the SYMPTOMS of shingles    Negative: Shingles Vaccine (Recombinant Zoster Vaccine; RZV; Shingrix), questions about    Negative: Shingles rash of face and eye pain or blurred vision    Negative: Shingles rash on the eyelid or tip of the nose    Negative: Shingles rash and spots start appearing other places on body    Negative: Patient sounds very sick or weak to the triager    Negative: Shingles rash (matches SYMPTOMS) and weak immune system (e.g., HIV positive, cancer chemotherapy, chronic steroid treatment, splenectomy) and NOT taking antiviral  "medication    Negative: Shingles rash of face and facial weakness    Negative: Shingles rash of face or ear and earache or ringing in the ear    Negative: Fever > 100.4 F  (38.0 C)    Negative: SEVERE pain (e.g., excruciating)    Negative: Shingles rash (matches SYMPTOMS) and onset < 72 hours ago (3 days)    Negative: Looks infected (spreading redness, pus) and no fever    Negative: Patient wants to be seen    Negative: Shingles rash and onset > 72 hours ago    Negative: Shingles rash already diagnosed and weak immune system (e.g., HIV positive, cancer chemotherapy, chronic steroid treatment, splenectomy) and taking antiviral medication    Negative: Pain lasting > 1 month after rash disappears    Answer Assessment - Initial Assessment Questions  1. APPEARANCE of RASH: \"Describe the rash.\"       Just 1 bump on left mid back below bra line. Tingling sensation started yesterday.   2. LOCATION: \"Where is the rash located?\"       Left mid back.   3. ONSET: \"When did the rash start?\"       Tingling started yesterday.   4. ITCHING: \"Does the rash itch?\" If Yes, ask: \"How bad is the itch?\"  (Scale 1-10; or mild, moderate, severe)      No itching.   5. PAIN: \"Does the rash hurt?\" If Yes, ask: \"How bad is the pain?\"  (Scale 0-10; or none, mild, moderate, severe)     - NONE (0): no pain     - MILD (1-3): doesn't interfere with normal activities      - MODERATE (4-7): interferes with normal activities or awakens from sleep      - SEVERE (8-10): excruciating pain, unable to do any normal activities      No pain, just prickly.   6. OTHER SYMPTOMS: \"Do you have any other symptoms?\" (e.g., fever)      no  7. PREGNANCY: \"Is there any chance you are pregnant?\" \"When was your last menstrual period?\"      NA    Protocols used: SHINGLES (ZOSTER)-A-OH    Shruthi Tabares RN    "

## 2023-07-31 ENCOUNTER — VIRTUAL VISIT (OUTPATIENT)
Dept: PHARMACY | Facility: CLINIC | Age: 73
End: 2023-07-31

## 2023-07-31 DIAGNOSIS — C50.611 MALIGNANT NEOPLASM OF AXILLARY TAIL OF RIGHT BREAST IN FEMALE, ESTROGEN RECEPTOR POSITIVE (H): ICD-10-CM

## 2023-07-31 DIAGNOSIS — I25.810 CORONARY ARTERY DISEASE INVOLVING AUTOLOGOUS ARTERY CORONARY BYPASS GRAFT WITHOUT ANGINA PECTORIS: ICD-10-CM

## 2023-07-31 DIAGNOSIS — K21.00 GASTROESOPHAGEAL REFLUX DISEASE WITH ESOPHAGITIS, UNSPECIFIED WHETHER HEMORRHAGE: ICD-10-CM

## 2023-07-31 DIAGNOSIS — K59.00 CONSTIPATION, UNSPECIFIED CONSTIPATION TYPE: ICD-10-CM

## 2023-07-31 DIAGNOSIS — Z17.0 MALIGNANT NEOPLASM OF AXILLARY TAIL OF RIGHT BREAST IN FEMALE, ESTROGEN RECEPTOR POSITIVE (H): ICD-10-CM

## 2023-07-31 DIAGNOSIS — B00.1 COLD SORE: ICD-10-CM

## 2023-07-31 DIAGNOSIS — Z71.6 ENCOUNTER FOR SMOKING CESSATION COUNSELING: Primary | ICD-10-CM

## 2023-07-31 DIAGNOSIS — Z78.9 TAKES DIETARY SUPPLEMENTS: ICD-10-CM

## 2023-07-31 DIAGNOSIS — F32.0 MILD MAJOR DEPRESSION (H): ICD-10-CM

## 2023-07-31 DIAGNOSIS — R52 PAIN: ICD-10-CM

## 2023-07-31 DIAGNOSIS — H92.03 OTALGIA OF BOTH EARS: ICD-10-CM

## 2023-07-31 PROCEDURE — 99207 PR NO CHARGE LOS: CPT | Performed by: PHARMACIST

## 2023-07-31 NOTE — PROGRESS NOTES
Medication Therapy Management (MTM) Encounter    ASSESSMENT:                            Medication Adherence/Access: No issues identified    Smoking Cessation:   Stable.  Long-term nicotine supplementation is preferable to patient returning to smoking.    Hx Breast Cancer:   Stable.    Constipation:  Colon cleanse appears to be a blend of senna and fiber products.  Likely safe.    CAD/Lipids:   Stable.    Depression:   Stable.  Discussed with patient that if she wants to, she can discuss with her provider whether a trial off bupropion would be appropriate.    Hashimoto's Thyroiditis:   Stable per patient.    Cold Sores:   Stable.    GERD:   Stable.    Eustachian tube dysfunction likely:  Discussed with patient that especially for patients in which this shows up during the spring and summer, this is often treated the same as allergic rhinitis, in which case she may want to try a nasal steroid since an oral antihistamine is not working.    Supplements:   Unable to assess proprietary Chinese herbal products today, but other supplements as listed are likely safe.    PLAN:                            1. I checked THC and CBD (contents of most sleep gummies) against your medications and did not find any issues.    2. Check with DR. Cruz about whether or bupropion discontinuation trial would be appropriate.    3. Consider fluticasone nasal spray for your ear pressure to see if that helps.    Follow-up: 6 months    SUBJECTIVE/OBJECTIVE:                          Mahnaz Adair is a 72 year old female called for a follow-up visit from 6/28/22.       Reason for visit: med recheck.    Allergies/ADRs: Reviewed in chart  Past Medical History: Reviewed in chart  Tobacco: She reports that she quit smoking about 13 years ago. Her smoking use included cigarettes. She started smoking about 54 years ago. She has a 22.00 pack-year smoking history. She has never used smokeless tobacco.  Alcohol: not currently using  Other Substance Use:  Takes hemp gummies to sleep but not sure whether there is THC or CBD- wants to check THC against her other meds.    Medication Adherence/Access: no issues reported    Smoking Cessation:   Bupropion  mg daily (prescribed for depression)  Nicorette 2 mg gum as needed    Patient continues to remain smoke-free.    Hx Breast Cancer:   Letrozole 2.5 mg daily    Patient reports being around year 5 of this therapy and is willing to take it for another 5 years, despite mild side effects that she is frustrated by.    Constipation:  Super colon cleanse 4 capsules daily    Patient reports a long history of issues with constipation, and that her bowel movements are regular now that she takes this product every day.  She does not report current issues with diarrhea.    CAD/Lipids:   Aspirin 81 mg daily  Rosuvastatin 20 mg daily    Patient has not currently complaining of any side effects.    Depression:   Bupropion XL 300mg daily    Feels her depression is OK. She's not sure if the bupropion is helping. She got TMS therapy (37 times) and felt this seemed to help significantly. As noted below, she's now also being treated for Hashimotos.      3/23/2022    10:13 AM 5/12/2022    11:16 AM 12/26/2022     1:05 PM   PHQ   PHQ-9 Total Score 4 2 3   Q9: Thoughts of better off dead/self-harm past 2 weeks Not at all Not at all Not at all     Hashimoto's Thyroiditis:   Naltrexone 4.5mg daily    Patient reports being recently diagnosed by an endocrinologist with Hashimotos thyroiditis.  She does not take a thyroid supplement currently.  Since that provider started her on low-dose naltrexone, she has not needed injections for her knees and has been able to stop hydrocodone.    Cold Sores:   Claudette acyclovir as needed  Lysine supplement as needed    Patient is not currently experiencing cold sores.    GERD:   Omeprazole 20 mg once daily   Patient reports no current symptoms.  Patient feels that current regimen is effective.    Eustachian  tube dysfunction likely:  Benadryl 25 mg as needed (patient currently takes 3-4 times weekly generally during the day)  Ibuprofen 200 mg as needed, generally once daily    Patient reports going to urgent care about 3 months back due to severe ear pain.  She was told there was no infection but that there was potentially fluid behind the eardrum.  She was told to take Sudafed short-term and then Benadryl to help with this.  She does not feel like the Benadryl is helping much.    Supplements:   Patient takes Ashwagandha and Vitamin C supplementation when she is feeling sick, calcium carbonate with D for bone health, vitamin D3 for bone health, CoQ 10 for general health, Lutien zeaxanthin for eye health, melatonin for sleep, and when she can afford it, she takes Krill oil and a variety of Chinese herbal preparations as listed in her chart, though she is not currently taking them.    I spent 25 minutes with this patient today. All changes were made via collaborative practice agreement with Dr. Cruz. A copy of the visit note was provided to the patient's provider(s).    A summary of these recommendations was sent via [employer].    Janice Serrano, PharmD, BCACP  Medication Therapy Management Provider  Phone: [phone]  [email or web address]     Telemedicine Visit Details  Type of service:  Telephone visit  Start Time: 2:03 PM  End Time:  2:28 PM     Medication Therapy Recommendations  Otalgia of both ears    Current Medication: IBUPROFEN PO   Rationale: Synergistic therapy - Needs additional medication therapy - Indication   Recommendation: Start Medication - Flonase Allergy Relief 50 MCG/ACT Susp   Status: Accepted - no CPA Needed

## 2023-08-01 NOTE — PATIENT INSTRUCTIONS
"Recommendations from today's MTM visit:                                                       1. I checked THC and CBD (contents of most sleep gummies) against your medications and did not find any issues.    2. Check with DR. Cruz about whether or bupropion discontinuation trial would be appropriate.    3. Consider fluticasone nasal spray for your ear pressure to see if that helps.    Follow-up: 6 months    It was great speaking with you today.  I value your experience and would be very thankful for your time in providing feedback in our clinic survey. In the next few days, you may receive an email or text message from [employer] with a link to a survey related to your  clinical pharmacist.\"     To schedule another MTM appointment, please call the clinic directly or you may call the MTM scheduling line at [phone] or toll-free at [phone].     My Clinical Pharmacist's contact information:                                                      Please feel free to contact me with any questions or concerns you have.      Janice Serrano PharmD, [facility] Center[facility]  Medication Therapy Management Provider  Phone: [phone]  [email or web address]   "

## 2023-08-30 ENCOUNTER — TRANSFERRED RECORDS (OUTPATIENT)
Dept: HEALTH INFORMATION MANAGEMENT | Facility: CLINIC | Age: 73
End: 2023-08-30
Payer: MEDICARE

## 2023-09-14 DIAGNOSIS — B00.9 HSV (HERPES SIMPLEX VIRUS) INFECTION: ICD-10-CM

## 2023-09-15 RX ORDER — VALACYCLOVIR HYDROCHLORIDE 1 G/1
TABLET, FILM COATED ORAL
Qty: 21 TABLET | Refills: 11 | Status: SHIPPED | OUTPATIENT
Start: 2023-09-15 | End: 2024-02-13

## 2023-09-25 ENCOUNTER — LAB (OUTPATIENT)
Dept: LAB | Facility: CLINIC | Age: 73
End: 2023-09-25
Payer: MEDICARE

## 2023-09-25 DIAGNOSIS — R93.1 HIGH CORONARY ARTERY CALCIUM SCORE: Chronic | ICD-10-CM

## 2023-09-25 DIAGNOSIS — E78.2 MIXED HYPERLIPIDEMIA: ICD-10-CM

## 2023-09-25 LAB
ALT SERPL W P-5'-P-CCNC: 24 U/L (ref 0–50)
ANION GAP SERPL CALCULATED.3IONS-SCNC: 10 MMOL/L (ref 7–15)
BUN SERPL-MCNC: 12.1 MG/DL (ref 8–23)
CALCIUM SERPL-MCNC: 9.6 MG/DL (ref 8.8–10.2)
CHLORIDE SERPL-SCNC: 105 MMOL/L (ref 98–107)
CHOLEST SERPL-MCNC: 155 MG/DL
CREAT SERPL-MCNC: 0.91 MG/DL (ref 0.51–0.95)
DEPRECATED HCO3 PLAS-SCNC: 25 MMOL/L (ref 22–29)
EGFRCR SERPLBLD CKD-EPI 2021: 67 ML/MIN/1.73M2
GLUCOSE SERPL-MCNC: 100 MG/DL (ref 70–99)
HDLC SERPL-MCNC: 70 MG/DL
LDLC SERPL CALC-MCNC: 67 MG/DL
NONHDLC SERPL-MCNC: 85 MG/DL
POTASSIUM SERPL-SCNC: 4.3 MMOL/L (ref 3.4–5.3)
SODIUM SERPL-SCNC: 140 MMOL/L (ref 136–145)
TRIGL SERPL-MCNC: 89 MG/DL

## 2023-09-25 PROCEDURE — 84460 ALANINE AMINO (ALT) (SGPT): CPT | Performed by: INTERNAL MEDICINE

## 2023-09-25 PROCEDURE — 80048 BASIC METABOLIC PNL TOTAL CA: CPT | Performed by: INTERNAL MEDICINE

## 2023-09-25 PROCEDURE — 80061 LIPID PANEL: CPT | Performed by: INTERNAL MEDICINE

## 2023-09-25 PROCEDURE — 36415 COLL VENOUS BLD VENIPUNCTURE: CPT | Performed by: INTERNAL MEDICINE

## 2023-10-09 ENCOUNTER — TELEPHONE (OUTPATIENT)
Dept: GASTROENTEROLOGY | Facility: CLINIC | Age: 73
End: 2023-10-09

## 2023-10-09 ENCOUNTER — OFFICE VISIT (OUTPATIENT)
Dept: FAMILY MEDICINE | Facility: CLINIC | Age: 73
End: 2023-10-09
Payer: MEDICARE

## 2023-10-09 VITALS
RESPIRATION RATE: 16 BRPM | DIASTOLIC BLOOD PRESSURE: 70 MMHG | TEMPERATURE: 97.3 F | OXYGEN SATURATION: 97 % | SYSTOLIC BLOOD PRESSURE: 119 MMHG | BODY MASS INDEX: 23.16 KG/M2 | WEIGHT: 139 LBS | HEART RATE: 74 BPM | HEIGHT: 65 IN

## 2023-10-09 DIAGNOSIS — Z87.891 PERSONAL HISTORY OF TOBACCO USE: ICD-10-CM

## 2023-10-09 DIAGNOSIS — Z00.00 ENCOUNTER FOR MEDICARE ANNUAL WELLNESS EXAM: Primary | ICD-10-CM

## 2023-10-09 DIAGNOSIS — Z17.0 MALIGNANT NEOPLASM OF AXILLARY TAIL OF RIGHT BREAST IN FEMALE, ESTROGEN RECEPTOR POSITIVE (H): ICD-10-CM

## 2023-10-09 DIAGNOSIS — E78.5 HYPERLIPIDEMIA LDL GOAL <70: ICD-10-CM

## 2023-10-09 DIAGNOSIS — F33.1 MODERATE EPISODE OF RECURRENT MAJOR DEPRESSIVE DISORDER (H): ICD-10-CM

## 2023-10-09 DIAGNOSIS — C50.611 MALIGNANT NEOPLASM OF AXILLARY TAIL OF RIGHT BREAST IN FEMALE, ESTROGEN RECEPTOR POSITIVE (H): ICD-10-CM

## 2023-10-09 DIAGNOSIS — F10.21 ALCOHOL DEPENDENCE IN REMISSION (H): ICD-10-CM

## 2023-10-09 DIAGNOSIS — E21.3 HYPERPARATHYROIDISM (H): ICD-10-CM

## 2023-10-09 DIAGNOSIS — Z12.11 SCREEN FOR COLON CANCER: ICD-10-CM

## 2023-10-09 DIAGNOSIS — H65.02 ACUTE SEROUS OTITIS MEDIA OF LEFT EAR, RECURRENCE NOT SPECIFIED: ICD-10-CM

## 2023-10-09 PROBLEM — F11.90 CHRONIC, CONTINUOUS USE OF OPIOIDS: Status: RESOLVED | Noted: 2018-04-26 | Resolved: 2023-10-09

## 2023-10-09 LAB
ERYTHROCYTE [DISTWIDTH] IN BLOOD BY AUTOMATED COUNT: 12.2 % (ref 10–15)
HCT VFR BLD AUTO: 44.3 % (ref 35–47)
HGB BLD-MCNC: 14.1 G/DL (ref 11.7–15.7)
MCH RBC QN AUTO: 30.7 PG (ref 26.5–33)
MCHC RBC AUTO-ENTMCNC: 31.8 G/DL (ref 31.5–36.5)
MCV RBC AUTO: 97 FL (ref 78–100)
PLATELET # BLD AUTO: 284 10E3/UL (ref 150–450)
RBC # BLD AUTO: 4.59 10E6/UL (ref 3.8–5.2)
WBC # BLD AUTO: 6.1 10E3/UL (ref 4–11)

## 2023-10-09 PROCEDURE — 85027 COMPLETE CBC AUTOMATED: CPT | Performed by: INTERNAL MEDICINE

## 2023-10-09 PROCEDURE — 90662 IIV NO PRSV INCREASED AG IM: CPT | Performed by: INTERNAL MEDICINE

## 2023-10-09 PROCEDURE — G0439 PPPS, SUBSEQ VISIT: HCPCS | Performed by: INTERNAL MEDICINE

## 2023-10-09 PROCEDURE — 36415 COLL VENOUS BLD VENIPUNCTURE: CPT | Performed by: INTERNAL MEDICINE

## 2023-10-09 PROCEDURE — G0008 ADMIN INFLUENZA VIRUS VAC: HCPCS | Performed by: INTERNAL MEDICINE

## 2023-10-09 PROCEDURE — G0296 VISIT TO DETERM LDCT ELIG: HCPCS | Performed by: INTERNAL MEDICINE

## 2023-10-09 PROCEDURE — 99213 OFFICE O/P EST LOW 20 MIN: CPT | Mod: 25 | Performed by: INTERNAL MEDICINE

## 2023-10-09 RX ORDER — AMOXICILLIN 875 MG
875 TABLET ORAL 2 TIMES DAILY
Qty: 14 TABLET | Refills: 0 | Status: SHIPPED | OUTPATIENT
Start: 2023-10-09 | End: 2023-12-09

## 2023-10-09 ASSESSMENT — ENCOUNTER SYMPTOMS
FREQUENCY: 0
HEADACHES: 0
COUGH: 0
NAUSEA: 0
HEMATURIA: 0
EYE PAIN: 0
DIARRHEA: 0
FEVER: 0
SHORTNESS OF BREATH: 0
ABDOMINAL PAIN: 0
MYALGIAS: 0
HEMATOCHEZIA: 0
WEAKNESS: 0
DIZZINESS: 0
BREAST MASS: 0
PALPITATIONS: 0
PARESTHESIAS: 0
NERVOUS/ANXIOUS: 0
ARTHRALGIAS: 1
SORE THROAT: 0
CHILLS: 0
HEARTBURN: 0
CONSTIPATION: 0
JOINT SWELLING: 0
DYSURIA: 0

## 2023-10-09 ASSESSMENT — PAIN SCALES - GENERAL: PAINLEVEL: NO PAIN (0)

## 2023-10-09 ASSESSMENT — PATIENT HEALTH QUESTIONNAIRE - PHQ9
10. IF YOU CHECKED OFF ANY PROBLEMS, HOW DIFFICULT HAVE THESE PROBLEMS MADE IT FOR YOU TO DO YOUR WORK, TAKE CARE OF THINGS AT HOME, OR GET ALONG WITH OTHER PEOPLE: SOMEWHAT DIFFICULT
SUM OF ALL RESPONSES TO PHQ QUESTIONS 1-9: 4
SUM OF ALL RESPONSES TO PHQ QUESTIONS 1-9: 4

## 2023-10-09 ASSESSMENT — ACTIVITIES OF DAILY LIVING (ADL): CURRENT_FUNCTION: NO ASSISTANCE NEEDED

## 2023-10-09 NOTE — RESULT ENCOUNTER NOTE
The following letter pertains to your most recent diagnostic tests:    Good news! The complete blood counts are normal.        Sincerely,    Dr. Cruz

## 2023-10-09 NOTE — PROGRESS NOTES
"SUBJECTIVE:   Mahnaz is a 73 year old who presents for Preventive Visit.      10/9/2023    11:00 AM   Additional Questions   Roomed by zainab       Are you in the first 12 months of your Medicare coverage?  No, Part B 08-    Healthy Habits:     In general, how would you rate your overall health?  Good    Frequency of exercise:  2-3 days/week    Duration of exercise:  30-45 minutes    Do you usually eat at least 4 servings of fruit and vegetables a day, include whole grains    & fiber and avoid regularly eating high fat or \"junk\" foods?  No    Taking medications regularly:  Yes    Medication side effects:  None    Ability to successfully perform activities of daily living:  No assistance needed    Home Safety:  No safety concerns identified    Hearing Impairment:  No hearing concerns    In the past 6 months, have you been bothered by leaking of urine?  No    In general, how would you rate your overall mental or emotional health?  Good    Additional concerns today:  No    She has been living with fatigue for 4 years, onset after COVID infection, naltrexone helps   She has also had a ear ache since April , antihistamines help   She is working 50 hours per week or more     Have you ever done Advance Care Planning? (For example, a Health Directive, POLST, or a discussion with a medical provider or your loved ones about your wishes): No, advance care planning information given to patient to review.  Patient plans to discuss their wishes with loved ones or provider.         Fall risk  Fallen 2 or more times in the past year?: No  Any fall with injury in the past year?: No    Cognitive Screening   1) Repeat 3 items (Leader, Season, Table)    2) Clock draw: NORMAL  3) 3 item recall: Recalls 3 objects  Results: 3 items recalled: COGNITIVE IMPAIRMENT LESS LIKELY    Mini-CogTM Copyright S Juan. Licensed by the author for use in [facility] [employer]; reprinted with permission (luis antonio@.[facility]). All rights reserved.  "     Do you have sleep apnea, excessive snoring or daytime drowsiness? : no    Reviewed and updated as needed this visit by clinical staff   Tobacco  Allergies  Meds   Med Hx  Surg Hx  Fam Hx          Reviewed and updated as needed this visit by Provider   Tobacco   Meds   Med Hx  Surg Hx  Fam Hx         Social History     Tobacco Use    Smoking status: Former     Packs/day: 1.00     Years: 22.00     Pack years: 22.00     Types: Cigarettes     Start date: 1969     Quit date: 2010     Years since quittin.4    Smokeless tobacco: Never    Tobacco comments:     I have been chewing nicorette gum 3 yrs and 3 months now. I have quit 8 and 9 yrs and periods in bet   Substance Use Topics    Alcohol use: Not Currently     Comment: No longer             10/9/2023    11:10 AM   Alcohol Use   Prescreen: >3 drinks/day or >7 drinks/week? No          No data to display              Do you have a current opioid prescription? No  Do you use any other controlled substances or medications that are not prescribed by a provider? None              Current providers sharing in care for this patient include:   Patient Care Team:  Vladislav Cruz MD as PCP - General (Internal Medicine)  Pavan Fuentes MD as MD (Oncology)  Vladislav Cruz MD as Assigned PCP  Janice Tierney, PharmD as Pharmacist (Pharmacist)  Alvina Whiteside MD as Assigned Behavioral Health Provider  Manjinder Rivero MD as MD (Medical Oncology)  Vladislav Cruz MD as Assigned Pain Medication Provider  Lara Smith PA-C as Assigned Sleep Provider  Taiwo Anthony PA-C as Assigned Heart and Vascular Provider  Janice Serrano RPH as Pharmacist (Pharmacist)  Janice Serrano RPH as Assigned MTM Pharmacist    The following health maintenance items are reviewed in Epic and correct as of today:  Health Maintenance   Topic Date Due    DEXA  2018    URINE DRUG SCREEN  2023    CONTROLLED SUBSTANCE  AGREEMENT FOR CHRONIC PAIN MANAGEMENT  01/17/2023    LUNG CANCER SCREENING  01/26/2023    COLORECTAL CANCER SCREENING  02/10/2023    INFLUENZA VACCINE (1) 09/01/2023    COVID-19 Vaccine (6 - 2023-24 season) 09/01/2023    ANGY ASSESSMENT  12/26/2023    MEDICARE ANNUAL WELLNESS VISIT  10/09/2024    ANNUAL REVIEW OF HM ORDERS  10/09/2024    FALL RISK ASSESSMENT  10/09/2024    PHQ-9  10/09/2024    LIPID  09/25/2028    ADVANCE CARE PLANNING  10/09/2028    DTAP/TDAP/TD IMMUNIZATION (4 - Td or Tdap) 05/18/2031    HEPATITIS C SCREENING  Completed    DEPRESSION ACTION PLAN  Completed    Pneumococcal Vaccine: 65+ Years  Completed    ZOSTER IMMUNIZATION  Completed    IPV IMMUNIZATION  Aged Out    HPV IMMUNIZATION  Aged Out    MENINGITIS IMMUNIZATION  Aged Out     BP Readings from Last 3 Encounters:   10/09/23 119/70   05/05/23 123/75   04/06/23 122/77    Wt Readings from Last 3 Encounters:   10/09/23 63 kg (139 lb)   05/05/23 64.4 kg (142 lb)   04/06/23 65.3 kg (144 lb)                  Patient Active Problem List   Diagnosis    Breast cancer est/prog +, HER2 ERNIE -    Osteoarthritis    Moderate episode of recurrent major depressive disorder (H)    Advanced directives, counseling/discussion    Knee pain    Past use of tobacco    IFG (impaired fasting glucose)    Low back pain    Malignant neoplasm of right breast (H)    Hyperlipidemia LDL goal <70    Follow-up examination following surgery    Atherosclerosis of left carotid artery    Adjustment disorder with mixed anxiety and depressed mood    Neck pain on right side    Hyperparathyroidism (H24)    Alcohol dependence in remission (H)    High coronary artery calcium score    Other fatigue     Past Surgical History:   Procedure Laterality Date    ABDOMEN SURGERY  2007?    Hysterectomy    COLONOSCOPY      COLONOSCOPY  11/17/2011    Procedure:COLONOSCOPY; Colonoscopy; Surgeon:MEKA RUSS; Location: GI    COLONOSCOPY N/A 2/10/2020    Procedure: COLONOSCOPY, WITH POLYPECTOMY  AND BIOPSY;  Surgeon: Opal Ace MD;  Location:  GI    DISSECT LYMPH NODE AXILLA Right 2017    Procedure: DISSECT LYMPH NODE AXILLA;  RIGHT AXILLARY LYMPH NODE DISSECTION;  Surgeon: Cortez White MD;  Location: [facility]    GYN SURGERY      hysterectomy after hx of ovarian cyst, ended up being benign    HYSTERECTOMY, PAP NO LONGER INDICATED      MASTECTOMY MODIFIED RADICAL  2011    bilateral    MASTECTOMY, BILATERAL      ORTHOPEDIC SURGERY      rt. knee arthroscopy    ORTHOPEDIC SURGERY Right     toe surgery    REALIGN PATELLA  1973    right     TOE SURGERY      right grt OA        Social History     Tobacco Use    Smoking status: Former     Packs/day: 1.00     Years: 22.00     Pack years: 22.00     Types: Cigarettes     Start date: 1969     Quit date: 2010     Years since quittin.4    Smokeless tobacco: Never    Tobacco comments:     I have been chewing nicorette gum 3 yrs and 3 months now. I have quit 8 and 9 yrs and periods in bet   Substance Use Topics    Alcohol use: Not Currently     Comment: No longer     Family History   Problem Relation Age of Onset    Cancer Mother 60        leukemia    Arthritis Mother         osteo    Eye Disorder Mother         glaucoma    Gastrointestinal Disease Mother         gallbladder    Other Cancer Mother     Gallbladder Disease Mother     Alcohol/Drug Father         alcohol    Heart Disease Father         ? CHF    Respiratory Father         emphysema    Coronary Artery Disease Father     Substance Abuse Father     Substance Abuse Sister     Anxiety Disorder Sister     Asthma Sister     Colon Cancer Brother     Breast Cancer Maternal Grandmother     Asthma Sister     Substance Abuse Sister     Cancer - colorectal Brother 50    Prostate Cancer Brother     Allergies Brother         bee     Arthritis Sister         osteo    Arthritis Sister         osteo    Arthritis Brother         osteo    Depression Sister     Psychotic Disorder Sister          bipolar    Respiratory Sister     Colon Cancer Brother     Prostate Cancer Brother     Depression Sister     Asthma Sister          Current Outpatient Medications   Medication Sig Dispense Refill    amoxicillin (AMOXIL) 875 MG tablet Take 1 tablet (875 mg) by mouth 2 times daily 14 tablet 0    ASHWAGANDHA PO Take 1 tablet by mouth daily as needed At onset of illness symptoms; as needed      aspirin 81 MG tablet Take 1 tablet (81 mg) by mouth daily 30 tablet     buPROPion (WELLBUTRIN XL) 300 MG 24 hr tablet Take 1 tablet (300 mg) by mouth every morning 90 tablet 3    calcium carbonate-vitamin D (OS-YASMIN) 500-400 MG-UNIT tablet Take 1 tablet by mouth daily       cholecalciferol (VITAMIN D3) 25 mcg (1000 units) capsule Take 1 capsule by mouth daily      Coenzyme Q10 300 MG CAPS Take 300 mg by mouth daily      IBUPROFEN PO Take 200 mg by mouth every 4 hours as needed for moderate pain       letrozole (FEMARA) 2.5 MG tablet Take 1 tablet by mouth daily  5    Lutein-Zeaxanthin 25-5 MG CAPS Take 1 capsule by mouth daily      LYSINE 1-3 daily as needed      melatonin 5 MG tablet Take 5 mg by mouth nightly as needed for sleep      Naltrexone HCl, Pain, 4.5 MG CAPS Take 1 capsule by mouth daily      nicotine polacrilex (NICORETTE) 2 MG gum Place 1 each (2 mg) inside cheek as needed for smoking cessation 30 tablet 11    omeprazole (PRILOSEC) 20 MG DR capsule TAKE 1 CAPSULE BY MOUTH AS NEEDED 90 capsule 1    rosuvastatin (CRESTOR) 20 MG tablet Take 1 tablet (20 mg) by mouth daily 90 tablet 3    UNABLE TO FIND Take by mouth daily MEDICATION NAME: Asea Redox - 1oz twice daily      UNABLE TO FIND Take 1 tablet by mouth daily MEDICATION NAME: Aidee Duong - chinese supplement takes at onset of illness symptoms      UNABLE TO FIND MEDICATION NAME: Somnapure - 2 tablets = 500mg valerian root, 300mg lemon balm extract, 200mg l-theanine, 120mg hops extract, 50mg chamomile flower extract, 50mg passion flower extract, 3mg melatonin.  Takes  1-2 tablets at bedtime      UNABLE TO FIND MEDICATION NAME: Moringa 1 serving of powder daily      UNABLE TO FIND MEDICATION NAME: Premium Amla Berry 2 capsules = 4mg Vitamin C, 966mg organic amla, organic amla extract.  Takes 1 capsule daily as needed for immune system      UNABLE TO FIND MEDICATION NAME: OmegaKrill 5x 3 capsules = 480mg of total omega-3, 64mg EPA, 392mg DHA, 300mcg astaxanthin.  Takes 3 capsules daily      UNABLE TO FIND MEDICATION NAME: Super Colon Cleanse 2 capsules = 665mg senna leaf powder, 321mg psyllium husk powder, 21mg fennel seed powder, 21mg papaya leaf powder, 21mg yolanda hips fruit powder, 11mg l-acidophilus.  Taking 4 capsules daily      valACYclovir (VALTREX) 1000 mg tablet TAKE 1 TABLET(1000 MG) BY MOUTH THREE TIMES DAILY FOR 7 DAYS 21 tablet 11    vitamin C (ASCORBIC ACID) 250 MG tablet Take 250 mg by mouth daily       Allergies   Allergen Reactions    Cats     Codeine Sulfate GI Disturbance             Review of Systems   Constitutional:  Negative for chills and fever.   HENT:  Positive for ear pain and hearing loss. Negative for congestion and sore throat.    Eyes:  Negative for pain and visual disturbance.   Respiratory:  Negative for cough and shortness of breath.    Cardiovascular:  Negative for chest pain, palpitations and peripheral edema.   Gastrointestinal:  Negative for abdominal pain, constipation, diarrhea, heartburn, hematochezia and nausea.   Breasts:  Negative for tenderness, breast mass and discharge.   Genitourinary:  Negative for dysuria, frequency, genital sores, hematuria, pelvic pain, urgency, vaginal bleeding and vaginal discharge.   Musculoskeletal:  Positive for arthralgias. Negative for joint swelling and myalgias.   Skin:  Negative for rash.   Neurological:  Negative for dizziness, weakness, headaches and paresthesias.   Psychiatric/Behavioral:  Negative for mood changes. The patient is not nervous/anxious.          OBJECTIVE:   /70 (BP Location: Left  "arm, Patient Position: Sitting, Cuff Size: Adult Regular)   Pulse 74   Temp 97.3  F (36.3  C) (Oral)   Resp 16   Ht 1.651 m (5' 5\")   Wt 63 kg (139 lb)   SpO2 97%   BMI 23.13 kg/m   Estimated body mass index is 23.13 kg/m  as calculated from the following:    Height as of this encounter: 1.651 m (5' 5\").    Weight as of this encounter: 63 kg (139 lb).  Physical Exam  GENERAL: healthy, alert and no distress  EYES: Eyes grossly normal to inspection, PERRL and conjunctivae and sclerae normal  HENT: opaque fluid behind left TM, right TM normal   NECK: no adenopathy, no asymmetry, masses, or scars and thyroid normal to palpation  RESP: lungs clear to auscultation - no rales, rhonchi or wheezes  CV: regular rate and rhythm, normal S1 S2, no S3 or S4, no murmur, click or rub, no peripheral edema and peripheral pulses strong  ABDOMEN: soft, nontender, no hepatosplenomegaly, no masses and bowel sounds normal  MS: no gross musculoskeletal defects noted, no edema  SKIN: no suspicious lesions or rashes  NEURO: Normal strength and tone, mentation intact and speech normal  PSYCH: mentation appears normal, affect normal/bright        ASSESSMENT / PLAN:       ICD-10-CM    1. Encounter for Medicare annual wellness exam  Z00.00 CBC with platelets      2. Malignant neoplasm of axillary tail of right breast in female, estrogen receptor positive (H)  C50.611     Z17.0       3. Moderate episode of recurrent major depressive disorder (H)  F33.1       4. Hyperlipidemia LDL goal <70  E78.5       5. Hyperparathyroidism (H24)  E21.3       6. Alcohol dependence in remission (H)  F10.21       7. Screen for colon cancer  Z12.11 Colonoscopy Screening  Referral      8. Personal history of tobacco use  Z87.891 Prof fee: Shared Decision Making for Lung Cancer Screening     CT Chest Lung Cancer Scrn Low Dose wo      9. Acute serous otitis media of left ear, recurrence not specified  H65.02 amoxicillin (AMOXIL) 875 MG tablet     Adult " ENT  Referral        Continue follow up with [place] oncology re breast cancer  Mood is OK all things considered  Lipids were recently at goal  She is no longer using hydrocodone since starting naltrexone  BMP was recently ok with normal calcium  In remission from alcoholism   Trial of amoxicillin for otitis media, if that does not help pain, see ENT for consideration of myringotomy         COUNSELING:  Reviewed preventive health counseling, as reflected in patient instructions  Special attention given to:       Regular exercise       Healthy diet/nutrition       Immunizations  Recommended COVID shot, flu shot, RSV shot; she adamantly declines additional COVID vaccine          Consider lung cancer screening for ages 55-80 years (77 for Medicare) and 20 pack-year smoking history referred for exam       Colon cancer screening; she had a few small polyps removed in 2020, I think 2025 colonoscopy follow-up would be appropriate, but since she lives with so much fatigue, I think it may be reasonable to obtain the surveillance test at 3 year interval        Mammography is no longer indicated as she is status post bilateral mastectomies        She reports that she quit smoking about 13 years ago. Her smoking use included cigarettes. She started smoking about 54 years ago. She has a 22.00 pack-year smoking history. She has never used smokeless tobacco.      Appropriate preventive services were discussed with this patient, including applicable screening as appropriate for fall prevention, nutrition, physical activity, Tobacco-use cessation, weight loss and cognition.  Checklist reviewing preventive services available has been given to the patient.    Reviewed patients plan of care and provided an AVS. The Basic Care Plan (routine screening as documented in Health Maintenance) for Svetlana meets the Care Plan requirement. This Care Plan has been established and reviewed with the Patient.          Vladislav Cruz MD  M  [facility]    Identified Health Risks:  Answers submitted by the patient for this visit:  Patient Health Questionnaire (Submitted on 10/9/2023)  If you checked off any problems, how difficult have these problems made it for you to do your work, take care of things at home, or get along with other people?: Somewhat difficult  PHQ9 TOTAL SCORE: 4  Lung Cancer Screening Shared Decision Making Visit     Svetlana Adair, a 73 year old female, is eligible for lung cancer screening    History   Smoking Status    Former    Packs/day: 1.00    Years: 22.00    Types: Cigarettes    Start date: 7/7/1969    Quit date: 4/28/2010   Smokeless Tobacco    Never       I have discussed with patient the risks and benefits of screening for lung cancer with low-dose CT.     The risks include:    radiation exposure: one low dose chest CT has as much ionizing radiation as about 15 chest x-rays, or 6 months of background radiation living in [place]      false positives: most findings/nodules are NOT cancer, but some might still require additional diagnostic evaluation, including biopsy    over-diagnosis: some slow growing cancers that might never have been clinically significant will be detected and treated unnecessarily     The benefit of early detection of lung cancer is contingent upon adherence to annual screening or more frequent follow up if indicated.     Furthermore, to benefit from screening, Svetlana must be willing and able to undergo diagnostic procedures, if indicated. Although no specific guide is available for determining severity of comorbidities, it is reasonable to withhold screening in patients who have greater mortality risk from other diseases.     We did discuss that the best way to prevent lung cancer is to not smoke.    Some patients may value a numeric estimation of lung cancer risk when evaluating if lung cancer screening is right for them, here is one calculator:    ShouldIScreenThe patient was  counseled and encouraged to consider modifying their diet and eating habits. She was provided with information on recommended healthy diet options.

## 2023-10-09 NOTE — PATIENT INSTRUCTIONS
You should get the RSV (Respiratory Syncytial Virus) vaccine at a pharmacy.           Lung Cancer Screening   Frequently Asked Questions  If you are at high-risk for lung cancer, getting screened with low-dose computed tomography (LDCT) every year can help save your life. This handout offers answers to some of the most common questions about lung cancer screening. If you have other questions, please call [phone] ([phone]).     What is it?  Lung cancer screening uses special X-ray technology to create an image of your lung tissue. The exam is quick and easy and takes less than 10 seconds. We don t give you any medicine or use any needles. You can eat before and after the exam. You don t need to change your clothes as long as the clothing on your chest doesn t contain metal. But, you do need to be able to hold your breath for at least 6 seconds during the exam.    What is the goal of lung cancer screening?  The goal of lung cancer screening is to save lives. Many times, lung cancer is not found until a person starts having physical symptoms. Lung cancer screening can help detect lung cancer in the earliest stages when it may be easier to treat.    Who should be screened for lung cancer?  We suggest lung cancer screening for anyone who is at high-risk for lung cancer. You are in the high-risk group if you:     are between the ages of 55 and 79, and   have smoked at least 1 pack of cigarettes a day for 20 or more years, and   still smoke or have quit within the past 15 years.    However, if you have a new cough or shortness of breath, you should talk to your doctor before being screened.    Why does it matter if I have symptoms?  Certain symptoms can be a sign that you have a condition in your lungs that should be checked and treated by your doctor. These symptoms include fever, chest pain, a new or changing cough, shortness of breath that you have never felt before, coughing up blood or unexplained  weight loss. Having any of these symptoms can greatly affect the results of lung cancer screening.       Should all smokers get an LDCT lung cancer screening exam?  It depends. Lung cancer screening is for a very specific group of men and women who have a history of heavy smoking over a long period of time (see  Who should be screened for lung cancer  above).  I am in the high-risk group, but have been diagnosed with cancer in the past. Is LDCT lung cancer screening right for me?  In some cases, you should not have LDCT lung screening, such as when your doctor is already following your cancer with CT scan studies. Your doctor will help you decide if LDCT lung screening is right for you.  Do I need to have a screening exam every year?  Yes. If you are in the high-risk group described earlier, you should get an LDCT lung cancer screening exam every year until you are 79, or are no longer willing or able to undergo screening and possible procedures to diagnose and treat lung cancer.  How effective is LDCT at preventing death from lung cancer?  Studies have shown that LDCT lung cancer screening can lower the risk of death from lung cancer by 20 percent in people who are at high-risk.  What are the risks?  There are some risks and limitations of LDCT lung cancer screening. We want to make sure you understand the risks and benefits, so please let us know if you have any questions. Your doctor may want to talk with you more about these risks.   Radiation exposure: As with any exam that uses radiation, there is a very small increased risk of cancer. The amount of radiation in LDCT is small--about the same amount a person would get from a mammogram. Your doctor orders the exam when he or she feels the potential benefits outweigh the risks.   False negatives: No test is perfect, including LDCT. It is possible that you may have a medical condition, including lung cancer, that is not found during your exam. This is called a  false negative result.   False positives and more testing: LDCT very often finds something in the lung that could be cancer, but in fact is not. This is called a false positive result. False positive tests often cause anxiety. To make sure these findings are not cancer, you may need to have more tests. These tests will be done only if you give us permission. Sometimes patients need a treatment that can have side effects, such as a biopsy. For more information on false positives, see  What can I expect from the results?    Findings not related to lung cancer: Your LDCT exam also takes pictures of areas of your body next to your lungs. In a very small number of cases, the CT scan will show an abnormal finding in one of these areas, such as your kidneys, adrenal glands, liver or thyroid. This finding may not be serious, but you may need more tests. Your doctor can help you decide what other tests you may need, if any.  What can I expect from the results?  About 1 out of 4 LDCT exams will find something that may need more tests. Most of the time, these findings are lung nodules. Lung nodules are very small collections of tissue in the lung. These nodules are very common, and the vast majority--more than 97 percent--are not cancer (benign). Most are normal lymph nodes or small areas of scarring from past infections.  But, if a small lung nodule is found to be cancer, the cancer can be cured more than 90 percent of the time. To know if the nodule is cancer, we may need to get more images before your next yearly screening exam. If the nodule has suspicious features (for example, it is large, has an odd shape or grows over time), we will refer you to a specialist for further testing.  Will my doctor also get the results?  Yes. Your doctor will get a copy of your results.  Is it okay to keep smoking now that there s a cancer screening exam?  No. Tobacco is one of the strongest cancer-causing agents. It causes not only lung  cancer, but other cancers and cardiovascular (heart) diseases as well. The damage caused by smoking builds over time. This means that the longer you smoke, the higher your risk of disease. While it is never too late to quit, the sooner you quit, the better.  Where can I find help to quit smoking?  The best way to prevent lung cancer is to stop smoking. If you have already quit smoking, congratulations and keep it up! For help on quitting smoking, please call [employer] at 8-396-QUITNOW (1-445.680.2374) or the American Cancer Society at 1-107.192.1767 to find local resources near you.  One-on-one health coaching:  If you d prefer to work individually with a health care provider on tobacco cessation, we offer:     Medication Therapy Management:  Our specially trained pharmacists work closely with you and your doctor to help you quit smoking.  Call [phone] or [phone] (toll free).    Patient Education   Personalized Prevention Plan  You are due for the preventive services outlined below.  Your care team is available to assist you in scheduling these services.  If you have already completed any of these items, please share that information with your care team to update in your medical record.  Health Maintenance Due   Topic Date Due    Osteoporosis Screening  11/06/2018    Annual Wellness Visit  01/17/2023    URINE DRUG SCREEN  01/17/2023    ANNUAL REVIEW OF HM ORDERS  01/17/2023    CONTROLLED SUBSTANCE AGREEMENT FOR CHRONIC PAIN MANAGEMENT  01/17/2023    LUNG CANCER SCREENING  01/26/2023    Colorectal Cancer Screening  02/10/2023    Flu Vaccine (1) 09/01/2023    COVID-19 Vaccine (6 - 2023-24 season) 09/01/2023     Learning About Dietary Guidelines  What are the Dietary Guidelines for Americans?     Dietary Guidelines for Americans provide tips for eating well and staying healthy. This helps reduce the risk for long-term (chronic) diseases.  These guidelines recommend that you:  Eat and drink the right amount  for you. The U.S. government's food guide is called MyPlate. It can help you make your own well-balanced eating plan.  Try to balance your eating with your activity. This helps you stay at a healthy weight.  Drink alcohol in moderation, if at all.  Limit foods high in salt, saturated fat, trans fat, and added sugar.  These guidelines are from the U.S. Department of Agriculture and the U.S. Department of Health and Human Services. They are updated every 5 years.  What is MyPlate?  MyPlate is the U.S. government's food guide. It can help you make your own well-balanced eating plan. A balanced eating plan means that you eat enough, but not too much, and that your food gives you the nutrients you need to stay healthy.  MyPlate focuses on eating plenty of whole grains, fruits, and vegetables, and on limiting fat and sugar. It is available online at www.ChooseMyPlate.gov.  How can you get started?  If you're trying to eat healthier, you can slowly change your eating habits over time. You don't have to make big changes all at once. Start by adding one or two healthy foods to your meals each day.  Grains  Choose whole-grain breads and cereals and whole-wheat pasta and whole-grain crackers.  Vegetables  Eat a variety of vegetables every day. They have lots of nutrients and are part of a heart-healthy diet.  Fruits  Eat a variety of fruits every day. Fruits contain lots of nutrients. Choose fresh fruit instead of fruit juice.  Protein foods  Choose fish and lean poultry more often. Eat red meat and fried meats less often. Dried beans, tofu, and nuts are also good sources of protein.  Dairy  Choose low-fat or fat-free products from this food group. If you have problems digesting milk, try eating cheese or yogurt instead.  Fats and oils  Limit fats and oils if you're trying to cut calories. Choose healthy fats when you cook. These include canola oil and olive oil.  Where can you learn more?  Go to  "https://www.healthONStor.net/patiented  Enter D676 in the search box to learn more about \"Learning About Dietary Guidelines.\"  Current as of: March 1, 2023               Content Version: 13.7 2006-2023 [employer], [employer].   Care instructions adapted under license by your healthcare professional. If you have questions about a medical condition or this instruction, always ask your healthcare professional. Healthwise, [employer] disclaims any warranty or liability for your use of this information.         "

## 2023-10-09 NOTE — TELEPHONE ENCOUNTER
"Endoscopy Scheduling Screen    Have you had a positive Covid test in the last 14 days?  No    Are you active on MyChart?   Yes    What insurance is in the chart?  Other:  Medicare    Ordering/Referring Provider:   JAZLYN FLOWERS        (If ordering provider performs procedure, schedule with ordering provider unless otherwise instructed. )    BMI: Estimated body mass index is 23.13 kg/m  as calculated from the following:    Height as of an earlier encounter on 10/9/23: 1.651 m (5' 5\").    Weight as of an earlier encounter on 10/9/23: 63 kg (139 lb).     Sedation Ordered  moderate sedation.   If patient BMI > 50 do not schedule in [facility].    If patient BMI > 45 do not schedule at [facility].    Are you taking methadone or Suboxone?  No    Are you taking any prescription medications for pain 3 or more times per week?   No    Do you have a history of malignant hyperthermia or adverse reaction to anesthesia?  No    (Females) Are you currently pregnant?   No     Have you been diagnosed or told you have pulmonary hypertension?   No    Do you have an LVAD?  No    Have you been told you have moderate to severe sleep apnea?  No    Have you been told you have COPD, asthma, or any other lung disease?  No    Do you have any heart conditions?  No     Have you ever had an organ transplant?   No    Have you ever had or are you awaiting a heart or lung transplant?   No    Have you had a stroke or transient ischemic attack (TIA aka \"mini stroke\" in the last 6 months?   No    Have you been diagnosed with or been told you have cirrhosis of the liver?   No    Are you currently on dialysis?   No    Do you need assistance transferring?   No    BMI: Estimated body mass index is 23.13 kg/m  as calculated from the following:    Height as of an earlier encounter on 10/9/23: 1.651 m (5' 5\").    Weight as of an earlier encounter on 10/9/23: 63 kg (139 lb).     Is patients BMI > 40 and scheduling location [facility]?  No    Do you take an injectable medication " for weight loss or diabetes (excluding insulin)?  No    Do you take the medication Naltrexone?  Yes Recommended hold time is 3 days before your procedure. Please contact your prescribing provider to discuss.    Do you take blood thinners?  No       Prep   Are you currently on dialysis or do you have chronic kidney disease?  No    Do you have a diagnosis of diabetes?  No    Do you have a diagnosis of cystic fibrosis (CF)?  No    On a regular basis do you go 3 -5 days between bowel movements?  No    BMI > 40?  No    Preferred Pharmacy:    [id number] - [place], [place] - [address] [facility] [address]  [address]  [place] [address]  Phone: [phone] Fax: [phone]      Final Scheduling Details   Colonoscopy prep sent?  Standard MiraLAX    Procedure scheduled  Colonoscopy    Surgeon:  Edu     Date of procedure:  11/10     Pre-OP / PAC:   No - Not required for this site.    Location  [facility] - Patient preference.    Sedation   Moderate Sedation - Per order.      Patient Reminders:   You will receive a call from a Nurse to review instructions and health history.  This assessment must be completed prior to your procedure.  Failure to complete the Nurse assessment may result in the procedure being cancelled.      On the day of your procedure, please designate an adult(s) who can drive you home stay with you for the next 24 hours. The medicines used in the exam will make you sleepy. You will not be able to drive.      You cannot take public transportation, ride share services, or non-medical taxi service without a responsible caregiver.  Medical transport services are allowed with the requirement that a responsible caregiver will receive you at your destination.  We require that drivers and caregivers are confirmed prior to your procedure.

## 2023-10-19 ENCOUNTER — TELEPHONE (OUTPATIENT)
Dept: FAMILY MEDICINE | Facility: CLINIC | Age: 73
End: 2023-10-19
Payer: MEDICARE

## 2023-10-19 ENCOUNTER — HOSPITAL ENCOUNTER (OUTPATIENT)
Dept: CT IMAGING | Facility: CLINIC | Age: 73
Discharge: HOME | End: 2023-10-19
Attending: INTERNAL MEDICINE | Admitting: INTERNAL MEDICINE
Payer: MEDICARE

## 2023-10-19 DIAGNOSIS — R91.8 PULMONARY NODULES: Primary | ICD-10-CM

## 2023-10-19 DIAGNOSIS — Z87.891 PERSONAL HISTORY OF TOBACCO USE: ICD-10-CM

## 2023-10-19 PROCEDURE — 71271 CT THORAX LUNG CANCER SCR C-: CPT

## 2023-10-19 NOTE — RESULT ENCOUNTER NOTE
The following letter pertains to your most recent diagnostic tests:    The CT shows a few new little nodules that require follow up at 6 month interval rather than a 12 month interval.  Please call [facility] at [phone] to schedule your CT scan in April.           Sincerely,    Dr. Cruz

## 2023-10-20 ENCOUNTER — TELEPHONE (OUTPATIENT)
Dept: FAMILY MEDICINE | Facility: CLINIC | Age: 73
End: 2023-10-20
Payer: MEDICARE

## 2023-10-20 DIAGNOSIS — R91.1 PULMONARY NODULE: ICD-10-CM

## 2023-10-20 NOTE — TELEPHONE ENCOUNTER
[facility] Radiology Lung Cancer Screening CT result notification:     LDCT/Lung Cancer Screening CT Exam date: 10/19/23  Radiologist Impression AND Recommendations:   ACR Assessment Category:  Lung-RADS Category 3. Probably benign  finding(s)- short term follow up suggested 6 month low dose CT. New  clustered sub-6 mm pulmonary nodules in the left upper lobe and right  middle lobe. Minimally increased size of clustered nodules in the  right lower lobe. Findings likely reflect a waxing and waning  infectious/inflammatory process. However, short-term interval  follow-up with low-dose chest CT in 6 months is recommended.   Pertinent Findings:  There are new clustered nodules in the right middle lobe  measuring up to 4 mm in size (series 4, image 208). There are new  clustered nodules in the superior segment of the left upper lobe  measuring up to 4 mm in size (series 4, image 100). Mildly increased  size of clustered nodules in the right lower lobe, now measuring 4 mm,  previously 3 mm (series 4, image 237). Stable size of tubular  opacities in the right lower lobe which likely represent mucoid  impacted airways (series 4, image 216 and 212). Additional pulmonary  nodules are stable. For example, stable 4 mm nodule in the left lower  lobe (series 4, image 226 and 4 mm nodule in the left lower lobe  (series 4, image 252). Stable pleural-based nodularity in the  posterior medial right lower lobe (series 4, image 222).     Ordering Provider: Dr Vladislav Adair did receive the remaining radiology results from their PCP.        The following letter pertains to your most recent diagnostic tests:     The CT shows a few new little nodules that require follow up at 6 month interval rather than a 12 month interval.  Please call [facility] at [phone] to schedule your CT scan in April.              Sincerely,     Dr. Cruz   Written by Vladislav Cruz MD on 10/19/2023  5:58 PM  CDT  Seen by patient Mahnaz Adair on 10/20/2023 10:35 AM    Lung Nodule Program Protocol recommendation [Pertaining to lung nodules]:  Lung RADS 3 Protocol: Results RN to notify Patient of results/recommendations and place order for 6 month CT ([id number]) - MD cook  CT Chest order ([id number]) placed to be completed within 6 months (Yes/No):  Yes due on or after 4/20/24.  Image scheduling will contact Patient 1 month prior to due date.    RN communicated the lung nodule finding to the patient (Yes/No):  NO  The patient had the following questions: NA  Correct letter sent as per [facility] Lung nodule protocol (Yes/No):  Yes  Did Patient have any CT's of chest previous? (Yes/No/NA) NA  If no comparisons used, inquire if patient has had any chest CT's in the past and if so, request priors.    If scheduling LDCT only, RN will contact Image Scheduling Team  Hours available (all sites below):  Mon-Fri 7A to 7P; Sat 7A to 3:30P.  No schedulers available on Sunday.  [facility] ([facility]): [phone]  North region ([place], [place], [place]): [phone]  South region ([place] and [place]): [phone]  East region ([facility]): [phone]    Lung Nodule Clinics  Pulomonary (Lung Care) Clinic at [facility]  Floor 2, Check-In D, [address], [place], [place]  Hours: Mon - Fri 7:00 AM to 5:00 PM  [facility] at [facility]   Floor 2, [address] [place], [place]  [phone]  Hours: Mon - Fri 7:00 AM - 7:00 PM;  Sat 8:00 AM to 12:00 PM (noon)  [facility] at [facility] [place]  [address] [place], [place], [phone]  Hours: Mon - Fri 8:00 AM - 4:30 PM  [facility] [place] [facility] [place] [address]  [address], [place] [address]  Hours: Mon-Thurs 7:00 AM- 6:30 PM;  Friday 7:00 AM- 5:30 PM  [facility] Lung [address] [place] [address]  [phone]  Hours: Mon -Thurs 7:00 AM-7:00 PM;  Friday 12:00 PM (noon) - 4 PM      Hallie Phan RN  [facility]  Lung Nodule and Emergency Dept Lab Result RN  Ph# [phone]

## 2023-10-27 ENCOUNTER — TELEPHONE (OUTPATIENT)
Dept: GASTROENTEROLOGY | Facility: CLINIC | Age: 73
End: 2023-10-27
Payer: MEDICARE

## 2023-10-27 DIAGNOSIS — Z12.11 ENCOUNTER FOR SCREENING COLONOSCOPY: Primary | ICD-10-CM

## 2023-10-27 NOTE — TELEPHONE ENCOUNTER
Pre assessment completed for upcoming procedure.   (Please see previous telephone encounter notes for complete details)    Patient  returned call.       Procedure details:    Arrival time and facility location reviewed.    Pre op exam needed? N/A    Designated  policy reviewed. Instructed to have someone stay 6 hours post procedure.     COVID policy reviewed.      Medication review:    Medications reviewed. Please see supporting documentation below. Holding recommendations discussed (if applicable).   Naltrexone: HOLD 3 days before procedure.   NSAID medication(s): Ibuprofen (Advil, Motrin): HOLD 1 day before procedure.      Prep for procedure:     Procedure prep instructions reviewed.        Additional information needed?  N/A      Patient  verbalized understanding and had no questions or concerns at this time.      Angi Rosenberg RN  Endoscopy Procedure Pre Assessment RN  [phone] option 4

## 2023-10-27 NOTE — TELEPHONE ENCOUNTER
Attempted to contact patient in order to complete pre assessment questions.     No answer. Left message to return call to [phone] option 4      Procedure details:    Patient scheduled for Colonoscopy  on 11/10/23.     Arrival time: 1015. Procedure time 1100    Pre op exam needed? N/A    Facility location: [facility]; [address] [place] [address] [place] [address]    Sedation type: Conscious sedation     Indication for procedure: History of colon polyps      Chart review:     Electronic implanted devices? No    Recent diagnosis of diverticulitis within the last 6 weeks? No    Diabetic? No    Diabetic medication HOLDING recommendations: (if applicable)  Oral diabetic medications: N/A  Diabetic injectables: N/A  Insulin: N/A      Medication review:    Anticoagulants? No    NSAIDS? Yes.  Ibuprofen (Advil, Motrin).  Holding interval of 1 day before procedure.    Other medication HOLDING recommendations:  Naltrexone: HOLD 3 days before procedure.       Prep for procedure:     Bowel prep recommendation: Standard Miralax  Due to: standard bowel prep.    Prep instructions sent via [employer].       Yolanda Zapata RN  Endoscopy Procedure Pre Assessment RN

## 2023-10-31 ENCOUNTER — TELEPHONE (OUTPATIENT)
Dept: GASTROENTEROLOGY | Facility: CLINIC | Age: 73
End: 2023-10-31
Payer: MEDICARE

## 2023-10-31 NOTE — TELEPHONE ENCOUNTER
"Patient needs to cancel procedure per [employer] message:    \"Hi have to cancel November 10 appointment and reschedule. Some circumstances came up  That makes it mandatory that I have to work\"    Forwarded to endo scheduling team to assist.   "

## 2023-10-31 NOTE — TELEPHONE ENCOUNTER
Caller: Mahnaz  Reason for Reschedule/Cancellation (please be detailed, any staff messages or encounters to note?): Patient has work conflict       Prior to reschedule please review:  Ordering Provider: Vladislav Cruz MD in  FAMILY PRAC/IM   Sedation per order: moderate  Does patient have any ASC Exclusions, please identify?: n      Notes on Cancelled Procedure:  Procedure: Lower Endoscopy [Colonoscopy]   Date: 11/10/2023  Location: [facility]; [address] [place] [address]  Surgeon: Edu      Rescheduled: Yes  Procedure: Lower Endoscopy [Colonoscopy]   Date: 01/22/2024  Location: [facility]; [address] [place] [address]  Surgeon: Wu  Sedation Level Scheduled  moderate,  Reason for Sedation Level per order  Prep/Instructions updated and sent: y       Send In - basket message to Panc - Uri Pool if E[facility]  procedure is canceled or rescheduled: [ N/A, YES or NO] na

## 2023-11-14 ENCOUNTER — TRANSFERRED RECORDS (OUTPATIENT)
Dept: HEALTH INFORMATION MANAGEMENT | Facility: CLINIC | Age: 73
End: 2023-11-14
Payer: MEDICARE

## 2023-12-01 ENCOUNTER — OFFICE VISIT (OUTPATIENT)
Dept: CARDIOLOGY | Facility: CLINIC | Age: 73
End: 2023-12-01
Attending: INTERNAL MEDICINE
Payer: MEDICARE

## 2023-12-01 VITALS
OXYGEN SATURATION: 96 % | DIASTOLIC BLOOD PRESSURE: 73 MMHG | WEIGHT: 139.7 LBS | HEIGHT: 65 IN | HEART RATE: 63 BPM | SYSTOLIC BLOOD PRESSURE: 115 MMHG | BODY MASS INDEX: 23.28 KG/M2

## 2023-12-01 DIAGNOSIS — R93.1 HIGH CORONARY ARTERY CALCIUM SCORE: Chronic | ICD-10-CM

## 2023-12-01 DIAGNOSIS — E78.2 MIXED HYPERLIPIDEMIA: ICD-10-CM

## 2023-12-01 DIAGNOSIS — R01.1 HEART MURMUR: Primary | ICD-10-CM

## 2023-12-01 DIAGNOSIS — E78.5 HYPERLIPIDEMIA LDL GOAL <70: ICD-10-CM

## 2023-12-01 PROCEDURE — 99215 OFFICE O/P EST HI 40 MIN: CPT | Performed by: INTERNAL MEDICINE

## 2023-12-01 RX ORDER — ROSUVASTATIN CALCIUM 40 MG/1
40 TABLET, COATED ORAL DAILY
Qty: 90 TABLET | Refills: 3 | Status: SHIPPED | OUTPATIENT
Start: 2023-12-01 | End: 2024-02-02

## 2023-12-01 NOTE — PROGRESS NOTES
HPI and Plan:   Ms. Adair is a very nice 73-year-old woman with past medical history significant for very high calcium score at 790, putting in the top 1% for age.  She is a former smoker, having quit in 2014.  She has hypercholesterolemia.  She is under a great deal of stress.    She lost her first  to Alzheimer's, her second  to lung cancer.  She has had her own garcia with breast cancer in 2017.  He now struggles with long COVID with fatigue and lack of energy.  She states because of this she has not been able to exercise and she is eating poorly.  When I saw her last I doubled her rosuvastatin from 10 mg to 20 mg daily.      She has no chest arm neck jaw or shoulder discomfort.  No dyspnea on exertion orthopnea or PND.  No palpitations lightheadedness dizziness syncope or near syncope.      We did do a stress MRI in July 2021 that appeared to be normal.     Assessment and plan.  I do not think Mahnaz is having any anginal symptoms.  Does not appear that she has any heart failure or any significant arrhythmias.    She thinks her long COVID symptoms may be somewhat better and she has sought treatment for this..     Fortunately she has stayed off of cigarettes for the last 10 years.  She states she still chews Nicorette gum on a daily basis.  She does smoke occasional marijuana.    Fasting lipid profile unfortunately is not to the new guidelines.  Given her calcium score in the 99th percentile I will aim for an LDL of less than 55.  We will increase her rosuvastatin to 40 mg daily and recheck a fasting lipid profile in 1 month.    She is concerned that I hear a flow murmur.  I do not think she has any significant pathology but we will check an echocardiogram.     We talked about the importance of getting back to a regular exercise regiment, predominantly plant-based diet.  Cutting out some of her red meat.      Blood pressure is well-controlled at 115/73 with a pulse of 63.     Weight is 139 pounds  giving her body mass index of 23.3.     When I saw her last we also checked cardiac specific C-reactive protein and it was mildly elevated at 3.6.     I told her she should follow annually given her calcium score and her high risk.     Thank you for allow me to participate in her care.  Sincerely Jose Alberto Perez MD [facility]        Today's clinic visit entailed:  Review of the result(s) of each unique test - lab work  Ordering of each unique test  Prescription drug management  41 minutes spent by me on the date of the encounter doing chart review, history and exam, documentation and further activities per the note  Provider  Link to MDM Help Grid     The level of medical decision making during this visit was of moderate complexity.      Orders Placed This Encounter   Procedures    Lipid Profile    ALT    Basic metabolic panel    Lipid Profile    ALT    Follow-Up with Cardiology    Follow-Up with Cardiology PATIENCE    Echocardiogram Complete       Orders Placed This Encounter   Medications    rosuvastatin (CRESTOR) 40 MG tablet     Sig: Take 1 tablet (40 mg) by mouth daily     Dispense:  90 tablet     Refill:  3       Medications Discontinued During This Encounter   Medication Reason    rosuvastatin (CRESTOR) 20 MG tablet Reorder (No AVS)         Encounter Diagnoses   Name Primary?    Mixed hyperlipidemia     High coronary artery calcium score     Hyperlipidemia LDL goal <70     Heart murmur Yes       CURRENT MEDICATIONS:  Current Outpatient Medications   Medication Sig Dispense Refill    amoxicillin (AMOXIL) 875 MG tablet Take 1 tablet (875 mg) by mouth 2 times daily 14 tablet 0    ASHWAGANDHA PO Take 1 tablet by mouth daily as needed At onset of illness symptoms; as needed      aspirin 81 MG tablet Take 1 tablet (81 mg) by mouth daily 30 tablet     buPROPion (WELLBUTRIN XL) 300 MG 24 hr tablet Take 1 tablet (300 mg) by mouth every morning 90 tablet 3    calcium carbonate-vitamin D (OS-YASMIN) 500-400 MG-UNIT tablet Take 1  tablet by mouth daily       cholecalciferol (VITAMIN D3) 25 mcg (1000 units) capsule Take 1 capsule by mouth daily      Coenzyme Q10 300 MG CAPS Take 300 mg by mouth daily      IBUPROFEN PO Take 200 mg by mouth every 4 hours as needed for moderate pain       letrozole (FEMARA) 2.5 MG tablet Take 1 tablet by mouth daily  5    Lutein-Zeaxanthin 25-5 MG CAPS Take 1 capsule by mouth daily      LYSINE 1-3 daily as needed      melatonin 5 MG tablet Take 5 mg by mouth nightly as needed for sleep      Naltrexone HCl, Pain, 4.5 MG CAPS Take 1 capsule by mouth daily      nicotine polacrilex (NICORETTE) 2 MG gum Place 1 each (2 mg) inside cheek as needed for smoking cessation 30 tablet 11    omeprazole (PRILOSEC) 20 MG DR capsule TAKE 1 CAPSULE BY MOUTH AS NEEDED 90 capsule 1    rosuvastatin (CRESTOR) 40 MG tablet Take 1 tablet (40 mg) by mouth daily 90 tablet 3    UNABLE TO FIND Take by mouth daily MEDICATION NAME: Asea Redox - 1oz twice daily      UNABLE TO FIND Take 1 tablet by mouth daily MEDICATION NAME: Aidee Duong - chinese supplement takes at onset of illness symptoms      UNABLE TO FIND MEDICATION NAME: Somnapure - 2 tablets = 500mg valerian root, 300mg lemon balm extract, 200mg l-theanine, 120mg hops extract, 50mg chamomile flower extract, 50mg passion flower extract, 3mg melatonin.  Takes 1-2 tablets at bedtime      UNABLE TO FIND MEDICATION NAME: Moringa 1 serving of powder daily      UNABLE TO FIND MEDICATION NAME: Premium Amla Berry 2 capsules = 4mg Vitamin C, 966mg organic amla, organic amla extract.  Takes 1 capsule daily as needed for immune system      UNABLE TO FIND MEDICATION NAME: OmegaKrill 5x 3 capsules = 480mg of total omega-3, 64mg EPA, 392mg DHA, 300mcg astaxanthin.  Takes 3 capsules daily      UNABLE TO FIND MEDICATION NAME: Super Colon Cleanse 2 capsules = 665mg senna leaf powder, 321mg psyllium husk powder, 21mg fennel seed powder, 21mg papaya leaf powder, 21mg yolanda hips fruit powder, 11mg  l-acidophilus.  Taking 4 capsules daily      valACYclovir (VALTREX) 1000 mg tablet TAKE 1 TABLET(1000 MG) BY MOUTH THREE TIMES DAILY FOR 7 DAYS 21 tablet 11    vitamin C (ASCORBIC ACID) 250 MG tablet Take 250 mg by mouth daily         ALLERGIES     Allergies   Allergen Reactions    Cats     Codeine Sulfate GI Disturbance       PAST MEDICAL HISTORY:  Past Medical History:   Diagnosis Date    Alcoholism (H)     Allergies     Fall    Arthritis     Basal cell cancer     back, chest, face    Breast cancer (H)     Coronary artery disease     CT calcium bdpcx=483 Nov 2015    Depression     Hyperlipidemia LDL goal <130 12/2/2015    Hypertension     borderline    PONV (postoperative nausea and vomiting)     Squamous cell carcinoma     left upper arm, chin       PAST SURGICAL HISTORY:  Past Surgical History:   Procedure Laterality Date    ABDOMEN SURGERY  2007?    Hysterectomy    COLONOSCOPY      COLONOSCOPY  11/17/2011    Procedure:COLONOSCOPY; Colonoscopy; Surgeon:MEKA RUSS; Location: GI    COLONOSCOPY N/A 2/10/2020    Procedure: COLONOSCOPY, WITH POLYPECTOMY AND BIOPSY;  Surgeon: Opal Ace MD;  Location:  GI    DISSECT LYMPH NODE AXILLA Right 11/2/2017    Procedure: DISSECT LYMPH NODE AXILLA;  RIGHT AXILLARY LYMPH NODE DISSECTION;  Surgeon: Cortez White MD;  Location:  [facility]    GYN SURGERY  2005    hysterectomy after hx of ovarian cyst, ended up being benign    HYSTERECTOMY, PAP NO LONGER INDICATED      MASTECTOMY MODIFIED RADICAL  2011    bilateral    MASTECTOMY, BILATERAL      ORTHOPEDIC SURGERY      rt. knee arthroscopy    ORTHOPEDIC SURGERY Right     toe surgery    REALIGN PATELLA  1973    right     TOE SURGERY      right grt OA        FAMILY HISTORY:  Family History   Problem Relation Age of Onset    Cancer Mother 60        leukemia    Arthritis Mother         osteo    Eye Disorder Mother         glaucoma    Gastrointestinal Disease Mother         gallbladder    Other Cancer Mother      Gallbladder Disease Mother     Alcohol/Drug Father         alcohol    Heart Disease Father         ? CHF    Respiratory Father         emphysema    Coronary Artery Disease Father     Substance Abuse Father     Substance Abuse Sister     Anxiety Disorder Sister     Asthma Sister     Colon Cancer Brother     Breast Cancer Maternal Grandmother     Asthma Sister     Substance Abuse Sister     Cancer - colorectal Brother 50    Prostate Cancer Brother     Allergies Brother         bee     Arthritis Sister         osteo    Arthritis Sister         osteo    Arthritis Brother         osteo    Depression Sister     Psychotic Disorder Sister         bipolar    Respiratory Sister     Colon Cancer Brother     Prostate Cancer Brother     Depression Sister     Asthma Sister        SOCIAL HISTORY:  Social History     Socioeconomic History    Marital status:      Spouse name: None    Number of children: None    Years of education: None    Highest education level: None   Tobacco Use    Smoking status: Former     Packs/day: 1.00     Years: 22.00     Additional pack years: 0.00     Total pack years: 22.00     Types: Cigarettes     Start date: 1969     Quit date: 2010     Years since quittin.6    Smokeless tobacco: Never    Tobacco comments:     I have been chewing nicorette gum 3 yrs and 3 months now. I have quit 8 and 9 yrs and periods in bet   Vaping Use    Vaping Use: Never used   Substance and Sexual Activity    Alcohol use: Not Currently     Comment: No longer    Drug use: Yes     Types: Marijuana     Comment: for sleeping/occ    Sexual activity: Not Currently     Partners: Male     Birth control/protection: Surgical     Comment: hyst   Other Topics Concern     Service No    Blood Transfusions No    Caffeine Concern Yes     Comment: 4-5 cups caffeine per day    Occupational Exposure No    Hobby Hazards No    Sleep Concern Yes    Stress Concern Yes    Weight Concern No    Special Diet Yes     Comment:  "organic foods    Back Care No    Exercise No    Bike Helmet No    Seat Belt Yes    Self-Exams Yes    Parent/sibling w/ CABG, MI or angioplasty before 65F 55M? No     Social Determinants of Health     Financial Resource Strain: Low Risk  (10/9/2023)    Financial Resource Strain     Within the past 12 months, have you or your family members you live with been unable to get utilities (heat, electricity) when it was really needed?: No   Food Insecurity: Low Risk  (10/9/2023)    Food Insecurity     Within the past 12 months, did you worry that your food would run out before you got money to buy more?: No     Within the past 12 months, did the food you bought just not last and you didn t have money to get more?: No   Transportation Needs: Low Risk  (10/9/2023)    Transportation Needs     Within the past 12 months, has lack of transportation kept you from medical appointments, getting your medicines, non-medical meetings or appointments, work, or from getting things that you need?: No   Housing Stability: Low Risk  (10/9/2023)    Housing Stability     Do you have housing? : Yes     Are you worried about losing your housing?: No       Review of Systems:  Skin:  Negative       Eyes:  Positive for glasses    ENT:  Negative      Respiratory:  Negative       Cardiovascular:  chest pain;Negative;palpitations;dizziness;lightheadedness;edema fatigue;Positive for    Gastroenterology: Negative      Genitourinary:  Negative      Musculoskeletal:  Negative      Neurologic:  Negative      Psychiatric:  Negative      Heme/Lymph/Imm:  Positive for      Endocrine:  Negative        Physical Exam:  Vitals: /73 (BP Location: Left arm, Patient Position: Sitting)   Pulse 63   Ht 1.651 m (5' 5\")   Wt 63.4 kg (139 lb 11.2 oz)   SpO2 96%   BMI 23.25 kg/m      Constitutional:  cooperative, alert and oriented, well developed, well nourished, in no acute distress        Skin:  warm and dry to the touch, no apparent skin lesions or masses " noted          Head:  normocephalic, no masses or lesions        Eyes:  pupils equal and round, conjunctivae and lids unremarkable, sclera white, no xanthalasma, EOMS intact, no nystagmus        Lymph:      ENT:  no pallor or cyanosis, dentition good        Neck:  carotid pulses are full and equal bilaterally;no carotid bruit        Respiratory:  normal breath sounds, clear to auscultation, normal A-P diameter, normal symmetry, normal respiratory excursion, no use of accessory muscles         Cardiac: regular rhythm;normal S1 and S2;no S3 or S4       systolic ejection murmur;LUSB;grade 2        pulses full and equal                                        GI:           Extremities and Muscular Skeletal:  no edema;no spinal abnormalities noted;normal muscle strength and tone              Neurological:  no gross motor deficits        Psych:  affect appropriate, oriented to time, person and place        CC  Jose Alberto Perez MD  [id number]  [place] [id number]

## 2023-12-01 NOTE — LETTER
12/1/2023    Vladislav Cruz MD  [address]  [facility] [address]    RE: Svetlana Adair       Dear Colleague,     I had the pleasure of seeing Svetlana Adair in the [facility].  HPI and Plan:   Ms. Adair is a very nice 73-year-old woman with past medical history significant for very high calcium score at 790, putting in the top 1% for age.  She is a former smoker, having quit in 2014.  She has hypercholesterolemia.  She is under a great deal of stress.    She lost her first  to Alzheimer's, her second  to lung cancer.  She has had her own garcia with breast cancer in 2017.  He now struggles with long COVID with fatigue and lack of energy.  She states because of this she has not been able to exercise and she is eating poorly.  When I saw her last I doubled her rosuvastatin from 10 mg to 20 mg daily.      She has no chest arm neck jaw or shoulder discomfort.  No dyspnea on exertion orthopnea or PND.  No palpitations lightheadedness dizziness syncope or near syncope.      We did do a stress MRI in July 2021 that appeared to be normal.     Assessment and plan.  I do not think Mahnaz is having any anginal symptoms.  Does not appear that she has any heart failure or any significant arrhythmias.    She thinks her long COVID symptoms may be somewhat better and she has sought treatment for this..     Fortunately she has stayed off of cigarettes for the last 10 years.  She states she still chews Nicorette gum on a daily basis.  She does smoke occasional marijuana.    Fasting lipid profile unfortunately is not to the new guidelines.  Given her calcium score in the 99th percentile I will aim for an LDL of less than 55.  We will increase her rosuvastatin to 40 mg daily and recheck a fasting lipid profile in 1 month.    She is concerned that I hear a flow murmur.  I do not think she has any significant pathology but we will check an echocardiogram.     We talked about the  importance of getting back to a regular exercise regiment, predominantly plant-based diet.  Cutting out some of her red meat.      Blood pressure is well-controlled at 115/73 with a pulse of 63.     Weight is 139 pounds giving her body mass index of 23.3.     When I saw her last we also checked cardiac specific C-reactive protein and it was mildly elevated at 3.6.     I told her she should follow annually given her calcium score and her high risk.     Thank you for allow me to participate in her care.  Sincerely Jose Alberto Perez MD [facility]        Today's clinic visit entailed:  Review of the result(s) of each unique test - lab work  Ordering of each unique test  Prescription drug management  41 minutes spent by me on the date of the encounter doing chart review, history and exam, documentation and further activities per the note  Provider  Link to [facility] Help Grid     The level of medical decision making during this visit was of moderate complexity.      Orders Placed This Encounter   Procedures    Lipid Profile    ALT    Basic metabolic panel    Lipid Profile    ALT    Follow-Up with Cardiology    Follow-Up with Cardiology PATIENCE    Echocardiogram Complete       Orders Placed This Encounter   Medications    rosuvastatin (CRESTOR) 40 MG tablet     Sig: Take 1 tablet (40 mg) by mouth daily     Dispense:  90 tablet     Refill:  3       Medications Discontinued During This Encounter   Medication Reason    rosuvastatin (CRESTOR) 20 MG tablet Reorder (No AVS)         Encounter Diagnoses   Name Primary?    Mixed hyperlipidemia     High coronary artery calcium score     Hyperlipidemia LDL goal <70     Heart murmur Yes       CURRENT MEDICATIONS:  Current Outpatient Medications   Medication Sig Dispense Refill    amoxicillin (AMOXIL) 875 MG tablet Take 1 tablet (875 mg) by mouth 2 times daily 14 tablet 0    ASHWAGANDHA PO Take 1 tablet by mouth daily as needed At onset of illness symptoms; as needed      aspirin 81 MG tablet Take 1  tablet (81 mg) by mouth daily 30 tablet     buPROPion (WELLBUTRIN XL) 300 MG 24 hr tablet Take 1 tablet (300 mg) by mouth every morning 90 tablet 3    calcium carbonate-vitamin D (OS-YASMIN) 500-400 MG-UNIT tablet Take 1 tablet by mouth daily       cholecalciferol (VITAMIN D3) 25 mcg (1000 units) capsule Take 1 capsule by mouth daily      Coenzyme Q10 300 MG CAPS Take 300 mg by mouth daily      IBUPROFEN PO Take 200 mg by mouth every 4 hours as needed for moderate pain       letrozole (FEMARA) 2.5 MG tablet Take 1 tablet by mouth daily  5    Lutein-Zeaxanthin 25-5 MG CAPS Take 1 capsule by mouth daily      LYSINE 1-3 daily as needed      melatonin 5 MG tablet Take 5 mg by mouth nightly as needed for sleep      Naltrexone HCl, Pain, 4.5 MG CAPS Take 1 capsule by mouth daily      nicotine polacrilex (NICORETTE) 2 MG gum Place 1 each (2 mg) inside cheek as needed for smoking cessation 30 tablet 11    omeprazole (PRILOSEC) 20 MG DR capsule TAKE 1 CAPSULE BY MOUTH AS NEEDED 90 capsule 1    rosuvastatin (CRESTOR) 40 MG tablet Take 1 tablet (40 mg) by mouth daily 90 tablet 3    UNABLE TO FIND Take by mouth daily MEDICATION NAME: Asea Redox - 1oz twice daily      UNABLE TO FIND Take 1 tablet by mouth daily MEDICATION NAME: Yin Chiao - chinese supplement takes at onset of illness symptoms      UNABLE TO FIND MEDICATION NAME: Somnapure - 2 tablets = 500mg valerian root, 300mg lemon balm extract, 200mg l-theanine, 120mg hops extract, 50mg chamomile flower extract, 50mg passion flower extract, 3mg melatonin.  Takes 1-2 tablets at bedtime      UNABLE TO FIND MEDICATION NAME: Moringa 1 serving of powder daily      UNABLE TO FIND MEDICATION NAME: Premium Amla Berry 2 capsules = 4mg Vitamin C, 966mg organic amla, organic amla extract.  Takes 1 capsule daily as needed for immune system      UNABLE TO FIND MEDICATION NAME: OmegaKrill 5x 3 capsules = 480mg of total omega-3, 64mg EPA, 392mg DHA, 300mcg astaxanthin.  Takes 3 capsules daily       UNABLE TO FIND MEDICATION NAME: Super Colon Cleanse 2 capsules = 665mg senna leaf powder, 321mg psyllium husk powder, 21mg fennel seed powder, 21mg papaya leaf powder, 21mg yolanda hips fruit powder, 11mg l-acidophilus.  Taking 4 capsules daily      valACYclovir (VALTREX) 1000 mg tablet TAKE 1 TABLET(1000 MG) BY MOUTH THREE TIMES DAILY FOR 7 DAYS 21 tablet 11    vitamin C (ASCORBIC ACID) 250 MG tablet Take 250 mg by mouth daily         ALLERGIES     Allergies   Allergen Reactions    Cats     Codeine Sulfate GI Disturbance       PAST MEDICAL HISTORY:  Past Medical History:   Diagnosis Date    Alcoholism (H)     Allergies     Fall    Arthritis     Basal cell cancer     back, chest, face    Breast cancer (H)     Coronary artery disease     CT calcium dnxia=662 Nov 2015    Depression     Hyperlipidemia LDL goal <130 12/2/2015    Hypertension     borderline    PONV (postoperative nausea and vomiting)     Squamous cell carcinoma     left upper arm, chin       PAST SURGICAL HISTORY:  Past Surgical History:   Procedure Laterality Date    ABDOMEN SURGERY  2007?    Hysterectomy    COLONOSCOPY      COLONOSCOPY  11/17/2011    Procedure:COLONOSCOPY; Colonoscopy; Surgeon:MEKA RUSS; Location: GI    COLONOSCOPY N/A 2/10/2020    Procedure: COLONOSCOPY, WITH POLYPECTOMY AND BIOPSY;  Surgeon: Opal Ace MD;  Location:  GI    DISSECT LYMPH NODE AXILLA Right 11/2/2017    Procedure: DISSECT LYMPH NODE AXILLA;  RIGHT AXILLARY LYMPH NODE DISSECTION;  Surgeon: Cortez White MD;  Location:  [facility]    GYN SURGERY  2005    hysterectomy after hx of ovarian cyst, ended up being benign    HYSTERECTOMY, PAP NO LONGER INDICATED      MASTECTOMY MODIFIED RADICAL  2011    bilateral    MASTECTOMY, BILATERAL      ORTHOPEDIC SURGERY      rt. knee arthroscopy    ORTHOPEDIC SURGERY Right     toe surgery    REALIGN PATELLA  1973    right     TOE SURGERY      right grt OA        FAMILY HISTORY:  Family History   Problem Relation  Age of Onset    Cancer Mother 60        leukemia    Arthritis Mother         osteo    Eye Disorder Mother         glaucoma    Gastrointestinal Disease Mother         gallbladder    Other Cancer Mother     Gallbladder Disease Mother     Alcohol/Drug Father         alcohol    Heart Disease Father         ? CHF    Respiratory Father         emphysema    Coronary Artery Disease Father     Substance Abuse Father     Substance Abuse Sister     Anxiety Disorder Sister     Asthma Sister     Colon Cancer Brother     Breast Cancer Maternal Grandmother     Asthma Sister     Substance Abuse Sister     Cancer - colorectal Brother 50    Prostate Cancer Brother     Allergies Brother         bee     Arthritis Sister         osteo    Arthritis Sister         osteo    Arthritis Brother         osteo    Depression Sister     Psychotic Disorder Sister         bipolar    Respiratory Sister     Colon Cancer Brother     Prostate Cancer Brother     Depression Sister     Asthma Sister        SOCIAL HISTORY:  Social History     Socioeconomic History    Marital status:      Spouse name: None    Number of children: None    Years of education: None    Highest education level: None   Tobacco Use    Smoking status: Former     Packs/day: 1.00     Years: 22.00     Additional pack years: 0.00     Total pack years: 22.00     Types: Cigarettes     Start date: 1969     Quit date: 2010     Years since quittin.6    Smokeless tobacco: Never    Tobacco comments:     I have been chewing nicorette gum 3 yrs and 3 months now. I have quit 8 and 9 yrs and periods in bet   Vaping Use    Vaping Use: Never used   Substance and Sexual Activity    Alcohol use: Not Currently     Comment: No longer    Drug use: Yes     Types: Marijuana     Comment: for sleeping/occ    Sexual activity: Not Currently     Partners: Male     Birth control/protection: Surgical     Comment: hyst   Other Topics Concern     Service No    Blood Transfusions No     "Caffeine Concern Yes     Comment: 4-5 cups caffeine per day    Occupational Exposure No    Hobby Hazards No    Sleep Concern Yes    Stress Concern Yes    Weight Concern No    Special Diet Yes     Comment: organic foods    Back Care No    Exercise No    Bike Helmet No    Seat Belt Yes    Self-Exams Yes    Parent/sibling w/ CABG, MI or angioplasty before 65F 55M? No     Social Determinants of Health     Financial Resource Strain: Low Risk  (10/9/2023)    Financial Resource Strain     Within the past 12 months, have you or your family members you live with been unable to get utilities (heat, electricity) when it was really needed?: No   Food Insecurity: Low Risk  (10/9/2023)    Food Insecurity     Within the past 12 months, did you worry that your food would run out before you got money to buy more?: No     Within the past 12 months, did the food you bought just not last and you didn t have money to get more?: No   Transportation Needs: Low Risk  (10/9/2023)    Transportation Needs     Within the past 12 months, has lack of transportation kept you from medical appointments, getting your medicines, non-medical meetings or appointments, work, or from getting things that you need?: No   Housing Stability: Low Risk  (10/9/2023)    Housing Stability     Do you have housing? : Yes     Are you worried about losing your housing?: No       Review of Systems:  Skin:  Negative       Eyes:  Positive for glasses    ENT:  Negative      Respiratory:  Negative       Cardiovascular:  chest pain;Negative;palpitations;dizziness;lightheadedness;edema fatigue;Positive for    Gastroenterology: Negative      Genitourinary:  Negative      Musculoskeletal:  Negative      Neurologic:  Negative      Psychiatric:  Negative      Heme/Lymph/Imm:  Positive for      Endocrine:  Negative        Physical Exam:  Vitals: /73 (BP Location: Left arm, Patient Position: Sitting)   Pulse 63   Ht 1.651 m (5' 5\")   Wt 63.4 kg (139 lb 11.2 oz)   SpO2 " 96%   BMI 23.25 kg/m      Constitutional:  cooperative, alert and oriented, well developed, well nourished, in no acute distress        Skin:  warm and dry to the touch, no apparent skin lesions or masses noted          Head:  normocephalic, no masses or lesions        Eyes:  pupils equal and round, conjunctivae and lids unremarkable, sclera white, no xanthalasma, EOMS intact, no nystagmus        Lymph:      ENT:  no pallor or cyanosis, dentition good        Neck:  carotid pulses are full and equal bilaterally;no carotid bruit        Respiratory:  normal breath sounds, clear to auscultation, normal A-P diameter, normal symmetry, normal respiratory excursion, no use of accessory muscles         Cardiac: regular rhythm;normal S1 and S2;no S3 or S4       systolic ejection murmur;LUSB;grade 2        pulses full and equal                                        GI:           Extremities and Muscular Skeletal:  no edema;no spinal abnormalities noted;normal muscle strength and tone              Neurological:  no gross motor deficits        Psych:  affect appropriate, oriented to time, person and place        CC  Jose Alberto Perez MD  [address] [facility] [address] [address]        Thank you for allowing me to participate in the care of your patient.      Sincerely,     Jose Alberto Perez MD     [facility]

## 2023-12-05 ENCOUNTER — TELEPHONE (OUTPATIENT)
Dept: CARDIOLOGY | Facility: CLINIC | Age: 73
End: 2023-12-05
Payer: MEDICARE

## 2023-12-05 NOTE — TELEPHONE ENCOUNTER
Spoke with Patient who thinks she is having TMJ as she has had it in the past and saw a TMJ Specialist.    Patient states she was doing TMJ exercises today and noticed that she had jaw discomfort, down her neck and shoulders and ear ache since April.  Patient wondering if these could be heart pains.?     Patient saw a ENT DrRenetta  recently who said she has  TMJ,    Advise Patient if discomfort persists or worsens to go into ED for assessment and evaluation.  Patient agrees with plan.    Patient also encouraged to call the ENT Dr. And PCP regarding their recommendations.     Patient states she will call back with any further questions or concerns.     Last Dr. Perez OC 12-1-23.   Assessment and plan.  I do not think Mahnaz is having any anginal symptoms.  Does not appear that she has any heart failure or any significant arrhythmias.

## 2023-12-05 NOTE — TELEPHONE ENCOUNTER
[facility] Call Center    Phone Message    May a detailed message be left on voicemail: yes     Reason for Call: Symptoms or Concerns     If patient has red-flag symptoms, warm transfer to triage line    Current symptom or concern: Pain from skull to back of ear, down to the jaw and neck along with headache    Patient called stating they are experiencing pain from their skull that travels to the back of their ear down to the jaw and neck, and with this they also have headache. Patient declined to speak with triage nurse. Patient stated it could be TMJ, but wanting to check that it doesn't have anything to do with the heart. Please call patient back to further discuss.    Action Taken: Other: Cardiology    Travel Screening: Not Applicable    Thank you!  [facility]

## 2023-12-08 ENCOUNTER — MYC REFILL (OUTPATIENT)
Dept: FAMILY MEDICINE | Facility: CLINIC | Age: 73
End: 2023-12-08
Payer: MEDICARE

## 2023-12-08 DIAGNOSIS — B00.9 HSV (HERPES SIMPLEX VIRUS) INFECTION: ICD-10-CM

## 2023-12-08 RX ORDER — VALACYCLOVIR HYDROCHLORIDE 1 G/1
TABLET, FILM COATED ORAL
Qty: 21 TABLET | Refills: 11 | OUTPATIENT
Start: 2023-12-08

## 2023-12-09 ENCOUNTER — HOSPITAL ENCOUNTER (INPATIENT)
Facility: CLINIC | Age: 73
LOS: 3 days | Discharge: HOME | DRG: 274 | End: 2023-12-12
Attending: EMERGENCY MEDICINE | Admitting: INTERNAL MEDICINE
Payer: MEDICARE

## 2023-12-09 DIAGNOSIS — K21.00 GASTROESOPHAGEAL REFLUX DISEASE WITH ESOPHAGITIS WITHOUT HEMORRHAGE: Primary | ICD-10-CM

## 2023-12-09 DIAGNOSIS — K21.9 GERD (GASTROESOPHAGEAL REFLUX DISEASE): ICD-10-CM

## 2023-12-09 DIAGNOSIS — I25.10 CORONARY ARTERY DISEASE INVOLVING NATIVE CORONARY ARTERY OF NATIVE HEART WITHOUT ANGINA PECTORIS: ICD-10-CM

## 2023-12-09 DIAGNOSIS — R07.89 OTHER CHEST PAIN: ICD-10-CM

## 2023-12-09 DIAGNOSIS — I48.92 ATRIAL FLUTTER WITH RAPID VENTRICULAR RESPONSE (H): ICD-10-CM

## 2023-12-09 DIAGNOSIS — I48.92 NEW ONSET ATRIAL FLUTTER (H): ICD-10-CM

## 2023-12-09 PROBLEM — Z85.3 PERSONAL HISTORY OF MALIGNANT NEOPLASM OF BREAST: Status: ACTIVE | Noted: 2023-12-09

## 2023-12-09 LAB
ANION GAP SERPL CALCULATED.3IONS-SCNC: 13 MMOL/L (ref 7–15)
ATRIAL RATE - MUSE: 330 BPM
BASOPHILS # BLD AUTO: 0.1 10E3/UL (ref 0–0.2)
BASOPHILS NFR BLD AUTO: 1 %
BUN SERPL-MCNC: 16.6 MG/DL (ref 8–23)
CALCIUM SERPL-MCNC: 9.7 MG/DL (ref 8.8–10.2)
CHLORIDE SERPL-SCNC: 104 MMOL/L (ref 98–107)
CREAT SERPL-MCNC: 0.71 MG/DL (ref 0.51–0.95)
DEPRECATED HCO3 PLAS-SCNC: 21 MMOL/L (ref 22–29)
DIASTOLIC BLOOD PRESSURE - MUSE: NORMAL MMHG
EGFRCR SERPLBLD CKD-EPI 2021: 90 ML/MIN/1.73M2
EOSINOPHIL # BLD AUTO: 0.3 10E3/UL (ref 0–0.7)
EOSINOPHIL NFR BLD AUTO: 4 %
ERYTHROCYTE [DISTWIDTH] IN BLOOD BY AUTOMATED COUNT: 12.3 % (ref 10–15)
GLUCOSE SERPL-MCNC: 92 MG/DL (ref 70–99)
HCT VFR BLD AUTO: 40.4 % (ref 35–47)
HGB BLD-MCNC: 13.6 G/DL (ref 11.7–15.7)
HOLD SPECIMEN: NORMAL
IMM GRANULOCYTES # BLD: 0 10E3/UL
IMM GRANULOCYTES NFR BLD: 0 %
INTERPRETATION ECG - MUSE: NORMAL
LYMPHOCYTES # BLD AUTO: 1.8 10E3/UL (ref 0.8–5.3)
LYMPHOCYTES NFR BLD AUTO: 26 %
MAGNESIUM SERPL-MCNC: 2.2 MG/DL (ref 1.7–2.3)
MCH RBC QN AUTO: 31.3 PG (ref 26.5–33)
MCHC RBC AUTO-ENTMCNC: 33.7 G/DL (ref 31.5–36.5)
MCV RBC AUTO: 93 FL (ref 78–100)
MONOCYTES # BLD AUTO: 0.6 10E3/UL (ref 0–1.3)
MONOCYTES NFR BLD AUTO: 9 %
NEUTROPHILS # BLD AUTO: 4.3 10E3/UL (ref 1.6–8.3)
NEUTROPHILS NFR BLD AUTO: 60 %
NRBC # BLD AUTO: 0 10E3/UL
NRBC BLD AUTO-RTO: 0 /100
P AXIS - MUSE: 97 DEGREES
PLATELET # BLD AUTO: 293 10E3/UL (ref 150–450)
POTASSIUM SERPL-SCNC: 4.1 MMOL/L (ref 3.4–5.3)
PR INTERVAL - MUSE: NORMAL MS
QRS DURATION - MUSE: 108 MS
QT - MUSE: 276 MS
QTC - MUSE: 457 MS
R AXIS - MUSE: 13 DEGREES
RBC # BLD AUTO: 4.34 10E6/UL (ref 3.8–5.2)
SODIUM SERPL-SCNC: 138 MMOL/L (ref 135–145)
SYSTOLIC BLOOD PRESSURE - MUSE: NORMAL MMHG
T AXIS - MUSE: 268 DEGREES
T4 FREE SERPL-MCNC: 1.09 NG/DL (ref 0.9–1.7)
TROPONIN T SERPL HS-MCNC: 7 NG/L
TSH SERPL DL<=0.005 MIU/L-ACNC: 4.94 UIU/ML (ref 0.3–4.2)
VENTRICULAR RATE- MUSE: 165 BPM
WBC # BLD AUTO: 7.1 10E3/UL (ref 4–11)

## 2023-12-09 PROCEDURE — 96376 TX/PRO/DX INJ SAME DRUG ADON: CPT

## 2023-12-09 PROCEDURE — 96361 HYDRATE IV INFUSION ADD-ON: CPT

## 2023-12-09 PROCEDURE — 93005 ELECTROCARDIOGRAM TRACING: CPT

## 2023-12-09 PROCEDURE — 84439 ASSAY OF FREE THYROXINE: CPT | Performed by: EMERGENCY MEDICINE

## 2023-12-09 PROCEDURE — 99285 EMERGENCY DEPT VISIT HI MDM: CPT | Mod: 25

## 2023-12-09 PROCEDURE — 250N000013 HC RX MED GY IP 250 OP 250 PS 637: Performed by: INTERNAL MEDICINE

## 2023-12-09 PROCEDURE — 83735 ASSAY OF MAGNESIUM: CPT | Performed by: EMERGENCY MEDICINE

## 2023-12-09 PROCEDURE — 85025 COMPLETE CBC W/AUTO DIFF WBC: CPT | Performed by: EMERGENCY MEDICINE

## 2023-12-09 PROCEDURE — 84443 ASSAY THYROID STIM HORMONE: CPT | Performed by: EMERGENCY MEDICINE

## 2023-12-09 PROCEDURE — 84484 ASSAY OF TROPONIN QUANT: CPT | Performed by: EMERGENCY MEDICINE

## 2023-12-09 PROCEDURE — 250N000011 HC RX IP 250 OP 636: Mod: JZ | Performed by: EMERGENCY MEDICINE

## 2023-12-09 PROCEDURE — 80048 BASIC METABOLIC PNL TOTAL CA: CPT | Performed by: EMERGENCY MEDICINE

## 2023-12-09 PROCEDURE — 210N000002 HC R&B HEART CARE

## 2023-12-09 PROCEDURE — 96374 THER/PROPH/DIAG INJ IV PUSH: CPT

## 2023-12-09 PROCEDURE — 99223 1ST HOSP IP/OBS HIGH 75: CPT | Mod: AI | Performed by: INTERNAL MEDICINE

## 2023-12-09 PROCEDURE — 258N000003 HC RX IP 258 OP 636: Performed by: EMERGENCY MEDICINE

## 2023-12-09 PROCEDURE — 250N000009 HC RX 250: Performed by: INTERNAL MEDICINE

## 2023-12-09 PROCEDURE — 36415 COLL VENOUS BLD VENIPUNCTURE: CPT | Performed by: EMERGENCY MEDICINE

## 2023-12-09 PROCEDURE — 258N000003 HC RX IP 258 OP 636: Performed by: INTERNAL MEDICINE

## 2023-12-09 RX ORDER — ENOXAPARIN SODIUM 100 MG/ML
1 INJECTION SUBCUTANEOUS EVERY 12 HOURS
Status: DISCONTINUED | OUTPATIENT
Start: 2023-12-10 | End: 2023-12-10

## 2023-12-09 RX ORDER — ENOXAPARIN SODIUM 100 MG/ML
1 INJECTION SUBCUTANEOUS ONCE
Status: COMPLETED | OUTPATIENT
Start: 2023-12-09 | End: 2023-12-09

## 2023-12-09 RX ORDER — LIDOCAINE 40 MG/G
CREAM TOPICAL
Status: DISCONTINUED | OUTPATIENT
Start: 2023-12-09 | End: 2023-12-11

## 2023-12-09 RX ORDER — ROSUVASTATIN CALCIUM 20 MG/1
40 TABLET, COATED ORAL AT BEDTIME
Status: DISCONTINUED | OUTPATIENT
Start: 2023-12-09 | End: 2023-12-12 | Stop reason: HOSPADM

## 2023-12-09 RX ORDER — ACETAMINOPHEN 325 MG/1
650 TABLET ORAL EVERY 4 HOURS PRN
Status: DISCONTINUED | OUTPATIENT
Start: 2023-12-09 | End: 2023-12-12 | Stop reason: HOSPADM

## 2023-12-09 RX ORDER — ACETAMINOPHEN 650 MG/1
650 SUPPOSITORY RECTAL EVERY 4 HOURS PRN
Status: DISCONTINUED | OUTPATIENT
Start: 2023-12-09 | End: 2023-12-12 | Stop reason: HOSPADM

## 2023-12-09 RX ORDER — PANTOPRAZOLE SODIUM 40 MG/1
40 TABLET, DELAYED RELEASE ORAL
Status: DISCONTINUED | OUTPATIENT
Start: 2023-12-10 | End: 2023-12-12 | Stop reason: HOSPADM

## 2023-12-09 RX ORDER — AMOXICILLIN 250 MG
1 CAPSULE ORAL 2 TIMES DAILY PRN
Status: DISCONTINUED | OUTPATIENT
Start: 2023-12-09 | End: 2023-12-12 | Stop reason: HOSPADM

## 2023-12-09 RX ORDER — METOPROLOL TARTRATE 25 MG/1
25 TABLET, FILM COATED ORAL 2 TIMES DAILY
Status: DISCONTINUED | OUTPATIENT
Start: 2023-12-09 | End: 2023-12-11

## 2023-12-09 RX ORDER — DILTIAZEM HYDROCHLORIDE 5 MG/ML
15 INJECTION INTRAVENOUS ONCE
Status: COMPLETED | OUTPATIENT
Start: 2023-12-09 | End: 2023-12-09

## 2023-12-09 RX ORDER — BUPROPION HYDROCHLORIDE 300 MG/1
300 TABLET ORAL EVERY MORNING
Status: DISCONTINUED | OUTPATIENT
Start: 2023-12-10 | End: 2023-12-12 | Stop reason: HOSPADM

## 2023-12-09 RX ORDER — NITROGLYCERIN 0.4 MG/1
0.4 TABLET SUBLINGUAL EVERY 5 MIN PRN
Status: DISCONTINUED | OUTPATIENT
Start: 2023-12-09 | End: 2023-12-11

## 2023-12-09 RX ORDER — CALCIUM CARBONATE 500 MG/1
1000 TABLET, CHEWABLE ORAL 4 TIMES DAILY PRN
Status: DISCONTINUED | OUTPATIENT
Start: 2023-12-09 | End: 2023-12-12 | Stop reason: HOSPADM

## 2023-12-09 RX ORDER — LETROZOLE 2.5 MG/1
2.5 TABLET, FILM COATED ORAL DAILY
Status: DISCONTINUED | OUTPATIENT
Start: 2023-12-10 | End: 2023-12-12 | Stop reason: HOSPADM

## 2023-12-09 RX ORDER — AMOXICILLIN 250 MG
2 CAPSULE ORAL 2 TIMES DAILY PRN
Status: DISCONTINUED | OUTPATIENT
Start: 2023-12-09 | End: 2023-12-12 | Stop reason: HOSPADM

## 2023-12-09 RX ADMIN — DILTIAZEM HYDROCHLORIDE 15 MG: 5 INJECTION, SOLUTION INTRAVENOUS at 15:30

## 2023-12-09 RX ADMIN — DILTIAZEM HYDROCHLORIDE 15 MG: 5 INJECTION, SOLUTION INTRAVENOUS at 17:43

## 2023-12-09 RX ADMIN — ACETAMINOPHEN 650 MG: 325 TABLET, FILM COATED ORAL at 22:46

## 2023-12-09 RX ADMIN — Medication 3 MG: at 21:32

## 2023-12-09 RX ADMIN — ENOXAPARIN SODIUM 60 MG: 60 INJECTION SUBCUTANEOUS at 17:13

## 2023-12-09 RX ADMIN — ROSUVASTATIN CALCIUM 40 MG: 20 TABLET, FILM COATED ORAL at 21:32

## 2023-12-09 RX ADMIN — SODIUM CHLORIDE 1000 ML: 9 INJECTION, SOLUTION INTRAVENOUS at 15:30

## 2023-12-09 RX ADMIN — DILTIAZEM HYDROCHLORIDE 5 MG/HR: 5 INJECTION INTRAVENOUS at 21:03

## 2023-12-09 ASSESSMENT — ACTIVITIES OF DAILY LIVING (ADL)
ADLS_ACUITY_SCORE: 20
ADLS_ACUITY_SCORE: 35
ADLS_ACUITY_SCORE: 35
ADLS_ACUITY_SCORE: 20
ADLS_ACUITY_SCORE: 20

## 2023-12-09 NOTE — ED TRIAGE NOTES
Pt sts she was awakened with palpitations Thursday night has been intermittent since     Triage Assessment (Adult)       Row Name 12/09/23 1436          Triage Assessment    Airway WDL WDL        Respiratory WDL    Respiratory WDL WDL        Skin Circulation/Temperature WDL    Skin Circulation/Temperature WDL WDL        Cardiac WDL    Cardiac Rhythm Atrial flutter        Peripheral/Neurovascular WDL    Peripheral Neurovascular WDL WDL        Cognitive/Neuro/Behavioral WDL    Cognitive/Neuro/Behavioral WDL WDL

## 2023-12-09 NOTE — Clinical Note
Hemodynamic equipment used: 5 lead ECG, [employer] With 3 Leads, Machine BP Cuff and pulse oximeter probe.

## 2023-12-09 NOTE — PHARMACY-ADMISSION MEDICATION HISTORY
Pharmacist Admission Medication History    Admission medication history is complete. The information provided in this note is only as accurate as the sources available at the time of the update.    Information Source(s): Patient and CareEverywhere/SureScripts via in-person    Pertinent Information: Patient gets compounded low dose naltrexone and reports it is for Hashimoto and pain. She reports she does not have it with her and is okay missing a few doses but can arrange to have a family member bring it in if she remains in the hospital for an extended period of time.     Patient is taking Valtrex for cold sores. Patient reported she started it 2 days ago although it was just filled yesterday (12/8) for a 7 day supply.    Changes made to PTA medication list:  Added: None  Deleted: amoxicillin (course completed)  Changed: None    Allergies reviewed with patient and updates made in EHR: yes    Medication History Completed By: Keya Jauregui [facility] 12/9/2023 5:13 PM    PTA Med List   Medication Sig Note Last Dose    ASHWAGANDHA PO Take 1 tablet by mouth daily as needed At onset of illness symptoms; as needed  Unknown at PRN    aspirin 81 MG tablet Take 1 tablet (81 mg) by mouth daily  12/8/2023 at HS    buPROPion (WELLBUTRIN XL) 300 MG 24 hr tablet Take 1 tablet (300 mg) by mouth every morning  12/9/2023 at AM    calcium carbonate-vitamin D (OS-YASMIN) 500-400 MG-UNIT tablet Take 1 tablet by mouth daily   12/9/2023    cholecalciferol (VITAMIN D3) 25 mcg (1000 units) capsule Take 1 capsule by mouth daily 12/9/2023: Patient unsure of strength 12/9/2023    Coenzyme Q10 300 MG CAPS Take 300 mg by mouth daily  12/9/2023    IBUPROFEN PO Take 200 mg by mouth every 4 hours as needed for moderate pain   Unknown at prn    letrozole (FEMARA) 2.5 MG tablet Take 1 tablet by mouth daily  12/9/2023 at am    Lutein-Zeaxanthin 25-5 MG CAPS Take 1 capsule by mouth daily  12/9/2023    LYSINE 1-3 daily as needed  Unknown at prn    melatonin 5 MG  tablet Take 5 mg by mouth nightly as needed for sleep  Unknown at prn    Naltrexone HCl, Pain, 4.5 MG CAPS Take 1 capsule by mouth daily  12/9/2023 at am    nicotine polacrilex (NICORETTE) 2 MG gum Place 1 each (2 mg) inside cheek as needed for smoking cessation  Unknown at prn    omeprazole (PRILOSEC) 20 MG DR capsule TAKE 1 CAPSULE BY MOUTH AS NEEDED  Unknown at prn    rosuvastatin (CRESTOR) 40 MG tablet Take 1 tablet (40 mg) by mouth daily  12/8/2023 at hs    UNABLE TO FIND Take by mouth daily MEDICATION NAME: Asea Redox - 1oz twice daily  More than a month    UNABLE TO FIND Take 1 tablet by mouth daily MEDICATION NAME: Yin Kolbyao - chinese supplement takes at onset of illness symptoms  12/8/2023 at prn    UNABLE TO FIND MEDICATION NAME: Somnapure - 2 tablets = 500mg valerian root, 300mg lemon balm extract, 200mg l-theanine, 120mg hops extract, 50mg chamomile flower extract, 50mg passion flower extract, 3mg melatonin.  Takes 1-2 tablets at bedtime  Unknown at prn    UNABLE TO FIND MEDICATION NAME: Moringa 1 serving of powder daily  12/9/2023    UNABLE TO FIND MEDICATION NAME: Premium Amla Berry 2 capsules = 4mg Vitamin C, 966mg organic amla, organic amla extract.  Takes 1 capsule daily as needed for immune system  Unknown at prn    UNABLE TO FIND MEDICATION NAME: OmegaKrill 5x 3 capsules = 480mg of total omega-3, 64mg EPA, 392mg DHA, 300mcg astaxanthin.  Takes 3 capsules daily  12/9/2023    UNABLE TO FIND MEDICATION NAME: Super Colon Cleanse 2 capsules = 665mg senna leaf powder, 321mg psyllium husk powder, 21mg fennel seed powder, 21mg papaya leaf powder, 21mg yolanda hips fruit powder, 11mg l-acidophilus.  Taking 4 capsules daily  12/9/2023    valACYclovir (VALTREX) 1000 mg tablet TAKE 1 TABLET(1000 MG) BY MOUTH THREE TIMES DAILY FOR 7 DAYS  12/9/2023 at AM x 1    vitamin C (ASCORBIC ACID) 250 MG tablet Take 250 mg by mouth daily  12/9/2023

## 2023-12-09 NOTE — ED NOTES
[facility]  ED Nurse Handoff Report    ED Chief complaint: Palpitations      ED Diagnosis:   Final diagnoses:   None       Code Status: Full Code    Allergies:   Allergies   Allergen Reactions    Cats     Codeine Sulfate GI Disturbance     Svetlana Adair is a 73 year old female with hypercholesterolemia presenting with her friend for palpitations.  She actually met with her cardiologist (high calcium score) recently and had no symptoms.  When she developed symptoms 2 days ago she wondered if this was may be just in her head or maybe she was getting a cold.  She describes lightheadedness and feeling generally unwell all day 2 days ago.  That night and again last night she had palpitations while resting in bed.  She remarks they were so severe it seemed like they were even shaking the bed.  She did not have associated chest pain or leg swelling.  Today she noticed she was more short of breath than usual during her typical activities with some mild lightheadedness.  She overall felt well enough to meet her friend for coffee but her symptoms ultimately prompted an urgent care visit where she was found to have atrial flutter and was referred to the emergency department.  She denies any change in medications outside of increased statin dosing.  She does not have change in alcohol or caffeine use.  She is a former smoker and chews nicotine.  She uses marijuana. She is feeling well while resting in bed for interview.   Patient Story:   Focused Assessment:  Alert and oriented, assist of one for transfers, no complaints.  No cardiac history, as required a lot of education and re-assurance  regarding current condition.     Treatments and/or interventions provided: labs, imaging   Patient's response to treatments and/or interventions:   Labs Ordered and Resulted from Time of ED Arrival to Time of ED Departure   BASIC METABOLIC PANEL - Abnormal       Result Value    Sodium 138      Potassium 4.1      Chloride  104      Carbon Dioxide (CO2) 21 (*)     Anion Gap 13      Urea Nitrogen 16.6      Creatinine 0.71      GFR Estimate 90      Calcium 9.7      Glucose 92     TSH WITH FREE T4 REFLEX - Abnormal    TSH 4.94 (*)    TROPONIN T, HIGH SENSITIVITY - Normal    Troponin T, High Sensitivity 7     MAGNESIUM - Normal    Magnesium 2.2     T4 FREE - Normal    Free T4 1.09     CBC WITH PLATELETS AND DIFFERENTIAL    WBC Count 7.1      RBC Count 4.34      Hemoglobin 13.6      Hematocrit 40.4      MCV 93      MCH 31.3      MCHC 33.7      RDW 12.3      Platelet Count 293      % Neutrophils 60      % Lymphocytes 26      % Monocytes 9      % Eosinophils 4      % Basophils 1      % Immature Granulocytes 0      NRBCs per 100 WBC 0      Absolute Neutrophils 4.3      Absolute Lymphocytes 1.8      Absolute Monocytes 0.6      Absolute Eosinophils 0.3      Absolute Basophils 0.1      Absolute Immature Granulocytes 0.0      Absolute NRBCs 0.0           To be done/followed up on inpatient unit:  =    Does this patient have any cognitive concerns?:  none    Activity level - Baseline/Home:  Independent  Activity Level - Current:   Stand with Assist    Patient's Preferred language: English   Needed?: No    Isolation: None  Infection: Not Applicable  Patient tested for COVID 19 prior to admission: NO  Bariatric?: No    Vital Signs:   Vitals:    12/09/23 1540 12/09/23 1600 12/09/23 1630 12/09/23 1700   BP:  115/80 132/87 (!) 107/90   Pulse: 80 81 105 (!) 141   Resp: 14 15 14 (!) 9   Temp:       TempSrc:       SpO2: 97% 98% 97% 97%       Cardiac Rhythm:Cardiac Rhythm: Atrial flutter    Was the PSS-3 completed:   Yes  What interventions are required if any?               Family Comments: Friend at bedside   OBS brochure/video discussed/provided to patient/family: N/A              Name of person given brochure if not patient:               Relationship to patient:     For the majority of the shift this patient's behavior was Green.    Behavioral interventions performed were .    ED NURSE PHONE NUMBER: *[phone]

## 2023-12-09 NOTE — ED PROVIDER NOTES
History     Chief Complaint:  Palpitations       The history is provided by the patient, medical records and a friend.      Svetlana Adair is a 73 year old female with hypercholesterolemia presenting with her friend for palpitations.  She actually met with her cardiologist (high calcium score) recently and had no symptoms.  When she developed symptoms 2 days ago she wondered if this was may be just in her head or maybe she was getting a cold.  She describes lightheadedness and feeling generally unwell all day 2 days ago.  That night and again last night she had palpitations while resting in bed.  She remarks they were so severe it seemed like they were even shaking the bed.  She did not have associated chest pain or leg swelling.  Today she noticed she was more short of breath than usual during her typical activities with some mild lightheadedness.  She overall felt well enough to meet her friend for coffee but her symptoms ultimately prompted an urgent care visit where she was found to have atrial flutter and was referred to the emergency department.  She denies any change in medications outside of increased statin dosing.  She does not have change in alcohol or caffeine use.  She is a former smoker and chews nicotine.  She uses marijuana. She is feeling well while resting in bed for interview.    Independent Historian:    As above    Review of External Notes:  Cardiology visit 12/1/2023.    Medications:    ASHWAGANDHA PO  aspirin 81 MG tablet  buPROPion (WELLBUTRIN XL) 300 MG 24 hr tablet  calcium carbonate-vitamin D (OS-YASMIN) 500-400 MG-UNIT tablet  cholecalciferol (VITAMIN D3) 25 mcg (1000 units) capsule  Coenzyme Q10 300 MG CAPS  IBUPROFEN PO  letrozole (FEMARA) 2.5 MG tablet  Lutein-Zeaxanthin 25-5 MG CAPS  LYSINE  melatonin 5 MG tablet  Naltrexone HCl, Pain, 4.5 MG CAPS  nicotine polacrilex (NICORETTE) 2 MG gum  omeprazole (PRILOSEC) 20 MG DR capsule  rosuvastatin (CRESTOR) 40 MG tablet  valACYclovir  (VALTREX) 1000 mg tablet  vitamin C (ASCORBIC ACID) 250 MG tablet    Past Medical History:    Past Medical History:   Diagnosis Date    Alcoholism (H)     Allergies     Arthritis     Basal cell cancer     Breast cancer (H)     Coronary artery disease     Depression     Hyperlipidemia LDL goal <130 12/2/2015    Hypertension     PONV (postoperative nausea and vomiting)     Squamous cell carcinoma      Past Surgical History:    Past Surgical History:   Procedure Laterality Date    ABDOMEN SURGERY  2007?    Hysterectomy    COLONOSCOPY      COLONOSCOPY  11/17/2011    Procedure:COLONOSCOPY; Colonoscopy; Surgeon:MEKA RUSS; Location: GI    COLONOSCOPY N/A 2/10/2020    Procedure: COLONOSCOPY, WITH POLYPECTOMY AND BIOPSY;  Surgeon: Opal Ace MD;  Location:  GI    DISSECT LYMPH NODE AXILLA Right 11/2/2017    Procedure: DISSECT LYMPH NODE AXILLA;  RIGHT AXILLARY LYMPH NODE DISSECTION;  Surgeon: Cortez White MD;  Location: [facility]    GYN SURGERY  2005    hysterectomy after hx of ovarian cyst, ended up being benign    HYSTERECTOMY, PAP NO LONGER INDICATED      MASTECTOMY MODIFIED RADICAL  2011    bilateral    MASTECTOMY, BILATERAL      ORTHOPEDIC SURGERY      rt. knee arthroscopy    ORTHOPEDIC SURGERY Right     toe surgery    REALIGN PATELLA  1973    right     TOE SURGERY      right grt OA       Physical Exam   Patient Vitals for the past 24 hrs:   BP Temp Temp src Pulse Resp SpO2 Height Weight   12/09/23 1800 113/78 -- -- 82 19 98 % -- --   12/09/23 1730 109/84 -- -- (!) 144 21 98 % -- --   12/09/23 1700 (!) 107/90 -- -- (!) 141 (!) 9 97 % -- --   12/09/23 1630 132/87 -- -- 105 14 97 % -- --   12/09/23 1600 115/80 -- -- 81 15 98 % -- --   12/09/23 1540 -- -- -- 80 14 97 % -- --   12/09/23 1530 (!) 134/109 -- -- (!) 163 11 97 % -- --   12/09/23 1435 (!) 133/92 98  F (36.7  C) Oral (!) 164 20 99 % -- --        Physical Exam  General: Well-developed and well-nourished. Well appearing elderly   woman. Cooperative.  Head:  Atraumatic.  Eyes:  Conjunctivae, lids, and sclerae are normal.  ENT:    Normal nose. Moist mucous membranes.  Neck:  Supple. Normal range of motion.  CV:  Tachycardic rate and regular rhythm during exam. Normal heart sounds with no murmurs, rubs, or gallops detected.  Resp:  No respiratory distress. Clear to auscultation bilaterally without decreased breath sounds, wheezing, rales, or rhonchi.  GI:  Soft. Non-distended. Non-tender.    MS:  Normal ROM. No bilateral lower extremity edema.  Skin:  Warm. Non-diaphoretic. No pallor.  Neuro:  Awake. A&Ox3. Normal strength.  Psych: Normal mood and affect. Normal speech.  Vitals reviewed.    Emergency Department Course   EKG  Indication: palpitations  Time: 1436  Rate 165 bpm. QRS duration 108. QT/QTc 276/457.   Atrial flutter with 2-1 AV conduction  ST and T wave abnormality, consider anterior lateral ischemia  Abnormal ECG  No acute ST changes.  Rate increased, atrial flutter replaces sinus rhythm, and ST depressions are new as compared to prior, dated 7/21/21.    Laboratory:  Labs Ordered and Resulted from Time of ED Arrival to Time of ED Departure   BASIC METABOLIC PANEL - Abnormal       Result Value    Sodium 138      Potassium 4.1      Chloride 104      Carbon Dioxide (CO2) 21 (*)     Anion Gap 13      Urea Nitrogen 16.6      Creatinine 0.71      GFR Estimate 90      Calcium 9.7      Glucose 92     TSH WITH FREE T4 REFLEX - Abnormal    TSH 4.94 (*)    TROPONIN T, HIGH SENSITIVITY - Normal    Troponin T, High Sensitivity 7     MAGNESIUM - Normal    Magnesium 2.2     T4 FREE - Normal    Free T4 1.09     CBC WITH PLATELETS AND DIFFERENTIAL    WBC Count 7.1      RBC Count 4.34      Hemoglobin 13.6      Hematocrit 40.4      MCV 93      MCH 31.3      MCHC 33.7      RDW 12.3      Platelet Count 293      % Neutrophils 60      % Lymphocytes 26      % Monocytes 9      % Eosinophils 4      % Basophils 1      % Immature Granulocytes 0      NRBCs per  100 WBC 0      Absolute Neutrophils 4.3      Absolute Lymphocytes 1.8      Absolute Monocytes 0.6      Absolute Eosinophils 0.3      Absolute Basophils 0.1      Absolute Immature Granulocytes 0.0      Absolute NRBCs 0.0        Emergency Department Course & Assessments:  Interventions:  diltiazem (CARDIZEM) injection 15 mg (15 mg Intravenous $Given 12/9/23 1530)   sodium chloride 0.9% BOLUS 1,000 mL (1,000 mLs Intravenous Stopped 12/9/23 1705)   enoxaparin ANTICOAGULANT (LOVENOX) injection 60 mg (60 mg Subcutaneous $Given 12/9/23 1713)   diltiazem (CARDIZEM) injection 15 mg (15 mg Intravenous $Given 12/9/23 1743)      Assessments:  1635 I updated the patient. All questions answered.  Heart rate improved but atrial flutter persists.    Independent Interpretation (X-rays, CTs, rhythm strip):  Not applicable    Consultations/Discussion of Management or Tests:  1645 I spoke with Dr. Degroot, hospitalist.     Social Determinants of Health affecting care:  Supportive friend  Established care providers     Disposition:  The patient was admitted to the hospital under the care of Dr. Degroot.       JOUCO-4-Cydx Score  (calculator)  Background  Calculates overall risk of atrial fibrillation related CVA based on 7 factors: Age>=65-75, CHF, Htn, CVA/TIA, DM, vascular disease, female  Data  73 year old year old female  has Breast cancer est/prog +, HER2 ERNIE -; Osteoarthritis; Moderate episode of recurrent major depressive disorder (H); Advanced directives, counseling/discussion; Knee pain; Past use of tobacco; IFG (impaired fasting glucose); Low back pain; Malignant neoplasm of right breast (H); Hyperlipidemia LDL goal <70; Follow-up examination following surgery; Atherosclerosis of left carotid artery; Adjustment disorder with mixed anxiety and depressed mood; Neck pain on right side; Hyperparathyroidism (H24); Alcohol dependence in remission (H); High coronary artery calcium score; Other fatigue; Pulmonary nodule; and New  onset atrial flutter (H) on their problem list.  Criteria   Of possible 6 points for 5 possible items  2 point: Age over 75 years (1 point over 65 years)  1 point: Vascular Disease  1 point: Female  Interpretation  JTWHM-8-Uqif Score: 4  CHADS Score 2+ (CVA risk >5% per year): Warfarin with goal INR 2.0 to 3.0      Impression & Plan    Medical Decision Making:  Mahnaz is a 73-year-old woman who has had over 48 hours of intermittent lightheadedness and palpitations.  Today she was also short of breath with exertion.  She is actually quite well-appearing and denies any symptoms while resting during interview.  However, she is markedly tachycardic to 160 bpm.  EKG reveals this to be atrial flutter with 2-1 AV conduction.  She is not hypotensive.  There are no ischemic changes.  Troponin is normal.  Indeed, all of her laboratory studies are quite reassuring without kidney injury, electrolyte derangement, leukocytosis, or anemia.  Although TSH is abnormal, free T4 is normal and this is an unlikely source of her new onset atrial flutter.    She was given IV fluids and a Cardizem bolus with significant improvement in her rates to about 80 bpm.  However, she clearly requires hospitalization for evaluation of rate versus rhythm control strategy and anticoagulation with DSDWV-6-Hqqs of 4.  I updated her and her friend and answered all their questions.  They verbalized understanding and are amenable.  I discussed the patient's case with Dr. Degroot, hospitalist, who accepts admission.  He agrees with plan for initiation of anticoagulation with Lovenox which was administered in the emergency department.  She did have return of tachycardia and was given a second Cardizem bolus.    Diagnosis:    ICD-10-CM    1. New onset atrial flutter (H)  I48.92       2. Atrial flutter with rapid ventricular response (H)  I48.92            12/9/2023   Hailee Stevens MD Dixson, Kylie S, MD  12/09/23 2152

## 2023-12-10 ENCOUNTER — APPOINTMENT (OUTPATIENT)
Dept: CARDIOLOGY | Facility: CLINIC | Age: 73
DRG: 274 | End: 2023-12-10
Attending: INTERNAL MEDICINE
Payer: MEDICARE

## 2023-12-10 LAB
ALBUMIN SERPL BCG-MCNC: 4.2 G/DL (ref 3.5–5.2)
ALP SERPL-CCNC: 54 U/L (ref 40–150)
ALT SERPL W P-5'-P-CCNC: 30 U/L (ref 0–50)
ANION GAP SERPL CALCULATED.3IONS-SCNC: 9 MMOL/L (ref 7–15)
AST SERPL W P-5'-P-CCNC: 24 U/L (ref 0–45)
BILIRUB SERPL-MCNC: 0.3 MG/DL
BUN SERPL-MCNC: 10.9 MG/DL (ref 8–23)
CALCIUM SERPL-MCNC: 9.4 MG/DL (ref 8.8–10.2)
CHLORIDE SERPL-SCNC: 110 MMOL/L (ref 98–107)
CREAT SERPL-MCNC: 0.81 MG/DL (ref 0.51–0.95)
DEPRECATED HCO3 PLAS-SCNC: 26 MMOL/L (ref 22–29)
EGFRCR SERPLBLD CKD-EPI 2021: 77 ML/MIN/1.73M2
ERYTHROCYTE [DISTWIDTH] IN BLOOD BY AUTOMATED COUNT: 12.5 % (ref 10–15)
GLUCOSE SERPL-MCNC: 108 MG/DL (ref 70–99)
HCT VFR BLD AUTO: 41.2 % (ref 35–47)
HGB BLD-MCNC: 13.6 G/DL (ref 11.7–15.7)
LVEF ECHO: NORMAL
MCH RBC QN AUTO: 31 PG (ref 26.5–33)
MCHC RBC AUTO-ENTMCNC: 33 G/DL (ref 31.5–36.5)
MCV RBC AUTO: 94 FL (ref 78–100)
PLATELET # BLD AUTO: 264 10E3/UL (ref 150–450)
POTASSIUM SERPL-SCNC: 4.6 MMOL/L (ref 3.4–5.3)
PROT SERPL-MCNC: 6.8 G/DL (ref 6.4–8.3)
RBC # BLD AUTO: 4.39 10E6/UL (ref 3.8–5.2)
SODIUM SERPL-SCNC: 145 MMOL/L (ref 135–145)
WBC # BLD AUTO: 6.7 10E3/UL (ref 4–11)

## 2023-12-10 PROCEDURE — 250N000013 HC RX MED GY IP 250 OP 250 PS 637: Performed by: INTERNAL MEDICINE

## 2023-12-10 PROCEDURE — 99232 SBSQ HOSP IP/OBS MODERATE 35: CPT | Performed by: INTERNAL MEDICINE

## 2023-12-10 PROCEDURE — 258N000003 HC RX IP 258 OP 636: Performed by: INTERNAL MEDICINE

## 2023-12-10 PROCEDURE — 99223 1ST HOSP IP/OBS HIGH 75: CPT | Mod: 25 | Performed by: INTERNAL MEDICINE

## 2023-12-10 PROCEDURE — 999N000208 ECHOCARDIOGRAM COMPLETE

## 2023-12-10 PROCEDURE — 85027 COMPLETE CBC AUTOMATED: CPT | Performed by: INTERNAL MEDICINE

## 2023-12-10 PROCEDURE — 250N000009 HC RX 250: Performed by: INTERNAL MEDICINE

## 2023-12-10 PROCEDURE — 80053 COMPREHEN METABOLIC PANEL: CPT | Performed by: INTERNAL MEDICINE

## 2023-12-10 PROCEDURE — 93306 TTE W/DOPPLER COMPLETE: CPT | Mod: 26 | Performed by: INTERNAL MEDICINE

## 2023-12-10 PROCEDURE — 210N000001 HC R&B IMCU HEART CARE

## 2023-12-10 PROCEDURE — 250N000011 HC RX IP 250 OP 636: Performed by: INTERNAL MEDICINE

## 2023-12-10 PROCEDURE — 36415 COLL VENOUS BLD VENIPUNCTURE: CPT | Performed by: INTERNAL MEDICINE

## 2023-12-10 PROCEDURE — 255N000002 HC RX 255 OP 636: Performed by: INTERNAL MEDICINE

## 2023-12-10 RX ADMIN — ACETAMINOPHEN 650 MG: 325 TABLET, FILM COATED ORAL at 12:42

## 2023-12-10 RX ADMIN — ENOXAPARIN SODIUM 60 MG: 60 INJECTION SUBCUTANEOUS at 08:29

## 2023-12-10 RX ADMIN — APIXABAN 5 MG: 5 TABLET, FILM COATED ORAL at 21:36

## 2023-12-10 RX ADMIN — DILTIAZEM HYDROCHLORIDE 5 MG/HR: 5 INJECTION INTRAVENOUS at 14:51

## 2023-12-10 RX ADMIN — ACETAMINOPHEN 650 MG: 325 TABLET, FILM COATED ORAL at 08:32

## 2023-12-10 RX ADMIN — BUPROPION HYDROCHLORIDE 300 MG: 300 TABLET, EXTENDED RELEASE ORAL at 08:29

## 2023-12-10 RX ADMIN — LETROZOLE 2.5 MG: 2.5 TABLET ORAL at 08:29

## 2023-12-10 RX ADMIN — PANTOPRAZOLE SODIUM 40 MG: 40 TABLET, DELAYED RELEASE ORAL at 08:29

## 2023-12-10 RX ADMIN — HUMAN ALBUMIN MICROSPHERES AND PERFLUTREN 9 ML: 10; .22 INJECTION, SOLUTION INTRAVENOUS at 09:53

## 2023-12-10 RX ADMIN — ACETAMINOPHEN 650 MG: 325 TABLET, FILM COATED ORAL at 21:36

## 2023-12-10 RX ADMIN — ROSUVASTATIN CALCIUM 40 MG: 20 TABLET, FILM COATED ORAL at 21:36

## 2023-12-10 RX ADMIN — METOPROLOL TARTRATE 25 MG: 25 TABLET, FILM COATED ORAL at 21:36

## 2023-12-10 RX ADMIN — Medication 3 MG: at 21:36

## 2023-12-10 ASSESSMENT — ACTIVITIES OF DAILY LIVING (ADL)
ADLS_ACUITY_SCORE: 20

## 2023-12-10 NOTE — PROGRESS NOTES
[facility]    Medicine Progress Note - Hospitalist Service       Date of Admission:  12/9/2023    Assessment & Plan   Svetlana Adair is a 73 year old female with hx of HLD, breast cancer, and depression/anxiety who was admitted on 12/9/2023 for new onset a-flutter with RVR    New onset atrial flutter with RVR  *Presented with palpitations and associated dizziness/lightheadedness. Noted to be in a-flutter with 2:1 AV conduction on arrival..   *Initiated on IV diltiazem and enoxaparin in the ED  *Cardiology consulted on admission  *TTE (12/10) largely unremarkable; EF 60-65% with no WMA, no significant valvular disease  - Plan for ablation in AM  - Cont dilt gtt  - She has been initiated on metoprolol 25 mg BID per Cardiology  - Enoxaparin d/c'd per Cardiology; plan to start apixaban 5 mg BID x30 days post-ablation  - Cardiology following, appreciate assistance    Chronic and stable medical conditions: No changes made to home meds  Hyperlipidemia  Hx of breast cancer  Hx of basal cell carcinoma  Hx of squamous cell carcinoma  Alcohol use disorder, in remission  Major recurrent depressive disorder with anxiety     Diet: Low Saturated Fat Na <2400 mg    DVT Prophylaxis: None, plan to start DOAC post-ablation  Maki Catheter: Not present  Code Status: Full Code         Disposition: Expected discharge home tomorrow after ablation    The patient's care was discussed with the Bedside Nurse and Patient.    Estelita Walton MD  Hospitalist Service  [facility]  Contact information available via [facility] Paging/Directory    ______________________________________________________________________    Interval History   Admitted last night. Reports headache, but offers no complaints. Remains in a-flutter but rates controlled on dilt gtt. Denies chest pain, shortness of breath, palpitations, or dizziness/lightheadedness. Cardiology recs reviewed; tentative plan for ablation  tomorrow    Data reviewed today: I reviewed all medications, new labs and imaging results over the last 24 hours. I personally reviewed the TTE image(s) showing no significant abnormalities,  .    Physical Exam   Vital Signs: Temp: 98.5  F (36.9  C) Temp src: Oral BP: 130/87 Pulse: 82   Resp: 18 SpO2: 98 % O2 Device: None (Room air)    Weight: 139 lbs 8 oz  Constitutional: Resting comfortably, NAD  HEENT: Sclera white, MMM  Respiratory: Breathing non-labored. Lungs CTAB - no wheezes, crackles, or rhonchi  Cardiovascular: Heart tachycardic. No pedal edema  GI: +BS, abd soft/NT  Skin/Integument: No rash  Musculoskeletal: Normal muscle bulk and tone  Neuro: Alert and appropriate, MOORE  Psych: Calm and cooperative    Data   Recent Labs   Lab 12/10/23  0705 23  1459   WBC 6.7 7.1   HGB 13.6 13.6   MCV 94 93    293    138   POTASSIUM 4.6 4.1   CHLORIDE 110* 104   CO2 26 21*   BUN 10.9 16.6   CR 0.81 0.71   ANIONGAP 9 13   YASMIN 9.4 9.7   * 92   ALBUMIN 4.2  --    PROTTOTAL 6.8  --    BILITOTAL 0.3  --    ALKPHOS 54  --    ALT 30  --    AST 24  --          Recent Results (from the past 24 hour(s))   Echocardiogram Complete   Result Value    LVEF  60-65%    Narrative    [id number]  [id number]  [id number]  [id number]^OLIVIA^EDDIE     [facility]  Echocardiography Laboratory  [address] [place] [address]     Name: ERIKA VALENCIA  MRN: [id number]  : 1950  Study Date: 12/10/2023 09:28 AM  Age: 73 yrs  Gender: Female  Patient Location: [facility]  Reason For Study: Atrial Flutter  Ordering Physician: EDDIE BAKER  Referring Physician: EDDIE BAKER  Performed By: Ace Maria     BSA: 1.7 m2  Height: 65 in  Weight: 140 lb  HR: 108  BP: 130/87 mmHg  ______________________________________________________________________________  Procedure  Complete Echo Adult. [employer] (ND #[id number]) given  intravenously.  ______________________________________________________________________________  Interpretation Summary     Left ventricular systolic function is normal.  The visual ejection fraction is 60-65%.  There is trace to mild tricuspid regurgitation.  Right ventricle systolic pressure estimate normal  Initial rhythm appears to be rapid atrial flutter which converts to what  appears to be sinus tachycardia with first degree AV block and frequent PACs  during the exam.     There is no prior echo available for comparison.  ______________________________________________________________________________  Left Ventricle  The left ventricle is normal in size. There is normal left ventricular wall  thickness. Left ventricular systolic function is normal. The visual ejection  fraction is 60-65%. No regional wall motion abnormalities noted.     Right Ventricle  The right ventricle is normal in size and function.     Atria  Normal left atrial size. Right atrial size is normal.     Mitral Valve  There is trace mitral regurgitation.     Tricuspid Valve  There is trace to mild tricuspid regurgitation. Right ventricle systolic  pressure estimate normal.     Aortic Valve  The aortic valve is trileaflet. There is trace aortic regurgitation.     Pulmonic Valve  There is trace pulmonic valvular regurgitation. There is no pulmonic valvular  stenosis.     Vessels  The aortic root is normal size. Normal size ascending aorta. The inferior vena  cava is normal.     Pericardium  There is no pericardial effusion.     ______________________________________________________________________________  MMode/2D Measurements & Calculations  IVSd: 0.94 cm  LVIDd: 4.6 cm  LVIDs: 2.7 cm  LVPWd: 0.72 cm  FS: 41.5 %  LV mass(C)d: 126.0 grams  LV mass(C)dI: 74.1 grams/m2     Ao root diam: 3.2 cm  LA dimension: 3.3 cm  asc Aorta Diam: 3.0 cm  LA/Ao: 1.0  LVOT diam: 2.0 cm  LVOT area: 3.0 cm2  Ao root diam index Ht(cm/m): 1.9  Ao root diam index BSA  (cm/m2): 1.9  Asc Ao diam index BSA (cm/m2): 1.8  Asc Ao diam index Ht(cm/m): 1.8  LA Volume (BP): 31.5 ml  LA Volume Index (BP): 18.5 ml/m2  RV Base: 3.2 cm     RWT: 0.31  TAPSE: 1.8 cm     Doppler Measurements & Calculations  MV E max sami: 85.3 cm/sec  MV A max sami: 105.8 cm/sec  MV E/A: 0.81  MV dec slope: 576.6 cm/sec2  MV dec time: 0.15 sec  Ao V2 max: 129.0 cm/sec  Ao max P.0 mmHg  Ao V2 mean: 77.6 cm/sec  Ao mean PG: 3.0 mmHg  Ao V2 VTI: 20.8 cm  AMRIK(I,D): 2.7 cm2  AMRIK(V,D): 2.5 cm2  LV V1 max P.7 mmHg  LV V1 max: 108.0 cm/sec  LV V1 VTI: 19.0 cm  SV(LVOT): 56.8 ml  SI(LVOT): 33.4 ml/m2  PA acc time: 0.10 sec  TR max sami: 221.3 cm/sec  TR max P.6 mmHg  AV Sami Ratio (DI): 0.84  AMRIK Index (cm2/m2): 1.6  E/E' av.1  Lateral E/e': 7.5  Medial E/e': 8.7     RV S Sami: 19.1 cm/sec     ______________________________________________________________________________  Report approved by: MD Georgiana Tapia 12/10/2023 01:40 PM             Medications    dilTIAZem 5 mg/hr (12/10/23 0100)    - MEDICATION INSTRUCTIONS -        apixaban ANTICOAGULANT  5 mg Oral BID    buPROPion  300 mg Oral QAM    letrozole  2.5 mg Oral Daily    metoprolol tartrate  25 mg Oral BID    [START ON 2023] Naltrexone HCl (Pain)  1 capsule Oral Daily    pantoprazole  40 mg Oral QAM AC    rosuvastatin  40 mg Oral At Bedtime    sodium chloride (PF)  3 mL Intracatheter Q8H    sodium chloride (PF)  3 mL Intracatheter Q8H

## 2023-12-10 NOTE — H&P
[facility]    History and Physical - Hospitalist Service       Date of Admission:  12/9/2023    Assessment & Plan      Svetlana Adair is a 73 year old female admitted on 12/9/2023. She was admitted with  palpitations for 2 days and new onset Atrial Flutter    Principal Problem:    New onset atrial flutter (H)    Assessment: persistently tachycardic, received Diltiazem in ED    Plan:   Admit to C  Diltiazem drip, start Lovenox   Start metoprolol  Echocardiogram and Cardiology consult    Active Problems:    Hyperlipidemia LDL goal <70    Assessment: chronic    Plan:   Resume Rosuvastatin      Adjustment disorder with mixed anxiety and depressed mood    Assessment: stable    Plan:   Resume home Wellbutrin      Personal history of malignant neoplasm of breast    Plan:   Resume Letrozole          Diet: Combination Diet Low Saturated Fat Na <2400mg Diet, No Caffeine Diet    DVT Prophylaxis: Enoxaparin (Lovenox) SQ  Maki Catheter: Not present  Lines: None     Cardiac Monitoring: ACTIVE order. Indication: Tachyarrhythmias, acute (48 hours)  Code Status: Full Code      Clinically Significant Risk Factors Present on Admission                # Drug Induced Platelet Defect: home medication list includes an antiplatelet medication                   Disposition Plan      Expected Discharge Date: 12/11/2023                  Abran Degroot MD  Hospitalist Service  [facility]  Securely message with [employer] (more info)  Text page via [employer] Paging/Directory     ______________________________________________________________________    Chief Complaint     Palpitations      History is obtained from the patient    History of Present Illness   Svetlana Adair is a 73 year old female who  presented to the ER with  palpitations.  Had also been having lightheadedness and shortness of breath for  the last few days.  Went to the Urgency room and was found to have Atrial  Flutter.    Has no H/O DM or CAD.        Past Medical History    Past Medical History:   Diagnosis Date    Alcoholism (H)     Allergies     Fall    Arthritis     Basal cell cancer     back, chest, face    Breast cancer (H)     Coronary artery disease     CT calcium ahnfa=788 2015    Depression     Hyperlipidemia LDL goal <130 2015    Hypertension     borderline    PONV (postoperative nausea and vomiting)     Squamous cell carcinoma     left upper arm, chin       Past Surgical History   Past Surgical History:   Procedure Laterality Date    ABDOMEN SURGERY  ?    Hysterectomy    COLONOSCOPY      COLONOSCOPY  2011    Procedure:COLONOSCOPY; Colonoscopy; Surgeon:MEKA RUSS; Location: GI    COLONOSCOPY N/A 2/10/2020    Procedure: COLONOSCOPY, WITH POLYPECTOMY AND BIOPSY;  Surgeon: Opal Ace MD;  Location:  GI    DISSECT LYMPH NODE AXILLA Right 2017    Procedure: DISSECT LYMPH NODE AXILLA;  RIGHT AXILLARY LYMPH NODE DISSECTION;  Surgeon: Cortez White MD;  Location:  [facility]    GYN SURGERY      hysterectomy after hx of ovarian cyst, ended up being benign    HYSTERECTOMY, PAP NO LONGER INDICATED      MASTECTOMY MODIFIED RADICAL      bilateral    MASTECTOMY, BILATERAL      ORTHOPEDIC SURGERY      rt. knee arthroscopy    ORTHOPEDIC SURGERY Right     toe surgery    REALIGN PATELLA  1973    right     TOE SURGERY      right grt OA        Prior to Admission Medications   Prior to Admission Medications   Prescriptions Last Dose Informant Patient Reported? Taking?   ASHWAGANDHA PO Unknown at PRN  Yes Yes   Sig: Take 1 tablet by mouth daily as needed At onset of illness symptoms; as needed   Coenzyme Q10 300 MG CAPS 2023  Yes Yes   Sig: Take 300 mg by mouth daily   IBUPROFEN PO Unknown at prn  Yes Yes   Sig: Take 200 mg by mouth every 4 hours as needed for moderate pain    LYSINE Unknown at prn  Yes Yes   Si-3 daily as needed   Lutein-Zeaxanthin 25-5 MG CAPS 2023   Yes Yes   Sig: Take 1 capsule by mouth daily   Naltrexone HCl, Pain, 4.5 MG CAPS 12/9/2023 at am  Yes Yes   Sig: Take 1 capsule by mouth daily   UNABLE TO FIND Unknown at prn  Yes Yes   Sig: MEDICATION NAME: Somnapure - 2 tablets = 500mg valerian root, 300mg lemon balm extract, 200mg l-theanine, 120mg hops extract, 50mg chamomile flower extract, 50mg passion flower extract, 3mg melatonin.  Takes 1-2 tablets at bedtime   UNABLE TO FIND 12/9/2023  Yes Yes   Sig: MEDICATION NAME: Moringa 1 serving of powder daily   UNABLE TO FIND Unknown at prn  Yes Yes   Sig: MEDICATION NAME: Premium Amla Berry 2 capsules = 4mg Vitamin C, 966mg organic amla, organic amla extract.  Takes 1 capsule daily as needed for immune system   UNABLE TO FIND 12/9/2023  Yes Yes   Sig: MEDICATION NAME: OmegaKrill 5x 3 capsules = 480mg of total omega-3, 64mg EPA, 392mg DHA, 300mcg astaxanthin.  Takes 3 capsules daily   UNABLE TO FIND 12/9/2023  Yes Yes   Sig: MEDICATION NAME: Super Colon Cleanse 2 capsules = 665mg senna leaf powder, 321mg psyllium husk powder, 21mg fennel seed powder, 21mg papaya leaf powder, 21mg yolanda hips fruit powder, 11mg l-acidophilus.  Taking 4 capsules daily   UNABLE TO FIND 12/8/2023 at prn  Yes Yes   Sig: Take 1 tablet by mouth daily MEDICATION NAME: Aidee Jarrettao - chinese supplement takes at onset of illness symptoms   UNABLE TO FIND More than a month  Yes Yes   Sig: Take by mouth daily MEDICATION NAME: Asea Redox - 1oz twice daily   aspirin 81 MG tablet 12/8/2023 at HS  No Yes   Sig: Take 1 tablet (81 mg) by mouth daily   buPROPion (WELLBUTRIN XL) 300 MG 24 hr tablet 12/9/2023 at AM  No Yes   Sig: Take 1 tablet (300 mg) by mouth every morning   calcium carbonate-vitamin D (OS-YASMIN) 500-400 MG-UNIT tablet 12/9/2023  Yes Yes   Sig: Take 1 tablet by mouth daily    cholecalciferol (VITAMIN D3) 25 mcg (1000 units) capsule 12/9/2023  Yes Yes   Sig: Take 1 capsule by mouth daily   letrozole (FEMARA) 2.5 MG tablet 12/9/2023 at am   Yes Yes   Sig: Take 1 tablet by mouth daily   melatonin 5 MG tablet Unknown at prn  Yes Yes   Sig: Take 5 mg by mouth nightly as needed for sleep   nicotine polacrilex (NICORETTE) 2 MG gum Unknown at prn  No Yes   Sig: Place 1 each (2 mg) inside cheek as needed for smoking cessation   omeprazole (PRILOSEC) 20 MG DR capsule Unknown at prn  No Yes   Sig: TAKE 1 CAPSULE BY MOUTH AS NEEDED   rosuvastatin (CRESTOR) 40 MG tablet 12/8/2023 at hs  No Yes   Sig: Take 1 tablet (40 mg) by mouth daily   valACYclovir (VALTREX) 1000 mg tablet 12/9/2023 at AM x 1  No Yes   Sig: TAKE 1 TABLET(1000 MG) BY MOUTH THREE TIMES DAILY FOR 7 DAYS   vitamin C (ASCORBIC ACID) 250 MG tablet 12/9/2023  Yes Yes   Sig: Take 250 mg by mouth daily      Facility-Administered Medications: None        Review of Systems    The 10 point Review of Systems is negative other than noted in the HPI or here.   Physical Exam   Vital Signs: Temp: 98.3  F (36.8  C) Temp src: Oral BP: 110/89 Pulse: (!) 161   Resp: 19 SpO2: 98 %      Weight: 140 lbs 0 oz    General Appearance:  no distress  Respiratory: Lungs clear  Cardiovascular: Tachycardic  GI: Nontender  Skin: No rash  Extremities: No edema        Medical Decision Making             Data     I have personally reviewed the following data over the past 24 hrs:    7.1  \   13.6   / 293     138 104 16.6 /  92   4.1 21 (L) 0.71 \     Trop: 7 BNP: N/A     TSH: 4.94 (H) T4: 1.09 A1C: N/A       Imaging results reviewed over the past 24 hrs:   No results found for this or any previous visit (from the past 24 hour(s)).

## 2023-12-10 NOTE — PLAN OF CARE
Neuro: A&Ox4  Tele/Cardiac: A-flutter  at rest with dilt gtt at 5mL/hr. 120-140s with activity- asymptomatic   Resp: WDL, RA, denies SOB  Activity: SBA  Pain: pt reported jaw pain related to TMJ- PRn tylenol given x1 with relief  Drips/IV: dilt 5mL/hr  GI/: WDL  Skin: WDL  Diet: Diet: NPO per Anesthesia Guidelines for Procedure/Surgery Except for: Meds     Test/Procedures: Echo   Plan: Echo, Cardiology consult. Rate control

## 2023-12-10 NOTE — PROGRESS NOTES
MD Notification    Notified Person: Dr. BAKER    Notification Date/Time:  12/9/2023  8:02 PM    Notification Interaction:   AMCOM    Purpose of Notification:  Update new admit HR still climbing to 160's. Pt asymptomatic, and needing med to help sleep.     Orders Received:    MD called back- order to start dilt gtt and melatonin as needed for asleep MD to put order in.

## 2023-12-10 NOTE — PROVIDER NOTIFICATION
MD Notification    Notified Person: MD    Notified Person Name: Agapito Lewis     Notification Date/Time: 12/9/23 2204    Notification Interaction: Vocera message    Purpose of Notification:  pt requesting ibuprofen for TMJ related jaw pain. Please adviseLucia, [phone]     Orders Received:  Tylenol ordered PRN     Comments:   Given with relief

## 2023-12-10 NOTE — PLAN OF CARE
Goal Outcome Evaluation:       AORX3, able to state her needs.  Up with SBA of 1, HR up with exertion.  Continues on Dilt gtt, plan to discontinue Lovenox IFO Eliquis.   Plan for AVN in am.   Dilt at 5 mg, bp stable.

## 2023-12-10 NOTE — CONSULTS
[facility]    Cardiac Electrophysiology Consultation     Date of Admission:  12/9/2023  Date of Consult (When I saw the patient): 12/10/23    Assessment & Plan   Svetlana Adair is a 73 year old female who was admitted on 12/9/2023. I was asked to see the patient for afl.delightful pt with h/o breast cancer, s/p bilateral mastectomy in remission noted to have new onset of palpitation a few days ago. Noted to be in atrial flutter with 2;1 AV conduction from ecg in ED. Echo appears to show normal LVEF. Otherwise in good state of health. Denies sob cp or near syncope.    Discussed cause of AFL which in her case likely related to her age and potential complications including CVA which is about 2% given CHADSVASC 2 and tachycardia induced CM. Next, discussed treatment option including KAISER guided cardioversion or ablation. Recommending ablation as AFL likely to recur. Discussed risks of the procedure including but not limited to vascular injury and cva.  Stop lovenox and start eliquis 5 mg bid for 30 days post ablation  Continue with dilt gtt  Invest in hr or ecg hand held device such as Kardia as pts with AFl are at higher risk of having AFIb    Michael Vallejo MD    Code Status    Full Code    Primary Care Physician   Vladislav Cruz    History is obtained from the patient    Past Medical History   I have reviewed this patient's medical history and updated it with pertinent information if needed.   Past Medical History:   Diagnosis Date    Alcoholism (H)     Allergies     Fall    Arthritis     Basal cell cancer     back, chest, face    Breast cancer (H)     Coronary artery disease     CT calcium bbjjk=729 Nov 2015    Depression     Hyperlipidemia LDL goal <130 12/2/2015    Hypertension     borderline    PONV (postoperative nausea and vomiting)     Squamous cell carcinoma     left upper arm, chin       Past Surgical History   I have reviewed this patient's surgical history and updated it with  pertinent information if needed.  Past Surgical History:   Procedure Laterality Date    ABDOMEN SURGERY  ?    Hysterectomy    COLONOSCOPY      COLONOSCOPY  2011    Procedure:COLONOSCOPY; Colonoscopy; Surgeon:MEKA RUSS; Location: GI    COLONOSCOPY N/A 2/10/2020    Procedure: COLONOSCOPY, WITH POLYPECTOMY AND BIOPSY;  Surgeon: Opal Ace MD;  Location:  GI    DISSECT LYMPH NODE AXILLA Right 2017    Procedure: DISSECT LYMPH NODE AXILLA;  RIGHT AXILLARY LYMPH NODE DISSECTION;  Surgeon: Cortez White MD;  Location:  [facility]    GYN SURGERY      hysterectomy after hx of ovarian cyst, ended up being benign    HYSTERECTOMY, PAP NO LONGER INDICATED      MASTECTOMY MODIFIED RADICAL      bilateral    MASTECTOMY, BILATERAL      ORTHOPEDIC SURGERY      rt. knee arthroscopy    ORTHOPEDIC SURGERY Right     toe surgery    REALIGN PATELLA  1973    right     TOE SURGERY      right grt OA        Prior to Admission Medications   Prior to Admission Medications   Prescriptions Last Dose Informant Patient Reported? Taking?   ASHWAGANDHA PO Unknown at PRN  Yes Yes   Sig: Take 1 tablet by mouth daily as needed At onset of illness symptoms; as needed   Coenzyme Q10 300 MG CAPS 2023  Yes Yes   Sig: Take 300 mg by mouth daily   IBUPROFEN PO Unknown at prn  Yes Yes   Sig: Take 200 mg by mouth every 4 hours as needed for moderate pain    LYSINE Unknown at prn  Yes Yes   Si-3 daily as needed   Lutein-Zeaxanthin 25-5 MG CAPS 2023  Yes Yes   Sig: Take 1 capsule by mouth daily   Naltrexone HCl, Pain, 4.5 MG CAPS 2023 at am  Yes Yes   Sig: Take 1 capsule by mouth daily   UNABLE TO FIND Unknown at prn  Yes Yes   Sig: MEDICATION NAME: Somnapure - 2 tablets = 500mg valerian root, 300mg lemon balm extract, 200mg l-theanine, 120mg hops extract, 50mg chamomile flower extract, 50mg passion flower extract, 3mg melatonin.  Takes 1-2 tablets at bedtime   UNABLE TO FIND 2023  Yes Yes   Sig:  MEDICATION NAME: Moringa 1 serving of powder daily   UNABLE TO FIND Unknown at prn  Yes Yes   Sig: MEDICATION NAME: Premium Amla Berry 2 capsules = 4mg Vitamin C, 966mg organic amla, organic amla extract.  Takes 1 capsule daily as needed for immune system   UNABLE TO FIND 12/9/2023  Yes Yes   Sig: MEDICATION NAME: OmegaKrill 5x 3 capsules = 480mg of total omega-3, 64mg EPA, 392mg DHA, 300mcg astaxanthin.  Takes 3 capsules daily   UNABLE TO FIND 12/9/2023  Yes Yes   Sig: MEDICATION NAME: Super Colon Cleanse 2 capsules = 665mg senna leaf powder, 321mg psyllium husk powder, 21mg fennel seed powder, 21mg papaya leaf powder, 21mg yolanda hips fruit powder, 11mg l-acidophilus.  Taking 4 capsules daily   UNABLE TO FIND 12/8/2023 at prn  Yes Yes   Sig: Take 1 tablet by mouth daily MEDICATION NAME: Yin Chiao - chinese supplement takes at onset of illness symptoms   UNABLE TO FIND More than a month  Yes Yes   Sig: Take by mouth daily MEDICATION NAME: Asea Redox - 1oz twice daily   aspirin 81 MG tablet 12/8/2023 at HS  No Yes   Sig: Take 1 tablet (81 mg) by mouth daily   buPROPion (WELLBUTRIN XL) 300 MG 24 hr tablet 12/9/2023 at AM  No Yes   Sig: Take 1 tablet (300 mg) by mouth every morning   calcium carbonate-vitamin D (OS-YASMIN) 500-400 MG-UNIT tablet 12/9/2023  Yes Yes   Sig: Take 1 tablet by mouth daily    cholecalciferol (VITAMIN D3) 25 mcg (1000 units) capsule 12/9/2023  Yes Yes   Sig: Take 1 capsule by mouth daily   letrozole (FEMARA) 2.5 MG tablet 12/9/2023 at am  Yes Yes   Sig: Take 1 tablet by mouth daily   melatonin 5 MG tablet Unknown at prn  Yes Yes   Sig: Take 5 mg by mouth nightly as needed for sleep   nicotine polacrilex (NICORETTE) 2 MG gum Unknown at prn  No Yes   Sig: Place 1 each (2 mg) inside cheek as needed for smoking cessation   omeprazole (PRILOSEC) 20 MG DR capsule Unknown at prn  No Yes   Sig: TAKE 1 CAPSULE BY MOUTH AS NEEDED   rosuvastatin (CRESTOR) 40 MG tablet 12/8/2023 at hs  No Yes   Sig: Take 1  tablet (40 mg) by mouth daily   valACYclovir (VALTREX) 1000 mg tablet 12/9/2023 at AM x 1  No Yes   Sig: TAKE 1 TABLET(1000 MG) BY MOUTH THREE TIMES DAILY FOR 7 DAYS   vitamin C (ASCORBIC ACID) 250 MG tablet 12/9/2023  Yes Yes   Sig: Take 250 mg by mouth daily      Facility-Administered Medications: None     Allergies   Allergies   Allergen Reactions    Cats     Codeine Sulfate GI Disturbance       Social History   I have reviewed this patient's social history and updated it with pertinent information if needed. Svetlana Adair  reports that she quit smoking about 13 years ago. Her smoking use included cigarettes. She started smoking about 54 years ago. She has a 22.00 pack-year smoking history. She has never used smokeless tobacco. She reports that she does not currently use alcohol. She reports current drug use. Drug: Marijuana.    Family History   I have reviewed this patient's family history and updated it with pertinent information if needed.   Family History   Problem Relation Age of Onset    Cancer Mother 60        leukemia    Arthritis Mother         osteo    Eye Disorder Mother         glaucoma    Gastrointestinal Disease Mother         gallbladder    Other Cancer Mother     Gallbladder Disease Mother     Alcohol/Drug Father         alcohol    Heart Disease Father         ? CHF    Respiratory Father         emphysema    Coronary Artery Disease Father     Substance Abuse Father     Substance Abuse Sister     Anxiety Disorder Sister     Asthma Sister     Colon Cancer Brother     Breast Cancer Maternal Grandmother     Asthma Sister     Substance Abuse Sister     Cancer - colorectal Brother 50    Prostate Cancer Brother     Allergies Brother         bee     Arthritis Sister         osteo    Arthritis Sister         osteo    Arthritis Brother         osteo    Depression Sister     Psychotic Disorder Sister         bipolar    Respiratory Sister     Colon Cancer Brother     Prostate Cancer Brother     Depression  Sister     Asthma Sister        Review of Systems   Comprehensive review of systems was performed with pertinent positives and negatives listed in assessment and plan section.    Physical Exam   Temp: 98.5  F (36.9  C) Temp src: Oral BP: 130/87 Pulse: 82   Resp: 18 SpO2: 98 % O2 Device: None (Room air)    Vital Signs with Ranges  Temp:  [98  F (36.7  C)-98.7  F (37.1  C)] 98.5  F (36.9  C)  Pulse:  [] 82  Resp:  [9-21] 18  BP: ()/() 130/87  SpO2:  [97 %-99 %] 98 %  139 lbs 8 oz    Constitutional: awake, alert, cooperative, no apparent distress, and appears stated age  Eyes: Lids and lashes normal, pupils equal, round and reactive to light, extra ocular muscles intact, sclera clear, conjunctiva normal  ENT: Normocephalic, without obvious abnormality, atraumatic, sinuses nontender on palpation, external ears without lesions, oral pharynx with moist mucous membranes, tonsils without erythema or exudates, gums normal and good dentition.  Hematologic / Lymphatic: no cervical lymphadenopathy  Respiratory: No increased work of breathing, good air exchange, clear to auscultation bilaterally, no crackles or wheezing  Cardiovascular: tachycardic with regular rhythm  GI: No scars, normal bowel sounds, soft, non-distended, non-tender, no masses palpated, no hepatosplenomegally  Skin: no bruising or bleeding  Musculoskeletal: There is no redness, warmth, or swelling of the joints.  Full range of motion noted.   Neurologic: Awake, alert,  Neuropsychiatric: General: normal, calm, and normal eye contact    Data   I personally reviewed all recent ECGs and images.  Results for orders placed or performed during the hospital encounter of 12/09/23 (from the past 24 hour(s))   EKG 12-lead, tracing only   Result Value Ref Range    Systolic Blood Pressure  mmHg    Diastolic Blood Pressure  mmHg    Ventricular Rate 165 BPM    Atrial Rate 330 BPM    LA Interval  ms    QRS Duration 108 ms     ms    QTc 457 ms    P Axis  97 degrees    R AXIS 13 degrees    T Axis 268 degrees    Interpretation ECG       ** Critical Test Result: High HR  Atrial flutter with 2:1 A-V conduction  ST & T wave abnormality, consider anterolateral ischemia  Abnormal ECG  When compared with ECG of 21-JUL-2021 11:31,  Atrial flutter has replaced Sinus rhythm  Vent. rate has increased BY  95 BPM  Questionable change in QRS duration  Confirmed by GENERATED REPORT, COMPUTER ([id number]),  Grisel Gant ([id number]) on 12/9/2023 11:25:18 PM     [place] Draw    Narrative    The following orders were created for panel order [place] Draw.  Procedure                               Abnormality         Status                     ---------                               -----------         ------                     Extra Blue Top Tube[[id number]]                              Final result               Extra Green Top (Lithium...[[id number]]                                                 Extra Purple Top Tube[[id number]]                                                         Please view results for these tests on the individual orders.   Extra Blue Top Tube   Result Value Ref Range    Hold Specimen JI    CBC with platelets differential    Narrative    The following orders were created for panel order CBC with platelets differential.  Procedure                               Abnormality         Status                     ---------                               -----------         ------                     CBC with platelets and d...[[id number]]                      Final result                 Please view results for these tests on the individual orders.   Basic metabolic panel   Result Value Ref Range    Sodium 138 135 - 145 mmol/L    Potassium 4.1 3.4 - 5.3 mmol/L    Chloride 104 98 - 107 mmol/L    Carbon Dioxide (CO2) 21 (L) 22 - 29 mmol/L    Anion Gap 13 7 - 15 mmol/L    Urea Nitrogen 16.6 8.0 - 23.0 mg/dL    Creatinine 0.71 0.51 - 0.95 mg/dL    GFR Estimate 90 >60  mL/min/1.73m2    Calcium 9.7 8.8 - 10.2 mg/dL    Glucose 92 70 - 99 mg/dL   Troponin T, High Sensitivity   Result Value Ref Range    Troponin T, High Sensitivity 7 <=14 ng/L   TSH with free T4 reflex   Result Value Ref Range    TSH 4.94 (H) 0.30 - 4.20 uIU/mL   Magnesium   Result Value Ref Range    Magnesium 2.2 1.7 - 2.3 mg/dL   CBC with platelets and differential   Result Value Ref Range    WBC Count 7.1 4.0 - 11.0 10e3/uL    RBC Count 4.34 3.80 - 5.20 10e6/uL    Hemoglobin 13.6 11.7 - 15.7 g/dL    Hematocrit 40.4 35.0 - 47.0 %    MCV 93 78 - 100 fL    MCH 31.3 26.5 - 33.0 pg    MCHC 33.7 31.5 - 36.5 g/dL    RDW 12.3 10.0 - 15.0 %    Platelet Count 293 150 - 450 10e3/uL    % Neutrophils 60 %    % Lymphocytes 26 %    % Monocytes 9 %    % Eosinophils 4 %    % Basophils 1 %    % Immature Granulocytes 0 %    NRBCs per 100 WBC 0 <1 /100    Absolute Neutrophils 4.3 1.6 - 8.3 10e3/uL    Absolute Lymphocytes 1.8 0.8 - 5.3 10e3/uL    Absolute Monocytes 0.6 0.0 - 1.3 10e3/uL    Absolute Eosinophils 0.3 0.0 - 0.7 10e3/uL    Absolute Basophils 0.1 0.0 - 0.2 10e3/uL    Absolute Immature Granulocytes 0.0 <=0.4 10e3/uL    Absolute NRBCs 0.0 10e3/uL   T4 free   Result Value Ref Range    Free T4 1.09 0.90 - 1.70 ng/dL   Comprehensive metabolic panel   Result Value Ref Range    Sodium 145 135 - 145 mmol/L    Potassium 4.6 3.4 - 5.3 mmol/L    Carbon Dioxide (CO2) 26 22 - 29 mmol/L    Anion Gap 9 7 - 15 mmol/L    Urea Nitrogen 10.9 8.0 - 23.0 mg/dL    Creatinine 0.81 0.51 - 0.95 mg/dL    GFR Estimate 77 >60 mL/min/1.73m2    Calcium 9.4 8.8 - 10.2 mg/dL    Chloride 110 (H) 98 - 107 mmol/L    Glucose 108 (H) 70 - 99 mg/dL    Alkaline Phosphatase 54 40 - 150 U/L    AST 24 0 - 45 U/L    ALT 30 0 - 50 U/L    Protein Total 6.8 6.4 - 8.3 g/dL    Albumin 4.2 3.5 - 5.2 g/dL    Bilirubin Total 0.3 <=1.2 mg/dL   CBC with platelets   Result Value Ref Range    WBC Count 6.7 4.0 - 11.0 10e3/uL    RBC Count 4.39 3.80 - 5.20 10e6/uL    Hemoglobin  13.6 11.7 - 15.7 g/dL    Hematocrit 41.2 35.0 - 47.0 %    MCV 94 78 - 100 fL    MCH 31.0 26.5 - 33.0 pg    MCHC 33.0 31.5 - 36.5 g/dL    RDW 12.5 10.0 - 15.0 %    Platelet Count 264 150 - 450 10e3/uL

## 2023-12-10 NOTE — PROGRESS NOTES
RECEIVING UNIT ED HANDOFF REVIEW    ED Nurse Handoff Report was reviewed by: Marysol Mclean RN on December 9, 2023 at 6:00 PM

## 2023-12-11 ENCOUNTER — APPOINTMENT (OUTPATIENT)
Dept: CARDIOLOGY | Facility: CLINIC | Age: 73
DRG: 274 | End: 2023-12-11
Attending: INTERNAL MEDICINE
Payer: MEDICARE

## 2023-12-11 LAB — LVEF ECHO: NORMAL

## 2023-12-11 PROCEDURE — 99232 SBSQ HOSP IP/OBS MODERATE 35: CPT | Mod: 25 | Performed by: NURSE PRACTITIONER

## 2023-12-11 PROCEDURE — 210N000002 HC R&B HEART CARE

## 2023-12-11 PROCEDURE — C1887 CATHETER, GUIDING: HCPCS | Performed by: INTERNAL MEDICINE

## 2023-12-11 PROCEDURE — 99232 SBSQ HOSP IP/OBS MODERATE 35: CPT | Performed by: INTERNAL MEDICINE

## 2023-12-11 PROCEDURE — 250N000011 HC RX IP 250 OP 636: Performed by: INTERNAL MEDICINE

## 2023-12-11 PROCEDURE — 258N000003 HC RX IP 258 OP 636: Performed by: INTERNAL MEDICINE

## 2023-12-11 PROCEDURE — 93653 COMPRE EP EVAL TX SVT: CPT | Performed by: INTERNAL MEDICINE

## 2023-12-11 PROCEDURE — 93325 DOPPLER ECHO COLOR FLOW MAPG: CPT

## 2023-12-11 PROCEDURE — 4A023FZ MEASUREMENT OF CARDIAC RHYTHM, PERCUTANEOUS APPROACH: ICD-10-PCS | Performed by: INTERNAL MEDICINE

## 2023-12-11 PROCEDURE — 93320 DOPPLER ECHO COMPLETE: CPT | Mod: 26 | Performed by: INTERNAL MEDICINE

## 2023-12-11 PROCEDURE — 250N000013 HC RX MED GY IP 250 OP 250 PS 637: Performed by: INTERNAL MEDICINE

## 2023-12-11 PROCEDURE — 4A0234Z MEASUREMENT OF CARDIAC ELECTRICAL ACTIVITY, PERCUTANEOUS APPROACH: ICD-10-PCS | Performed by: INTERNAL MEDICINE

## 2023-12-11 PROCEDURE — 02K83ZZ MAP CONDUCTION MECHANISM, PERCUTANEOUS APPROACH: ICD-10-PCS | Performed by: INTERNAL MEDICINE

## 2023-12-11 PROCEDURE — C1732 CATH, EP, DIAG/ABL, 3D/VECT: HCPCS | Performed by: INTERNAL MEDICINE

## 2023-12-11 PROCEDURE — 99153 MOD SED SAME PHYS/QHP EA: CPT | Performed by: INTERNAL MEDICINE

## 2023-12-11 PROCEDURE — 272N000001 HC OR GENERAL SUPPLY STERILE: Performed by: INTERNAL MEDICINE

## 2023-12-11 PROCEDURE — 999N000183 HC STATISTIC TEE INCLUDES SEDATION

## 2023-12-11 PROCEDURE — B241ZZ4 ULTRASONOGRAPHY OF MULTIPLE CORONARY ARTERIES, TRANSESOPHAGEAL: ICD-10-PCS | Performed by: INTERNAL MEDICINE

## 2023-12-11 PROCEDURE — 02583ZZ DESTRUCTION OF CONDUCTION MECHANISM, PERCUTANEOUS APPROACH: ICD-10-PCS | Performed by: INTERNAL MEDICINE

## 2023-12-11 PROCEDURE — 93325 DOPPLER ECHO COLOR FLOW MAPG: CPT | Mod: 26 | Performed by: INTERNAL MEDICINE

## 2023-12-11 PROCEDURE — 4A023CZ MEASUREMENT OF CARDIAC RATE, PERCUTANEOUS APPROACH: ICD-10-PCS | Performed by: INTERNAL MEDICINE

## 2023-12-11 PROCEDURE — 999N000184 HC STATISTIC TELEMETRY

## 2023-12-11 PROCEDURE — 93312 ECHO TRANSESOPHAGEAL: CPT | Mod: 26 | Performed by: INTERNAL MEDICINE

## 2023-12-11 PROCEDURE — 250N000009 HC RX 250: Performed by: INTERNAL MEDICINE

## 2023-12-11 PROCEDURE — 99152 MOD SED SAME PHYS/QHP 5/>YRS: CPT | Performed by: INTERNAL MEDICINE

## 2023-12-11 RX ORDER — NALOXONE HYDROCHLORIDE 0.4 MG/ML
0.4 INJECTION, SOLUTION INTRAMUSCULAR; INTRAVENOUS; SUBCUTANEOUS
Status: DISCONTINUED | OUTPATIENT
Start: 2023-12-11 | End: 2023-12-11

## 2023-12-11 RX ORDER — DEXTROSE MONOHYDRATE 25 G/50ML
9.5 INJECTION, SOLUTION INTRAVENOUS
Status: DISCONTINUED | OUTPATIENT
Start: 2023-12-11 | End: 2023-12-11 | Stop reason: HOSPADM

## 2023-12-11 RX ORDER — SODIUM CHLORIDE 9 MG/ML
INJECTION, SOLUTION INTRAVENOUS CONTINUOUS PRN
Status: DISCONTINUED | OUTPATIENT
Start: 2023-12-11 | End: 2023-12-11 | Stop reason: HOSPADM

## 2023-12-11 RX ORDER — FLUMAZENIL 0.1 MG/ML
0.2 INJECTION, SOLUTION INTRAVENOUS
Status: DISCONTINUED | OUTPATIENT
Start: 2023-12-11 | End: 2023-12-11 | Stop reason: HOSPADM

## 2023-12-11 RX ORDER — ASPIRIN 81 MG/1
81 TABLET ORAL DAILY
Start: 2024-01-15

## 2023-12-11 RX ORDER — LIDOCAINE 40 MG/G
CREAM TOPICAL
Status: DISCONTINUED | OUTPATIENT
Start: 2023-12-11 | End: 2023-12-11

## 2023-12-11 RX ORDER — NALOXONE HYDROCHLORIDE 0.4 MG/ML
0.2 INJECTION, SOLUTION INTRAMUSCULAR; INTRAVENOUS; SUBCUTANEOUS
Status: DISCONTINUED | OUTPATIENT
Start: 2023-12-11 | End: 2023-12-11

## 2023-12-11 RX ORDER — NALOXONE HYDROCHLORIDE 0.4 MG/ML
0.4 INJECTION, SOLUTION INTRAMUSCULAR; INTRAVENOUS; SUBCUTANEOUS
Status: DISCONTINUED | OUTPATIENT
Start: 2023-12-11 | End: 2023-12-11 | Stop reason: HOSPADM

## 2023-12-11 RX ORDER — OXYCODONE AND ACETAMINOPHEN 5; 325 MG/1; MG/1
1 TABLET ORAL EVERY 4 HOURS PRN
Status: DISCONTINUED | OUTPATIENT
Start: 2023-12-11 | End: 2023-12-12 | Stop reason: HOSPADM

## 2023-12-11 RX ORDER — FENTANYL CITRATE 50 UG/ML
INJECTION, SOLUTION INTRAMUSCULAR; INTRAVENOUS
Status: DISCONTINUED | OUTPATIENT
Start: 2023-12-11 | End: 2023-12-11 | Stop reason: HOSPADM

## 2023-12-11 RX ORDER — GLYCOPYRROLATE 0.2 MG/ML
0.1 INJECTION, SOLUTION INTRAMUSCULAR; INTRAVENOUS ONCE
Status: COMPLETED | OUTPATIENT
Start: 2023-12-11 | End: 2023-12-11

## 2023-12-11 RX ORDER — SODIUM CHLORIDE, SODIUM LACTATE, POTASSIUM CHLORIDE, CALCIUM CHLORIDE 600; 310; 30; 20 MG/100ML; MG/100ML; MG/100ML; MG/100ML
INJECTION, SOLUTION INTRAVENOUS CONTINUOUS
Status: DISCONTINUED | OUTPATIENT
Start: 2023-12-11 | End: 2023-12-11 | Stop reason: HOSPADM

## 2023-12-11 RX ORDER — NALOXONE HYDROCHLORIDE 0.4 MG/ML
0.2 INJECTION, SOLUTION INTRAMUSCULAR; INTRAVENOUS; SUBCUTANEOUS
Status: DISCONTINUED | OUTPATIENT
Start: 2023-12-11 | End: 2023-12-11 | Stop reason: HOSPADM

## 2023-12-11 RX ORDER — METOPROLOL TARTRATE 25 MG/1
25 TABLET, FILM COATED ORAL 2 TIMES DAILY
Qty: 60 TABLET | Refills: 0 | Status: SHIPPED | OUTPATIENT
Start: 2023-12-11 | End: 2023-12-15

## 2023-12-11 RX ORDER — FENTANYL CITRATE 50 UG/ML
25 INJECTION, SOLUTION INTRAMUSCULAR; INTRAVENOUS
Status: DISCONTINUED | OUTPATIENT
Start: 2023-12-11 | End: 2023-12-11 | Stop reason: HOSPADM

## 2023-12-11 RX ORDER — LIDOCAINE HYDROCHLORIDE 40 MG/ML
1.5 SOLUTION TOPICAL ONCE
Status: COMPLETED | OUTPATIENT
Start: 2023-12-11 | End: 2023-12-11

## 2023-12-11 RX ORDER — LIDOCAINE 50 MG/G
OINTMENT TOPICAL ONCE
Status: COMPLETED | OUTPATIENT
Start: 2023-12-11 | End: 2023-12-11

## 2023-12-11 RX ADMIN — GLYCOPYRROLATE 0.1 MG: 0.2 INJECTION, SOLUTION INTRAMUSCULAR; INTRAVENOUS at 12:52

## 2023-12-11 RX ADMIN — MIDAZOLAM 1 MG: 1 INJECTION INTRAMUSCULAR; INTRAVENOUS at 13:19

## 2023-12-11 RX ADMIN — Medication 3 MG: at 22:08

## 2023-12-11 RX ADMIN — SODIUM CHLORIDE: 9 INJECTION, SOLUTION INTRAVENOUS at 12:58

## 2023-12-11 RX ADMIN — LIDOCAINE HYDROCHLORIDE 1.5 ML: 40 SOLUTION TOPICAL at 12:51

## 2023-12-11 RX ADMIN — MIDAZOLAM 1 MG: 1 INJECTION INTRAMUSCULAR; INTRAVENOUS at 13:21

## 2023-12-11 RX ADMIN — APIXABAN 5 MG: 5 TABLET, FILM COATED ORAL at 10:01

## 2023-12-11 RX ADMIN — MIDAZOLAM 1 MG: 1 INJECTION INTRAMUSCULAR; INTRAVENOUS at 13:22

## 2023-12-11 RX ADMIN — LETROZOLE 2.5 MG: 2.5 TABLET ORAL at 10:01

## 2023-12-11 RX ADMIN — FENTANYL CITRATE 25 MCG: 50 INJECTION, SOLUTION INTRAMUSCULAR; INTRAVENOUS at 13:19

## 2023-12-11 RX ADMIN — FENTANYL CITRATE 25 MCG: 50 INJECTION, SOLUTION INTRAMUSCULAR; INTRAVENOUS at 13:16

## 2023-12-11 RX ADMIN — ACETAMINOPHEN 650 MG: 325 TABLET, FILM COATED ORAL at 10:01

## 2023-12-11 RX ADMIN — MIDAZOLAM 1 MG: 1 INJECTION INTRAMUSCULAR; INTRAVENOUS at 13:16

## 2023-12-11 RX ADMIN — ACETAMINOPHEN 650 MG: 325 TABLET, FILM COATED ORAL at 22:07

## 2023-12-11 RX ADMIN — PANTOPRAZOLE SODIUM 40 MG: 40 TABLET, DELAYED RELEASE ORAL at 10:01

## 2023-12-11 RX ADMIN — ROSUVASTATIN CALCIUM 40 MG: 20 TABLET, FILM COATED ORAL at 22:07

## 2023-12-11 RX ADMIN — BUPROPION HYDROCHLORIDE 300 MG: 300 TABLET, EXTENDED RELEASE ORAL at 10:01

## 2023-12-11 RX ADMIN — APIXABAN 5 MG: 5 TABLET, FILM COATED ORAL at 22:07

## 2023-12-11 RX ADMIN — FENTANYL CITRATE 25 MCG: 50 INJECTION, SOLUTION INTRAMUSCULAR; INTRAVENOUS at 13:21

## 2023-12-11 RX ADMIN — SODIUM CHLORIDE, POTASSIUM CHLORIDE, SODIUM LACTATE AND CALCIUM CHLORIDE: 600; 310; 30; 20 INJECTION, SOLUTION INTRAVENOUS at 14:35

## 2023-12-11 RX ADMIN — METOPROLOL TARTRATE 25 MG: 25 TABLET, FILM COATED ORAL at 10:01

## 2023-12-11 ASSESSMENT — ACTIVITIES OF DAILY LIVING (ADL)
ADLS_ACUITY_SCORE: 20

## 2023-12-11 NOTE — PRE-PROCEDURE
GENERAL PRE-PROCEDURE:   Procedure:  [place]  Date/Time:  12/11/2023 1:12 PM    Verbal consent obtained?: Yes    Written consent obtained?: Yes    Risks and benefits: Risks, benefits and alternatives were discussed    Consent given by:  Patient  Patient states understanding of procedure being performed: Yes    Patient's understanding of procedure matches consent: Yes    Procedure consent matches procedure scheduled: Yes    Expected level of sedation:  Moderate  Appropriately NPO:  Yes  ASA Class:  2  Mallampati  :  Grade 2- soft palate, base of uvula, tonsillar pillars, and portion of posterior pharyngeal wall visible  Lungs:  Lungs clear with good breath sounds bilaterally  Heart:  Normal heart sounds and rate and a-flutter  History & Physical reviewed:  History and physical reviewed and no updates needed  Statement of review:  I have reviewed the lab findings, diagnostic data, medications, and the plan for sedation

## 2023-12-11 NOTE — PROGRESS NOTES
Uneventful ablation of typical right sided atrial flutter.  Remove suture from right groin after 4 hours of bedrest  Stop dilt and metoprolol  Cont with Eliquis for 30 days

## 2023-12-11 NOTE — PLAN OF CARE
A&OX4,RA,VSS, Tele-Aflutter CVR. Up with SBA. Diltiazem gtt infusing overnight.Tylenol given headache, melatonin given, pt slept intermittent.  Plan: NPO since midnight for ablation.

## 2023-12-11 NOTE — ACP (ADVANCE CARE PLANNING)
"SPIRITUAL HEALTH SERVICES - Consult Note  [facility]  Referral Source/Reason for Visit: Health Care Directive Consult    Summary and Recommendations -  Mahnaz is grateful to receive information regarding a health care directive and intends to complete it after discharge.    Plan: Spiritual Health remains available for support.    Mireya Mckee M.Div.  Staff     SHS available 24/7 for emergent requests/referrals, either by paging the on-call  or by entering an ASAP/STAT consult in [employer], which will also page the on-call .    Assessment    Saw pt Svetlana \"Mahnaz\" S Farstad per consult for health care directive.    Responded to a consult order for Health Care Directive. Offered education regarding purpose and process for completing the document. Materials left with patient for consideration and completion.    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   She has the support of siblings, nieces, and nephews.    Meaning, Beliefs, and Spirituality - not discussed      "

## 2023-12-11 NOTE — PROGRESS NOTES
[facility]  Electrophysiology Progress Note  Date of Admission: 12/9/2023  Date of Service: 12/11/2023  Primary Cardiologist: New to cardiology    Assessment & Plan   Svetlana Adair is a 73 year old female with past medical history significant for history of hyperlipidemia, anxiety, depression, breast cancer status post bilateral mastectomy in remission admitted on 12/9/2023 with with palpitations and found to be in typical atrial flutter.    Assessment:   Typical atrial flutter with RVR, new diagnosis  ECG noted 2-1 AV conduction with heart rate in the 160s in the ED  [id number] Score 2 (age and gender)  Initially started on Lovenox but was transition to Eliquis 5 mg twice daily that will be continued 30 days post ablation  Echocardiogram (12/9/2023): LVEF of 60 to 65% with trace TR.  No wall motion abnormalities.    Plan:  Plan for atrial flutter ablation today.  Discussed risks and benefits with the patient.  Recommend [employer] or smart watch for outpatient monitoring in the future.    Janice Kang, APRN CNP  Pager:  [phone]       Physical Exam   Temp: 98.8  F (37.1  C) Temp src: Oral BP: 120/76 Pulse: 78   Resp: 18 SpO2: 96 % O2 Device: None (Room air)    Vitals:    12/09/23 1815 12/10/23 0558 12/11/23 0637   Weight: 63.5 kg (140 lb) 63.3 kg (139 lb 8 oz) 63.2 kg (139 lb 5.3 oz)       Constitutional:   NAD    Skin:   Warm and dry   Head:   Nontraumatic   Neck:   no JVD   Lungs:   symmetric, clear to auscultation   Cardiovascular:   irregularly irregular rhythm   Abdomen:   Benign    Extremities and Back:   No edema   Neurological:   Grossly nonfocal     Medications    dilTIAZem 5 mg/hr (12/10/23 2140)    lactated ringers      - MEDICATION INSTRUCTIONS -        apixaban ANTICOAGULANT  5 mg Oral BID    buPROPion  300 mg Oral QAM    letrozole  2.5 mg Oral Daily    metoprolol tartrate  25 mg Oral BID    Naltrexone HCl (Pain)  1 capsule Oral Daily    pantoprazole  40  mg Oral QAM AC    rosuvastatin  40 mg Oral At Bedtime    sodium chloride (PF)  3 mL Intracatheter Q8H       Code Status    Full Code    Allergies   Allergies   Allergen Reactions    Cats     Codeine Sulfate GI Disturbance

## 2023-12-11 NOTE — PROGRESS NOTES
[facility] procedure note    1250 Pt arrived in [facility] via cart for [facility] to assess for clots prior to Aflutter ablation. A/O. Pt denies difficulty swallowing or sleep apnea. No dentures or loose teeth. NPO x 8+ hrs. All questions & concerns addressed.     [facility]: Pt tolerated well. VSS. Total sedation given - 4 mg Versed & 75 mcg Fentanyl. ee [facility] Flowsheet. Plan to proceed with ablation later this afternoon.    1425 Diltiazem gtt stopped at 1405 d/t SBP in low 90s. HR stable at 76-78. Detailed report called to Melany MARTELL, bedside RN. Plan to proceed with ablation later this afternoon around 1530.

## 2023-12-11 NOTE — PROGRESS NOTES
[facility]    Medicine Progress Note - Hospitalist Service       Date of Admission:  12/9/2023    Assessment & Plan   Svetlana Adair is a 73 year old female with hx of HLD, breast cancer, and depression/anxiety who was admitted on 12/9/2023 for new onset a-flutter with RVR    New onset atrial flutter with RVR  *Presented with palpitations and associated dizziness/lightheadedness. Noted to be in a-flutter with 2:1 AV conduction on arrival..   *Initiated on IV diltiazem and enoxaparin in the ED  *Cardiology consulted on admission  *TTE (12/10) largely unremarkable; EF 60-65% with no WMA, no significant valvular disease  - Plan for ablation today  - Conts on dilt gtt  - She has been initiated on metoprolol 25 mg BID per Cardiology  - Plan to start apixaban 5 mg BID x30 days post-ablation  - Cardiology following, appreciate assistance    Chronic and stable medical conditions: No changes made to home meds  Hyperlipidemia  Hx of breast cancer  Hx of basal cell carcinoma  Hx of squamous cell carcinoma  Alcohol use disorder, in remission  Major recurrent depressive disorder with anxiety     Diet: NPO for Medical/Clinical Reasons Except for: Other; Specify: May take medications with a drink of water prior to Electrophysiology study  Diet  NPO per Anesthesia Guidelines for Procedure/Surgery Except for: Meds    DVT Prophylaxis: None, plan to start DOAC post-ablation  Maki Catheter: Not present  Code Status: Full Code         Disposition: Expected discharge home tomorrow if she remains in sinus rhythm    The patient's care was discussed with the Patient.    Estelita Walton MD  Hospitalist Service  [facility]  Contact information available via [facility] Paging/Directory    ______________________________________________________________________    Interval History   No acute events. Remains in a-flutter, rate-controlled on dilt gtt. Denies chest pain, shortness of breath,  palpitations, or dizziness/lightheadedness. Ablation today    Data reviewed today: I reviewed all medications, new labs and imaging results over the last 24 hours. I personally reviewed no images or EKG's today.    Physical Exam   Vital Signs: Temp: 98.8  F (37.1  C) Temp src: Oral BP: 118/69 Pulse: 79   Resp: 16 SpO2: 95 % O2 Device: None (Room air)    Weight: 139 lbs 5.29 oz  Constitutional: Resting comfortably, NAD  HEENT: Sclera white, MMM  Respiratory: Breathing non-labored. Lungs CTAB - no wheezes, crackles, or rhonchi  Cardiovascular: Heart RRR, no m/r/g. No pedal edema  GI: +BS, abd soft/NT  Skin/Integument: No rash  Musculoskeletal: Normal muscle bulk and tone  Neuro: Alert and appropriate, MOORE  Psych: Calm and cooperative    Data   Recent Labs   Lab 12/10/23  0705 12/09/23  1459   WBC 6.7 7.1   HGB 13.6 13.6   MCV 94 93    293    138   POTASSIUM 4.6 4.1   CHLORIDE 110* 104   CO2 26 21*   BUN 10.9 16.6   CR 0.81 0.71   ANIONGAP 9 13   YASMIN 9.4 9.7   * 92   ALBUMIN 4.2  --    PROTTOTAL 6.8  --    BILITOTAL 0.3  --    ALKPHOS 54  --    ALT 30  --    AST 24  --          No results found for this or any previous visit (from the past 24 hour(s)).      Medications    dilTIAZem 5 mg/hr (12/11/23 1258)    - MEDICATION INSTRUCTIONS -      lactated ringers      - MEDICATION INSTRUCTIONS -      - MEDICATION INSTRUCTIONS -      sodium chloride 30 mL/hr at 12/11/23 1258      apixaban ANTICOAGULANT  5 mg Oral BID    benzocaine 20%  1-4 spray Mouth/Throat Once    buPROPion  300 mg Oral QAM    letrozole  2.5 mg Oral Daily    metoprolol tartrate  25 mg Oral BID    Naltrexone HCl (Pain)  1 capsule Oral Daily    pantoprazole  40 mg Oral QAM AC    rosuvastatin  40 mg Oral At Bedtime    sodium chloride (PF)  3 mL Intracatheter Q8H

## 2023-12-12 ENCOUNTER — TELEPHONE (OUTPATIENT)
Dept: CARDIOLOGY | Facility: CLINIC | Age: 73
End: 2023-12-12
Payer: MEDICARE

## 2023-12-12 VITALS
SYSTOLIC BLOOD PRESSURE: 106 MMHG | BODY MASS INDEX: 23.14 KG/M2 | OXYGEN SATURATION: 98 % | HEIGHT: 65 IN | TEMPERATURE: 97.4 F | HEART RATE: 70 BPM | DIASTOLIC BLOOD PRESSURE: 78 MMHG | RESPIRATION RATE: 20 BRPM | WEIGHT: 138.9 LBS

## 2023-12-12 LAB
CREAT SERPL-MCNC: 0.99 MG/DL (ref 0.51–0.95)
EGFRCR SERPLBLD CKD-EPI 2021: 61 ML/MIN/1.73M2
PLATELET # BLD AUTO: 265 10E3/UL (ref 150–450)

## 2023-12-12 PROCEDURE — 93005 ELECTROCARDIOGRAM TRACING: CPT

## 2023-12-12 PROCEDURE — 250N000013 HC RX MED GY IP 250 OP 250 PS 637: Performed by: NURSE PRACTITIONER

## 2023-12-12 PROCEDURE — 99239 HOSP IP/OBS DSCHRG MGMT >30: CPT | Performed by: INTERNAL MEDICINE

## 2023-12-12 PROCEDURE — 36415 COLL VENOUS BLD VENIPUNCTURE: CPT | Performed by: INTERNAL MEDICINE

## 2023-12-12 PROCEDURE — 250N000013 HC RX MED GY IP 250 OP 250 PS 637: Performed by: INTERNAL MEDICINE

## 2023-12-12 PROCEDURE — 82565 ASSAY OF CREATININE: CPT | Performed by: INTERNAL MEDICINE

## 2023-12-12 PROCEDURE — 99232 SBSQ HOSP IP/OBS MODERATE 35: CPT | Mod: FS | Performed by: NURSE PRACTITIONER

## 2023-12-12 PROCEDURE — 85049 AUTOMATED PLATELET COUNT: CPT | Performed by: INTERNAL MEDICINE

## 2023-12-12 RX ORDER — IBUPROFEN 400 MG/1
400 TABLET, FILM COATED ORAL 2 TIMES DAILY PRN
Status: DISCONTINUED | OUTPATIENT
Start: 2023-12-12 | End: 2023-12-12 | Stop reason: HOSPADM

## 2023-12-12 RX ORDER — NALOXONE HYDROCHLORIDE 0.4 MG/ML
0.2 INJECTION, SOLUTION INTRAMUSCULAR; INTRAVENOUS; SUBCUTANEOUS
Status: DISCONTINUED | OUTPATIENT
Start: 2023-12-12 | End: 2023-12-12 | Stop reason: HOSPADM

## 2023-12-12 RX ORDER — NALOXONE HYDROCHLORIDE 0.4 MG/ML
0.4 INJECTION, SOLUTION INTRAMUSCULAR; INTRAVENOUS; SUBCUTANEOUS
Status: DISCONTINUED | OUTPATIENT
Start: 2023-12-12 | End: 2023-12-12 | Stop reason: HOSPADM

## 2023-12-12 RX ORDER — IBUPROFEN 400 MG/1
400 TABLET, FILM COATED ORAL 2 TIMES DAILY PRN
Start: 2023-12-12 | End: 2024-01-25

## 2023-12-12 RX ADMIN — IBUPROFEN 400 MG: 400 TABLET ORAL at 10:37

## 2023-12-12 RX ADMIN — ACETAMINOPHEN 650 MG: 325 TABLET, FILM COATED ORAL at 06:12

## 2023-12-12 RX ADMIN — LETROZOLE 2.5 MG: 2.5 TABLET ORAL at 07:45

## 2023-12-12 RX ADMIN — BUPROPION HYDROCHLORIDE 300 MG: 300 TABLET, EXTENDED RELEASE ORAL at 07:45

## 2023-12-12 RX ADMIN — APIXABAN 5 MG: 5 TABLET, FILM COATED ORAL at 08:47

## 2023-12-12 RX ADMIN — PANTOPRAZOLE SODIUM 40 MG: 40 TABLET, DELAYED RELEASE ORAL at 07:45

## 2023-12-12 ASSESSMENT — ACTIVITIES OF DAILY LIVING (ADL)
ADLS_ACUITY_SCORE: 20

## 2023-12-12 NOTE — TELEPHONE ENCOUNTER
[facility] Call Center    Phone Message    May a detailed message be left on voicemail: yes     Reason for Call: Other: Patient was hospitalized for aflutter. She would like to know if she really needs a discharge follow up. Please call patient to discuss. [facility]'s next opening is 1/17/24     Action Taken: Other: cardio    Travel Screening: Not Applicable

## 2023-12-12 NOTE — PROGRESS NOTES
[facility]  Electrophysiology Progress Note  Date of Admission: 12/9/2023  Date of Service: 12/12/2023  Primary Cardiologist: Dr. Perez (general)    Assessment & Plan   Svetlana Adair is a 73 year old female with past medical history significant for history of hyperlipidemia, anxiety, depression, long COVID, coronary artery calcifications based on calcium score, history of tobacco use (quit ~10 years ago), breast cancer status post bilateral mastectomy in remission admitted on 12/9/2023 with with palpitations and found to be in typical atrial flutter.    Assessment:   Typical atrial flutter with RVR, new diagnosis  ECG noted 2-1 AV conduction with heart rate in the 160s in the ED  [id number] Score 2 (age and gender)  Initially started on Lovenox but was transition to Eliquis 5 mg twice daily that will be continued 30 days post ablation  Echocardiogram (12/9/2023): LVEF of 60 to 65% with trace TR.  No wall motion abnormalities.  S/p [facility] no KURT thrombus with LVEF 55-60%, negative bubble study and CTI ablation   EKG (12/12/2023): SR with PACs  Sleep follow up scheduled 12/2    Coronary artery calcifications  Calcium score from 2015: total score of 790 (99th percentile when compared to age and gender matched control group).   FLP (5/2023): , trigs 30, HDL 74, LDL 46  Rosuvastatin increased to 40 mg daily 12/1/2023    Plan:  Eliquis x 30 days   Ibuprofen 400 mg BID as needed for chest discomfort post procedure.  Recommend [employer] or smart watch for outpatient monitoring in the future.  EP will sign off  Follow up in 1 month with EP PATIENCE, fasting lipid profile, and EKG (messaged )     ANNAMARIA Rao CNP  Pager:  [phone]       Physical Exam   Temp: 97.4  F (36.3  C) Temp src: Oral BP: 122/79 Pulse: 63   Resp: 18 SpO2: 98 % O2 Device: None (Room air) Oxygen Delivery: 2 LPM  Vitals:    12/10/23 0558 12/11/23 0637 12/12/23 0622   Weight: 63.3 kg (139 lb 8  oz) 63.2 kg (139 lb 5.3 oz) 63 kg (138 lb 14.4 oz)       Constitutional:   NAD    Skin:   Warm and dry   Head:   Nontraumatic   Neck:   no JVD   Lungs:   symmetric, clear to auscultation   Cardiovascular:   irregularly irregular rhythm   Abdomen:   Benign    Extremities and Back:   No edema groin site CDI. No tenderness.    Neurological:   Grossly nonfocal     Medications    - MEDICATION INSTRUCTIONS -        apixaban ANTICOAGULANT  5 mg Oral BID    buPROPion  300 mg Oral QAM    letrozole  2.5 mg Oral Daily    Naltrexone HCl (Pain)  1 capsule Oral Daily    pantoprazole  40 mg Oral QAM AC    rosuvastatin  40 mg Oral At Bedtime       Code Status    Full Code    Allergies   Allergies   Allergen Reactions    Cats     Codeine Sulfate GI Disturbance

## 2023-12-12 NOTE — PLAN OF CARE
[id number]  Patient discharged to home with friend providing transportation. Ivs removed. Patient verbalized understanding of discharge instructions and sent home with all belongings and new medications.

## 2023-12-12 NOTE — PLAN OF CARE
Goal Outcome Evaluation:      Plan of Care Reviewed With: patient           A+Ox4. Bedrest until 10pm. R groin ablation site WDL, stopcock in place. Pt sleepy, educated on bedrest, keeping leg still- forgetful. CMS intact. Now SB with PAC's. On Eliquis. RA. Denies pain.

## 2023-12-12 NOTE — DISCHARGE SUMMARY
[facility]  Hospitalist Discharge Summary      Date of Admission:  12/9/2023  Date of Discharge:  12/12/2023 10:53 AM  Discharging Provider: Estelita Walton MD    Discharge Diagnoses   New onset atrial flutter with RVR   Hyperlipidemia  Hx of breast cancer  Hx of basal cell carcinoma  Hx of squamous cell carcinoma  Alcohol use disorder, in remission  Major recurrent depressive disorder with anxiety    Follow-ups Needed After Discharge   Follow-up Appointments     Follow-up and recommended labs and tests       Follow up with Cardiology as scheduled          Discharge Disposition   Discharged to home  Condition at discharge: Stable}    Hospital Course   Svetlana Adair is a 73 year old female with hx of HLD, breast cancer, and depression/anxiety who was admitted on 12/9/2023 for new onset a-flutter with RVR. She is s/p successful ablation on 12/11.     New onset atrial flutter with RVR s/p ablation on 12/11/23  *Presented with palpitations and associated dizziness/lightheadedness. Noted to be in a-flutter with 2:1 AV conduction on arrival..   *Initiated on IV diltiazem and enoxaparin in the ED  *Cardiology consulted on admission  *TTE (12/10) largely unremarkable; EF 60-65% with no WMA, no significant valvular disease  *Underwent successful ablation per EP on 12/11  - She will be discharged on metoprolol 25 mg BID and a 30-day course of apixaban 5 mg BID  - EP has recommended [employer] or smart watch for outpateitn monitoring  - She will follow up with EP in 1 month  - Patient was advised to discontinue aspirin while on apixaban, and resume once she completes 30-day course of apixaban. Because she also takes ibuprofen PRN, she was advised to change her omeprazole from PRN to daily for GI ppx    Chronic and stable medical conditions: No changes made to home meds  Hyperlipidemia  Hx of breast cancer  Hx of basal cell carcinoma  Hx of squamous cell carcinoma  Alcohol use disorder, in  remission  Major recurrent depressive disorder with anxiety    Consultations This Hospital Stay   CARDIOLOGY IP CONSULT  ADVANCE DIRECTIVE IP CONSULT    Code Status   Prior    Time Spent on this Encounter   I, Estelita Walton MD, personally saw the patient today and spent 35 minutes discharging this patient.       Estelita Walton MD  [facility]  [address] [place] [address] [address]VE, [address]  [place] [address]  Phone: [phone]  ______________________________________________________________________    Physical Exam   Vital Signs: Temp: 97.4  F (36.3  C) Temp src: Oral BP: 106/78 Pulse: 70   Resp: 20 SpO2: 98 % O2 Device: None (Room air) Oxygen Delivery: 2 LPM  Weight: 138 lbs 14.4 oz    Constitutional: Resting comfortably, NAD  HEENT: Sclera white, conjunctiva clear, EOMI, MMM  Respiratory: Breathing non-labored. Lungs CTAB - no wheezes/crackles/rhonchi  Cardiovascular: Heart RRR, no m/r/g. No pedal edema.   GI: +BS. Abd soft/NT  Skin: Warm and dry. No rash.  Musculoskeletal: Normal muscle bulk and tone  Neurologic: Alert and appropriate. MOORE  Psychiatric: Calm and cooperative    Primary Care Physician   Vladislav Cruz    Discharge Orders      Follow-Up with Cardiology PATIENCE      Reason for your hospital stay    New onset atrial flutter with rapid heart rate. You underwent an ablation, and were started on new medications     Follow-up and recommended labs and tests     Follow up with Cardiology as scheduled     Activity    Your activity upon discharge: activity as tolerated     Diet    Follow this diet upon discharge: Regular diet       Significant Results and Procedures   Most Recent 3 CBC's:  Recent Labs   Lab Test 12/12/23  0612 12/10/23  0705 12/09/23  1459 10/09/23  1155   WBC  --  6.7 7.1 6.1   HGB  --  13.6 13.6 14.1   MCV  --  94 93 97    264 293 284     Most Recent 3 BMP's:  Recent Labs   Lab Test 12/12/23  0612 12/10/23  0705 12/09/23  1459 09/25/23  0913   NA  --   145 138 140   POTASSIUM  --  4.6 4.1 4.3   CHLORIDE  --  110* 104 105   CO2  --  26 21* 25   BUN  --  10.9 16.6 12.1   CR 0.99* 0.81 0.71 0.91   ANIONGAP  --  9 13 10   YASMIN  --  9.4 9.7 9.6   GLC  --  108* 92 100*   ,   Results for orders placed or performed during the hospital encounter of 12/09/23   EP Ablation    Narrative    Procedures:  1. Ablation of right sided typical cavotricuspid isthmus (CTI) dependent    atrial flutter  2. 3-Dimensional CARTO mapping  3. Comprehensive electrophysiology study with arrhythmia induction  4. Conscious sedation    INDICATIONS: typical atrial flutter    History of Present Illness: This is a 73 year old year-old patient with a   history of symptomatic typical atrial flutter. [facility] shows not clots in the   LA/KURT. Patient is referred for an electrophysiology study and ablation.    METHODS: I determined this patient to be an appropriate candidate for the   planned sedation and procedure and have reassessed the patient immediately   prior to sedation and procedure. The patient was prepped and draped in the   usual fashion.  1% Lidocaine was infiltrated into the right femoral area.    A 7 and a 8-Malay sheaths were then placed in the right femoral vein via   the modififed Seldinger's technique and ultrasound guidance. A deflectable   catheter was placed in the CS for left atrial recording and pacing.  The   patient presented in normal sinus rhythm. An 8 mm ablation catheter was   placed in the right atrium. Ablation was then performed using the Stockert   generator.      Post ablation burst atrial and ventricular pacing and atrial extra-stimuli   were then performed.    At the conclusion of the study catheters and sheaths were then removed.    Hemostasis was achieved by placement of a suture in a figure of 8   configuration held by a stopcock. Patient was then transferred back to the   Heart Center in stable condition.     CONDUCTION INTERVALS:  AH: 68 ms  HV: 37 ms  AV Wenkebach:  410 ms  AV Node ERP: 600/340ms  No VA conduction when paced at 600 ms    ARRHYTHMIA SUMMARY: Patient presented in atrial flutter with a cycle   length of 215 milliseconds.  It had a high to low and right to left atrial   activation sequence. Three-Dimensional CARTO mapping was performed.   Concealed entrainment was demonstrated by pacing from the CTI.  The above   tachycardia was consistent for CTI dependent atrial flutter.    ABLATION SUMMARY: Approximately 5 applications of radiofrequency were   targeted at the CTI as guided by the CARTO system for 15 to 120 seconds at   70 Antunez and 60 degree Celsius as the catheter was continuously slowly   dragged toward the IVC. AFL terminated during ablation and bi-directional   CTI block was demonstrated and was still present after 10 minutes of   waiting.    CONCLUSIONS:  1.  Uneventful ablation of typical right sided CTI dependent atrial   flutter  2. Normal sinus and AV josiane function without evidence of dual AV josiane   physiology  3. Eliquis 5 mg bid for 30 days    Michael John MD        Echocardiogram Complete     Value    LVEF  60-65%    Narrative    [id number]  [id number]  [id number]  [id number]^OLIVIA^EDDIE     [facility]  Echocardiography Laboratory  [address] [place] [address]     Name: ERIKA VALENCIA  MRN: [id number]  : 1950  Study Date: 12/10/2023 09:28 AM  Age: 73 yrs  Gender: Female  Patient Location: [facility]  Reason For Study: Atrial Flutter  Ordering Physician: EDDIE BAKER  Referring Physician: EDDIE BAKER  Performed By: Ace Maria     BSA: 1.7 m2  Height: 65 in  Weight: 140 lb  HR: 108  BP: 130/87 mmHg  ______________________________________________________________________________  Procedure  Complete Echo Adult. Optison (NDC #[id number]) given intravenously.  ______________________________________________________________________________  Interpretation Summary     Left ventricular systolic function is  normal.  The visual ejection fraction is 60-65%.  There is trace to mild tricuspid regurgitation.  Right ventricle systolic pressure estimate normal  Initial rhythm appears to be rapid atrial flutter which converts to what  appears to be sinus tachycardia with first degree AV block and frequent PACs  during the exam.     There is no prior echo available for comparison.  ______________________________________________________________________________  Left Ventricle  The left ventricle is normal in size. There is normal left ventricular wall  thickness. Left ventricular systolic function is normal. The visual ejection  fraction is 60-65%. No regional wall motion abnormalities noted.     Right Ventricle  The right ventricle is normal in size and function.     Atria  Normal left atrial size. Right atrial size is normal.     Mitral Valve  There is trace mitral regurgitation.     Tricuspid Valve  There is trace to mild tricuspid regurgitation. Right ventricle systolic  pressure estimate normal.     Aortic Valve  The aortic valve is trileaflet. There is trace aortic regurgitation.     Pulmonic Valve  There is trace pulmonic valvular regurgitation. There is no pulmonic valvular  stenosis.     Vessels  The aortic root is normal size. Normal size ascending aorta. The inferior vena  cava is normal.     Pericardium  There is no pericardial effusion.     ______________________________________________________________________________  MMode/2D Measurements & Calculations  IVSd: 0.94 cm  LVIDd: 4.6 cm  LVIDs: 2.7 cm  LVPWd: 0.72 cm  FS: 41.5 %  LV mass(C)d: 126.0 grams  LV mass(C)dI: 74.1 grams/m2     Ao root diam: 3.2 cm  LA dimension: 3.3 cm  asc Aorta Diam: 3.0 cm  LA/Ao: 1.0  LVOT diam: 2.0 cm  LVOT area: 3.0 cm2  Ao root diam index Ht(cm/m): 1.9  Ao root diam index BSA (cm/m2): 1.9  Asc Ao diam index BSA (cm/m2): 1.8  Asc Ao diam index Ht(cm/m): 1.8  LA Volume (BP): 31.5 ml  LA Volume Index (BP): 18.5 ml/m2  RV Base: 3.2 cm      RWT: 0.31  TAPSE: 1.8 cm     Doppler Measurements & Calculations  MV E max sami: 85.3 cm/sec  MV A max sami: 105.8 cm/sec  MV E/A: 0.81  MV dec slope: 576.6 cm/sec2  MV dec time: 0.15 sec  Ao V2 max: 129.0 cm/sec  Ao max P.0 mmHg  Ao V2 mean: 77.6 cm/sec  Ao mean PG: 3.0 mmHg  Ao V2 VTI: 20.8 cm  AMRIK(I,D): 2.7 cm2  AMRIK(V,D): 2.5 cm2  LV V1 max P.7 mmHg  LV V1 max: 108.0 cm/sec  LV V1 VTI: 19.0 cm  SV(LVOT): 56.8 ml  SI(LVOT): 33.4 ml/m2  PA acc time: 0.10 sec  TR max saim: 221.3 cm/sec  TR max P.6 mmHg  AV Sami Ratio (DI): 0.84  AMRIK Index (cm2/m2): 1.6  E/E' av.1  Lateral E/e': 7.5  Medial E/e': 8.7     RV S Sami: 19.1 cm/sec     ______________________________________________________________________________  Report approved by: MD Georgiana Tapia 12/10/2023 01:40 PM         Transesophageal Echocardiogram     Value    LVEF  55-60%    Narrative    [id number]  Critical access hospital  [id number]  [id number]^NANCY^HUMPHREY^PATRICIA     [facility]  Echocardiography Laboratory  [address] [place] [address]     Name: ERIKA VALENCIA  MRN: [id number]  : 1950  Study Date: 2023 01:04 PM  Age: 73 yrs  Gender: Female  Patient Location: [facility]  Reason For Study: Aflutter  Ordering Physician: HUMPHREY CRUZ  Performed By: Raquel Hinton     BSA: 1.7 m2  Height: 65 in  Weight: 139 lb  HR: 168  BP: 118/69 mmHg  ______________________________________________________________________________  Procedure  Complete Portable [facility] Adult. [facility] Probe serial #[id number] was used during the  procedure. The heart rate, respiratory rate and response to care were  monitored throughout the procedure with the assistance of the nurse.  ______________________________________________________________________________  Interpretation Summary     1. Left ventricular systolic function is normal. The visual ejection fraction  is 55-60%.  2. No regional wall motion abnormalities noted.  3. The right ventricle is normal in structure,  function and size.  4. No evidence for significant valvular pathology.  5. There is no color Doppler evidence of an atrial shunt. A contrast injection  (Bubble Study) was performed that was negative for flow across the interatrial  septum.  6. No thrombus is detected in the left atrial appendage.  ______________________________________________________________________________  [facility]  I determined this patient to be an appropriate candidate for the planned  sedation and procedure and have reassessed the patient immediately prior to  sedation and procedure. Total sedation time: 12 min. minutes of continuous  bedside 1:1 monitoring. Versed (4mg) was given intravenously. Fentanyl (75mcg)  was given intravenously.     Left Ventricle  The left ventricle is normal in size. Left ventricular systolic function is  normal. The visual ejection fraction is 55-60%. No regional wall motion  abnormalities noted.     Right Ventricle  The right ventricle is normal in structure, function and size.     Atria  Normal left atrial size. Right atrial size is normal. There is no color  Doppler evidence of an atrial shunt. A contrast injection (Bubble Study) was  performed that was negative for flow across the interatrial septum. No  thrombus is detected in the left atrial appendage.     Mitral Valve  The mitral valve is normal in structure and function. There is trace mitral  regurgitation.     Tricuspid Valve  The tricuspid valve is normal in structure and function. There is mild (1+)  tricuspid regurgitation.     Aortic Valve  The aortic valve is normal in structure and function.     Pulmonic Valve  The pulmonic valve is not well seen, but is grossly normal.     Pericardial/Pleural  There is no pericardial effusion.     Rhythm  The rhythm was atrial flutter.  ______________________________________________________________________________  Report approved by: Lary Eaton 12/11/2023 03:00 PM      ______________________________________________________________________________          Discharge Medications   Discharge Medication List as of 12/12/2023  9:43 AM        START taking these medications    Details   apixaban ANTICOAGULANT (ELIQUIS) 5 MG tablet Take 1 tablet (5 mg) by mouth 2 times daily, Disp-60 tablet, R-0, ALEXEI, E-Prescribe      aspirin 81 MG EC tablet Take 1 tablet (81 mg) by mouth daily, ALEXEI, No Print Out      metoprolol tartrate (LOPRESSOR) 25 MG tablet Take 1 tablet (25 mg) by mouth 2 times daily, Disp-60 tablet, R-0, E-Prescribe           CONTINUE these medications which have CHANGED    Details   omeprazole (PRILOSEC) 20 MG DR capsule Take 1 capsule (20 mg) by mouth daily, No Print Out           CONTINUE these medications which have NOT CHANGED    Details   ASHWAGANDHA PO Take 1 tablet by mouth daily as needed At onset of illness symptoms; as needed, Historical      buPROPion (WELLBUTRIN XL) 300 MG 24 hr tablet Take 1 tablet (300 mg) by mouth every morning, Disp-90 tablet, R-3, E-Prescribe      calcium carbonate-vitamin D (OS-YASMIN) 500-400 MG-UNIT tablet Take 1 tablet by mouth daily , Historical      cholecalciferol (VITAMIN D3) 25 mcg (1000 units) capsule Take 1 capsule by mouth daily, Historical      Coenzyme Q10 300 MG CAPS Take 300 mg by mouth daily, Historical      IBUPROFEN PO Take 200 mg by mouth every 4 hours as needed for moderate pain , Historical      letrozole (FEMARA) 2.5 MG tablet Take 1 tablet by mouth daily, R-5, Historical      Lutein-Zeaxanthin 25-5 MG CAPS Take 1 capsule by mouth daily, Historical      LYSINE 1-3 daily as needed, Historical      melatonin 5 MG tablet Take 5 mg by mouth nightly as needed for sleep, Historical      Naltrexone HCl, Pain, 4.5 MG CAPS Take 1 capsule by mouth daily, Historical      nicotine polacrilex (NICORETTE) 2 MG gum Place 1 each (2 mg) inside cheek as needed for smoking cessation, Disp-30 tablet, R-11, E-Prescribe      rosuvastatin (CRESTOR)  40 MG tablet Take 1 tablet (40 mg) by mouth daily, Disp-90 tablet, R-3, E-Prescribe      !! UNABLE TO FIND Take by mouth daily MEDICATION NAME: Asea Redox - 1oz twice daily, Historical      !! UNABLE TO FIND Take 1 tablet by mouth daily MEDICATION NAME: Yin Chiao - chinese supplement takes at onset of illness symptoms, Historical      !! UNABLE TO FIND MEDICATION NAME: Somnapure - 2 tablets = 500mg valerian root, 300mg lemon balm extract, 200mg l-theanine, 120mg hops extract, 50mg chamomile flower extract, 50mg passion flower extract, 3mg melatonin.  Takes 1-2 tablets at bedtime, Historical      !! UNABLE TO FIND MEDICATION NAME: Moringa 1 serving of powder daily, Historical      !! UNABLE TO FIND MEDICATION NAME: Premium Amla Berry 2 capsules = 4mg Vitamin C, 966mg organic amla, organic amla extract.  Takes 1 capsule daily as needed for immune system, Historical      !! UNABLE TO FIND MEDICATION NAME: OmegaKrill 5x 3 capsules = 480mg of total omega-3, 64mg EPA, 392mg DHA, 300mcg astaxanthin.  Takes 3 capsules daily, Historical      !! UNABLE TO FIND MEDICATION NAME: Super Colon Cleanse 2 capsules = 665mg senna leaf powder, 321mg psyllium husk powder, 21mg fennel seed powder, 21mg papaya leaf powder, 21mg yolanda hips fruit powder, 11mg l-acidophilus.  Taking 4 capsules daily, Historical      valACYclovir (VALTREX) 1000 mg tablet TAKE 1 TABLET(1000 MG) BY MOUTH THREE TIMES DAILY FOR 7 DAYS, Disp-21 tablet, R-11, E-Prescribe      vitamin C (ASCORBIC ACID) 250 MG tablet Take 250 mg by mouth daily, Historical       !! - Potential duplicate medications found. Please discuss with provider.        STOP taking these medications       Aspirin 81 MG CAPS Comments:   Reason for Stopping:             Allergies   Allergies   Allergen Reactions    Cats     Codeine Sulfate GI Disturbance

## 2023-12-12 NOTE — PROGRESS NOTES
[id number]  Alert and oriented times four  Independent  Denies chest pain  Back pain treated with tylenol   Right groin site WDL CMS intact  Tele: SRB  Plan :continue to monitor

## 2023-12-12 NOTE — PROVIDER NOTIFICATION
"MD Notification    Notified Person: MD    Notified Person Name: Estelita Bain    Notification Date/Time: 12/12/23 @0717    Notification Interaction: vocera    Purpose of Notification:   FYI night nurse reported pt started complaining of chest tightness around 0630 - just now pt said still tight down center of chest \"like I'm getting a cold but I'm not congested\" -  still appears SR but wanted to let you know    Orders Received: EKG ordered    Comments:    "

## 2023-12-12 NOTE — TELEPHONE ENCOUNTER
Spoke with patient, she is being discharged momentarily from her admission for atrial flutter and an ablation. Reviewed that the visit on 1/18/2024 (with PATIENCE Janice Kang) is to review her medications post-ablation and see EP team.    She asks if the echo previously scheduled for 1/4/2024 is still needed? She had an echo while admitted.  She is also scheduled for a 1 month lipid panel after changing to crestor 40mg.    Will message Dr. Perez to review

## 2023-12-13 ENCOUNTER — TELEPHONE (OUTPATIENT)
Dept: CARDIOLOGY | Facility: CLINIC | Age: 73
End: 2023-12-13
Payer: MEDICARE

## 2023-12-13 ENCOUNTER — PATIENT OUTREACH (OUTPATIENT)
Dept: CARE COORDINATION | Facility: CLINIC | Age: 73
End: 2023-12-13
Payer: MEDICARE

## 2023-12-13 NOTE — TELEPHONE ENCOUNTER
Message left to return call.   Can cancel echo and remind of upcoming lab and PATIENCE OV in Jan.     FLP/ALT 1-15-24,  PATIENCE OV 1-17-24.    Dr. Perez's reply -   No we do not need to repeat echo.  We should follow-up on the cholesterol.  Thanks

## 2023-12-13 NOTE — TELEPHONE ENCOUNTER
[facility] Call Center    Phone Message    May a detailed message be left on voicemail: yes     Reason for Call: Other: Pt would like a call back to discuss how she is suppose to be feeling today after the ablation as she has a few questions     Action Taken: Other: Cardio    Travel Screening: Not Applicable

## 2023-12-13 NOTE — TELEPHONE ENCOUNTER
Spoke to patient who reports having some tightness with inspiration.  Informed patient that this is normal. Asked patient if she has been taking ibuprofen for discomfort.  She reports she has not taken it routinely.  Suggested she try this for a couple of days to see if this will help with the inflammation.  Feeling lightheaded.  Does not have BP cuff to check this.  Asked if she has not had enough hydration.  Admits that she has not done a good job with this and will work on this.  Discussed that she is on medication that works on her HR and BP and symptoms of lightheadedness usually is low BP readings.  Also discussed that she was in the hospital for 3 days which can zap energy and stamina.  Reviewed that it sometimes takes a few days to get back to baseline.  Patient does not have any resources to check her BP or HR.  Suggested  BP cuff or BALALIKEA francisco for HR monitoring.  Patient will think about this.  Patient has follow up 1/18/2024 s/p aflutter ablation.  Patient had no further questions at this time.  RAKEL Crenshaw

## 2023-12-13 NOTE — PROGRESS NOTES
Connected Care Resource Center: [facility]    Background: Transitional Care Management program identified per system criteria and reviewed by [facility] team for possible outreach.    Assessment: Upon chart review, CCR Team member will not proceed with patient outreach related to this episode of Transitional Care Management program due to reason below:    Patient has active communication with a nurse, provider or care team for reason of post-hospital follow up plan.  Outreach call by CCRC team not indicated to minimize duplicative efforts.     Plan: Transitional Care Management episode addressed appropriately per reason noted above.      RAMÓN Barlow  [facility]    *Connected Care Resource Team does NOT follow patient ongoing. Referrals are identified based on internal discharge reports and the outreach is to ensure patient has an understanding of their discharge instructions.

## 2023-12-14 LAB
ATRIAL RATE - MUSE: 64 BPM
DIASTOLIC BLOOD PRESSURE - MUSE: NORMAL MMHG
INTERPRETATION ECG - MUSE: NORMAL
P AXIS - MUSE: 72 DEGREES
PR INTERVAL - MUSE: 146 MS
QRS DURATION - MUSE: 82 MS
QT - MUSE: 396 MS
QTC - MUSE: 408 MS
R AXIS - MUSE: 54 DEGREES
SYSTOLIC BLOOD PRESSURE - MUSE: NORMAL MMHG
T AXIS - MUSE: 47 DEGREES
VENTRICULAR RATE- MUSE: 64 BPM

## 2023-12-14 NOTE — TELEPHONE ENCOUNTER
Detailed message left for patient that repeat echo is not needed. Appointment was already cancelled by scheduling on 12/1. Order removed. Reviewed in message that she should keep her other labs and follow up in January as scheduled. Team 2 number provided to call back with any questions.

## 2023-12-15 ENCOUNTER — TELEPHONE (OUTPATIENT)
Dept: CARDIOLOGY | Facility: CLINIC | Age: 73
End: 2023-12-15

## 2023-12-15 DIAGNOSIS — I48.92 ATRIAL FLUTTER WITH RAPID VENTRICULAR RESPONSE (H): ICD-10-CM

## 2023-12-15 DIAGNOSIS — I48.92 ATRIAL FLUTTER WITH RAPID VENTRICULAR RESPONSE (H): Primary | ICD-10-CM

## 2023-12-15 PROCEDURE — 93000 ELECTROCARDIOGRAM COMPLETE: CPT | Performed by: INTERNAL MEDICINE

## 2023-12-15 NOTE — TELEPHONE ENCOUNTER
Spoke to patient to let her know that EKG looked good NSR at 57bpm.  Also d/t fluctuation of HR will stop metoprolol.  Patient also reports lefs feeling heavy and feeling winded.  Discussed that sometimes metoprolol can have the above side effects.  Now that she is stopping the medication will see if the above symptoms go away.  Asked patient to call with update next week on symptoms.  Medication list updated in epic.  Patient provided verbal understanding regarding above.  RAKEL Crenshaw

## 2023-12-15 NOTE — TELEPHONE ENCOUNTER
Patient called reports she continues to get winded easily with activity especially steps.  HR are fluctuating.  Reports HR as low as mid 40's. She has not taken her metoprolol today.  She does have a Haloband francisco and is trying to get a tracing over to us.  Sent instructions on how to send tracing through my chart.  Will have patient come in for EKG today.  RAKEL Crenshaw

## 2023-12-18 ENCOUNTER — TELEPHONE (OUTPATIENT)
Dept: CARDIOLOGY | Facility: CLINIC | Age: 73
End: 2023-12-18

## 2023-12-18 ENCOUNTER — HOSPITAL ENCOUNTER (OUTPATIENT)
Dept: GENERAL RADIOLOGY | Facility: CLINIC | Age: 73
Discharge: HOME | End: 2023-12-18
Attending: PHYSICIAN ASSISTANT | Admitting: PHYSICIAN ASSISTANT
Payer: MEDICARE

## 2023-12-18 ENCOUNTER — OFFICE VISIT (OUTPATIENT)
Dept: CARDIOLOGY | Facility: CLINIC | Age: 73
End: 2023-12-18
Payer: MEDICARE

## 2023-12-18 VITALS
WEIGHT: 141 LBS | DIASTOLIC BLOOD PRESSURE: 74 MMHG | HEIGHT: 65 IN | BODY MASS INDEX: 23.49 KG/M2 | SYSTOLIC BLOOD PRESSURE: 118 MMHG | HEART RATE: 68 BPM | OXYGEN SATURATION: 97 %

## 2023-12-18 DIAGNOSIS — I48.92 ATRIAL FLUTTER WITH RAPID VENTRICULAR RESPONSE (H): Primary | ICD-10-CM

## 2023-12-18 DIAGNOSIS — R06.02 SHORTNESS OF BREATH: ICD-10-CM

## 2023-12-18 DIAGNOSIS — I48.92 ATRIAL FLUTTER WITH RAPID VENTRICULAR RESPONSE (H): ICD-10-CM

## 2023-12-18 DIAGNOSIS — R06.02 SHORTNESS OF BREATH: Primary | ICD-10-CM

## 2023-12-18 PROCEDURE — 71046 X-RAY EXAM CHEST 2 VIEWS: CPT

## 2023-12-18 PROCEDURE — 99215 OFFICE O/P EST HI 40 MIN: CPT | Performed by: PHYSICIAN ASSISTANT

## 2023-12-18 NOTE — TELEPHONE ENCOUNTER
Spoke to patient who continues to have SOB and lightheadedness even after stopping metoprolol.  Last dose of metoprolol was on 12/14. She also reports rash which she did not mention on any of our conversations last week.  Due to symptoms not improving will have her see ERNESTINA Kaminski today for evaluation.  RAKEL Crenshaw

## 2023-12-18 NOTE — LETTER
12/18/2023    Vladislav Cruz MD  [address]  [facility] [address]    RE: Svetlana Adair       Dear Colleague,     I had the pleasure of seeing Svetlana Aadir in the [facility].    Electrophysiology Clinic Progress Note    Svetlana Adair MRN# [id number]   YOB: 1950 Age: 73 year old     Primary cardiology team: Dr. Perez / Dr. John ()         Assessment and Plan     In summary, Svetlana Adair presents today for urgent evaluation of dyspnea, fatigue, and a rash, one week after uneventful AFL ablation. I suspect that her now-improving symptoms may be related to metoprolol which is likely still washing out of her system (last dose 12/15). Her only symptom that hasn't yet improved is a rash which started in the hospital.     There is no chest pain nor orthopnea, so my concern for pericardial effusion/pericarditis is very low. At this time, I will check a chest x-ray to ensure we don't see any pulmonary edema. If noted, will start her on a few days of furosemide. I considered just starting this empirically, however with her ongoing rash will hold off on introducing new medications unless clearly necessary. I will follow up with her after those results return. If her rash doesn't improve over this week, I will change her from Eliquis to Xarelto, and ask her to be further evaluated by her PCP.     Follow-up:  Scheduled to see Janice Kang 1/18/23.    [facility]         History of Presenting Illness     Svetlana Adair is a pleasant 73 year old patient with a pertinent history of hyperlipidemia, anxiety, depression, long COVID, coronary artery calcifications based on calcium score, history of tobacco use (quit ~10 years ago), breast cancer status post bilateral mastectomy in remission, who was recently admitted on 12/9/2023 with with palpitations and found to be in typical atrial flutter with rates in the 160's. LVEF of  "60 to 65% with trace TR. She underwent uneventful AFL ablation with Dr. John on 12/11/23. Post-procedure, she developed chest burning for which she was started on ibuprofen 400-600 mg TID x two days. She was placed on Eliquis ([id number] score of 2) for one month, followed by aspirin as long as no identifiable recurrent AFL or AF was noted on event monitor.     Two days later, she called our clinic with concerns about shortness of breath, heavy legs, lightheadedness and low HR. EKG showed sinus bradycardia with PAC's. Dr. John stopped her metoprolol which had been newly started in the hospital. However she called back this morning and stated that she feels no better and was added urgently to my schedule.     Now, Mahnaz says her breathing is feeling better. She could walk up stairs just now without much issue but states she couldn't have done that this morning. Her lightheadedness has also improved. She bends over and stands back up quickly in the room and feels fine with it. Her rash however has not improved. She developed a rash day 2 in the hospital, which would be after the time metoprolol was started, but possibly before the Eliquis was initiated. It is itching, on both her extremities and trunk, small raised papules. She hasn't noticed any issues with recurrent atrial flutter since hospital discharge. Patient denies chest pain, PND, orthopnea, edema, claudication, palpitations.    Her lungs are clear on exam. She sounds to be in sinus rhythm with occasional PAC's. There is no rub.         Review of Systems     12-pt ROS is negative except for as noted in the HPI.          Physical Exam     Vitals: /74   Pulse 68   Ht 1.651 m (5' 5\")   Wt 64 kg (141 lb)   SpO2 97%   BMI 23.46 kg/m    Wt Readings from Last 10 Encounters:   12/18/23 64 kg (141 lb)   12/12/23 63 kg (138 lb 14.4 oz)   12/01/23 63.4 kg (139 lb 11.2 oz)   10/09/23 63 kg (139 lb)   05/05/23 64.4 kg (142 lb)   04/06/23 65.3 kg (144 lb) "   03/29/23 65.3 kg (144 lb)   05/12/22 66.3 kg (146 lb 3.2 oz)   04/14/22 66.2 kg (146 lb)   02/03/22 66.5 kg (146 lb 9.6 oz)       Constitutional:  Patient is pleasant, alert, cooperative, and in NAD.  HEENT:  NCAT. PERRLA. EOM's intact.   Neck:  CVP appears normal. No carotid bruits.   Pulmonary: Normal respiratory effort. CTAB.   Cardiac: RRR, normal S1/S2, no S3/S4, no murmur or rub.   Abdomen:  Non-tender abdomen, no hepatosplenomegaly appreciated.   Vascular: Pulses in the upper and lower extremities are 2+ and equal bilaterally.  Extremities: No edema, erythema, cyanosis or tenderness appreciated.  Skin:  No rashes or lesions appreciated.   Neurological:  No gross motor or sensory deficits.   Psych: Appropriate affect.          Data   Labs reviewed:  Recent Labs   Lab Test 12/09/23  1459 09/25/23  0913 05/05/23  1152 03/28/23  0938 03/23/21  1040 01/11/21  1241 04/30/19  1003 10/19/18  0931   LDL  --  67 46 77   < >  --    < >  --    HDL  --  70 74 70   < >  --    < >  --    NHDL  --  85 56 87   < >  --    < >  --    CHOL  --  155 130 157   < >  --    < >  --    TRIG  --  89 50 51   < >  --    < >  --    TSH 4.94*  --   --   --   --  1.59  --  2.46    < > = values in this interval not displayed.       Lab Results   Component Value Date    WBC 6.7 12/10/2023    WBC 7.3 01/11/2021    RBC 4.39 12/10/2023    RBC 4.17 01/11/2021    HGB 13.6 12/10/2023    HGB 13.6 01/11/2021    HCT 41.2 12/10/2023    HCT 40.6 01/11/2021    MCV 94 12/10/2023    MCV 97 01/11/2021    MCH 31.0 12/10/2023    MCH 32.6 01/11/2021    MCHC 33.0 12/10/2023    MCHC 33.5 01/11/2021    RDW 12.5 12/10/2023    RDW 13.1 01/11/2021     12/12/2023     01/11/2021       Lab Results   Component Value Date     12/10/2023     01/11/2021    POTASSIUM 4.6 12/10/2023    POTASSIUM 4.3 01/17/2022    POTASSIUM 4.2 01/11/2021    CHLORIDE 110 (H) 12/10/2023    CHLORIDE 103 01/17/2022    CHLORIDE 101 01/11/2021    CO2 26 12/10/2023     "CO2 30 01/17/2022    CO2 24 01/11/2021    ANIONGAP 9 12/10/2023    ANIONGAP 2 (L) 01/17/2022    ANIONGAP 9 01/11/2021     (H) 12/10/2023     (H) 01/17/2022     (H) 01/11/2021    BUN 10.9 12/10/2023    BUN 11 01/17/2022    BUN 17 01/11/2021    CR 0.99 (H) 12/12/2023    CR 0.74 01/11/2021    GFRESTIMATED 61 12/12/2023    GFRESTIMATED >60 07/21/2021    GFRESTIMATED 83 01/11/2021    GFRESTBLACK >90 01/11/2021    YASMIN 9.4 12/10/2023    YASMIN 9.2 01/11/2021      Lab Results   Component Value Date    AST 24 12/10/2023    AST 18 10/31/2019    ALT 30 12/10/2023    ALT 35 10/31/2019       Lab Results   Component Value Date    A1C 5.6 11/18/2016       No results found for: \"INR\"         Problem List     Patient Active Problem List   Diagnosis    Breast cancer est/prog +, HER2 ERNIE -    Osteoarthritis    Moderate episode of recurrent major depressive disorder (H)    Advanced directives, counseling/discussion    Knee pain    Past use of tobacco    IFG (impaired fasting glucose)    Low back pain    Malignant neoplasm of right breast (H)    Hyperlipidemia LDL goal <70    Follow-up examination following surgery    Atherosclerosis of left carotid artery    Adjustment disorder with mixed anxiety and depressed mood    Neck pain on right side    Hyperparathyroidism (H24)    Alcohol dependence in remission (H)    High coronary artery calcium score    Other fatigue    Pulmonary nodule    New onset atrial flutter (H)    Personal history of malignant neoplasm of breast            Medications     Current Outpatient Medications   Medication Sig Dispense Refill    apixaban ANTICOAGULANT (ELIQUIS) 5 MG tablet Take 1 tablet (5 mg) by mouth 2 times daily 60 tablet 0    ASHWAGANDHA PO Take 1 tablet by mouth daily as needed At onset of illness symptoms; as needed      buPROPion (WELLBUTRIN XL) 300 MG 24 hr tablet Take 1 tablet (300 mg) by mouth every morning 90 tablet 3    calcium carbonate-vitamin D (OS-YASMIN) 500-400 MG-UNIT " tablet Take 1 tablet by mouth daily       cholecalciferol (VITAMIN D3) 25 mcg (1000 units) capsule Take 1 capsule by mouth daily      Coenzyme Q10 300 MG CAPS Take 300 mg by mouth daily      ibuprofen (ADVIL/MOTRIN) 400 MG tablet Take 1 tablet (400 mg) by mouth 2 times daily as needed for inflammatory pain      letrozole (FEMARA) 2.5 MG tablet Take 1 tablet by mouth daily  5    Lutein-Zeaxanthin 25-5 MG CAPS Take 1 capsule by mouth daily      LYSINE 1-3 daily as needed      melatonin 5 MG tablet Take 5 mg by mouth nightly as needed for sleep      Naltrexone HCl, Pain, 4.5 MG CAPS Take 1 capsule by mouth daily      nicotine polacrilex (NICORETTE) 2 MG gum Place 1 each (2 mg) inside cheek as needed for smoking cessation 30 tablet 11    omeprazole (PRILOSEC) 20 MG DR capsule Take 1 capsule (20 mg) by mouth daily      rosuvastatin (CRESTOR) 40 MG tablet Take 1 tablet (40 mg) by mouth daily 90 tablet 3    UNABLE TO FIND Take by mouth daily MEDICATION NAME: Asea Redox - 1oz twice daily      UNABLE TO FIND Take 1 tablet by mouth daily MEDICATION NAME: Yin Chiao - chinese supplement takes at onset of illness symptoms      UNABLE TO FIND MEDICATION NAME: Somnapure - 2 tablets = 500mg valerian root, 300mg lemon balm extract, 200mg l-theanine, 120mg hops extract, 50mg chamomile flower extract, 50mg passion flower extract, 3mg melatonin.  Takes 1-2 tablets at bedtime      UNABLE TO FIND MEDICATION NAME: Moringa 1 serving of powder daily      UNABLE TO FIND MEDICATION NAME: Premium Amla Berry 2 capsules = 4mg Vitamin C, 966mg organic amla, organic amla extract.  Takes 1 capsule daily as needed for immune system      UNABLE TO FIND MEDICATION NAME: OmegaKrill 5x 3 capsules = 480mg of total omega-3, 64mg EPA, 392mg DHA, 300mcg astaxanthin.  Takes 3 capsules daily      UNABLE TO FIND MEDICATION NAME: Super Colon Cleanse 2 capsules = 665mg senna leaf powder, 321mg psyllium husk powder, 21mg fennel seed powder, 21mg papaya leaf  powder, 21mg yolanda hips fruit powder, 11mg l-acidophilus.  Taking 4 capsules daily      valACYclovir (VALTREX) 1000 mg tablet TAKE 1 TABLET(1000 MG) BY MOUTH THREE TIMES DAILY FOR 7 DAYS 21 tablet 11    vitamin C (ASCORBIC ACID) 250 MG tablet Take 250 mg by mouth daily      [START ON 1/15/2024] aspirin 81 MG EC tablet Take 1 tablet (81 mg) by mouth daily (Patient not taking: Reported on 12/18/2023)      IBUPROFEN PO Take 200 mg by mouth every 4 hours as needed for moderate pain  (Patient not taking: Reported on 12/18/2023)              Past Medical History     Past Medical History:   Diagnosis Date    Alcoholism (H)     Allergies     Fall    Arthritis     Basal cell cancer     back, chest, face    Breast cancer (H)     Coronary artery disease     CT calcium hgbpo=194 Nov 2015    Depression     Hyperlipidemia LDL goal <130 12/2/2015    Hypertension     borderline    PONV (postoperative nausea and vomiting)     Squamous cell carcinoma     left upper arm, chin     Past Surgical History:   Procedure Laterality Date    ABDOMEN SURGERY  2007?    Hysterectomy    COLONOSCOPY      COLONOSCOPY  11/17/2011    Procedure:COLONOSCOPY; Colonoscopy; Surgeon:MEKA RUSS; Location: GI    COLONOSCOPY N/A 2/10/2020    Procedure: COLONOSCOPY, WITH POLYPECTOMY AND BIOPSY;  Surgeon: Opal Ace MD;  Location:  GI    DISSECT LYMPH NODE AXILLA Right 11/2/2017    Procedure: DISSECT LYMPH NODE AXILLA;  RIGHT AXILLARY LYMPH NODE DISSECTION;  Surgeon: Cortez White MD;  Location:  [facility]    EP ABLATION ATRIAL FLUTTER N/A 12/11/2023    Procedure: EP Ablation Atrial Flutter;  Surgeon: Michael John MD;  Location:  HEART CARD[facility] CATH LAB    GYN SURGERY  2005    hysterectomy after hx of ovarian cyst, ended up being benign    HYSTERECTOMY, PAP NO LONGER INDICATED      MASTECTOMY MODIFIED RADICAL  2011    bilateral    MASTECTOMY, BILATERAL      ORTHOPEDIC SURGERY      rt. knee arthroscopy    ORTHOPEDIC SURGERY Right     toe  surgery    REALIGN PATELLA  1973    right     TOE SURGERY      right grt OA      Family History   Problem Relation Age of Onset    Cancer Mother 60        leukemia    Arthritis Mother         osteo    Eye Disorder Mother         glaucoma    Gastrointestinal Disease Mother         gallbladder    Other Cancer Mother     Gallbladder Disease Mother     Alcohol/Drug Father         alcohol    Heart Disease Father         ? CHF    Respiratory Father         emphysema    Coronary Artery Disease Father     Substance Abuse Father     Substance Abuse Sister     Anxiety Disorder Sister     Asthma Sister     Colon Cancer Brother     Breast Cancer Maternal Grandmother     Asthma Sister     Substance Abuse Sister     Cancer - colorectal Brother 50    Prostate Cancer Brother     Allergies Brother         bee     Arthritis Sister         osteo    Arthritis Sister         osteo    Arthritis Brother         osteo    Depression Sister     Psychotic Disorder Sister         bipolar    Respiratory Sister     Colon Cancer Brother     Prostate Cancer Brother     Depression Sister     Asthma Sister      Social History     Socioeconomic History    Marital status:      Spouse name: Not on file    Number of children: Not on file    Years of education: Not on file    Highest education level: Not on file   Occupational History    Not on file   Tobacco Use    Smoking status: Former     Packs/day: 1.00     Years: 22.00     Additional pack years: 0.00     Total pack years: 22.00     Types: Cigarettes     Start date: 1969     Quit date: 2010     Years since quittin.6    Smokeless tobacco: Never    Tobacco comments:     I have been chewing nicorette gum 3 yrs and 3 months now. I have quit 8 and 9 yrs and periods in bet   Vaping Use    Vaping Use: Never used   Substance and Sexual Activity    Alcohol use: Not Currently     Comment: No longer    Drug use: Yes     Types: Marijuana     Comment: for sleeping/occ    Sexual activity: Not  Currently     Partners: Male     Birth control/protection: Surgical     Comment: hyst   Other Topics Concern     Service No    Blood Transfusions No    Caffeine Concern Yes     Comment: 4-5 cups caffeine per day    Occupational Exposure No    Hobby Hazards No    Sleep Concern Yes    Stress Concern Yes    Weight Concern No    Special Diet Yes     Comment: organic foods    Back Care No    Exercise No    Bike Helmet No    Seat Belt Yes    Self-Exams Yes    Parent/sibling w/ CABG, MI or angioplasty before 65F 55M? No   Social History Narrative    Not on file     Social Determinants of Health     Financial Resource Strain: Low Risk  (10/9/2023)    Financial Resource Strain     Within the past 12 months, have you or your family members you live with been unable to get utilities (heat, electricity) when it was really needed?: No   Food Insecurity: Low Risk  (10/9/2023)    Food Insecurity     Within the past 12 months, did you worry that your food would run out before you got money to buy more?: No     Within the past 12 months, did the food you bought just not last and you didn t have money to get more?: No   Transportation Needs: Low Risk  (10/9/2023)    Transportation Needs     Within the past 12 months, has lack of transportation kept you from medical appointments, getting your medicines, non-medical meetings or appointments, work, or from getting things that you need?: No   Physical Activity: Not on file   Stress: Not on file   Social Connections: Not on file   Interpersonal Safety: Not on file   Housing Stability: Low Risk  (10/9/2023)    Housing Stability     Do you have housing? : Yes     Are you worried about losing your housing?: No            Allergies   Cats and Codeine sulfate    Today's clinic visit entailed:  Review of the result(s) of each unique test - atrial flutter ablation, EKG, echocardiogram  I spent a total of 45 minutes on the day of the visit.   Time spent by me doing chart review, history  and exam, documentation and further activities per the note  Provider  Link to MDM Help Grid     The level of medical decision making during this visit was of high complexity.      Thank you for allowing me to participate in the care of your patient.      Sincerely,     Adriana Mack PA-C     [facility]  cc:   Adriana Mack PA-C  [address]  [place] [address]

## 2023-12-18 NOTE — PROGRESS NOTES
Electrophysiology Clinic Progress Note    Svetlana Adair MRN# [id number]   YOB: 1950 Age: 73 year old     Primary cardiology team: Dr. Perez / Dr. John (EP)         Assessment and Plan     In summary, Svetlana Adair presents today for urgent evaluation of dyspnea, fatigue, and a rash, one week after uneventful AFL ablation. I suspect that her now-improving symptoms may be related to metoprolol which is likely still washing out of her system (last dose 12/15). Her only symptom that hasn't yet improved is a rash which started in the hospital.     There is no chest pain nor orthopnea, so my concern for pericardial effusion/pericarditis is very low. At this time, I will check a chest x-ray to ensure we don't see any pulmonary edema. If noted, will start her on a few days of furosemide. I considered just starting this empirically, however with her ongoing rash will hold off on introducing new medications unless clearly necessary. I will follow up with her after those results return. If her rash doesn't improve over this week, I will change her from Eliquis to Xarelto, and ask her to be further evaluated by her PCP.     Follow-up:  Scheduled to see Janice Kang 1/18/23.    Adriana Mack PA-C  [facility]         History of Presenting Illness     Svetlana Adair is a pleasant 73 year old patient with a pertinent history of hyperlipidemia, anxiety, depression, long COVID, coronary artery calcifications based on calcium score, history of tobacco use (quit ~10 years ago), breast cancer status post bilateral mastectomy in remission, who was recently admitted on 12/9/2023 with with palpitations and found to be in typical atrial flutter with rates in the 160's. LVEF of 60 to 65% with trace TR. She underwent uneventful AFL ablation with Dr. John on 12/11/23. Post-procedure, she developed chest burning for which she was started on ibuprofen 400-600 mg TID x two days. She was placed  "on Eliquis ([id number] score of 2) for one month, followed by aspirin as long as no identifiable recurrent AFL or AF was noted on event monitor.     Two days later, she called our clinic with concerns about shortness of breath, heavy legs, lightheadedness and low HR. EKG showed sinus bradycardia with PAC's. Dr. John stopped her metoprolol which had been newly started in the hospital. However she called back this morning and stated that she feels no better and was added urgently to my schedule.     Now, Mahnaz says her breathing is feeling better. She could walk up stairs just now without much issue but states she couldn't have done that this morning. Her lightheadedness has also improved. She bends over and stands back up quickly in the room and feels fine with it. Her rash however has not improved. She developed a rash day 2 in the hospital, which would be after the time metoprolol was started, but possibly before the Eliquis was initiated. It is itching, on both her extremities and trunk, small raised papules. She hasn't noticed any issues with recurrent atrial flutter since hospital discharge. Patient denies chest pain, PND, orthopnea, edema, claudication, palpitations.    Her lungs are clear on exam. She sounds to be in sinus rhythm with occasional PAC's. There is no rub.         Review of Systems     12-pt ROS is negative except for as noted in the HPI.          Physical Exam     Vitals: /74   Pulse 68   Ht 1.651 m (5' 5\")   Wt 64 kg (141 lb)   SpO2 97%   BMI 23.46 kg/m    Wt Readings from Last 10 Encounters:   12/18/23 64 kg (141 lb)   12/12/23 63 kg (138 lb 14.4 oz)   12/01/23 63.4 kg (139 lb 11.2 oz)   10/09/23 63 kg (139 lb)   05/05/23 64.4 kg (142 lb)   04/06/23 65.3 kg (144 lb)   03/29/23 65.3 kg (144 lb)   05/12/22 66.3 kg (146 lb 3.2 oz)   04/14/22 66.2 kg (146 lb)   02/03/22 66.5 kg (146 lb 9.6 oz)       Constitutional:  Patient is pleasant, alert, cooperative, and in NAD.  HEENT:  NCAT. " PERRLA. EOM's intact.   Neck:  CVP appears normal. No carotid bruits.   Pulmonary: Normal respiratory effort. CTAB.   Cardiac: RRR, normal S1/S2, no S3/S4, no murmur or rub.   Abdomen:  Non-tender abdomen, no hepatosplenomegaly appreciated.   Vascular: Pulses in the upper and lower extremities are 2+ and equal bilaterally.  Extremities: No edema, erythema, cyanosis or tenderness appreciated.  Skin:  No rashes or lesions appreciated.   Neurological:  No gross motor or sensory deficits.   Psych: Appropriate affect.          Data   Labs reviewed:  Recent Labs   Lab Test 12/09/23  1459 09/25/23  0913 05/05/23  1152 03/28/23  0938 03/23/21  1040 01/11/21  1241 04/30/19  1003 10/19/18  0931   LDL  --  67 46 77   < >  --    < >  --    HDL  --  70 74 70   < >  --    < >  --    NHDL  --  85 56 87   < >  --    < >  --    CHOL  --  155 130 157   < >  --    < >  --    TRIG  --  89 50 51   < >  --    < >  --    TSH 4.94*  --   --   --   --  1.59  --  2.46    < > = values in this interval not displayed.       Lab Results   Component Value Date    WBC 6.7 12/10/2023    WBC 7.3 01/11/2021    RBC 4.39 12/10/2023    RBC 4.17 01/11/2021    HGB 13.6 12/10/2023    HGB 13.6 01/11/2021    HCT 41.2 12/10/2023    HCT 40.6 01/11/2021    MCV 94 12/10/2023    MCV 97 01/11/2021    MCH 31.0 12/10/2023    MCH 32.6 01/11/2021    MCHC 33.0 12/10/2023    MCHC 33.5 01/11/2021    RDW 12.5 12/10/2023    RDW 13.1 01/11/2021     12/12/2023     01/11/2021       Lab Results   Component Value Date     12/10/2023     01/11/2021    POTASSIUM 4.6 12/10/2023    POTASSIUM 4.3 01/17/2022    POTASSIUM 4.2 01/11/2021    CHLORIDE 110 (H) 12/10/2023    CHLORIDE 103 01/17/2022    CHLORIDE 101 01/11/2021    CO2 26 12/10/2023    CO2 30 01/17/2022    CO2 24 01/11/2021    ANIONGAP 9 12/10/2023    ANIONGAP 2 (L) 01/17/2022    ANIONGAP 9 01/11/2021     (H) 12/10/2023     (H) 01/17/2022     (H) 01/11/2021    BUN 10.9 12/10/2023  "   BUN 11 01/17/2022    BUN 17 01/11/2021    CR 0.99 (H) 12/12/2023    CR 0.74 01/11/2021    GFRESTIMATED 61 12/12/2023    GFRESTIMATED >60 07/21/2021    GFRESTIMATED 83 01/11/2021    GFRESTBLACK >90 01/11/2021    YASMIN 9.4 12/10/2023    YASMIN 9.2 01/11/2021      Lab Results   Component Value Date    AST 24 12/10/2023    AST 18 10/31/2019    ALT 30 12/10/2023    ALT 35 10/31/2019       Lab Results   Component Value Date    A1C 5.6 11/18/2016       No results found for: \"INR\"         Problem List     Patient Active Problem List   Diagnosis    Breast cancer est/prog +, HER2 ERNIE -    Osteoarthritis    Moderate episode of recurrent major depressive disorder (H)    Advanced directives, counseling/discussion    Knee pain    Past use of tobacco    IFG (impaired fasting glucose)    Low back pain    Malignant neoplasm of right breast (H)    Hyperlipidemia LDL goal <70    Follow-up examination following surgery    Atherosclerosis of left carotid artery    Adjustment disorder with mixed anxiety and depressed mood    Neck pain on right side    Hyperparathyroidism (H24)    Alcohol dependence in remission (H)    High coronary artery calcium score    Other fatigue    Pulmonary nodule    New onset atrial flutter (H)    Personal history of malignant neoplasm of breast            Medications     Current Outpatient Medications   Medication Sig Dispense Refill    apixaban ANTICOAGULANT (ELIQUIS) 5 MG tablet Take 1 tablet (5 mg) by mouth 2 times daily 60 tablet 0    ASHWAGANDHA PO Take 1 tablet by mouth daily as needed At onset of illness symptoms; as needed      buPROPion (WELLBUTRIN XL) 300 MG 24 hr tablet Take 1 tablet (300 mg) by mouth every morning 90 tablet 3    calcium carbonate-vitamin D (OS-YASMIN) 500-400 MG-UNIT tablet Take 1 tablet by mouth daily       cholecalciferol (VITAMIN D3) 25 mcg (1000 units) capsule Take 1 capsule by mouth daily      Coenzyme Q10 300 MG CAPS Take 300 mg by mouth daily      ibuprofen (ADVIL/MOTRIN) 400 MG " tablet Take 1 tablet (400 mg) by mouth 2 times daily as needed for inflammatory pain      letrozole (FEMARA) 2.5 MG tablet Take 1 tablet by mouth daily  5    Lutein-Zeaxanthin 25-5 MG CAPS Take 1 capsule by mouth daily      LYSINE 1-3 daily as needed      melatonin 5 MG tablet Take 5 mg by mouth nightly as needed for sleep      Naltrexone HCl, Pain, 4.5 MG CAPS Take 1 capsule by mouth daily      nicotine polacrilex (NICORETTE) 2 MG gum Place 1 each (2 mg) inside cheek as needed for smoking cessation 30 tablet 11    omeprazole (PRILOSEC) 20 MG DR capsule Take 1 capsule (20 mg) by mouth daily      rosuvastatin (CRESTOR) 40 MG tablet Take 1 tablet (40 mg) by mouth daily 90 tablet 3    UNABLE TO FIND Take by mouth daily MEDICATION NAME: Asea Redox - 1oz twice daily      UNABLE TO FIND Take 1 tablet by mouth daily MEDICATION NAME: Aidee Jarrettao - chinese supplement takes at onset of illness symptoms      UNABLE TO FIND MEDICATION NAME: Somnapure - 2 tablets = 500mg valerian root, 300mg lemon balm extract, 200mg l-theanine, 120mg hops extract, 50mg chamomile flower extract, 50mg passion flower extract, 3mg melatonin.  Takes 1-2 tablets at bedtime      UNABLE TO FIND MEDICATION NAME: Moringa 1 serving of powder daily      UNABLE TO FIND MEDICATION NAME: Premium Amla Berry 2 capsules = 4mg Vitamin C, 966mg organic amla, organic amla extract.  Takes 1 capsule daily as needed for immune system      UNABLE TO FIND MEDICATION NAME: OmegaKrill 5x 3 capsules = 480mg of total omega-3, 64mg EPA, 392mg DHA, 300mcg astaxanthin.  Takes 3 capsules daily      UNABLE TO FIND MEDICATION NAME: Super Colon Cleanse 2 capsules = 665mg senna leaf powder, 321mg psyllium husk powder, 21mg fennel seed powder, 21mg papaya leaf powder, 21mg yolanda hips fruit powder, 11mg l-acidophilus.  Taking 4 capsules daily      valACYclovir (VALTREX) 1000 mg tablet TAKE 1 TABLET(1000 MG) BY MOUTH THREE TIMES DAILY FOR 7 DAYS 21 tablet 11    vitamin C (ASCORBIC ACID)  250 MG tablet Take 250 mg by mouth daily      [START ON 1/15/2024] aspirin 81 MG EC tablet Take 1 tablet (81 mg) by mouth daily (Patient not taking: Reported on 12/18/2023)      IBUPROFEN PO Take 200 mg by mouth every 4 hours as needed for moderate pain  (Patient not taking: Reported on 12/18/2023)              Past Medical History     Past Medical History:   Diagnosis Date    Alcoholism (H)     Allergies     Fall    Arthritis     Basal cell cancer     back, chest, face    Breast cancer (H)     Coronary artery disease     CT calcium njrth=164 Nov 2015    Depression     Hyperlipidemia LDL goal <130 12/2/2015    Hypertension     borderline    PONV (postoperative nausea and vomiting)     Squamous cell carcinoma     left upper arm, chin     Past Surgical History:   Procedure Laterality Date    ABDOMEN SURGERY  2007?    Hysterectomy    COLONOSCOPY      COLONOSCOPY  11/17/2011    Procedure:COLONOSCOPY; Colonoscopy; Surgeon:MEKA RUSS; Location: GI    COLONOSCOPY N/A 2/10/2020    Procedure: COLONOSCOPY, WITH POLYPECTOMY AND BIOPSY;  Surgeon: Opal cAe MD;  Location:  GI    DISSECT LYMPH NODE AXILLA Right 11/2/2017    Procedure: DISSECT LYMPH NODE AXILLA;  RIGHT AXILLARY LYMPH NODE DISSECTION;  Surgeon: Cortez White MD;  Location:  SD    EP ABLATION ATRIAL FLUTTER N/A 12/11/2023    Procedure: EP Ablation Atrial Flutter;  Surgeon: Michael John MD;  Location:  HEART CARDIAC CATH LAB    GYN SURGERY  2005    hysterectomy after hx of ovarian cyst, ended up being benign    HYSTERECTOMY, PAP NO LONGER INDICATED      MASTECTOMY MODIFIED RADICAL  2011    bilateral    MASTECTOMY, BILATERAL      ORTHOPEDIC SURGERY      rt. knee arthroscopy    ORTHOPEDIC SURGERY Right     toe surgery    REALIGN PATELLA  1973    right     TOE SURGERY      right grt OA      Family History   Problem Relation Age of Onset    Cancer Mother 60        leukemia    Arthritis Mother         osteo    Eye Disorder Mother          glaucoma    Gastrointestinal Disease Mother         gallbladder    Other Cancer Mother     Gallbladder Disease Mother     Alcohol/Drug Father         alcohol    Heart Disease Father         ? CHF    Respiratory Father         emphysema    Coronary Artery Disease Father     Substance Abuse Father     Substance Abuse Sister     Anxiety Disorder Sister     Asthma Sister     Colon Cancer Brother     Breast Cancer Maternal Grandmother     Asthma Sister     Substance Abuse Sister     Cancer - colorectal Brother 50    Prostate Cancer Brother     Allergies Brother         bee     Arthritis Sister         osteo    Arthritis Sister         osteo    Arthritis Brother         osteo    Depression Sister     Psychotic Disorder Sister         bipolar    Respiratory Sister     Colon Cancer Brother     Prostate Cancer Brother     Depression Sister     Asthma Sister      Social History     Socioeconomic History    Marital status:      Spouse name: Not on file    Number of children: Not on file    Years of education: Not on file    Highest education level: Not on file   Occupational History    Not on file   Tobacco Use    Smoking status: Former     Packs/day: 1.00     Years: 22.00     Additional pack years: 0.00     Total pack years: 22.00     Types: Cigarettes     Start date: 1969     Quit date: 2010     Years since quittin.6    Smokeless tobacco: Never    Tobacco comments:     I have been chewing nicorette gum 3 yrs and 3 months now. I have quit 8 and 9 yrs and periods in bet   Vaping Use    Vaping Use: Never used   Substance and Sexual Activity    Alcohol use: Not Currently     Comment: No longer    Drug use: Yes     Types: Marijuana     Comment: for sleeping/occ    Sexual activity: Not Currently     Partners: Male     Birth control/protection: Surgical     Comment: hyst   Other Topics Concern     Service No    Blood Transfusions No    Caffeine Concern Yes     Comment: 4-5 cups caffeine per day     Occupational Exposure No    Hobby Hazards No    Sleep Concern Yes    Stress Concern Yes    Weight Concern No    Special Diet Yes     Comment: organic foods    Back Care No    Exercise No    Bike Helmet No    Seat Belt Yes    Self-Exams Yes    Parent/sibling w/ CABG, MI or angioplasty before 65F 55M? No   Social History Narrative    Not on file     Social Determinants of Health     Financial Resource Strain: Low Risk  (10/9/2023)    Financial Resource Strain     Within the past 12 months, have you or your family members you live with been unable to get utilities (heat, electricity) when it was really needed?: No   Food Insecurity: Low Risk  (10/9/2023)    Food Insecurity     Within the past 12 months, did you worry that your food would run out before you got money to buy more?: No     Within the past 12 months, did the food you bought just not last and you didn t have money to get more?: No   Transportation Needs: Low Risk  (10/9/2023)    Transportation Needs     Within the past 12 months, has lack of transportation kept you from medical appointments, getting your medicines, non-medical meetings or appointments, work, or from getting things that you need?: No   Physical Activity: Not on file   Stress: Not on file   Social Connections: Not on file   Interpersonal Safety: Not on file   Housing Stability: Low Risk  (10/9/2023)    Housing Stability     Do you have housing? : Yes     Are you worried about losing your housing?: No            Allergies   Cats and Codeine sulfate    Today's clinic visit entailed:  Review of the result(s) of each unique test - atrial flutter ablation, EKG, echocardiogram  I spent a total of 45 minutes on the day of the visit.   Time spent by me doing chart review, history and exam, documentation and further activities per the note  Provider  Link to [facility] Help Grid     The level of medical decision making during this visit was of high complexity.

## 2023-12-18 NOTE — TELEPHONE ENCOUNTER
[facility] Call Center    Phone Message    May a detailed message be left on voicemail: yes     Reason for Call: Symptoms or Concerns     If patient has red-flag symptoms, warm transfer to triage line    Current symptom or concern: Still feeling week, light headed and SOB with exertion, rash from metoprolol, pt states she has stopped taking it.    Symptoms have been present for:  3 day(s)    Has patient previously been seen for this? Yes    By : Barbra Street RN     Date: 12/15/23    Are there any new or worsening symptoms? Yes:     Action Taken: Other: cardio    Travel Screening: Not Applicable      Thank you!  [facility]

## 2024-01-07 NOTE — TELEPHONE ENCOUNTER
Rescheduled Procedure     *NOTE - Upon review patient had a recent cardiac event that occurred on 12/9/23 for new onset a-flutter with RVR.  S/p successful ablation on 12/11/23 with Dr. John. Patient is on Apixaban (Eliquis). Plan is to have patient on this for one month.    The Gastroenterology Service Line is advising delay of procedure for at least 6 months.     Sent to cardiology and ordering provider to review and advise.     ------------------------------------------------------------------------------------------------------    Pre visit planning completed.      Procedure details:    Patient scheduled for Colonoscopy  on 1/22/24.     Arrival time: 0945. Procedure time 1030    Pre op exam needed? N/A    Facility location: [facility]; [address] [place] [address]    Sedation type: Conscious sedation     Medication review:    Anticoagulants? Yes Apixaban (Eliquis): Recommended HOLD 2 days before procedure.  Consult with your managing provider.    NSAIDS? Yes.  Ibuprofen (Advil, Motrin).  Holding interval of 1 day before procedure.    Other medication HOLDING recommendations:  Naltrexone PO: HOLD 3 days before procedure.       Prep for procedure:     Bowel prep recommendation: Standard Miralax   Due to:  standard bowel prep.    Prep instructions sent via [employer] -to be resent once response received from cardiology provider regarding ok to proceed.         Angi Ridley RN  Endoscopy Procedure Pre Assessment RN  [phone] option 4

## 2024-01-08 ENCOUNTER — TELEPHONE (OUTPATIENT)
Dept: GASTROENTEROLOGY | Facility: CLINIC | Age: 74
End: 2024-01-08
Payer: MEDICARE

## 2024-01-08 NOTE — TELEPHONE ENCOUNTER
Caller: Writer to patient  Reason for Reschedule/Cancellation (please be detailed, any staff messages or encounters to note?): Needs to be 3/11/2024 or after per cardiology      Prior to reschedule please review:  Ordering Provider: Vladislav Cruz MD   Sedation per order: Moderate  Does patient have any ASC Exclusions, please identify?: Y - recent cardiac events      Notes on Cancelled Procedure:  Procedure: Lower Endoscopy [Colonoscopy]   Date: 1/22/2024  Location: [facility]; [address] [place] [address]  Surgeon: Wu      Rescheduled: Yes  Procedure: Lower Endoscopy [Colonoscopy]   Date: 4/1/2024  Location: [facility]; [address] [place] [address]  Surgeon: Wu  Sedation Level Scheduled  Moderate,  Reason for Sedation Level Order  Prep/Instructions updated and sent: Larry

## 2024-01-08 NOTE — TELEPHONE ENCOUNTER
----- Message from Angi Ridley RN sent at 1/8/2024  8:05 AM CST -----  Regarding: RE: Cardiac review for upcoming colonoscopy  Endoscopy scheduling  team - Please see cardiology note below.     Procedure to be rescheduled after 3/11/24 due to recent cardiac event.     Thank you,   Angi Ridley RN  Endoscopy Procedure Pre Assessment RN  [phone] option 4       ----- Message -----  From: Adriana Mack PA-C  Sent: 1/7/2024  10:20 PM CST  To: Angi Ridley RN; Vlaidslav Cruz MD  Subject: RE: Cardiac review for upcoming colonoscopy      Colonoscopy would be OK from a cardiac standpoint after 3/11/24. Thank you.  ----- Message -----  From: Angi Ridley RN  Sent: 1/7/2024   1:41 PM CST  To: Vladislav Cruz MD; Adriana Mack PA-C  Subject: Cardiac review for upcoming colonoscopy          We are reaching out because your patient has an order for a Colonoscopy  for indication of screening.    Upon review patient had a recent cardiac event that occurred on 12/9/23 for new onset a-flutter with RVR.  S/p successful ablation on 12/11/23 with Dr. John. Patient is on Apixaban (Eliquis). Plan is to have patient on this for one month.    The Gastroenterology Service Line is advising delay of procedure for at least 6 months.     Please review and advise appropriateness of procedure and whether delay of procedure is recommended. If procedure should NOT be delayed for 6 months, please indicate date ok to proceed with completing and if ok to hold Apixaban (Eliquis) for 2 days prior to scheduled procedure.      Thank you,  Angi Ridley RN  Endoscopy Procedure Pre Assessment RAKEL

## 2024-01-08 NOTE — TELEPHONE ENCOUNTER
"Response received from cardiology provider Adriana Mack PA-C:    \"Colonoscopy would be OK from a cardiac standpoint after 3/11/24\"      Sent to endoscopy scheduling to reschedule case for after 3/11/24 per provider recommendations.     Angi Ridley RN  Endoscopy Procedure Pre Assessment RN  [phone] option 4    "

## 2024-01-15 ENCOUNTER — LAB (OUTPATIENT)
Dept: LAB | Facility: CLINIC | Age: 74
End: 2024-01-15
Payer: MEDICARE

## 2024-01-15 DIAGNOSIS — E78.2 MIXED HYPERLIPIDEMIA: ICD-10-CM

## 2024-01-15 LAB
ALT SERPL W P-5'-P-CCNC: 31 U/L (ref 0–50)
CHOLEST SERPL-MCNC: 155 MG/DL
FASTING STATUS PATIENT QL REPORTED: NO
HDLC SERPL-MCNC: 83 MG/DL
LDLC SERPL CALC-MCNC: 60 MG/DL
NONHDLC SERPL-MCNC: 72 MG/DL
TRIGL SERPL-MCNC: 60 MG/DL

## 2024-01-15 PROCEDURE — 84460 ALANINE AMINO (ALT) (SGPT): CPT | Performed by: INTERNAL MEDICINE

## 2024-01-15 PROCEDURE — 36415 COLL VENOUS BLD VENIPUNCTURE: CPT | Performed by: INTERNAL MEDICINE

## 2024-01-15 PROCEDURE — 80061 LIPID PANEL: CPT | Performed by: INTERNAL MEDICINE

## 2024-01-16 NOTE — TELEPHONE ENCOUNTER
Controlled Substance Refill Request for Norco  Problem List Complete:  Yes    Last Written Prescription Date:  10/26/18  Last Fill Quantity: 20,   # refills: 0    Last Office Visit with [facility] primary care provider: 10/19/18    Clinic visit frequency required: Q 3 months     Future Office visit:   Next 5 appointments (look out 90 days)    Dec 31, 2018 10:30 AM CST  [facility] [address] [facility] [address]  [phone]   Jan 03, 2019  1:30 PM CST  Pre-Op physical with [facility]islav [facility]  [facility] [address] [facility] [address]  [phone]   Jan 22, 2019 10:00 AM CST  Return Visit with Monserrat Meng [facility] ([facility] [address]  [address]  [facility] [address]  [phone]          Controlled substance agreement on file: Yes:  Date 04/26/18.     Processing:  Patient will  in clinic   checked in past 3 months?  No, route to RAKEL Sher MA       dysuria , fever and left flank pain

## 2024-01-17 ENCOUNTER — ANCILLARY PROCEDURE (OUTPATIENT)
Dept: MRI IMAGING | Facility: CLINIC | Age: 74
End: 2024-01-17
Attending: PLASTIC SURGERY
Payer: MEDICARE

## 2024-01-17 DIAGNOSIS — Z85.3 HISTORY OF MALIGNANT NEOPLASM OF FEMALE BREAST: ICD-10-CM

## 2024-01-17 DIAGNOSIS — Z90.13 HISTORY OF BILATERAL MASTECTOMY: ICD-10-CM

## 2024-01-17 PROCEDURE — A9585 GADOBUTROL INJECTION: HCPCS | Performed by: PLASTIC SURGERY

## 2024-01-17 PROCEDURE — 77049 MRI BREAST C-+ W/CAD BI: CPT

## 2024-01-17 PROCEDURE — 255N000002 HC RX 255 OP 636: Performed by: PLASTIC SURGERY

## 2024-01-17 RX ORDER — GADOBUTROL 604.72 MG/ML
6.5 INJECTION INTRAVENOUS ONCE
Status: COMPLETED | OUTPATIENT
Start: 2024-01-17 | End: 2024-01-17

## 2024-01-17 RX ADMIN — GADOBUTROL 6.5 ML: 604.72 INJECTION INTRAVENOUS at 09:51

## 2024-01-18 ENCOUNTER — OFFICE VISIT (OUTPATIENT)
Dept: CARDIOLOGY | Facility: CLINIC | Age: 74
End: 2024-01-18
Payer: MEDICARE

## 2024-01-18 VITALS
HEART RATE: 58 BPM | HEIGHT: 65 IN | OXYGEN SATURATION: 96 % | DIASTOLIC BLOOD PRESSURE: 71 MMHG | SYSTOLIC BLOOD PRESSURE: 111 MMHG | BODY MASS INDEX: 23.44 KG/M2 | WEIGHT: 140.7 LBS

## 2024-01-18 DIAGNOSIS — I48.92 ATRIAL FLUTTER WITH RAPID VENTRICULAR RESPONSE (H): ICD-10-CM

## 2024-01-18 PROCEDURE — 93000 ELECTROCARDIOGRAM COMPLETE: CPT | Performed by: NURSE PRACTITIONER

## 2024-01-18 PROCEDURE — 99213 OFFICE O/P EST LOW 20 MIN: CPT | Performed by: NURSE PRACTITIONER

## 2024-01-18 NOTE — PATIENT INSTRUCTIONS
Today's Recommendations    Continue all medications without changes.  Please follow up with Taiwo in June.    Please send [facility] message or call [phone] to the RN team with questions or concerns.     Scheduling and after hours number [phone]    ANNAMARIA Rice, CNP      How to send EKG on [employer]:    Take EKG on [employer] device-click done  Go to health history on [employer] Harsh  Click on see EKG history  Click on EKG wanting to send  Scroll down to where it says download PDF  Click on skip  Tap the box with the arrow pointing up  Save to file-iCloud  GO TO YOUR [email or web address] ON YOUR PHONE  Starting from [employer] once you log in  Click on messages  Send a Message  Click on Ask a medical question  Click on updates on health  Click on provider  Fill in subject and message line  Click on Attach (paperclip at bottom of screen)  Click on the file folder   Click on EKG in the folder  Click send (button must be green when ready to send)

## 2024-01-18 NOTE — PROGRESS NOTES
Electrophysiology Clinic Progress Note  Svetlana Adair MRN# [id number]   YOB: 1950 Age: 73 year old     Primary cardiologist: Dr. Perez (general) and Dr. John (EP)    Reason for visit: Atrial flutter follow up    History of presenting illness:    Svetlana Adair is a pleasant 73 year old patient with past medical history significant for:    Typical atrial flutter: Status post CTI ablation 12/11/2023 by Dr. John patient was placed on Eliquis x 30 days postprocedure.  Did not tolerate metoprolol due to rash, shortness of breath and overall fatigue.  Hyperlipidemia  Anxiety and depression  Long COVID syndrome  Coronary artery calcifications based on calcium score from 2015   Breast cancer status post bilateral mastectomies in remission  Tobacco use history: Quit approximately 10 years ago    The patient presented to [facility] with palpitations on 12/9/2023.  She was found to have typical atrial flutter with RVR and started on Eliquis 5 mg twice daily.  During her stay she underwent a CTI ablation.  Postprocedure she developed chest burning and was prescribed 4600 mg of ibuprofen 3 times daily x 2 days.    Mahnaz called the clinic and reported shortness of breath, heavy legs, lightheadedness and low heart rate and an EKG at that visit noted sinus bradycardia with PACs.  Metoprolol was discontinued which was started during her admission by Dr. John on 12/15.  Given ongoing symptoms she was urgently added onto clinic with Adriana Mack PA-C on 12/18/2023 due to ongoing symptoms.  At that visit she did report improving symptoms.  She also reported a rash that was improving after metoprolol was discontinued.    Today the patient returns for a discharge follow-up.  EKG today confirms sinus rhythm and she has no symptomatic concerns for recurrent atrial tachyarrhythmias.  She has stopped Eliquis after 30 days of therapy.  Also, her symptoms of fatigue, shortness of breath and rash have  completely resolved since stopping metoprolol approximately a month ago.    Diagnotic studies:  EKG (1/18/2024): SR 60 bpm, , , QTc 488, QRSD 86  Chest x-ray (12/18/2023): No acute concerns without concern for pericardial pleural effusion.  [facility] (12/11/2023): LVEF of 55 to 60% with wall motion abnormalities.  No evidence of valvular pathology.  No thrombus in KURT  Stress cardiac MRI (2021): Normal LV size and function without wall motion abnormalities.  No concerns for MI, fibrosis or infiltrative disease.  No evidence of ischemia on stress portion.  CT coronary angiogram (2015): Total score of 790 placing her to the 99 percentile when compared to age and gender matched group.  Left main 94, , circumflex 0 and .          Assessment and Plan:     ASSESSMENT:    Typical atrial flutter  Status post CTI ablation 12/9/2023 by Dr. John patient was placed on Eliquis x 30 days postprocedure.    [id number] score of 2 (gender and age)  Did not tolerate metoprolol due to rash, shortness of breath and overall fatigue.    Coronary artery calcifications based on CT scan  Negative stress MRI in 2021  FLP (1/15/2024): Total cholesterol 155, HDL 83, LDL 60 and triglycerides 60  Maintained on rosuvastatin 20 mg daily and baby aspirin      PLAN:     Continue present medical therapy  Follow-up with general cardiology in June as previously scheduled       Orders this Visit:  Orders Placed This Encounter   Procedures    EKG 12-lead complete w/read - Clinics (performed today)     No orders of the defined types were placed in this encounter.    Medications Discontinued During This Encounter   Medication Reason    apixaban ANTICOAGULANT (ELIQUIS) 5 MG tablet        Today's clinic visit entailed:  Review of the result(s) of each unique test - EKG, echo, ablation, labs  25 minutes spent by me on the date of the encounter doing chart review, history and exam, documentation and further activities per the  "note  Provider  Link to [facility] Help Grid     The level of medical decision making during this visit was of moderate complexity.           Review of Systems:     Review of Systems:  Skin:  Negative rash   Eyes:  Positive for glasses  ENT:  Negative vertigo  Respiratory:  Positive for shortness of breath  Cardiovascular:  Negative for;chest pain;palpitations;lightheadedness;dizziness;syncope or near-syncope;edema;fatigue fatigue;Positive for  Gastroenterology: Negative    Genitourinary:  Negative urinary frequency  Musculoskeletal:  Negative back pain;neck pain;joint pain;joint stiffness  Neurologic:  Negative headaches  Psychiatric:  Negative excessive stress;sleep disturbances  Heme/Lymph/Imm:  Negative allergies  Endocrine:  Negative              Physical Exam:     Vitals: /71 (BP Location: Left arm, Patient Position: Sitting)   Pulse 58   Ht 1.651 m (5' 5\")   Wt 63.8 kg (140 lb 11.2 oz)   SpO2 96%   BMI 23.41 kg/m    Constitutional: Well nourished and in no apparent distress.  Eyes: Pupils equal, round. Sclerae anicteric.   HEENT: Normocephalic, atraumatic.   Neck: Supple. JVD  Respiratory: Breathing non-labored. Lungs clear to auscultation bilaterally. No crackles, wheezes, rhonchi, or rales.  Cardiovascular:  Regular rate and rhythm, normal S1 and S2. No murmur, rub, or gallop.  Skin: Warm, dry. No rashes, cyanosis, or xanthelasma.  Extremities: No edema. No concerns with RFV site   Neurologic: No gross motor deficits. Alert, awake, and oriented to person, place and time.  Psychiatric: Affect appropriate.        CURRENT MEDICATIONS:  Current Outpatient Medications   Medication Sig Dispense Refill    ASHWAGANDHA PO Take 1 tablet by mouth daily as needed At onset of illness symptoms; as needed      aspirin 81 MG EC tablet Take 1 tablet (81 mg) by mouth daily      buPROPion (WELLBUTRIN XL) 300 MG 24 hr tablet Take 1 tablet (300 mg) by mouth every morning 90 tablet 3    calcium carbonate-vitamin D (OS-YASMIN) " 500-400 MG-UNIT tablet Take 1 tablet by mouth daily       cholecalciferol (VITAMIN D3) 25 mcg (1000 units) capsule Take 1 capsule by mouth daily      Coenzyme Q10 300 MG CAPS Take 300 mg by mouth daily      ibuprofen (ADVIL/MOTRIN) 400 MG tablet Take 1 tablet (400 mg) by mouth 2 times daily as needed for inflammatory pain      IBUPROFEN PO Take 200 mg by mouth every 4 hours as needed for moderate pain      letrozole (FEMARA) 2.5 MG tablet Take 1 tablet by mouth daily  5    Lutein-Zeaxanthin 25-5 MG CAPS Take 1 capsule by mouth daily      LYSINE 1-3 daily as needed      melatonin 5 MG tablet Take 5 mg by mouth nightly as needed for sleep      Naltrexone HCl, Pain, 4.5 MG CAPS Take 1 capsule by mouth daily      nicotine polacrilex (NICORETTE) 2 MG gum Place 1 each (2 mg) inside cheek as needed for smoking cessation 30 tablet 11    omeprazole (PRILOSEC) 20 MG DR capsule Take 1 capsule (20 mg) by mouth daily      rosuvastatin (CRESTOR) 40 MG tablet Take 1 tablet (40 mg) by mouth daily 90 tablet 3    UNABLE TO FIND Take by mouth daily MEDICATION NAME: Asea Redox - 1oz twice daily      UNABLE TO FIND Take 1 tablet by mouth daily MEDICATION NAME: Yin Chiao - chinese supplement takes at onset of illness symptoms      UNABLE TO FIND MEDICATION NAME: Somnapure - 2 tablets = 500mg valerian root, 300mg lemon balm extract, 200mg l-theanine, 120mg hops extract, 50mg chamomile flower extract, 50mg passion flower extract, 3mg melatonin.  Takes 1-2 tablets at bedtime      UNABLE TO FIND MEDICATION NAME: Moringa 1 serving of powder daily      UNABLE TO FIND MEDICATION NAME: Premium Amla Berry 2 capsules = 4mg Vitamin C, 966mg organic amla, organic amla extract.  Takes 1 capsule daily as needed for immune system      UNABLE TO FIND MEDICATION NAME: OmegaKrill 5x 3 capsules = 480mg of total omega-3, 64mg EPA, 392mg DHA, 300mcg astaxanthin.  Takes 3 capsules daily      UNABLE TO FIND MEDICATION NAME: Super Colon Cleanse 2 capsules =  665mg senna leaf powder, 321mg psyllium husk powder, 21mg fennel seed powder, 21mg papaya leaf powder, 21mg yolanda hips fruit powder, 11mg l-acidophilus.  Taking 4 capsules daily      valACYclovir (VALTREX) 1000 mg tablet TAKE 1 TABLET(1000 MG) BY MOUTH THREE TIMES DAILY FOR 7 DAYS 21 tablet 11    vitamin C (ASCORBIC ACID) 250 MG tablet Take 250 mg by mouth daily         ALLERGIES  Allergies   Allergen Reactions    Cats     Codeine Sulfate GI Disturbance    Metoprolol Rash         PAST MEDICAL HISTORY:  Past Medical History:   Diagnosis Date    Alcoholism (H)     Allergies     Fall    Arthritis     Basal cell cancer     back, chest, face    Breast cancer (H)     Coronary artery disease     CT calcium xlgnn=745 Nov 2015    Depression     Hyperlipidemia LDL goal <130 12/2/2015    Hypertension     borderline    PONV (postoperative nausea and vomiting)     Squamous cell carcinoma     left upper arm, chin       PAST SURGICAL HISTORY:  Past Surgical History:   Procedure Laterality Date    ABDOMEN SURGERY  2007?    Hysterectomy    COLONOSCOPY      COLONOSCOPY  11/17/2011    Procedure:COLONOSCOPY; Colonoscopy; Surgeon:MEKA RUSS; Location: GI    COLONOSCOPY N/A 2/10/2020    Procedure: COLONOSCOPY, WITH POLYPECTOMY AND BIOPSY;  Surgeon: Opal Ace MD;  Location:  GI    DISSECT LYMPH NODE AXILLA Right 11/2/2017    Procedure: DISSECT LYMPH NODE AXILLA;  RIGHT AXILLARY LYMPH NODE DISSECTION;  Surgeon: Cortez White MD;  Location:  [facility]    EP ABLATION ATRIAL FLUTTER N/A 12/11/2023    Procedure: EP Ablation Atrial Flutter;  Surgeon: Michael John MD;  Location:  [facility]    GYN SURGERY  2005    hysterectomy after hx of ovarian cyst, ended up being benign    HYSTERECTOMY, PAP NO LONGER INDICATED      MASTECTOMY MODIFIED RADICAL  2011    bilateral    MASTECTOMY, BILATERAL      ORTHOPEDIC SURGERY      rt. knee arthroscopy    ORTHOPEDIC SURGERY Right     toe surgery    REALIGN PATELLA  1973     right     TOE SURGERY      right grt OA        FAMILY HISTORY:  Family History   Problem Relation Age of Onset    Cancer Mother 60        leukemia    Arthritis Mother         osteo    Eye Disorder Mother         glaucoma    Gastrointestinal Disease Mother         gallbladder    Other Cancer Mother     Gallbladder Disease Mother     Alcohol/Drug Father         alcohol    Heart Disease Father         ? CHF    Respiratory Father         emphysema    Coronary Artery Disease Father     Substance Abuse Father     Substance Abuse Sister     Anxiety Disorder Sister     Asthma Sister     Colon Cancer Brother     Breast Cancer Maternal Grandmother     Asthma Sister     Substance Abuse Sister     Cancer - colorectal Brother 50    Prostate Cancer Brother     Allergies Brother         bee     Arthritis Sister         osteo    Arthritis Sister         osteo    Arthritis Brother         osteo    Depression Sister     Psychotic Disorder Sister         bipolar    Respiratory Sister     Colon Cancer Brother     Prostate Cancer Brother     Depression Sister     Asthma Sister        SOCIAL HISTORY:  Social History     Socioeconomic History    Marital status:      Spouse name: None    Number of children: None    Years of education: None    Highest education level: None   Tobacco Use    Smoking status: Former     Packs/day: 1.00     Years: 22.00     Additional pack years: 0.00     Total pack years: 22.00     Types: Cigarettes     Start date: 1969     Quit date: 2010     Years since quittin.7    Smokeless tobacco: Never    Tobacco comments:     I have been chewing nicorette gum 3 yrs and 3 months now. I have quit 8 and 9 yrs and periods in bet   Vaping Use    Vaping Use: Never used   Substance and Sexual Activity    Alcohol use: Not Currently     Comment: No longer    Drug use: Yes     Types: Marijuana     Comment: for sleeping/occ    Sexual activity: Not Currently     Partners: Male     Birth control/protection:  Surgical     Comment: hyst   Other Topics Concern     Service No    Blood Transfusions No    Caffeine Concern Yes     Comment: 4-5 cups caffeine per day    Occupational Exposure No    Hobby Hazards No    Sleep Concern Yes    Stress Concern Yes    Weight Concern No    Special Diet Yes     Comment: organic foods    Back Care No    Exercise No    Bike Helmet No    Seat Belt Yes    Self-Exams Yes    Parent/sibling w/ CABG, MI or angioplasty before 65F 55M? No     Social Determinants of Health     Financial Resource Strain: Low Risk  (10/9/2023)    Financial Resource Strain     Within the past 12 months, have you or your family members you live with been unable to get utilities (heat, electricity) when it was really needed?: No   Food Insecurity: Low Risk  (10/9/2023)    Food Insecurity     Within the past 12 months, did you worry that your food would run out before you got money to buy more?: No     Within the past 12 months, did the food you bought just not last and you didn t have money to get more?: No   Transportation Needs: Low Risk  (10/9/2023)    Transportation Needs     Within the past 12 months, has lack of transportation kept you from medical appointments, getting your medicines, non-medical meetings or appointments, work, or from getting things that you need?: No   Housing Stability: Low Risk  (10/9/2023)    Housing Stability     Do you have housing? : Yes     Are you worried about losing your housing?: No

## 2024-01-18 NOTE — LETTER
1/18/2024    Vladislav Cruz MD  [address]  [facility] [address]    RE: Svetlana Adair       Dear Colleague,     I had the pleasure of seeing Svetlana Adair in the [facility].    Electrophysiology Clinic Progress Note  Svetlana Adair MRN# [id number]   YOB: 1950 Age: 73 year old     Primary cardiologist: Dr. Perez (general) and Dr. John ()    Reason for visit: Atrial flutter follow up    History of presenting illness:    Svetlana Adair is a pleasant 73 year old patient with past medical history significant for:    Typical atrial flutter: Status post CTI ablation 12/11/2023 by Dr. John patient was placed on Eliquis x 30 days postprocedure.  Did not tolerate metoprolol due to rash, shortness of breath and overall fatigue.  Hyperlipidemia  Anxiety and depression  Long COVID syndrome  Coronary artery calcifications based on calcium score from 2015   Breast cancer status post bilateral mastectomies in remission  Tobacco use history: Quit approximately 10 years ago    The patient presented to [facility] with palpitations on 12/9/2023.  She was found to have typical atrial flutter with RVR and started on Eliquis 5 mg twice daily.  During her stay she underwent a CTI ablation.  Postprocedure she developed chest burning and was prescribed 4600 mg of ibuprofen 3 times daily x 2 days.    Mahnaz called the clinic and reported shortness of breath, heavy legs, lightheadedness and low heart rate and an EKG at that visit noted sinus bradycardia with PACs.  Metoprolol was discontinued which was started during her admission by Dr. John on 12/15.  Given ongoing symptoms she was urgently added onto clinic with Adriana Mack PA-C on 12/18/2023 due to ongoing symptoms.  At that visit she did report improving symptoms.  She also reported a rash that was improving after metoprolol was discontinued.    Today the patient returns for a discharge follow-up.  EKG today  confirms sinus rhythm and she has no symptomatic concerns for recurrent atrial tachyarrhythmias.  She has stopped Eliquis after 30 days of therapy.  Also, her symptoms of fatigue, shortness of breath and rash have completely resolved since stopping metoprolol approximately a month ago.    Diagnotic studies:  EKG (1/18/2024): SR 60 bpm, , , QTc 488, QRSD 86  Chest x-ray (12/18/2023): No acute concerns without concern for pericardial pleural effusion.  [facility] (12/11/2023): LVEF of 55 to 60% with wall motion abnormalities.  No evidence of valvular pathology.  No thrombus in KURT  Stress cardiac MRI (2021): Normal LV size and function without wall motion abnormalities.  No concerns for MI, fibrosis or infiltrative disease.  No evidence of ischemia on stress portion.  CT coronary angiogram (2015): Total score of 790 placing her to the 99 percentile when compared to age and gender matched group.  Left main 94, , circumflex 0 and .          Assessment and Plan:     ASSESSMENT:    Typical atrial flutter  Status post CTI ablation 12/9/2023 by Dr. John patient was placed on Eliquis x 30 days postprocedure.    [id number] score of 2 (gender and age)  Did not tolerate metoprolol due to rash, shortness of breath and overall fatigue.    Coronary artery calcifications based on CT scan  Negative stress MRI in 2021  FLP (1/15/2024): Total cholesterol 155, HDL 83, LDL 60 and triglycerides 60  Maintained on rosuvastatin 20 mg daily and baby aspirin      PLAN:     Continue present medical therapy  Follow-up with general cardiology in June as previously scheduled       Orders this Visit:  Orders Placed This Encounter   Procedures    EKG 12-lead complete w/read - Clinics (performed today)     No orders of the defined types were placed in this encounter.    Medications Discontinued During This Encounter   Medication Reason    apixaban ANTICOAGULANT (ELIQUIS) 5 MG tablet        Today's clinic visit entailed:  Review  "of the result(s) of each unique test - EKG, echo, ablation, labs  25 minutes spent by me on the date of the encounter doing chart review, history and exam, documentation and further activities per the note  Provider  Link to [facility] Help Grid     The level of medical decision making during this visit was of moderate complexity.           Review of Systems:     Review of Systems:  Skin:  Negative rash   Eyes:  Positive for glasses  ENT:  Negative vertigo  Respiratory:  Positive for shortness of breath  Cardiovascular:  Negative for;chest pain;palpitations;lightheadedness;dizziness;syncope or near-syncope;edema;fatigue fatigue;Positive for  Gastroenterology: Negative    Genitourinary:  Negative urinary frequency  Musculoskeletal:  Negative back pain;neck pain;joint pain;joint stiffness  Neurologic:  Negative headaches  Psychiatric:  Negative excessive stress;sleep disturbances  Heme/Lymph/Imm:  Negative allergies  Endocrine:  Negative              Physical Exam:     Vitals: /71 (BP Location: Left arm, Patient Position: Sitting)   Pulse 58   Ht 1.651 m (5' 5\")   Wt 63.8 kg (140 lb 11.2 oz)   SpO2 96%   BMI 23.41 kg/m    Constitutional: Well nourished and in no apparent distress.  Eyes: Pupils equal, round. Sclerae anicteric.   HEENT: Normocephalic, atraumatic.   Neck: Supple. JVD  Respiratory: Breathing non-labored. Lungs clear to auscultation bilaterally. No crackles, wheezes, rhonchi, or rales.  Cardiovascular:  Regular rate and rhythm, normal S1 and S2. No murmur, rub, or gallop.  Skin: Warm, dry. No rashes, cyanosis, or xanthelasma.  Extremities: No edema. No concerns with RFV site   Neurologic: No gross motor deficits. Alert, awake, and oriented to person, place and time.  Psychiatric: Affect appropriate.        CURRENT MEDICATIONS:  Current Outpatient Medications   Medication Sig Dispense Refill    ASHWAGANDHA PO Take 1 tablet by mouth daily as needed At onset of illness symptoms; as needed      aspirin " 81 MG EC tablet Take 1 tablet (81 mg) by mouth daily      buPROPion (WELLBUTRIN XL) 300 MG 24 hr tablet Take 1 tablet (300 mg) by mouth every morning 90 tablet 3    calcium carbonate-vitamin D (OS-YASMIN) 500-400 MG-UNIT tablet Take 1 tablet by mouth daily       cholecalciferol (VITAMIN D3) 25 mcg (1000 units) capsule Take 1 capsule by mouth daily      Coenzyme Q10 300 MG CAPS Take 300 mg by mouth daily      ibuprofen (ADVIL/MOTRIN) 400 MG tablet Take 1 tablet (400 mg) by mouth 2 times daily as needed for inflammatory pain      IBUPROFEN PO Take 200 mg by mouth every 4 hours as needed for moderate pain      letrozole (FEMARA) 2.5 MG tablet Take 1 tablet by mouth daily  5    Lutein-Zeaxanthin 25-5 MG CAPS Take 1 capsule by mouth daily      LYSINE 1-3 daily as needed      melatonin 5 MG tablet Take 5 mg by mouth nightly as needed for sleep      Naltrexone HCl, Pain, 4.5 MG CAPS Take 1 capsule by mouth daily      nicotine polacrilex (NICORETTE) 2 MG gum Place 1 each (2 mg) inside cheek as needed for smoking cessation 30 tablet 11    omeprazole (PRILOSEC) 20 MG DR capsule Take 1 capsule (20 mg) by mouth daily      rosuvastatin (CRESTOR) 40 MG tablet Take 1 tablet (40 mg) by mouth daily 90 tablet 3    UNABLE TO FIND Take by mouth daily MEDICATION NAME: Asea Redox - 1oz twice daily      UNABLE TO FIND Take 1 tablet by mouth daily MEDICATION NAME: Aidee Duong - chinese supplement takes at onset of illness symptoms      UNABLE TO FIND MEDICATION NAME: Somnapure - 2 tablets = 500mg valerian root, 300mg lemon balm extract, 200mg l-theanine, 120mg hops extract, 50mg chamomile flower extract, 50mg passion flower extract, 3mg melatonin.  Takes 1-2 tablets at bedtime      UNABLE TO FIND MEDICATION NAME: Moringa 1 serving of powder daily      UNABLE TO FIND MEDICATION NAME: Premium Amla Berry 2 capsules = 4mg Vitamin C, 966mg organic amla, organic amla extract.  Takes 1 capsule daily as needed for immune system      UNABLE TO FIND  MEDICATION NAME: OmegaKrill 5x 3 capsules = 480mg of total omega-3, 64mg EPA, 392mg DHA, 300mcg astaxanthin.  Takes 3 capsules daily      UNABLE TO FIND MEDICATION NAME: Super Colon Cleanse 2 capsules = 665mg senna leaf powder, 321mg psyllium husk powder, 21mg fennel seed powder, 21mg papaya leaf powder, 21mg yolanda hips fruit powder, 11mg l-acidophilus.  Taking 4 capsules daily      valACYclovir (VALTREX) 1000 mg tablet TAKE 1 TABLET(1000 MG) BY MOUTH THREE TIMES DAILY FOR 7 DAYS 21 tablet 11    vitamin C (ASCORBIC ACID) 250 MG tablet Take 250 mg by mouth daily         ALLERGIES  Allergies   Allergen Reactions    Cats     Codeine Sulfate GI Disturbance    Metoprolol Rash         PAST MEDICAL HISTORY:  Past Medical History:   Diagnosis Date    Alcoholism (H)     Allergies     Fall    Arthritis     Basal cell cancer     back, chest, face    Breast cancer (H)     Coronary artery disease     CT calcium xiozz=742 Nov 2015    Depression     Hyperlipidemia LDL goal <130 12/2/2015    Hypertension     borderline    PONV (postoperative nausea and vomiting)     Squamous cell carcinoma     left upper arm, chin       PAST SURGICAL HISTORY:  Past Surgical History:   Procedure Laterality Date    ABDOMEN SURGERY  2007?    Hysterectomy    COLONOSCOPY      COLONOSCOPY  11/17/2011    Procedure:COLONOSCOPY; Colonoscopy; Surgeon:MEKA RUSS; Location: GI    COLONOSCOPY N/A 2/10/2020    Procedure: COLONOSCOPY, WITH POLYPECTOMY AND BIOPSY;  Surgeon: Opal Ace MD;  Location:  GI    DISSECT LYMPH NODE AXILLA Right 11/2/2017    Procedure: DISSECT LYMPH NODE AXILLA;  RIGHT AXILLARY LYMPH NODE DISSECTION;  Surgeon: Cortez White MD;  Location:  SD    EP ABLATION ATRIAL FLUTTER N/A 12/11/2023    Procedure: EP Ablation Atrial Flutter;  Surgeon: Michael John MD;  Location:  HEART CARDIAC CATH LAB    GYN SURGERY  2005    hysterectomy after hx of ovarian cyst, ended up being benign    HYSTERECTOMY, PAP NO LONGER  INDICATED      MASTECTOMY MODIFIED RADICAL  2011    bilateral    MASTECTOMY, BILATERAL      ORTHOPEDIC SURGERY      rt. knee arthroscopy    ORTHOPEDIC SURGERY Right     toe surgery    REALIGN PATELLA  1973    right     TOE SURGERY      right grt OA        FAMILY HISTORY:  Family History   Problem Relation Age of Onset    Cancer Mother 60        leukemia    Arthritis Mother         osteo    Eye Disorder Mother         glaucoma    Gastrointestinal Disease Mother         gallbladder    Other Cancer Mother     Gallbladder Disease Mother     Alcohol/Drug Father         alcohol    Heart Disease Father         ? CHF    Respiratory Father         emphysema    Coronary Artery Disease Father     Substance Abuse Father     Substance Abuse Sister     Anxiety Disorder Sister     Asthma Sister     Colon Cancer Brother     Breast Cancer Maternal Grandmother     Asthma Sister     Substance Abuse Sister     Cancer - colorectal Brother 50    Prostate Cancer Brother     Allergies Brother         bee     Arthritis Sister         osteo    Arthritis Sister         osteo    Arthritis Brother         osteo    Depression Sister     Psychotic Disorder Sister         bipolar    Respiratory Sister     Colon Cancer Brother     Prostate Cancer Brother     Depression Sister     Asthma Sister        SOCIAL HISTORY:  Social History     Socioeconomic History    Marital status:      Spouse name: None    Number of children: None    Years of education: None    Highest education level: None   Tobacco Use    Smoking status: Former     Packs/day: 1.00     Years: 22.00     Additional pack years: 0.00     Total pack years: 22.00     Types: Cigarettes     Start date: 1969     Quit date: 2010     Years since quittin.7    Smokeless tobacco: Never    Tobacco comments:     I have been chewing nicorette gum 3 yrs and 3 months now. I have quit 8 and 9 yrs and periods in bet   Vaping Use    Vaping Use: Never used   Substance and Sexual Activity     Alcohol use: Not Currently     Comment: No longer    Drug use: Yes     Types: Marijuana     Comment: for sleeping/occ    Sexual activity: Not Currently     Partners: Male     Birth control/protection: Surgical     Comment: hyst   Other Topics Concern     Service No    Blood Transfusions No    Caffeine Concern Yes     Comment: 4-5 cups caffeine per day    Occupational Exposure No    Hobby Hazards No    Sleep Concern Yes    Stress Concern Yes    Weight Concern No    Special Diet Yes     Comment: organic foods    Back Care No    Exercise No    Bike Helmet No    Seat Belt Yes    Self-Exams Yes    Parent/sibling w/ CABG, MI or angioplasty before 65F 55M? No     Social Determinants of Health     Financial Resource Strain: Low Risk  (10/9/2023)    Financial Resource Strain     Within the past 12 months, have you or your family members you live with been unable to get utilities (heat, electricity) when it was really needed?: No   Food Insecurity: Low Risk  (10/9/2023)    Food Insecurity     Within the past 12 months, did you worry that your food would run out before you got money to buy more?: No     Within the past 12 months, did the food you bought just not last and you didn t have money to get more?: No   Transportation Needs: Low Risk  (10/9/2023)    Transportation Needs     Within the past 12 months, has lack of transportation kept you from medical appointments, getting your medicines, non-medical meetings or appointments, work, or from getting things that you need?: No   Housing Stability: Low Risk  (10/9/2023)    Housing Stability     Do you have housing? : Yes     Are you worried about losing your housing?: No               Thank you for allowing me to participate in the care of your patient.      Sincerely,     ANNAMARIA Rao CNP     [facility]  cc:   ANNAMARIA Rice CNP  [address] [facility] [address]  [place] [address]

## 2024-01-25 ENCOUNTER — VIRTUAL VISIT (OUTPATIENT)
Dept: PHARMACY | Facility: CLINIC | Age: 74
End: 2024-01-25

## 2024-01-25 DIAGNOSIS — H92.03 OTALGIA OF BOTH EARS: ICD-10-CM

## 2024-01-25 DIAGNOSIS — K59.00 CONSTIPATION, UNSPECIFIED CONSTIPATION TYPE: ICD-10-CM

## 2024-01-25 DIAGNOSIS — R52 PAIN: ICD-10-CM

## 2024-01-25 DIAGNOSIS — F32.0 MILD MAJOR DEPRESSION (H): Primary | ICD-10-CM

## 2024-01-25 DIAGNOSIS — I25.810 CORONARY ARTERY DISEASE INVOLVING AUTOLOGOUS ARTERY CORONARY BYPASS GRAFT WITHOUT ANGINA PECTORIS: ICD-10-CM

## 2024-01-25 DIAGNOSIS — Z78.9 TAKES DIETARY SUPPLEMENTS: ICD-10-CM

## 2024-01-25 DIAGNOSIS — Z71.6 ENCOUNTER FOR SMOKING CESSATION COUNSELING: ICD-10-CM

## 2024-01-25 DIAGNOSIS — C50.611 MALIGNANT NEOPLASM OF AXILLARY TAIL OF RIGHT BREAST IN FEMALE, ESTROGEN RECEPTOR POSITIVE (H): ICD-10-CM

## 2024-01-25 DIAGNOSIS — I48.92 NEW ONSET ATRIAL FLUTTER (H): ICD-10-CM

## 2024-01-25 DIAGNOSIS — B00.1 COLD SORE: ICD-10-CM

## 2024-01-25 DIAGNOSIS — Z17.0 MALIGNANT NEOPLASM OF AXILLARY TAIL OF RIGHT BREAST IN FEMALE, ESTROGEN RECEPTOR POSITIVE (H): ICD-10-CM

## 2024-01-25 DIAGNOSIS — K21.00 GASTROESOPHAGEAL REFLUX DISEASE WITH ESOPHAGITIS, UNSPECIFIED WHETHER HEMORRHAGE: ICD-10-CM

## 2024-01-25 PROCEDURE — 99207 PR NO CHARGE LOS: CPT | Mod: 93 | Performed by: PHARMACIST

## 2024-01-25 RX ORDER — CALCIUM/MAGNESIUM/ZINC 333-133 MG
1 TABLET ORAL DAILY
COMMUNITY

## 2024-01-25 RX ORDER — BUPROPION HYDROCHLORIDE 150 MG/1
150 TABLET ORAL EVERY MORNING
Qty: 30 TABLET | Refills: 0 | Status: SHIPPED | OUTPATIENT
Start: 2024-01-25 | End: 2024-02-13

## 2024-01-25 NOTE — PATIENT INSTRUCTIONS
"Recommendations from today's MTM visit:                                                       Restart vitamin D at 1000 international unit(s) (25mcg) daily.  I have sent an order for bupropion 150 mg tablets.  You can decrease your dose to 150 mg for 4 weeks and then stop the medication altogether.  Please let myself or Dr. Cruz know if you have intolerable withdrawal at any point, and we can try to get you some immediate release tablets that are a lower dose to help make the steps down less severe.    Follow-up: Return in 3 months (on 4/25/2024) for phone visit.    It was great speaking with you today.  I value your experience and would be very thankful for your time in providing feedback in our clinic survey. In the next few days, you may receive an email or text message from [employer] with a link to a survey related to your  clinical pharmacist.\"     To schedule another MTM appointment, please call the clinic directly or you may call the MTM scheduling line at [phone] or toll-free at [phone].     My Clinical Pharmacist's contact information:                                                      Please feel free to contact me with any questions or concerns you have.      Janice Serrano, oJvi, BCACP  Medication Therapy Management Provider  Phone: [phone]  [email or web address]    "

## 2024-01-25 NOTE — PROGRESS NOTES
Medication Therapy Management (MTM) Encounter    ASSESSMENT:                            Medication Adherence/Access: No issues identified    Smoking Cessation:   Current therapy appropriate but see comments under depression below.     Hx Breast Cancer:   Stable.     Constipation:  Stable.    CAD/Lipids:   Stable.     Depression:   Patient has been interested in coming off bupropion since our last visit last year.  Advised her that she would need to taper off of this medication slowly, going back up on the medication if she feels like things were not going well.  She is open to this plan.    Hashimoto's Thyroiditis:   Plan in place with specialist.  T4 within normal limits.    Cold Sores:   Stable.     GERD:   Stable.     Jaw Dysfunction with Ear Pain:  Stable.     Supplements:   Current therapies likely safe aside from Chinese herbal preparations, which are difficult to determine safety on.  Patient is not currently taking Chinese herbal preparations at the time.  I did suggest that with her cancer and depression history, it might be beneficial for her to restart vitamin D 1000 international units daily if she is not taking it right now.    Episode of AFlutter:   Discussed with this patient that her bupropion and metoprolol interact, increasing the concentration of metoprolol in the system up to 4 fold.  As this patient has low to normal blood pressure at baseline, it is possible that her fatigue and low heart rate were because of to higher dose of metoprolol while on bupropion.  We discussed that the rash, however, is not dose dependent and would likely recur if she started metoprolol again.    PLAN:                            Restart vitamin D at 1000 international unit(s) (25mcg) daily.  I have sent an order for bupropion 150 mg tablets.  You can decrease your dose to 150 mg for 4 weeks and then stop the medication altogether.  Please let myself or Dr. Cruz know if you have intolerable withdrawal at any point,  and we can try to get you some immediate release tablets that are a lower dose to help make the steps down less severe.    Follow-up: Return in 3 months (on 4/25/2024) for phone visit.    SUBJECTIVE/OBJECTIVE:                          Mahnaz Adair is a 73 year old female called for a follow-up visit from 7/31/23.       Reason for visit: Med recheck.    Allergies/ADRs: Reviewed in chart  Past Medical History: Reviewed in chart  Tobacco: She reports that she quit smoking about 13 years ago. Her smoking use included cigarettes. She started smoking about 54 years ago. She has a 22 pack-year smoking history. She has never used smokeless tobacco.  Alcohol: not currently using  Caffeine: 1 cup per day  Medication Adherence/Access: no issues reported    Smoking Cessation:   Bupropion  mg daily (prescribed for depression)  Nicorette 2 mg gum as needed     Patient continues to remain smoke-free.  See additional comments under depression below.     Hx Breast Cancer:   Sees oncology  Letrozole 2.5 mg daily     Patient reports being around year 5 of this therapy and is willing to take it for another 5 years, despite mild side effects that she is frustrated by.     Constipation:  Super colon cleanse 4 capsules daily     Patient reports a long history of issues with constipation, and that her bowel movements are regular now that she takes this product every day without diarrhea.     CAD/Lipids:   Aspirin 81 mg daily  Rosuvastatin 40 mg daily     Patient has not currently complaining of any side effects.     Depression:   Bupropion XL 300mg daily     Feels her depression is OK. She's not sure if the bupropion is helping. She got TMS therapy (37 times) and felt this seemed to help significantly.  At this point she would like to come off of bupropion if possible.       3/23/2022    10:13 AM 5/12/2022    11:16 AM 12/26/2022     1:05 PM   PHQ   PHQ-9 Total Score 4 2 3   Q9: Thoughts of better off dead/self-harm past 2 weeks Not  at all Not at all Not at all      Hashimoto's Thyroiditis:   Naltrexone 4.5mg daily     Patient reports being recently diagnosed by an endocrinologist with Hashimotos thyroiditis.  She does not take a thyroid supplement currently.  Since that provider started her on low-dose naltrexone, she has not needed injections for her knees and has been able to stop hydrocodone.     Cold Sores:   Valacyclovir as needed  Lysine supplement as needed     Patient is not currently experiencing cold sores.     GERD:   Omeprazole 20 mg once daily     Patient reports no current symptoms.  Patient feels that current regimen is effective.     Jaw Dysfunction with Ear Pain:  Ibuprofen 200 mg as needed, generally once or twice a week more for general aches and pains.     Has been using jaw exercises and this has been better     Supplements:   Ashwagandha as needed for illness  Vitamin C supplementation when she is feeling sick  Calcium carbonate with D for bone health  Vitamin D3 for bone health (not sure she's taking anymore)  CoQ 10 for general health  Lutien zeaxanthin for eye health  melatonin for sleep  When she can afford it, she takes Krill oil and a variety of Chinese herbal preparations as listed in her chart, though she is not currently taking them.    Patient feels that her supplements are helpful without side effects.    Episode of AFlutter:   Patient was hospitalized December 9, 2023 for new onset atrial flutter.  She was advised to take Eliquis and metoprolol for a month afterwards to reduce risk of recurrence and clot.  Shortly after starting these, she developed significant fatigue and heart rates as low as the mid 40s.  She also developed a purulent rash.  Both of these issues resolved after she stopped metoprolol.  She is wondering why she had a reaction like this to metoprolol.  She is no longer on Eliquis either.    I spent 38 minutes with this patient today. All changes were made via collaborative practice agreement  with Vladislav Cruz MD. A copy of the visit note was provided to the patient's provider(s).    A summary of these recommendations was sent via [employer].    Janice Serrano, PharmD, BCACP  Medication Therapy Management Provider  Phone: [phone]  [email or web address]    Telemedicine Visit Details  Type of service:  Telephone visit  Start Time: 10:31 AM  End Time: 11:09 AM     Medication Therapy Recommendations  Mild major depression (H24)    Current Medication: buPROPion (WELLBUTRIN XL) 300 MG 24 hr tablet (Discontinued)   Rationale: Nonmedication therapy more appropriate - Unnecessary medication therapy - Indication   Recommendation: Discontinue Medication   Status: Accepted per CPA

## 2024-02-02 DIAGNOSIS — E78.5 HYPERLIPIDEMIA LDL GOAL <70: ICD-10-CM

## 2024-02-02 RX ORDER — ROSUVASTATIN CALCIUM 40 MG/1
40 TABLET, COATED ORAL DAILY
Qty: 90 TABLET | Refills: 4 | Status: SHIPPED | OUTPATIENT
Start: 2024-02-02

## 2024-02-13 ENCOUNTER — MYC MEDICAL ADVICE (OUTPATIENT)
Dept: CARDIOLOGY | Facility: CLINIC | Age: 74
End: 2024-02-13
Payer: MEDICARE

## 2024-02-13 DIAGNOSIS — F32.0 MILD MAJOR DEPRESSION (H): ICD-10-CM

## 2024-02-13 DIAGNOSIS — K21.00 GASTROESOPHAGEAL REFLUX DISEASE WITH ESOPHAGITIS WITHOUT HEMORRHAGE: ICD-10-CM

## 2024-02-13 DIAGNOSIS — B00.9 HSV (HERPES SIMPLEX VIRUS) INFECTION: ICD-10-CM

## 2024-02-13 RX ORDER — BUPROPION HYDROCHLORIDE 150 MG/1
150 TABLET ORAL EVERY MORNING
Qty: 90 TABLET | Refills: 0 | Status: SHIPPED | OUTPATIENT
Start: 2024-02-13

## 2024-02-13 RX ORDER — VALACYCLOVIR HYDROCHLORIDE 1 G/1
TABLET, FILM COATED ORAL
Qty: 21 TABLET | Refills: 11 | Status: SHIPPED | OUTPATIENT
Start: 2024-02-13

## 2024-02-13 NOTE — TELEPHONE ENCOUNTER
Medication Question or Refill        What medication are you calling about (include dose and sig)?:     valACYclovir (VALTREX) 1000 mg tablet   omeprazole (PRILOSEC) 20 MG DR capsule   buPROPion (WELLBUTRIN XL) 150 MG 24 hr tablet   Preferred Pharmacy:       [id number] - [place], [place] - [address]  [address]  [place] [address]  Phone: [phone] Fax: [phone]      Controlled Substance Agreement on file:   CSA -- Patient Level:     [Media Unavailable] Controlled Substance Agreement - Opioid - Scan on 1/17/2022 10:04 AM: 2022 Document       Who prescribed the medication?:     Do you need a refill? Yes    When did you use the medication last?     Patient offered an appointment? No    Do you have any questions or concerns?  No      Could we send this information to you in [facility] or would you prefer to receive a phone call?:   Patient would prefer a phone call   Okay to leave a detailed message?: Yes at Home number on file [phone] (home)

## 2024-02-19 ENCOUNTER — TRANSFERRED RECORDS (OUTPATIENT)
Dept: HEALTH INFORMATION MANAGEMENT | Facility: CLINIC | Age: 74
End: 2024-02-19
Payer: MEDICARE

## 2024-02-29 ENCOUNTER — TRANSFERRED RECORDS (OUTPATIENT)
Dept: HEALTH INFORMATION MANAGEMENT | Facility: CLINIC | Age: 74
End: 2024-02-29
Payer: MEDICARE

## 2024-03-04 ENCOUNTER — TELEPHONE (OUTPATIENT)
Dept: FAMILY MEDICINE | Facility: CLINIC | Age: 74
End: 2024-03-04
Payer: MEDICARE

## 2024-03-04 DIAGNOSIS — G93.32 POST-COVID CHRONIC FATIGUE: Primary | ICD-10-CM

## 2024-03-04 DIAGNOSIS — U09.9 POST-COVID CHRONIC FATIGUE: Primary | ICD-10-CM

## 2024-03-04 NOTE — TELEPHONE ENCOUNTER
Order/Referral Request    Who is requesting: pt    Orders being requested: Physical Therapy     Reason service is needed/diagnosis: prescribe therapy for covid long lasting tiredness     When are orders needed by: as soon as possible     Has this been discussed with Provider: Yes    Does patient have a preference on a Group/Provider/Facility? [facility]    Does patient have an appointment scheduled?: No    Where to send orders:  [facility]    Could we send this information to you in [facility] or would you prefer to receive a phone call?:   Patient would prefer a phone call   Okay to leave a detailed message?: Yes at Cell number on file:    Telephone Information:   Mobile [phone]

## 2024-03-06 NOTE — TELEPHONE ENCOUNTER
I signed order, can be printed and faxed to the PT clinic of her [facility] e

## 2024-03-16 NOTE — TELEPHONE ENCOUNTER
Pre visit planning completed.      Procedure details:    Patient scheduled for Colonoscopy  on 4/1/2024.     Arrival time: 0915. Procedure time 1000    Facility location: [facility]; [facility], [place] [address]. Check in location: 1st Floor [facility].     Sedation type: Conscious sedation     Pre op exam needed? N/A    Indication for procedure: Screening       Chart review:     Electronic implanted devices? No    Recent diagnosis of diverticulitis within the last 6 weeks? No    Diabetic? No      Medication review:    Anticoagulants? No, Eliquis was discontinued at Cardiology OV on 1/18/2024    NSAIDS? Yes.  Ibuprofen (Advil, Motrin).  Holding interval of 1 day before procedure.    Other medication HOLDING recommendations:  Naltrexone PO: HOLD 3 days before procedure.       Prep for procedure:     Bowel prep recommendation: Standard Miralax  Due to: standard bowel prep.    Prep instructions sent via [employer]         Estelita Martin RN  Endoscopy Procedure Pre Assessment RN  [phone] option 4

## 2024-03-18 NOTE — TELEPHONE ENCOUNTER
Pre assessment completed for upcoming procedure.   (Please see previous telephone encounter notes for complete details)      Procedure details:    Arrival time and facility location reviewed.    Pre op exam needed? N/A    Designated  policy reviewed. Instructed to have someone stay 6 hours post procedure.       Medication review:    Medications reviewed. Please see supporting documentation below. Holding recommendations discussed (if applicable).   Naltrexone: HOLD 3 days before procedure.   NSAID medication(s): Ibuprofen (Advil, Motrin): HOLD 1 day before procedure.      Prep for procedure:     Patient declined to review procedure prep instructions on the phone. Instructed patient to review the procedure prep instructions at least 7 days prior to procedure due to necessary dietary modifications. NPO instructions reviewed.    Patient was instructed to call if any questions or concerns arise.       Any additional information needed:  N/A      Patient  verbalized understanding and had no questions or concerns at this time.      Yolanda Zapata RN  Endoscopy Procedure Pre Assessment RN  [phone] option 4

## 2024-03-31 RX ORDER — ONDANSETRON 2 MG/ML
4 INJECTION INTRAMUSCULAR; INTRAVENOUS
Status: CANCELLED | OUTPATIENT
Start: 2024-03-31

## 2024-03-31 RX ORDER — LIDOCAINE 40 MG/G
CREAM TOPICAL
Status: CANCELLED | OUTPATIENT
Start: 2024-03-31

## 2024-04-01 RX ORDER — ONDANSETRON 4 MG/1
TABLET, FILM COATED ORAL
Qty: 3 TABLET | Refills: 0 | Status: SHIPPED | OUTPATIENT
Start: 2024-04-01

## 2024-04-01 RX ORDER — BISACODYL 5 MG/1
TABLET, DELAYED RELEASE ORAL
Qty: 4 TABLET | Refills: 0 | Status: ON HOLD | OUTPATIENT
Start: 2024-04-01 | End: 2024-04-03

## 2024-04-01 NOTE — TELEPHONE ENCOUNTER
The Pt called back. She is scheduled for 4/3. She wants to know what prep to do. She is currently still on a CLD. Will discuss with team at morning huddle.     Ramona Sandy RN   Endoscopy Procedure Pre Assessment RN

## 2024-04-01 NOTE — TELEPHONE ENCOUNTER
"Patient called back stating she spoke to someone this morning about an incomplete bowel prep and has rescheduled the colonoscopy to June. Patient inquired about a potential appointment this week and wanted to know how long the \"fasting period\" would be. Patient reports she did Miralax prep and had no bowel movements following the first half of the Miralax/Gatorate mixture. Patient reports she started the second half of the Miralax mixture earlier than advised and continued to have no bowel movements for some time. Discussed bowel prep options to which patient opted to keep the rescheduled appointment in June and plans to proceed with Extended Golytely prep to ensure she will be cleared out. Discussed Extended Golytely prep with patient. Patient agreeable to plan.    Patient verbalized understanding and had no further questions.    Yolanda Zapata RN  Endoscopy Procedure Pre-Assessment RN  [phone], option 4    "

## 2024-04-01 NOTE — TELEPHONE ENCOUNTER
Will do standard Golytely tomorrow, 4/2. Today, 4/1 the Pt can eat a light low fiber breakfast and lunch, but should start CLD at 1300.     Completed updated PA with the Pt. Sent Golytely and Zofran script in. New instructions sent. Pt will continue to hold Naltrexone.     Rescheduled procedure    Patient scheduled for Colonoscopy  on 4/3/24.    Arrival time: 0815. Procedure time 0900    Pre op exam needed? N/A    Facility location: [facility]; [address] [place] [address]    Sedation type: Conscious sedation     Pre-Assessment was completed for previously scheduled procedure. (See documentation below).  No new medical events or medications since last review.     Resent prep instructions via [employer].    RN spoke with Patient  and reviewed information. They have no further questions or concerns at this time.     Ramona Sandy, RN  Endoscopy Procedure Pre Assessment RN

## 2024-04-01 NOTE — TELEPHONE ENCOUNTER
"Caller: Mahnaz    Reason for Reschedule/Cancellation   (please be detailed, any staff messages or encounters to note?): Was not cleaned out all the way.       Prior to reschedule please review:  Ordering Provider: Vladislav Cruz  Sedation Determined: Moderate  Does patient have any ASC Exclusions, please identify?: Y - cardiac event in Dec 2023.       Notes on Cancelled Procedure:  Procedure: Lower Endoscopy [Colonoscopy]   Date: 4/1/2024  Location: [facility]; [address] [place] [id number]   Surgeon: Wu      Rescheduled: Yes,      Endoscopy Scheduling Screen    Have you had a positive Covid test in the last 14 days?  No    What is your communication preference for Instructions and/or Bowel Prep?   Mail/USPS    What insurance is in the chart?  Other:  [facility] MEDICARE    Ordering/Referring Provider: Vladislav Cruz   (If ordering provider performs procedure, schedule with ordering provider unless otherwise instructed. )    BMI: Estimated body mass index is 23.41 kg/m  as calculated from the following:    Height as of 1/18/24: 1.651 m (5' 5\").    Weight as of 1/18/24: 63.8 kg (140 lb 11.2 oz).     Sedation Ordered  moderate sedation.   If patient BMI > 50 do not schedule in ASC.    If patient BMI > 45 do not schedule at [facility].    Are you taking methadone or Suboxone?  No    Have you had difficulties, pain, or discomfort during past endoscopy procedures?  No    Are you taking any prescription medications for pain 3 or more times per week?   NO, No RN review required.    Do you have a history of malignant hyperthermia?  No    (Females) Are you currently pregnant?   No     Have you been diagnosed or told you have pulmonary hypertension?   No    Do you have an LVAD?  No    Have you been told you have moderate to severe sleep apnea?  No    Have you been told you have COPD, asthma, or any other lung disease?  No    Do you have any heart conditions?  No     Have you ever had or are you waiting for an organ " "transplant?  No. Continue scheduling, no site restrictions.    Have you had a stroke or transient ischemic attack (TIA aka \"mini stroke\" in the last 6 months?   No    Have you been diagnosed with or been told you have cirrhosis of the liver?   No    Are you currently on dialysis?   No    Do you need assistance transferring?   No    BMI: Estimated body mass index is 23.41 kg/m  as calculated from the following:    Height as of 1/18/24: 1.651 m (5' 5\").    Weight as of 1/18/24: 63.8 kg (140 lb 11.2 oz).     Is patients BMI > 40 and scheduling location [facility]?  No    Do you take an injectable medication for weight loss or diabetes (excluding insulin)?  No    Do you take the medication Naltrexone?  Yes Recommended hold time is 3 days. Please consult with you prescribing provider to discuss endoscopy recommendations.    Do you take blood thinners?  No       Prep   Are you currently on dialysis or do you have chronic kidney disease?  No    Do you have a diagnosis of diabetes?  No    Do you have a diagnosis of cystic fibrosis (CF)?  No    On a regular basis do you go 3 -5 days between bowel movements?  No    BMI > 40?  No    Preferred Pharmacy:    [id number] - [place], [place] - [address] AT [facility] [address] [place] [address]  Phone: [phone] Fax: [phone]      Final Scheduling Details     Procedure scheduled  Colonoscopy    Surgeon:  HUNTER     Date of procedure:  6/19/2024     Pre-OP / PAC:   No - Not required for this site.    Location  [facility] - Per order.    Sedation   Moderate Sedation - Per order.      Patient Reminders:   You will receive a call from a Nurse to review instructions and health history.  This assessment must be completed prior to your procedure.  Failure to complete the Nurse assessment may result in the procedure being cancelled.      On the day of your procedure, please designate an adult(s) who can drive you home stay with you for the next 24 hours. " The medicines used in the exam will make you sleepy. You will not be able to drive.      You cannot take public transportation, ride share services, or non-medical taxi service without a responsible caregiver.  Medical transport services are allowed with the requirement that a responsible caregiver will receive you at your destination.  We require that drivers and caregivers are confirmed prior to your procedure.

## 2024-04-03 ENCOUNTER — HOSPITAL ENCOUNTER (OUTPATIENT)
Facility: CLINIC | Age: 74
Discharge: HOME | End: 2024-04-03
Attending: COLON & RECTAL SURGERY | Admitting: COLON & RECTAL SURGERY
Payer: MEDICARE

## 2024-04-03 VITALS
WEIGHT: 135 LBS | BODY MASS INDEX: 22.49 KG/M2 | SYSTOLIC BLOOD PRESSURE: 117 MMHG | RESPIRATION RATE: 10 BRPM | OXYGEN SATURATION: 98 % | DIASTOLIC BLOOD PRESSURE: 55 MMHG | HEIGHT: 65 IN | HEART RATE: 53 BPM

## 2024-04-03 LAB — COLONOSCOPY: NORMAL

## 2024-04-03 PROCEDURE — 45385 COLONOSCOPY W/LESION REMOVAL: CPT | Performed by: COLON & RECTAL SURGERY

## 2024-04-03 PROCEDURE — 250N000011 HC RX IP 250 OP 636: Performed by: COLON & RECTAL SURGERY

## 2024-04-03 PROCEDURE — 88305 TISSUE EXAM BY PATHOLOGIST: CPT | Mod: TC | Performed by: COLON & RECTAL SURGERY

## 2024-04-03 PROCEDURE — G0500 MOD SEDAT ENDO SERVICE >5YRS: HCPCS | Mod: PT | Performed by: COLON & RECTAL SURGERY

## 2024-04-03 RX ORDER — FENTANYL CITRATE 50 UG/ML
INJECTION, SOLUTION INTRAMUSCULAR; INTRAVENOUS PRN
Status: DISCONTINUED | OUTPATIENT
Start: 2024-04-03 | End: 2024-04-03 | Stop reason: HOSPADM

## 2024-04-03 ASSESSMENT — ACTIVITIES OF DAILY LIVING (ADL)
ADLS_ACUITY_SCORE: 35
ADLS_ACUITY_SCORE: 33

## 2024-04-03 NOTE — H&P
Colon & Rectal Surgery History and Physical  Pre-Endoscopy Procedure Note    History of Present Illness   I have been asked by Dr. Cruz to evaluate this 73 year old female for colorectal polyp surveillance. She currently denies any abdominal pain, weight loss, bleeding per rectum, or recent change in bowel habits.    Past Medical History  Diagnosis Date    Alcoholism     Allergies     Arthritis     Basal cell cancer     back, chest, face    Breast cancer     Coronary artery disease     CT calcium tieuz=135 Nov 2015    Depression     Hyperlipidemia LDL 12/2/2015    Hypertension     borderline    PONV (postoperative nausea and vomiting)     Squamous cell carcinoma     left upper arm, chin       Past Surgical History  Procedure Laterality Date    DISSECT LYMPH NODE AXILLA Right 11/2/2017    Procedure: DISSECT LYMPH NODE AXILLA;  RIGHT AXILLARY LYMPH NODE DISSECTION;  Surgeon: Cortez White MD;  Location:  [facility]    ABLATION ATRIAL FLUTTER N/A 12/11/2023    Procedure: EP Ablation Atrial Flutter;  Surgeon: Michael John MD;  Location:  [facility]    GYN SURGERY  2005    hysterectomy after hx of ovarian cyst, ended up being benign    MASTECTOMY MODIFIED RADICAL  2011    bilateral    ORTHOPEDIC SURGERY      rt. knee arthroscopy    ORTHOPEDIC SURGERY Right     toe surgery    REALIGN PATELLA  1973    right         Medications     Medications Prior to Admission   Medication Sig    aspirin 81 MG EC tablet Take 1 tablet (81 mg) by mouth daily    buPROPion (WELLBUTRIN XL) 150 MG 24 hr tablet Take 1 tablet (150 mg) by mouth every morning For 4 weeks, then stop if tolerated    omeprazole (PRILOSEC) 20 MG DR capsule Take 1 capsule (20 mg) by mouth daily    ondansetron (ZOFRAN) 4 MG tablet Take one tablet every six hours for nausea during colonoscopy bowel prepping    rosuvastatin (CRESTOR) 40 MG tablet Take 1 tablet (40 mg) by mouth daily    ASHWAGANDHA PO Take 1 tablet by mouth daily as needed At onset of  illness symptoms; as needed    calcium carbonate-vitamin D (OS-YASMIN) 500-400 MG-UNIT tablet Take 1 tablet by mouth daily     cholecalciferol (VITAMIN D3) 25 mcg (1000 units) capsule Take 1 capsule by mouth daily (Patient not taking: Reported on 1/25/2024)    Coenzyme Q10 300 MG CAPS Take 300 mg by mouth daily    IBUPROFEN PO Take 200 mg by mouth every 4 hours as needed for moderate pain    letrozole (FEMARA) 2.5 MG tablet Take 1 tablet by mouth daily    Lutein-Zeaxanthin 25-5 MG CAPS Take 1 capsule by mouth daily    LYSINE 1-3 daily as needed (Patient not taking: Reported on 1/25/2024)    melatonin 5 MG tablet Take 5 mg by mouth nightly as needed for sleep    Milk Thistle-Dand-Fennel-Licor (MILK THISTLE XTRA) CAPS capsule Take 1 capsule by mouth daily    Naltrexone HCl, Pain, 4.5 MG CAPS Take 1 capsule by mouth daily    nicotine polacrilex (NICORETTE) 2 MG gum Place 1 each (2 mg) inside cheek as needed for smoking cessation    RED CLOVER LEAF EXTRACT PO Take 1 capsule by mouth daily    TURMERIC PO Take 1 capsule by mouth daily    valACYclovir (VALTREX) 1000 mg tablet TAKE 1 TABLET(1000 MG) BY MOUTH THREE TIMES DAILY FOR 7 DAYS    vitamin C (ASCORBIC ACID) 250 MG tablet Take 250 mg by mouth daily       Allergies  Allergen Reactions    Cats     Codeine Sulfate GI Disturbance    Metoprolol Rash        Family History   Family history includes Alcohol/Drug use in her father; Allergies in her brother; Anxiety Disorder in her sister; Arthritis in her brother, mother, sister, and sister; Asthma in her sister, sister, and sister; Breast Cancer in her maternal grandmother; Cancer (age of onset: 60) in her mother; Cancer - colorectal (age of onset: 50) in her brother; Coronary Artery Disease in her father; Depression in her sister and sister; Eye Disorder in her mother; Gallbladder Disease in her mother; Gastrointestinal Disease in her mother; Heart Disease in her father; Other Cancer in her mother; Prostate Cancer in her  "brother and brother; Psychotic Disorder in her sister; Respiratory Disorder in her father and sister; Substance Abuse in her father, sister, and sister.     Social History   She reports that she quit smoking about 13 years ago. Her smoking use included cigarettes. She started smoking about 54 years ago. She has a 22 pack-year smoking history. She has never used smokeless tobacco. She reports that she does not currently use alcohol. She reports current drug use. Drug: Marijuana.    Review of Systems   Constitutional:  No fever, weight change or fatigue.    Eyes:     No dry eyes or vision changes.   Ears/Nose/Throat/Neck:  No oral ulcers, sore throat or voice change.    Cardiovascular:   No palpitations, syncope, angina or edema.   Respiratory:    No chest pain, excessive sleepiness, shortness of breath or hemoptysis.    Gastrointestinal:   No abdominal pain, nausea, vomiting, diarrhea or heartburn.    Genitourinary:   No dysuria, hematuria, urinary retention or urinary frequency.   Musculoskeletal:  No joint swelling or arthralgias.    Dermatologic:  No skin rash or other skin changes.   Neurologic:    No focal weakness or numbness. No neuropathy.   Psychiatric:    No depression, anxiety, suicidal ideation, or paranoid ideation.   Endocrine:   No cold or heat intolerance, polydipsia, hirsutism, change in libido, or flushing.   Hematology/Lymphatic:  No bleeding or lymphadenopathy.    Allergy/Immunology:  No rhinitis or hives.     Physical Exam   Vitals:  Blood pressure 121/68, pulse 55, resp. rate 16, height 1.651 m (5' 5\"), weight 61.2 kg (135 lb), SpO2 99%, not currently breastfeeding.    General:  Alert and oriented to person, place and time   Airway: Normal oropharyngeal airway and neck mobility   Lungs:  Clear bilaterally   Heart:  Regular rate and rhythm   Abdomen: Soft, NT, ND, no masses   Extremities: Warm, good capillary refill    ASA Grade: II (mild systemic disease)    Impression: Cleared for use of " conscious sedation for colorectal polyp surveillance    Plan: Proceed with colonoscopy     Edith Washburn MD  [facility]  [phone]

## 2024-04-04 LAB
PATH REPORT.COMMENTS IMP SPEC: NORMAL
PATH REPORT.FINAL DX SPEC: NORMAL
PATH REPORT.GROSS SPEC: NORMAL
PATH REPORT.MICROSCOPIC SPEC OTHER STN: NORMAL
PATH REPORT.RELEVANT HX SPEC: NORMAL
PHOTO IMAGE: NORMAL

## 2024-04-04 PROCEDURE — 88305 TISSUE EXAM BY PATHOLOGIST: CPT | Mod: 26

## 2024-04-16 ENCOUNTER — HOSPITAL ENCOUNTER (OUTPATIENT)
Dept: CT IMAGING | Facility: CLINIC | Age: 74
Discharge: HOME | End: 2024-04-16
Attending: INTERNAL MEDICINE | Admitting: INTERNAL MEDICINE
Payer: MEDICARE

## 2024-04-16 DIAGNOSIS — R91.1 PULMONARY NODULE: ICD-10-CM

## 2024-04-16 PROCEDURE — 71250 CT THORAX DX C-: CPT | Mod: MG

## 2024-04-16 NOTE — RESULT ENCOUNTER NOTE
The following letter pertains to your most recent diagnostic tests:    Good news!  The lung cancer screening test is okay.  We should recheck in 1 year.      Sincerely,    Dr. Cruz

## 2024-04-24 ENCOUNTER — TRANSCRIBE ORDERS (OUTPATIENT)
Dept: OTHER | Age: 74
End: 2024-04-24

## 2024-04-24 DIAGNOSIS — G47.33 OSA (OBSTRUCTIVE SLEEP APNEA): Primary | ICD-10-CM

## 2024-04-25 ENCOUNTER — VIRTUAL VISIT (OUTPATIENT)
Dept: PHARMACY | Facility: CLINIC | Age: 74
End: 2024-04-25
Payer: MEDICARE

## 2024-04-25 DIAGNOSIS — L60.3 BRITTLE NAILS: ICD-10-CM

## 2024-04-25 DIAGNOSIS — F32.0 MILD MAJOR DEPRESSION (H): Primary | ICD-10-CM

## 2024-04-25 PROCEDURE — 99207 PR NO CHARGE LOS: CPT | Mod: 93 | Performed by: PHARMACIST

## 2024-04-25 NOTE — PATIENT INSTRUCTIONS
"Recommendations from today's MTM visit:                                                       Biotin 2-3mg (2-3000mcg) daily may be helpful for brittle nails. Stop after 3 months if it doesn't help. Make sure you also stop 3 days before any thyroid checks (and restart after), as biotin can mess with the lab values and make them falsely high or low.    Follow-up: 6 months    It was great speaking with you today.  I value your experience and would be very thankful for your time in providing feedback in our clinic survey. In the next few days, you may receive an email or text message from [employer] with a link to a survey related to your  clinical pharmacist.\"     To schedule another MTM appointment, please call the clinic directly or you may call the MTM scheduling line at [phone] or toll-free at [phone].     My Clinical Pharmacist's contact information:                                                      Please feel free to contact me with any questions or concerns you have.      Janice Serrano, Jovi, BCACP  Medication Therapy Management Provider  Phone: [phone]  [email or web address]    "

## 2024-04-25 NOTE — PROGRESS NOTES
Medication Therapy Management (MTM) Encounter    ASSESSMENT:                            Medication Adherence/Access: No issues identified    Depression:   Stable. We did discuss that Wellbutrin is often used for weight loss, so stopping this may have triggered more of an appetite. Patient encouraged to try to fill up on protein and fiber early in the day if she needs help with this, but not too concerning as she's not having rapid weight gain, etc.    Hair and Nail issues:   Discussed with patient that there is some low quality data suggesting that biotin may help with brittle nails, though the data for hair loss is not good. Suggested she try biotin short-term.    PLAN:                            Biotin 2-3mg (2-3000mcg) daily may be helpful for brittle nails. Stop after 3 months if it doesn't help. Make sure you also stop 3 days before any thyroid checks (and restart after), as biotin can mess with the lab values and make them falsely high or low.    Follow-up: 6 months    SUBJECTIVE/OBJECTIVE:                          Mahnaz Adair is a 73 year old female called for a follow-up visit.       Reason for visit: Med recheck.    Allergies/ADRs: Reviewed in chart  Past Medical History: Reviewed in chart  Tobacco: She reports that she quit smoking about 14 years ago. Her smoking use included cigarettes. She started smoking about 54 years ago. She has a 40.8 pack-year smoking history. She has never used smokeless tobacco.  Alcohol: not currently using    Medication Adherence/Access: no issues reported        Depression:     Stopped Wellbutrin.  She feels that emotionally, this went fine, she is not having symptoms of depression since stopping. She has been craving sugar for the last month or two, though, and is wondering what could be doing that. She has not gained any weight that she can tell.    Hair and Nail issues:     Patient is having ongoing hair loss and very brittle nails, she assumes because of her letrozole.  She is especially frustrated with her nails, which seem to flake away very easily. She is wondering if there is anything she can take for this.    I spent 15 minutes with this patient today. All changes were made via collaborative practice agreement with Vladislav Cruz MD. A copy of the visit note was provided to the patient's provider(s).    A summary of these recommendations was sent via [employer].    Courtney Dela CruzD, BCACP  Medication Therapy Management Provider  Phone: [phone]  [email or web address]    Telemedicine Visit Details  Type of service:  Telephone visit  Start Time:  10:38 AM  End Time:  10:53 AM     Medication Therapy Recommendations  Breast cancer (H)    Current Medication: letrozole (FEMARA) 2.5 MG tablet   Rationale: Undesirable effect - Adverse medication event - Safety   Recommendation: Start Medication - biotin 2.5 MG Tabs   Status: Accepted - no CPA Needed

## 2024-04-30 NOTE — RESULT ENCOUNTER NOTE
The following letter pertains to your most recent diagnostic tests:    Good news! The CT is negative for lung cancer findings.  We should recheck in one year.        Sincerely,    Dr. Cruz

## 2024-05-30 ENCOUNTER — TRANSFERRED RECORDS (OUTPATIENT)
Dept: HEALTH INFORMATION MANAGEMENT | Facility: CLINIC | Age: 74
End: 2024-05-30
Payer: MEDICARE

## 2024-06-05 ENCOUNTER — TRANSFERRED RECORDS (OUTPATIENT)
Dept: HEALTH INFORMATION MANAGEMENT | Facility: CLINIC | Age: 74
End: 2024-06-05
Payer: MEDICARE

## 2024-06-05 NOTE — PROGRESS NOTES
Primary Cardiologist: Dr. Perez    Reason for Visit: Annual follow-up    History of Present Illness:   Mahnaz is a very pleasant 73-year-old female with medical history is notable for high calcium score of 790 (99th percentile; negative nuclear stress scan), former tobacco user, hyperlipidemia, and more recently diagnosis of typical atrial flutter (s/p ablation in 12/2023 performed by Dr. John; [id number] score of 2; was placed on apixaban to be taken for 30 days; did not tolerate metoprolol due to multiple side effects).     Mahnaz reports memory issues and fatigue since the increase in rosuvastatin from 20 to 40 mg. She stopped it completely and has been trying a herbal supplement called citrus bergamot.     Assessment and Plan:  Mahnaz is a very pleasant 73-year-old female with medical history is notable for high calcium score of 790 (99th percentile; negative nuclear stress scan), former tobacco user, hyperlipidemia (recently stopped rosuvastatin), and more recently diagnosis of typical atrial flutter (s/p ablation in 12/2023 performed by Dr. John; [id number] score of 2; was placed on apixaban to be taken for 30 days; did not tolerate metoprolol due to multiple side effects).     We will check lipid panel/ALT per her wishes and follow up afterwards to discuss if we should consider lower dose of rosuvastatin or an alternative. She will otherwise follow up with us in 12/2024.       This note was completed in part using Dragon voice recognition software. Although reviewed after completion, some word and grammatical errors may occur.    Orders this Visit:  Orders Placed This Encounter   Procedures    Lipid Profile    ALT    Follow-Up with Cardiology PATIENCE     No orders of the defined types were placed in this encounter.    There are no discontinued medications.      Encounter Diagnoses   Name Primary?    Mixed hyperlipidemia     High coronary artery calcium score     Hyperlipidemia LDL goal <70     Heart murmur         CURRENT MEDICATIONS:  Current Outpatient Medications   Medication Sig Dispense Refill    ASHWAGANDHA PO Take 1 tablet by mouth daily as needed At onset of illness symptoms; as needed      aspirin 81 MG EC tablet Take 1 tablet (81 mg) by mouth daily      calcium carbonate-vitamin D (OS-YASMIN) 500-400 MG-UNIT tablet Take 1 tablet by mouth daily       Coenzyme Q10 300 MG CAPS Take 300 mg by mouth daily      IBUPROFEN PO Take 200 mg by mouth every 4 hours as needed for moderate pain      letrozole (FEMARA) 2.5 MG tablet Take 1 tablet by mouth daily  5    Lutein-Zeaxanthin 25-5 MG CAPS Take 1 capsule by mouth daily      LYSINE 1-3 daily as needed      melatonin 5 MG tablet Take 5 mg by mouth nightly as needed for sleep      Milk Thistle-Dand-Fennel-Licor (MILK THISTLE XTRA) CAPS capsule Take 1 capsule by mouth daily      Naltrexone HCl, Pain, 4.5 MG CAPS Take 1 capsule by mouth daily      nicotine polacrilex (NICORETTE) 2 MG gum Place 1 each (2 mg) inside cheek as needed for smoking cessation 30 tablet 11    omeprazole (PRILOSEC) 20 MG DR capsule Take 1 capsule (20 mg) by mouth daily 90 capsule 1    RED CLOVER LEAF EXTRACT PO Take 1 capsule by mouth daily      rosuvastatin (CRESTOR) 40 MG tablet Take 1 tablet (40 mg) by mouth daily 90 tablet 4    TURMERIC PO Take 1 capsule by mouth daily      UNABLE TO FIND Take by mouth daily MEDICATION NAME: Asea Redox - 1oz twice daily      UNABLE TO FIND Take 1 tablet by mouth daily MEDICATION NAME: Aidee Chiao - chinese supplement takes at onset of illness symptoms      UNABLE TO FIND MEDICATION NAME: Moringa 1 serving of powder daily      UNABLE TO FIND MEDICATION NAME: OmegaKrill 5x 3 capsules = 480mg of total omega-3, 64mg EPA, 392mg DHA, 300mcg astaxanthin.  Takes 3 capsules daily      UNABLE TO FIND MEDICATION NAME: Super Colon Cleanse 2 capsules = 665mg senna leaf powder, 321mg psyllium husk powder, 21mg fennel seed powder, 21mg papaya leaf powder, 21mg yolanda hips fruit  powder, 11mg l-acidophilus.  Taking 4 capsules daily      valACYclovir (VALTREX) 1000 mg tablet TAKE 1 TABLET(1000 MG) BY MOUTH THREE TIMES DAILY FOR 7 DAYS 21 tablet 11    vitamin C (ASCORBIC ACID) 250 MG tablet Take 250 mg by mouth daily      buPROPion (WELLBUTRIN XL) 150 MG 24 hr tablet Take 1 tablet (150 mg) by mouth every morning For 4 weeks, then stop if tolerated (Patient not taking: Reported on 6/13/2024) 90 tablet 0    cholecalciferol (VITAMIN D3) 25 mcg (1000 units) capsule Take 1 capsule by mouth daily (Patient not taking: Reported on 1/25/2024)      ondansetron (ZOFRAN) 4 MG tablet Take one tablet every six hours for nausea during colonoscopy bowel prepping (Patient not taking: Reported on 6/13/2024) 3 tablet 0    UNABLE TO FIND MEDICATION NAME: Somnapure - 2 tablets = 500mg valerian root, 300mg lemon balm extract, 200mg l-theanine, 120mg hops extract, 50mg chamomile flower extract, 50mg passion flower extract, 3mg melatonin.  Takes 1-2 tablets at bedtime (Patient not taking: Reported on 1/25/2024)      UNABLE TO FIND MEDICATION NAME: Premium Amla Berry 2 capsules = 4mg Vitamin C, 966mg organic amla, organic amla extract.  Takes 1 capsule daily as needed for immune system (Patient not taking: Reported on 1/25/2024)         ALLERGIES     Allergies   Allergen Reactions    Cats     Codeine Sulfate GI Disturbance    Metoprolol Rash       PAST MEDICAL HISTORY:  Past Medical History:   Diagnosis Date    Alcoholism (H)     Allergies     Fall    Arthritis     Basal cell cancer     back, chest, face    Breast cancer (H)     Coronary artery disease     CT calcium eksdk=054 Nov 2015    Depression     Hyperlipidemia LDL goal <130 12/2/2015    Hypertension     borderline    PONV (postoperative nausea and vomiting)     Squamous cell carcinoma     left upper arm, chin       PAST SURGICAL HISTORY:  Past Surgical History:   Procedure Laterality Date    ABDOMEN SURGERY  2007?    Hysterectomy    COLONOSCOPY       COLONOSCOPY  11/17/2011    Procedure:COLONOSCOPY; Colonoscopy; Surgeon:MEKA RUSS; Location: GI    COLONOSCOPY N/A 2/10/2020    Procedure: COLONOSCOPY, WITH POLYPECTOMY AND BIOPSY;  Surgeon: Opal Ace MD;  Location:  GI    COLONOSCOPY N/A 4/3/2024    Procedure: Colonoscopy FV;  Surgeon: Edith Washburn MD;  Location:  GI    DISSECT LYMPH NODE AXILLA Right 11/2/2017    Procedure: DISSECT LYMPH NODE AXILLA;  RIGHT AXILLARY LYMPH NODE DISSECTION;  Surgeon: Cortez White MD;  Location:  [facility]    EP ABLATION ATRIAL FLUTTER N/A 12/11/2023    Procedure: EP Ablation Atrial Flutter;  Surgeon: Michael John MD;  Location:  [facility]    GYN SURGERY  2005    hysterectomy after hx of ovarian cyst, ended up being benign    HYSTERECTOMY, PAP NO LONGER INDICATED      MASTECTOMY MODIFIED RADICAL  2011    bilateral    MASTECTOMY, BILATERAL      ORTHOPEDIC SURGERY      rt. knee arthroscopy    ORTHOPEDIC SURGERY Right     toe surgery    REALIGN PATELLA  1973    right     TOE SURGERY      right grt OA        FAMILY HISTORY:  Family History   Problem Relation Age of Onset    Cancer Mother 60        leukemia    Arthritis Mother         osteo    Eye Disorder Mother         glaucoma    Gastrointestinal Disease Mother         gallbladder    Other Cancer Mother     Gallbladder Disease Mother     Alcohol/Drug Father         alcohol    Heart Disease Father         ? CHF    Respiratory Father         emphysema    Coronary Artery Disease Father     Substance Abuse Father     Substance Abuse Sister     Anxiety Disorder Sister     Asthma Sister     Colon Cancer Brother     Breast Cancer Maternal Grandmother     Asthma Sister     Substance Abuse Sister     Cancer - colorectal Brother 50    Prostate Cancer Brother     Allergies Brother         bee     Arthritis Sister         osteo    Arthritis Sister         osteo    Arthritis Brother         osteo    Depression Sister     Psychotic Disorder Sister          bipolar    Respiratory Sister     Colon Cancer Brother     Prostate Cancer Brother     Depression Sister     Asthma Sister        SOCIAL HISTORY:  Social History     Socioeconomic History    Marital status:      Spouse name: None    Number of children: None    Years of education: None    Highest education level: None   Tobacco Use    Smoking status: Former     Current packs/day: 0.00     Average packs/day: 1 pack/day for 40.8 years (40.8 ttl pk-yrs)     Types: Cigarettes     Start date: 1969     Quit date: 2010     Years since quittin.1    Smokeless tobacco: Never    Tobacco comments:     I have been chewing nicorette gum 3 yrs and 3 months now. I have quit 8 and 9 yrs and periods in bet   Vaping Use    Vaping status: Never Used   Substance and Sexual Activity    Alcohol use: Not Currently     Comment: No longer    Drug use: Yes     Types: Marijuana     Comment: for sleeping/occ    Sexual activity: Not Currently     Partners: Male     Birth control/protection: Surgical     Comment: hyst   Other Topics Concern     Service No    Blood Transfusions No    Caffeine Concern Yes     Comment: 4-5 cups caffeine per day    Occupational Exposure No    Hobby Hazards No    Sleep Concern Yes    Stress Concern Yes    Weight Concern No    Special Diet Yes     Comment: organic foods    Back Care No    Exercise No    Bike Helmet No    Seat Belt Yes    Self-Exams Yes    Parent/sibling w/ CABG, MI or angioplasty before 65F 55M? No     Social Determinants of Health     Financial Resource Strain: Low Risk  (10/9/2023)    Financial Resource Strain     Within the past 12 months, have you or your family members you live with been unable to get utilities (heat, electricity) when it was really needed?: No   Food Insecurity: Low Risk  (10/9/2023)    Food Insecurity     Within the past 12 months, did you worry that your food would run out before you got money to buy more?: No     Within the past 12 months,  "did the food you bought just not last and you didn t have money to get more?: No   Transportation Needs: Low Risk  (10/9/2023)    Transportation Needs     Within the past 12 months, has lack of transportation kept you from medical appointments, getting your medicines, non-medical meetings or appointments, work, or from getting things that you need?: No    Received from [facility], [facility]    Social Connections   Housing Stability: Low Risk  (10/9/2023)    Housing Stability     Do you have housing? : Yes     Are you worried about losing your housing?: No       Review of Systems:  Skin:        Eyes:       ENT:       Respiratory:       Cardiovascular:       Gastroenterology:      Genitourinary:       Musculoskeletal:       Neurologic:       Psychiatric:       Heme/Lymph/Imm:       Endocrine:         Physical Exam:  Vitals: /65   Pulse 73   Ht 1.645 m (5' 4.75\")   Wt 63 kg (139 lb)   SpO2 97%   BMI 23.31 kg/m       GEN:  NAD  NECK: No JVD  C/V:  Regular rate and rhythm, no murmur, rub or gallop.  RESP: Clear to auscultation bilaterally without wheezing, rales, or rhonchi.  GI: Abdomen soft, nontender, nondistended. No HSM appreciated.   EXTREM: NO pitting LE edema.   NEURO: Alert and oriented, cooperative. No obvious focal deficits.   PSYCH: Normal affect.  SKIN: Warm and dry.       Recent Lab Results:  LIPID RESULTS:  Lab Results   Component Value Date    CHOL 155 01/15/2024    CHOL 179 03/23/2021    HDL 83 01/15/2024     03/23/2021    LDL 60 01/15/2024    LDL 54 03/23/2021    TRIG 60 01/15/2024    TRIG 87 03/23/2021    CHOLHDLRATIO 3.3 10/29/2015       LIVER ENZYME RESULTS:  Lab Results   Component Value Date    AST 24 12/10/2023    AST 18 10/31/2019    ALT 31 01/15/2024    ALT 35 10/31/2019       CBC RESULTS:  Lab Results   Component Value Date    WBC 6.7 12/10/2023    WBC 7.3 01/11/2021    RBC 4.39 12/10/2023    RBC 4.17 " "01/11/2021    HGB 13.6 12/10/2023    HGB 13.6 01/11/2021    HCT 41.2 12/10/2023    HCT 40.6 01/11/2021    MCV 94 12/10/2023    MCV 97 01/11/2021    MCH 31.0 12/10/2023    MCH 32.6 01/11/2021    MCHC 33.0 12/10/2023    MCHC 33.5 01/11/2021    RDW 12.5 12/10/2023    RDW 13.1 01/11/2021     12/12/2023     01/11/2021       BMP RESULTS:  Lab Results   Component Value Date     12/10/2023     01/11/2021    POTASSIUM 4.6 12/10/2023    POTASSIUM 4.3 01/17/2022    POTASSIUM 4.2 01/11/2021    CHLORIDE 110 (H) 12/10/2023    CHLORIDE 103 01/17/2022    CHLORIDE 101 01/11/2021    CO2 26 12/10/2023    CO2 30 01/17/2022    CO2 24 01/11/2021    ANIONGAP 9 12/10/2023    ANIONGAP 2 (L) 01/17/2022    ANIONGAP 9 01/11/2021     (H) 12/10/2023     (H) 01/17/2022     (H) 01/11/2021    BUN 10.9 12/10/2023    BUN 11 01/17/2022    BUN 17 01/11/2021    CR 0.99 (H) 12/12/2023    CR 0.74 01/11/2021    GFRESTIMATED 61 12/12/2023    GFRESTIMATED >60 07/21/2021    GFRESTIMATED 83 01/11/2021    GFRESTBLACK >90 01/11/2021    YASMIN 9.4 12/10/2023    YASMIN 9.2 01/11/2021        A1C RESULTS:  Lab Results   Component Value Date    A1C 5.6 11/18/2016       INR RESULTS:  No results found for: \"INR\"        Taiwo Anthony PA-C, PA-C   June 5, 2024     "

## 2024-06-13 ENCOUNTER — OFFICE VISIT (OUTPATIENT)
Dept: CARDIOLOGY | Facility: CLINIC | Age: 74
End: 2024-06-13
Payer: MEDICARE

## 2024-06-13 VITALS
OXYGEN SATURATION: 97 % | HEIGHT: 65 IN | HEART RATE: 73 BPM | DIASTOLIC BLOOD PRESSURE: 65 MMHG | SYSTOLIC BLOOD PRESSURE: 114 MMHG | BODY MASS INDEX: 23.16 KG/M2 | WEIGHT: 139 LBS

## 2024-06-13 DIAGNOSIS — R01.1 HEART MURMUR: ICD-10-CM

## 2024-06-13 DIAGNOSIS — E78.2 MIXED HYPERLIPIDEMIA: ICD-10-CM

## 2024-06-13 DIAGNOSIS — E78.5 HYPERLIPIDEMIA LDL GOAL <70: ICD-10-CM

## 2024-06-13 DIAGNOSIS — R93.1 HIGH CORONARY ARTERY CALCIUM SCORE: Chronic | ICD-10-CM

## 2024-06-13 PROCEDURE — 99214 OFFICE O/P EST MOD 30 MIN: CPT | Performed by: PHYSICIAN ASSISTANT

## 2024-06-13 NOTE — LETTER
6/13/2024    Vladislav Cruz MD  [address]  [facility] [address]    RE: Svetlana Adair       Dear Colleague,     I had the pleasure of seeing Svetlana Adair in the [facility].  Primary Cardiologist: Dr. Perez    Reason for Visit: Annual follow-up    History of Present Illness:   Mahnaz is a very pleasant 73-year-old female with medical history is notable for high calcium score of 790 (99th percentile; negative nuclear stress scan), former tobacco user, hyperlipidemia, and more recently diagnosis of typical atrial flutter (s/p ablation in 12/2023 performed by Dr. John; [id number] score of 2; was placed on apixaban to be taken for 30 days; did not tolerate metoprolol due to multiple side effects).     Mahnaz reports memory issues and fatigue since the increase in rosuvastatin from 20 to 40 mg. She stopped it completely and has been trying a herbal supplement called citrus bergamot.     Assessment and Plan:  Mahnaz is a very pleasant 73-year-old female with medical history is notable for high calcium score of 790 (99th percentile; negative nuclear stress scan), former tobacco user, hyperlipidemia (recently stopped rosuvastatin), and more recently diagnosis of typical atrial flutter (s/p ablation in 12/2023 performed by Dr. John; [id number] score of 2; was placed on apixaban to be taken for 30 days; did not tolerate metoprolol due to multiple side effects).     We will check lipid panel/ALT per her wishes and follow up afterwards to discuss if we should consider lower dose of rosuvastatin or an alternative. She will otherwise follow up with us in 12/2024.       This note was completed in part using Dragon voice recognition software. Although reviewed after completion, some word and grammatical errors may occur.    Orders this Visit:  Orders Placed This Encounter   Procedures    Lipid Profile    ALT    Follow-Up with Cardiology PATIENCE     No orders of the defined types were placed in  this encounter.    There are no discontinued medications.      Encounter Diagnoses   Name Primary?    Mixed hyperlipidemia     High coronary artery calcium score     Hyperlipidemia LDL goal <70     Heart murmur        CURRENT MEDICATIONS:  Current Outpatient Medications   Medication Sig Dispense Refill    ASHWAGANDHA PO Take 1 tablet by mouth daily as needed At onset of illness symptoms; as needed      aspirin 81 MG EC tablet Take 1 tablet (81 mg) by mouth daily      calcium carbonate-vitamin D (OS-YASMIN) 500-400 MG-UNIT tablet Take 1 tablet by mouth daily       Coenzyme Q10 300 MG CAPS Take 300 mg by mouth daily      IBUPROFEN PO Take 200 mg by mouth every 4 hours as needed for moderate pain      letrozole (FEMARA) 2.5 MG tablet Take 1 tablet by mouth daily  5    Lutein-Zeaxanthin 25-5 MG CAPS Take 1 capsule by mouth daily      LYSINE 1-3 daily as needed      melatonin 5 MG tablet Take 5 mg by mouth nightly as needed for sleep      Milk Thistle-Dand-Fennel-Licor (MILK THISTLE XTRA) CAPS capsule Take 1 capsule by mouth daily      Naltrexone HCl, Pain, 4.5 MG CAPS Take 1 capsule by mouth daily      nicotine polacrilex (NICORETTE) 2 MG gum Place 1 each (2 mg) inside cheek as needed for smoking cessation 30 tablet 11    omeprazole (PRILOSEC) 20 MG DR capsule Take 1 capsule (20 mg) by mouth daily 90 capsule 1    RED CLOVER LEAF EXTRACT PO Take 1 capsule by mouth daily      rosuvastatin (CRESTOR) 40 MG tablet Take 1 tablet (40 mg) by mouth daily 90 tablet 4    TURMERIC PO Take 1 capsule by mouth daily      UNABLE TO FIND Take by mouth daily MEDICATION NAME: Asea Redox - 1oz twice daily      UNABLE TO FIND Take 1 tablet by mouth daily MEDICATION NAME: Aidee Jarrettao - chinese supplement takes at onset of illness symptoms      UNABLE TO FIND MEDICATION NAME: Moringa 1 serving of powder daily      UNABLE TO FIND MEDICATION NAME: OmegaKrill 5x 3 capsules = 480mg of total omega-3, 64mg EPA, 392mg DHA, 300mcg astaxanthin.  Takes 3  capsules daily      UNABLE TO FIND MEDICATION NAME: Super Colon Cleanse 2 capsules = 665mg senna leaf powder, 321mg psyllium husk powder, 21mg fennel seed powder, 21mg papaya leaf powder, 21mg yolanda hips fruit powder, 11mg l-acidophilus.  Taking 4 capsules daily      valACYclovir (VALTREX) 1000 mg tablet TAKE 1 TABLET(1000 MG) BY MOUTH THREE TIMES DAILY FOR 7 DAYS 21 tablet 11    vitamin C (ASCORBIC ACID) 250 MG tablet Take 250 mg by mouth daily      buPROPion (WELLBUTRIN XL) 150 MG 24 hr tablet Take 1 tablet (150 mg) by mouth every morning For 4 weeks, then stop if tolerated (Patient not taking: Reported on 6/13/2024) 90 tablet 0    cholecalciferol (VITAMIN D3) 25 mcg (1000 units) capsule Take 1 capsule by mouth daily (Patient not taking: Reported on 1/25/2024)      ondansetron (ZOFRAN) 4 MG tablet Take one tablet every six hours for nausea during colonoscopy bowel prepping (Patient not taking: Reported on 6/13/2024) 3 tablet 0    UNABLE TO FIND MEDICATION NAME: Somnapure - 2 tablets = 500mg valerian root, 300mg lemon balm extract, 200mg l-theanine, 120mg hops extract, 50mg chamomile flower extract, 50mg passion flower extract, 3mg melatonin.  Takes 1-2 tablets at bedtime (Patient not taking: Reported on 1/25/2024)      UNABLE TO FIND MEDICATION NAME: Premium Amla Berry 2 capsules = 4mg Vitamin C, 966mg organic amla, organic amla extract.  Takes 1 capsule daily as needed for immune system (Patient not taking: Reported on 1/25/2024)         ALLERGIES     Allergies   Allergen Reactions    Cats     Codeine Sulfate GI Disturbance    Metoprolol Rash       PAST MEDICAL HISTORY:  Past Medical History:   Diagnosis Date    Alcoholism (H)     Allergies     Fall    Arthritis     Basal cell cancer     back, chest, face    Breast cancer (H)     Coronary artery disease     CT calcium rxydr=785 Nov 2015    Depression     Hyperlipidemia LDL goal <130 12/2/2015    Hypertension     borderline    PONV (postoperative nausea and  vomiting)     Squamous cell carcinoma     left upper arm, chin       PAST SURGICAL HISTORY:  Past Surgical History:   Procedure Laterality Date    ABDOMEN SURGERY  2007?    Hysterectomy    COLONOSCOPY      COLONOSCOPY  11/17/2011    Procedure:COLONOSCOPY; Colonoscopy; Surgeon:MEKA RUSS; Location: GI    COLONOSCOPY N/A 2/10/2020    Procedure: COLONOSCOPY, WITH POLYPECTOMY AND BIOPSY;  Surgeon: Opal Ace MD;  Location:  GI    COLONOSCOPY N/A 4/3/2024    Procedure: Colonoscopy FV;  Surgeon: Edith Washburn MD;  Location:  GI    DISSECT LYMPH NODE AXILLA Right 11/2/2017    Procedure: DISSECT LYMPH NODE AXILLA;  RIGHT AXILLARY LYMPH NODE DISSECTION;  Surgeon: Cortez White MD;  Location:  SD    EP ABLATION ATRIAL FLUTTER N/A 12/11/2023    Procedure: EP Ablation Atrial Flutter;  Surgeon: Michael John MD;  Location:  HEART CARDIAC CATH LAB    GYN SURGERY  2005    hysterectomy after hx of ovarian cyst, ended up being benign    HYSTERECTOMY, PAP NO LONGER INDICATED      MASTECTOMY MODIFIED RADICAL  2011    bilateral    MASTECTOMY, BILATERAL      ORTHOPEDIC SURGERY      rt. knee arthroscopy    ORTHOPEDIC SURGERY Right     toe surgery    REALIGN PATELLA  1973    right     TOE SURGERY      right grt OA        FAMILY HISTORY:  Family History   Problem Relation Age of Onset    Cancer Mother 60        leukemia    Arthritis Mother         osteo    Eye Disorder Mother         glaucoma    Gastrointestinal Disease Mother         gallbladder    Other Cancer Mother     Gallbladder Disease Mother     Alcohol/Drug Father         alcohol    Heart Disease Father         ? CHF    Respiratory Father         emphysema    Coronary Artery Disease Father     Substance Abuse Father     Substance Abuse Sister     Anxiety Disorder Sister     Asthma Sister     Colon Cancer Brother     Breast Cancer Maternal Grandmother     Asthma Sister     Substance Abuse Sister     Cancer - colorectal Brother 50     Prostate Cancer Brother     Allergies Brother         bee     Arthritis Sister         osteo    Arthritis Sister         osteo    Arthritis Brother         osteo    Depression Sister     Psychotic Disorder Sister         bipolar    Respiratory Sister     Colon Cancer Brother     Prostate Cancer Brother     Depression Sister     Asthma Sister        SOCIAL HISTORY:  Social History     Socioeconomic History    Marital status:      Spouse name: None    Number of children: None    Years of education: None    Highest education level: None   Tobacco Use    Smoking status: Former     Current packs/day: 0.00     Average packs/day: 1 pack/day for 40.8 years (40.8 ttl pk-yrs)     Types: Cigarettes     Start date: 1969     Quit date: 2010     Years since quittin.1    Smokeless tobacco: Never    Tobacco comments:     I have been chewing nicorette gum 3 yrs and 3 months now. I have quit 8 and 9 yrs and periods in bet   Vaping Use    Vaping status: Never Used   Substance and Sexual Activity    Alcohol use: Not Currently     Comment: No longer    Drug use: Yes     Types: Marijuana     Comment: for sleeping/occ    Sexual activity: Not Currently     Partners: Male     Birth control/protection: Surgical     Comment: hyst   Other Topics Concern     Service No    Blood Transfusions No    Caffeine Concern Yes     Comment: 4-5 cups caffeine per day    Occupational Exposure No    Hobby Hazards No    Sleep Concern Yes    Stress Concern Yes    Weight Concern No    Special Diet Yes     Comment: organic foods    Back Care No    Exercise No    Bike Helmet No    Seat Belt Yes    Self-Exams Yes    Parent/sibling w/ CABG, MI or angioplasty before 65F 55M? No     Social Determinants of Health     Financial Resource Strain: Low Risk  (10/9/2023)    Financial Resource Strain     Within the past 12 months, have you or your family members you live with been unable to get utilities (heat, electricity) when it was really  "needed?: No   Food Insecurity: Low Risk  (10/9/2023)    Food Insecurity     Within the past 12 months, did you worry that your food would run out before you got money to buy more?: No     Within the past 12 months, did the food you bought just not last and you didn t have money to get more?: No   Transportation Needs: Low Risk  (10/9/2023)    Transportation Needs     Within the past 12 months, has lack of transportation kept you from medical appointments, getting your medicines, non-medical meetings or appointments, work, or from getting things that you need?: No    Received from [facility], [facility]    Social Connections   Housing Stability: Low Risk  (10/9/2023)    Housing Stability     Do you have housing? : Yes     Are you worried about losing your housing?: No       Review of Systems:  Skin:        Eyes:       ENT:       Respiratory:       Cardiovascular:       Gastroenterology:      Genitourinary:       Musculoskeletal:       Neurologic:       Psychiatric:       Heme/Lymph/Imm:       Endocrine:         Physical Exam:  Vitals: /65   Pulse 73   Ht 1.645 m (5' 4.75\")   Wt 63 kg (139 lb)   SpO2 97%   BMI 23.31 kg/m       GEN:  NAD  NECK: No JVD  C/V:  Regular rate and rhythm, no murmur, rub or gallop.  RESP: Clear to auscultation bilaterally without wheezing, rales, or rhonchi.  GI: Abdomen soft, nontender, nondistended. No HSM appreciated.   EXTREM: NO pitting LE edema.   NEURO: Alert and oriented, cooperative. No obvious focal deficits.   PSYCH: Normal affect.  SKIN: Warm and dry.       Recent Lab Results:  LIPID RESULTS:  Lab Results   Component Value Date    CHOL 155 01/15/2024    CHOL 179 03/23/2021    HDL 83 01/15/2024     03/23/2021    LDL 60 01/15/2024    LDL 54 03/23/2021    TRIG 60 01/15/2024    TRIG 87 03/23/2021    CHOLHDLRATIO 3.3 10/29/2015       LIVER ENZYME RESULTS:  Lab Results   Component Value Date    AST 24 " "12/10/2023    AST 18 10/31/2019    ALT 31 01/15/2024    ALT 35 10/31/2019       CBC RESULTS:  Lab Results   Component Value Date    WBC 6.7 12/10/2023    WBC 7.3 01/11/2021    RBC 4.39 12/10/2023    RBC 4.17 01/11/2021    HGB 13.6 12/10/2023    HGB 13.6 01/11/2021    HCT 41.2 12/10/2023    HCT 40.6 01/11/2021    MCV 94 12/10/2023    MCV 97 01/11/2021    MCH 31.0 12/10/2023    MCH 32.6 01/11/2021    MCHC 33.0 12/10/2023    MCHC 33.5 01/11/2021    RDW 12.5 12/10/2023    RDW 13.1 01/11/2021     12/12/2023     01/11/2021       BMP RESULTS:  Lab Results   Component Value Date     12/10/2023     01/11/2021    POTASSIUM 4.6 12/10/2023    POTASSIUM 4.3 01/17/2022    POTASSIUM 4.2 01/11/2021    CHLORIDE 110 (H) 12/10/2023    CHLORIDE 103 01/17/2022    CHLORIDE 101 01/11/2021    CO2 26 12/10/2023    CO2 30 01/17/2022    CO2 24 01/11/2021    ANIONGAP 9 12/10/2023    ANIONGAP 2 (L) 01/17/2022    ANIONGAP 9 01/11/2021     (H) 12/10/2023     (H) 01/17/2022     (H) 01/11/2021    BUN 10.9 12/10/2023    BUN 11 01/17/2022    BUN 17 01/11/2021    CR 0.99 (H) 12/12/2023    CR 0.74 01/11/2021    GFRESTIMATED 61 12/12/2023    GFRESTIMATED >60 07/21/2021    GFRESTIMATED 83 01/11/2021    GFRESTBLACK >90 01/11/2021    YASMIN 9.4 12/10/2023    YASMIN 9.2 01/11/2021        A1C RESULTS:  Lab Results   Component Value Date    A1C 5.6 11/18/2016       INR RESULTS:  No results found for: \"INR\"        Taiwo Anthony PA-C, BENI   June 5, 2024       Thank you for allowing me to participate in the care of your patient.      Sincerely,     Taiwo Anthony PA-C     [facility]  cc:   Jose Alberto Perez MD  [address] [place] [address]  [place] [address]      "

## 2024-06-14 ENCOUNTER — MYC MEDICAL ADVICE (OUTPATIENT)
Dept: FAMILY MEDICINE | Facility: CLINIC | Age: 74
End: 2024-06-14

## 2024-06-14 ENCOUNTER — TELEPHONE (OUTPATIENT)
Dept: CARDIOLOGY | Facility: CLINIC | Age: 74
End: 2024-06-14

## 2024-06-14 DIAGNOSIS — F43.23 ADJUSTMENT DISORDER WITH MIXED ANXIETY AND DEPRESSED MOOD: ICD-10-CM

## 2024-06-14 RX ORDER — BUPROPION HYDROCHLORIDE 300 MG/1
300 TABLET ORAL EVERY MORNING
Qty: 90 TABLET | Refills: 3 | Status: SHIPPED | OUTPATIENT
Start: 2024-06-14

## 2024-06-14 NOTE — TELEPHONE ENCOUNTER
See below message from patient, requesting to resume Wellbutrin, please advise     Jasmyn RAZA, Triage RN  [facility]

## 2024-06-14 NOTE — TELEPHONE ENCOUNTER
[facility] Call Center    Phone Message    May a detailed message be left on voicemail: yes     Reason for Call: Other: Can you please provide some clarification on follow up for labs. Does patient need an appointment or was Aybike going to call reinaldo to review results     Action Taken: Other: cardio    Travel Screening: Not Applicable     Date of Service:

## 2024-07-08 ENCOUNTER — THERAPY VISIT (OUTPATIENT)
Dept: SLEEP MEDICINE | Facility: CLINIC | Age: 74
End: 2024-07-08
Attending: PHYSICIAN ASSISTANT
Payer: MEDICARE

## 2024-07-08 DIAGNOSIS — R53.82 CHRONIC FATIGUE: ICD-10-CM

## 2024-07-08 DIAGNOSIS — F33.1 MODERATE EPISODE OF RECURRENT MAJOR DEPRESSIVE DISORDER (H): ICD-10-CM

## 2024-07-08 DIAGNOSIS — R06.83 SNORING: ICD-10-CM

## 2024-07-08 PROCEDURE — 95810 POLYSOM 6/> YRS 4/> PARAM: CPT | Performed by: PSYCHIATRY & NEUROLOGY

## 2024-07-09 NOTE — PATIENT INSTRUCTIONS
[facility]    1. Your sleep study will be reviewed by a sleep physician within the next few days.     2. Please follow up in the sleep clinic as scheduled, or, make an appointment with your sleep provider to be seen within two weeks to discuss the results of the sleep study.    3. If you have any questions or problems with your treatment plan, please contact your sleep clinic provider at [phone] to further manage your condition.    4. Please review your attached medication list, and, at your follow-up appointment advise your sleep clinic provider about any changes.    5. Go to [email or web address] for more information about your sleep problems.    Jatinder MARTELL, CAT, RPSGT  July 9, 2024

## 2024-07-16 LAB — SLPCOMP: NORMAL

## 2024-07-16 NOTE — PROCEDURES
" SLEEP STUDY INTERPRETATION  DIAGNOSTIC POLYSOMNOGRAPHY REPORT      Patient: ERIKA VALENCIA  YOB: 1950  Study Date: 7/8/2024  MRN: [id number]  Referring Provider: Vladislav Cruz MD  Ordering Provider: Lara Smith PA-C    Indications for Polysomnography: The patient is a 73 year old Female who is 5' 5\" and weighs 144.0 lbs. Her BMI is 24.0, Wilson sleepiness scale 1/24 and neck circumference is 37 cm. Relevant medical history includes snoring, ? apneas. A diagnostic polysomnogram was performed to evaluate for sleep apnea.    Polysomnogram Data: A full night polysomnogram recorded the standard physiologic parameters including EEG, EOG, EMG, ECG, nasal and oral airflow. Respiratory parameters of chest and abdominal movements were recorded with respiratory inductance plethysmography. Oxygen saturation was recorded by pulse oximetry. Hypopnea scoring rule used: 1B 4%.    Sleep Architecture: Sleep fragmentation  The total recording time of the polysomnogram was 454.2 minutes. The total sleep time was 287.5 minutes. Sleep latency was 12.3 minutes. REM latency was 93.5 minutes. Arousal index was 62.0 arousals per hour. Sleep efficiency was 63.3%. Wake after sleep onset was 154.0 minutes. The patient spent 27.7% of total sleep time in Stage N1, 46.1% in Stage N2, 5.7% in Stage N3, and 20.5% in REM. Time in REM supine was 2.5 minutes.    Respiration: Mild LALITA    Events ? The polysomnogram revealed a presence of 11 obstructive, 5 central, and 1 mixed apneas resulting in an apnea index of 3.5 events per hour. There were 9 obstructive hypopneas and - central hypopneas resulting in an obstructive hypopnea index of 1.9 and central hypopnea index of - events per hour. The combined apnea/hypopnea index was 5.4 events per hour (central apnea/hypopnea index was 1.0 events per hour). The REM AHI was 21.4 events per hour. The supine AHI was 7.3 events per hour. The RERA index was 5.0 events per hour.  The RDI was " 10.4 events per hour.  Snoring - was reported as loud.  Respiratory rate and pattern - was notable for normal respiratory rate and pattern.  Sustained Sleep Associated Hypoventilation - Transcutaneous carbon dioxide monitoring was not used.  Sleep Associated Hypoxemia - (Greater than 5 minutes O2 sat at or below 88%) was not present. Baseline oxygen saturation was 95.2%. Lowest oxygen saturation was 90.0%. Time spent less than or equal to 88% was 0 minutes. Time spent less than or equal to 89% was 0 minutes.    Movement Activity: Elevated PLMs  Periodic Limb Activity - There were 256 PLMs during the entire study. The PLM index was 53.4 movements per hour. The PLM Arousal Index was 41.3 per hour.  REM EMG Activity - Excessive muscle activity was not present.  Nocturnal Behavior - Abnormal sleep related behaviors were not noted.  Bruxism - None apparent.    Cardiac Summary: Sinus  The average pulse rate was 54.4 bpm. The minimum pulse rate was 43.0 bpm while the maximum pulse rate was 78.0 bpm.      Assessment:   Mild LALITA  Elevated PLMs    Recommendations:  If deemed clinically relevant Mild LALITA can be treated with the following options:  Dental Appliance  Auto CPAP  Upper Airway Surgery  Position restriction device to prevent the patient from sleeping supine   Advice regarding the risks of drowsy driving.  Suggest optimizing sleep schedule and avoiding sleep deprivation.    Diagnostic Codes: G47.33, G47.9       Cortez Corbin MD 7-  Diplomate, Sleep Medicine  American Board of Psychiatry and Neurology

## 2024-07-23 ENCOUNTER — LAB (OUTPATIENT)
Dept: LAB | Facility: CLINIC | Age: 74
End: 2024-07-23
Payer: MEDICARE

## 2024-07-23 DIAGNOSIS — E78.2 MIXED HYPERLIPIDEMIA: ICD-10-CM

## 2024-07-23 LAB
ALT SERPL W P-5'-P-CCNC: 13 U/L (ref 0–50)
CHOLEST SERPL-MCNC: 243 MG/DL
FASTING STATUS PATIENT QL REPORTED: YES
HDLC SERPL-MCNC: 73 MG/DL
LDLC SERPL CALC-MCNC: 157 MG/DL
NONHDLC SERPL-MCNC: 170 MG/DL
TRIGL SERPL-MCNC: 63 MG/DL

## 2024-07-23 PROCEDURE — 36415 COLL VENOUS BLD VENIPUNCTURE: CPT | Performed by: PHYSICIAN ASSISTANT

## 2024-07-23 PROCEDURE — 80061 LIPID PANEL: CPT | Performed by: PHYSICIAN ASSISTANT

## 2024-07-23 PROCEDURE — 84460 ALANINE AMINO (ALT) (SGPT): CPT | Performed by: PHYSICIAN ASSISTANT

## 2024-07-30 ENCOUNTER — TELEPHONE (OUTPATIENT)
Dept: CARDIOLOGY | Facility: CLINIC | Age: 74
End: 2024-07-30
Payer: MEDICARE

## 2024-07-30 DIAGNOSIS — E78.5 HYPERLIPIDEMIA LDL GOAL <70: ICD-10-CM

## 2024-07-30 DIAGNOSIS — E78.2 MIXED HYPERLIPIDEMIA: Primary | ICD-10-CM

## 2024-07-30 NOTE — TELEPHONE ENCOUNTER
Spoke with pt about rosuvastatin tablets and informed her that she can continue taking the 1/2 tablet of the 40mg.   Informed pt to stay on that until she uses up her 40mg tablets.   Pt will have lipids checked again on 10/3/24.  Pt gave verbal understanding.

## 2024-07-30 NOTE — TELEPHONE ENCOUNTER
[facility] Call Center    Phone Message    May a detailed message be left on voicemail: yes     Reason for Call: Medication Question or concern regarding medication   Prescription Clarification  Name of Medication: rosuvastatin (CRESTOR) 40 MG tablet   Prescribing Provider: Taiwo Galvez   Pharmacy: [facility] [id number] [place] [address]   What on the order needs clarification? Pt was advised to take only 20 mg of the Rousvastatin.  Pt has 60 40 mg tablets and she would like to cut them in half to take the 20 mg instead of get a new script.  Please call to confirm that it's ok to cut the 40 mg Rousvastatin in half - pt states they cut wonderfully.   IF pt should not take half of the 40 mg tabs please send a new script for 20 mg to the pharmacy listed above, not the [facility] listed on the other meds pt takes.  Thank you      Action Taken: Message routed to:  [facility] cardio    Travel Screening: Not Applicable    Thank you!  [facility]       Date of Service:

## 2024-08-15 ENCOUNTER — TRANSFERRED RECORDS (OUTPATIENT)
Dept: HEALTH INFORMATION MANAGEMENT | Facility: CLINIC | Age: 74
End: 2024-08-15
Payer: MEDICARE

## 2024-08-23 ENCOUNTER — VIRTUAL VISIT (OUTPATIENT)
Dept: SLEEP MEDICINE | Facility: CLINIC | Age: 74
End: 2024-08-23
Payer: MEDICARE

## 2024-08-23 VITALS — WEIGHT: 135 LBS | BODY MASS INDEX: 22.49 KG/M2 | HEIGHT: 65 IN

## 2024-08-23 DIAGNOSIS — G47.33 OSA (OBSTRUCTIVE SLEEP APNEA): Primary | ICD-10-CM

## 2024-08-23 DIAGNOSIS — E61.1 IRON DEFICIENCY: ICD-10-CM

## 2024-08-23 DIAGNOSIS — G47.61 PERIODIC LIMB MOVEMENTS OF SLEEP: ICD-10-CM

## 2024-08-23 PROCEDURE — 99214 OFFICE O/P EST MOD 30 MIN: CPT | Mod: 95 | Performed by: INTERNAL MEDICINE

## 2024-08-23 RX ORDER — GABAPENTIN 300 MG/1
300 CAPSULE ORAL AT BEDTIME
Qty: 30 CAPSULE | Refills: 2 | Status: SHIPPED | OUTPATIENT
Start: 2024-08-23

## 2024-08-23 ASSESSMENT — SLEEP AND FATIGUE QUESTIONNAIRES
HOW LIKELY ARE YOU TO NOD OFF OR FALL ASLEEP WHILE WATCHING TV: WOULD NEVER DOZE
HOW LIKELY ARE YOU TO NOD OFF OR FALL ASLEEP WHILE SITTING AND TALKING TO SOMEONE: WOULD NEVER DOZE
HOW LIKELY ARE YOU TO NOD OFF OR FALL ASLEEP WHILE SITTING QUIETLY AFTER LUNCH WITHOUT ALCOHOL: WOULD NEVER DOZE
HOW LIKELY ARE YOU TO NOD OFF OR FALL ASLEEP WHILE LYING DOWN TO REST IN THE AFTERNOON WHEN CIRCUMSTANCES PERMIT: WOULD NEVER DOZE
HOW LIKELY ARE YOU TO NOD OFF OR FALL ASLEEP IN A CAR, WHILE STOPPED FOR A FEW MINUTES IN TRAFFIC: WOULD NEVER DOZE
HOW LIKELY ARE YOU TO NOD OFF OR FALL ASLEEP WHILE SITTING AND READING: WOULD NEVER DOZE
HOW LIKELY ARE YOU TO NOD OFF OR FALL ASLEEP WHILE SITTING INACTIVE IN A PUBLIC PLACE: WOULD NEVER DOZE
HOW LIKELY ARE YOU TO NOD OFF OR FALL ASLEEP WHEN YOU ARE A PASSENGER IN A CAR FOR AN HOUR WITHOUT A BREAK: WOULD NEVER DOZE

## 2024-08-23 ASSESSMENT — PAIN SCALES - GENERAL: PAINLEVEL: MODERATE PAIN (5)

## 2024-08-23 NOTE — PROGRESS NOTES
Virtual Visit Details    Type of service:  Video Visit   Video Start Time: 3:30 PM  Video End Time:3:52 PM    Originating Location (pt. Location): Home    Distant Location (provider location):  On-site  Platform used for Video Visit: [facility]    Sleep Study Follow-Up Visit:    Date on this visit: 8/23/2024    Svetlana Adair comes in today for follow-up of her sleep study done on 7/8/2024 at the [facility] for possible sleep apnea.    Sleep latency 12.3 minutes. REM achieved.   REM latency 93.5 minutes.  Sleep efficiency 63%. Total sleep time 287.5 minutes.    Sleep architecture:  Stage 1, 27.7% (5%), stage 2, 46% (45-55%), stage 3, 5.7% (15-20%), stage REM, 20.5% (20-25%).  AHI was 5.4, without significant desaturations. RDI 10.4.  REM AHI 21.4, consistent with moderate REM LALITA.  Supine AHI 7.3, consistent with mild SUPINE LALITA.  Periodic Limb Movement Index 53.4/hour. PLM arousal index 41.3 per hour.     These findings were reviewed with patient.     Past medical/surgical history, family history, social history, medications and allergies were reviewed.      Impression/Plan:    Mild obstructive sleep apnea  Periodic limb movements of sleep     PSG result was reviewed in detail with the patient.  Discussed borderline obstructive sleep apnea, consequences and therapy options.  Patient has long-term sequelae/fatigue from COVID, and was evaluating if sleep apnea played a role in her fatigue.  Considering this is very mild sleep apnea, unlikely to be a significant factor.  We discussed potential therapy options including mandibular advancement dental appliance or CPAP if desired.  Not treating mild sleep apnea was also discussed.  Patient wants to consider mandibular advancement dental appliance, and a referral to dental sleep medicine was provided.    We discussed periodic limb movements of sleep.  She denies restless leg symptoms.  PLM arousal index was high, and we can consider if pharmacotherapy would  make a difference for her.  After informed discussion, we decided to try gabapentin.  Also recommend checking a serum ferritin to assess if iron deficiency is playing a role.    Plan:     Dental sleep medicine referral for treatment of mild obstructive sleep apnea  Gabapentin 300 mg at bedtime for excessive periodic limb movements of sleep  Check serum ferritin and iron panel    Electronically signed by Dr. Jh Flannery, Diplomate, Sleep Medicine, ABPN       CC: Vladislav Cruz

## 2024-08-23 NOTE — NURSING NOTE
Current patient location: [facility] [address]   [facility] [address]    Is the patient currently in the state of [place]? YES    Visit mode:VIDEO    If the visit is dropped, the patient can be reconnected by: VIDEO VISIT: Text to cell phone:   Telephone Information:   Mobile [phone]       Will anyone else be joining the visit? NO  (If patient encounters technical issues they should call [phone]    How would you like to obtain your AVS? MyChart    Are changes needed to the allergy or medication list? No    Are refills needed on medications prescribed by this physician? NO    Rooming Documentation:  Questionnaire(s) completed      Reason for visit: RECHVALERIE MACHADOF

## 2024-08-23 NOTE — PATIENT INSTRUCTIONS
Your Body mass index is 22.47 kg/m .  Weight management is a personal decision.  If you are interested in exploring weight loss strategies, the following discussion covers the approaches that may be successful. Body mass index (BMI) is one way to tell whether you are at a healthy weight, overweight, or obese. It measures your weight in relation to your height.  A BMI of 18.5 to 24.9 is in the healthy range. A person with a BMI of 25 to 29.9 is considered overweight, and someone with a BMI of 30 or greater is considered obese. More than two-thirds of American adults are considered overweight or obese.  Being overweight or obese increases the risk for further weight gain. Excess weight may lead to heart disease and diabetes.  Creating and following plans for healthy eating and physical activity may help you improve your health.  Weight control is part of healthy lifestyle and includes exercise, emotional health, and healthy eating habits. Careful eating habits lifelong are the mainstay of weight control. Though there are significant health benefits from weight loss, long-term weight loss with diet alone may be very difficult to achieve- studies show long-term success with dietary management in less than 10% of people. Attaining a healthy weight may be especially difficult to achieve in those with severe obesity. In some cases, medications, devices and surgical management might be considered.  What can you do?  If you are overweight or obese and are interested in methods for weight loss, you should discuss this with your provider.   Consider reducing daily calorie intake by 500 calories.   Keep a food journal.   Avoiding skipping meals, consider cutting portions instead.    Diet combined with exercise helps maintain muscle while optimizing fat loss. Strength training is particularly important for building and maintaining muscle mass. Exercise helps reduce stress, increase energy, and improves fitness. Increasing  exercise without diet control, however, may not burn enough calories to loose weight.     Start walking three days a week 10-20 minutes at a time  Work towards walking thirty minutes five days a week   Eventually, increase the speed of your walking for 1-2 minutes at time    In addition, we recommend that you review healthy lifestyles and methods for weight loss available through the National Institutes of Health patient information sites:  http://win.niddk.nih.gov/publications/index.htm    And look into health and wellness programs that may be available through your health insurance provider, employer, local community center, or [employer].

## 2024-08-29 ENCOUNTER — LAB (OUTPATIENT)
Dept: LAB | Facility: CLINIC | Age: 74
End: 2024-08-29
Payer: MEDICARE

## 2024-08-29 DIAGNOSIS — E61.1 IRON DEFICIENCY: ICD-10-CM

## 2024-08-29 DIAGNOSIS — E78.2 MIXED HYPERLIPIDEMIA: ICD-10-CM

## 2024-08-29 DIAGNOSIS — G47.61 PERIODIC LIMB MOVEMENTS OF SLEEP: ICD-10-CM

## 2024-08-29 LAB
ALT SERPL W P-5'-P-CCNC: 19 U/L (ref 0–50)
FERRITIN SERPL-MCNC: 69 NG/ML (ref 11–328)
IRON BINDING CAPACITY (ROCHE): 315 UG/DL (ref 240–430)
IRON SATN MFR SERPL: 31 % (ref 15–46)
IRON SERPL-MCNC: 99 UG/DL (ref 37–145)

## 2024-08-29 PROCEDURE — 84460 ALANINE AMINO (ALT) (SGPT): CPT | Performed by: PHYSICIAN ASSISTANT

## 2024-08-29 PROCEDURE — 83540 ASSAY OF IRON: CPT | Performed by: INTERNAL MEDICINE

## 2024-08-29 PROCEDURE — 36415 COLL VENOUS BLD VENIPUNCTURE: CPT | Performed by: PHYSICIAN ASSISTANT

## 2024-08-29 PROCEDURE — 83550 IRON BINDING TEST: CPT | Performed by: INTERNAL MEDICINE

## 2024-08-29 PROCEDURE — 82728 ASSAY OF FERRITIN: CPT | Performed by: INTERNAL MEDICINE

## 2024-09-05 ENCOUNTER — MYC MEDICAL ADVICE (OUTPATIENT)
Dept: CARDIOLOGY | Facility: CLINIC | Age: 74
End: 2024-09-05
Payer: MEDICARE

## 2024-10-03 ENCOUNTER — LAB (OUTPATIENT)
Dept: LAB | Facility: CLINIC | Age: 74
End: 2024-10-03
Payer: MEDICARE

## 2024-10-03 ENCOUNTER — CARE COORDINATION (OUTPATIENT)
Dept: CARDIOLOGY | Facility: CLINIC | Age: 74
End: 2024-10-03

## 2024-10-03 ENCOUNTER — TELEPHONE (OUTPATIENT)
Dept: CARDIOLOGY | Facility: CLINIC | Age: 74
End: 2024-10-03

## 2024-10-03 DIAGNOSIS — E78.2 MIXED HYPERLIPIDEMIA: ICD-10-CM

## 2024-10-03 DIAGNOSIS — E78.5 HYPERLIPIDEMIA LDL GOAL <70: ICD-10-CM

## 2024-10-03 LAB
ANION GAP SERPL CALCULATED.3IONS-SCNC: 13 MMOL/L (ref 7–15)
BUN SERPL-MCNC: 16.5 MG/DL (ref 8–23)
CALCIUM SERPL-MCNC: 9.7 MG/DL (ref 8.8–10.4)
CHLORIDE SERPL-SCNC: 102 MMOL/L (ref 98–107)
CHOLEST SERPL-MCNC: 174 MG/DL
CREAT SERPL-MCNC: 0.84 MG/DL (ref 0.51–0.95)
EGFRCR SERPLBLD CKD-EPI 2021: 73 ML/MIN/1.73M2
FASTING STATUS PATIENT QL REPORTED: YES
GLUCOSE SERPL-MCNC: 108 MG/DL (ref 70–99)
HCO3 SERPL-SCNC: 22 MMOL/L (ref 22–29)
HDLC SERPL-MCNC: 81 MG/DL
LDLC SERPL CALC-MCNC: 81 MG/DL
NONHDLC SERPL-MCNC: 93 MG/DL
POTASSIUM SERPL-SCNC: 4.3 MMOL/L (ref 3.4–5.3)
SODIUM SERPL-SCNC: 137 MMOL/L (ref 135–145)
TRIGL SERPL-MCNC: 61 MG/DL

## 2024-10-03 PROCEDURE — 36415 COLL VENOUS BLD VENIPUNCTURE: CPT | Performed by: PHYSICIAN ASSISTANT

## 2024-10-03 PROCEDURE — 80048 BASIC METABOLIC PNL TOTAL CA: CPT | Performed by: PHYSICIAN ASSISTANT

## 2024-10-03 PROCEDURE — 80061 LIPID PANEL: CPT | Performed by: PHYSICIAN ASSISTANT

## 2024-10-03 NOTE — TELEPHONE ENCOUNTER
[facility] Call Center    Phone Message    May a detailed message be left on voicemail: yes     Reason for Call: Other: Per call from patient, requesting a call back concerning medication before fasting labs today at 10:30am. Please reach out to patient.      Action Taken: Other: Cardio    Travel Screening: Not Applicable     Date of Service:

## 2024-10-03 NOTE — PROGRESS NOTES
Pt left message stating she needs to cancel her 1030 lab for this morning and reschedule as she has a family emergency. I left message for pt with update that her lab has been canceled. Pt to call scheduling back at [phone] to reschedule. Kaylynn ELLINGTON October 3, 2024, 7:54 AM

## 2024-10-03 NOTE — TELEPHONE ENCOUNTER
I called pt back. She ended up scheduling her labs for this morning. She wanted to know if she can take her morning meds. Pt also had a piece of nicorette gum this morning. I told pt that is fine to take her meds, and still have her labs. Kaylynn ELLINGTON October 3, 2024, 9:00 AM

## 2024-10-25 ENCOUNTER — TELEPHONE (OUTPATIENT)
Dept: FAMILY MEDICINE | Facility: CLINIC | Age: 74
End: 2024-10-25
Payer: MEDICARE

## 2024-10-25 NOTE — TELEPHONE ENCOUNTER
Reason for Call:  Appointment Request    Patient requesting this type of appt:  Preventive     Requested provider: Vladislav Cruz    Reason patient unable to be scheduled:  Patient is taking care of her sister and cannot make it on 11/05/24.    When does patient want to be seen/preferred time:  ASAP    Comments: Patient would like to see Dr Cruz in person but if not possible would be open to a phone or video visit.    Could we send this information to you in [facility]ID or would you prefer to receive a phone call?:   Patient would prefer a phone call   Okay to leave a detailed message?: Yes at Cell number on file:    Telephone Information:   Mobile [phone]       Call taken on 10/25/2024 at 2:58 PM by Eric Cano

## 2024-11-06 ENCOUNTER — OFFICE VISIT (OUTPATIENT)
Dept: FAMILY MEDICINE | Facility: CLINIC | Age: 74
End: 2024-11-06
Payer: MEDICARE

## 2024-11-06 VITALS
HEIGHT: 65 IN | RESPIRATION RATE: 18 BRPM | SYSTOLIC BLOOD PRESSURE: 123 MMHG | BODY MASS INDEX: 23.47 KG/M2 | TEMPERATURE: 98 F | OXYGEN SATURATION: 97 % | WEIGHT: 140.9 LBS | DIASTOLIC BLOOD PRESSURE: 76 MMHG | HEART RATE: 65 BPM

## 2024-11-06 DIAGNOSIS — E21.3 HYPERPARATHYROIDISM (H): ICD-10-CM

## 2024-11-06 DIAGNOSIS — F33.2 SEVERE RECURRENT MAJOR DEPRESSION WITHOUT PSYCHOTIC FEATURES (H): ICD-10-CM

## 2024-11-06 DIAGNOSIS — F10.20 ALCOHOL USE DISORDER, SEVERE, DEPENDENCE (H): ICD-10-CM

## 2024-11-06 DIAGNOSIS — E78.5 HYPERLIPIDEMIA LDL GOAL <70: ICD-10-CM

## 2024-11-06 DIAGNOSIS — Z17.0 MALIGNANT NEOPLASM OF AXILLARY TAIL OF RIGHT BREAST IN FEMALE, ESTROGEN RECEPTOR POSITIVE (H): ICD-10-CM

## 2024-11-06 DIAGNOSIS — Z00.00 ENCOUNTER FOR MEDICARE ANNUAL WELLNESS EXAM: Primary | ICD-10-CM

## 2024-11-06 DIAGNOSIS — C50.611 MALIGNANT NEOPLASM OF AXILLARY TAIL OF RIGHT BREAST IN FEMALE, ESTROGEN RECEPTOR POSITIVE (H): ICD-10-CM

## 2024-11-06 PROBLEM — F43.23 ADJUSTMENT DISORDER WITH MIXED ANXIETY AND DEPRESSED MOOD: Status: RESOLVED | Noted: 2018-05-08 | Resolved: 2024-11-06

## 2024-11-06 PROBLEM — R93.1 HIGH CORONARY ARTERY CALCIUM SCORE: Chronic | Status: RESOLVED | Noted: 2019-05-01 | Resolved: 2024-11-06

## 2024-11-06 PROBLEM — Z09 FOLLOW-UP EXAMINATION FOLLOWING SURGERY: Status: RESOLVED | Noted: 2017-11-10 | Resolved: 2024-11-06

## 2024-11-06 LAB
ALBUMIN SERPL BCG-MCNC: 4.5 G/DL (ref 3.5–5.2)
ALP SERPL-CCNC: 58 U/L (ref 40–150)
ALT SERPL W P-5'-P-CCNC: 22 U/L (ref 0–50)
ANION GAP SERPL CALCULATED.3IONS-SCNC: 12 MMOL/L (ref 7–15)
AST SERPL W P-5'-P-CCNC: 22 U/L (ref 0–45)
BILIRUB SERPL-MCNC: 0.3 MG/DL
BUN SERPL-MCNC: 15.3 MG/DL (ref 8–23)
CALCIUM SERPL-MCNC: 9.9 MG/DL (ref 8.8–10.4)
CHLORIDE SERPL-SCNC: 102 MMOL/L (ref 98–107)
CHOLEST SERPL-MCNC: 170 MG/DL
CREAT SERPL-MCNC: 0.83 MG/DL (ref 0.51–0.95)
EGFRCR SERPLBLD CKD-EPI 2021: 74 ML/MIN/1.73M2
ERYTHROCYTE [DISTWIDTH] IN BLOOD BY AUTOMATED COUNT: 12.3 % (ref 10–15)
EST. AVERAGE GLUCOSE BLD GHB EST-MCNC: 120 MG/DL
FASTING STATUS PATIENT QL REPORTED: NO
FASTING STATUS PATIENT QL REPORTED: NO
GLUCOSE SERPL-MCNC: 106 MG/DL (ref 70–99)
HBA1C MFR BLD: 5.8 % (ref 0–5.6)
HCO3 SERPL-SCNC: 26 MMOL/L (ref 22–29)
HCT VFR BLD AUTO: 42.3 % (ref 35–47)
HDLC SERPL-MCNC: 88 MG/DL
HGB BLD-MCNC: 13.7 G/DL (ref 11.7–15.7)
LDLC SERPL CALC-MCNC: 73 MG/DL
MCH RBC QN AUTO: 31 PG (ref 26.5–33)
MCHC RBC AUTO-ENTMCNC: 32.4 G/DL (ref 31.5–36.5)
MCV RBC AUTO: 96 FL (ref 78–100)
NONHDLC SERPL-MCNC: 82 MG/DL
PLATELET # BLD AUTO: 260 10E3/UL (ref 150–450)
POTASSIUM SERPL-SCNC: 4.5 MMOL/L (ref 3.4–5.3)
PROT SERPL-MCNC: 7.2 G/DL (ref 6.4–8.3)
RBC # BLD AUTO: 4.42 10E6/UL (ref 3.8–5.2)
SODIUM SERPL-SCNC: 140 MMOL/L (ref 135–145)
TRIGL SERPL-MCNC: 43 MG/DL
WBC # BLD AUTO: 5.7 10E3/UL (ref 4–11)

## 2024-11-06 PROCEDURE — G0439 PPPS, SUBSEQ VISIT: HCPCS | Performed by: INTERNAL MEDICINE

## 2024-11-06 PROCEDURE — 80061 LIPID PANEL: CPT | Performed by: INTERNAL MEDICINE

## 2024-11-06 PROCEDURE — 85027 COMPLETE CBC AUTOMATED: CPT | Performed by: INTERNAL MEDICINE

## 2024-11-06 PROCEDURE — 83036 HEMOGLOBIN GLYCOSYLATED A1C: CPT | Performed by: INTERNAL MEDICINE

## 2024-11-06 PROCEDURE — 99214 OFFICE O/P EST MOD 30 MIN: CPT | Mod: 25 | Performed by: INTERNAL MEDICINE

## 2024-11-06 PROCEDURE — 90662 IIV NO PRSV INCREASED AG IM: CPT | Performed by: INTERNAL MEDICINE

## 2024-11-06 PROCEDURE — G0008 ADMIN INFLUENZA VIRUS VAC: HCPCS | Performed by: INTERNAL MEDICINE

## 2024-11-06 PROCEDURE — 80053 COMPREHEN METABOLIC PANEL: CPT | Performed by: INTERNAL MEDICINE

## 2024-11-06 PROCEDURE — 36415 COLL VENOUS BLD VENIPUNCTURE: CPT | Performed by: INTERNAL MEDICINE

## 2024-11-06 RX ORDER — BUPROPION HYDROCHLORIDE 300 MG/1
300 TABLET ORAL EVERY MORNING
Qty: 90 TABLET | Refills: 3 | Status: SHIPPED | OUTPATIENT
Start: 2024-11-06

## 2024-11-06 RX ORDER — NALTREXONE HYDROCHLORIDE 50 MG/1
50 TABLET, FILM COATED ORAL DAILY
Qty: 90 TABLET | Refills: 3 | Status: SHIPPED | OUTPATIENT
Start: 2024-11-06

## 2024-11-06 RX ORDER — BUPROPION HYDROCHLORIDE 150 MG/1
150 TABLET ORAL EVERY MORNING
Qty: 90 TABLET | Refills: 3 | Status: SHIPPED | OUTPATIENT
Start: 2024-11-06

## 2024-11-06 SDOH — HEALTH STABILITY: PHYSICAL HEALTH: ON AVERAGE, HOW MANY DAYS PER WEEK DO YOU ENGAGE IN MODERATE TO STRENUOUS EXERCISE (LIKE A BRISK WALK)?: 0 DAYS

## 2024-11-06 ASSESSMENT — PAIN SCALES - GENERAL: PAINLEVEL_OUTOF10: MODERATE PAIN (5)

## 2024-11-06 ASSESSMENT — ANXIETY QUESTIONNAIRES
6. BECOMING EASILY ANNOYED OR IRRITABLE: NOT AT ALL
7. FEELING AFRAID AS IF SOMETHING AWFUL MIGHT HAPPEN: NOT AT ALL
5. BEING SO RESTLESS THAT IT IS HARD TO SIT STILL: NOT AT ALL
8. IF YOU CHECKED OFF ANY PROBLEMS, HOW DIFFICULT HAVE THESE MADE IT FOR YOU TO DO YOUR WORK, TAKE CARE OF THINGS AT HOME, OR GET ALONG WITH OTHER PEOPLE?: NOT DIFFICULT AT ALL
3. WORRYING TOO MUCH ABOUT DIFFERENT THINGS: SEVERAL DAYS
1. FEELING NERVOUS, ANXIOUS, OR ON EDGE: NOT AT ALL
7. FEELING AFRAID AS IF SOMETHING AWFUL MIGHT HAPPEN: NOT AT ALL
IF YOU CHECKED OFF ANY PROBLEMS ON THIS QUESTIONNAIRE, HOW DIFFICULT HAVE THESE PROBLEMS MADE IT FOR YOU TO DO YOUR WORK, TAKE CARE OF THINGS AT HOME, OR GET ALONG WITH OTHER PEOPLE: NOT DIFFICULT AT ALL
GAD7 TOTAL SCORE: 1
2. NOT BEING ABLE TO STOP OR CONTROL WORRYING: NOT AT ALL
4. TROUBLE RELAXING: NOT AT ALL
GAD7 TOTAL SCORE: 1
GAD7 TOTAL SCORE: 1

## 2024-11-06 ASSESSMENT — PATIENT HEALTH QUESTIONNAIRE - PHQ9
10. IF YOU CHECKED OFF ANY PROBLEMS, HOW DIFFICULT HAVE THESE PROBLEMS MADE IT FOR YOU TO DO YOUR WORK, TAKE CARE OF THINGS AT HOME, OR GET ALONG WITH OTHER PEOPLE: NOT DIFFICULT AT ALL
SUM OF ALL RESPONSES TO PHQ QUESTIONS 1-9: 8
SUM OF ALL RESPONSES TO PHQ QUESTIONS 1-9: 8

## 2024-11-06 ASSESSMENT — SOCIAL DETERMINANTS OF HEALTH (SDOH): HOW OFTEN DO YOU GET TOGETHER WITH FRIENDS OR RELATIVES?: ONCE A WEEK

## 2024-11-06 NOTE — RESULT ENCOUNTER NOTE
"The following letter pertains to your most recent diagnostic tests:    -Liver and gallbladder tests are normal for you. (ALT,AST, Alk phos, bilirubin), kidney function is normal for you (Creatinine, GFR), Sodium is normal, Potassium is normal for you, Calcium is normal for you, the Glucose (blood sugar) is elevated but it is not in the diabetic range (diabetes is defined as a fasting blood sugar reading greater than 126 on two separate occassions).        -Your cholesterol panel looks improved, but the \"bad cholesterol\" LDL remains just above our goal of that less than 70 for you.  Please try not to forget doses of Crestor (rosuvastatin).      -Your hemoglobin A1c test which averages your blood sugars over the last 3 months returned suggesting mild prediabetes.    -Your complete blood counts including your hemoglobin returned normal for you.         Bottom line: The lab results look okay except for the mild blood sugar problems and the mildly elevated \"bad cholesterol\" LDL.  Decreasing calories from carbohydrates will help with the blood sugar.  Trying to eat more servings of high-fiber foods like vegetables, fruits, nuts and seeds will drive your \"bad cholesterol\" LDL and make you less hungry for high carbohydrate foods that will adversely affect your blood sugar.  Try to do your best to eat more of these healthy foods.      Follow up: I look forward to speaking with you in a few weeks to see how the new medications are helping.      Sincerely,    Dr. Cruz"

## 2024-11-06 NOTE — PATIENT INSTRUCTIONS
Patient Education   Preventive Care Advice   This is general advice given by our system to help you stay healthy. However, your care team may have specific advice just for you. Please talk to your care team about your preventive care needs.  Nutrition  Eat 5 or more servings of fruits and vegetables each day.  Try wheat bread, brown rice and whole grain pasta (instead of white bread, rice, and pasta).  Get enough calcium and vitamin D. Check the label on foods and aim for 100% of the RDA (recommended daily allowance).  Lifestyle  Exercise at least 150 minutes each week  (30 minutes a day, 5 days a week).  Do muscle strengthening activities 2 days a week. These help control your weight and prevent disease.  No smoking.  Wear sunscreen to prevent skin cancer.  Have a dental exam and cleaning every 6 months.  Yearly exams  See your health care team every year to talk about:  Any changes in your health.  Any medicines your care team has prescribed.  Preventive care, family planning, and ways to prevent chronic diseases.  Shots (vaccines)   HPV shots (up to age 26), if you've never had them before.  Hepatitis B shots (up to age 59), if you've never had them before.  COVID-19 shot: Get this shot when it's due.  Flu shot: Get a flu shot every year.  Tetanus shot: Get a tetanus shot every 10 years.  Pneumococcal, hepatitis A, and RSV shots: Ask your care team if you need these based on your risk.  Shingles shot (for age 50 and up)  General health tests  Diabetes screening:  Starting at age 35, Get screened for diabetes at least every 3 years.  If you are younger than age 35, ask your care team if you should be screened for diabetes.  Cholesterol test: At age 39, start having a cholesterol test every 5 years, or more often if advised.  Bone density scan (DEXA): At age 50, ask your care team if you should have this scan for osteoporosis (brittle bones).  Hepatitis C: Get tested at least once in your life.  STIs (sexually  transmitted infections)  Before age 24: Ask your care team if you should be screened for STIs.  After age 24: Get screened for STIs if you're at risk. You are at risk for STIs (including HIV) if:  You are sexually active with more than one person.  You don't use condoms every time.  You or a partner was diagnosed with a sexually transmitted infection.  If you are at risk for HIV, ask about PrEP medicine to prevent HIV.  Get tested for HIV at least once in your life, whether you are at risk for HIV or not.  Cancer screening tests  Cervical cancer screening: If you have a cervix, begin getting regular cervical cancer screening tests starting at age 21.  Breast cancer scan (mammogram): If you've ever had breasts, begin having regular mammograms starting at age 40. This is a scan to check for breast cancer.  Colon cancer screening: It is important to start screening for colon cancer at age 45.  Have a colonoscopy test every 10 years (or more often if you're at risk) Or, ask your provider about stool tests like a FIT test every year or Cologuard test every 3 years.  To learn more about your testing options, visit:   .  For help making a decision, visit:   https://bit.ly/bu94886.  Prostate cancer screening test: If you have a prostate, ask your care team if a prostate cancer screening test (PSA) at age 55 is right for you.  Lung cancer screening: If you are a current or former smoker ages 50 to 80, ask your care team if ongoing lung cancer screenings are right for you.  For informational purposes only. Not to replace the advice of your health care provider. Copyright   2023 [facility] [employer]. All rights reserved. Clinically reviewed by the [facility] Transitions Program. EventRegist [id number] - REV 01/24.  Hearing Loss: Care Instructions  Overview     Hearing loss is a sudden or slow decrease in how well you hear. It can range from slight to profound. Permanent hearing loss can occur with aging. It also can  happen when you are exposed long-term to loud noise. Examples include listening to loud music, riding motorcycles, or being around other loud machines.  Hearing loss can affect your work and home life. It can make you feel lonely or depressed. You may feel that you have lost your independence. But hearing aids and other devices can help you hear better and feel connected to others.  Follow-up care is a key part of your treatment and safety. Be sure to make and go to all appointments, and call your doctor if you are having problems. It's also a good idea to know your test results and keep a list of the medicines you take.  How can you care for yourself at home?  Avoid loud noises whenever possible. This helps keep your hearing from getting worse.  Always wear hearing protection around loud noises.  Wear a hearing aid as directed.  A professional can help you pick a hearing aid that will work best for you.  You can also get hearing aids over the counter for mild to moderate hearing loss.  Have hearing tests as your doctor suggests. They can show whether your hearing has changed. Your hearing aid may need to be adjusted.  Use other devices as needed. These may include:  Telephone amplifiers and hearing aids that can connect to a television, stereo, radio, or microphone.  Devices that use lights or vibrations. These alert you to the doorbell, a ringing telephone, or a baby monitor.  Television closed-captioning. This shows the words at the bottom of the screen. Most new TVs can do this.  TTY (text telephone). This lets you type messages back and forth on the telephone instead of talking or listening. These devices are also called TDD. When messages are typed on the keyboard, they are sent over the phone line to a receiving TTY. The message is shown on a monitor.  Use text messaging, social media, and email if it is hard for you to communicate by telephone.  Try to learn a listening technique called speechreading. It is  "not lipreading. You pay attention to people's gestures, expressions, posture, and tone of voice. These clues can help you understand what a person is saying. Face the person you are talking to, and have them face you. Make sure the lighting is good. You need to see the other person's face clearly.  Think about counseling if you need help to adjust to your hearing loss.  When should you call for help?  Watch closely for changes in your health, and be sure to contact your doctor if:    You think your hearing is getting worse.     You have new symptoms, such as dizziness or nausea.   Where can you learn more?  Go to https://www.Pathflow.net/patiented  Enter R798 in the search box to learn more about \"Hearing Loss: Care Instructions.\"  Current as of: September 27, 2023  Content Version: 14.2    2024 [employer].   Care instructions adapted under license by your healthcare professional. If you have questions about a medical condition or this instruction, always ask your healthcare professional. [employer] Incorporated disclaims any warranty or liability for your use of this information.    Learning About Sleeping Well  What does sleeping well mean?     Sleeping well means getting enough sleep to feel good and stay healthy. How much sleep is enough varies among people.  The number of hours you sleep and how you feel when you wake up are both important. If you do not feel refreshed, you probably need more sleep. Another sign of not getting enough sleep is feeling tired during the day.  Experts recommend that adults get at least 7 or more hours of sleep per day. Children and older adults need more sleep.  Why is getting enough sleep important?  Getting enough quality sleep is a basic part of good health. When your sleep suffers, your physical health, mood, and your thoughts can suffer too. You may find yourself feeling more grumpy or stressed. Not getting enough sleep also can lead to serious problems, " "including injury, accidents, anxiety, and depression.  What might cause poor sleeping?  Many things can cause sleep problems, including:  Changes to your sleep schedule.  Stress. Stress can be caused by fear about a single event, such as giving a speech. Or you may have ongoing stress, such as worry about work or school.  Depression, anxiety, and other mental or emotional conditions.  Changes in your sleep habits or surroundings. This includes changes that happen where you sleep, such as noise, light, or sleeping in a different bed. It also includes changes in your sleep pattern, such as having jet lag or working a late shift.  Health problems, such as pain, breathing problems, and restless legs syndrome.  Lack of regular exercise.  Using alcohol, nicotine, or caffeine before bed.  How can you help yourself?  Here are some tips that may help you sleep more soundly and wake up feeling more refreshed.  Your sleeping area   Use your bedroom only for sleeping and sex. A bit of light reading may help you fall asleep. But if it doesn't, do your reading elsewhere in the house. Try not to use your TV, computer, smartphone, or tablet while you are in bed.  Be sure your bed is big enough to stretch out comfortably, especially if you have a sleep partner.  Keep your bedroom quiet, dark, and cool. Use curtains, blinds, or a sleep mask to block out light. To block out noise, use earplugs, soothing music, or a \"white noise\" machine.  Your evening and bedtime routine   Create a relaxing bedtime routine. You might want to take a warm shower or bath, or listen to soothing music.  Go to bed at the same time every night. And get up at the same time every morning, even if you feel tired.  What to avoid   Limit caffeine (coffee, tea, caffeinated sodas) during the day, and don't have any for at least 6 hours before bedtime.  Avoid drinking alcohol before bedtime. Alcohol can cause you to wake up more often during the night.  Try not to " "smoke or use tobacco, especially in the evening. Nicotine can keep you awake.  Limit naps during the day, especially close to bedtime.  Avoid lying in bed awake for too long. If you can't fall asleep or if you wake up in the middle of the night and can't get back to sleep within about 20 minutes, get out of bed and go to another room until you feel sleepy.  Avoid taking medicine right before bed that may keep you awake or make you feel hyper or energized. Your doctor can tell you if your medicine may do this and if you can take it earlier in the day.  If you can't sleep   Imagine yourself in a peaceful, pleasant scene. Focus on the details and feelings of being in a place that is relaxing.  Get up and do a quiet or boring activity until you feel sleepy.  Avoid drinking any liquids before going to bed to help prevent waking up often to use the bathroom.  Where can you learn more?  Go to https://www.HiveLive.net/patiented  Enter J942 in the search box to learn more about \"Learning About Sleeping Well.\"  Current as of: July 10, 2023  Content Version: 14.2 2024 [employer].   Care instructions adapted under license by your healthcare professional. If you have questions about a medical condition or this instruction, always ask your healthcare professional. Healthwise, Incorporated disclaims any warranty or liability for your use of this information.    Learning About Depression Screening  What is depression screening?  Depression screening is a way to see if you have depression symptoms. It may be done by a doctor or counselor. It's often part of a routine checkup. That's because your mental health is just as important as your physical health.  Depression is a mental health condition that affects how you feel, think, and act. You may:  Have less energy.  Lose interest in your daily activities.  Feel sad and grouchy for a long time.  Depression is very common. It affects people of all ages.  Many things " "can lead to depression. Some people become depressed after they have a stroke or find out they have a major illness like cancer or heart disease. The death of a loved one or a breakup may lead to depression. It can run in families. Most experts believe that a combination of inherited genes and stressful life events can cause it.  What happens during screening?  You may be asked to fill out a form about your depression symptoms. You and the doctor will discuss your answers. The doctor may ask you more questions to learn more about how you think, act, and feel.  What happens after screening?  If you have symptoms of depression, your doctor will talk to you about your options.  Doctors usually treat depression with medicines or counseling. Often, combining the two works best. Many people don't get help because they think that they'll get over the depression on their own. But people with depression may not get better unless they get treatment.  The cause of depression is not well understood. There may be many factors involved. But if you have depression, it's not your fault.  A serious symptom of depression is thinking about death or suicide. If you or someone you care about talks about this or about feeling hopeless, get help right away.  It's important to know that depression can be treated. Medicine, counseling, and self-care may help.  Where can you learn more?  Go to https://www.XMOS.net/patiented  Enter T185 in the search box to learn more about \"Learning About Depression Screening.\"  Current as of: June 24, 2023  Content Version: 14.2 2024 [facility] [employer].   Care instructions adapted under license by your healthcare professional. If you have questions about a medical condition or this instruction, always ask your healthcare professional. Healthwise, Incorporated disclaims any warranty or liability for your use of this information.    Substance Use Disorder: Care Instructions  Overview     You can " improve your life and health by stopping your use of alcohol or drugs. When you don't drink or use drugs, you may feel and sleep better. You may get along better with your family, friends, and coworkers. There are medicines and programs that can help with substance use disorder.  How can you care for yourself at home?  Here are some ways to help you stay sober and prevent relapse.  If you have been given medicine to help keep you sober or reduce your cravings, be sure to take it exactly as prescribed.  Talk to your doctor about programs that can help you stop using drugs or drinking alcohol.  Do not keep alcohol or drugs in your home.  Plan ahead. Think about what you'll say if other people ask you to drink or use drugs. Try not to spend time with people who drink or use drugs.  Use the time and money spent on drinking or drugs to do something that's important to you.  Preventing a relapse  Have a plan to deal with relapse. Learn to recognize changes in your thinking that lead you to drink or use drugs. Get help before you start to drink or use drugs again.  Try to stay away from situations, friends, or places that may lead you to drink or use drugs.  If you feel the need to drink alcohol or use drugs again, seek help right away. Call a trusted friend or family member. Some people get support from organizations such as Narcotics Anonymous or [employer] or from treatment facilities.  If you relapse, get help as soon as you can. Some people make a plan with another person that outlines what they want that person to do for them if they relapse. The plan usually includes how to handle the relapse and who to notify in case of relapse.  Don't give up. Remember that a relapse doesn't mean that you have failed. Use the experience to learn the triggers that lead you to drink or use drugs. Then quit again. Recovery is a lifelong process. Many people have several relapses before they are able to quit for good.  Follow-up  "care is a deng part of your treatment and safety. Be sure to make and go to all appointments, and call your doctor if you are having problems. It's also a good idea to know your test results and keep a list of the medicines you take.  When should you call for help?   Call 911  anytime you think you may need emergency care. For example, call if you or someone else:    Has overdosed or has withdrawal signs. Be sure to tell the emergency workers that you are or someone else is using or trying to quit using drugs. Overdose or withdrawal signs may include:  Losing consciousness.  Seizure.  Seeing or hearing things that aren't there (hallucinations).     Is thinking or talking about suicide or harming others.   Where to get help 24 hours a day, 7 days a week   If you or someone you know talks about suicide, self-harm, a mental health crisis, a substance use crisis, or any other kind of emotional distress, get help right away. You can:    Call the Suicide and Crisis Lifeline at 988.     Call 7-234-228-TALK (1-610.883.5740).     Text HOME to 686386 to access the Crisis Text Line.   Consider saving these numbers in your phone.  Go to itsDapper for more information or to chat online.  Call your doctor now or seek immediate medical care if:    You are having withdrawal symptoms. These may include nausea or vomiting, sweating, shakiness, and anxiety.   Watch closely for changes in your health, and be sure to contact your doctor if:    You have a relapse.     You need more help or support to stop.   Where can you learn more?  Go to https://www.Aviasales.net/patiented  Enter H573 in the search box to learn more about \"Substance Use Disorder: Care Instructions.\"  Current as of: November 15, 2023  Content Version: 14.2 2024 [facility] [employer].   Care instructions adapted under license by your healthcare professional. If you have questions about a medical condition or this instruction, always ask your healthcare " professional. [employer] disclaims any warranty or liability for your use of this information.

## 2024-11-06 NOTE — PROGRESS NOTES
Preventive Care Visit  [facility]  Vladislav Cruz MD, Internal Medicine  Nov 6, 2024      Assessment & Plan     Encounter for Medicare annual wellness exam  Check screening labs   - Hemoglobin A1c; Future  - CBC with platelets; Future  - Comprehensive metabolic panel; Future    Severe recurrent major depression without psychotic features (H)  She has a significant amount of diffuse complaints including lack of energy since COVID illness, persistent thoughts of death without a specific suicide plan, lack of engagement and interest in activities that used to interest her, eating more than she would like to eat.  I think she is depressed.  She has not responded well to antidepressants other than bupropion in the past including trials of venlafaxine and duloxetine.  She had trans cranial magnetic therapy but did not respond to that significantly.  She has seen psychiatry specialists.  She tries to be physically active, but is limited by musculoskeletal pain.  She did even inquire about returning to taking hydrocodone to manage her pain.  I think we should try an increased dose of Wellbutrin.  Recommended increasing to the maximum dose of 450 mg daily.  In addition, she does seem to have a very positive response to a low-dose of naltrexone.  It seems to have helped her pain.  She has a history of alcoholism and in order to maintain her sobriety and to help with her cravings for food, I think increasing the dose from 4.5 mg daily to a more therapeutic dose of 50 mg daily might help the overall situation.  She was in agreement.  - buPROPion (WELLBUTRIN XL) 150 MG 24 hr tablet; Take 1 tablet (150 mg) by mouth every morning. Take in addition to 300 mg tablet for total daily dose of 450 mg daily.  - buPROPion (WELLBUTRIN XL) 300 MG 24 hr tablet; Take 1 tablet (300 mg) by mouth every morning. Take with 150 mg tablet for total daily dose of 450 mg once daily.    Alcohol use disorder, severe, dependence  (H)  See discussion above  - naltrexone (DEPADE/REVIA) 50 MG tablet; Take 1 tablet (50 mg) by mouth daily.    Hyperparathyroidism (H)  Rechecking metabolic panel    Malignant neoplasm of axillary tail of right breast in female, estrogen receptor positive (H)  History of breast cancer, on Femara    Hyperlipidemia LDL goal <70  History of atherosclerotic coronary vascular disease, recent statin dose was increased, goal LDL would be at least less than 70 but less than 55 would be ideal.  Recheck today.  - Lipid panel reflex to direct LDL Fasting; Future    Patient has been advised of split billing requirements and indicates understanding: Yes        Counseling  Appropriate preventive services were addressed with this patient via screening, questionnaire, or discussion as appropriate for fall prevention, nutrition, physical activity, Tobacco-use cessation, social engagement, weight loss and cognition.  Checklist reviewing preventive services available has been given to the patient.  Reviewed patient's diet, addressing concerns and/or questions.   She is at risk for psychosocial distress and has been provided with information to reduce risk.   Discussed possible causes of fatigue. The patient was provided with written information regarding signs of hearing loss.   The patient's PHQ-9 score is consistent with mild depression. She was provided with information regarding depression.   Recommended flu shot today, she declined a COVID shot, recommended RSV shot at pharmacy    FUTURE APPOINTMENTS:       -Virtual visit in 3 to 4 weeks to assess therapy with maximum dose Wellbutrin and dose increase of naltrexone    Santos Joya is a 74 year old, presenting for the following:  Physical (Non fasting)        11/6/2024     8:00 AM   Additional Questions   Roomed by zainab OWUSU          Health Care Directive  Patient does not have a Health Care Directive: Discussed advance care planning with patient; however, patient  declined at this time.      11/6/2024   General Health   How would you rate your overall physical health? Good   Feel stress (tense, anxious, or unable to sleep) Only a little      (!) STRESS CONCERN      11/6/2024   Nutrition   Diet: Regular (no restrictions)            11/6/2024   Exercise   Days per week of moderate/strenous exercise 0 days      (!) EXERCISE CONCERN      11/6/2024   Social Factors   Frequency of gathering with friends or relatives Once a week   Worry food won't last until get money to buy more No   Food not last or not have enough money for food? No   Do you have housing? (Housing is defined as stable permanent housing and does not include staying ouside in a car, in a tent, in an abandoned building, in an overnight shelter, or couch-surfing.) Yes   Are you worried about losing your housing? Yes   Lack of transportation? No   Unable to get utilities (heat,electricity)? No   Want help with housing or utility concern? No      (!) HOUSING CONCERN PRESENT      11/6/2024   Fall Risk   Fallen 2 or more times in the past year? No    Trouble with walking or balance? No        Patient-reported          11/6/2024   Activities of Daily Living- Home Safety   Needs help with the following daily activites None of the above   Safety concerns in the home None of the above            11/6/2024   Dental   Dentist two times every year? Yes            11/6/2024   Hearing Screening   Hearing concerns? (!) TROUBLE UNDERSTANDING SOFT OR WHISPERED SPEECH.            11/6/2024   Driving Risk Screening   Patient/family members have concerns about driving No            11/6/2024   General Alertness/Fatigue Screening   Have you been more tired than usual lately? (!) YES            11/6/2024   Urinary Incontinence Screening   Bothered by leaking urine in past 6 months No            11/6/2024   TB Screening   Were you born outside of the [place]? No          Today's PHQ-9 Score:       11/6/2024     7:49 AM   PHQ-9 SCORE   PHQ-9  Total Score MyChart 8 (Mild depression)   PHQ-9 Total Score 8        Patient-reported         2024   Substance Use   Alcohol more than 3/day or more than 7/wk Not Applicable   Do you have a current opioid prescription? No   How severe/bad is pain from 1 to 10? 6/10   Do you use any other substances recreationally? (!) CANNABIS PRODUCTS    (!) PRESCRIPTION DRUGS       Multiple values from one day are sorted in reverse-chronological order     Social History     Tobacco Use    Smoking status: Former     Current packs/day: 0.00     Average packs/day: 1 pack/day for 40.8 years (40.8 ttl pk-yrs)     Types: Cigarettes     Start date: 1969     Quit date: 2010     Years since quittin.5    Smokeless tobacco: Never    Tobacco comments:     I have been chewing nicorette gum 3 yrs and 3 months now. I have quit 8 and 9 yrs and periods in bet   Vaping Use    Vaping status: Never Used   Substance Use Topics    Alcohol use: Not Currently     Comment: No longer    Drug use: Yes     Types: Marijuana     Comment: for sleeping/occ              ASCVD Risk   The 10-year ASCVD risk score (Gaurang TREJO, et al., 2019) is: 10.2%    Values used to calculate the score:      Age: 74 years      Sex: Female      Is Non- : No      Diabetic: No      Tobacco smoker: No      Systolic Blood Pressure: 123 mmHg      Is BP treated: No      HDL Cholesterol: 81 mg/dL      Total Cholesterol: 174 mg/dL            Reviewed and updated as needed this visit by Provider                    Past Medical History:   Diagnosis Date    Alcoholism (H)     Allergies     Fall    Arthritis     Basal cell cancer     back, chest, face    Breast cancer (H)     Coronary artery disease     CT calcium [id number]=723 2015    Depression     Hyperlipidemia LDL goal <130 2015    Hypertension     borderline    PONV (postoperative nausea and vomiting)     Squamous cell carcinoma     left upper arm, chin     Past Surgical History:    Procedure Laterality Date    ABDOMEN SURGERY  2007?    Hysterectomy    COLONOSCOPY      COLONOSCOPY  11/17/2011    Procedure:COLONOSCOPY; Colonoscopy; Surgeon:MEKA RUSS; Location: GI    COLONOSCOPY N/A 2/10/2020    Procedure: COLONOSCOPY, WITH POLYPECTOMY AND BIOPSY;  Surgeon: Opal Ace MD;  Location:  GI    COLONOSCOPY N/A 4/3/2024    Procedure: Colonoscopy FV;  Surgeon: Edith Washburn MD;  Location:  GI    DISSECT LYMPH NODE AXILLA Right 11/2/2017    Procedure: DISSECT LYMPH NODE AXILLA;  RIGHT AXILLARY LYMPH NODE DISSECTION;  Surgeon: Cortez White MD;  Location:  SD    EP ABLATION ATRIAL FLUTTER N/A 12/11/2023    Procedure: EP Ablation Atrial Flutter;  Surgeon: Michael John MD;  Location:  [facility]    GYN SURGERY  2005    hysterectomy after hx of ovarian cyst, ended up being benign    HYSTERECTOMY, PAP NO LONGER INDICATED      MASTECTOMY MODIFIED RADICAL  2011    bilateral    MASTECTOMY, BILATERAL      ORTHOPEDIC SURGERY      rt. knee arthroscopy    ORTHOPEDIC SURGERY Right     toe surgery    REALIGN PATELLA  1973    right     TOE SURGERY      right grt OA      BP Readings from Last 3 Encounters:   11/06/24 123/76   06/13/24 114/65   04/03/24 117/55    Wt Readings from Last 3 Encounters:   11/06/24 63.9 kg (140 lb 14.4 oz)   08/23/24 61.2 kg (135 lb)   06/13/24 63 kg (139 lb)                  Patient Active Problem List   Diagnosis    Breast cancer est/prog +, HER2 ERNIE -    Osteoarthritis    Moderate episode of recurrent major depressive disorder (H)    Knee pain    Past use of tobacco    IFG (impaired fasting glucose)    Low back pain    Hyperlipidemia LDL goal <70    Atherosclerosis of left carotid artery    Neck pain on right side    Hyperparathyroidism (H)    Alcohol dependence in remission (H)    Other fatigue    Pulmonary nodule    New onset atrial flutter (H)    Personal history of malignant neoplasm of breast    Severe recurrent major depression  without psychotic features (H)     Past Surgical History:   Procedure Laterality Date    ABDOMEN SURGERY  ?    Hysterectomy    COLONOSCOPY      COLONOSCOPY  2011    Procedure:COLONOSCOPY; Colonoscopy; Surgeon:MEKA RUSS; Location: GI    COLONOSCOPY N/A 2/10/2020    Procedure: COLONOSCOPY, WITH POLYPECTOMY AND BIOPSY;  Surgeon: Opal Ace MD;  Location:  GI    COLONOSCOPY N/A 4/3/2024    Procedure: Colonoscopy FV;  Surgeon: Edith Washburn MD;  Location:  GI    DISSECT LYMPH NODE AXILLA Right 2017    Procedure: DISSECT LYMPH NODE AXILLA;  RIGHT AXILLARY LYMPH NODE DISSECTION;  Surgeon: Cortez White MD;  Location:  [facility]    EP ABLATION ATRIAL FLUTTER N/A 2023    Procedure: EP Ablation Atrial Flutter;  Surgeon: Michael John MD;  Location:  [facility]    GYN SURGERY      hysterectomy after hx of ovarian cyst, ended up being benign    HYSTERECTOMY, PAP NO LONGER INDICATED      MASTECTOMY MODIFIED RADICAL      bilateral    MASTECTOMY, BILATERAL      ORTHOPEDIC SURGERY      rt. knee arthroscopy    ORTHOPEDIC SURGERY Right     toe surgery    REALIGN PATELLA      right     TOE SURGERY      right grt OA        Social History     Tobacco Use    Smoking status: Former     Current packs/day: 0.00     Average packs/day: 1 pack/day for 40.8 years (40.8 ttl pk-yrs)     Types: Cigarettes     Start date: 1969     Quit date: 2010     Years since quittin.5    Smokeless tobacco: Never    Tobacco comments:     I have been chewing nicorette gum 3 yrs and 3 months now. I have quit 8 and 9 yrs and periods in bet   Substance Use Topics    Alcohol use: Not Currently     Comment: No longer     Family History   Problem Relation Age of Onset    Cancer Mother 60        leukemia    Arthritis Mother         osteo    Eye Disorder Mother         glaucoma    Gastrointestinal Disease Mother         gallbladder    Other Cancer Mother     Gallbladder Disease  Mother     Alcohol/Drug Father         alcohol    Heart Disease Father         ? CHF    Respiratory Father         emphysema    Coronary Artery Disease Father     Substance Abuse Father     Substance Abuse Sister     Anxiety Disorder Sister     Asthma Sister     Colon Cancer Brother     Breast Cancer Maternal Grandmother     Asthma Sister     Substance Abuse Sister     Cancer - colorectal Brother 50    Prostate Cancer Brother     Allergies Brother         bee     Arthritis Sister         osteo    Arthritis Sister         osteo    Arthritis Brother         osteo    Depression Sister     Psychotic Disorder Sister         bipolar    Respiratory Sister     Colon Cancer Brother     Prostate Cancer Brother     Depression Sister     Asthma Sister          Current Outpatient Medications   Medication Sig Dispense Refill    ASHWAGANDHA PO Take 1 tablet by mouth daily as needed At onset of illness symptoms; as needed      aspirin 81 MG EC tablet Take 1 tablet (81 mg) by mouth daily      buPROPion (WELLBUTRIN XL) 300 MG 24 hr tablet Take 1 tablet (300 mg) by mouth every morning 90 tablet 3    calcium carbonate-vitamin D (OS-YASMIN) 500-400 MG-UNIT tablet Take 1 tablet by mouth daily       cholecalciferol (VITAMIN D3) 25 mcg (1000 units) capsule Take 1 capsule by mouth daily.      Coenzyme Q10 300 MG CAPS Take 300 mg by mouth daily      gabapentin (NEURONTIN) 300 MG capsule Take 1 capsule (300 mg) by mouth at bedtime. 30 capsule 2    IBUPROFEN PO Take 200 mg by mouth every 4 hours as needed for moderate pain      letrozole (FEMARA) 2.5 MG tablet Take 1 tablet by mouth daily  5    Lutein-Zeaxanthin 25-5 MG CAPS Take 1 capsule by mouth daily      LYSINE 1-3 daily as needed      melatonin 5 MG tablet Take 5 mg by mouth nightly as needed for sleep      Milk Thistle-Dand-Fennel-Licor (MILK THISTLE XTRA) CAPS capsule Take 1 capsule by mouth daily      Naltrexone HCl, Pain, 4.5 MG CAPS Take 1 capsule by mouth daily      nicotine  polacrilex (NICORETTE) 2 MG gum Place 1 each (2 mg) inside cheek as needed for smoking cessation 30 tablet 11    omeprazole (PRILOSEC) 20 MG DR capsule Take 1 capsule (20 mg) by mouth daily 90 capsule 1    RED CLOVER LEAF EXTRACT PO Take 1 capsule by mouth daily      rosuvastatin (CRESTOR) 40 MG tablet Take 1 tablet (40 mg) by mouth daily 90 tablet 4    TURMERIC PO Take 1 capsule by mouth daily      UNABLE TO FIND Take 1 tablet by mouth daily MEDICATION NAME: Yin Chiao - chinese supplement takes at onset of illness symptoms      UNABLE TO FIND MEDICATION NAME: Somnapure - 2 tablets = 500mg valerian root, 300mg lemon balm extract, 200mg l-theanine, 120mg hops extract, 50mg chamomile flower extract, 50mg passion flower extract, 3mg melatonin.  Takes 1-2 tablets at bedtime      UNABLE TO FIND MEDICATION NAME: Moringa 1 serving of powder daily      UNABLE TO FIND MEDICATION NAME: Premium Amla Berry 2 capsules = 4mg Vitamin C, 966mg organic amla, organic amla extract.  Takes 1 capsule daily as needed for immune system      UNABLE TO FIND MEDICATION NAME: OmegaKrill 5x 3 capsules = 480mg of total omega-3, 64mg EPA, 392mg DHA, 300mcg astaxanthin.  Takes 3 capsules daily      UNABLE TO FIND MEDICATION NAME: Super Colon Cleanse 2 capsules = 665mg senna leaf powder, 321mg psyllium husk powder, 21mg fennel seed powder, 21mg papaya leaf powder, 21mg yolanda hips fruit powder, 11mg l-acidophilus.  Taking 4 capsules daily      valACYclovir (VALTREX) 1000 mg tablet TAKE 1 TABLET(1000 MG) BY MOUTH THREE TIMES DAILY FOR 7 DAYS 21 tablet 11    vitamin C (ASCORBIC ACID) 250 MG tablet Take 250 mg by mouth daily       Current providers sharing in care for this patient include:  Patient Care Team:  Vladislav Cruz MD as PCP - General (Internal Medicine)  Pavan Fuentes MD as MD (Oncology)  Vladislav Cruz MD as Assigned PCP  Janice Tierney, PharmD as Pharmacist (Pharmacist)  Manjinder Rivero MD as MD (Medical Oncology)  Janice Serrano  "EVAN Murrell as Pharmacist (Pharmacist)  Janice Serrano RPH as Assigned MTM Pharmacist  Taiwo Anthony PA-C as Assigned Heart and Vascular Provider  Jh Flannery MD as Assigned Sleep Provider    The following health maintenance items are reviewed in Epic and correct as of today:  Health Maintenance   Topic Date Due    RSV VACCINE (1 - Risk 60-74 years 1-dose series) Never done    DEXA  11/06/2018    URINE DRUG SCREEN  01/17/2023    INFLUENZA VACCINE (1) 09/01/2024    COVID-19 Vaccine (6 - 2024-25 season) 09/01/2024    ANNUAL REVIEW OF HM ORDERS  10/09/2024    MEDICARE ANNUAL WELLNESS VISIT  10/09/2024    LUNG CANCER SCREENING  04/16/2025    PHQ-9  05/06/2025    LIPID  10/03/2025    FALL RISK ASSESSMENT  11/06/2025    GLUCOSE  10/03/2027    ADVANCE CARE PLANNING  10/09/2028    COLORECTAL CANCER SCREENING  04/03/2029    DTAP/TDAP/TD IMMUNIZATION (4 - Td or Tdap) 05/18/2031    HEPATITIS C SCREENING  Completed    DEPRESSION ACTION PLAN  Completed    Pneumococcal Vaccine: 65+ Years  Completed    ZOSTER IMMUNIZATION  Completed    HPV IMMUNIZATION  Aged Out    MENINGITIS IMMUNIZATION  Aged Out    RSV MONOCLONAL ANTIBODY  Aged Out       A 10 organ systems ROS is negative other than any pertinent positives or negatives previously stated.      Objective    Exam  /76 (BP Location: Left arm, Patient Position: Sitting, Cuff Size: Adult Regular)   Pulse 65   Temp 98  F (36.7  C) (Temporal)   Resp 18   Ht 1.651 m (5' 5\")   Wt 63.9 kg (140 lb 14.4 oz)   SpO2 97%   BMI 23.45 kg/m     Estimated body mass index is 23.45 kg/m  as calculated from the following:    Height as of this encounter: 1.651 m (5' 5\").    Weight as of this encounter: 63.9 kg (140 lb 14.4 oz).    Physical Exam  GENERAL: alert and no distress  EYES: Eyes grossly normal to inspection, PERRL and conjunctivae and sclerae normal  HENT: ear canals and TM's normal, nose and mouth without ulcers or lesions  NECK: no adenopathy, no " asymmetry, masses, or scars  RESP: lungs clear to auscultation - no rales, rhonchi or wheezes  CV: regular rate and rhythm, normal S1 S2, no S3 or S4, no murmur, click or rub, no peripheral edema  ABDOMEN: soft, nontender, no hepatosplenomegaly, no masses and bowel sounds normal  MS: no gross musculoskeletal defects noted, no edema  SKIN: no suspicious lesions or rashes  NEURO: Normal strength and tone, mentation intact and speech normal  PSYCH: mentation appears normal, affect normal/bright         11/6/2024   Mini Cog   Clock Draw Score 2 Normal   3 Item Recall 2 objects recalled   Mini Cog Total Score 4                 Signed Electronically by: Vladislav Cruz MD

## 2024-11-18 ENCOUNTER — TELEPHONE (OUTPATIENT)
Dept: CARDIOLOGY | Facility: CLINIC | Age: 74
End: 2024-11-18
Payer: MEDICARE

## 2024-11-18 NOTE — TELEPHONE ENCOUNTER
Called patient and confirmed no additional labs are needed at this time, already updated 11/6 by PCP.

## 2024-11-18 NOTE — TELEPHONE ENCOUNTER
[facility] Call Center    Phone Message    May a detailed message be left on voicemail: yes     Reason for Call: Other: Mahnaz called to confirm her appt with Dr. Perez on 11/26 and thought she had labs scheduled to check her medications. Looking at past appts, lab appt was scheduled but cancelled stating they weren't needed and Mahnaz wants to double check and make sure the labs are not needed. Please reach out to Mahnaz to discuss if she needs labs for Dr. Perez. Thank you!     Action Taken: Other: Cardiology    Travel Screening: Not Applicable    Thank you!  [facility]      Date of Service:

## 2024-11-21 ENCOUNTER — TELEPHONE (OUTPATIENT)
Dept: PHARMACY | Facility: CLINIC | Age: 74
End: 2024-11-21
Payer: MEDICARE

## 2024-11-21 NOTE — TELEPHONE ENCOUNTER
Called patient for routine MTM follow-up- we agreed that there did not seem to be much to talk about today, as she's been working with Dr. Cruz on the few things she's needed help with and she's not been on med changes from their last visit long enough to see the outcome. I offered options and she would like me to reach out in a year. I did encourage her to call anytime with questions in the meantime.    Courtney Dela CruzD, [facility]  Medication Therapy Management Provider  Phone: [phone]  [email or web address]

## 2024-11-26 ENCOUNTER — VIRTUAL VISIT (OUTPATIENT)
Dept: CARDIOLOGY | Facility: CLINIC | Age: 74
End: 2024-11-26
Attending: INTERNAL MEDICINE
Payer: MEDICARE

## 2024-11-26 DIAGNOSIS — E78.2 MIXED HYPERLIPIDEMIA: Primary | ICD-10-CM

## 2024-11-26 DIAGNOSIS — R73.01 IFG (IMPAIRED FASTING GLUCOSE): ICD-10-CM

## 2024-11-26 DIAGNOSIS — I48.92 NEW ONSET ATRIAL FLUTTER (H): ICD-10-CM

## 2024-11-26 DIAGNOSIS — R93.1 HIGH CORONARY ARTERY CALCIUM SCORE: Chronic | ICD-10-CM

## 2024-11-26 DIAGNOSIS — E78.5 HYPERLIPIDEMIA LDL GOAL <70: ICD-10-CM

## 2024-11-26 RX ORDER — EZETIMIBE 10 MG/1
10 TABLET ORAL DAILY
Qty: 90 TABLET | Refills: 4 | Status: SHIPPED | OUTPATIENT
Start: 2024-11-26

## 2024-11-26 RX ORDER — ROSUVASTATIN CALCIUM 20 MG/1
20 TABLET, COATED ORAL DAILY
Qty: 90 TABLET | Refills: 4 | Status: SHIPPED | OUTPATIENT
Start: 2024-11-26

## 2024-11-26 NOTE — PROGRESS NOTES
Mahnaz is a 74 year old who is being evaluated via a billable video visit.    How would you like to obtain your AVS? MyChart  If the video visit is dropped, the invitation should be resent by: Text to cell phone: [phone]  Will anyone else be joining your video visit? No    Video-Visit Details    Type of service:  Video Visit   Video End Time: 8:27  Originating Location (pt. Location): Home    Distant Location (provider location):  On-site  Platform used for Video Visit: [employer]    Review Of Systems  Skin: NEGATIVE  Eyes: NEGATIVE  Ears/Nose/Throat: NEGATIVE  Respiratory: NEGATIVE  Cardiovascular:NEGATIVE  Gastrointestinal: NEGATIVE  Genitourinary:NEGATIVE  Musculoskeletal: NEGATIVE  Neurologic: NEGATIVE  Psychiatric: NEGATIVE  Hematologic/Lymphatic/Immunologic: NEGATIVE  Endocrine: NEGATIVE    No reported vitals    Telephone number of patient: [phone]    Ms. Adair is a very nice 74-year-old woman with past medical history significant for very high calcium score at 790, putting in the top 1% for age.  She is a former smoker, having quit in 2014.  She has hypercholesterolemia, impaired fasting glucose and status post aflutter ablation in 12/20/2023.      She is under a great deal of stress.    She lost her first  to Alzheimer's, her second  to lung cancer.  She has had her own garcia with breast cancer in 2017.  He now struggles with long COVID with fatigue and lack of energy.  She is working long hours and comes home exhausted.  She is recently seen Dr. Cruz who think she has depression and started her on some medication.  She states because of this she has not been able to exercise and she is eating poorly.  Previously I had increased her rosuvastatin to 40 mg daily.  She could not tolerate it and is now on 20 mg daily..      She has no chest arm neck jaw or shoulder discomfort.  No dyspnea on exertion orthopnea or PND.  No palpitations lightheadedness dizziness syncope or near syncope.        We did do a stress MRI in July 2021 that appeared to be normal.     Assessment and plan.  I do not think Mahnaz is having any anginal symptoms.  Does not appear that she has any heart failure or any significant arrhythmias.     She thinks her long COVID symptoms may be somewhat better and she has sought treatment for this..     Fortunately she has stayed off of cigarettes for the last 10 years.   She does smoke occasional marijuana.     Fasting lipid profile is improved with total cholesterol of 170, HDL 88 LDL 73 and triglycerides of 43.  We discussed the individual parameters what they mean.  As her calcium score is in the 99th percentile I will add Zetia 10 mg daily to her regiment recheck blood work in 4 to 8 weeks.  I have emphasized the importance of a predominately plant-based diet, regular exercise to include both aerobic and resistance activity.     She states she has done well since her flutter ablation.     Previous cardiac specific C-reactive protein and it was mildly elevated at 3.6.  I will recheck this with her next fasting lipid profile.     I told her she should follow annually given her calcium score and her high risk.     Thank you for allow me to participate in her care.  Sincerely Jose Alberto Perez MD [facility]    The longitudinal plan of care for the diagnosis(es)/condition(s) as documented were addressed during this visit. Due to the added complexity in care, I will continue to support Mahnaz in the subsequent management and with ongoing continuity of care.    30 minutes spent reviewing studies interviewing Mahnaz and composing note and plan, lab and echocardiogram reviewed

## 2024-11-26 NOTE — LETTER
11/26/2024    Vladislav Cruz MD  [address]  [facility] [address]    RE: Svetlana Adair       Dear Colleague,     I had the pleasure of seeing Svetlana Adair in the [facility].  Mahnaz is a 74 year old who is being evaluated via a billable video visit.    How would you like to obtain your AVS? MyChart  If the video visit is dropped, the invitation should be resent by: Text to cell phone: [phone]  Will anyone else be joining your video visit? No    Video-Visit Details    Type of service:  Video Visit   Video End Time: 8:27  Originating Location (pt. Location): Home    Distant Location (provider location):  On-site  Platform used for Video Visit: Tripbod    Review Of Systems  Skin: NEGATIVE  Eyes: NEGATIVE  Ears/Nose/Throat: NEGATIVE  Respiratory: NEGATIVE  Cardiovascular:NEGATIVE  Gastrointestinal: NEGATIVE  Genitourinary:NEGATIVE  Musculoskeletal: NEGATIVE  Neurologic: NEGATIVE  Psychiatric: NEGATIVE  Hematologic/Lymphatic/Immunologic: NEGATIVE  Endocrine: NEGATIVE    No reported vitals    Telephone number of patient: [phone]    Ms. Adair is a very nice 74-year-old woman with past medical history significant for very high calcium score at 790, putting in the top 1% for age.  She is a former smoker, having quit in 2014.  She has hypercholesterolemia, impaired fasting glucose and status post aflutter ablation in 12/20/2023.      She is under a great deal of stress.    She lost her first  to Alzheimer's, her second  to lung cancer.  She has had her own garcia with breast cancer in 2017.  He now struggles with long COVID with fatigue and lack of energy.  She is working long hours and comes home exhausted.  She is recently seen Dr. Cruz who think she has depression and started her on some medication.  She states because of this she has not been able to exercise and she is eating poorly.  Previously I had increased her rosuvastatin to 40 mg daily.  She could not  tolerate it and is now on 20 mg daily..      She has no chest arm neck jaw or shoulder discomfort.  No dyspnea on exertion orthopnea or PND.  No palpitations lightheadedness dizziness syncope or near syncope.       We did do a stress MRI in July 2021 that appeared to be normal.     Assessment and plan.  I do not think Mahnaz is having any anginal symptoms.  Does not appear that she has any heart failure or any significant arrhythmias.     She thinks her long COVID symptoms may be somewhat better and she has sought treatment for this..     Fortunately she has stayed off of cigarettes for the last 10 years.   She does smoke occasional marijuana.     Fasting lipid profile is improved with total cholesterol of 170, HDL 88 LDL 73 and triglycerides of 43.  We discussed the individual parameters what they mean.  As her calcium score is in the 99th percentile I will add Zetia 10 mg daily to her regiment recheck blood work in 4 to 8 weeks.  I have emphasized the importance of a predominately plant-based diet, regular exercise to include both aerobic and resistance activity.     She states she has done well since her flutter ablation.     Previous cardiac specific C-reactive protein and it was mildly elevated at 3.6.  I will recheck this with her next fasting lipid profile.     I told her she should follow annually given her calcium score and her high risk.     Thank you for allow me to participate in her care.  Sincerely Jose Alberto Perez MD [facility]    The longitudinal plan of care for the diagnosis(es)/condition(s) as documented were addressed during this visit. Due to the added complexity in care, I will continue to support Mahnaz in the subsequent management and with ongoing continuity of care.    30 minutes spent reviewing studies interviewing Mahnaz and composing note and plan, lab and echocardiogram reviewed            Thank you for allowing me to participate in the care of your patient.      Sincerely,     Jose Alberto Perez,  MD     [facility]  cc:   Jose Alberto Perez MD  [address]  [place] [address]

## 2024-12-23 ENCOUNTER — TELEPHONE (OUTPATIENT)
Dept: CARDIOLOGY | Facility: CLINIC | Age: 74
End: 2024-12-23
Payer: MEDICARE

## 2024-12-23 NOTE — TELEPHONE ENCOUNTER
Hi Team 2,  There are lab orders in pt's chart due this Dec, but ppt stated she had done at [facility]. If there are any remaining that need to get scheduled, please let scheduling know.    Thanks!    Elisa

## 2024-12-23 NOTE — TELEPHONE ENCOUNTER
Per Dr. Perez's dictation 11/26/2024:  Fasting lipid profile is improved with total cholesterol of 170, HDL 88 LDL 73 and triglycerides of 43. We discussed the individual parameters what they mean. As her calcium score is in the 99th percentile I will add Zetia 10 mg daily to her regiment recheck blood work in 4 to 8 weeks.     Called patient to review that flp/alt and CRP were ordered for follow up after starting zetia. She is expecting scheduling to call  her.  Message return to scheduling

## 2024-12-28 DIAGNOSIS — K21.00 GASTROESOPHAGEAL REFLUX DISEASE WITH ESOPHAGITIS WITHOUT HEMORRHAGE: ICD-10-CM

## 2024-12-30 NOTE — TELEPHONE ENCOUNTER
Prescription approved per [facility] Refill Protocol.  Dana Roberson RN  [facility]

## 2025-01-02 ENCOUNTER — LAB (OUTPATIENT)
Dept: LAB | Facility: CLINIC | Age: 75
End: 2025-01-02
Payer: MEDICARE

## 2025-01-02 DIAGNOSIS — R93.1 HIGH CORONARY ARTERY CALCIUM SCORE: Chronic | ICD-10-CM

## 2025-01-02 DIAGNOSIS — E78.2 MIXED HYPERLIPIDEMIA: ICD-10-CM

## 2025-01-02 LAB
ALT SERPL W P-5'-P-CCNC: 29 U/L (ref 0–50)
CHOLEST SERPL-MCNC: 158 MG/DL
CRP SERPL HS-MCNC: 0.96 MG/L
FASTING STATUS PATIENT QL REPORTED: YES
HDLC SERPL-MCNC: 92 MG/DL
LDLC SERPL CALC-MCNC: 56 MG/DL
NONHDLC SERPL-MCNC: 66 MG/DL
TRIGL SERPL-MCNC: 49 MG/DL

## 2025-01-03 ENCOUNTER — MYC MEDICAL ADVICE (OUTPATIENT)
Dept: CARDIOLOGY | Facility: CLINIC | Age: 75
End: 2025-01-03
Payer: MEDICARE

## 2025-01-09 NOTE — TELEPHONE ENCOUNTER
Andre Phillipe message not read by patient. Called and reviewed FLP/ALT/CRP and message from Dr Perez. All questions answered.

## 2025-02-26 DIAGNOSIS — G47.61 PERIODIC LIMB MOVEMENTS OF SLEEP: ICD-10-CM

## 2025-02-26 RX ORDER — GABAPENTIN 300 MG/1
300 CAPSULE ORAL AT BEDTIME
Qty: 30 CAPSULE | Refills: 2 | Status: SHIPPED | OUTPATIENT
Start: 2025-02-26

## 2025-02-27 ENCOUNTER — VIRTUAL VISIT (OUTPATIENT)
Dept: FAMILY MEDICINE | Facility: CLINIC | Age: 75
End: 2025-02-27
Payer: MEDICARE

## 2025-02-27 DIAGNOSIS — F33.2 SEVERE RECURRENT MAJOR DEPRESSION WITHOUT PSYCHOTIC FEATURES (H): ICD-10-CM

## 2025-02-27 DIAGNOSIS — F33.1 MODERATE EPISODE OF RECURRENT MAJOR DEPRESSIVE DISORDER (H): Primary | ICD-10-CM

## 2025-02-27 RX ORDER — BUPROPION HYDROCHLORIDE 300 MG/1
300 TABLET ORAL EVERY MORNING
Qty: 90 TABLET | Refills: 3 | Status: SHIPPED | OUTPATIENT
Start: 2025-02-27

## 2025-02-27 ASSESSMENT — ANXIETY QUESTIONNAIRES
GAD7 TOTAL SCORE: 20
3. WORRYING TOO MUCH ABOUT DIFFERENT THINGS: NEARLY EVERY DAY
GAD7 TOTAL SCORE: 20
7. FEELING AFRAID AS IF SOMETHING AWFUL MIGHT HAPPEN: NEARLY EVERY DAY
6. BECOMING EASILY ANNOYED OR IRRITABLE: MORE THAN HALF THE DAYS
GAD7 TOTAL SCORE: 20
2. NOT BEING ABLE TO STOP OR CONTROL WORRYING: NEARLY EVERY DAY
5. BEING SO RESTLESS THAT IT IS HARD TO SIT STILL: NEARLY EVERY DAY
7. FEELING AFRAID AS IF SOMETHING AWFUL MIGHT HAPPEN: NEARLY EVERY DAY
4. TROUBLE RELAXING: NEARLY EVERY DAY
1. FEELING NERVOUS, ANXIOUS, OR ON EDGE: NEARLY EVERY DAY
8. IF YOU CHECKED OFF ANY PROBLEMS, HOW DIFFICULT HAVE THESE MADE IT FOR YOU TO DO YOUR WORK, TAKE CARE OF THINGS AT HOME, OR GET ALONG WITH OTHER PEOPLE?: NOT DIFFICULT AT ALL
IF YOU CHECKED OFF ANY PROBLEMS ON THIS QUESTIONNAIRE, HOW DIFFICULT HAVE THESE PROBLEMS MADE IT FOR YOU TO DO YOUR WORK, TAKE CARE OF THINGS AT HOME, OR GET ALONG WITH OTHER PEOPLE: NOT DIFFICULT AT ALL

## 2025-02-27 ASSESSMENT — PATIENT HEALTH QUESTIONNAIRE - PHQ9
SUM OF ALL RESPONSES TO PHQ QUESTIONS 1-9: 8
SUM OF ALL RESPONSES TO PHQ QUESTIONS 1-9: 8
10. IF YOU CHECKED OFF ANY PROBLEMS, HOW DIFFICULT HAVE THESE PROBLEMS MADE IT FOR YOU TO DO YOUR WORK, TAKE CARE OF THINGS AT HOME, OR GET ALONG WITH OTHER PEOPLE: NOT DIFFICULT AT ALL

## 2025-02-27 ASSESSMENT — ENCOUNTER SYMPTOMS: NERVOUS/ANXIOUS: 1

## 2025-02-27 NOTE — PROGRESS NOTES
Mahnaz is a 74 year old who is being evaluated via a billable video visit.    What phone number would you like to be contacted at? [phone]   How would you like to obtain your AVS? Larry      Assessment & Plan     Moderate episode of recurrent major depressive disorder (H)  We decided to try a trial of a lower dose of Wellbutrin decreasing from 450 mg daily down to 300 mg daily.  The Wellbutrin dose was increased because of fatigue associated with her post-COVID syndrome.  The good news, is that her post-COVID symptoms have been significantly improved.  Therefore, she feels confident about decreasing her Wellbutrin dose.  Will arrange short-term virtual follow-up to assess her symptoms on the lower dose of Wellbutrin.  I also suggested that she try to establish with a psychotherapist either through the [facility] counseling service or through [facility]-based community counselors.            FUTURE APPOINTMENTS:       - Return in about 1 month (around 3/27/2025) for Virtual mood follow-up. Patient instructed to return to clinic or contact us sooner if symptoms worsen or new symptoms develop.     Subjective   Mahnaz is a 74 year old, presenting for the following health issues:  Anxiety        11/6/2024     8:00 AM   Additional Questions   Roomed by [employer] Start Time: 2:22 PM    Anxiety    History of Present Illness       Mental Health Follow-up:  Patient presents to follow-up on Anxiety.    Patient's anxiety since last visit has been:  No change  The patient is not having other symptoms associated with anxiety.  Any significant life events: other  Patient is feeling anxious or having panic attacks.  Patient has no concerns about alcohol or drug use.   She is taking medications regularly.              Mahnaz is having a lot of anxiety  Much of it stems from current politics  She is also having some racing thoughts and invasive thoughts about guilt from her childhood pertaining to Taoist  She wonders if the  "higher dose of Wellbutrin might be contributing?  Symptoms have been escalating since last fall        Objective    Vitals - Patient Reported  Weight (Patient Reported): 63.5 kg (140 lb)  Height (Patient Reported): 165.1 cm (5' 5\")  BMI (Based on Pt Reported Ht/Wt): 23.3        Physical Exam   GENERAL: We were unable to establish a video connection  PSYCH: Appropriate affect, tone, and pace of words          Video-Visit Details    Type of service:  Video Visit   Video End Time:2:44 PM  Originating Location (pt. Location): Home    Distant Location (provider location):  On-site  Platform used for Video Visit: Tanner  Signed Electronically by: Vladislav Cruz MD      This was an 18-minute and 57-second telephone call    Total visit time 21 minutes including telephone time and documentation time  "

## 2025-03-10 ENCOUNTER — TELEPHONE (OUTPATIENT)
Dept: CARDIOLOGY | Facility: CLINIC | Age: 75
End: 2025-03-10
Payer: MEDICARE

## 2025-03-10 NOTE — TELEPHONE ENCOUNTER
[facility] Call Center    Phone Message    May a detailed message be left on voicemail: yes     Reason for Call: Medication Refill Request    Has the patient contacted the pharmacy for the refill? Yes   Name of medication being requested: letrozole (FEMARA) 2.5 MG tablet     Pharmacy:    [facility] [id number] [place] [address] [facility]    Date medication is needed: ASAP   Patient stated they are all out of medication and needing a one time refill order for 90 days to be sent to [facility] pharmacy. Please review and send order as needed. Thank you!    Action Taken: Other: Cardiology    Travel Screening: Not Applicable     Thank you!  [facility]

## 2025-03-10 NOTE — TELEPHONE ENCOUNTER
Left VM requesting a call back to triage. On call back, please find out who is managing medication since medication is listed as historical. If rx is needed from PCP, please verify dosage and pertinent information and forward message to provider.

## 2025-03-11 NOTE — TELEPHONE ENCOUNTER
Called and spoke with patient to inquire about Femara refill request. Pt states she submitted the refill request to the wrong clinic. She usually is prescribed this by oncology. Pt states she will contact oncology for refill and let us know if they are unable to fill this. Pt states she will call back with additional questions or concerns.     Kirsty Cano RN

## 2025-03-29 DIAGNOSIS — K21.00 GASTROESOPHAGEAL REFLUX DISEASE WITH ESOPHAGITIS WITHOUT HEMORRHAGE: ICD-10-CM

## 2025-03-31 RX ORDER — OMEPRAZOLE 20 MG/1
20 CAPSULE, DELAYED RELEASE ORAL DAILY
Qty: 90 CAPSULE | Refills: 1 | Status: SHIPPED | OUTPATIENT
Start: 2025-03-31

## 2025-04-16 ENCOUNTER — TELEPHONE (OUTPATIENT)
Dept: CARDIOLOGY | Facility: CLINIC | Age: 75
End: 2025-04-16
Payer: MEDICARE

## 2025-04-16 DIAGNOSIS — E78.2 MIXED HYPERLIPIDEMIA: Primary | ICD-10-CM

## 2025-04-16 RX ORDER — ROSUVASTATIN CALCIUM 40 MG/1
20 TABLET, COATED ORAL DAILY
Qty: 45 TABLET | Refills: 1 | Status: SHIPPED | OUTPATIENT
Start: 2025-04-16 | End: 2025-04-16

## 2025-04-16 RX ORDER — ROSUVASTATIN CALCIUM 40 MG/1
20 TABLET, COATED ORAL DAILY
Qty: 45 TABLET | Refills: 3 | Status: SHIPPED | OUTPATIENT
Start: 2025-04-16

## 2025-04-16 RX ORDER — ROSUVASTATIN CALCIUM 40 MG/1
20 TABLET, COATED ORAL DAILY
Qty: 90 TABLET | Refills: 1 | Status: SHIPPED | OUTPATIENT
Start: 2025-04-16 | End: 2025-04-16

## 2025-04-16 NOTE — TELEPHONE ENCOUNTER
[facility] Call Center    Phone Message    May a detailed message be left on voicemail: yes     Reason for Call: Other: Pt is requesting a callback to discuss increasing rosuvastatin (CRESTOR) 20 MG tablet to 40 mg tablets so she can cut them in half so it lasts longer as both cost $30, it cost effective for her.     Action Taken: Other: cardio    Travel Screening: Not Applicable    Thank you!  [facility]       Date of Service:

## 2025-04-16 NOTE — TELEPHONE ENCOUNTER
Return call to patient. Patient reports her insurance covered 40 mg tablets at a lower rate. Patient informed there may not be a cost difference as we still have to order for her to take 20 mg daily. Patient requesting anyway's. [facility] Refill Guideline reviewed.  Medication meets criteria for refill.     UPDATE 1530: Patient called asking if medication could be ordered at 40 mg daily and she would cut in half. Explained to patient that order needs to be accurately prescribed and written to how she is taking it. Patient verbalizes understanding.

## 2025-04-29 DIAGNOSIS — G47.61 PERIODIC LIMB MOVEMENTS OF SLEEP: ICD-10-CM

## 2025-04-29 RX ORDER — GABAPENTIN 300 MG/1
300 CAPSULE ORAL AT BEDTIME
Qty: 30 CAPSULE | Refills: 11 | Status: SHIPPED | OUTPATIENT
Start: 2025-04-29

## 2025-04-30 ENCOUNTER — TELEPHONE (OUTPATIENT)
Dept: FAMILY MEDICINE | Facility: CLINIC | Age: 75
End: 2025-04-30
Payer: MEDICARE

## 2025-04-30 DIAGNOSIS — Z17.0 MALIGNANT NEOPLASM OF AXILLARY TAIL OF RIGHT BREAST IN FEMALE, ESTROGEN RECEPTOR POSITIVE (H): Primary | ICD-10-CM

## 2025-04-30 DIAGNOSIS — C50.611 MALIGNANT NEOPLASM OF AXILLARY TAIL OF RIGHT BREAST IN FEMALE, ESTROGEN RECEPTOR POSITIVE (H): Primary | ICD-10-CM

## 2025-04-30 NOTE — TELEPHONE ENCOUNTER
"Hi Dr. Cruz,    Patient is requesting referral to lymphedema clinic @ [facility]  -Lymphedema is present @ right arm    -Patient was informed that appointment is needed for referrals.  \"I don't need appointment, Dr. Cruz knows that I've had cancer\"    If referral provided, please route to [facility].    Primary Care Pool: Please fax referral to: [phone]      "

## 2025-05-01 NOTE — TELEPHONE ENCOUNTER
Spoke with pt about PCP approval. Pt verbalized understanding and has no further questions or concerns.     Team please fax referral to  [phone]    Thank you    Cuong Valencia RN  [facility]

## 2025-05-01 NOTE — TELEPHONE ENCOUNTER
Referral faxed over to [facility] at [phone]. Copy of referral in accordion in Pink pod.

## 2025-05-09 ENCOUNTER — MYC REFILL (OUTPATIENT)
Dept: FAMILY MEDICINE | Facility: CLINIC | Age: 75
End: 2025-05-09

## 2025-05-09 DIAGNOSIS — B00.9 HSV (HERPES SIMPLEX VIRUS) INFECTION: ICD-10-CM

## 2025-05-09 NOTE — LETTER
11/21/2019    Vladislav Cruz MD  [address] [facility] [address]  [place] [address]    RE: Svetlana Adair       Dear Colleague,    I had the pleasure of seeing Svetlana Adair in the [facility].    Primary Cardiologist: Dr. Perez    Reason for Visit: 6 month follow up after lifestyle change and repeat lipid/ALT    History of Present Illness:   This is a very pleasant 69-year-old female with medical history is notable for high calcium score of 790, former tobacco user, and hyperlipidemia.     She returns to clinic, stating that she is doing ok. She is struggling with depression due to significant health events that have occurred over the last few years, including her boyfriend who was diagnosed with stage IV lung cancer. She is part of a support group and she thinks this is helping her a lot. Sounds like she has a good [place] of friends as well who get together with her regularly. She denies any specific symptoms of chest discomfort, palpitations, SOB, orthopnea, PND, presyncope, or syncope. Once in while she will have twinges of pain in her chest these are very atypical. She admits that she has not exercised much over the last 6 months.    Assessment and Plan:   This is a very pleasant 69-year-old female with medical history is notable for high calcium score of 790, former tobacco user, and hyperlipidemia.     Interestingly, her lipid panel is much better this time around. LDL is 69 which is at goal. I am not able to explain this change as she had no increase in her statin and she tells me she has not exercised much. At this time we will monitor her numbers without any changes. Her main concern has been stress and dealing with depression.     She will return our clinic in 6 months.    Thank you for allowing me to participate in the care of this patient today.    This note was completed in part using Dragon voice recognition software. Although reviewed after completion, some word and  grammatical errors may occur.    Orders this Visit:  No orders of the defined types were placed in this encounter.    No orders of the defined types were placed in this encounter.    Medications Discontinued During This Encounter   Medication Reason     amoxicillin (AMOXIL) 875 MG tablet Therapy completed     UNABLE TO FIND Stopped by Patient     UNABLE TO FIND Stopped by Patient     UNABLE TO FIND Stopped by Patient     UNABLE TO FIND Stopped by Patient         Encounter Diagnosis   Name Primary?     Hyperlipidemia LDL goal <70        CURRENT MEDICATIONS:  Current Outpatient Medications   Medication Sig Dispense Refill     aspirin 81 MG tablet Take 1 tablet (81 mg) by mouth daily 30 tablet      Cholecalciferol (VITAMIN D-3) 5000 units TABS Take 2 tablets by mouth daily       Coenzyme Q10 300 MG CAPS Take 300 mg by mouth daily       DULoxetine (CYMBALTA) 60 MG capsule Take 1 capsule (60 mg) by mouth daily 90 capsule 3     HYDROcodone-acetaminophen (NORCO) 5-325 MG tablet Take 1 tablet by mouth daily as needed for pain 20 tablet 0     IBUPROFEN PO Take 200 mg by mouth every 4 hours as needed for moderate pain        letrozole (FEMARA) 2.5 MG tablet Take 1 tablet by mouth daily  5     LYSINE 1-3 daily as needed       Magnesium 300 MG CAPS Take 300 mg by mouth as needed        Melatonin 10 MG TABS tablet Take 10 mg by mouth nightly as needed for sleep       Multiple Vitamins-Minerals (OCUVITE ADULT 50+) CAPS Take 1 capsule by mouth daily       nicotine polacrilex (NICORETTE) 2 MG gum Place 1 each (2 mg) inside cheek as needed for smoking cessation 30 tablet 11     rosuvastatin (CRESTOR) 10 MG tablet Take 1 tablet (10 mg) by mouth daily 90 tablet 3     UNABLE TO FIND MEDICATION NAME: Somnapure - 2 tablets = 500mg valerian root, 300mg lemon balm extract, 200mg l-theanine, 120mg hops extract, 50mg chamomile flower extract, 50mg passion flower extract, 3mg melatonin.  Takes 1-2 tablets at bedtime       UNABLE TO FIND  MEDICATION NAME: Moringa 1 serving of powder daily       UNABLE TO FIND MEDICATION NAME: Premium Amla Berry 2 capsules = 4mg Vitamin C, 966mg organic amla, organic amla extract.  Takes 2 capsules daily       UNABLE TO FIND MEDICATION NAME: OmegaKrill 5x 3 capsules = 480mg of total omega-3, 64mg EPA, 392mg DHA, 300mcg astaxanthin.  Takes 3 capsules daily       UNABLE TO FIND MEDICATION NAME: Super Colon Cleanse 2 capsules = 665mg senna leaf powder, 321mg psyllium husk powder, 21mg fennel seed powder, 21mg papaya leaf powder, 21mg yolanda hips fruit powder, 11mg l-acidophilus.  Taking 4 capsules daily       UNABLE TO FIND MEDICATION NAME: Majestha powder daily       valACYclovir (VALTREX) 1000 mg tablet TAKE 2 TABLETS(2000 MG) BY MOUTH TWICE DAILY AS NEEDED 8 tablet 0     ZOLEDRONIC ACID IV Inject into the vein every 6 months         ALLERGIES     Allergies   Allergen Reactions     Cats      Codeine Sulfate GI Disturbance       PAST MEDICAL HISTORY:  Past Medical History:   Diagnosis Date     Alcoholism (H)      Allergies     Fall     Basal cell cancer     back, chest, face     Breast cancer (H)      Coronary artery disease     CT calcium nrjdz=538 Nov 2015     Depression      Hyperlipidemia LDL goal <130 12/2/2015     Hypertension     borderline     PONV (postoperative nausea and vomiting)      Squamous cell carcinoma     left upper arm, chin       PAST SURGICAL HISTORY:  Past Surgical History:   Procedure Laterality Date     COLONOSCOPY       COLONOSCOPY  11/17/2011    Procedure:COLONOSCOPY; Colonoscopy; Surgeon:MEKA RUSS; Location: GI     DISSECT LYMPH NODE AXILLA Right 11/2/2017    Procedure: DISSECT LYMPH NODE AXILLA;  RIGHT AXILLARY LYMPH NODE DISSECTION;  Surgeon: Cortez White MD;  Location: [facility]     GYN SURGERY  2005    hysterectomy after hx of ovarian cyst, ended up being benign     HYSTERECTOMY, PAP NO LONGER INDICATED       MASTECTOMY MODIFIED RADICAL  2011    bilateral     MASTECTOMY,  BILATERAL       ORTHOPEDIC SURGERY      rt. knee arthroscopy     ORTHOPEDIC SURGERY Right     toe surgery     REALIGN PATELLA  1973    right      TOE SURGERY      right grt OA        FAMILY HISTORY:  Family History   Problem Relation Age of Onset     Cancer Mother 60        leukemia     Arthritis Mother         osteo     Eye Disorder Mother         glaucoma     Gastrointestinal Disease Mother         gallbladder     Other Cancer Mother      Gallbladder Disease Mother      Alcohol/Drug Father         alcohol     Heart Disease Father         ? CHF     Respiratory Father         emphysema     Coronary Artery Disease Father      Substance Abuse Father      Substance Abuse Sister      Colon Cancer Brother      Breast Cancer Maternal Grandmother      Asthma Sister      Cancer - colorectal Brother 50     Prostate Cancer Brother      Allergies Brother         bee      Arthritis Sister         osteo     Arthritis Sister         osteo     Arthritis Brother         osteo     Depression Sister      Psychotic Disorder Sister         bipolar     Respiratory Sister        SOCIAL HISTORY:  Social History     Socioeconomic History     Marital status:      Spouse name: None     Number of children: None     Years of education: None     Highest education level: None   Occupational History     None   Social Needs     Financial resource strain: None     Food insecurity:     Worry: None     Inability: None     Transportation needs:     Medical: None     Non-medical: None   Tobacco Use     Smoking status: Former Smoker     Packs/day: 1.00     Years: 22.00     Pack years: 22.00     Types: Cigarettes     Last attempt to quit: 2010     Years since quittin.5     Smokeless tobacco: Never Used   Substance and Sexual Activity     Alcohol use: No     Alcohol/week: 0.0 standard drinks     Comment: intermittent sobriety 11     Drug use: Yes     Types: Marijuana     Comment: for sleeping/occ     Sexual activity: Yes     Partners:  "Male     Birth control/protection: Surgical     Comment: hyst   Lifestyle     Physical activity:     Days per week: None     Minutes per session: None     Stress: None   Relationships     Social connections:     Talks on phone: None     Gets together: None     Attends [employer] service: None     Active member of club or organization: None     Attends meetings of clubs or organizations: None     Relationship status: None     Intimate partner violence:     Fear of current or ex partner: None     Emotionally abused: None     Physically abused: None     Forced sexual activity: None   Other Topics Concern      Service No     Blood Transfusions No     Caffeine Concern Yes     Comment: 4-5 cups caffeine per day     Occupational Exposure No     Hobby Hazards No     Sleep Concern Yes     Stress Concern Yes     Weight Concern No     Special Diet Yes     Comment: organic foods     Back Care No     Exercise No     Bike Helmet No     Seat Belt Yes     Self-Exams Yes     Parent/sibling w/ CABG, MI or angioplasty before 65F 55M? No   Social History Narrative     None       Review of Systems:  Skin:  Negative     Eyes:  Positive for glasses  ENT:  Positive for vertigo  Respiratory:  Positive for dyspnea on exertion;shortness of breath  Cardiovascular:  Negative    Gastroenterology: Negative    Genitourinary:  Positive for urinary frequency  Musculoskeletal:  Positive for joint pain;arthritis  Neurologic:  Negative    Psychiatric:  Positive for depression;sleep disturbances;anxiety;excessive stress  Heme/Lymph/Imm:  Positive for allergies  Endocrine:  Negative      Physical Exam:  Vitals: /71   Pulse 58   Ht 1.651 m (5' 5\")   Wt 62.6 kg (138 lb)   BMI 22.96 kg/m        GEN:  NAD  NECK: No JVD  C/V:  Regular rate and rhythm, no murmur, rub or gallop.  RESP: Clear to auscultation bilaterally.  GI: Abdomen soft, nontender, nondistended.  EXTREM: No LE edema.   NEURO: Alert and oriented, cooperative. No obvious focal " deficits.   PSYCH: Normal affect.  SKIN: Warm and dry.       Recent Lab Results:  LIPID RESULTS:  Lab Results   Component Value Date    CHOL 171 11/21/2019    HDL 85 11/21/2019    LDL 69 11/21/2019    TRIG 87 11/21/2019    CHOLHDLRATIO 3.3 10/29/2015       LIVER ENZYME RESULTS:  Lab Results   Component Value Date    AST 18 10/31/2019    ALT 35 10/31/2019       CBC RESULTS:  Lab Results   Component Value Date    WBC 6.3 10/31/2019    RBC 4.09 10/31/2019    HGB 13.4 10/31/2019    HCT 40.2 10/31/2019    MCV 98 10/31/2019    MCH 32.8 10/31/2019    MCHC 33.3 10/31/2019    RDW 12.5 10/31/2019     10/31/2019       BMP RESULTS:  Lab Results   Component Value Date     10/31/2019    POTASSIUM 4.4 10/31/2019    CHLORIDE 105 10/31/2019    CO2 31 10/31/2019    ANIONGAP 3 10/31/2019    GLC 94 10/31/2019    BUN 17 10/31/2019    CR 0.80 10/31/2019    GFRESTIMATED 76 10/31/2019    GFRESTBLACK 88 10/31/2019    YASMIN 9.2 10/31/2019        A1C RESULTS:  Lab Results   Component Value Date    A1C 5.6 11/18/2016       INR RESULTS:  No results found for: INR      Thank you for allowing me to participate in the care of your patient.    Sincerely,     Taiwo Anthony PA-C     [facility]     Applied

## 2025-05-12 RX ORDER — VALACYCLOVIR HYDROCHLORIDE 1 G/1
TABLET, FILM COATED ORAL
Qty: 21 TABLET | Refills: 11 | OUTPATIENT
Start: 2025-05-12

## 2025-05-12 RX ORDER — VALACYCLOVIR HYDROCHLORIDE 1 G/1
1000 TABLET, FILM COATED ORAL
Qty: 21 TABLET | Refills: 0 | Status: SHIPPED | OUTPATIENT
Start: 2025-05-12 | End: 2025-05-19

## 2025-05-31 DIAGNOSIS — F33.2 SEVERE RECURRENT MAJOR DEPRESSION WITHOUT PSYCHOTIC FEATURES (H): ICD-10-CM

## 2025-06-02 RX ORDER — BUPROPION HYDROCHLORIDE 300 MG/1
TABLET ORAL
Qty: 90 TABLET | Refills: 1 | Status: SHIPPED | OUTPATIENT
Start: 2025-06-02

## 2025-07-14 ENCOUNTER — TELEPHONE (OUTPATIENT)
Dept: FAMILY MEDICINE | Facility: CLINIC | Age: 75
End: 2025-07-14
Payer: COMMERCIAL

## 2025-07-14 RX ORDER — LEVOTHYROXINE SODIUM 25 UG/1
25 TABLET ORAL
COMMUNITY

## 2025-07-14 NOTE — TELEPHONE ENCOUNTER
She reports taking Levothyroxine 25 mcg tablet daily prescribed by endocrinologist ([facility]).   -Medication list updated.    Patient also updating PCP she has weaned herself completely off Wellbutrin. She is not feeling any different and is doing well. She is starting counseling soon also.   -Medication list updated.    Patient requesting Naltrexone be changed back to 4.5 mg daily for Hashimoto's Thyroiditis, last prescribed by endocrinologist ([facility] ) 3/28/2025.   -Medication list updated.     Patient wants 11/6/24 notes corrected, states naltrexone was not discussed at the appointment and it is prescribed by her endocrinologist ([facility] ) for Hashimoto's, not alcohol addiction.   She does not want her other medical providers to have the wrong information regarding her medication and the reasons she takes them.     Per chart review, appears patient started taking Naltrexone in 2023 for Hashimoto's. Please see 4/19/2023 [facility] Visit with [facility] for further documentation of medication initiation and reasoning.       Per 3/28/25 VV with [facility] via [employer]:        Please see 11/6/24 OV Notes she would like removed.   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  I think she is depressed.  She has not responded well to antidepressants other than bupropion in the past including trials of venlafaxine and duloxetine.  She had trans cranial magnetic therapy but did not respond to that significantly.  She has seen psychiatry specialists.  She tries to be physically active, but is limited  by musculoskeletal pain.  She did even inquire about returning to taking hydrocodone to manage her pain.  I think we should try an increased dose of Wellbutrin.  Recommended increasing to the maximum dose of 450 mg daily.

## 2025-07-16 ASSESSMENT — ANXIETY QUESTIONNAIRES
GAD7 TOTAL SCORE: 5
7. FEELING AFRAID AS IF SOMETHING AWFUL MIGHT HAPPEN: NOT AT ALL
7. FEELING AFRAID AS IF SOMETHING AWFUL MIGHT HAPPEN: NOT AT ALL
3. WORRYING TOO MUCH ABOUT DIFFERENT THINGS: SEVERAL DAYS
5. BEING SO RESTLESS THAT IT IS HARD TO SIT STILL: NOT AT ALL
4. TROUBLE RELAXING: SEVERAL DAYS
8. IF YOU CHECKED OFF ANY PROBLEMS, HOW DIFFICULT HAVE THESE MADE IT FOR YOU TO DO YOUR WORK, TAKE CARE OF THINGS AT HOME, OR GET ALONG WITH OTHER PEOPLE?: SOMEWHAT DIFFICULT
IF YOU CHECKED OFF ANY PROBLEMS ON THIS QUESTIONNAIRE, HOW DIFFICULT HAVE THESE PROBLEMS MADE IT FOR YOU TO DO YOUR WORK, TAKE CARE OF THINGS AT HOME, OR GET ALONG WITH OTHER PEOPLE: SOMEWHAT DIFFICULT
6. BECOMING EASILY ANNOYED OR IRRITABLE: SEVERAL DAYS
1. FEELING NERVOUS, ANXIOUS, OR ON EDGE: SEVERAL DAYS
GAD7 TOTAL SCORE: 5
2. NOT BEING ABLE TO STOP OR CONTROL WORRYING: SEVERAL DAYS
GAD7 TOTAL SCORE: 5

## 2025-07-16 ASSESSMENT — PATIENT HEALTH QUESTIONNAIRE - PHQ9
SUM OF ALL RESPONSES TO PHQ QUESTIONS 1-9: 10
SUM OF ALL RESPONSES TO PHQ QUESTIONS 1-9: 10
10. IF YOU CHECKED OFF ANY PROBLEMS, HOW DIFFICULT HAVE THESE PROBLEMS MADE IT FOR YOU TO DO YOUR WORK, TAKE CARE OF THINGS AT HOME, OR GET ALONG WITH OTHER PEOPLE: SOMEWHAT DIFFICULT

## 2025-07-17 ENCOUNTER — VIRTUAL VISIT (OUTPATIENT)
Dept: PSYCHOLOGY | Facility: CLINIC | Age: 75
End: 2025-07-17
Payer: MEDICARE

## 2025-07-17 DIAGNOSIS — F33.0 MAJOR DEPRESSIVE DISORDER, RECURRENT EPISODE, MILD: Primary | ICD-10-CM

## 2025-07-17 PROCEDURE — 90791 PSYCH DIAGNOSTIC EVALUATION: CPT | Mod: 95 | Performed by: SOCIAL WORKER

## 2025-07-17 NOTE — PROGRESS NOTES
"  [facility]     PATIENT'S NAME: Svetlana Adair  PREFERRED NAME: Mahnaz  PRONOUNS: she/her  MRN: [id number]  : 1950  ADDRESS: [address] [place] [id number]  ACCT. NUMBER: [id number]  DATE OF SERVICE: 25  START TIME: 809  END TIME: 940  PREFERRED PHONE: [phone]  May we leave a program related message: Yes  EMERGENCY CONTACT: was confirmed, sister, brother and friend   SERVICE MODALITY: Video Visit:      Provider verified identity through the following two step process.  Patient provided:  Patient photo, Patient , and Patient address    Telemedicine Visit: The patient's condition can be safely assessed and treated via synchronous audio and visual telemedicine encounter.      Reason for Telemedicine Visit: Patient has requested telehealth visit    Originating Site (Patient Location): Patient's home    Distant Site (Provider Location): Provider Remote Setting- Home Office    Consent:  The patient/guardian has verbally consented to: the potential risks and benefits of telemedicine (video visit) versus in person care; bill my insurance or make self-payment for services provided; and responsibility for payment of non-covered services.     Patient would like the video invitation sent by:  My Chart    Mode of Communication:  Video Conference via Amwell    Distant Location (Provider):  Off-site    As the provider I attest to compliance with applicable laws and regulations related to telemedicine.    UNIVERSAL ADULT MENTAL HEALTH DIAGNOSTIC ASSESSMENT    Identifying Information:  Patient is a 74 year old,  individual. Patient was referred for an assessment by referring provider. Patient attended the session alone.    Chief Complaint:   The reason for seeking services at this time is: \"depression.\" She reports that depressive symptoms onset about three and a half years. She does not feel depressed, but sometimes feels down. She feels strongly that she does not " "have major depressive disorder, stating that she has seen what that looks like in her older sister, whose presentation is very different from her own. She reports feeling lonely and lacking energy. She was recently diagnosed with Hashimoto, and as found some improvement to sleep and fatigue since being treated for this. She describes feeling stressed about the current political climate. She denies any current, recent, or historical suicidal ideation, though states that if she were diagnosed with a life threatening illness, she does not believe she would want to pursue treatment, and may prefer a comfort approach.    Patient describes her mood as generally \"good.\" Endorses periodic irritability. Patient denies significant changes to weight, though shares that she has been having sugar cravings for the last four months. Patient denies issues falling asleep, though takes a cannabis gummy to help induce sleep. She wakes up frequently at night and is up for about fifteen minutes.    Patient has attempted to resolve these concerns in the past through psychotherapy and medication trials.    Social/Family History:  Patient reported she grew up in [facility]. She was raised by biological parents. Parents were always together. She has three siblings. She is the youngest of four. Patient reported that her childhood was \"there were stellar, amazing moments.... I understood my mother, who was struggling to deal with my older sister. I could see that she was struggling, and I knew what to do. My dad was a traveling salesman. He was diagnosed alcoholic and went through treatment when I was in high school. My mother joined him in drinking and taking her Valium, and she would get goofy. She'd go in her room and say that she hated him. Neither were abusive. My dad was a fun drunk. He'd come home from work after he'd been drinking by the boys. He was always home for dinner. Mother would be waiting for him. There was terrible " "episodes of them fighting and being drunk.\" She describes having a close relationship with her sister, Smitha. She shares that her other two siblings are also supportive.     The patient describes her cultural background as . Patient reports that she is \"very spiritual,\" and was raised Yarsanism. She is part of a meditation group. Cultural influences and impact on patient's life structure, values, norms, and healthcare: Yarsanism. Contextual influences on patient's health include: , loneliness, Spiritual/involved in meditation group, daily cannabis, hx of etoh, currently in AA, newly diagnosed Hashimoto. These factors will be addressed in the Preliminary Treatment plan. Patient identified her preferred language to be English. Patient reported she does not need the assistance of an  or other support involved in therapy.     Patient reported she had no significant delays in developmental tasks. Patient's highest education level was associate degree / vocational certificate, [employer] ([employer]). Patient denied learning problems. Modifications will not be used to assist communication in therapy. Patient reports she is able to understand written materials.    Patient's current relationship status is  for fifteen years. She  her \"childhood codiert,\" Dusty, who was a Musician. They lived in [place] and  when she was 31 years old. She remarried Laurel, who was ten years older than her and had two young adult sons. They were  17 years, and he  fifteen years ago. Patient identified her sexual orientation as heterosexual. Patient reported she did not have children. Patient identified siblings; friends; other as part of her support system. Patient identified the quality of these relationships as stable and meaningful.      Patient lives alone in [place]. Patient reports that housing is stable.    Patient is currently employed part time. She is " self-employed, providing  care. Shares that she has filed bankruptcy in the past. Patient reports her finances are obtained through employment and social security. Patient does not identify finances as a current stressor.      Patient reported that she has been involved with the legal system. She got a DWI when she and her first  , about the age of 32. She got another one after the death of her second , and she got her last DWI 5 years ago. Patient does report being under probation/parole/jurisdiction.     Patient's Strengths and Limitations:  Patient identified the following strengths or resources that will help patient succeed in treatment: commitment to health and well being, friends / good social support, sense of humor, sober support group / recovery support , and strong social skills. Things that may interfere with the patient's success in treatment include: none identified.     Assessments:  The following assessments were completed by patient for this visit:  PHQ9:       3/23/2022    10:13 AM 5/12/2022    11:16 AM 12/26/2022     1:05 PM 10/9/2023    11:05 AM 11/6/2024     7:49 AM 2/27/2025     1:53 PM 7/16/2025     9:58 PM   PHQ-9 SCORE   PHQ-9 Total Score MyChart   3 (Minimal depression) 4 (Minimal depression) 8 (Mild depression) 8 (Mild depression) 10 (Moderate depression)   PHQ-9 Total Score 4 2 3 4 8  8  10        Patient-reported    Proxy-reported     GAD7:       9/5/2021     3:37 PM 11/27/2021     1:42 PM 12/10/2021     8:57 AM 12/26/2022     1:02 PM 11/6/2024     7:50 AM 2/27/2025     1:57 PM 7/16/2025     9:59 PM   ANGY-7 SCORE   Total Score 4 (minimal anxiety) 12 (moderate anxiety) 9 (mild anxiety) 1 (minimal anxiety) 1 (minimal anxiety) 20 (severe anxiety) 5 (mild anxiety)   Total Score 4 12 9 1 1  20  5        Patient-reported    Proxy-reported     CAGE-AID:       7/17/2025     8:34 AM   CAGE-AID Total Score   Total Score 3    Total Score MyChart 3 (A total score of 2  or greater is considered clinically significant)       Proxy-reported     PROMIS 10-Global Health (only subscores and total score):       12/10/2021     8:59 AM 7/17/2025     8:33 AM   PROMIS-10 Scores Only   Global Mental Health Score 10 18    Global Physical Health Score 11 12    PROMIS TOTAL - SUBSCORES 21 30        Proxy-reported     [place] Suicide Severity Rating Scale (Lifetime/Recent)      9/7/2021     2:00 PM 7/17/2025     9:22 AM   [place] Suicide Severity Rating (Lifetime/Recent)   Q1 Wish to be Dead (Lifetime) No     Q2 Non-Specific Active Suicidal Thoughts (Lifetime) No     RETIRED: 1. Wish to be Dead (Recent) No     RETIRED: 2. Non-Specific Active Suicidal Thoughts (Recent) No    Actual Attempt (Lifetime) No    Actual Attempt (Past 3 Months) No    Has subject engaged in non-suicidal self-injurious behavior? (Lifetime) No    Has subject engaged in non-suicidal self-injurious behavior? (Past 3 Months) No    1. Wish to be Dead (Lifetime)  N   1. Wish to be Dead (Past 1 Month)  N   2. Non-Specific Active Suicidal Thoughts (Lifetime)  N   Actual Attempt (Lifetime)  N   Has subject engaged in non-suicidal self-injurious behavior? (Lifetime)  N   Interrupted Attempts (Lifetime)  N   Aborted or Self-Interrupted Attempt (Lifetime)  N   Preparatory Acts or Behavior (Lifetime)  N   Calculated C-SSRS Risk Score (Lifetime/Recent)  No Risk Indicated       Data saved with a previous flowsheet row definition     Personal and Family Medical History:  Patient does report a family history of mental health concerns. Patient reports family history includes Alcohol/Drug in her father; Anxiety Disorder in her sister; Arthritis in her mother; Asthma in her sister and sister; Bipolar Disorder in her sister; Breast Cancer in her maternal grandmother; Cancer (age of onset: 60) in her mother; Colon Cancer in her brother; Coronary Artery Disease in her father; Eye Disorder in her mother; Gallbladder Disease in her mother;  Gastrointestinal Disease in her mother; Heart Disease in her father; Other Cancer in her mother; Respiratory in her father; Substance Abuse in her father, sister, and sister.     Patient reports previous diagnosis of depression by her primary care provider, as well as former therapist. Previous mental health treatment includes therapy and medication management. Reports previous engagement in therapy, most recently with DIMAS Biu in 2022. She was first prescribed an antidepressant medication about thirty years ago, though has never found these helpful. Describes previous therapy experiences as unhelpful. Denies history of psychiatric hospitalization. Denies history of civil commitment. Current mental health care includes, medication management in primary care.    Patient has had a physical exam to rule out medical causes for current symptoms. Date of last physical exam was within the past year. Patient was encouraged to follow up with PCP if symptoms were to develop. The patient has a [facility] Primary Care Provider, who is named Dr. Vladislav Cruz. Patient reports physical health concerns related to arthritis, Hashimoto, and hx of breast cancer. Patient denies any issues with pain. There are not significant appetite/nutritional concerns/weight changes. Patient does not report a history of head injury/brain injury/concussion.    Patient reports current meds as:   Current Outpatient Medications   Medication Sig Dispense Refill    ASHWAGANDHA PO Take 1 tablet by mouth daily as needed At onset of illness symptoms; as needed      aspirin 81 MG EC tablet Take 1 tablet (81 mg) by mouth daily      calcium carbonate-vitamin D (OS-YASMIN) 500-400 MG-UNIT tablet Take 1 tablet by mouth daily       cholecalciferol (VITAMIN D3) 25 mcg (1000 units) capsule Take 1 capsule by mouth daily.      Coenzyme Q10 300 MG CAPS Take 300 mg by mouth daily      ezetimibe (ZETIA) 10 MG tablet Take 1 tablet (10 mg) by mouth daily. 90 tablet  4    gabapentin (NEURONTIN) 300 MG capsule Take 1 capsule (300 mg) by mouth at bedtime. 30 capsule 11    IBUPROFEN PO Take 200 mg by mouth every 4 hours as needed for moderate pain      letrozole (FEMARA) 2.5 MG tablet Take 1 tablet by mouth daily  5    levothyroxine (SYNTHROID/LEVOTHROID) 25 MCG tablet Take 25 mcg by mouth every morning (before breakfast).      Lutein-Zeaxanthin 25-5 MG CAPS Take 1 capsule by mouth daily      LYSINE 1-3 daily as needed      melatonin 3 MG tablet Take 3 mg by mouth nightly as needed for sleep.      Milk Thistle-Dand-Fennel-Licor (MILK THISTLE XTRA) CAPS capsule Take 1 capsule by mouth daily      Naltrexone HCl, Pain, 4.5 MG CAPS Take 4.5 mg by mouth daily.      nicotine polacrilex (NICORETTE) 2 MG gum Place 1 each (2 mg) inside cheek as needed for smoking cessation 30 tablet 11    omeprazole (PRILOSEC) 20 MG DR capsule TAKE ONE CAPSULE BY MOUTH ONE TIME DAILY 90 capsule 1    rosuvastatin (CRESTOR) 40 MG tablet Take 0.5 tablets (20 mg) by mouth daily. 45 tablet 3    TURMERIC PO Take 1 capsule by mouth daily      UNABLE TO FIND Take 1 tablet by mouth daily MEDICATION NAME: Yin Chiao - chinese supplement takes at onset of illness symptoms      UNABLE TO FIND MEDICATION NAME: Somnapure - 2 tablets = 500mg valerian root, 300mg lemon balm extract, 200mg l-theanine, 120mg hops extract, 50mg chamomile flower extract, 50mg passion flower extract, 3mg melatonin.  Takes 1-2 tablets at bedtime      UNABLE TO FIND MEDICATION NAME: Moringa 1 serving of powder daily      UNABLE TO FIND MEDICATION NAME: Premium Amla Berry 2 capsules = 4mg Vitamin C, 966mg organic amla, organic amla extract.  Takes 1 capsule daily as needed for immune system      UNABLE TO FIND MEDICATION NAME: OmegaKrill 5x 3 capsules = 480mg of total omega-3, 64mg EPA, 392mg DHA, 300mcg astaxanthin.  Takes 3 capsules daily      UNABLE TO FIND MEDICATION NAME: Super Colon Cleanse 2 capsules = 665mg senna leaf powder, 321mg psyllium  husk powder, 21mg fennel seed powder, 21mg papaya leaf powder, 21mg yolanda hips fruit powder, 11mg l-acidophilus.  Taking 4 capsules daily      valACYclovir (VALTREX) 1000 mg tablet TAKE ONE TABLET BY MOUTH THREE TIMES DAILY FOR SEVEN DAYS 21 tablet 0    vitamin C (ASCORBIC ACID) 250 MG tablet Take 250 mg by mouth daily       No current facility-administered medications for this visit.     Medication Adherence:  Patient reports taking medications regularly as prescribed. She is currently weaning off Wellbutrin.    Patient Allergies:   Allergies   Allergen Reactions    Cats     Codeine Sulfate GI Disturbance    Metoprolol Rash     Medical History:   Past Medical History:   Diagnosis Date    Alcoholism (H)     Allergies     Fall    Arthritis     Basal cell cancer     back, chest, face    Breast cancer (H)     Coronary artery disease     CT calcium fdzfb=372 Nov 2015    Depression     Hyperlipidemia LDL goal <130 12/2/2015    Hypertension     borderline    PONV (postoperative nausea and vomiting)     Squamous cell carcinoma     left upper arm, chin     Current Mental Status Exam:  Appearance:  Appropriate    Eye Contact:  Good   Psychomotor:  Normal       Gait/station:  no problem  Attitude/Demeanor: Cooperative  Friendly  Speech      Rate/Production: Normal/ Responsive Talkative      Volume:  Normal  volume      Language:  intact  Mood:   Anxious  Normal Euthymic  Affect:   Appropriate    Thought Content: Clear   Thought Process: Coherent  Logical       Associations: No loosening of associations  Insight:   Fair   Judgment:  Intact   Orientation:  Person Place Time Situation  Attention/concentration: Good    Substance Use:   Patient did report a family history of substance use concerns. Reports that her parents and older sister abused alcohol; see medical history section for details. Patient has received chemical dependency treatment in the past at an outpatient treatment for women in the [facility]'s. Patient has not ever been  to detox. Patient is currently involved in AA and attends meetings once/week. She has been part of this group for more than ten years and she has a couple of Sponsors.     Substance History of use Age of first use Date of last use Pattern and duration of use (include amounts and frequency)   Alcohol used in the past [age] 11/02/21 Sober since 11/2/21   Cannabis currently use 20 07/16/25 Daily edible at HS. Smokes socially, about once/week.   Amphetamines never used     REPORTS SUBSTANCE USE: N/A   Cocaine/crack never used      REPORTS SUBSTANCE USE: N/A   Hallucinogens never used       REPORTS SUBSTANCE USE: N/A   Inhalants never used       REPORTS SUBSTANCE USE: N/A   Heroin never used       REPORTS SUBSTANCE USE: N/A   Other Opiates never used     REPORTS SUBSTANCE USE: N/A   Benzodiazepine never used     REPORTS SUBSTANCE USE: N/A   Barbiturates never used     REPORTS SUBSTANCE USE: N/A   Over the counter meds never used     REPORTS SUBSTANCE USE: N/A   Caffeine currently use 20   Daily, estimates 2-3 cups before 2:30 pm   Nicotine currently use 18 07/17/25 Chews Nicotine gum.   Other substances never used     REPORTS SUBSTANCE USE: N/A     Patient reported the following problems as a result of her substance use: DUI; family problems.    Substance Use: No symptoms    Based on the CAGE score of 3 and clinical interview there is a history of substance abuse, though no current concerns.    Significant Losses/Trauma/Abuse/Neglect Issues:   Patient did not serve in the .  Patient denies history of abuse. Concerns for possible neglect are not present.     Safety Assessment:   Patient denies current homicidal ideation and behaviors.  Patient denies current self-injurious ideation and behaviors.    Patient denied risk behaviors associated with substance use.   Patient denies any high risk behaviors associated with mental health symptoms.  Patient reports the following current concerns for her personal safety:  None.  Patient reports there are not firearms in the house.     History of Safety Concerns:  Patient denied a history of homicidal ideation.     Patient denied a history of personal safety concerns.    Patient denied a history of assaultive behaviors.    Patient denied a history of sexual assault behaviors.     Patient reported a history unsafe motor vehicle operation associated with substance use.  Patient denies any history of high risk behaviors associated with mental health symptoms.  Patient reports the following protective factors: forward or future oriented thinking; dedication to family or friends; safe and stable environment; effectively controls impulses; sense of belonging; purpose; help seeking behaviors when distressed; daily obligations; effective problem solving skills; positive social skills; healthy fear of risky behaviors or pain    Risk Plan:  See Recommendations for Safety and Risk Management Plan    Review of Symptoms per patient report:   Depression: Lack of interest or pleasure in doing things, Feeling sad, down, or depressed, Change in energy level, Change in sleep, Low self-worth, Difficulties concentrating, Psychomotor slowing or agitation, Ruminations, Irritability, and Withdrawn  Marce:  No Symptoms  Psychosis: No Symptoms  Anxiety: Excessive worry, Sleep disturbance, Ruminations, and Irritability  Panic:  No symptoms  Post Traumatic Stress Disorder:  No Symptoms   Eating Disorder: No Symptoms  ADD/ADHD:  No symptoms  Conduct Disorder: No symptoms  Autism Spectrum Disorder: No symptoms  Obsessive Compulsive Disorder: No Symptoms    Patient denies compulsive behaviors and treatment history.      Diagnostic Criteria:   (F33) Major Depressive Disorder, Recurrent episode, Mild  A. Five (or more) of the following symptoms have been present during the same 2-week period and represent a change from previous functioning; at least one of the symptoms is either (1) depressed mood or (2) loss of  interest or pleasure. (Note: Do not include symptoms that are clearly attributable to another medical condition.)  - Depressed mood most of the day, nearly every day, as indicated by either subjective report (e.g., feels sad, empty, hopeless) or observation made by others (e.g., appears tearful). (Note: In children and adolescents, can be irritable mood.)  - Markedly diminished interest or pleasure in all, or almost all, activities most of the day, nearly every day (as indicated by either subjective account or observation).  - Insomnia or hypersomnia nearly every day.  - Psychomotor agitation or retardation nearly every day (observable by others, not merely subjective feelings of restlessness or being slowed down).  - Fatigue or loss of energy nearly every day.  - Feelings of worthlessness or excessive or inappropriate guilt (which may be delusional) nearly every day (not merely self-reproach or guilt about being sick).  - Diminished ability to think or concentrate, or indecisiveness, nearly every day (either by subjective account or as observed by others).  B. The symptoms cause clinically significant distress or impairment in social, occupational, or other important areas of functioning.  C. The episode is not attributable to the physiological effects of a substance or another medical condition.  D. At least one major depressive episode is not better explained by schizoaffective disorder and is not superimposed on schizophrenia, schizophreniform disorder, delusional disorder, or other specified and unspecified schizophrenia spectrum and other psychotic disorders.  E. There has never been a manic episode or a hypomanic episode.    Functional Status:  Patient reports the following functional impairments: health maintenance, management of the household and or completion of tasks, relationship(s), self-care, social interactions, and work / vocational responsibilities  Nonprogrammatic care:  Patient is requesting basic  services to address current mental health concerns.    Clinical Summary:  1. Psychosocial, Cultural and Contextual Factors: , loneliness, Spiritual/involved in meditation group, daily cannabis, hx of etoh, currently in AA, newly diagnosed Hashimoto  2. Principal DSM5 Diagnosis (sustained by DSM5 criteria listed above): (F33) Major Depressive Disorder, Recurrent episode, Mild  3. Other diagnoses that are relevant to services: N/A  4. Provisional Diagnosis: N/A  5. Prognosis: Expect Improvement  6. Likely consequences of symptoms if not treated: worsening symptoms  7. Patient strengths include: employed, has a previous history of therapy, intelligent, support of family, friends and providers, willing to ask questions, and willing to relate to others    Recommendations:     1. Plan for Safety and Risk Management:  Safety and Risk: Recommended that patient call 911 or go to the local ED should there be a change in any of these risk factors.          Report to child/adult protection services was NA.     2. Patient reports there are no ethnic, cultural or Yarsanism factors relevant for therapy.     3. Initial Treatment will focus on:    Depression - alleviation of symptoms and implementation of adaptive coping strategies     4. Resources/Service Plan:    services are not indicated.   Modifications to assist communication are not indicated.   Additional disability accommodations are not indicated.      5. Collaboration:  Collaboration/coordination of treatment will be initiated with the following support professionals: primary care physician.      6.  Referrals:   The following referral(s) will be initiated: Outpatient [facility]      A Release of Information has been obtained for the following: N/A.    Clinical Substantiation/medical necessity for the above recommendations: reported symptoms creating functional impairment across domains.    7. LM:   LM: Discussed the general effects of drugs  and alcohol on health and well-being. Recommendations: limit/abstain from mood-altering substances, continue in AA.    8. Records:   These were reviewed at time of assessment.  Information in this assessment was obtained from the medical record and  provided by patient who is a good historian. Patient will have open access to their mental health medical record.    9.   Interactive Complexity: No    10. Safety Plan: Not indicated     Provider Name/Credentials: DIMAS Vaz   July 17, 2025

## 2025-07-17 NOTE — PATIENT INSTRUCTIONS
Dear Mahnaz,      It was nice meeting you today. Thank you for your willingness to speak with me. Today we discussed starting therapy. Before your next visit, please think about what you want to change in your life through therapy, what are your expectations for your therapy work, 2 - 3 goals you would like to accomplish in treatment, and how we will know when you've achieved them. If you struggle with this, think about this .... when you walk out of here for the last time and you have done what you came in here to do, what do you want to be different? What would you do more of or less of? What would you start doing or stop doing? What might you think differently about?     My therapy sessions are generally 45 minutes long. Please call [phone] to make, change or cancel appointments. Please allow 24 hours notice if you need to cancel or change your visit. Please review the attendance agreement below.     I am available for in-person care on Wednesdays at [facility], [facility] [address], [address], [place] [address]. The office is adjacent to the [employer] shop and across from the [place] (adult[facility]. The RED parking ramp is the closest to my office.       Attendance Agreement  [facility]    To get the most out of your treatment, it is important to make it a top priority. You must try to keep all of the appointments (visits) that you schedule.     I have read and understand the following:   I will commit to my treatment with [facility].  I will make it a priority to go to all scheduled visits.     If I need to cancel a visit, I must call at least 24 hours in advance.  This allows [facility] to give my time slot to another patient.     [facility] will only call to remind me of my first visit.     If I miss two visits within six months, and I do not call to cancel them,  [facility] may stop providing me services.     If I stop seeing my therapist for 90 days or more, I may need to complete another set of intake forms if I want to return to therapy.      Mental Health Resources     Crisis Lines  Crisis Text Line  Text 525911  You will be connected with a trained live crisis counselor to provide support. Crisis Text Line provides free, text-based support 24/7.     Caregiver Help Desk  476.218.1113  7 am - 6 pm  Staffed by caregiving experts, the Help Desk helps you find the right information you need to help you navigate your complex caregiving challenges.     Disaster Distress Helpline  3.382.109.7084  The [facility] Disaster Distress Helpline is available for anyone experiencing emotional #distress related to natural or human-caused disasters. Call or text 8-940-133-9706 to be connected to a trained, caring counselor, 24/7/365. disasterdistress.[facility].gov    Gambling Hotline  3.010.816.1014 [hope] or 1.800.DINALER  Text HOPE to 00009 or 800GAM  1800gamblerchat.org mnapg.org   www.mnapg.org     línea de crisis española  300.825.6120    [facility] Helpline  468.788.4136    [place] Domestic Violence Hotline  1.655.878.3689  text LOVEIS to 18464  Wave - Private Location ApphospitalsVideoplaza.AudioSnaps   for victims or survivors who need support    National Sexual Assault Hotline  578.687.HOPE [6320]   Speak with a trained staff member from a sexual assault service provider in your area.     National Hope Line  1.148.860.1906 [hope]    National Suicide Prevention Lifeline  Free and confidential support  988  http://suicidepreventionlifeline.org    The [place] Project (LGBTQ Youth Crisis Line)  3.522.035.2206  text START to 257083  A national 24-hour, toll free confidential suicide hotline for LGBTQ youth.     Trans Lifeline  747.725.1240  Peer support hotline run by trans people, for trans, and questioning callers    [place] Native Helpline  3.327.805.8414   "Confidential and anonymous culturally-appropriate domestic violence and dating violence helpline for Native Americans, available every day from 7 a.m. to 10 p.m.    [place] Crisis Line  988 (Press 1)  or text 105794    [facility] Mental Health Crisis Response  [facility] [place] by [facility]  799.549.2002  [facility] Mobile Crisis  128.478.8267  [facility] Crisis  830.013.7121  [facility] Mobile Crisis  442.927.9063   [facility] Mobile Crisis  638.267.8620 (adults)  [phone] (children)  [facility] Mobile Crisis  033.859.1358  [facility] Mobile Crisis  431.645.8267    Community Resources  Fast Tracker  Linking people to mental health and substance use disorder resources  Omegawave.org     [place] Mental Health Warmline  Peer to peer support  9 am to 9 pm, 7 days/week  940.238.3629 or 855.WARMLINE  Text \"Support\" to 52103  https://mentalhealthmn.org/    National [place] [employer] Mental Illness ([place])  691.017.6513 or 1.888.[place].HELPS  https://namimn.org/    [facility] for Adult Mental Health  [facility] residents [address]  398.198.6951    [facility]  Free mental health counseling  https://walkin.org/  455.046.6033    Mental Health Apps  Calm Harm  https://calmharm.co.uk/  My3  https://my3app.org/  VirtualHopeBox  https://[place].org/apps/virtual-hope-box/  Suicide Safety Plan (Identiv)  [place] Safety Plan  https://www.mysafetyplan.org/   "

## 2025-08-19 ENCOUNTER — TRANSFERRED RECORDS (OUTPATIENT)
Dept: HEALTH INFORMATION MANAGEMENT | Facility: CLINIC | Age: 75
End: 2025-08-19
Payer: COMMERCIAL

## 2025-09-03 ENCOUNTER — OFFICE VISIT (OUTPATIENT)
Dept: FAMILY MEDICINE | Facility: CLINIC | Age: 75
End: 2025-09-03
Payer: MEDICARE

## 2025-09-03 VITALS
HEIGHT: 65 IN | BODY MASS INDEX: 23.66 KG/M2 | RESPIRATION RATE: 16 BRPM | OXYGEN SATURATION: 97 % | SYSTOLIC BLOOD PRESSURE: 120 MMHG | TEMPERATURE: 97 F | WEIGHT: 142 LBS | HEART RATE: 63 BPM | DIASTOLIC BLOOD PRESSURE: 74 MMHG

## 2025-09-03 DIAGNOSIS — K21.00 GASTROESOPHAGEAL REFLUX DISEASE WITH ESOPHAGITIS WITHOUT HEMORRHAGE: ICD-10-CM

## 2025-09-03 DIAGNOSIS — F33.2 SEVERE RECURRENT MAJOR DEPRESSION WITHOUT PSYCHOTIC FEATURES (H): ICD-10-CM

## 2025-09-03 DIAGNOSIS — I48.92 ATRIAL FLUTTER, UNSPECIFIED TYPE (H): ICD-10-CM

## 2025-09-03 DIAGNOSIS — E06.3 HASHIMOTO'S THYROIDITIS: ICD-10-CM

## 2025-09-03 DIAGNOSIS — F10.21 ALCOHOL DEPENDENCE IN REMISSION (H): ICD-10-CM

## 2025-09-03 DIAGNOSIS — Z85.3 PERSONAL HISTORY OF MALIGNANT NEOPLASM OF BREAST: ICD-10-CM

## 2025-09-03 DIAGNOSIS — H26.9 CATARACT, UNSPECIFIED CATARACT TYPE, UNSPECIFIED LATERALITY: ICD-10-CM

## 2025-09-03 DIAGNOSIS — Z01.818 PREOP GENERAL PHYSICAL EXAM: Primary | ICD-10-CM

## 2025-09-03 LAB
ANION GAP SERPL CALCULATED.3IONS-SCNC: 11 MMOL/L (ref 7–15)
BUN SERPL-MCNC: 10.8 MG/DL (ref 8–23)
CALCIUM SERPL-MCNC: 9.9 MG/DL (ref 8.8–10.4)
CHLORIDE SERPL-SCNC: 103 MMOL/L (ref 98–107)
CREAT SERPL-MCNC: 0.81 MG/DL (ref 0.51–0.95)
EGFRCR SERPLBLD CKD-EPI 2021: 76 ML/MIN/1.73M2
GLUCOSE SERPL-MCNC: 108 MG/DL (ref 70–99)
HCO3 SERPL-SCNC: 26 MMOL/L (ref 22–29)
POTASSIUM SERPL-SCNC: 4.3 MMOL/L (ref 3.4–5.3)
SODIUM SERPL-SCNC: 140 MMOL/L (ref 135–145)

## 2025-09-03 PROCEDURE — 3078F DIAST BP <80 MM HG: CPT | Performed by: PHYSICIAN ASSISTANT

## 2025-09-03 PROCEDURE — 80048 BASIC METABOLIC PNL TOTAL CA: CPT | Performed by: PHYSICIAN ASSISTANT

## 2025-09-03 PROCEDURE — 90662 IIV NO PRSV INCREASED AG IM: CPT | Performed by: PHYSICIAN ASSISTANT

## 2025-09-03 PROCEDURE — 36415 COLL VENOUS BLD VENIPUNCTURE: CPT | Performed by: PHYSICIAN ASSISTANT

## 2025-09-03 PROCEDURE — G0008 ADMIN INFLUENZA VIRUS VAC: HCPCS | Performed by: PHYSICIAN ASSISTANT

## 2025-09-03 PROCEDURE — 1126F AMNT PAIN NOTED NONE PRSNT: CPT | Performed by: PHYSICIAN ASSISTANT

## 2025-09-03 PROCEDURE — 3074F SYST BP LT 130 MM HG: CPT | Performed by: PHYSICIAN ASSISTANT

## 2025-09-03 PROCEDURE — 99213 OFFICE O/P EST LOW 20 MIN: CPT | Performed by: PHYSICIAN ASSISTANT

## 2025-09-03 ASSESSMENT — PAIN SCALES - GENERAL: PAINLEVEL_OUTOF10: NO PAIN (0)

## 2025-09-03 ASSESSMENT — PATIENT HEALTH QUESTIONNAIRE - PHQ9
SUM OF ALL RESPONSES TO PHQ QUESTIONS 1-9: 2
SUM OF ALL RESPONSES TO PHQ QUESTIONS 1-9: 2
10. IF YOU CHECKED OFF ANY PROBLEMS, HOW DIFFICULT HAVE THESE PROBLEMS MADE IT FOR YOU TO DO YOUR WORK, TAKE CARE OF THINGS AT HOME, OR GET ALONG WITH OTHER PEOPLE: NOT DIFFICULT AT ALL

## (undated) DEVICE — DRSG GAUZE 4X4" 3033

## (undated) DEVICE — Device

## (undated) DEVICE — DRAIN JACKSON PRATT RESERVOIR 100ML SU130-1305

## (undated) DEVICE — SU VICRYL 2-0 TIE 12X18" J905T

## (undated) DEVICE — SU SILK 2-0 FSL 18" 677G

## (undated) DEVICE — DRSG STERI STRIP 1/2X4" R1547

## (undated) DEVICE — ESU ELEC BLADE 2.75" COATED/INSULATED E1455

## (undated) DEVICE — DEFIB PRO-PADZ LVP LQD GEL ADULT 8900-2105-01

## (undated) DEVICE — GLOVE PROTEXIS BLUE W/NEU-THERA 7.5  2D73EB75

## (undated) DEVICE — PREP CHLORAPREP 26ML TINTED ORANGE  260815

## (undated) DEVICE — PACK MINOR SBA15MIFSE

## (undated) DEVICE — LINEN TOWEL PACK X5 5464

## (undated) DEVICE — SOL NACL 0.9% IRRIG 1000ML BOTTLE 2F7124

## (undated) DEVICE — DRAIN JACKSON PRATT 15FR ROUND SU130-1323

## (undated) DEVICE — SU VICRYL 3-0 SH 27" J316H

## (undated) DEVICE — GLOVE PROTEXIS W/NEU-THERA 7.5  2D73TE75

## (undated) DEVICE — CATH EP 7FR X 115CM DECANAV CA

## (undated) DEVICE — SUCTION TIP YANKAUER W/O VENT K86

## (undated) DEVICE — PIN SAFTY INF 1.5" STERILE SS C18700-020

## (undated) DEVICE — DRAPE BREAST/CHEST 29420

## (undated) DEVICE — SUCTION CANISTER MEDIVAC LINER 3000ML W/LID 65651-530

## (undated) DEVICE — CATH 8MM NAVISTAR J-J CRV 115

## (undated) DEVICE — INTRO CATH 12CM 8.5FR FST-CATH

## (undated) DEVICE — PACK EP SRG PROC LF DISP SAN32EPFSR

## (undated) DEVICE — INTRODUCER SHEATH GREEN 6.5FRX11CM .038IN PSI-6F-11-038ACT

## (undated) DEVICE — SU VICRYL 4-0 PS-2 18" UND J496H

## (undated) DEVICE — PATCH CARTO 3 EXTERNAL REFERENCE 3D MAPPING CREFP6

## (undated) RX ORDER — FENTANYL CITRATE 50 UG/ML
INJECTION, SOLUTION INTRAMUSCULAR; INTRAVENOUS
Status: DISPENSED
Start: 2023-12-11

## (undated) RX ORDER — LIDOCAINE HYDROCHLORIDE 20 MG/ML
INJECTION, SOLUTION EPIDURAL; INFILTRATION; INTRACAUDAL; PERINEURAL
Status: DISPENSED
Start: 2017-11-02

## (undated) RX ORDER — HEPARIN SODIUM 1000 [USP'U]/ML
INJECTION, SOLUTION INTRAVENOUS; SUBCUTANEOUS
Status: DISPENSED
Start: 2023-12-11

## (undated) RX ORDER — DEXAMETHASONE SODIUM PHOSPHATE 4 MG/ML
INJECTION, SOLUTION INTRA-ARTICULAR; INTRALESIONAL; INTRAMUSCULAR; INTRAVENOUS; SOFT TISSUE
Status: DISPENSED
Start: 2017-11-02

## (undated) RX ORDER — KETOROLAC TROMETHAMINE 30 MG/ML
INJECTION, SOLUTION INTRAMUSCULAR; INTRAVENOUS
Status: DISPENSED
Start: 2017-11-02

## (undated) RX ORDER — FLUMAZENIL 0.1 MG/ML
INJECTION, SOLUTION INTRAVENOUS
Status: DISPENSED
Start: 2023-12-11

## (undated) RX ORDER — NALOXONE HYDROCHLORIDE 0.4 MG/ML
INJECTION, SOLUTION INTRAMUSCULAR; INTRAVENOUS; SUBCUTANEOUS
Status: DISPENSED
Start: 2023-12-11

## (undated) RX ORDER — HYDROCODONE BITARTRATE AND ACETAMINOPHEN 5; 325 MG/1; MG/1
TABLET ORAL
Status: DISPENSED
Start: 2017-11-02

## (undated) RX ORDER — REGADENOSON 0.08 MG/ML
INJECTION, SOLUTION INTRAVENOUS
Status: DISPENSED
Start: 2021-07-21

## (undated) RX ORDER — PROPOFOL 10 MG/ML
INJECTION, EMULSION INTRAVENOUS
Status: DISPENSED
Start: 2017-11-02

## (undated) RX ORDER — LIDOCAINE HYDROCHLORIDE 40 MG/ML
SOLUTION TOPICAL
Status: DISPENSED
Start: 2023-12-11

## (undated) RX ORDER — CEFAZOLIN SODIUM 2 G/100ML
INJECTION, SOLUTION INTRAVENOUS
Status: DISPENSED
Start: 2017-11-02

## (undated) RX ORDER — HYDROMORPHONE HYDROCHLORIDE 1 MG/ML
INJECTION, SOLUTION INTRAMUSCULAR; INTRAVENOUS; SUBCUTANEOUS
Status: DISPENSED
Start: 2017-11-02

## (undated) RX ORDER — ACETAMINOPHEN 10 MG/ML
INJECTION, SOLUTION INTRAVENOUS
Status: DISPENSED
Start: 2017-11-02

## (undated) RX ORDER — LIDOCAINE HYDROCHLORIDE 10 MG/ML
INJECTION, SOLUTION EPIDURAL; INFILTRATION; INTRACAUDAL; PERINEURAL
Status: DISPENSED
Start: 2023-12-11

## (undated) RX ORDER — FENTANYL CITRATE 50 UG/ML
INJECTION, SOLUTION INTRAMUSCULAR; INTRAVENOUS
Status: DISPENSED
Start: 2017-11-02

## (undated) RX ORDER — FENTANYL CITRATE 50 UG/ML
INJECTION, SOLUTION INTRAMUSCULAR; INTRAVENOUS
Status: DISPENSED
Start: 2020-02-10

## (undated) RX ORDER — GLYCOPYRROLATE 0.2 MG/ML
INJECTION, SOLUTION INTRAMUSCULAR; INTRAVENOUS
Status: DISPENSED
Start: 2023-12-11

## (undated) RX ORDER — ONDANSETRON 2 MG/ML
INJECTION INTRAMUSCULAR; INTRAVENOUS
Status: DISPENSED
Start: 2017-11-02

## (undated) RX ORDER — FENTANYL CITRATE 50 UG/ML
INJECTION, SOLUTION INTRAMUSCULAR; INTRAVENOUS
Status: DISPENSED
Start: 2024-04-03